# Patient Record
Sex: FEMALE | Race: WHITE | Employment: OTHER | URBAN - NONMETROPOLITAN AREA
[De-identification: names, ages, dates, MRNs, and addresses within clinical notes are randomized per-mention and may not be internally consistent; named-entity substitution may affect disease eponyms.]

---

## 2011-01-01 LAB
BILIRUBIN URINE: NORMAL
BLOOD, URINE: NORMAL
CLARITY: NORMAL
COLOR: NORMAL
GLUCOSE URINE: NORMAL
KETONES, URINE: NORMAL
LEUKOCYTE ESTERASE, URINE: NORMAL
NITRITE, URINE: NORMAL
PH UA: NORMAL
PROTEIN UA: NORMAL
SPECIFIC GRAVITY UA: NORMAL
UROBILINOGEN, URINE: NORMAL

## 2017-01-24 PROBLEM — A41.9 SEPSIS (HCC): Status: ACTIVE | Noted: 2017-01-24

## 2017-02-20 ENCOUNTER — OFFICE VISIT (OUTPATIENT)
Dept: CARDIOLOGY | Age: 67
End: 2017-02-20

## 2017-02-20 VITALS
WEIGHT: 129 LBS | HEART RATE: 60 BPM | HEIGHT: 63 IN | DIASTOLIC BLOOD PRESSURE: 80 MMHG | SYSTOLIC BLOOD PRESSURE: 130 MMHG | BODY MASS INDEX: 22.86 KG/M2

## 2017-02-20 DIAGNOSIS — I47.29 NSVT (NONSUSTAINED VENTRICULAR TACHYCARDIA): ICD-10-CM

## 2017-02-20 DIAGNOSIS — R06.09 DOE (DYSPNEA ON EXERTION): ICD-10-CM

## 2017-02-20 DIAGNOSIS — I49.3 PVC'S (PREMATURE VENTRICULAR CONTRACTIONS): Primary | ICD-10-CM

## 2017-02-20 DIAGNOSIS — G35 MULTIPLE SCLEROSIS (HCC): ICD-10-CM

## 2017-02-20 PROCEDURE — 1036F TOBACCO NON-USER: CPT | Performed by: PHYSICIAN ASSISTANT

## 2017-02-20 PROCEDURE — G8484 FLU IMMUNIZE NO ADMIN: HCPCS | Performed by: PHYSICIAN ASSISTANT

## 2017-02-20 PROCEDURE — G8427 DOCREV CUR MEDS BY ELIG CLIN: HCPCS | Performed by: PHYSICIAN ASSISTANT

## 2017-02-20 PROCEDURE — 1090F PRES/ABSN URINE INCON ASSESS: CPT | Performed by: PHYSICIAN ASSISTANT

## 2017-02-20 PROCEDURE — G8419 CALC BMI OUT NRM PARAM NOF/U: HCPCS | Performed by: PHYSICIAN ASSISTANT

## 2017-02-20 PROCEDURE — 99213 OFFICE O/P EST LOW 20 MIN: CPT | Performed by: PHYSICIAN ASSISTANT

## 2017-02-20 PROCEDURE — 4040F PNEUMOC VAC/ADMIN/RCVD: CPT | Performed by: PHYSICIAN ASSISTANT

## 2017-02-20 PROCEDURE — 3017F COLORECTAL CA SCREEN DOC REV: CPT | Performed by: PHYSICIAN ASSISTANT

## 2017-02-20 PROCEDURE — G8400 PT W/DXA NO RESULTS DOC: HCPCS | Performed by: PHYSICIAN ASSISTANT

## 2017-02-20 PROCEDURE — 1111F DSCHRG MED/CURRENT MED MERGE: CPT | Performed by: PHYSICIAN ASSISTANT

## 2017-02-20 PROCEDURE — 3014F SCREEN MAMMO DOC REV: CPT | Performed by: PHYSICIAN ASSISTANT

## 2017-02-20 PROCEDURE — 1123F ACP DISCUSS/DSCN MKR DOCD: CPT | Performed by: PHYSICIAN ASSISTANT

## 2017-02-20 RX ORDER — DIGOXIN 250 MCG
250 TABLET ORAL DAILY
Qty: 30 TABLET | Refills: 3 | Status: SHIPPED | OUTPATIENT
Start: 2017-02-20 | End: 2017-06-27 | Stop reason: SDUPTHER

## 2017-06-27 ENCOUNTER — OFFICE VISIT (OUTPATIENT)
Dept: CARDIOLOGY | Age: 67
End: 2017-06-27

## 2017-06-27 VITALS
HEART RATE: 72 BPM | WEIGHT: 130 LBS | SYSTOLIC BLOOD PRESSURE: 118 MMHG | HEIGHT: 63 IN | DIASTOLIC BLOOD PRESSURE: 64 MMHG | BODY MASS INDEX: 23.04 KG/M2

## 2017-06-27 DIAGNOSIS — I49.3 FREQUENT PVCS: Primary | ICD-10-CM

## 2017-06-27 DIAGNOSIS — I47.29 NSVT (NONSUSTAINED VENTRICULAR TACHYCARDIA): ICD-10-CM

## 2017-06-27 PROCEDURE — 3017F COLORECTAL CA SCREEN DOC REV: CPT | Performed by: INTERNAL MEDICINE

## 2017-06-27 PROCEDURE — 4040F PNEUMOC VAC/ADMIN/RCVD: CPT | Performed by: INTERNAL MEDICINE

## 2017-06-27 PROCEDURE — G8420 CALC BMI NORM PARAMETERS: HCPCS | Performed by: INTERNAL MEDICINE

## 2017-06-27 PROCEDURE — 3014F SCREEN MAMMO DOC REV: CPT | Performed by: INTERNAL MEDICINE

## 2017-06-27 PROCEDURE — 1036F TOBACCO NON-USER: CPT | Performed by: INTERNAL MEDICINE

## 2017-06-27 PROCEDURE — 1123F ACP DISCUSS/DSCN MKR DOCD: CPT | Performed by: INTERNAL MEDICINE

## 2017-06-27 PROCEDURE — G8427 DOCREV CUR MEDS BY ELIG CLIN: HCPCS | Performed by: INTERNAL MEDICINE

## 2017-06-27 PROCEDURE — 1090F PRES/ABSN URINE INCON ASSESS: CPT | Performed by: INTERNAL MEDICINE

## 2017-06-27 PROCEDURE — G8400 PT W/DXA NO RESULTS DOC: HCPCS | Performed by: INTERNAL MEDICINE

## 2017-06-27 PROCEDURE — 99213 OFFICE O/P EST LOW 20 MIN: CPT | Performed by: INTERNAL MEDICINE

## 2017-06-27 RX ORDER — ASPIRIN 325 MG
325 TABLET ORAL DAILY
COMMUNITY
End: 2018-02-06 | Stop reason: SDUPTHER

## 2017-06-27 RX ORDER — HYDROCODONE BITARTRATE AND ACETAMINOPHEN 5; 325 MG/1; MG/1
1 TABLET ORAL EVERY 8 HOURS PRN
COMMUNITY
End: 2018-02-06 | Stop reason: ALTCHOICE

## 2017-06-27 RX ORDER — DIGOXIN 250 MCG
250 TABLET ORAL DAILY
Qty: 30 TABLET | Refills: 6 | Status: SHIPPED | OUTPATIENT
Start: 2017-06-27 | End: 2018-01-09 | Stop reason: SDUPTHER

## 2018-01-09 RX ORDER — DIGOXIN 250 MCG
250 TABLET ORAL DAILY
Qty: 30 TABLET | Refills: 6 | Status: SHIPPED | OUTPATIENT
Start: 2018-01-09 | End: 2018-01-17 | Stop reason: SDUPTHER

## 2018-01-18 RX ORDER — DIGOXIN 250 MCG
250 TABLET ORAL DAILY
Qty: 30 TABLET | Refills: 0 | Status: SHIPPED | OUTPATIENT
Start: 2018-01-18 | End: 2018-08-07

## 2018-02-06 ENCOUNTER — OFFICE VISIT (OUTPATIENT)
Dept: CARDIOLOGY CLINIC | Age: 68
End: 2018-02-06
Payer: MEDICARE

## 2018-02-06 VITALS
WEIGHT: 130 LBS | SYSTOLIC BLOOD PRESSURE: 90 MMHG | HEART RATE: 62 BPM | DIASTOLIC BLOOD PRESSURE: 58 MMHG | BODY MASS INDEX: 23.04 KG/M2 | HEIGHT: 63 IN

## 2018-02-06 DIAGNOSIS — I25.10 ASCVD (ARTERIOSCLEROTIC CARDIOVASCULAR DISEASE): Primary | ICD-10-CM

## 2018-02-06 PROCEDURE — 3014F SCREEN MAMMO DOC REV: CPT | Performed by: INTERNAL MEDICINE

## 2018-02-06 PROCEDURE — 4040F PNEUMOC VAC/ADMIN/RCVD: CPT | Performed by: INTERNAL MEDICINE

## 2018-02-06 PROCEDURE — 1036F TOBACCO NON-USER: CPT | Performed by: INTERNAL MEDICINE

## 2018-02-06 PROCEDURE — G8420 CALC BMI NORM PARAMETERS: HCPCS | Performed by: INTERNAL MEDICINE

## 2018-02-06 PROCEDURE — 3017F COLORECTAL CA SCREEN DOC REV: CPT | Performed by: INTERNAL MEDICINE

## 2018-02-06 PROCEDURE — G8400 PT W/DXA NO RESULTS DOC: HCPCS | Performed by: INTERNAL MEDICINE

## 2018-02-06 PROCEDURE — G8484 FLU IMMUNIZE NO ADMIN: HCPCS | Performed by: INTERNAL MEDICINE

## 2018-02-06 PROCEDURE — G8427 DOCREV CUR MEDS BY ELIG CLIN: HCPCS | Performed by: INTERNAL MEDICINE

## 2018-02-06 PROCEDURE — G8598 ASA/ANTIPLAT THER USED: HCPCS | Performed by: INTERNAL MEDICINE

## 2018-02-06 PROCEDURE — 1123F ACP DISCUSS/DSCN MKR DOCD: CPT | Performed by: INTERNAL MEDICINE

## 2018-02-06 PROCEDURE — 1090F PRES/ABSN URINE INCON ASSESS: CPT | Performed by: INTERNAL MEDICINE

## 2018-02-06 PROCEDURE — 99214 OFFICE O/P EST MOD 30 MIN: CPT | Performed by: INTERNAL MEDICINE

## 2018-02-06 ASSESSMENT — ENCOUNTER SYMPTOMS
BLURRED VISION: 0
BACK PAIN: 0
DIARRHEA: 0
BLOATING: 0
COUGH: 0
CONSTIPATION: 0
HOARSE VOICE: 0
EYE DISCHARGE: 0
BOWEL INCONTINENCE: 0
SPUTUM PRODUCTION: 0
DOUBLE VISION: 0
SHORTNESS OF BREATH: 0
EYE PAIN: 0
ABDOMINAL PAIN: 0
HEMOPTYSIS: 0
CHANGE IN BOWEL HABIT: 0
ORTHOPNEA: 0

## 2018-02-06 NOTE — PROGRESS NOTES
[facility] PROFESSIONAL SERVS  [facility] OF [address]  [address] [address]  Dept: [phone]  Dept Fax: [phone]  Loc: [phone]    Visit Date: 2/6/2018    Ms. Jagjit Ramírez is a 69 y.o. female  who presented for:  Chief Complaint   Patient presents with    6 Month Follow-Up     PVC's       HPI:   68 yo F c hx of HLD, Multiple sclerosis, and seizure disorder presents for a follow up. Patient was last seen by Dr. Madan Hi. She is wheel chair bound due to multiple sclerosis . She was previously seen in hospital for possible PVCs? She denies any symptoms and is unsure of what medications she takes for her heart. Denies any chest pain, sob, palpitations, lightheadedness, dizziness, orthopnea, PND or pedal edema. Current Outpatient Prescriptions:     aspirin 81 MG tablet, Take 1 tablet by mouth daily, Disp: 30 tablet, Rfl: 3    metoprolol tartrate (LOPRESSOR) 25 MG tablet, Take 0.5 tablets by mouth 2 times daily, Disp: 30 tablet, Rfl: 0    digoxin (LANOXIN) 250 MCG tablet, Take 1 tablet by mouth daily, Disp: 30 tablet, Rfl: 0    oxybutynin (DITROPAN) 5 MG/5ML syrup, Take 5 mg by mouth 2 times daily, Disp: , Rfl:     carBAMazepine (TEGRETOL) 100 MG chewable tablet, Take 100 mg by mouth 3 times daily , Disp: , Rfl:     Past Medical History  Maria E Wahl  has a past medical history of Hyperlipidemia; Intra-abdominal lymphadenopathy; MS (multiple sclerosis) ([facility] [address] Pancreatitis; Pneumonia; Seizures ([facility] [address] and UTI (urinary tract infection). Social History  Sharri  reports that she quit smoking about 4 years ago. Her smoking use included E-Cigarettes. She has a 80.00 pack-year smoking history. She has quit using smokeless tobacco. She reports that she does not drink alcohol or use drugs. Family History  Sharri family history includes Cancer in her mother; Heart Disease in her father.     Past Surgical History   Past Surgical History:   Procedure Laterality Date    CHOLECYSTECTOMY Normal rate, regular rhythm, normal heart sounds and intact distal pulses. No murmur heard. Pulmonary/Chest: Effort normal and breath sounds normal. No respiratory distress. She has no wheezes. She has no rales. Abdominal: Soft. Bowel sounds are normal. She exhibits no distension. There is no tenderness. Musculoskeletal: Normal range of motion. She exhibits no edema. Neurological: She is alert and oriented to person, place, and time. No cranial nerve deficit. Coordination normal.   Skin: Skin is warm and dry. Psychiatric: She has a normal mood and affect. Nursing note and vitals reviewed.       No results found for: CKTOTAL, CKMB, CKMBINDEX    Lab Results   Component Value Date    WBC 7.6 01/29/2017    RBC 3.87 01/29/2017    HGB 12.7 01/29/2017    HCT 37.2 01/29/2017    MCV 95.9 01/29/2017    MCH 32.8 01/29/2017    MCHC 34.2 01/29/2017    RDW 13.6 01/29/2017     01/29/2017    MPV 9.4 01/29/2017       Lab Results   Component Value Date     01/29/2017    K 3.9 01/29/2017    CL 98 01/29/2017    CO2 25 01/29/2017    BUN 6 01/29/2017    LABALBU 2.4 01/24/2017    CREATININE 0.3 01/29/2017    CALCIUM 8.5 01/29/2017    LABGLOM >90 01/29/2017    GLUCOSE 75 01/29/2017       Lab Results   Component Value Date    ALKPHOS 261 01/24/2017    ALT 41 01/24/2017    AST 54 01/24/2017    PROT 7.1 01/24/2017    BILITOT 0.5 01/24/2017    BILIDIR 1.2 04/03/2016    LABALBU 2.4 01/24/2017       Lab Results   Component Value Date    MG 1.9 01/25/2017       Lab Results   Component Value Date    INR 1.04 10/02/2015         No results found for: LABA1C    Lab Results   Component Value Date    TRIG 67 10/02/2015       Lab Results   Component Value Date    TSH 2.430 03/29/2016         Testing Reviewed:      I have individually reviewed the below cardiac tests    EKG:SR, anterior infarct    ECHO:   Results for orders placed during the hospital encounter of 01/23/17   ECHO Complete 2D W Doppler W Color    Narrative Transthoracic Echocardiography Report (TTE)     Demographics      Patient Name    Alyssa Figueroa Gender                Female      MR #            [id number]     Race                                                      Ethnicity      Account #       [de-identified]       Room Number           0004      Accession       [id number]     Date of Study         01/24/2017   Number      Date of Birth   1950    Referring Physician   Sierra Arteaga MD      Age             74 year(s)    Sonographer           Ngozi Reis [facility]                                    Interpreting          Martha Watson DO                                 Physician     Procedure    Type of Study      TTE procedure:ECHOCARDIOGRAM COMPLETE 2D W DOPPLER W COLOR. Procedure Date  Date: 01/24/2017 Start: 08:50 AM    Study Location: Bedside  Technical Quality: Adequate visualization    Indications:Arrhythmia. Additional Medical History:Ex Smoker, Sepsis, Multiple Sclerosis,  Hyperlipidemia. Patient Status: Routine    Height: 63 inches Weight: 115 pounds BSA: 1.53 m^2 BMI: 20.37 kg/m^2    BP: 96/76 mmHg     Conclusions      Summary   Systolic function was normal.   Ejection fraction is visually estimated at 50 %. Doppler parameters were consistent with abnormal left ventricular   relaxation (grade 1 diastolic dysfunction). Normal right ventricular size and function. Right ventricular systolic pressure of 45 mm Hg consistent with mild   pulmonary hypertension. Structurally normal mitral valve. Mild mitral regurgitation is present. Tricuspid valve is structurally normal.   Mild tricuspid regurgitation visualized.       Signature      ----------------------------------------------------------------   Electronically signed by Martha Watson DO (Interpreting   physician) on 01/24/2017 at 09:43 PM   ---------------------------------------------------------------- TV Peak A-Wave: 39 cm/s   msec                              IVRT: 70 msec         TV Peak Gradient: 1.05                                                    mmHg   MV E' Septal Velocity:                           TR Velocity:296 cm/s   8.68 cm/s                  AV DVI (Vmax):0.7     TR Gradient:35.05 mmHg   MV A' Septal Velocity:                           PV Peak Velocity: 73.5   8.48 cm/s                                        cm/s   MV E' Lateral Velocity:                          PV Peak Gradient: 2.16   7.02 cm/s                                        mmHg   MV A' Lateral Velocity:   11.1 cm/s   E/E' septal: 10.35   E/E' lateral: 12.79   MR Velocity: 398 cm/s     [email or web address] [id number]%3d%3d       STRESS:2/23/17:      Summary   There were no significant perfusion abnormalities.   No evidence of ischemia is noted on this study.   The LVEF is calculated to be 63% with normal wall motion       CATH:    Assessment/Plan      1. ASCVD (arteriosclerotic cardiovascular disease)     2. Hx of PVCs  3. HLD  4. Multiple scleroris    Patient denies any cardiac symptoms at present  Reviewed Echo and Stress test with her  BP on the lower side  No lightheadedness or dizziness  Patient on Aspirin 325mg will reduce to 81 mg daily  Patient is on digoxin, asked to stop the medication  The patient is asked to make an attempt to improve diet and exercise patterns to aid in medical management of this problem. Asked to call office or return to clinic if any changes or symptoms. Thank you for allowing me to participate in the care of this patient. Please do not hesitate to contact me for any further questions. Return in about 6 months (around 8/6/2018), or if symptoms worsen or fail to improve, for Review testing, Regular follow up.            Electronically signed by Gómez Rangel MD

## 2018-02-06 NOTE — PROGRESS NOTES
Pt here for 6 month follow up, former [facility] pt. Pt denies cp, dizziness/lightheadedness, SOB, palpitations    Pt continues with bilateral feet swelling without change.

## 2018-06-04 RX ORDER — ACETAMINOPHEN/DIPHENHYDRAMINE 500MG-25MG
TABLET ORAL
Qty: 30 TABLET | Refills: 6 | Status: SHIPPED | OUTPATIENT
Start: 2018-06-04 | End: 2018-12-28 | Stop reason: SDUPTHER

## 2018-08-07 ENCOUNTER — OFFICE VISIT (OUTPATIENT)
Dept: CARDIOLOGY CLINIC | Age: 68
End: 2018-08-07
Payer: MEDICARE

## 2018-08-07 VITALS
HEART RATE: 60 BPM | BODY MASS INDEX: 23.92 KG/M2 | HEIGHT: 63 IN | SYSTOLIC BLOOD PRESSURE: 122 MMHG | DIASTOLIC BLOOD PRESSURE: 72 MMHG | WEIGHT: 135 LBS

## 2018-08-07 DIAGNOSIS — I25.10 ASCVD (ARTERIOSCLEROTIC CARDIOVASCULAR DISEASE): Primary | ICD-10-CM

## 2018-08-07 DIAGNOSIS — R60.0 LEG EDEMA: ICD-10-CM

## 2018-08-07 PROCEDURE — 4040F PNEUMOC VAC/ADMIN/RCVD: CPT | Performed by: INTERNAL MEDICINE

## 2018-08-07 PROCEDURE — G8598 ASA/ANTIPLAT THER USED: HCPCS | Performed by: INTERNAL MEDICINE

## 2018-08-07 PROCEDURE — G8427 DOCREV CUR MEDS BY ELIG CLIN: HCPCS | Performed by: INTERNAL MEDICINE

## 2018-08-07 PROCEDURE — 1036F TOBACCO NON-USER: CPT | Performed by: INTERNAL MEDICINE

## 2018-08-07 PROCEDURE — 99213 OFFICE O/P EST LOW 20 MIN: CPT | Performed by: INTERNAL MEDICINE

## 2018-08-07 PROCEDURE — 1123F ACP DISCUSS/DSCN MKR DOCD: CPT | Performed by: INTERNAL MEDICINE

## 2018-08-07 PROCEDURE — 3017F COLORECTAL CA SCREEN DOC REV: CPT | Performed by: INTERNAL MEDICINE

## 2018-08-07 PROCEDURE — 1090F PRES/ABSN URINE INCON ASSESS: CPT | Performed by: INTERNAL MEDICINE

## 2018-08-07 PROCEDURE — G8420 CALC BMI NORM PARAMETERS: HCPCS | Performed by: INTERNAL MEDICINE

## 2018-08-07 PROCEDURE — 1101F PT FALLS ASSESS-DOCD LE1/YR: CPT | Performed by: INTERNAL MEDICINE

## 2018-08-07 PROCEDURE — G8400 PT W/DXA NO RESULTS DOC: HCPCS | Performed by: INTERNAL MEDICINE

## 2018-08-07 RX ORDER — FUROSEMIDE 20 MG/1
20 TABLET ORAL DAILY
Qty: 60 TABLET | Refills: 3 | Status: SHIPPED | OUTPATIENT
Start: 2018-08-07 | End: 2018-11-08

## 2018-08-07 ASSESSMENT — ENCOUNTER SYMPTOMS
SHORTNESS OF BREATH: 0
ORTHOPNEA: 0
EYE PAIN: 0
BLOATING: 0
EYE DISCHARGE: 0
SPUTUM PRODUCTION: 0
BOWEL INCONTINENCE: 0
CONSTIPATION: 0
HEMOPTYSIS: 0
COUGH: 0
DOUBLE VISION: 0
HOARSE VOICE: 0
CHANGE IN BOWEL HABIT: 0
BACK PAIN: 0
BLURRED VISION: 0
ABDOMINAL PAIN: 0
DIARRHEA: 0

## 2018-08-07 NOTE — PROGRESS NOTES
Pt here for 6 month check up    Denies chest pain, SOB, palpitations     C/O bilateral lower leg edema. Pt states she has had it before but not this bad.

## 2018-08-07 NOTE — PROGRESS NOTES
[address] CARDIOLOGY  [facility] [address]  [place] [address]  Dept: [phone]  Dept Fax: [phone]  Loc: [phone]    Visit Date: 8/7/2018    Ms. Jimenez Harrison is a 69 y.o. female  who presented for:  Chief Complaint   Patient presents with    6 Month Follow-Up    Leg Swelling       HPI:   70 yo F c hx of Seizures, pancreatitis, HLD is here for a follow up. Patient reports some pedal edema. The leg edema is chronic. paient is unable to ambulate due to multiple sclerosis. Denies any chest pain, sob, palpitations, lightheadedness, dizziness, orthopnea, PND. Current Outpatient Prescriptions:     RA ASPIRIN EC 81 MG EC tablet, take 1 tablet by mouth once daily, Disp: 30 tablet, Rfl: 6    metoprolol tartrate (LOPRESSOR) 25 MG tablet, Take 0.5 tablets by mouth 2 times daily, Disp: 30 tablet, Rfl: 8    oxybutynin (DITROPAN) 5 MG/5ML syrup, Take 5 mg by mouth 2 times daily, Disp: , Rfl:     carBAMazepine (TEGRETOL) 100 MG chewable tablet, Take 100 mg by mouth 3 times daily , Disp: , Rfl:     Past Medical History  Kamran Grace  has a past medical history of Hyperlipidemia; Intra-abdominal lymphadenopathy; MS (multiple sclerosis) ([address] Pancreatitis; Pneumonia; Seizures ([address] and UTI (urinary tract infection). Social History  Sharri  reports that she quit smoking about 4 years ago. Her smoking use included E-Cigarettes. She has a 80.00 pack-year smoking history. She has quit using smokeless tobacco. She reports that she does not drink alcohol or use drugs. Family History  Sharri family history includes Cancer in her mother; Heart Disease in her father. Past Surgical History   Past Surgical History:   Procedure Laterality Date    CHOLECYSTECTOMY  April 1st 2016    laparoscopic       Subjective:     Review of Systems   Constitution: Negative for decreased appetite, diaphoresis, fever, weakness and malaise/fatigue.    HENT: Negative for congestion,                                     Ethnicity      Account #       [de-identified]       Room Number           0004      Accession       [id number]     Date of Study         01/24/2017   Number      Date of Birth   1950    Referring Physician   Gary Hein MD      Age             74 year(s)    Sonographer           Maria Teresa Carbajal, [facility]                                    Interpreting          Bailee Belle DO                                 Physician     Procedure    Type of Study      TTE procedure:ECHOCARDIOGRAM COMPLETE 2D W DOPPLER W COLOR. Procedure Date  Date: 01/24/2017 Start: 08:50 AM    Study Location: Bedside  Technical Quality: Adequate visualization    Indications:Arrhythmia. Additional Medical History:Ex Smoker, Sepsis, Multiple Sclerosis,  Hyperlipidemia. Patient Status: Routine    Height: 63 inches Weight: 115 pounds BSA: 1.53 m^2 BMI: 20.37 kg/m^2    BP: 96/76 mmHg     Conclusions      Summary   Systolic function was normal.   Ejection fraction is visually estimated at 50 %. Doppler parameters were consistent with abnormal left ventricular   relaxation (grade 1 diastolic dysfunction). Normal right ventricular size and function. Right ventricular systolic pressure of 45 mm Hg consistent with mild   pulmonary hypertension. Structurally normal mitral valve. Mild mitral regurgitation is present. Tricuspid valve is structurally normal.   Mild tricuspid regurgitation visualized. Signature      ----------------------------------------------------------------   Electronically signed by Bailee Belle DO (Interpreting   physician) on 01/24/2017 at 09:43 PM   ----------------------------------------------------------------      Findings      Mitral Valve   Structurally normal mitral valve. Mild mitral regurgitation is present.       Aortic Valve   The aortic valve appears to be trileaflet with good leaflet separation. Tricuspid Valve   Tricuspid valve is structurally normal.   Mild tricuspid regurgitation visualized. Pulmonic Valve   Pulmonic valve is structurally normal.   Trivial pulmonic regurgitation visualized. Left Atrium   Normal size left atrium. Left Ventricle   Systolic function was normal.   Ejection fraction is visually estimated at 50 %. Doppler parameters were consistent with abnormal left ventricular   relaxation (grade 1 diastolic dysfunction). Right Atrium   The right atrium is of normal size. Right Ventricle   Normal right ventricular size and function. Right ventricular systolic pressure of 45 mm Hg consistent with mild   pulmonary hypertension. Pericardial Effusion   There is a trivial pericardial effusion noted.      M-Mode/2D Measurements & Calculations      LV Diastolic     LV Systolic Dimension: 3 cm AV Cusp Separation: 1.6 cmLA   Dimension: 3.8   LV Volume Diastolic: 62 ml  Dimension: 2.4 cmAO Root   cm               LV Volume Systolic: 35 ml   Dimension: 3 cm   LV FS:21.1 %     LV EDV/LV EDV Index: 62   LV PW Diastolic: PA/56 W^0WR ESV/LV ESV   0.7 cm           Index: 35 ml/23 m^2   Septum           EF Calculated: 43.6 %       RV Diastolic Dimension: 1.6   Diastolic: 0.8                               cm   cm                                                LA/Aorta: 0.8     Doppler Measurements & Calculations      MV Peak E-Wave: 89.8 cm/s  AV Peak Velocity: 142 LVOT Peak Velocity: 100   MV Peak A-Wave: 91.8 cm/s  cm/s                  cm/s   MV E/A Ratio: 0.98         AV Peak Gradient:     LVOT Peak Gradient: 4   MV Peak Gradient: 3.23     8.07 mmHg             mmHg   mmHg                                                    TV Peak E-Wave: 51.3 cm/s   MV Deceleration Time: 313                        TV Peak A-Wave: 39 cm/s   msec                              IVRT: 70 msec         TV Peak Gradient: 1.05 MD [facility]  8/7/2018 at 12:21 PM

## 2018-11-08 NOTE — PROGRESS NOTES
Patient instructed not to eat or drink anything after midnight the day before surgery. Take heart, blood pressure medications & Tegretol in the morning with a small sip of water. Please bring list of medications with the dosages & when you take them. If you do not  have a list bring the medications bottles with you. If having a MAC or general anesthetic you MUST have a . Bring photo ID & insurance information. Leave jewelry (watch ,rings, peircings) & other valuables, including extra cash, at home. Wear comfortable clean clothing. When showering or bathing the night before or morning of surgery please use  antibacterial soap.

## 2018-11-14 ENCOUNTER — ANESTHESIA EVENT (OUTPATIENT)
Dept: OPERATING ROOM | Age: 68
End: 2018-11-14
Payer: MEDICARE

## 2018-11-15 ENCOUNTER — ANESTHESIA (OUTPATIENT)
Dept: OPERATING ROOM | Age: 68
End: 2018-11-15
Payer: MEDICARE

## 2018-11-15 ENCOUNTER — HOSPITAL ENCOUNTER (OUTPATIENT)
Age: 68
Setting detail: OUTPATIENT SURGERY
Discharge: HOME | End: 2018-11-15
Attending: OPHTHALMOLOGY | Admitting: OPHTHALMOLOGY
Payer: MEDICARE

## 2018-11-15 VITALS
OXYGEN SATURATION: 98 % | DIASTOLIC BLOOD PRESSURE: 52 MMHG | RESPIRATION RATE: 18 BRPM | SYSTOLIC BLOOD PRESSURE: 89 MMHG

## 2018-11-15 VITALS
WEIGHT: 135 LBS | RESPIRATION RATE: 20 BRPM | OXYGEN SATURATION: 98 % | DIASTOLIC BLOOD PRESSURE: 56 MMHG | SYSTOLIC BLOOD PRESSURE: 94 MMHG | TEMPERATURE: 97 F | HEIGHT: 63 IN | BODY MASS INDEX: 23.92 KG/M2 | HEART RATE: 95 BPM

## 2018-11-15 PROCEDURE — 2709999900 HC NON-CHARGEABLE SUPPLY: Performed by: OPHTHALMOLOGY

## 2018-11-15 PROCEDURE — 3600000003 HC SURGERY LEVEL 3 BASE: Performed by: OPHTHALMOLOGY

## 2018-11-15 PROCEDURE — 3600000013 HC SURGERY LEVEL 3 ADDTL 15MIN: Performed by: OPHTHALMOLOGY

## 2018-11-15 PROCEDURE — 3700000000 HC ANESTHESIA ATTENDED CARE: Performed by: OPHTHALMOLOGY

## 2018-11-15 PROCEDURE — 7100000011 HC PHASE II RECOVERY - ADDTL 15 MIN: Performed by: OPHTHALMOLOGY

## 2018-11-15 PROCEDURE — 2500000003 HC RX 250 WO HCPCS: Performed by: OPHTHALMOLOGY

## 2018-11-15 PROCEDURE — 6370000000 HC RX 637 (ALT 250 FOR IP): Performed by: OPHTHALMOLOGY

## 2018-11-15 PROCEDURE — 3700000001 HC ADD 15 MINUTES (ANESTHESIA): Performed by: OPHTHALMOLOGY

## 2018-11-15 PROCEDURE — 7100000010 HC PHASE II RECOVERY - FIRST 15 MIN: Performed by: OPHTHALMOLOGY

## 2018-11-15 PROCEDURE — V2632 POST CHMBR INTRAOCULAR LENS: HCPCS | Performed by: OPHTHALMOLOGY

## 2018-11-15 PROCEDURE — 6360000002 HC RX W HCPCS: Performed by: NURSE ANESTHETIST, CERTIFIED REGISTERED

## 2018-11-15 DEVICE — Z DUP USE 2247921 LENS INTOCU +5.0 TO +34.0 DIOPT L13MM DIA6MM UV BLK: Type: IMPLANTABLE DEVICE | Site: EYE | Status: FUNCTIONAL

## 2018-11-15 RX ORDER — PROPOFOL 10 MG/ML
INJECTION, EMULSION INTRAVENOUS PRN
Status: DISCONTINUED | OUTPATIENT
Start: 2018-11-15 | End: 2018-11-15 | Stop reason: SDUPTHER

## 2018-11-15 RX ORDER — MIDAZOLAM HYDROCHLORIDE 1 MG/ML
INJECTION INTRAMUSCULAR; INTRAVENOUS PRN
Status: DISCONTINUED | OUTPATIENT
Start: 2018-11-15 | End: 2018-11-15 | Stop reason: SDUPTHER

## 2018-11-15 RX ORDER — ACETAMINOPHEN 500 MG
1000 TABLET ORAL
Status: DISCONTINUED | OUTPATIENT
Start: 2018-11-15 | End: 2018-11-15 | Stop reason: HOSPADM

## 2018-11-15 RX ORDER — SODIUM CHLORIDE 9 MG/ML
INJECTION, SOLUTION INTRAVENOUS CONTINUOUS
Status: DISCONTINUED | OUTPATIENT
Start: 2018-11-15 | End: 2018-11-15 | Stop reason: HOSPADM

## 2018-11-15 RX ORDER — KETOROLAC TROMETHAMINE 5 MG/ML
1 SOLUTION OPHTHALMIC EVERY 5 MIN PRN
Status: COMPLETED | OUTPATIENT
Start: 2018-11-15 | End: 2018-11-15

## 2018-11-15 RX ORDER — PHENYLEPHRINE HCL 2.5 %
1 DROPS OPHTHALMIC (EYE) EVERY 5 MIN PRN
Status: COMPLETED | OUTPATIENT
Start: 2018-11-15 | End: 2018-11-15

## 2018-11-15 RX ORDER — BUPIVACAINE HYDROCHLORIDE 7.5 MG/ML
1 INJECTION, SOLUTION EPIDURAL; RETROBULBAR EVERY 5 MIN PRN
Status: COMPLETED | OUTPATIENT
Start: 2018-11-15 | End: 2018-11-15

## 2018-11-15 RX ORDER — TROPICAMIDE 10 MG/ML
1 SOLUTION/ DROPS OPHTHALMIC EVERY 5 MIN PRN
Status: COMPLETED | OUTPATIENT
Start: 2018-11-15 | End: 2018-11-15

## 2018-11-15 RX ORDER — LIDOCAINE HYDROCHLORIDE 10 MG/ML
INJECTION, SOLUTION EPIDURAL; INFILTRATION; INTRACAUDAL; PERINEURAL PRN
Status: DISCONTINUED | OUTPATIENT
Start: 2018-11-15 | End: 2018-11-15 | Stop reason: HOSPADM

## 2018-11-15 RX ADMIN — PROPOFOL 50 MG: 10 INJECTION, EMULSION INTRAVENOUS at 10:41

## 2018-11-15 RX ADMIN — PROPOFOL 30 MG: 10 INJECTION, EMULSION INTRAVENOUS at 10:57

## 2018-11-15 RX ADMIN — PHENYLEPHRINE HYDROCHLORIDE 1 DROP: 25 SOLUTION/ DROPS OPHTHALMIC at 09:25

## 2018-11-15 RX ADMIN — TROPICAMIDE 1 DROP: 10 SOLUTION/ DROPS OPHTHALMIC at 09:45

## 2018-11-15 RX ADMIN — PROPOFOL 30 MG: 10 INJECTION, EMULSION INTRAVENOUS at 10:43

## 2018-11-15 RX ADMIN — TROPICAMIDE 1 DROP: 10 SOLUTION/ DROPS OPHTHALMIC at 09:40

## 2018-11-15 RX ADMIN — PROPOFOL 30 MG: 10 INJECTION, EMULSION INTRAVENOUS at 10:50

## 2018-11-15 RX ADMIN — KETOROLAC TROMETHAMINE 1 DROP: 5 SOLUTION OPHTHALMIC at 09:30

## 2018-11-15 RX ADMIN — KETOROLAC TROMETHAMINE 1 DROP: 5 SOLUTION OPHTHALMIC at 09:25

## 2018-11-15 RX ADMIN — TROPICAMIDE 1 DROP: 10 SOLUTION/ DROPS OPHTHALMIC at 09:35

## 2018-11-15 RX ADMIN — PROPOFOL 30 MG: 10 INJECTION, EMULSION INTRAVENOUS at 10:47

## 2018-11-15 RX ADMIN — PROPOFOL 30 MG: 10 INJECTION, EMULSION INTRAVENOUS at 10:53

## 2018-11-15 RX ADMIN — BUPIVACAINE HYDROCHLORIDE 0.38 MG: 7.5 INJECTION, SOLUTION EPIDURAL; RETROBULBAR at 09:35

## 2018-11-15 RX ADMIN — PHENYLEPHRINE HYDROCHLORIDE 1 DROP: 25 SOLUTION/ DROPS OPHTHALMIC at 09:35

## 2018-11-15 RX ADMIN — BUPIVACAINE HYDROCHLORIDE 0.38 MG: 7.5 INJECTION, SOLUTION EPIDURAL; RETROBULBAR at 09:25

## 2018-11-15 RX ADMIN — TROPICAMIDE 1 DROP: 10 SOLUTION/ DROPS OPHTHALMIC at 09:25

## 2018-11-15 RX ADMIN — MIDAZOLAM HYDROCHLORIDE 2 MG: 1 INJECTION, SOLUTION INTRAMUSCULAR; INTRAVENOUS at 10:41

## 2018-11-15 RX ADMIN — PHENYLEPHRINE HYDROCHLORIDE 1 DROP: 25 SOLUTION/ DROPS OPHTHALMIC at 09:30

## 2018-11-15 RX ADMIN — KETOROLAC TROMETHAMINE 1 DROP: 5 SOLUTION OPHTHALMIC at 09:39

## 2018-11-15 RX ADMIN — BUPIVACAINE HYDROCHLORIDE 0.38 MG: 7.5 INJECTION, SOLUTION EPIDURAL; RETROBULBAR at 09:30

## 2018-11-15 RX ADMIN — TROPICAMIDE 1 DROP: 10 SOLUTION/ DROPS OPHTHALMIC at 09:30

## 2018-11-15 ASSESSMENT — PULMONARY FUNCTION TESTS
PIF_VALUE: 0

## 2018-11-15 ASSESSMENT — PAIN - FUNCTIONAL ASSESSMENT: PAIN_FUNCTIONAL_ASSESSMENT: 0-10

## 2018-11-15 ASSESSMENT — ENCOUNTER SYMPTOMS: SHORTNESS OF BREATH: 1

## 2018-11-15 ASSESSMENT — PAIN SCALES - GENERAL
PAINLEVEL_OUTOF10: 0

## 2018-11-15 NOTE — PROGRESS NOTES
1116:  Pt arrival to phase II recovery, VSS, POx 98% RA. Pt denies pain/nausea. IV infusing. Warm blankets provided to pt.  to bedside, Dr. Talley to see pt & . Pt denies snack/drink. Pt resting, side rails up x 2. Call light in reach. 1135:  Pt &  educated on d/c instructions, both verbalize understanding. IV removed, pt dressing for d/c.    1155:  Pt has riding scooter, fully awake.  assisting pt to utilize scooter to get to handicapped accessible vehicle. Pt d/c in stable condition.

## 2018-12-16 ENCOUNTER — ANESTHESIA EVENT (OUTPATIENT)
Dept: OPERATING ROOM | Age: 68
End: 2018-12-16
Payer: MEDICARE

## 2018-12-17 ENCOUNTER — HOSPITAL ENCOUNTER (OUTPATIENT)
Age: 68
Setting detail: OUTPATIENT SURGERY
Discharge: HOME | End: 2018-12-17
Attending: OPHTHALMOLOGY | Admitting: OPHTHALMOLOGY
Payer: MEDICARE

## 2018-12-17 ENCOUNTER — ANESTHESIA (OUTPATIENT)
Dept: OPERATING ROOM | Age: 68
End: 2018-12-17
Payer: MEDICARE

## 2018-12-17 VITALS
RESPIRATION RATE: 20 BRPM | TEMPERATURE: 97 F | WEIGHT: 140 LBS | SYSTOLIC BLOOD PRESSURE: 102 MMHG | BODY MASS INDEX: 24.8 KG/M2 | HEART RATE: 82 BPM | OXYGEN SATURATION: 97 % | DIASTOLIC BLOOD PRESSURE: 55 MMHG | HEIGHT: 63 IN

## 2018-12-17 VITALS
SYSTOLIC BLOOD PRESSURE: 92 MMHG | RESPIRATION RATE: 16 BRPM | OXYGEN SATURATION: 98 % | DIASTOLIC BLOOD PRESSURE: 55 MMHG

## 2018-12-17 PROCEDURE — 7100000011 HC PHASE II RECOVERY - ADDTL 15 MIN: Performed by: OPHTHALMOLOGY

## 2018-12-17 PROCEDURE — 3700000000 HC ANESTHESIA ATTENDED CARE: Performed by: OPHTHALMOLOGY

## 2018-12-17 PROCEDURE — 7100000010 HC PHASE II RECOVERY - FIRST 15 MIN: Performed by: OPHTHALMOLOGY

## 2018-12-17 PROCEDURE — V2632 POST CHMBR INTRAOCULAR LENS: HCPCS | Performed by: OPHTHALMOLOGY

## 2018-12-17 PROCEDURE — 6360000002 HC RX W HCPCS: Performed by: NURSE ANESTHETIST, CERTIFIED REGISTERED

## 2018-12-17 PROCEDURE — 2500000003 HC RX 250 WO HCPCS: Performed by: OPHTHALMOLOGY

## 2018-12-17 PROCEDURE — 6370000000 HC RX 637 (ALT 250 FOR IP): Performed by: OPHTHALMOLOGY

## 2018-12-17 PROCEDURE — 3600000012 HC SURGERY LEVEL 2 ADDTL 15MIN: Performed by: OPHTHALMOLOGY

## 2018-12-17 PROCEDURE — 3700000001 HC ADD 15 MINUTES (ANESTHESIA): Performed by: OPHTHALMOLOGY

## 2018-12-17 PROCEDURE — 2580000003 HC RX 258: Performed by: OPHTHALMOLOGY

## 2018-12-17 PROCEDURE — 3600000002 HC SURGERY LEVEL 2 BASE: Performed by: OPHTHALMOLOGY

## 2018-12-17 PROCEDURE — 2709999900 HC NON-CHARGEABLE SUPPLY: Performed by: OPHTHALMOLOGY

## 2018-12-17 DEVICE — Z DUP USE 2247921 LENS INTOCU +5.0 TO +34.0 DIOPT L13MM DIA6MM UV BLK: Type: IMPLANTABLE DEVICE | Site: EYE | Status: FUNCTIONAL

## 2018-12-17 RX ORDER — SODIUM CHLORIDE 9 MG/ML
INJECTION, SOLUTION INTRAVENOUS CONTINUOUS
Status: DISCONTINUED | OUTPATIENT
Start: 2018-12-17 | End: 2018-12-17 | Stop reason: HOSPADM

## 2018-12-17 RX ORDER — MIDAZOLAM HYDROCHLORIDE 1 MG/ML
INJECTION INTRAMUSCULAR; INTRAVENOUS PRN
Status: DISCONTINUED | OUTPATIENT
Start: 2018-12-17 | End: 2018-12-17 | Stop reason: SDUPTHER

## 2018-12-17 RX ORDER — ACETAMINOPHEN 500 MG
1000 TABLET ORAL
Status: DISCONTINUED | OUTPATIENT
Start: 2018-12-17 | End: 2018-12-17 | Stop reason: HOSPADM

## 2018-12-17 RX ORDER — TROPICAMIDE 10 MG/ML
1 SOLUTION/ DROPS OPHTHALMIC EVERY 5 MIN PRN
Status: COMPLETED | OUTPATIENT
Start: 2018-12-17 | End: 2018-12-17

## 2018-12-17 RX ORDER — PROPOFOL 10 MG/ML
INJECTION, EMULSION INTRAVENOUS PRN
Status: DISCONTINUED | OUTPATIENT
Start: 2018-12-17 | End: 2018-12-17 | Stop reason: SDUPTHER

## 2018-12-17 RX ORDER — KETOROLAC TROMETHAMINE 5 MG/ML
1 SOLUTION OPHTHALMIC EVERY 5 MIN PRN
Status: COMPLETED | OUTPATIENT
Start: 2018-12-17 | End: 2018-12-17

## 2018-12-17 RX ORDER — BUPIVACAINE HYDROCHLORIDE 7.5 MG/ML
1 INJECTION, SOLUTION EPIDURAL; RETROBULBAR EVERY 5 MIN PRN
Status: COMPLETED | OUTPATIENT
Start: 2018-12-17 | End: 2018-12-17

## 2018-12-17 RX ORDER — LIDOCAINE HYDROCHLORIDE 10 MG/ML
INJECTION, SOLUTION EPIDURAL; INFILTRATION; INTRACAUDAL; PERINEURAL PRN
Status: DISCONTINUED | OUTPATIENT
Start: 2018-12-17 | End: 2018-12-17 | Stop reason: HOSPADM

## 2018-12-17 RX ORDER — PHENYLEPHRINE HCL 2.5 %
1 DROPS OPHTHALMIC (EYE) EVERY 5 MIN PRN
Status: COMPLETED | OUTPATIENT
Start: 2018-12-17 | End: 2018-12-17

## 2018-12-17 RX ADMIN — PROPOFOL 10 MG: 10 INJECTION, EMULSION INTRAVENOUS at 08:45

## 2018-12-17 RX ADMIN — PROPOFOL 10 MG: 10 INJECTION, EMULSION INTRAVENOUS at 08:29

## 2018-12-17 RX ADMIN — TROPICAMIDE 1 DROP: 10 SOLUTION/ DROPS OPHTHALMIC at 07:35

## 2018-12-17 RX ADMIN — PROPOFOL 40 MG: 10 INJECTION, EMULSION INTRAVENOUS at 08:28

## 2018-12-17 RX ADMIN — PROPOFOL 20 MG: 10 INJECTION, EMULSION INTRAVENOUS at 08:41

## 2018-12-17 RX ADMIN — PROPOFOL 20 MG: 10 INJECTION, EMULSION INTRAVENOUS at 08:38

## 2018-12-17 RX ADMIN — KETOROLAC TROMETHAMINE 1 DROP: 5 SOLUTION OPHTHALMIC at 07:45

## 2018-12-17 RX ADMIN — TROPICAMIDE 1 DROP: 10 SOLUTION/ DROPS OPHTHALMIC at 07:55

## 2018-12-17 RX ADMIN — PHENYLEPHRINE HYDROCHLORIDE 1 DROP: 25 SOLUTION/ DROPS OPHTHALMIC at 07:45

## 2018-12-17 RX ADMIN — TROPICAMIDE 1 DROP: 10 SOLUTION/ DROPS OPHTHALMIC at 07:40

## 2018-12-17 RX ADMIN — PROPOFOL 20 MG: 10 INJECTION, EMULSION INTRAVENOUS at 08:31

## 2018-12-17 RX ADMIN — PROPOFOL 20 MG: 10 INJECTION, EMULSION INTRAVENOUS at 08:43

## 2018-12-17 RX ADMIN — KETOROLAC TROMETHAMINE 1 DROP: 5 SOLUTION OPHTHALMIC at 07:40

## 2018-12-17 RX ADMIN — BUPIVACAINE HYDROCHLORIDE 0.38 MG: 7.5 INJECTION, SOLUTION EPIDURAL; RETROBULBAR at 07:40

## 2018-12-17 RX ADMIN — BUPIVACAINE HYDROCHLORIDE 0.38 MG: 7.5 INJECTION, SOLUTION EPIDURAL; RETROBULBAR at 07:45

## 2018-12-17 RX ADMIN — KETOROLAC TROMETHAMINE 1 DROP: 5 SOLUTION OPHTHALMIC at 07:35

## 2018-12-17 RX ADMIN — PROPOFOL 10 MG: 10 INJECTION, EMULSION INTRAVENOUS at 08:57

## 2018-12-17 RX ADMIN — BUPIVACAINE HYDROCHLORIDE 0.38 MG: 7.5 INJECTION, SOLUTION EPIDURAL; RETROBULBAR at 07:35

## 2018-12-17 RX ADMIN — PROPOFOL 10 MG: 10 INJECTION, EMULSION INTRAVENOUS at 08:50

## 2018-12-17 RX ADMIN — PHENYLEPHRINE HYDROCHLORIDE 1 DROP: 25 SOLUTION/ DROPS OPHTHALMIC at 07:35

## 2018-12-17 RX ADMIN — PROPOFOL 20 MG: 10 INJECTION, EMULSION INTRAVENOUS at 08:34

## 2018-12-17 RX ADMIN — PROPOFOL 10 MG: 10 INJECTION, EMULSION INTRAVENOUS at 08:47

## 2018-12-17 RX ADMIN — MIDAZOLAM HYDROCHLORIDE 1 MG: 1 INJECTION, SOLUTION INTRAMUSCULAR; INTRAVENOUS at 08:43

## 2018-12-17 RX ADMIN — TROPICAMIDE 1 DROP: 10 SOLUTION/ DROPS OPHTHALMIC at 07:50

## 2018-12-17 RX ADMIN — TROPICAMIDE 1 DROP: 10 SOLUTION/ DROPS OPHTHALMIC at 07:45

## 2018-12-17 RX ADMIN — SODIUM CHLORIDE: 9 INJECTION, SOLUTION INTRAVENOUS at 08:24

## 2018-12-17 RX ADMIN — MIDAZOLAM HYDROCHLORIDE 1 MG: 1 INJECTION, SOLUTION INTRAMUSCULAR; INTRAVENOUS at 08:27

## 2018-12-17 RX ADMIN — PHENYLEPHRINE HYDROCHLORIDE 1 DROP: 25 SOLUTION/ DROPS OPHTHALMIC at 07:40

## 2018-12-17 RX ADMIN — PROPOFOL 10 MG: 10 INJECTION, EMULSION INTRAVENOUS at 08:55

## 2018-12-17 ASSESSMENT — PULMONARY FUNCTION TESTS
PIF_VALUE: 0

## 2018-12-17 ASSESSMENT — PAIN - FUNCTIONAL ASSESSMENT: PAIN_FUNCTIONAL_ASSESSMENT: 0-10

## 2018-12-17 ASSESSMENT — PAIN SCALES - GENERAL: PAINLEVEL_OUTOF10: 0

## 2018-12-17 ASSESSMENT — ENCOUNTER SYMPTOMS: SHORTNESS OF BREATH: 1

## 2018-12-17 NOTE — OP NOTE
[address] [place] [address]                                OPERATIVE REPORT    PATIENT NAME: Erma Plummer                      :        1950  MED REC NO:   [id number]                           ROOM:  ACCOUNT NO:   [de-identified]                           ADMIT DATE: 2018  PROVIDER:     Jhon Elena M.D.    Kurtis Earnest OF PROCEDURE:  2018    PREOPERATIVE DIAGNOSIS:  Visually significant cataract, right eye. POSTOPERATIVE DIAGNOSIS:  Visually significant cataract, right eye. PROCEDURE:  Phacoemulsification of cataract with posterior chamber  intraocular lens implant, right eye. SURGEON:  Jhon Elena M.D. ANESTHESIA:  MAC. DESCRIPTION OF PROCEDURE:  The patient was brought to the operating  room. The right eye was prepped and draped in the usual sterile  fashion. An eyelid speculum was placed, isolating the eyelashes. A  paracentesis was performed. The anterior chamber was inflated with 0.3  mL of 1% lidocaine and then with Viscoat. A leander blade was used to  make a 600 micron groove at the temporal limbus. The keratome was  placed one-half the depth of the screw and used to tunnel through about  2.7 mm of clear cornea, entering the anterior chamber. A continuous  capsulorrhexis was performed with forceps. Hydrodissection was  performed with balanced salt solution. The nucleus was removed with the  phacoemulsification handpiece with a bimanual, endocapsular technique. Cortex was removed with irrigation/aspiration. The bag was inflated  with Provisc. The intraocular lens power was confirmed. The  intraocular lens was inspected, folded, and then inserted into the  capsular bag. Viscoelastic was removed from behind the implant from the  anterior chamber. All incisions were hydrated with balanced salt  solution.   The intraocular pressure was adjusted to normal.  All  incisions were found to

## 2018-12-17 NOTE — H&P
[address] [place] [address]                              HISTORY AND PHYSICAL    PATIENT NAME: Jud Malin                      :        1950  MED REC NO:   [id number]                           ROOM:  ACCOUNT NO:   [de-identified]                           ADMIT DATE: 2018  PROVIDER:     Jacobo Hurtado M.D. HISTORY OF PRESENT ILLNESS:  This is a 38-year-old woman who was found  to have decreased vision and a visually significant cataract in the  right eye. PHYSICAL EXAMINATION:  GENERAL:  Today, she is alert. VITAL SIGNS:  Stable. HEART:  She has a regular heart rhythm. LUNGS:  Clear. ABDOMEN:  Soft. IMPRESSION AND PLAN:  In summary, the patient has a stable exam and we  will proceed with cataract surgery of the right eye.         Joycelyn Mazariegos M.D.    D: 2018 9:02:44       T: 2018 9:30:08     [id number]  Job#: [id number]     Doc#: [id number]    CC:

## 2018-12-17 NOTE — ANESTHESIA PRE PROCEDURE
(63.5 kg)   Height: 5' 3\" (1.6 m) 5' 3\" (1.6 m)                                              BP Readings from Last 3 Encounters:   12/17/18 (!) 117/56   11/15/18 (!) 89/52   11/15/18 (!) 94/56       NPO Status: Time of last liquid consumption: 2000                        Time of last solid consumption: 2000                        Date of last liquid consumption: 12/16/18                        Date of last solid food consumption: 12/16/18    BMI:   Wt Readings from Last 3 Encounters:   12/17/18 140 lb (63.5 kg)   11/15/18 135 lb (61.2 kg)   08/07/18 135 lb (61.2 kg)     Body mass index is 24.8 kg/m². CBC:   Lab Results   Component Value Date    WBC 7.6 01/29/2017    RBC 3.87 01/29/2017    HGB 12.7 01/29/2017    HCT 37.2 01/29/2017    MCV 95.9 01/29/2017    RDW 13.6 01/29/2017     01/29/2017       CMP:   Lab Results   Component Value Date     01/29/2017    K 3.9 01/29/2017    CL 98 01/29/2017    CO2 25 01/29/2017    BUN 6 01/29/2017    CREATININE 0.3 01/29/2017    LABGLOM >90 01/29/2017    GLUCOSE 75 01/29/2017    PROT 7.1 01/24/2017    CALCIUM 8.5 01/29/2017    BILITOT 0.5 01/24/2017    ALKPHOS 261 01/24/2017    AST 54 01/24/2017    ALT 41 01/24/2017       POC Tests: No results for input(s): POCGLU, POCNA, POCK, POCCL, POCBUN, POCHEMO, POCHCT in the last 72 hours.     Coags:   Lab Results   Component Value Date    INR 1.04 10/02/2015    APTT 31.9 10/02/2015       HCG (If Applicable): No results found for: PREGTESTUR, PREGSERUM, HCG, HCGQUANT     ABGs: No results found for: PHART, PO2ART, NAC7WSJ, FRP3WXC, BEART, M8EDAPVU     Type & Screen (If Applicable):  No results found for: LABABO, [address]    Anesthesia Evaluation  Patient summary reviewed and Nursing notes reviewed  Airway: Mallampati: II  TM distance: >3 FB   Neck ROM: full  Mouth opening: > = 3 FB Dental:          Pulmonary: breath sounds clear to auscultation  (+) pneumonia:  shortness of breath:

## 2018-12-28 RX ORDER — ACETAMINOPHEN/DIPHENHYDRAMINE 500MG-25MG
TABLET ORAL
Qty: 30 TABLET | Refills: 6 | Status: SHIPPED | OUTPATIENT
Start: 2018-12-28 | End: 2019-06-06 | Stop reason: SDUPTHER

## 2018-12-31 ENCOUNTER — APPOINTMENT (OUTPATIENT)
Dept: INTERVENTIONAL RADIOLOGY/VASCULAR | Age: 68
DRG: 689 | End: 2018-12-31
Payer: MEDICARE

## 2018-12-31 ENCOUNTER — APPOINTMENT (OUTPATIENT)
Dept: CT IMAGING | Age: 68
DRG: 689 | End: 2018-12-31
Payer: MEDICARE

## 2018-12-31 ENCOUNTER — HOSPITAL ENCOUNTER (INPATIENT)
Age: 68
LOS: 6 days | Discharge: HOME | DRG: 689 | End: 2019-01-06
Attending: EMERGENCY MEDICINE | Admitting: INTERNAL MEDICINE
Payer: MEDICARE

## 2018-12-31 DIAGNOSIS — R30.0 DYSURIA: ICD-10-CM

## 2018-12-31 DIAGNOSIS — N30.01 ACUTE CYSTITIS WITH HEMATURIA: ICD-10-CM

## 2018-12-31 DIAGNOSIS — R31.0 HEMATURIA, GROSS: Primary | ICD-10-CM

## 2018-12-31 PROBLEM — R31.9 HEMATURIA: Status: ACTIVE | Noted: 2018-12-31

## 2018-12-31 LAB
ANION GAP SERPL CALCULATED.3IONS-SCNC: 11 MEQ/L (ref 8–16)
BACTERIA: ABNORMAL
BASOPHILS # BLD: 0.6 %
BASOPHILS ABSOLUTE: 0.1 THOU/MM3 (ref 0–0.1)
BILIRUBIN URINE: NEGATIVE
BLOOD, URINE: ABNORMAL
BUN BLDV-MCNC: 27 MG/DL (ref 7–22)
CALCIUM SERPL-MCNC: 9.1 MG/DL (ref 8.5–10.5)
CASTS: ABNORMAL /LPF
CHARACTER, URINE: ABNORMAL
CHLORIDE BLD-SCNC: 101 MEQ/L (ref 98–111)
CO2: 26 MEQ/L (ref 23–33)
COLOR: ABNORMAL
CREAT SERPL-MCNC: 0.6 MG/DL (ref 0.4–1.2)
EOSINOPHIL # BLD: 1.4 %
EOSINOPHILS ABSOLUTE: 0.2 THOU/MM3 (ref 0–0.4)
EPITHELIAL CELLS, UA: ABNORMAL /HPF
ERYTHROCYTE [DISTWIDTH] IN BLOOD BY AUTOMATED COUNT: 13.2 % (ref 11.5–14.5)
ERYTHROCYTE [DISTWIDTH] IN BLOOD BY AUTOMATED COUNT: 50.3 FL (ref 35–45)
GFR SERPL CREATININE-BSD FRML MDRD: > 90 ML/MIN/1.73M2
GLUCOSE BLD-MCNC: 95 MG/DL (ref 70–108)
GLUCOSE, URINE: 100 MG/DL
HCT VFR BLD CALC: 40.5 % (ref 37–47)
HEMOGLOBIN: 13.1 GM/DL (ref 12–16)
IMMATURE GRANS (ABS): 0.07 THOU/MM3 (ref 0–0.07)
IMMATURE GRANULOCYTES: 0.5 %
KETONES, URINE: NEGATIVE
LACTIC ACID, SEPSIS: 1.6 MMOL/L (ref 0.5–1.9)
LACTIC ACID, SEPSIS: 1.9 MMOL/L (ref 0.5–1.9)
LEUKOCYTE ESTERASE, URINE: ABNORMAL
LYMPHOCYTES # BLD: 14.2 %
LYMPHOCYTES ABSOLUTE: 2 THOU/MM3 (ref 1–4.8)
MCH RBC QN AUTO: 33 PG (ref 26–33)
MCHC RBC AUTO-ENTMCNC: 32.3 GM/DL (ref 32.2–35.5)
MCV RBC AUTO: 102 FL (ref 81–99)
MONOCYTES # BLD: 8.6 %
MONOCYTES ABSOLUTE: 1.2 THOU/MM3 (ref 0.4–1.3)
NITRITE, URINE: NEGATIVE
NUCLEATED RED BLOOD CELLS: 0 /100 WBC
OSMOLALITY CALCULATION: 280.6 MOSMOL/KG (ref 275–300)
PH UA: 7.5
PLATELET # BLD: 251 THOU/MM3 (ref 130–400)
PMV BLD AUTO: 11.3 FL (ref 9.4–12.4)
POTASSIUM REFLEX MAGNESIUM: 4.7 MEQ/L (ref 3.5–5.2)
PROTEIN UA: >= 1000 MG/DL
RBC # BLD: 3.97 MILL/MM3 (ref 4.2–5.4)
RBC URINE: > 200 /HPF
SEG NEUTROPHILS: 74.7 %
SEGMENTED NEUTROPHILS ABSOLUTE COUNT: 10.3 THOU/MM3 (ref 1.8–7.7)
SODIUM BLD-SCNC: 138 MEQ/L (ref 135–145)
SPECIFIC GRAVITY UA: > 1.03 (ref 1–1.03)
UROBILINOGEN, URINE: 0.2 EU/DL
WBC # BLD: 13.8 THOU/MM3 (ref 4.8–10.8)
WBC UA: > 200 /HPF

## 2018-12-31 PROCEDURE — 81001 URINALYSIS AUTO W/SCOPE: CPT

## 2018-12-31 PROCEDURE — 80048 BASIC METABOLIC PNL TOTAL CA: CPT

## 2018-12-31 PROCEDURE — 96365 THER/PROPH/DIAG IV INF INIT: CPT

## 2018-12-31 PROCEDURE — 74177 CT ABD & PELVIS W/CONTRAST: CPT

## 2018-12-31 PROCEDURE — 6360000002 HC RX W HCPCS: Performed by: EMERGENCY MEDICINE

## 2018-12-31 PROCEDURE — 93005 ELECTROCARDIOGRAM TRACING: CPT | Performed by: UROLOGY

## 2018-12-31 PROCEDURE — 2580000003 HC RX 258: Performed by: EMERGENCY MEDICINE

## 2018-12-31 PROCEDURE — 36415 COLL VENOUS BLD VENIPUNCTURE: CPT

## 2018-12-31 PROCEDURE — 99223 1ST HOSP IP/OBS HIGH 75: CPT | Performed by: NURSE PRACTITIONER

## 2018-12-31 PROCEDURE — 99285 EMERGENCY DEPT VISIT HI MDM: CPT

## 2018-12-31 PROCEDURE — 6370000000 HC RX 637 (ALT 250 FOR IP): Performed by: EMERGENCY MEDICINE

## 2018-12-31 PROCEDURE — 2709999900 HC NON-CHARGEABLE SUPPLY

## 2018-12-31 PROCEDURE — 1200000000 HC SEMI PRIVATE

## 2018-12-31 PROCEDURE — 51700 IRRIGATION OF BLADDER: CPT

## 2018-12-31 PROCEDURE — 1200000003 HC TELEMETRY R&B

## 2018-12-31 PROCEDURE — 93970 EXTREMITY STUDY: CPT

## 2018-12-31 PROCEDURE — 6360000004 HC RX CONTRAST MEDICATION: Performed by: EMERGENCY MEDICINE

## 2018-12-31 PROCEDURE — 85025 COMPLETE CBC W/AUTO DIFF WBC: CPT

## 2018-12-31 PROCEDURE — 87040 BLOOD CULTURE FOR BACTERIA: CPT

## 2018-12-31 PROCEDURE — 83605 ASSAY OF LACTIC ACID: CPT

## 2018-12-31 RX ORDER — SODIUM CHLORIDE 0.9 % (FLUSH) 0.9 %
10 SYRINGE (ML) INJECTION PRN
Status: DISCONTINUED | OUTPATIENT
Start: 2018-12-31 | End: 2019-01-06 | Stop reason: HOSPADM

## 2018-12-31 RX ORDER — SODIUM CHLORIDE 0.9 % (FLUSH) 0.9 %
10 SYRINGE (ML) INJECTION EVERY 12 HOURS SCHEDULED
Status: DISCONTINUED | OUTPATIENT
Start: 2018-12-31 | End: 2019-01-06 | Stop reason: HOSPADM

## 2018-12-31 RX ORDER — DOCUSATE SODIUM 100 MG/1
100 CAPSULE, LIQUID FILLED ORAL 2 TIMES DAILY PRN
Status: DISCONTINUED | OUTPATIENT
Start: 2018-12-31 | End: 2019-01-06 | Stop reason: HOSPADM

## 2018-12-31 RX ORDER — 0.9 % SODIUM CHLORIDE 0.9 %
30 INTRAVENOUS SOLUTION INTRAVENOUS ONCE
Status: COMPLETED | OUTPATIENT
Start: 2018-12-31 | End: 2018-12-31

## 2018-12-31 RX ORDER — CARBAMAZEPINE 100 MG/1
100 TABLET, CHEWABLE ORAL 2 TIMES DAILY
Status: DISCONTINUED | OUTPATIENT
Start: 2018-12-31 | End: 2019-01-06 | Stop reason: HOSPADM

## 2018-12-31 RX ORDER — ACETAMINOPHEN 160 MG/5ML
650 SOLUTION ORAL ONCE
Status: COMPLETED | OUTPATIENT
Start: 2018-12-31 | End: 2018-12-31

## 2018-12-31 RX ORDER — ONDANSETRON 2 MG/ML
4 INJECTION INTRAMUSCULAR; INTRAVENOUS EVERY 6 HOURS PRN
Status: DISCONTINUED | OUTPATIENT
Start: 2018-12-31 | End: 2019-01-06 | Stop reason: HOSPADM

## 2018-12-31 RX ORDER — METOPROLOL TARTRATE 50 MG/1
25 TABLET, FILM COATED ORAL 2 TIMES DAILY
Status: DISCONTINUED | OUTPATIENT
Start: 2018-12-31 | End: 2019-01-06 | Stop reason: HOSPADM

## 2018-12-31 RX ADMIN — SODIUM CHLORIDE 1641 ML: 9 INJECTION, SOLUTION INTRAVENOUS at 17:57

## 2018-12-31 RX ADMIN — IOPAMIDOL 85 ML: 755 INJECTION, SOLUTION INTRAVENOUS at 19:56

## 2018-12-31 RX ADMIN — ACETAMINOPHEN 650 MG: 650 SOLUTION ORAL at 19:14

## 2018-12-31 RX ADMIN — CEFTRIAXONE SODIUM 1 G: 1 INJECTION, POWDER, FOR SOLUTION INTRAMUSCULAR; INTRAVENOUS at 18:52

## 2018-12-31 ASSESSMENT — PAIN - FUNCTIONAL ASSESSMENT: PAIN_FUNCTIONAL_ASSESSMENT: 0-10

## 2018-12-31 ASSESSMENT — PAIN SCALES - GENERAL: PAINLEVEL_OUTOF10: 3

## 2018-12-31 ASSESSMENT — PAIN DESCRIPTION - LOCATION: LOCATION: VAGINA

## 2018-12-31 ASSESSMENT — PAIN DESCRIPTION - PAIN TYPE: TYPE: ACUTE PAIN

## 2018-12-31 ASSESSMENT — PAIN DESCRIPTION - FREQUENCY: FREQUENCY: CONTINUOUS

## 2018-12-31 ASSESSMENT — PAIN DESCRIPTION - DESCRIPTORS: DESCRIPTORS: BURNING

## 2019-01-01 PROBLEM — Z72.0 NICOTINE ABUSE: Status: ACTIVE | Noted: 2019-01-01

## 2019-01-01 PROBLEM — A41.9 SEPSIS (HCC): Status: RESOLVED | Noted: 2017-01-24 | Resolved: 2019-01-01

## 2019-01-01 LAB
ANION GAP SERPL CALCULATED.3IONS-SCNC: 10 MEQ/L (ref 8–16)
BUN BLDV-MCNC: 18 MG/DL (ref 7–22)
CALCIUM SERPL-MCNC: 8.3 MG/DL (ref 8.5–10.5)
CHLORIDE BLD-SCNC: 104 MEQ/L (ref 98–111)
CO2: 24 MEQ/L (ref 23–33)
CREAT SERPL-MCNC: 0.5 MG/DL (ref 0.4–1.2)
EKG ATRIAL RATE: 127 BPM
EKG P AXIS: 57 DEGREES
EKG P-R INTERVAL: 100 MS
EKG Q-T INTERVAL: 314 MS
EKG QRS DURATION: 90 MS
EKG QTC CALCULATION (BAZETT): 456 MS
EKG R AXIS: -48 DEGREES
EKG T AXIS: 92 DEGREES
EKG VENTRICULAR RATE: 127 BPM
ERYTHROCYTE [DISTWIDTH] IN BLOOD BY AUTOMATED COUNT: 13.5 % (ref 11.5–14.5)
ERYTHROCYTE [DISTWIDTH] IN BLOOD BY AUTOMATED COUNT: 49.4 FL (ref 35–45)
GFR SERPL CREATININE-BSD FRML MDRD: > 90 ML/MIN/1.73M2
GLUCOSE BLD-MCNC: 106 MG/DL (ref 70–108)
HCT VFR BLD CALC: 29.2 % (ref 37–47)
HCT VFR BLD CALC: 34.9 % (ref 37–47)
HEMOGLOBIN: 11.4 GM/DL (ref 12–16)
HEMOGLOBIN: 9.5 GM/DL (ref 12–16)
MCH RBC QN AUTO: 32.8 PG (ref 26–33)
MCHC RBC AUTO-ENTMCNC: 32.7 GM/DL (ref 32.2–35.5)
MCV RBC AUTO: 100.3 FL (ref 81–99)
PLATELET # BLD: 192 THOU/MM3 (ref 130–400)
PMV BLD AUTO: 11.5 FL (ref 9.4–12.4)
POTASSIUM SERPL-SCNC: 3.9 MEQ/L (ref 3.5–5.2)
RBC # BLD: 3.48 MILL/MM3 (ref 4.2–5.4)
SODIUM BLD-SCNC: 138 MEQ/L (ref 135–145)
WBC # BLD: 11.6 THOU/MM3 (ref 4.8–10.8)

## 2019-01-01 PROCEDURE — 6370000000 HC RX 637 (ALT 250 FOR IP): Performed by: NURSE PRACTITIONER

## 2019-01-01 PROCEDURE — 85014 HEMATOCRIT: CPT

## 2019-01-01 PROCEDURE — 99233 SBSQ HOSP IP/OBS HIGH 50: CPT | Performed by: INTERNAL MEDICINE

## 2019-01-01 PROCEDURE — 85027 COMPLETE CBC AUTOMATED: CPT

## 2019-01-01 PROCEDURE — 2709999900 HC NON-CHARGEABLE SUPPLY

## 2019-01-01 PROCEDURE — 1200000003 HC TELEMETRY R&B

## 2019-01-01 PROCEDURE — 36415 COLL VENOUS BLD VENIPUNCTURE: CPT

## 2019-01-01 PROCEDURE — 6360000002 HC RX W HCPCS: Performed by: NURSE PRACTITIONER

## 2019-01-01 PROCEDURE — 93010 ELECTROCARDIOGRAM REPORT: CPT | Performed by: INTERNAL MEDICINE

## 2019-01-01 PROCEDURE — 51700 IRRIGATION OF BLADDER: CPT

## 2019-01-01 PROCEDURE — 93005 ELECTROCARDIOGRAM TRACING: CPT | Performed by: NURSE PRACTITIONER

## 2019-01-01 PROCEDURE — 2580000003 HC RX 258: Performed by: INTERNAL MEDICINE

## 2019-01-01 PROCEDURE — 2500000003 HC RX 250 WO HCPCS: Performed by: NURSE PRACTITIONER

## 2019-01-01 PROCEDURE — 80048 BASIC METABOLIC PNL TOTAL CA: CPT

## 2019-01-01 PROCEDURE — 2580000003 HC RX 258: Performed by: NURSE PRACTITIONER

## 2019-01-01 PROCEDURE — 99222 1ST HOSP IP/OBS MODERATE 55: CPT | Performed by: UROLOGY

## 2019-01-01 PROCEDURE — 85018 HEMOGLOBIN: CPT

## 2019-01-01 RX ORDER — SODIUM CHLORIDE 9 MG/ML
INJECTION, SOLUTION INTRAVENOUS CONTINUOUS
Status: DISCONTINUED | OUTPATIENT
Start: 2019-01-01 | End: 2019-01-05

## 2019-01-01 RX ORDER — PREDNISOLONE ACETATE 10 MG/ML
1 SUSPENSION/ DROPS OPHTHALMIC 3 TIMES DAILY
Status: DISCONTINUED | OUTPATIENT
Start: 2019-01-01 | End: 2019-01-01

## 2019-01-01 RX ORDER — METOPROLOL TARTRATE 5 MG/5ML
5 INJECTION INTRAVENOUS ONCE
Status: COMPLETED | OUTPATIENT
Start: 2019-01-01 | End: 2019-01-01

## 2019-01-01 RX ORDER — PREDNISOLONE ACETATE 10 MG/ML
1 SUSPENSION/ DROPS OPHTHALMIC 3 TIMES DAILY
Status: DISCONTINUED | OUTPATIENT
Start: 2019-01-01 | End: 2019-01-06 | Stop reason: HOSPADM

## 2019-01-01 RX ORDER — PREDNISOLONE ACETATE 10 MG/ML
1 SUSPENSION/ DROPS OPHTHALMIC 3 TIMES DAILY
COMMUNITY
End: 2019-06-06

## 2019-01-01 RX ADMIN — Medication 10 ML: at 11:38

## 2019-01-01 RX ADMIN — SODIUM CHLORIDE: 9 INJECTION, SOLUTION INTRAVENOUS at 14:43

## 2019-01-01 RX ADMIN — CEFTRIAXONE SODIUM 1 G: 1 INJECTION, POWDER, FOR SOLUTION INTRAMUSCULAR; INTRAVENOUS at 18:34

## 2019-01-01 RX ADMIN — PREDNISOLONE ACETATE 1 DROP: 10 SUSPENSION/ DROPS OPHTHALMIC at 15:00

## 2019-01-01 RX ADMIN — CARBAMAZEPINE 100 MG: 100 TABLET, CHEWABLE ORAL at 08:52

## 2019-01-01 RX ADMIN — CARBAMAZEPINE 100 MG: 100 TABLET, CHEWABLE ORAL at 01:03

## 2019-01-01 RX ADMIN — PREDNISOLONE ACETATE 1 DROP: 10 SUSPENSION/ DROPS OPHTHALMIC at 22:03

## 2019-01-01 RX ADMIN — Medication 10 ML: at 00:16

## 2019-01-01 RX ADMIN — SODIUM CHLORIDE: 9 INJECTION, SOLUTION INTRAVENOUS at 22:25

## 2019-01-01 RX ADMIN — METOPROLOL TARTRATE 5 MG: 1 INJECTION, SOLUTION INTRAVENOUS at 22:02

## 2019-01-01 RX ADMIN — CARBAMAZEPINE 100 MG: 100 TABLET, CHEWABLE ORAL at 22:01

## 2019-01-01 ASSESSMENT — PAIN SCALES - GENERAL
PAINLEVEL_OUTOF10: 0

## 2019-01-02 ENCOUNTER — ANESTHESIA EVENT (OUTPATIENT)
Dept: OPERATING ROOM | Age: 69
DRG: 689 | End: 2019-01-02
Payer: MEDICARE

## 2019-01-02 ENCOUNTER — ANESTHESIA (OUTPATIENT)
Dept: OPERATING ROOM | Age: 69
DRG: 689 | End: 2019-01-02
Payer: MEDICARE

## 2019-01-02 VITALS
RESPIRATION RATE: 1 BRPM | DIASTOLIC BLOOD PRESSURE: 51 MMHG | OXYGEN SATURATION: 99 % | SYSTOLIC BLOOD PRESSURE: 92 MMHG

## 2019-01-02 LAB
ABO: NORMAL
ANION GAP SERPL CALCULATED.3IONS-SCNC: 9 MEQ/L (ref 8–16)
ANTIBODY SCREEN: NORMAL
BUN BLDV-MCNC: 11 MG/DL (ref 7–22)
CALCIUM SERPL-MCNC: 8.1 MG/DL (ref 8.5–10.5)
CHLORIDE BLD-SCNC: 109 MEQ/L (ref 98–111)
CO2: 24 MEQ/L (ref 23–33)
CREAT SERPL-MCNC: 0.4 MG/DL (ref 0.4–1.2)
EKG ATRIAL RATE: 144 BPM
EKG P AXIS: 75 DEGREES
EKG P-R INTERVAL: 114 MS
EKG Q-T INTERVAL: 302 MS
EKG QRS DURATION: 100 MS
EKG QTC CALCULATION (BAZETT): 467 MS
EKG R AXIS: -52 DEGREES
EKG T AXIS: 127 DEGREES
EKG VENTRICULAR RATE: 144 BPM
ERYTHROCYTE [DISTWIDTH] IN BLOOD BY AUTOMATED COUNT: 13.7 % (ref 11.5–14.5)
ERYTHROCYTE [DISTWIDTH] IN BLOOD BY AUTOMATED COUNT: 52 FL (ref 35–45)
GFR SERPL CREATININE-BSD FRML MDRD: > 90 ML/MIN/1.73M2
GLUCOSE BLD-MCNC: 81 MG/DL (ref 70–108)
HCT VFR BLD CALC: 29.5 % (ref 37–47)
HEMOGLOBIN: 9.5 GM/DL (ref 12–16)
MCH RBC QN AUTO: 33.1 PG (ref 26–33)
MCHC RBC AUTO-ENTMCNC: 32.2 GM/DL (ref 32.2–35.5)
MCV RBC AUTO: 102.8 FL (ref 81–99)
PLATELET # BLD: 177 THOU/MM3 (ref 130–400)
PMV BLD AUTO: 11.3 FL (ref 9.4–12.4)
POTASSIUM SERPL-SCNC: 3.6 MEQ/L (ref 3.5–5.2)
RBC # BLD: 2.87 MILL/MM3 (ref 4.2–5.4)
RH FACTOR: NORMAL
SODIUM BLD-SCNC: 142 MEQ/L (ref 135–145)
WBC # BLD: 9.7 THOU/MM3 (ref 4.8–10.8)

## 2019-01-02 PROCEDURE — 7100000000 HC PACU RECOVERY - FIRST 15 MIN: Performed by: UROLOGY

## 2019-01-02 PROCEDURE — 86850 RBC ANTIBODY SCREEN: CPT

## 2019-01-02 PROCEDURE — 2709999900 HC NON-CHARGEABLE SUPPLY: Performed by: UROLOGY

## 2019-01-02 PROCEDURE — 36415 COLL VENOUS BLD VENIPUNCTURE: CPT

## 2019-01-02 PROCEDURE — 85027 COMPLETE CBC AUTOMATED: CPT

## 2019-01-02 PROCEDURE — 7100000001 HC PACU RECOVERY - ADDTL 15 MIN: Performed by: UROLOGY

## 2019-01-02 PROCEDURE — 6370000000 HC RX 637 (ALT 250 FOR IP): Performed by: NURSE PRACTITIONER

## 2019-01-02 PROCEDURE — 6370000000 HC RX 637 (ALT 250 FOR IP): Performed by: UROLOGY

## 2019-01-02 PROCEDURE — 3700000000 HC ANESTHESIA ATTENDED CARE: Performed by: UROLOGY

## 2019-01-02 PROCEDURE — 2709999900 HC NON-CHARGEABLE SUPPLY

## 2019-01-02 PROCEDURE — 3600000003 HC SURGERY LEVEL 3 BASE: Performed by: UROLOGY

## 2019-01-02 PROCEDURE — 86900 BLOOD TYPING SEROLOGIC ABO: CPT

## 2019-01-02 PROCEDURE — 2580000003 HC RX 258: Performed by: INTERNAL MEDICINE

## 2019-01-02 PROCEDURE — 0TCB8ZZ EXTIRPATION OF MATTER FROM BLADDER, VIA NATURAL OR ARTIFICIAL OPENING ENDOSCOPIC: ICD-10-PCS | Performed by: UROLOGY

## 2019-01-02 PROCEDURE — 3600000013 HC SURGERY LEVEL 3 ADDTL 15MIN: Performed by: UROLOGY

## 2019-01-02 PROCEDURE — 1200000003 HC TELEMETRY R&B

## 2019-01-02 PROCEDURE — 2580000003 HC RX 258: Performed by: NURSE ANESTHETIST, CERTIFIED REGISTERED

## 2019-01-02 PROCEDURE — 80048 BASIC METABOLIC PNL TOTAL CA: CPT

## 2019-01-02 PROCEDURE — 86901 BLOOD TYPING SEROLOGIC RH(D): CPT

## 2019-01-02 PROCEDURE — 3700000001 HC ADD 15 MINUTES (ANESTHESIA): Performed by: UROLOGY

## 2019-01-02 PROCEDURE — 99232 SBSQ HOSP IP/OBS MODERATE 35: CPT | Performed by: INTERNAL MEDICINE

## 2019-01-02 PROCEDURE — 6360000002 HC RX W HCPCS: Performed by: NURSE ANESTHETIST, CERTIFIED REGISTERED

## 2019-01-02 PROCEDURE — 99231 SBSQ HOSP IP/OBS SF/LOW 25: CPT | Performed by: UROLOGY

## 2019-01-02 PROCEDURE — 93010 ELECTROCARDIOGRAM REPORT: CPT | Performed by: INTERNAL MEDICINE

## 2019-01-02 PROCEDURE — 2580000003 HC RX 258: Performed by: NURSE PRACTITIONER

## 2019-01-02 PROCEDURE — 51700 IRRIGATION OF BLADDER: CPT

## 2019-01-02 PROCEDURE — 6360000002 HC RX W HCPCS: Performed by: NURSE PRACTITIONER

## 2019-01-02 PROCEDURE — 6370000000 HC RX 637 (ALT 250 FOR IP): Performed by: INTERNAL MEDICINE

## 2019-01-02 PROCEDURE — 51798 US URINE CAPACITY MEASURE: CPT

## 2019-01-02 RX ORDER — OXYBUTYNIN CHLORIDE 5 MG/1
5 TABLET ORAL 2 TIMES DAILY
Status: DISCONTINUED | OUTPATIENT
Start: 2019-01-02 | End: 2019-01-04

## 2019-01-02 RX ORDER — SODIUM CHLORIDE 9 MG/ML
INJECTION, SOLUTION INTRAVENOUS CONTINUOUS PRN
Status: DISCONTINUED | OUTPATIENT
Start: 2019-01-02 | End: 2019-01-02 | Stop reason: SDUPTHER

## 2019-01-02 RX ORDER — ACETAMINOPHEN 325 MG/1
650 TABLET ORAL EVERY 4 HOURS PRN
Status: DISCONTINUED | OUTPATIENT
Start: 2019-01-02 | End: 2019-01-06 | Stop reason: HOSPADM

## 2019-01-02 RX ORDER — PROPOFOL 10 MG/ML
INJECTION, EMULSION INTRAVENOUS PRN
Status: DISCONTINUED | OUTPATIENT
Start: 2019-01-02 | End: 2019-01-02 | Stop reason: SDUPTHER

## 2019-01-02 RX ORDER — MIDAZOLAM HYDROCHLORIDE 1 MG/ML
INJECTION INTRAMUSCULAR; INTRAVENOUS PRN
Status: DISCONTINUED | OUTPATIENT
Start: 2019-01-02 | End: 2019-01-02 | Stop reason: SDUPTHER

## 2019-01-02 RX ADMIN — PREDNISOLONE ACETATE 1 DROP: 10 SUSPENSION/ DROPS OPHTHALMIC at 07:45

## 2019-01-02 RX ADMIN — CARBAMAZEPINE 100 MG: 100 TABLET, CHEWABLE ORAL at 20:40

## 2019-01-02 RX ADMIN — PHENYLEPHRINE HYDROCHLORIDE 100 MCG: 10 INJECTION INTRAVENOUS at 08:20

## 2019-01-02 RX ADMIN — PHENYLEPHRINE HYDROCHLORIDE 100 MCG: 10 INJECTION INTRAVENOUS at 08:22

## 2019-01-02 RX ADMIN — PROPOFOL 30 MG: 10 INJECTION, EMULSION INTRAVENOUS at 08:19

## 2019-01-02 RX ADMIN — PREDNISOLONE ACETATE 1 DROP: 10 SUSPENSION/ DROPS OPHTHALMIC at 14:05

## 2019-01-02 RX ADMIN — ACETAMINOPHEN 650 MG: 325 TABLET ORAL at 16:41

## 2019-01-02 RX ADMIN — CEFTRIAXONE SODIUM 1 G: 1 INJECTION, POWDER, FOR SOLUTION INTRAMUSCULAR; INTRAVENOUS at 18:57

## 2019-01-02 RX ADMIN — OXYBUTYNIN CHLORIDE 5 MG: 5 TABLET ORAL at 20:40

## 2019-01-02 RX ADMIN — CARBAMAZEPINE 100 MG: 100 TABLET, CHEWABLE ORAL at 10:48

## 2019-01-02 RX ADMIN — SODIUM CHLORIDE: 9 INJECTION, SOLUTION INTRAVENOUS at 08:04

## 2019-01-02 RX ADMIN — PREDNISOLONE ACETATE 1 DROP: 10 SUSPENSION/ DROPS OPHTHALMIC at 20:42

## 2019-01-02 RX ADMIN — MIDAZOLAM HYDROCHLORIDE 1 MG: 1 INJECTION, SOLUTION INTRAMUSCULAR; INTRAVENOUS at 08:18

## 2019-01-02 RX ADMIN — SODIUM CHLORIDE: 9 INJECTION, SOLUTION INTRAVENOUS at 14:03

## 2019-01-02 RX ADMIN — PHENYLEPHRINE HYDROCHLORIDE 100 MCG: 10 INJECTION INTRAVENOUS at 08:32

## 2019-01-02 RX ADMIN — PROPOFOL 20 MG: 10 INJECTION, EMULSION INTRAVENOUS at 08:24

## 2019-01-02 RX ADMIN — METOPROLOL TARTRATE 25 MG: 50 TABLET, FILM COATED ORAL at 10:48

## 2019-01-02 ASSESSMENT — PAIN SCALES - GENERAL
PAINLEVEL_OUTOF10: 0
PAINLEVEL_OUTOF10: 3
PAINLEVEL_OUTOF10: 0
PAINLEVEL_OUTOF10: 0

## 2019-01-02 ASSESSMENT — PULMONARY FUNCTION TESTS
PIF_VALUE: 0
PIF_VALUE: 1
PIF_VALUE: 0
PIF_VALUE: 1
PIF_VALUE: 0
PIF_VALUE: 0
PIF_VALUE: 1
PIF_VALUE: 1
PIF_VALUE: 0
PIF_VALUE: 1
PIF_VALUE: 0
PIF_VALUE: 1
PIF_VALUE: 0

## 2019-01-03 LAB
ANION GAP SERPL CALCULATED.3IONS-SCNC: 7 MEQ/L (ref 8–16)
BUN BLDV-MCNC: 9 MG/DL (ref 7–22)
CALCIUM SERPL-MCNC: 7.8 MG/DL (ref 8.5–10.5)
CHLORIDE BLD-SCNC: 113 MEQ/L (ref 98–111)
CO2: 20 MEQ/L (ref 23–33)
CREAT SERPL-MCNC: 0.4 MG/DL (ref 0.4–1.2)
ERYTHROCYTE [DISTWIDTH] IN BLOOD BY AUTOMATED COUNT: 13.3 % (ref 11.5–14.5)
ERYTHROCYTE [DISTWIDTH] IN BLOOD BY AUTOMATED COUNT: 50.5 FL (ref 35–45)
GFR SERPL CREATININE-BSD FRML MDRD: > 90 ML/MIN/1.73M2
GLUCOSE BLD-MCNC: 83 MG/DL (ref 70–108)
HCT VFR BLD CALC: 26.1 % (ref 37–47)
HEMOGLOBIN: 8.4 GM/DL (ref 12–16)
MCH RBC QN AUTO: 32.8 PG (ref 26–33)
MCHC RBC AUTO-ENTMCNC: 32.2 GM/DL (ref 32.2–35.5)
MCV RBC AUTO: 102 FL (ref 81–99)
PLATELET # BLD: 155 THOU/MM3 (ref 130–400)
PMV BLD AUTO: 11.2 FL (ref 9.4–12.4)
POTASSIUM SERPL-SCNC: 3.5 MEQ/L (ref 3.5–5.2)
RBC # BLD: 2.56 MILL/MM3 (ref 4.2–5.4)
SODIUM BLD-SCNC: 140 MEQ/L (ref 135–145)
WBC # BLD: 9.3 THOU/MM3 (ref 4.8–10.8)

## 2019-01-03 PROCEDURE — 1200000003 HC TELEMETRY R&B

## 2019-01-03 PROCEDURE — 36415 COLL VENOUS BLD VENIPUNCTURE: CPT

## 2019-01-03 PROCEDURE — 2709999900 HC NON-CHARGEABLE SUPPLY

## 2019-01-03 PROCEDURE — 6370000000 HC RX 637 (ALT 250 FOR IP): Performed by: UROLOGY

## 2019-01-03 PROCEDURE — 6360000002 HC RX W HCPCS: Performed by: NURSE PRACTITIONER

## 2019-01-03 PROCEDURE — 85027 COMPLETE CBC AUTOMATED: CPT

## 2019-01-03 PROCEDURE — 2580000003 HC RX 258: Performed by: INTERNAL MEDICINE

## 2019-01-03 PROCEDURE — 99232 SBSQ HOSP IP/OBS MODERATE 35: CPT | Performed by: PHYSICIAN ASSISTANT

## 2019-01-03 PROCEDURE — 6370000000 HC RX 637 (ALT 250 FOR IP): Performed by: NURSE PRACTITIONER

## 2019-01-03 PROCEDURE — 80048 BASIC METABOLIC PNL TOTAL CA: CPT

## 2019-01-03 PROCEDURE — 2580000003 HC RX 258: Performed by: NURSE PRACTITIONER

## 2019-01-03 RX ADMIN — CARBAMAZEPINE 100 MG: 100 TABLET, CHEWABLE ORAL at 11:01

## 2019-01-03 RX ADMIN — SODIUM CHLORIDE: 9 INJECTION, SOLUTION INTRAVENOUS at 22:35

## 2019-01-03 RX ADMIN — CEFTRIAXONE SODIUM 1 G: 1 INJECTION, POWDER, FOR SOLUTION INTRAMUSCULAR; INTRAVENOUS at 19:21

## 2019-01-03 RX ADMIN — SODIUM CHLORIDE: 9 INJECTION, SOLUTION INTRAVENOUS at 00:45

## 2019-01-03 RX ADMIN — PREDNISOLONE ACETATE 1 DROP: 10 SUSPENSION/ DROPS OPHTHALMIC at 14:10

## 2019-01-03 RX ADMIN — SODIUM CHLORIDE: 9 INJECTION, SOLUTION INTRAVENOUS at 10:58

## 2019-01-03 RX ADMIN — METOPROLOL TARTRATE 25 MG: 50 TABLET, FILM COATED ORAL at 21:52

## 2019-01-03 RX ADMIN — PREDNISOLONE ACETATE 1 DROP: 10 SUSPENSION/ DROPS OPHTHALMIC at 11:05

## 2019-01-03 RX ADMIN — OXYBUTYNIN CHLORIDE 5 MG: 5 TABLET ORAL at 11:00

## 2019-01-03 RX ADMIN — METOPROLOL TARTRATE 25 MG: 50 TABLET, FILM COATED ORAL at 11:00

## 2019-01-03 RX ADMIN — CARBAMAZEPINE 100 MG: 100 TABLET, CHEWABLE ORAL at 21:52

## 2019-01-03 RX ADMIN — PREDNISOLONE ACETATE 1 DROP: 10 SUSPENSION/ DROPS OPHTHALMIC at 21:52

## 2019-01-03 ASSESSMENT — PAIN SCALES - GENERAL
PAINLEVEL_OUTOF10: 2
PAINLEVEL_OUTOF10: 0
PAINLEVEL_OUTOF10: 6

## 2019-01-03 ASSESSMENT — PAIN DESCRIPTION - PROGRESSION
CLINICAL_PROGRESSION: NOT CHANGED
CLINICAL_PROGRESSION: NOT CHANGED

## 2019-01-03 ASSESSMENT — PAIN DESCRIPTION - LOCATION
LOCATION: BUTTOCKS

## 2019-01-03 ASSESSMENT — PAIN DESCRIPTION - ONSET
ONSET: ON-GOING
ONSET: ON-GOING

## 2019-01-03 ASSESSMENT — PAIN DESCRIPTION - PAIN TYPE
TYPE: ACUTE PAIN

## 2019-01-03 ASSESSMENT — PAIN DESCRIPTION - FREQUENCY
FREQUENCY: CONTINUOUS
FREQUENCY: INTERMITTENT

## 2019-01-03 ASSESSMENT — PAIN DESCRIPTION - DESCRIPTORS
DESCRIPTORS: BURNING
DESCRIPTORS: BURNING

## 2019-01-04 LAB
ANION GAP SERPL CALCULATED.3IONS-SCNC: 7 MEQ/L (ref 8–16)
BUN BLDV-MCNC: 6 MG/DL (ref 7–22)
CALCIUM SERPL-MCNC: 7.8 MG/DL (ref 8.5–10.5)
CHLORIDE BLD-SCNC: 115 MEQ/L (ref 98–111)
CO2: 20 MEQ/L (ref 23–33)
CREAT SERPL-MCNC: 0.4 MG/DL (ref 0.4–1.2)
ERYTHROCYTE [DISTWIDTH] IN BLOOD BY AUTOMATED COUNT: 13.6 % (ref 11.5–14.5)
ERYTHROCYTE [DISTWIDTH] IN BLOOD BY AUTOMATED COUNT: 50.2 FL (ref 35–45)
GFR SERPL CREATININE-BSD FRML MDRD: > 90 ML/MIN/1.73M2
GLUCOSE BLD-MCNC: 88 MG/DL (ref 70–108)
HCT VFR BLD CALC: 26.2 % (ref 37–47)
HEMOGLOBIN: 8.5 GM/DL (ref 12–16)
MCH RBC QN AUTO: 32.8 PG (ref 26–33)
MCHC RBC AUTO-ENTMCNC: 32.4 GM/DL (ref 32.2–35.5)
MCV RBC AUTO: 101.2 FL (ref 81–99)
PLATELET # BLD: 187 THOU/MM3 (ref 130–400)
PMV BLD AUTO: 11 FL (ref 9.4–12.4)
POTASSIUM SERPL-SCNC: 3.6 MEQ/L (ref 3.5–5.2)
RBC # BLD: 2.59 MILL/MM3 (ref 4.2–5.4)
SODIUM BLD-SCNC: 142 MEQ/L (ref 135–145)
WBC # BLD: 8.3 THOU/MM3 (ref 4.8–10.8)

## 2019-01-04 PROCEDURE — 6370000000 HC RX 637 (ALT 250 FOR IP): Performed by: INTERNAL MEDICINE

## 2019-01-04 PROCEDURE — 51700 IRRIGATION OF BLADDER: CPT

## 2019-01-04 PROCEDURE — 99232 SBSQ HOSP IP/OBS MODERATE 35: CPT | Performed by: NURSE PRACTITIONER

## 2019-01-04 PROCEDURE — 80048 BASIC METABOLIC PNL TOTAL CA: CPT

## 2019-01-04 PROCEDURE — 6360000002 HC RX W HCPCS: Performed by: NURSE PRACTITIONER

## 2019-01-04 PROCEDURE — 1200000003 HC TELEMETRY R&B

## 2019-01-04 PROCEDURE — 36415 COLL VENOUS BLD VENIPUNCTURE: CPT

## 2019-01-04 PROCEDURE — 2709999900 HC NON-CHARGEABLE SUPPLY

## 2019-01-04 PROCEDURE — 2580000003 HC RX 258: Performed by: NURSE PRACTITIONER

## 2019-01-04 PROCEDURE — 85027 COMPLETE CBC AUTOMATED: CPT

## 2019-01-04 PROCEDURE — 99232 SBSQ HOSP IP/OBS MODERATE 35: CPT | Performed by: INTERNAL MEDICINE

## 2019-01-04 PROCEDURE — 6370000000 HC RX 637 (ALT 250 FOR IP): Performed by: UROLOGY

## 2019-01-04 PROCEDURE — 6370000000 HC RX 637 (ALT 250 FOR IP): Performed by: NURSE PRACTITIONER

## 2019-01-04 RX ADMIN — CARBAMAZEPINE 100 MG: 100 TABLET, CHEWABLE ORAL at 20:56

## 2019-01-04 RX ADMIN — CARBAMAZEPINE 100 MG: 100 TABLET, CHEWABLE ORAL at 08:35

## 2019-01-04 RX ADMIN — ACETAMINOPHEN 650 MG: 325 TABLET ORAL at 14:28

## 2019-01-04 RX ADMIN — METOPROLOL TARTRATE 25 MG: 50 TABLET, FILM COATED ORAL at 20:56

## 2019-01-04 RX ADMIN — ACETAMINOPHEN 650 MG: 325 TABLET ORAL at 06:28

## 2019-01-04 RX ADMIN — PREDNISOLONE ACETATE 1 DROP: 10 SUSPENSION/ DROPS OPHTHALMIC at 21:01

## 2019-01-04 RX ADMIN — CEFTRIAXONE SODIUM 1 G: 1 INJECTION, POWDER, FOR SOLUTION INTRAMUSCULAR; INTRAVENOUS at 19:34

## 2019-01-04 RX ADMIN — OXYBUTYNIN CHLORIDE 5 MG: 5 TABLET ORAL at 08:35

## 2019-01-04 RX ADMIN — PREDNISOLONE ACETATE 1 DROP: 10 SUSPENSION/ DROPS OPHTHALMIC at 17:10

## 2019-01-04 RX ADMIN — PREDNISOLONE ACETATE 1 DROP: 10 SUSPENSION/ DROPS OPHTHALMIC at 08:35

## 2019-01-04 RX ADMIN — METOPROLOL TARTRATE 25 MG: 50 TABLET, FILM COATED ORAL at 08:35

## 2019-01-04 ASSESSMENT — PAIN SCALES - GENERAL
PAINLEVEL_OUTOF10: 0
PAINLEVEL_OUTOF10: 7
PAINLEVEL_OUTOF10: 7
PAINLEVEL_OUTOF10: 0

## 2019-01-05 ENCOUNTER — APPOINTMENT (OUTPATIENT)
Dept: GENERAL RADIOLOGY | Age: 69
DRG: 689 | End: 2019-01-05
Payer: MEDICARE

## 2019-01-05 LAB
ANION GAP SERPL CALCULATED.3IONS-SCNC: 8 MEQ/L (ref 8–16)
BASOPHILS # BLD: 0.7 %
BASOPHILS ABSOLUTE: 0.1 THOU/MM3 (ref 0–0.1)
BUN BLDV-MCNC: 6 MG/DL (ref 7–22)
CALCIUM SERPL-MCNC: 8 MG/DL (ref 8.5–10.5)
CHLORIDE BLD-SCNC: 113 MEQ/L (ref 98–111)
CO2: 22 MEQ/L (ref 23–33)
CREAT SERPL-MCNC: 0.4 MG/DL (ref 0.4–1.2)
EOSINOPHIL # BLD: 6.9 %
EOSINOPHILS ABSOLUTE: 0.5 THOU/MM3 (ref 0–0.4)
ERYTHROCYTE [DISTWIDTH] IN BLOOD BY AUTOMATED COUNT: 13.5 % (ref 11.5–14.5)
ERYTHROCYTE [DISTWIDTH] IN BLOOD BY AUTOMATED COUNT: 49.4 FL (ref 35–45)
GFR SERPL CREATININE-BSD FRML MDRD: > 90 ML/MIN/1.73M2
GLUCOSE BLD-MCNC: 88 MG/DL (ref 70–108)
HCT VFR BLD CALC: 26.8 % (ref 37–47)
HEMOGLOBIN: 8.6 GM/DL (ref 12–16)
IMMATURE GRANS (ABS): 0.04 THOU/MM3 (ref 0–0.07)
IMMATURE GRANULOCYTES: 0.5 %
LYMPHOCYTES # BLD: 19.9 %
LYMPHOCYTES ABSOLUTE: 1.5 THOU/MM3 (ref 1–4.8)
MAGNESIUM: 1.6 MG/DL (ref 1.6–2.4)
MCH RBC QN AUTO: 32.5 PG (ref 26–33)
MCHC RBC AUTO-ENTMCNC: 32.1 GM/DL (ref 32.2–35.5)
MCV RBC AUTO: 101.1 FL (ref 81–99)
MONOCYTES # BLD: 8.5 %
MONOCYTES ABSOLUTE: 0.7 THOU/MM3 (ref 0.4–1.3)
NUCLEATED RED BLOOD CELLS: 0 /100 WBC
PLATELET # BLD: 218 THOU/MM3 (ref 130–400)
PMV BLD AUTO: 10.8 FL (ref 9.4–12.4)
POTASSIUM SERPL-SCNC: 3.8 MEQ/L (ref 3.5–5.2)
RBC # BLD: 2.65 MILL/MM3 (ref 4.2–5.4)
SEG NEUTROPHILS: 63.5 %
SEGMENTED NEUTROPHILS ABSOLUTE COUNT: 4.9 THOU/MM3 (ref 1.8–7.7)
SODIUM BLD-SCNC: 143 MEQ/L (ref 135–145)
WBC # BLD: 7.7 THOU/MM3 (ref 4.8–10.8)

## 2019-01-05 PROCEDURE — 83735 ASSAY OF MAGNESIUM: CPT

## 2019-01-05 PROCEDURE — 80048 BASIC METABOLIC PNL TOTAL CA: CPT

## 2019-01-05 PROCEDURE — 99232 SBSQ HOSP IP/OBS MODERATE 35: CPT | Performed by: INTERNAL MEDICINE

## 2019-01-05 PROCEDURE — 2709999900 HC NON-CHARGEABLE SUPPLY

## 2019-01-05 PROCEDURE — 99232 SBSQ HOSP IP/OBS MODERATE 35: CPT | Performed by: NURSE PRACTITIONER

## 2019-01-05 PROCEDURE — 2580000003 HC RX 258: Performed by: NURSE PRACTITIONER

## 2019-01-05 PROCEDURE — 85025 COMPLETE CBC W/AUTO DIFF WBC: CPT

## 2019-01-05 PROCEDURE — 1200000003 HC TELEMETRY R&B

## 2019-01-05 PROCEDURE — 6370000000 HC RX 637 (ALT 250 FOR IP): Performed by: NURSE PRACTITIONER

## 2019-01-05 PROCEDURE — 6370000000 HC RX 637 (ALT 250 FOR IP): Performed by: INTERNAL MEDICINE

## 2019-01-05 PROCEDURE — 2580000003 HC RX 258: Performed by: INTERNAL MEDICINE

## 2019-01-05 PROCEDURE — 36415 COLL VENOUS BLD VENIPUNCTURE: CPT

## 2019-01-05 PROCEDURE — 71046 X-RAY EXAM CHEST 2 VIEWS: CPT

## 2019-01-05 RX ORDER — LEVOFLOXACIN 500 MG/1
500 TABLET, FILM COATED ORAL DAILY
Status: DISCONTINUED | OUTPATIENT
Start: 2019-01-05 | End: 2019-01-06 | Stop reason: HOSPADM

## 2019-01-05 RX ADMIN — CARBAMAZEPINE 100 MG: 100 TABLET, CHEWABLE ORAL at 08:18

## 2019-01-05 RX ADMIN — SODIUM CHLORIDE: 9 INJECTION, SOLUTION INTRAVENOUS at 16:10

## 2019-01-05 RX ADMIN — Medication 10 ML: at 21:31

## 2019-01-05 RX ADMIN — PREDNISOLONE ACETATE 1 DROP: 10 SUSPENSION/ DROPS OPHTHALMIC at 08:18

## 2019-01-05 RX ADMIN — LEVOFLOXACIN 500 MG: 500 TABLET, FILM COATED ORAL at 21:24

## 2019-01-05 RX ADMIN — CARBAMAZEPINE 100 MG: 100 TABLET, CHEWABLE ORAL at 21:31

## 2019-01-05 RX ADMIN — PREDNISOLONE ACETATE 1 DROP: 10 SUSPENSION/ DROPS OPHTHALMIC at 15:25

## 2019-01-05 RX ADMIN — METOPROLOL TARTRATE 25 MG: 50 TABLET, FILM COATED ORAL at 21:24

## 2019-01-05 RX ADMIN — METOPROLOL TARTRATE 25 MG: 50 TABLET, FILM COATED ORAL at 08:17

## 2019-01-05 RX ADMIN — PREDNISOLONE ACETATE 1 DROP: 10 SUSPENSION/ DROPS OPHTHALMIC at 21:31

## 2019-01-05 RX ADMIN — SODIUM CHLORIDE: 9 INJECTION, SOLUTION INTRAVENOUS at 05:08

## 2019-01-05 ASSESSMENT — PAIN SCALES - GENERAL: PAINLEVEL_OUTOF10: 0

## 2019-01-06 VITALS
WEIGHT: 145.8 LBS | HEART RATE: 94 BPM | TEMPERATURE: 98.4 F | OXYGEN SATURATION: 93 % | SYSTOLIC BLOOD PRESSURE: 117 MMHG | RESPIRATION RATE: 16 BRPM | DIASTOLIC BLOOD PRESSURE: 58 MMHG | BODY MASS INDEX: 24.89 KG/M2 | HEIGHT: 64 IN

## 2019-01-06 LAB
ANION GAP SERPL CALCULATED.3IONS-SCNC: 7 MEQ/L (ref 8–16)
BASOPHILS # BLD: 1.1 %
BASOPHILS ABSOLUTE: 0.1 THOU/MM3 (ref 0–0.1)
BLOOD CULTURE, ROUTINE: NORMAL
BLOOD CULTURE, ROUTINE: NORMAL
BUN BLDV-MCNC: 7 MG/DL (ref 7–22)
CALCIUM SERPL-MCNC: 8.1 MG/DL (ref 8.5–10.5)
CHLORIDE BLD-SCNC: 109 MEQ/L (ref 98–111)
CO2: 23 MEQ/L (ref 23–33)
CREAT SERPL-MCNC: 0.4 MG/DL (ref 0.4–1.2)
EOSINOPHIL # BLD: 5.5 %
EOSINOPHILS ABSOLUTE: 0.4 THOU/MM3 (ref 0–0.4)
ERYTHROCYTE [DISTWIDTH] IN BLOOD BY AUTOMATED COUNT: 13.4 % (ref 11.5–14.5)
ERYTHROCYTE [DISTWIDTH] IN BLOOD BY AUTOMATED COUNT: 50 FL (ref 35–45)
GFR SERPL CREATININE-BSD FRML MDRD: > 90 ML/MIN/1.73M2
GLUCOSE BLD-MCNC: 83 MG/DL (ref 70–108)
HCT VFR BLD CALC: 29.5 % (ref 37–47)
HEMOGLOBIN: 9.3 GM/DL (ref 12–16)
IMMATURE GRANS (ABS): 0.06 THOU/MM3 (ref 0–0.07)
IMMATURE GRANULOCYTES: 0.8 %
LYMPHOCYTES # BLD: 22.6 %
LYMPHOCYTES ABSOLUTE: 1.7 THOU/MM3 (ref 1–4.8)
MCH RBC QN AUTO: 32.9 PG (ref 26–33)
MCHC RBC AUTO-ENTMCNC: 31.5 GM/DL (ref 32.2–35.5)
MCV RBC AUTO: 104.2 FL (ref 81–99)
MONOCYTES # BLD: 10.4 %
MONOCYTES ABSOLUTE: 0.8 THOU/MM3 (ref 0.4–1.3)
NUCLEATED RED BLOOD CELLS: 0 /100 WBC
PLATELET # BLD: 221 THOU/MM3 (ref 130–400)
PMV BLD AUTO: 10.6 FL (ref 9.4–12.4)
POTASSIUM SERPL-SCNC: 4 MEQ/L (ref 3.5–5.2)
RBC # BLD: 2.83 MILL/MM3 (ref 4.2–5.4)
SEG NEUTROPHILS: 59.6 %
SEGMENTED NEUTROPHILS ABSOLUTE COUNT: 4.4 THOU/MM3 (ref 1.8–7.7)
SODIUM BLD-SCNC: 139 MEQ/L (ref 135–145)
WBC # BLD: 7.4 THOU/MM3 (ref 4.8–10.8)

## 2019-01-06 PROCEDURE — 36415 COLL VENOUS BLD VENIPUNCTURE: CPT

## 2019-01-06 PROCEDURE — 2580000003 HC RX 258: Performed by: NURSE PRACTITIONER

## 2019-01-06 PROCEDURE — 6370000000 HC RX 637 (ALT 250 FOR IP): Performed by: INTERNAL MEDICINE

## 2019-01-06 PROCEDURE — 80048 BASIC METABOLIC PNL TOTAL CA: CPT

## 2019-01-06 PROCEDURE — 6370000000 HC RX 637 (ALT 250 FOR IP): Performed by: NURSE PRACTITIONER

## 2019-01-06 PROCEDURE — 99239 HOSP IP/OBS DSCHRG MGMT >30: CPT | Performed by: INTERNAL MEDICINE

## 2019-01-06 PROCEDURE — 85025 COMPLETE CBC W/AUTO DIFF WBC: CPT

## 2019-01-06 RX ORDER — PSEUDOEPHEDRINE HCL 30 MG
100 TABLET ORAL 2 TIMES DAILY PRN
Qty: 30 CAPSULE | Refills: 0 | Status: SHIPPED | OUTPATIENT
Start: 2019-01-06 | End: 2019-06-06

## 2019-01-06 RX ORDER — LEVOFLOXACIN 500 MG/1
500 TABLET, FILM COATED ORAL DAILY
Qty: 3 TABLET | Refills: 0 | Status: SHIPPED | OUTPATIENT
Start: 2019-01-07 | End: 2019-01-10

## 2019-01-06 RX ADMIN — PREDNISOLONE ACETATE 1 DROP: 10 SUSPENSION/ DROPS OPHTHALMIC at 09:28

## 2019-01-06 RX ADMIN — METOPROLOL TARTRATE 25 MG: 50 TABLET, FILM COATED ORAL at 09:27

## 2019-01-06 RX ADMIN — Medication 10 ML: at 09:27

## 2019-01-06 RX ADMIN — LEVOFLOXACIN 500 MG: 500 TABLET, FILM COATED ORAL at 09:27

## 2019-01-06 RX ADMIN — CARBAMAZEPINE 100 MG: 100 TABLET, CHEWABLE ORAL at 09:27

## 2019-01-07 ENCOUNTER — CARE COORDINATION (OUTPATIENT)
Dept: CASE MANAGEMENT | Age: 69
End: 2019-01-07

## 2019-01-15 ENCOUNTER — TELEPHONE (OUTPATIENT)
Dept: UROLOGY | Age: 69
End: 2019-01-15

## 2019-01-15 ENCOUNTER — OFFICE VISIT (OUTPATIENT)
Dept: UROLOGY | Age: 69
End: 2019-01-15
Payer: MEDICARE

## 2019-01-15 VITALS
BODY MASS INDEX: 24.75 KG/M2 | DIASTOLIC BLOOD PRESSURE: 78 MMHG | HEIGHT: 64 IN | SYSTOLIC BLOOD PRESSURE: 128 MMHG | WEIGHT: 145 LBS

## 2019-01-15 DIAGNOSIS — R31.0 GROSS HEMATURIA: Primary | ICD-10-CM

## 2019-01-15 LAB
BILIRUBIN URINE: NEGATIVE
BLOOD URINE, POC: ABNORMAL
CHARACTER, URINE: ABNORMAL
COLOR, URINE: ABNORMAL
GLUCOSE URINE: NEGATIVE MG/DL
KETONES, URINE: NEGATIVE
LEUKOCYTE CLUMPS, URINE: ABNORMAL
NITRITE, URINE: NEGATIVE
PH, URINE: 8.5
PROTEIN, URINE: >= 300 MG/DL
SPECIFIC GRAVITY, URINE: 1.02 (ref 1–1.03)
UROBILINOGEN, URINE: 0.2 EU/DL

## 2019-01-15 PROCEDURE — G8427 DOCREV CUR MEDS BY ELIG CLIN: HCPCS | Performed by: NURSE PRACTITIONER

## 2019-01-15 PROCEDURE — 1090F PRES/ABSN URINE INCON ASSESS: CPT | Performed by: NURSE PRACTITIONER

## 2019-01-15 PROCEDURE — G8400 PT W/DXA NO RESULTS DOC: HCPCS | Performed by: NURSE PRACTITIONER

## 2019-01-15 PROCEDURE — G8484 FLU IMMUNIZE NO ADMIN: HCPCS | Performed by: NURSE PRACTITIONER

## 2019-01-15 PROCEDURE — 4004F PT TOBACCO SCREEN RCVD TLK: CPT | Performed by: NURSE PRACTITIONER

## 2019-01-15 PROCEDURE — 51702 INSERT TEMP BLADDER CATH: CPT | Performed by: NURSE PRACTITIONER

## 2019-01-15 PROCEDURE — 1101F PT FALLS ASSESS-DOCD LE1/YR: CPT | Performed by: NURSE PRACTITIONER

## 2019-01-15 PROCEDURE — 1111F DSCHRG MED/CURRENT MED MERGE: CPT | Performed by: NURSE PRACTITIONER

## 2019-01-15 PROCEDURE — 4040F PNEUMOC VAC/ADMIN/RCVD: CPT | Performed by: NURSE PRACTITIONER

## 2019-01-15 PROCEDURE — 1123F ACP DISCUSS/DSCN MKR DOCD: CPT | Performed by: NURSE PRACTITIONER

## 2019-01-15 PROCEDURE — 3017F COLORECTAL CA SCREEN DOC REV: CPT | Performed by: NURSE PRACTITIONER

## 2019-01-15 PROCEDURE — 99214 OFFICE O/P EST MOD 30 MIN: CPT | Performed by: NURSE PRACTITIONER

## 2019-01-15 PROCEDURE — 81003 URINALYSIS AUTO W/O SCOPE: CPT | Performed by: NURSE PRACTITIONER

## 2019-01-15 PROCEDURE — G8420 CALC BMI NORM PARAMETERS: HCPCS | Performed by: NURSE PRACTITIONER

## 2019-01-15 RX ORDER — CATHETER MALE,EXTERNAL 41MM
EACH MISCELLANEOUS
Qty: 1 EACH | Refills: 11 | Status: SHIPPED | OUTPATIENT
Start: 2019-01-15 | End: 2022-01-12

## 2019-01-15 RX ORDER — OXYBUTYNIN CHLORIDE 5 MG/1
5 TABLET ORAL 3 TIMES DAILY PRN
Qty: 90 TABLET | Refills: 0 | Status: SHIPPED | OUTPATIENT
Start: 2019-01-15 | End: 2019-04-23 | Stop reason: SDUPTHER

## 2019-01-16 ENCOUNTER — TELEPHONE (OUTPATIENT)
Dept: UROLOGY | Age: 69
End: 2019-01-16

## 2019-01-16 DIAGNOSIS — N31.9 NEUROGENIC BLADDER: Primary | ICD-10-CM

## 2019-01-31 ENCOUNTER — CARE COORDINATION (OUTPATIENT)
Dept: CASE MANAGEMENT | Age: 69
End: 2019-01-31

## 2019-02-13 ENCOUNTER — CARE COORDINATION (OUTPATIENT)
Dept: CASE MANAGEMENT | Age: 69
End: 2019-02-13

## 2019-02-15 ENCOUNTER — TELEPHONE (OUTPATIENT)
Dept: UROLOGY | Age: 69
End: 2019-02-15

## 2019-02-18 ENCOUNTER — TELEPHONE (OUTPATIENT)
Dept: UROLOGY | Age: 69
End: 2019-02-18

## 2019-02-18 LAB
BILIRUBIN URINE: ABNORMAL MG/DL
BLOOD, URINE: ABNORMAL
CLARITY: ABNORMAL
COLOR: YELLOW
GLUCOSE URINE: NEGATIVE
KETONES, URINE: NEGATIVE
LEUKOCYTE ESTERASE, URINE: ABNORMAL
NITRITE, URINE: NEGATIVE
PH UA: 7 (ref 4.5–8)
PROTEIN UA: ABNORMAL
SPECIFIC GRAVITY UA: 1.02 (ref 1–1.03)
UROBILINOGEN, URINE: NORMAL

## 2019-02-18 RX ORDER — NITROFURANTOIN 25; 75 MG/1; MG/1
100 CAPSULE ORAL 2 TIMES DAILY
Qty: 10 CAPSULE | Refills: 0 | Status: SHIPPED | OUTPATIENT
Start: 2019-02-18 | End: 2019-02-19

## 2019-02-19 RX ORDER — NITROFURANTOIN 25 MG/5ML
50 SUSPENSION ORAL 4 TIMES DAILY
Qty: 200 ML | Refills: 0 | Status: SHIPPED | OUTPATIENT
Start: 2019-02-19 | End: 2019-07-02 | Stop reason: SDUPTHER

## 2019-02-21 ENCOUNTER — TELEPHONE (OUTPATIENT)
Dept: UROLOGY | Age: 69
End: 2019-02-21

## 2019-02-25 ENCOUNTER — TELEPHONE (OUTPATIENT)
Dept: UROLOGY | Age: 69
End: 2019-02-25

## 2019-02-25 RX ORDER — MAGNESIUM HYDROXIDE 1200 MG/15ML
LIQUID ORAL
Qty: 250 ML | Refills: 0 | Status: SHIPPED | OUTPATIENT
Start: 2019-02-25 | End: 2021-11-16 | Stop reason: ALTCHOICE

## 2019-02-27 ENCOUNTER — CARE COORDINATION (OUTPATIENT)
Dept: CARE COORDINATION | Age: 69
End: 2019-02-27

## 2019-02-28 ENCOUNTER — TELEPHONE (OUTPATIENT)
Dept: UROLOGY | Age: 69
End: 2019-02-28

## 2019-02-28 RX ORDER — ACETIC ACID 0.25 G/100ML
IRRIGANT IRRIGATION
Qty: 1000 ML | Refills: 5 | Status: SHIPPED | OUTPATIENT
Start: 2019-02-28 | End: 2020-01-28 | Stop reason: SDUPTHER

## 2019-03-22 ENCOUNTER — CARE COORDINATION (OUTPATIENT)
Dept: CASE MANAGEMENT | Age: 69
End: 2019-03-22

## 2019-04-04 ENCOUNTER — TELEPHONE (OUTPATIENT)
Dept: UROLOGY | Age: 69
End: 2019-04-04

## 2019-04-04 NOTE — TELEPHONE ENCOUNTER
Nate Winslow from [address] [place] [address] calling in regarding orders that have been faxed to the office on this patient. She wanted to make sure Dr Maura Maldonado was able to sign them today so they can be faxed back to them today at [phone].

## 2019-04-09 ENCOUNTER — CARE COORDINATION (OUTPATIENT)
Dept: CARE COORDINATION | Age: 69
End: 2019-04-09

## 2019-04-09 NOTE — CARE COORDINATION
Ne [facility] Transitions Follow Up Call    2019    Patient: Joao Patino  Patient : 1950   MRN: [id number]  Reason for Admission:   Discharge Date: 19 RARS: Readmission Risk Score: 10         Spoke with: Sb Rizzo, patient    Care Transitions Subsequent and Final Call    Subsequent and Final Calls  Are you currently active with any services?:  Home Health  Care Transitions Interventions  Other Interventions: Follow Up:  Spoke with patient. Patient states she is feeling better. Denies having any nausea/vomiting, fever, chills, pain or pressure. She currently has a home health nurse who visits once a week. The home health nurse also changes the catheter when needed. Patient has an appointment with PCP next Tuesday and follow up with urologist in July. Informed her that this is the final hospital discharge follow up call. She is aware to contact MD with any questions or concerns that she may have. No needs or concerns at this time.       Future Appointments   Date Time Provider Matt Loco   2019 11:45 AM Mary Rae MD  [place] [facility] [place]   2019  1:15 PM Stanton Olivares, [address] [place] Urology [facility] [place] Arita RN

## 2019-04-23 ENCOUNTER — TELEPHONE (OUTPATIENT)
Dept: UROLOGY | Age: 69
End: 2019-04-23

## 2019-04-23 RX ORDER — OXYBUTYNIN CHLORIDE 5 MG/1
5 TABLET ORAL 3 TIMES DAILY PRN
Qty: 90 TABLET | Refills: 5 | Status: SHIPPED | OUTPATIENT
Start: 2019-04-23 | End: 2019-06-06

## 2019-04-23 NOTE — TELEPHONE ENCOUNTER
The patient stated she seen Dr Jared Roberson and he gave her the liquid form of the oxybutynin. She can not take pills. She stated she has not been leaking around the catheter anymore and was not taking it. She also spoke to Dr Jared Roberson regarding the potassium and he wants her to continue eating bananas instead. Thank you.

## 2019-04-23 NOTE — TELEPHONE ENCOUNTER
I sent in refills for oxybutynin. I got a fax regarding liquid potassium, that will have to be addressed to her PCP.

## 2019-06-06 ENCOUNTER — OFFICE VISIT (OUTPATIENT)
Dept: CARDIOLOGY CLINIC | Age: 69
End: 2019-06-06
Payer: MEDICARE

## 2019-06-06 VITALS
WEIGHT: 140 LBS | HEART RATE: 72 BPM | HEIGHT: 63 IN | BODY MASS INDEX: 24.8 KG/M2 | DIASTOLIC BLOOD PRESSURE: 63 MMHG | SYSTOLIC BLOOD PRESSURE: 120 MMHG

## 2019-06-06 DIAGNOSIS — R60.0 LEG EDEMA: Primary | ICD-10-CM

## 2019-06-06 PROCEDURE — 1090F PRES/ABSN URINE INCON ASSESS: CPT | Performed by: INTERNAL MEDICINE

## 2019-06-06 PROCEDURE — 4040F PNEUMOC VAC/ADMIN/RCVD: CPT | Performed by: INTERNAL MEDICINE

## 2019-06-06 PROCEDURE — G8420 CALC BMI NORM PARAMETERS: HCPCS | Performed by: INTERNAL MEDICINE

## 2019-06-06 PROCEDURE — G8428 CUR MEDS NOT DOCUMENT: HCPCS | Performed by: INTERNAL MEDICINE

## 2019-06-06 PROCEDURE — G8400 PT W/DXA NO RESULTS DOC: HCPCS | Performed by: INTERNAL MEDICINE

## 2019-06-06 PROCEDURE — 1123F ACP DISCUSS/DSCN MKR DOCD: CPT | Performed by: INTERNAL MEDICINE

## 2019-06-06 PROCEDURE — 4004F PT TOBACCO SCREEN RCVD TLK: CPT | Performed by: INTERNAL MEDICINE

## 2019-06-06 PROCEDURE — 99213 OFFICE O/P EST LOW 20 MIN: CPT | Performed by: INTERNAL MEDICINE

## 2019-06-06 PROCEDURE — 3017F COLORECTAL CA SCREEN DOC REV: CPT | Performed by: INTERNAL MEDICINE

## 2019-06-06 RX ORDER — FUROSEMIDE 10 MG/ML
SOLUTION ORAL DAILY
Refills: 0 | Status: ON HOLD | COMMUNITY
Start: 2019-05-10 | End: 2019-09-01

## 2019-06-06 RX ORDER — ASPIRIN 81 MG/1
TABLET ORAL
Qty: 30 TABLET | Refills: 11 | Status: SHIPPED | OUTPATIENT
Start: 2019-06-06 | End: 2020-05-08 | Stop reason: SDUPTHER

## 2019-06-06 NOTE — PROGRESS NOTES
Arthritis, Hyperlipidemia, Hypertension, Intra-abdominal lymphadenopathy, MS (multiple sclerosis) ([address] Pancreatitis, Pneumonia, Seizures ([address] and UTI (urinary tract infection). Social History  Sharri  reports that she quit smoking about 5 years ago. Her smoking use included cigarettes. She has a 80.00 pack-year smoking history. She uses smokeless tobacco. She reports that she does not drink alcohol or use drugs. Family History  Sharri family history includes Cancer in her mother; Heart Disease in her father. Past Surgical History   Past Surgical History:   Procedure Laterality Date    CHOLECYSTECTOMY  April 1st 2016    laparoscopic    CYSTOSCOPY N/A 1/2/2019    CYSTO, CLOT EVACUATION, TURBT performed by Abida Clayton MD at [address] [place] [address] Right 12/17/2018    EYE CATARACT EMULSIFICATION IOL IMPLANT, RIGHT performed by Tod Barfield MD at [place] [address] Left 11/15/2018    EYE CATARACT EMULSIFICATION IOL IMPLANT LEFT EYE performed by Tod Barfield MD at [address] [place] [address]       Subjective:     REVIEW OF SYSTEMS  Constitutional: denies sweats, chills and fever  HENT: denies  congestion, sinus pressure, sneezing and sore throat. Eyes: denies  pain, discharge, redness and itching. Respiratory: denies apnea, cough  Gastrointestinal: denies blood in stool, constipation, diarrhea   Endocrine: denies cold intolerance, heat intolerance, polydipsia. Genitourinary: denies dysuria, enuresis, flank pain and hematuria. Musculoskeletal: denies arthralgias, joint swelling and neck pain. Neurological: denies numbness and headaches. Psychiatric/Behavioral: denies agitation, confusion, decreased concentration and dysphoric mood    All others reviewed and are negative.    Objective:     Ht 5' 3\" (1.6 m)   Wt 140 lb (63.5 kg) Comment: pt states weight  BMI 24.80 kg/m²     Wt Readings from Last 3 Encounters:   06/06/19 140 lb (63.5 kg)   01/15/19 145 lb (65.8 kg)   01/04/19 145 lb 12.8 oz (66.1 kg)     BP Readings from Last 3 Encounters:   01/15/19 128/78   01/06/19 (!) 117/58   01/02/19 (!) 92/51       PHYSICAL EXAM  Constitutional: Oriented to person, place, and time. Appears well-developed and well-nourished. HENT:   Head: Normocephalic and atraumatic. Eyes: EOM are normal. Pupils are equal, round, and reactive to light. Neck: Normal range of motion. Neck supple. No JVD present. Cardiovascular: Normal rate , normal heart sounds and intact distal pulses. Pulmonary/Chest: Effort normal and breath sounds normal. No respiratory distress. No wheezes. No rales. Abdominal: Soft. Bowel sounds are normal. No distension. There is no tenderness. Musculoskeletal: Normal range of motion. No edema. Neurological: Alert and oriented to person, place, and time. No cranial nerve deficit. Coordination normal.   Skin: Skin is warm and dry. Psychiatric: Normal mood and affect.        No results found for: CKTOTAL, CKMB, CKMBINDEX    Lab Results   Component Value Date    WBC 7.4 01/06/2019    RBC 2.83 01/06/2019    HGB 9.3 01/06/2019    HCT 29.5 01/06/2019    .2 01/06/2019    MCH 32.9 01/06/2019    MCHC 31.5 01/06/2019    RDW 13.6 01/29/2017     01/06/2019    MPV 10.6 01/06/2019       Lab Results   Component Value Date     01/06/2019    K 4.0 01/06/2019    K 4.7 12/31/2018     01/06/2019    CO2 23 01/06/2019    BUN 7 01/06/2019    LABALBU 2.4 01/24/2017    CREATININE 0.4 01/06/2019    CALCIUM 8.1 01/06/2019    LABGLOM >90 01/06/2019    GLUCOSE 83 01/06/2019       Lab Results   Component Value Date    ALKPHOS 261 01/24/2017    ALT 41 01/24/2017    AST 54 01/24/2017    PROT 7.1 01/24/2017    BILITOT 0.5 01/24/2017    BILIDIR 1.2 04/03/2016    LABALBU 2.4 01/24/2017       Lab Results   Component Value Date    MG 1.6 01/05/2019       Lab Results   Component Value Date    INR 1.04 10/02/2015         No results found for: LABA1C    Lab Results   Component Value Date    TRIG 67 10/02/2015       Lab Results   Component Value Date    TSH 2.430 03/29/2016         Testing Reviewed:      I haveindividually reviewed the below cardiac tests    EKG:    ECHO:   Results for orders placed during the hospital encounter of 01/23/17   ECHO Complete 2D W Doppler W Color    Narrative Transthoracic Echocardiography Report (TTE)     Demographics      Patient Name    Unice Numbers Gender                Female      MR #            [id number]     Race                                                      Ethnicity      Account #       [de-identified]       Room Number           0004      Accession       [id number]     Date of Study         01/24/2017   Number      Date of Birth   1950    Referring Physician   Shantell Hill MD      Age             74 year(s)    Sonographer           Felice Ahumada, RDCS                                    Interpreting          [facility]                                 Physician     Procedure    Type of Study      TTE procedure:ECHOCARDIOGRAM COMPLETE 2D W DOPPLER W COLOR. Procedure Date  Date: 01/24/2017 Start: 08:50 AM    Study Location: Bedside  Technical Quality: Adequate visualization    Indications:Arrhythmia. Additional Medical History:Ex Smoker, Sepsis, Multiple Sclerosis,  Hyperlipidemia. Patient Status: Routine    Height: 63 inches Weight: 115 pounds BSA: 1.53 m^2 BMI: 20.37 kg/m^2    BP: 96/76 mmHg     Conclusions      Summary   Systolic function was normal.   Ejection fraction is visually estimated at 50 %. Doppler parameters were consistent with abnormal left ventricular   relaxation (grade 1 diastolic dysfunction). Normal right ventricular size and function. Right ventricular systolic pressure of 45 mm Hg consistent with mild   pulmonary hypertension. Structurally normal mitral valve.    Mild mitral Peak E-Wave: 89.8 cm/s  AV Peak Velocity: 142 LVOT Peak Velocity: 100   MV Peak A-Wave: 91.8 cm/s  cm/s                  cm/s   MV E/A Ratio: 0.98         AV Peak Gradient:     LVOT Peak Gradient: 4   MV Peak Gradient: 3.23     8.07 mmHg             mmHg   mmHg                                                    TV Peak E-Wave: 51.3 cm/s   MV Deceleration Time: 313                        TV Peak A-Wave: 39 cm/s   msec                              IVRT: 70 msec         TV Peak Gradient: 1.05                                                    mmHg   MV E' Septal Velocity:                           TR Velocity:296 cm/s   8.68 cm/s                  AV DVI (Vmax):0.7     TR Gradient:35.05 mmHg   MV A' Septal Velocity:                           PV Peak Velocity: 73.5   8.48 cm/s                                        cm/s   MV E' Lateral Velocity:                          PV Peak Gradient: 2.16   7.02 cm/s                                        mmHg   MV A' Lateral Velocity:   11.1 cm/s   E/E' septal: 10.35   E/E' lateral: 12.79   MR Velocity: 398 cm/s     [email or web address] [id number]       STRESS:    CATH:    Assessment/Plan       Diagnosis Orders   1. Leg edema       Leg edema , wheel chair bound due to MS  HLD  Seizure disorder  Pancreatitis     Patent has 3+ bilateral pitting edema  She does not ambulate and is totally wheel chair bound  Had mild pul HTN  Discussed about leg wrapping, compression stocking and diuretics  presribed Lasix 20mg daily on last visit  Patient does not want to take diuretics   Continue aspirin  The patient is asked to make an attempt to improve diet and exercise patterns to aid in medical management of this problem.   Advised patient to call office or seek immediate medical attention if there is any new onset of  any chest pain, sob, palpitations, lightheadedness, dizziness, orthopnea, PND or pedal edema. Thank youfor allowing me to participate in the care of this patient. Please do not hesitate to contact me for any further questions. Return if symptoms worsen or fail to improve, for Review testing, Regular follow up.        Electronically signed by Cha Mitchell MD [address]  6/6/2019 at 11:42 AM

## 2019-07-01 ENCOUNTER — TELEPHONE (OUTPATIENT)
Dept: UROLOGY | Age: 69
End: 2019-07-01

## 2019-07-02 RX ORDER — NITROFURANTOIN 25 MG/5ML
50 SUSPENSION ORAL 4 TIMES DAILY
Qty: 400 ML | Refills: 0 | Status: SHIPPED | OUTPATIENT
Start: 2019-07-02 | End: 2019-07-12

## 2019-07-02 NOTE — TELEPHONE ENCOUNTER
Lorna  from Dr Taye Clark office called and stated patient cannot swallow pills so cannot do Macrobid. Lorna  would like me to call her back and let her know what else patient can be on that is a liquid and Dr Andrea Quesada can prescribe.

## 2019-07-23 ENCOUNTER — OFFICE VISIT (OUTPATIENT)
Dept: UROLOGY | Age: 69
End: 2019-07-23
Payer: MEDICARE

## 2019-07-23 VITALS
BODY MASS INDEX: 23.92 KG/M2 | SYSTOLIC BLOOD PRESSURE: 110 MMHG | HEIGHT: 63 IN | WEIGHT: 135 LBS | DIASTOLIC BLOOD PRESSURE: 64 MMHG

## 2019-07-23 DIAGNOSIS — R33.9 RETENTION OF URINE: Primary | ICD-10-CM

## 2019-07-23 DIAGNOSIS — N31.9 NEUROGENIC BLADDER: ICD-10-CM

## 2019-07-23 PROCEDURE — 1123F ACP DISCUSS/DSCN MKR DOCD: CPT | Performed by: NURSE PRACTITIONER

## 2019-07-23 PROCEDURE — G8420 CALC BMI NORM PARAMETERS: HCPCS | Performed by: NURSE PRACTITIONER

## 2019-07-23 PROCEDURE — G8400 PT W/DXA NO RESULTS DOC: HCPCS | Performed by: NURSE PRACTITIONER

## 2019-07-23 PROCEDURE — 3017F COLORECTAL CA SCREEN DOC REV: CPT | Performed by: NURSE PRACTITIONER

## 2019-07-23 PROCEDURE — 4040F PNEUMOC VAC/ADMIN/RCVD: CPT | Performed by: NURSE PRACTITIONER

## 2019-07-23 PROCEDURE — 1090F PRES/ABSN URINE INCON ASSESS: CPT | Performed by: NURSE PRACTITIONER

## 2019-07-23 PROCEDURE — G8427 DOCREV CUR MEDS BY ELIG CLIN: HCPCS | Performed by: NURSE PRACTITIONER

## 2019-07-23 PROCEDURE — 99213 OFFICE O/P EST LOW 20 MIN: CPT | Performed by: NURSE PRACTITIONER

## 2019-07-23 PROCEDURE — 4004F PT TOBACCO SCREEN RCVD TLK: CPT | Performed by: NURSE PRACTITIONER

## 2019-07-23 NOTE — PROGRESS NOTES
[facility],[address] [address]  Dept: [phone]  Loc: [phone]  Visit Date: 7/23/2019      HPI:     Maria L Segura is a 77 y.o. with past medical history as listed below who presents today in follow-up for gross hematuria and recurrent UTIs. Patient seen as consult while in the hospital by Urology locum provider, Dr. Priscilla Smith. Patient underwent Cystourethroscopy with clot evacuation from bladder per Dr. Priscilla Smith on 1-2-19. Cystoscopy showed acutely inflamed bladder consistent with UTI, extensive trabeculation and cellulization of bladder. Patient has history of MS, she is wheelchair bound. Patient's significant other cares for her, and does transfers from bed to chair. Patient reports last 4-5 years her urinary urgency, frequency, incontinence and recurrent UTIs have gotten progressively worse. She wears depends, she reports she is tired of being wet. She has ulcers on buttocks due to incontinence of urine. She reports frequent UTIs, being treated by PCP. She is currently taking cranberry pills and D Mannose for prevention. It was decided at last visit that a chronic vega catheter would be placed and changed every 4 weeks by home health nurse. Patient denies any issues with catheter. She denies any spasms, or leakage around catheter. Catheter is irrigated every week with acetic acid irrigation. Urine was checked last month for infection but patient denies any symptoms of UTI at that time. She was treated with macrobid. She denies fever, chills, gross hematuria, change in appetite or back pain. Neisha Romeo comes in today by herself. Hx is obtained from the patient and medical record.        Current Outpatient Medications   Medication Sig Dispense Refill    furosemide (LASIX) 10 MG/ML solution Take by mouth daily 4 milliliters by mout daily  0    aspirin (RA ASPIRIN EC) 81 MG EC tablet take 1 tablet by mouth once daily 30

## 2019-08-29 ENCOUNTER — APPOINTMENT (OUTPATIENT)
Dept: GENERAL RADIOLOGY | Age: 69
DRG: 698 | End: 2019-08-29
Payer: MEDICARE

## 2019-08-29 ENCOUNTER — HOSPITAL ENCOUNTER (INPATIENT)
Age: 69
LOS: 6 days | Discharge: HOME HEALTH | DRG: 698 | End: 2019-09-04
Attending: INTERNAL MEDICINE | Admitting: INTERNAL MEDICINE
Payer: MEDICARE

## 2019-08-29 DIAGNOSIS — T83.511A URINARY TRACT INFECTION ASSOCIATED WITH INDWELLING URETHRAL CATHETER, INITIAL ENCOUNTER (HCC): Primary | ICD-10-CM

## 2019-08-29 DIAGNOSIS — N39.0 URINARY TRACT INFECTION ASSOCIATED WITH INDWELLING URETHRAL CATHETER, INITIAL ENCOUNTER (HCC): Primary | ICD-10-CM

## 2019-08-29 DIAGNOSIS — R65.10 SIRS (SYSTEMIC INFLAMMATORY RESPONSE SYNDROME) (HCC): ICD-10-CM

## 2019-08-29 PROBLEM — I10 HTN (HYPERTENSION): Status: ACTIVE | Noted: 2019-08-29

## 2019-08-29 PROBLEM — G35 MULTIPLE SCLEROSIS (HCC): Status: ACTIVE | Noted: 2019-08-29

## 2019-08-29 PROBLEM — A41.9 SEPSIS (HCC): Status: ACTIVE | Noted: 2019-08-29

## 2019-08-29 PROBLEM — E78.5 HLD (HYPERLIPIDEMIA): Status: ACTIVE | Noted: 2019-08-29

## 2019-08-29 LAB
ANION GAP SERPL CALCULATED.3IONS-SCNC: 13 MEQ/L (ref 8–16)
BACTERIA: ABNORMAL /HPF
BASOPHILS # BLD: 0.5 %
BASOPHILS ABSOLUTE: 0.1 THOU/MM3 (ref 0–0.1)
BILIRUBIN URINE: NEGATIVE
BLOOD, URINE: ABNORMAL
BUN BLDV-MCNC: 24 MG/DL (ref 7–22)
CALCIUM SERPL-MCNC: 9.5 MG/DL (ref 8.5–10.5)
CASTS UA: ABNORMAL /LPF
CHARACTER, URINE: ABNORMAL
CHLORIDE BLD-SCNC: 100 MEQ/L (ref 98–111)
CO2: 26 MEQ/L (ref 23–33)
COLOR: ABNORMAL
CREAT SERPL-MCNC: 0.9 MG/DL (ref 0.4–1.2)
CRYSTALS, UA: ABNORMAL
EOSINOPHIL # BLD: 0.8 %
EOSINOPHILS ABSOLUTE: 0.1 THOU/MM3 (ref 0–0.4)
EPITHELIAL CELLS, UA: ABNORMAL /HPF
ERYTHROCYTE [DISTWIDTH] IN BLOOD BY AUTOMATED COUNT: 13.3 % (ref 11.5–14.5)
ERYTHROCYTE [DISTWIDTH] IN BLOOD BY AUTOMATED COUNT: 48 FL (ref 35–45)
GFR SERPL CREATININE-BSD FRML MDRD: 62 ML/MIN/1.73M2
GLUCOSE BLD-MCNC: 111 MG/DL (ref 70–108)
GLUCOSE URINE: NEGATIVE MG/DL
HCT VFR BLD CALC: 43.7 % (ref 37–47)
HEMOGLOBIN: 14.3 GM/DL (ref 12–16)
IMMATURE GRANS (ABS): 0.04 THOU/MM3 (ref 0–0.07)
IMMATURE GRANULOCYTES: 0 %
KETONES, URINE: NEGATIVE
LACTIC ACID: 1.3 MMOL/L (ref 0.5–2.2)
LACTIC ACID: 1.4 MMOL/L (ref 0.5–2.2)
LEUKOCYTE ESTERASE, URINE: ABNORMAL
LYMPHOCYTES # BLD: 9.1 %
LYMPHOCYTES ABSOLUTE: 1.1 THOU/MM3 (ref 1–4.8)
MCH RBC QN AUTO: 32.1 PG (ref 26–33)
MCHC RBC AUTO-ENTMCNC: 32.7 GM/DL (ref 32.2–35.5)
MCV RBC AUTO: 98.2 FL (ref 81–99)
MONOCYTES # BLD: 8.1 %
MONOCYTES ABSOLUTE: 1 THOU/MM3 (ref 0.4–1.3)
MRSA SCREEN RT-PCR: NEGATIVE
NITRITE, URINE: NEGATIVE
NUCLEATED RED BLOOD CELLS: 0 /100 WBC
OSMOLALITY CALCULATION: 282.3 MOSMOL/KG (ref 275–300)
PH UA: 7 (ref 5–9)
PLATELET # BLD: 204 THOU/MM3 (ref 130–400)
PMV BLD AUTO: 11.8 FL (ref 9.4–12.4)
POTASSIUM SERPL-SCNC: 4.3 MEQ/L (ref 3.5–5.2)
PROCALCITONIN: 0.09 NG/ML (ref 0.01–0.09)
PROTEIN UA: 100
RBC # BLD: 4.45 MILL/MM3 (ref 4.2–5.4)
RBC URINE: > 200 /HPF
SEG NEUTROPHILS: 81.2 %
SEGMENTED NEUTROPHILS ABSOLUTE COUNT: 9.7 THOU/MM3 (ref 1.8–7.7)
SODIUM BLD-SCNC: 139 MEQ/L (ref 135–145)
SPECIFIC GRAVITY, URINE: 1.01 (ref 1–1.03)
TROPONIN T: < 0.01 NG/ML
TROPONIN T: < 0.01 NG/ML
UROBILINOGEN, URINE: 0.2 EU/DL (ref 0–1)
VANCOMYCIN RESISTANT ENTEROCOCCUS: NEGATIVE
WBC # BLD: 12 THOU/MM3 (ref 4.8–10.8)
WBC UA: > 200 /HPF

## 2019-08-29 PROCEDURE — 87186 SC STD MICRODIL/AGAR DIL: CPT

## 2019-08-29 PROCEDURE — 6370000000 HC RX 637 (ALT 250 FOR IP): Performed by: INTERNAL MEDICINE

## 2019-08-29 PROCEDURE — 2580000003 HC RX 258: Performed by: NURSE PRACTITIONER

## 2019-08-29 PROCEDURE — 85025 COMPLETE CBC W/AUTO DIFF WBC: CPT

## 2019-08-29 PROCEDURE — 2580000003 HC RX 258: Performed by: INTERNAL MEDICINE

## 2019-08-29 PROCEDURE — 84145 PROCALCITONIN (PCT): CPT

## 2019-08-29 PROCEDURE — 6360000002 HC RX W HCPCS: Performed by: INTERNAL MEDICINE

## 2019-08-29 PROCEDURE — 96375 TX/PRO/DX INJ NEW DRUG ADDON: CPT

## 2019-08-29 PROCEDURE — 36415 COLL VENOUS BLD VENIPUNCTURE: CPT

## 2019-08-29 PROCEDURE — APPSS60 APP SPLIT SHARED TIME 46-60 MINUTES: Performed by: NURSE PRACTITIONER

## 2019-08-29 PROCEDURE — 2709999900 HC NON-CHARGEABLE SUPPLY

## 2019-08-29 PROCEDURE — 99223 1ST HOSP IP/OBS HIGH 75: CPT | Performed by: INTERNAL MEDICINE

## 2019-08-29 PROCEDURE — 71045 X-RAY EXAM CHEST 1 VIEW: CPT

## 2019-08-29 PROCEDURE — 87086 URINE CULTURE/COLONY COUNT: CPT

## 2019-08-29 PROCEDURE — 87081 CULTURE SCREEN ONLY: CPT

## 2019-08-29 PROCEDURE — 2000000000 HC ICU R&B

## 2019-08-29 PROCEDURE — 87641 MR-STAPH DNA AMP PROBE: CPT

## 2019-08-29 PROCEDURE — 02HV33Z INSERTION OF INFUSION DEVICE INTO SUPERIOR VENA CAVA, PERCUTANEOUS APPROACH: ICD-10-PCS | Performed by: INTERNAL MEDICINE

## 2019-08-29 PROCEDURE — 2500000003 HC RX 250 WO HCPCS: Performed by: INTERNAL MEDICINE

## 2019-08-29 PROCEDURE — 84484 ASSAY OF TROPONIN QUANT: CPT

## 2019-08-29 PROCEDURE — 97166 OT EVAL MOD COMPLEX 45 MIN: CPT

## 2019-08-29 PROCEDURE — C1751 CATH, INF, PER/CENT/MIDLINE: HCPCS

## 2019-08-29 PROCEDURE — 74018 RADEX ABDOMEN 1 VIEW: CPT

## 2019-08-29 PROCEDURE — 87077 CULTURE AEROBIC IDENTIFY: CPT

## 2019-08-29 PROCEDURE — 80048 BASIC METABOLIC PNL TOTAL CA: CPT

## 2019-08-29 PROCEDURE — 6360000002 HC RX W HCPCS: Performed by: PHARMACIST

## 2019-08-29 PROCEDURE — 83605 ASSAY OF LACTIC ACID: CPT

## 2019-08-29 PROCEDURE — 87147 CULTURE TYPE IMMUNOLOGIC: CPT

## 2019-08-29 PROCEDURE — 81001 URINALYSIS AUTO W/SCOPE: CPT

## 2019-08-29 PROCEDURE — 96365 THER/PROPH/DIAG IV INF INIT: CPT

## 2019-08-29 PROCEDURE — 36556 INSERT NON-TUNNEL CV CATH: CPT | Performed by: INTERNAL MEDICINE

## 2019-08-29 PROCEDURE — 93005 ELECTROCARDIOGRAM TRACING: CPT | Performed by: INTERNAL MEDICINE

## 2019-08-29 PROCEDURE — 87040 BLOOD CULTURE FOR BACTERIA: CPT

## 2019-08-29 PROCEDURE — 97110 THERAPEUTIC EXERCISES: CPT

## 2019-08-29 PROCEDURE — 87500 VANOMYCIN DNA AMP PROBE: CPT

## 2019-08-29 PROCEDURE — 99285 EMERGENCY DEPT VISIT HI MDM: CPT

## 2019-08-29 PROCEDURE — 94761 N-INVAS EAR/PLS OXIMETRY MLT: CPT

## 2019-08-29 PROCEDURE — 2580000003 HC RX 258: Performed by: PHARMACIST

## 2019-08-29 PROCEDURE — 6360000002 HC RX W HCPCS: Performed by: NURSE PRACTITIONER

## 2019-08-29 RX ORDER — ACETIC ACID 0.25 G/100ML
150 IRRIGANT IRRIGATION
Status: DISCONTINUED | OUTPATIENT
Start: 2019-08-29 | End: 2019-09-04 | Stop reason: HOSPADM

## 2019-08-29 RX ORDER — SODIUM CHLORIDE 0.9 % (FLUSH) 0.9 %
10 SYRINGE (ML) INJECTION EVERY 12 HOURS SCHEDULED
Status: DISCONTINUED | OUTPATIENT
Start: 2019-08-29 | End: 2019-09-04 | Stop reason: HOSPADM

## 2019-08-29 RX ORDER — LIDOCAINE HYDROCHLORIDE 10 MG/ML
5 INJECTION, SOLUTION EPIDURAL; INFILTRATION; INTRACAUDAL; PERINEURAL ONCE
Status: DISCONTINUED | OUTPATIENT
Start: 2019-08-29 | End: 2019-09-04 | Stop reason: HOSPADM

## 2019-08-29 RX ORDER — SODIUM CHLORIDE 0.9 % (FLUSH) 0.9 %
10 SYRINGE (ML) INJECTION EVERY 12 HOURS SCHEDULED
Status: DISCONTINUED | OUTPATIENT
Start: 2019-08-29 | End: 2019-08-29

## 2019-08-29 RX ORDER — ACETAMINOPHEN 325 MG/1
650 TABLET ORAL EVERY 4 HOURS PRN
Status: DISCONTINUED | OUTPATIENT
Start: 2019-08-29 | End: 2019-08-29 | Stop reason: SDUPTHER

## 2019-08-29 RX ORDER — SODIUM CHLORIDE 9 MG/ML
INJECTION, SOLUTION INTRAVENOUS CONTINUOUS
Status: DISCONTINUED | OUTPATIENT
Start: 2019-08-29 | End: 2019-08-31

## 2019-08-29 RX ORDER — ONDANSETRON 2 MG/ML
4 INJECTION INTRAMUSCULAR; INTRAVENOUS ONCE
Status: COMPLETED | OUTPATIENT
Start: 2019-08-29 | End: 2019-08-29

## 2019-08-29 RX ORDER — 0.9 % SODIUM CHLORIDE 0.9 %
1000 INTRAVENOUS SOLUTION INTRAVENOUS ONCE
Status: COMPLETED | OUTPATIENT
Start: 2019-08-29 | End: 2019-08-29

## 2019-08-29 RX ORDER — ACETAMINOPHEN 650 MG/1
650 SUPPOSITORY RECTAL EVERY 4 HOURS PRN
Status: DISCONTINUED | OUTPATIENT
Start: 2019-08-29 | End: 2019-09-04 | Stop reason: HOSPADM

## 2019-08-29 RX ORDER — CARBAMAZEPINE 100 MG/1
100 TABLET, CHEWABLE ORAL 3 TIMES DAILY
Status: DISCONTINUED | OUTPATIENT
Start: 2019-08-29 | End: 2019-09-04 | Stop reason: HOSPADM

## 2019-08-29 RX ORDER — 0.9 % SODIUM CHLORIDE 0.9 %
2500 INTRAVENOUS SOLUTION INTRAVENOUS ONCE
Status: DISCONTINUED | OUTPATIENT
Start: 2019-08-29 | End: 2019-09-04 | Stop reason: HOSPADM

## 2019-08-29 RX ORDER — ACETAMINOPHEN 325 MG/1
650 TABLET ORAL EVERY 4 HOURS PRN
Status: DISCONTINUED | OUTPATIENT
Start: 2019-08-29 | End: 2019-09-04 | Stop reason: HOSPADM

## 2019-08-29 RX ORDER — KETOROLAC TROMETHAMINE 30 MG/ML
15 INJECTION, SOLUTION INTRAMUSCULAR; INTRAVENOUS ONCE
Status: COMPLETED | OUTPATIENT
Start: 2019-08-29 | End: 2019-08-29

## 2019-08-29 RX ORDER — HYDROCODONE BITARTRATE AND ACETAMINOPHEN 7.5; 325 MG/1; MG/1
1 TABLET ORAL EVERY 6 HOURS PRN
Status: DISCONTINUED | OUTPATIENT
Start: 2019-08-29 | End: 2019-09-04 | Stop reason: HOSPADM

## 2019-08-29 RX ORDER — ONDANSETRON 2 MG/ML
4 INJECTION INTRAMUSCULAR; INTRAVENOUS EVERY 6 HOURS PRN
Status: DISCONTINUED | OUTPATIENT
Start: 2019-08-29 | End: 2019-09-04 | Stop reason: HOSPADM

## 2019-08-29 RX ORDER — SODIUM CHLORIDE 0.9 % (FLUSH) 0.9 %
10 SYRINGE (ML) INJECTION PRN
Status: DISCONTINUED | OUTPATIENT
Start: 2019-08-29 | End: 2019-08-29

## 2019-08-29 RX ORDER — ASPIRIN 81 MG/1
81 TABLET ORAL DAILY
Status: DISCONTINUED | OUTPATIENT
Start: 2019-08-29 | End: 2019-09-04 | Stop reason: HOSPADM

## 2019-08-29 RX ORDER — PROMETHAZINE HYDROCHLORIDE 25 MG/ML
12.5 INJECTION, SOLUTION INTRAMUSCULAR; INTRAVENOUS ONCE
Status: COMPLETED | OUTPATIENT
Start: 2019-08-29 | End: 2019-08-29

## 2019-08-29 RX ORDER — SODIUM CHLORIDE 0.9 % (FLUSH) 0.9 %
10 SYRINGE (ML) INJECTION PRN
Status: DISCONTINUED | OUTPATIENT
Start: 2019-08-29 | End: 2019-09-04 | Stop reason: HOSPADM

## 2019-08-29 RX ORDER — ACETAMINOPHEN 325 MG/1
650 TABLET ORAL ONCE
Status: DISCONTINUED | OUTPATIENT
Start: 2019-08-29 | End: 2019-09-04 | Stop reason: HOSPADM

## 2019-08-29 RX ADMIN — PROMETHAZINE HYDROCHLORIDE 12.5 MG: 25 INJECTION INTRAMUSCULAR; INTRAVENOUS at 03:24

## 2019-08-29 RX ADMIN — CARBAMAZEPINE 100 MG: 100 TABLET, CHEWABLE ORAL at 15:24

## 2019-08-29 RX ADMIN — Medication 8 MCG/MIN: at 08:15

## 2019-08-29 RX ADMIN — Medication 6 MCG/MIN: at 08:00

## 2019-08-29 RX ADMIN — VANCOMYCIN HYDROCHLORIDE 1000 MG: 1 INJECTION, POWDER, LYOPHILIZED, FOR SOLUTION INTRAVENOUS at 13:00

## 2019-08-29 RX ADMIN — MEROPENEM 1 G: 1 INJECTION, POWDER, FOR SOLUTION INTRAVENOUS at 18:25

## 2019-08-29 RX ADMIN — Medication 2 MCG/MIN: at 07:35

## 2019-08-29 RX ADMIN — ENOXAPARIN SODIUM 40 MG: 40 INJECTION SUBCUTANEOUS at 11:28

## 2019-08-29 RX ADMIN — Medication 4 MCG/MIN: at 07:50

## 2019-08-29 RX ADMIN — ASPIRIN 81 MG: 81 TABLET ORAL at 11:25

## 2019-08-29 RX ADMIN — MEROPENEM 1 G: 1 INJECTION, POWDER, FOR SOLUTION INTRAVENOUS at 11:21

## 2019-08-29 RX ADMIN — VITAMIN D, TAB 1000IU (100/BT) 1000 UNITS: 25 TAB at 11:25

## 2019-08-29 RX ADMIN — ONDANSETRON 4 MG: 2 INJECTION INTRAMUSCULAR; INTRAVENOUS at 02:50

## 2019-08-29 RX ADMIN — KETOROLAC TROMETHAMINE 15 MG: 30 INJECTION, SOLUTION INTRAMUSCULAR at 04:18

## 2019-08-29 RX ADMIN — CEFTRIAXONE SODIUM 1 G: 1 INJECTION, POWDER, FOR SOLUTION INTRAMUSCULAR; INTRAVENOUS at 04:42

## 2019-08-29 RX ADMIN — Medication 10 ML: at 21:36

## 2019-08-29 RX ADMIN — CARBAMAZEPINE 100 MG: 100 TABLET, CHEWABLE ORAL at 21:43

## 2019-08-29 RX ADMIN — CARBAMAZEPINE 100 MG: 100 TABLET, CHEWABLE ORAL at 11:25

## 2019-08-29 RX ADMIN — ACETIC ACID 150 ML: 250 IRRIGANT IRRIGATION at 14:07

## 2019-08-29 RX ADMIN — SODIUM CHLORIDE 1000 ML: 9 INJECTION, SOLUTION INTRAVENOUS at 02:50

## 2019-08-29 ASSESSMENT — PAIN SCALES - GENERAL
PAINLEVEL_OUTOF10: 0
PAINLEVEL_OUTOF10: 0
PAINLEVEL_OUTOF10: 4

## 2019-08-29 ASSESSMENT — ENCOUNTER SYMPTOMS
WHEEZING: 0
CONSTIPATION: 0
SINUS PAIN: 0
PHOTOPHOBIA: 0
VOMITING: 0
BACK PAIN: 0
SHORTNESS OF BREATH: 0
COLOR CHANGE: 0
TROUBLE SWALLOWING: 0

## 2019-08-29 ASSESSMENT — PAIN DESCRIPTION - PAIN TYPE: TYPE: ACUTE PAIN

## 2019-08-29 ASSESSMENT — PAIN DESCRIPTION - LOCATION: LOCATION: GROIN

## 2019-08-29 NOTE — CONSULTS
- CNP on 8/29/2019 at 4:43 PM   Patient seen by me. Chest x-ray shows mild interstitial prominence. Patient has severe kyphosis. Neck is stiff. Patient has 2+ lower extremity edema bilaterally. She does have anxiety. No thyromegaly. Continue to treat with antibiotics. Continue with low-dose pressors. Continue with antibiotics. Slow improvement. Electronically signed by Sarah Deutsch MD.

## 2019-08-29 NOTE — PROGRESS NOTES
[address] THERAPY  [facility] ICU 4D  EVALUATION    Time:    Time In: 1210  Time Out: 1256  Timed Code Treatment Minutes: 32 Minutes  Minutes: 46          Date: 2019  Patient Name: Reji Johnson,   Gender: female      MRN: [id number]  : 1950  (77 y.o.)  Referring Practitioner: Dr. Ramsey Sepulveda  Diagnosis: sepsis  Additional Pertinent Hx: Per ER note on 19: 77 y.o. female who presented to [address] [place] [address] with Vega issues. Patient states that she has leakage around her Vega catheter. Patient is complaining that she is having some bladder discomfort. Patient endorses recurrent UTIs. Patient states that she has no chest pain. She states that she had some shortness of breath but that this is resolved during my examination. She states that she has an pressure ulcer to her buttocks. Restrictions/Precautions:  Restrictions/Precautions: Fall Risk  Position Activity Restriction  Other position/activity restrictions: hx of MS, chronic vega    Subjective  Chart Reviewed: Yes, Orders, Progress Notes, History and Physical  Patient assessed for rehabilitation services?: Yes  Family / Caregiver Present: Yes(son )    Subjective: cooperative    Pain:  Pain Assessment  Patient Currently in Pain: No    Social/Functional History:  Lives With: Significant other  Type of Home: House  Home Layout: Two level, Performs ADL's on one level  Home Access: Ramped entrance  Home Equipment: Electric scooter, Hospital bed(phillip)   Bathroom Shower/Tub: Walk-in shower  Bathroom Toilet: Handicap height  Bathroom Equipment: Shower chair, Grab bars in shower, Grab bars around toilet       ADL Assistance: Needs assistance  Homemaking Responsibilities: No  Ambulation Assistance: (non ambulatory)  Transfer Assistance: Needs assistance          Additional Comments:  uses phillip to get Pt from bed to electric scooter;  A with t/f to toilet from electric scooter. Cognition/Orientation:     Overall Cognitive Status: WFL    ADL;s:  Feeding: Setup  Grooming: Setup       Functional Mobility:  Bed mobility  Rolling to Left: Maximum assistance  Rolling to Right: Maximum assistance   ** rolled 2x both ways; max A for repositioning in bed d/t Pt laying crooked        Balance:   Pt declined OOB, reports she really does not do that at home. Carlo to electric scooter & describes max A of  for sit-pivot scooter to toilet. Pt not wanting to practice while here in the hospital despite therapist encouragement. Upper Extremity Assessment:   LUE AROM : WFL  RUE AROM : WFL    LUE Strength  Gross LUE Strength: (shoulder 3-/5, elbow 4/5)  RUE Strength  Gross RUE Strength: (shoulder 3-/5, elbow 4/5)    Sensation  Overall Sensation Status: [facility](BUE)       Activity Tolerance: Patient limited by fatigue       Assessment:  Assessment: Pt demo decreased endurance & strength with admission for sepsis. Continued OT recommended to educate Pt on BUE HEP. Performance deficits / Impairments: Decreased strength, Decreased endurance  Prognosis: Fair  REQUIRES OT FOLLOW UP: Yes  Decision Making: Medium Complexity  Safety Devices in place: Yes  Type of devices: All fall risk precautions in place, Call light within reach    Treatment Initiated: Treatment and education initiated within context of evaluation. Evaluation time included review of current medical information, gathering information related to past medical, social and functional history, completion of standardized testing, formal and informal observation of tasks, assessment of data and development of plan of care and goals. Treatment time included skilled education and facilitation of tasks to increase safety and independence with ADL's for improved functional independence and quality of life.     Discharge Recommendations:  Continue to assess pending progress    Patient Education:  OT Education: OT Role, Plan of

## 2019-08-29 NOTE — H&P
History & Physical        Patient:  Peter Cardoza  YOB: 1950    MRN: [id number]     Acct: [de-identified]    PCP: Yoni Brown MD    Date of Admission: 8/29/2019    Date of Service: Pt seen/examined on 8/29/2019   and Admitted to Inpatient with expected LOS greater than two midnights due to medical therapy. Chief Complaint:   Chief Complaint   Patient presents with    Urinary Catheter Problem       History Of Present Illness:      77 y.o. female who presented to [address] [place] [address] with Stanford issues. Patient states that she has leakage around her Stanford catheter. Patient is complaining that she is having some bladder discomfort. Patient endorses recurrent UTIs. Patient states that she has no chest pain. She states that she had some shortness of breath but that this is resolved during my examination. She states that she has an pressure ulcer to her buttocks. Patient has nausea, that has been relieved with Phenergan. She denies vomiting. Denies diarrhea. She states that she feels she may be constipated. Patient hemodynamically stable while in emergency department. Patient was noted to have mild hypotension. Pulse 135, respirations 21, temperature 102.0 °F.  Leukocytosis with a WBC of 12,000. Patient has history of ESBL. Patient has a pressure ulcer to her buttocks. While in the ER urinary catheter was exchanged. Patient admitted at this time for sepsis likely due to underlying UTI cannot rule out other sources at this time. Chest x-ray is pending.     Past Medical History:          Diagnosis Date    Arthritis     Hyperlipidemia     Hypertension     Intra-abdominal lymphadenopathy     MS (multiple sclerosis) (HCC)     Pancreatitis     Pneumonia     Seizures (HCC)     UTI (urinary tract infection)        Past Surgical History:          Procedure Laterality Date    CHOLECYSTECTOMY  April 1st 2016    laparoscopic    CYSTOSCOPY N/A 1/2/2019    CYSTO, CLOT EVACUATION, History:      The patient currently lives at home    TOBACCO:   reports that she quit smoking about 5 years ago. Her smoking use included cigarettes. She has a 80.00 pack-year smoking history. She uses smokeless tobacco.  ETOH:   reports that she does not drink alcohol. Family History:      Reviewed in detail and positive as follows:        Problem Relation Age of Onset    Cancer Mother         colon    Heart Disease Father     Diabetes Neg Hx     High Blood Pressure Neg Hx     Stroke Neg Hx        Diet:  Regular diet    REVIEW OF SYSTEMS:   Pertinent positives as noted in the HPI. All other systems reviewed and negative. PHYSICAL EXAM:    BP 94/61   Pulse 133   Temp 102 °F (38.9 °C) (Oral)   Resp 19   Ht 5' 3\" (1.6 m)   Wt 135 lb (61.2 kg)   SpO2 97%   BMI 23.91 kg/m²     General appearance:  Noted apparent distress, appears stated age and cooperative. HEENT:  Normal cephalic, atraumatic without obvious deformity. Pupils equal, round, and reactive to light. Extra ocular muscles intact. Conjunctivae/corneas clear. Neck: Supple, with full range of motion. No jugular venous distention. Trachea midline. Respiratory:  Normal respiratory effort. Clear to auscultation, bilaterally without Rales/Wheezes/Rhonchi; decreased bases bilaterally  Cardiovascular:   normal S1/S2 without rubs or gallops. Abdomen: Soft, non-tender, non-distended with normal bowel sounds. Musculoskeletal:  No clubbing, cyanosis or edema bilaterally. Full range of motion without deformity. Skin: Skin color, texture, turgor normal.  No rashes or lesions. Neurologic:  Generalized weakness.   Answers questions and follows commands appropriately  Psychiatric:  Alert and oriented, thought content appropriate, normal insight  Capillary Refill: Brisk,< 3 seconds   Peripheral Pulses: +2 palpable, equal bilaterally       Labs:     Recent Labs     08/29/19  0244   WBC 12.0*   HGB 14.3   HCT 43.7        Recent Labs 08/29/19  0244      K 4.3      CO2 26   BUN 24*   CREATININE 0.9   CALCIUM 9.5     No results for input(s): AST, ALT, BILIDIR, BILITOT, ALKPHOS in the last 72 hours. No results for input(s): INR in the last 72 hours. No results for input(s): Celesta Felty in the last 72 hours. Urinalysis:      Lab Results   Component Value Date    NITRU NEGATIVE 08/29/2019    WBCUA > 200 08/29/2019    BACTERIA FEW 08/29/2019    RBCUA > 200 08/29/2019    BLOODU LARGE 08/29/2019    SPECGRAV 1.020 02/18/2019    GLUCOSEU NEGATIVE 08/29/2019       Radiology:       XR CHEST PORTABLE    (Results Pending)   XR ABDOMEN (KUB) (SINGLE AP VIEW)    (Results Pending)            DVT prophylaxis: [x] Lovenox                                 [] SCDs                                 [] SQ Heparin                                 [] Encourage ambulation           [] Already on Anticoagulation    Code Status: Full Code      PT/OT Eval Status: Encouraged    Disposition:    [x] Home       [] TCU       [] Rehab       [] Psych       [] SNF       [] [place]       [] Other-    ASSESSMENT:    Active Hospital Problems    Diagnosis Date Noted    Sepsis ([facility] [A41.9] 08/29/2019     Priority: High    UTI (urinary tract infection) [N39.0] 08/29/2019     Priority: High    HTN (hypertension) [I10] 08/29/2019    HLD (hyperlipidemia) [E78.5] 08/29/2019    Multiple sclerosis ([facility] [place] 08/29/2019    [facility] [G40.909] 10/02/2015       PLAN:    Admit to Telemetry Unit  Diet regular  IVF hydration as tolerated, fluid resuscitation for sepsis  Analgesics  Antiemetics  DVT prophylaxis  PT/OT encouraged  IS  Mer[place], history of ESBL  Cultures pending  CXR pending  Will need to re-evaluate home medications in [facility]  Continue to monitor closely      Thank you Tamika Lainez MD for the opportunity to be involved in this patient's care.     Electronically signed by Juan Antonio Gaming DO on 8/29/2019 at 5:22 AM

## 2019-08-29 NOTE — LETTER
Beneficiary Notification Letter     This [place] Provider is Participating in an Innovative Payment and [address] [place] [address] [place] from Monalisa Grant:   [facility] is participating in a Medicare initiative called the [facility] for [facility]. You are receiving this letter because your health care provider has identified you as a patient who is receiving care through this initiative. Health care providers participating in the [facility], including [facility], will work with Medicare to improve care for patients. Your Medicare rights have not been changed. You still have all the same Medicare rights and protections, including the right to choose which hospital, doctor, or other health care provider you see. However, because [facility] chose to participate in the [address] [place] [address], all Medicare beneficiaries who meet the eligibility criteria of this initiative will receive care under the initiative. If you do not wish to receive care under the Bundled Payments [facility], you must choose a health care provider that does not participate in this initiative for your care. Regardless of which health care provider you see, Medicare will continue to cover all of your medically necessary services. Bundled Payments for Care Improvement Advanced aims to help improve your care     The Bundled Payments [facility] is an innovative Medicare initiative that encourages your doctors, hospitals, and other health care providers to work more closely together so you get better care during and following certain hospital stays.  In this initiative, doctors and hospitals may work closely with certain health care providers and

## 2019-08-30 ENCOUNTER — APPOINTMENT (OUTPATIENT)
Dept: GENERAL RADIOLOGY | Age: 69
DRG: 698 | End: 2019-08-30
Payer: MEDICARE

## 2019-08-30 PROBLEM — E87.20 METABOLIC ACIDOSIS, NORMAL ANION GAP (NAG): Status: ACTIVE | Noted: 2019-08-30

## 2019-08-30 LAB
ANION GAP SERPL CALCULATED.3IONS-SCNC: 8 MEQ/L (ref 8–16)
BUN BLDV-MCNC: 12 MG/DL (ref 7–22)
CALCIUM SERPL-MCNC: 8.4 MG/DL (ref 8.5–10.5)
CHLORIDE BLD-SCNC: 109 MEQ/L (ref 98–111)
CO2: 22 MEQ/L (ref 23–33)
CREAT SERPL-MCNC: 0.5 MG/DL (ref 0.4–1.2)
EKG ATRIAL RATE: 91 BPM
EKG ATRIAL RATE: 97 BPM
EKG P AXIS: 60 DEGREES
EKG P AXIS: 61 DEGREES
EKG P-R INTERVAL: 114 MS
EKG P-R INTERVAL: 122 MS
EKG Q-T INTERVAL: 356 MS
EKG Q-T INTERVAL: 374 MS
EKG QRS DURATION: 102 MS
EKG QRS DURATION: 102 MS
EKG QTC CALCULATION (BAZETT): 452 MS
EKG QTC CALCULATION (BAZETT): 460 MS
EKG R AXIS: -49 DEGREES
EKG R AXIS: -51 DEGREES
EKG T AXIS: 107 DEGREES
EKG T AXIS: 108 DEGREES
EKG VENTRICULAR RATE: 91 BPM
EKG VENTRICULAR RATE: 97 BPM
ERYTHROCYTE [DISTWIDTH] IN BLOOD BY AUTOMATED COUNT: 13.7 % (ref 11.5–14.5)
ERYTHROCYTE [DISTWIDTH] IN BLOOD BY AUTOMATED COUNT: 50.4 FL (ref 35–45)
GFR SERPL CREATININE-BSD FRML MDRD: > 90 ML/MIN/1.73M2
GLUCOSE BLD-MCNC: 95 MG/DL (ref 70–108)
HCT VFR BLD CALC: 37.8 % (ref 37–47)
HEMOGLOBIN: 12.3 GM/DL (ref 12–16)
MCH RBC QN AUTO: 32.5 PG (ref 26–33)
MCHC RBC AUTO-ENTMCNC: 32.5 GM/DL (ref 32.2–35.5)
MCV RBC AUTO: 99.7 FL (ref 81–99)
PLATELET # BLD: 168 THOU/MM3 (ref 130–400)
PMV BLD AUTO: 11.4 FL (ref 9.4–12.4)
POTASSIUM SERPL-SCNC: 3.7 MEQ/L (ref 3.5–5.2)
PRO-BNP: 1868 PG/ML (ref 0–900)
RBC # BLD: 3.79 MILL/MM3 (ref 4.2–5.4)
SODIUM BLD-SCNC: 139 MEQ/L (ref 135–145)
WBC # BLD: 13 THOU/MM3 (ref 4.8–10.8)

## 2019-08-30 PROCEDURE — 71045 X-RAY EXAM CHEST 1 VIEW: CPT

## 2019-08-30 PROCEDURE — 93010 ELECTROCARDIOGRAM REPORT: CPT | Performed by: NUCLEAR MEDICINE

## 2019-08-30 PROCEDURE — 83880 ASSAY OF NATRIURETIC PEPTIDE: CPT

## 2019-08-30 PROCEDURE — 80048 BASIC METABOLIC PNL TOTAL CA: CPT

## 2019-08-30 PROCEDURE — 2580000003 HC RX 258: Performed by: INTERNAL MEDICINE

## 2019-08-30 PROCEDURE — 93005 ELECTROCARDIOGRAM TRACING: CPT | Performed by: INTERNAL MEDICINE

## 2019-08-30 PROCEDURE — 2580000003 HC RX 258: Performed by: PHARMACIST

## 2019-08-30 PROCEDURE — 2000000000 HC ICU R&B

## 2019-08-30 PROCEDURE — 2500000003 HC RX 250 WO HCPCS: Performed by: INTERNAL MEDICINE

## 2019-08-30 PROCEDURE — 6360000002 HC RX W HCPCS: Performed by: INTERNAL MEDICINE

## 2019-08-30 PROCEDURE — 6360000002 HC RX W HCPCS: Performed by: PHARMACIST

## 2019-08-30 PROCEDURE — 6370000000 HC RX 637 (ALT 250 FOR IP): Performed by: INTERNAL MEDICINE

## 2019-08-30 PROCEDURE — 36415 COLL VENOUS BLD VENIPUNCTURE: CPT

## 2019-08-30 PROCEDURE — 99232 SBSQ HOSP IP/OBS MODERATE 35: CPT | Performed by: INTERNAL MEDICINE

## 2019-08-30 PROCEDURE — APPSS45 APP SPLIT SHARED TIME 31-45 MINUTES: Performed by: NURSE PRACTITIONER

## 2019-08-30 PROCEDURE — 85027 COMPLETE CBC AUTOMATED: CPT

## 2019-08-30 PROCEDURE — 2709999900 HC NON-CHARGEABLE SUPPLY

## 2019-08-30 RX ADMIN — MEROPENEM 1 G: 1 INJECTION, POWDER, FOR SOLUTION INTRAVENOUS at 03:55

## 2019-08-30 RX ADMIN — ASPIRIN 81 MG: 81 TABLET ORAL at 09:30

## 2019-08-30 RX ADMIN — VITAMIN D, TAB 1000IU (100/BT) 1000 UNITS: 25 TAB at 09:30

## 2019-08-30 RX ADMIN — CARBAMAZEPINE 100 MG: 100 TABLET, CHEWABLE ORAL at 13:14

## 2019-08-30 RX ADMIN — ENOXAPARIN SODIUM 40 MG: 40 INJECTION SUBCUTANEOUS at 09:30

## 2019-08-30 RX ADMIN — Medication 10 ML: at 20:17

## 2019-08-30 RX ADMIN — CARBAMAZEPINE 100 MG: 100 TABLET, CHEWABLE ORAL at 09:30

## 2019-08-30 RX ADMIN — VANCOMYCIN HYDROCHLORIDE 1000 MG: 1 INJECTION, POWDER, LYOPHILIZED, FOR SOLUTION INTRAVENOUS at 13:14

## 2019-08-30 RX ADMIN — CARBAMAZEPINE 100 MG: 100 TABLET, CHEWABLE ORAL at 20:30

## 2019-08-30 ASSESSMENT — ENCOUNTER SYMPTOMS
VOMITING: 0
COLOR CHANGE: 0
ABDOMINAL PAIN: 0
SORE THROAT: 0
NAUSEA: 0
WHEEZING: 0
COUGH: 0
PHOTOPHOBIA: 0
TROUBLE SWALLOWING: 0

## 2019-08-30 ASSESSMENT — PAIN SCALES - GENERAL
PAINLEVEL_OUTOF10: 0

## 2019-08-30 NOTE — PROGRESS NOTES
CNP on 8/30/2019 at 11:56 AM   Patient seen by me. Case discussed with nurse practitioner. Slow improvement in sepsis. Continue with antibiotics.

## 2019-08-30 NOTE — PLAN OF CARE
Problem: Nutrition  Goal: Optimal nutrition therapy  Outcome: Ongoing   Nutrition Problem: Inadequate oral intake  Intervention: Food and/or Nutrient Delivery: Continue current diet, Start ONS, Vitamin Supplement(Started Magic Cups TID.  Consider a Multivitamin w/minerals daily.)  Nutritional Goals: Pt will consume 75% or more of meals during LOS

## 2019-08-30 NOTE — PLAN OF CARE
temps through night  Goal: Complications related to the disease process, condition or treatment will be avoided or minimized  Description  Complications related to the disease process, condition or treatment will be avoided or minimized  Outcome: Ongoing  Note:   Vega care completed. Patient with chronic vega d/t MS history. Care plan reviewed with patient and family. Patient and family verbalize understanding of the plan of care and contribute to goal setting.

## 2019-08-30 NOTE — PROGRESS NOTES
lb 1.6 oz (68.1 kg)(8/29; +4 pitting edema BLE)  · Usual Body Wt: 140 lb (63.5 kg)(per pt report. Per EMR: 145 lb 12.8 oz on 12/31/18)  · % Weight Change:  no weight loss in 8 months per EMR  · Ideal Body Wt: 115 lb (52.2 kg)   · BMI Classification: BMI 25.0 - 29.9 Overweight(26.6)    Nutrition Interventions:   Continue current diet, Start ONS, Vitamin Supplement(Downgraded diet to Dental Soft per pt request. Started Magic Cups TID.  Consider a Multivitamin w/minerals daily.)  Continued Inpatient Monitoring, Education Initiated, Coordination of Care(Encouraged po intake of meals at best effort and use of ONS during LOS and upon discharge.)    Nutrition Evaluation:   · Evaluation: Goals set   · Goals: Pt will consume 75% or more of meals during LOS    · Monitoring: Nutrition Progression, Meal Intake, Supplement Intake, Diet Tolerance, Skin Integrity, Wound Healing, Weight, Pertinent Labs, Monitor Bowel Function    Electronically signed by Clara Gustafson RD, LD on 8/30/19 at 3:09 PM    Contact Number: [phone]

## 2019-08-31 ENCOUNTER — APPOINTMENT (OUTPATIENT)
Dept: GENERAL RADIOLOGY | Age: 69
DRG: 698 | End: 2019-08-31
Payer: MEDICARE

## 2019-08-31 LAB
ANION GAP SERPL CALCULATED.3IONS-SCNC: 10 MEQ/L (ref 8–16)
BUN BLDV-MCNC: 10 MG/DL (ref 7–22)
CALCIUM SERPL-MCNC: 8.1 MG/DL (ref 8.5–10.5)
CHLORIDE BLD-SCNC: 108 MEQ/L (ref 98–111)
CO2: 21 MEQ/L (ref 23–33)
CREAT SERPL-MCNC: 0.3 MG/DL (ref 0.4–1.2)
ERYTHROCYTE [DISTWIDTH] IN BLOOD BY AUTOMATED COUNT: 13.7 % (ref 11.5–14.5)
ERYTHROCYTE [DISTWIDTH] IN BLOOD BY AUTOMATED COUNT: 51.2 FL (ref 35–45)
GFR SERPL CREATININE-BSD FRML MDRD: > 90 ML/MIN/1.73M2
GLUCOSE BLD-MCNC: 82 MG/DL (ref 70–108)
HCT VFR BLD CALC: 35.6 % (ref 37–47)
HEMOGLOBIN: 11.3 GM/DL (ref 12–16)
MCH RBC QN AUTO: 32.5 PG (ref 26–33)
MCHC RBC AUTO-ENTMCNC: 31.7 GM/DL (ref 32.2–35.5)
MCV RBC AUTO: 102.3 FL (ref 81–99)
ORGANISM: ABNORMAL
ORGANISM: ABNORMAL
PLATELET # BLD: 143 THOU/MM3 (ref 130–400)
PMV BLD AUTO: 11.6 FL (ref 9.4–12.4)
POTASSIUM SERPL-SCNC: 3.6 MEQ/L (ref 3.5–5.2)
RBC # BLD: 3.48 MILL/MM3 (ref 4.2–5.4)
SODIUM BLD-SCNC: 139 MEQ/L (ref 135–145)
URINE CULTURE REFLEX: ABNORMAL
URINE CULTURE REFLEX: ABNORMAL
WBC # BLD: 9.5 THOU/MM3 (ref 4.8–10.8)

## 2019-08-31 PROCEDURE — P9047 ALBUMIN (HUMAN), 25%, 50ML: HCPCS | Performed by: INTERNAL MEDICINE

## 2019-08-31 PROCEDURE — 6360000002 HC RX W HCPCS: Performed by: INTERNAL MEDICINE

## 2019-08-31 PROCEDURE — 99291 CRITICAL CARE FIRST HOUR: CPT | Performed by: INTERNAL MEDICINE

## 2019-08-31 PROCEDURE — 2000000000 HC ICU R&B

## 2019-08-31 PROCEDURE — 6360000002 HC RX W HCPCS: Performed by: PHARMACIST

## 2019-08-31 PROCEDURE — 6370000000 HC RX 637 (ALT 250 FOR IP): Performed by: INTERNAL MEDICINE

## 2019-08-31 PROCEDURE — 85027 COMPLETE CBC AUTOMATED: CPT

## 2019-08-31 PROCEDURE — 2580000003 HC RX 258: Performed by: INTERNAL MEDICINE

## 2019-08-31 PROCEDURE — 2500000003 HC RX 250 WO HCPCS: Performed by: INTERNAL MEDICINE

## 2019-08-31 PROCEDURE — 80048 BASIC METABOLIC PNL TOTAL CA: CPT

## 2019-08-31 PROCEDURE — 2709999900 HC NON-CHARGEABLE SUPPLY

## 2019-08-31 PROCEDURE — 36415 COLL VENOUS BLD VENIPUNCTURE: CPT

## 2019-08-31 PROCEDURE — 93005 ELECTROCARDIOGRAM TRACING: CPT | Performed by: INTERNAL MEDICINE

## 2019-08-31 PROCEDURE — 2580000003 HC RX 258: Performed by: PHARMACIST

## 2019-08-31 PROCEDURE — 71045 X-RAY EXAM CHEST 1 VIEW: CPT

## 2019-08-31 RX ORDER — ALBUMIN (HUMAN) 12.5 G/50ML
25 SOLUTION INTRAVENOUS ONCE
Status: COMPLETED | OUTPATIENT
Start: 2019-08-31 | End: 2019-08-31

## 2019-08-31 RX ORDER — BUMETANIDE 0.25 MG/ML
1 INJECTION, SOLUTION INTRAMUSCULAR; INTRAVENOUS ONCE
Status: COMPLETED | OUTPATIENT
Start: 2019-09-01 | End: 2019-08-31

## 2019-08-31 RX ADMIN — METOPROLOL TARTRATE 12.5 MG: 25 TABLET ORAL at 22:51

## 2019-08-31 RX ADMIN — ENOXAPARIN SODIUM 40 MG: 40 INJECTION SUBCUTANEOUS at 08:22

## 2019-08-31 RX ADMIN — VANCOMYCIN HYDROCHLORIDE 1000 MG: 1 INJECTION, POWDER, LYOPHILIZED, FOR SOLUTION INTRAVENOUS at 13:00

## 2019-08-31 RX ADMIN — Medication 10 ML: at 08:23

## 2019-08-31 RX ADMIN — CARBAMAZEPINE 100 MG: 100 TABLET, CHEWABLE ORAL at 13:45

## 2019-08-31 RX ADMIN — BUMETANIDE 1 MG: 0.25 INJECTION INTRAMUSCULAR; INTRAVENOUS at 23:06

## 2019-08-31 RX ADMIN — CARBAMAZEPINE 100 MG: 100 TABLET, CHEWABLE ORAL at 08:22

## 2019-08-31 RX ADMIN — ALBUMIN (HUMAN) 25 G: 0.25 INJECTION, SOLUTION INTRAVENOUS at 23:06

## 2019-08-31 RX ADMIN — METOPROLOL TARTRATE 12.5 MG: 25 TABLET ORAL at 19:17

## 2019-08-31 RX ADMIN — VITAMIN D, TAB 1000IU (100/BT) 1000 UNITS: 25 TAB at 08:22

## 2019-08-31 RX ADMIN — CARBAMAZEPINE 100 MG: 100 TABLET, CHEWABLE ORAL at 20:49

## 2019-08-31 RX ADMIN — ASPIRIN 81 MG: 81 TABLET ORAL at 08:22

## 2019-08-31 ASSESSMENT — PAIN SCALES - GENERAL
PAINLEVEL_OUTOF10: 0

## 2019-08-31 NOTE — PLAN OF CARE
Problem: DISCHARGE BARRIERS  Goal: Patient's continuum of care needs are met  8/31/2019 0239 by Nelida Hendrix RN  Outcome: Ongoing  Note:   Pt's daily icu care needs are met     Problem: Discharge Planning:  Goal: Discharged to appropriate level of care  Description  Discharged to appropriate level of care  8/31/2019 0239 by Nelida Hendrix RN  Outcome: Ongoing  Note:   Pt remains in icu care for monitoring BP     Problem: Infection, Septic Shock:  Goal: Will show no infection signs and symptoms  Description  Will show no infection signs and symptoms  8/31/2019 0239 by Nelida Hendrix RN  Outcome: Ongoing  Note:   Temp 99.3. WBC 13.0 Remains off levophed at this time. Problem: Falls - Risk of:  Goal: Will remain free from falls  Description  Will remain free from falls  8/31/2019 0239 by Nelida Hendrix RN  Outcome: Ongoing  Note:   Fall precautions in place due to weakness. Fall bracelet, non skid socks, fall door magnet, and bed alarm on. Call light in reach. Problem: Skin Integrity:  Goal: Absence of new skin breakdown  Description  Absence of new skin breakdown  8/31/2019 0239 by Nelida Hendrix RN  Outcome: Ongoing  Note:   Q2 turns. Protective barrier to coccyx/buttocks     Problem: Urinary Elimination:  Goal: Signs and symptoms of infection will decrease  Description  Signs and symptoms of infection will decrease  8/31/2019 0239 by Nelida Hendrix RN  Outcome: Ongoing  Note:   S&S of UTI decreasing. On vanco     Problem: Nutrition  Goal: Optimal nutrition therapy  8/31/2019 0239 by Nelida Hendrix RN  Outcome: Ongoing  Note:   In report was told pt did not eat much of her breakfast and lunch but ate more of her dinner tonight. Pt receives nutrition supplements with meals   Care plan reviewed with patient.  Will review and discuss care plan with family when available

## 2019-09-01 LAB
EKG ATRIAL RATE: 125 BPM
EKG P AXIS: 61 DEGREES
EKG P-R INTERVAL: 122 MS
EKG Q-T INTERVAL: 332 MS
EKG QRS DURATION: 98 MS
EKG QTC CALCULATION (BAZETT): 479 MS
EKG R AXIS: -35 DEGREES
EKG T AXIS: 100 DEGREES
EKG VENTRICULAR RATE: 125 BPM
VANCOMYCIN TROUGH: 6.3 UG/ML (ref 5–15)

## 2019-09-01 PROCEDURE — 2709999900 HC NON-CHARGEABLE SUPPLY

## 2019-09-01 PROCEDURE — 6360000002 HC RX W HCPCS: Performed by: INTERNAL MEDICINE

## 2019-09-01 PROCEDURE — 6370000000 HC RX 637 (ALT 250 FOR IP): Performed by: INTERNAL MEDICINE

## 2019-09-01 PROCEDURE — 93010 ELECTROCARDIOGRAM REPORT: CPT | Performed by: NUCLEAR MEDICINE

## 2019-09-01 PROCEDURE — 80202 ASSAY OF VANCOMYCIN: CPT

## 2019-09-01 PROCEDURE — 36415 COLL VENOUS BLD VENIPUNCTURE: CPT

## 2019-09-01 PROCEDURE — 99233 SBSQ HOSP IP/OBS HIGH 50: CPT | Performed by: INTERNAL MEDICINE

## 2019-09-01 PROCEDURE — 2060000000 HC ICU INTERMEDIATE R&B

## 2019-09-01 PROCEDURE — 2580000003 HC RX 258: Performed by: INTERNAL MEDICINE

## 2019-09-01 PROCEDURE — 2580000003 HC RX 258: Performed by: PHARMACIST

## 2019-09-01 PROCEDURE — 6360000002 HC RX W HCPCS: Performed by: PHARMACIST

## 2019-09-01 RX ADMIN — CARBAMAZEPINE 100 MG: 100 TABLET, CHEWABLE ORAL at 20:28

## 2019-09-01 RX ADMIN — ASPIRIN 81 MG: 81 TABLET ORAL at 08:45

## 2019-09-01 RX ADMIN — METOPROLOL TARTRATE 12.5 MG: 25 TABLET ORAL at 20:27

## 2019-09-01 RX ADMIN — CARBAMAZEPINE 100 MG: 100 TABLET, CHEWABLE ORAL at 13:30

## 2019-09-01 RX ADMIN — ACETAMINOPHEN 650 MG: 325 TABLET ORAL at 23:20

## 2019-09-01 RX ADMIN — Medication 10 ML: at 20:28

## 2019-09-01 RX ADMIN — Medication 10 ML: at 08:45

## 2019-09-01 RX ADMIN — VITAMIN D, TAB 1000IU (100/BT) 1000 UNITS: 25 TAB at 08:45

## 2019-09-01 RX ADMIN — CARBAMAZEPINE 100 MG: 100 TABLET, CHEWABLE ORAL at 08:45

## 2019-09-01 RX ADMIN — VANCOMYCIN HYDROCHLORIDE 1000 MG: 1 INJECTION, POWDER, LYOPHILIZED, FOR SOLUTION INTRAVENOUS at 13:31

## 2019-09-01 RX ADMIN — ENOXAPARIN SODIUM 40 MG: 40 INJECTION SUBCUTANEOUS at 08:45

## 2019-09-01 RX ADMIN — METOPROLOL TARTRATE 12.5 MG: 25 TABLET ORAL at 08:45

## 2019-09-01 ASSESSMENT — PAIN SCALES - GENERAL
PAINLEVEL_OUTOF10: 0

## 2019-09-01 NOTE — PLAN OF CARE
room. Side rails raised x4 (residing on critical care unit). Room well illuminated. Continuing to closely monitor and assess. 9/1/2019 1040 by Robert Loo RN  Outcome: Ongoing  9/1/2019 1038 by Robert Loo RN  Outcome: Ongoing  9/1/2019 0629 by Prerna Quick RN  Outcome: Ongoing  Note:   Patient at risk for falls due to being non mobile. Fall assessment completed. Patient verbalizes understanding of  call light for needs this shift. Fall band in place on patient's arm. Fall signs posted. Bed alarm in place and functioning properly. Problem: Skin Integrity:  Goal: Absence of new skin breakdown  Description  Absence of new skin breakdown  9/1/2019 1059 by Jesse Napoles RN  Outcome: Ongoing  Note:   Ongoing assessment & interventions provided throughout shift. Skin assessments provided. Encouraging/assisting patient to turn as needed. Continuing to closely monitor and assess. 9/1/2019 1040 by Robert Loo RN  Outcome: Ongoing  9/1/2019 1038 by Robert Loo RN  Outcome: Ongoing  9/1/2019 0629 by Prerna Quick RN  Outcome: Ongoing  Note:   Patient at risk for decreased skin integrity due to decreased mobility. Patient turned and repositioned every 2 hours and PRN t/o this shift. No new skin breakdown noted this shift. Problem: Risk for Impaired Skin Integrity  Goal: Tissue integrity - skin and mucous membranes  Description  Structural intactness and normal physiological function of skin and  mucous membranes. 9/1/2019 1040 by Robert Loo RN  Outcome: Ongoing  9/1/2019 1038 by Robert Loo RN  Outcome: Ongoing  9/1/2019 0629 by Prerna Quick RN  Note:   Patient at risk for decreased skin integrity due to decreased mobility. Patient turned and repositioned every 2 hours and PRN t/o this shift. No new skin breakdown noted this shift.         Problem: Urinary Elimination:  Goal: Signs and symptoms of infection will decrease  Description  Signs and symptoms of to monitor. Care plan reviewed with patient. Patient verbalizes understanding of the care plan and contributed to goal setting.

## 2019-09-01 NOTE — PROGRESS NOTES
Report called to [facility] on 4K. Patient alert/oriented. Vital signs stable at this time. Transport put in and and belonging packed up/given to back to patient.

## 2019-09-01 NOTE — FLOWSHEET NOTE
2100: Patient complaining of SOB, patient continues to be tachycardic. Noticeable rhythm changes. EKG ordered per protocol. 2110: Talked with Dr. Shu Barker about patients tachycardic, one time dose of lopressor ordered. 2122: EKG at bedside. 2137: Dr. Shu Barker notified of EKG, notified of patients SOB, stated he would put orders in.

## 2019-09-01 NOTE — PLAN OF CARE
Patient at risk for decreased skin integrity due to decreased mobility. Patient turned and repositioned every 2 hours and PRN t/o this shift. No new skin breakdown noted this shift. Problem: Infection - Methicillin-Resistant Staphylococcus Aureus Infection:  Intervention: Contact precautions  Note:   Patient remains in contact isolation. Contact precautions as ordered. Care plan reviewed with patient. Patient verbalizes understanding of the plan of care and contribute to goal setting.

## 2019-09-02 LAB — MRSA SCREEN: NORMAL

## 2019-09-02 PROCEDURE — 6370000000 HC RX 637 (ALT 250 FOR IP): Performed by: INTERNAL MEDICINE

## 2019-09-02 PROCEDURE — 2580000003 HC RX 258: Performed by: INTERNAL MEDICINE

## 2019-09-02 PROCEDURE — 6360000002 HC RX W HCPCS: Performed by: INTERNAL MEDICINE

## 2019-09-02 PROCEDURE — 99233 SBSQ HOSP IP/OBS HIGH 50: CPT | Performed by: INTERNAL MEDICINE

## 2019-09-02 PROCEDURE — 2060000000 HC ICU INTERMEDIATE R&B

## 2019-09-02 PROCEDURE — 2709999900 HC NON-CHARGEABLE SUPPLY

## 2019-09-02 PROCEDURE — 2500000003 HC RX 250 WO HCPCS: Performed by: INTERNAL MEDICINE

## 2019-09-02 RX ADMIN — ACETIC ACID 150 ML: 250 IRRIGANT IRRIGATION at 12:14

## 2019-09-02 RX ADMIN — CARBAMAZEPINE 100 MG: 100 TABLET, CHEWABLE ORAL at 21:19

## 2019-09-02 RX ADMIN — VITAMIN D, TAB 1000IU (100/BT) 1000 UNITS: 25 TAB at 08:19

## 2019-09-02 RX ADMIN — CARBAMAZEPINE 100 MG: 100 TABLET, CHEWABLE ORAL at 15:50

## 2019-09-02 RX ADMIN — CARBAMAZEPINE 100 MG: 100 TABLET, CHEWABLE ORAL at 08:19

## 2019-09-02 RX ADMIN — VANCOMYCIN HYDROCHLORIDE 1000 MG: 1 INJECTION, POWDER, LYOPHILIZED, FOR SOLUTION INTRAVENOUS at 08:22

## 2019-09-02 RX ADMIN — ASPIRIN 81 MG: 81 TABLET ORAL at 08:19

## 2019-09-02 RX ADMIN — METOPROLOL TARTRATE 12.5 MG: 25 TABLET ORAL at 21:19

## 2019-09-02 RX ADMIN — ENOXAPARIN SODIUM 40 MG: 40 INJECTION SUBCUTANEOUS at 08:19

## 2019-09-02 RX ADMIN — METOPROLOL TARTRATE 12.5 MG: 25 TABLET ORAL at 08:19

## 2019-09-02 RX ADMIN — Medication 10 ML: at 08:19

## 2019-09-02 ASSESSMENT — ENCOUNTER SYMPTOMS
COUGH: 0
RHINORRHEA: 0
CHEST TIGHTNESS: 0
BACK PAIN: 0

## 2019-09-02 NOTE — PROGRESS NOTES
Assessment and Plan:        1. Septic shock: qSOFA score 1,  off vasopressors. Resolving. 2. UTI: enterococcus fecalis /group d., MRSA. chronic vega, Vancomycin 8/30. Will likely  Change to amoxil and bactrim for longer course. 3. MS: history, patient does not walk, home with  and home health, uses scooter at home   4. Hypertension:resume  lopressor  5. Hx seizures: home Tegretol continued     CC:  Hypotension    Hospital course: Nickie Saint is a 77year old white female who presents to [facility] with complaints of issues with here vega.  She has a past medical history of UTI, seizures, pneumonia, pancreatitis, MS, hypertension, arthritis. She is noted to have a chronic vega and was having bladder discomfort and leakage around vega. She does state that she has a history of frequent UTI.  On exam she states that she has a home health nurse that is changing her vega monthly and she does follow with urology. Lily Gomez was admitted with mild hypotension.  On 8/29 she was noted to have low b/p on step down. Was transferred to ICU for pressors. Tansferred back to step down on 9/1. Subjective: (12 point review of systems completed. Pertinent positives noted. Otherwise ROS is negative) : Resting in bed, feels improved. Discussed plans with UTI. No newpain. No SOB. No chest pain.   PMH:  Per HPI  Medications:     norepinephrine Stopped (08/30/19 1900)      vancomycin  1,000 mg Intravenous Q18H    metoprolol tartrate  12.5 mg Oral BID    vancomycin (VANCOCIN) intermittent dosing (placeholder)   Other RX Placeholder    acetaminophen  650 mg Oral Once    acetic acid  150 mL Irrigation Once per day on Mon Thu    aspirin  81 mg Oral Daily    carBAMazepine  100 mg Oral TID    vitamin D  1,000 Units Oral Daily    enoxaparin  40 mg Subcutaneous Daily    sodium chloride  2,500 mL Intravenous Once    lidocaine 1 % injection  5 mL Intradermal Once    sodium chloride flush  10 mL Intravenous 2 times per day

## 2019-09-03 LAB
BLOOD CULTURE, ROUTINE: NORMAL
BLOOD CULTURE, ROUTINE: NORMAL

## 2019-09-03 PROCEDURE — 2580000003 HC RX 258: Performed by: INTERNAL MEDICINE

## 2019-09-03 PROCEDURE — 6370000000 HC RX 637 (ALT 250 FOR IP): Performed by: INTERNAL MEDICINE

## 2019-09-03 PROCEDURE — 97535 SELF CARE MNGMENT TRAINING: CPT

## 2019-09-03 PROCEDURE — 99239 HOSP IP/OBS DSCHRG MGMT >30: CPT | Performed by: INTERNAL MEDICINE

## 2019-09-03 PROCEDURE — 92610 EVALUATE SWALLOWING FUNCTION: CPT

## 2019-09-03 PROCEDURE — 2060000000 HC ICU INTERMEDIATE R&B

## 2019-09-03 PROCEDURE — 6360000002 HC RX W HCPCS: Performed by: INTERNAL MEDICINE

## 2019-09-03 PROCEDURE — 97110 THERAPEUTIC EXERCISES: CPT

## 2019-09-03 PROCEDURE — 99222 1ST HOSP IP/OBS MODERATE 55: CPT | Performed by: NURSE PRACTITIONER

## 2019-09-03 RX ORDER — FUROSEMIDE 10 MG/ML
SOLUTION ORAL DAILY
Qty: 10 MG | Refills: 0 | Status: ON HOLD | COMMUNITY
Start: 2019-09-03 | End: 2020-12-02 | Stop reason: HOSPADM

## 2019-09-03 RX ORDER — AMOXICILLIN 250 MG/5ML
500 POWDER, FOR SUSPENSION ORAL 3 TIMES DAILY
Qty: 300 ML | Refills: 0 | Status: SHIPPED | OUTPATIENT
Start: 2019-09-03 | End: 2019-09-13

## 2019-09-03 RX ORDER — METHENAMINE HIPPURATE 1000 MG/1
1 TABLET ORAL 2 TIMES DAILY WITH MEALS
Qty: 60 TABLET | Refills: 5 | Status: SHIPPED | OUTPATIENT
Start: 2019-09-03 | End: 2020-01-28 | Stop reason: SDUPTHER

## 2019-09-03 RX ORDER — SULFAMETHOXAZOLE AND TRIMETHOPRIM 200; 40 MG/5ML; MG/5ML
160 SUSPENSION ORAL 2 TIMES DAILY
Qty: 400 ML | Refills: 0 | Status: SHIPPED | OUTPATIENT
Start: 2019-09-03 | End: 2020-02-13 | Stop reason: SDUPTHER

## 2019-09-03 RX ADMIN — Medication 10 ML: at 10:16

## 2019-09-03 RX ADMIN — CARBAMAZEPINE 100 MG: 100 TABLET, CHEWABLE ORAL at 19:46

## 2019-09-03 RX ADMIN — VANCOMYCIN HYDROCHLORIDE 1000 MG: 1 INJECTION, POWDER, LYOPHILIZED, FOR SOLUTION INTRAVENOUS at 02:33

## 2019-09-03 RX ADMIN — ASPIRIN 81 MG: 81 TABLET ORAL at 10:15

## 2019-09-03 RX ADMIN — VITAMIN D, TAB 1000IU (100/BT) 1000 UNITS: 25 TAB at 10:15

## 2019-09-03 RX ADMIN — METOPROLOL TARTRATE 12.5 MG: 25 TABLET ORAL at 10:15

## 2019-09-03 RX ADMIN — CARBAMAZEPINE 100 MG: 100 TABLET, CHEWABLE ORAL at 10:15

## 2019-09-03 RX ADMIN — ENOXAPARIN SODIUM 40 MG: 40 INJECTION SUBCUTANEOUS at 10:15

## 2019-09-03 RX ADMIN — Medication 10 ML: at 19:45

## 2019-09-03 RX ADMIN — CARBAMAZEPINE 100 MG: 100 TABLET, CHEWABLE ORAL at 14:42

## 2019-09-03 RX ADMIN — METOPROLOL TARTRATE 12.5 MG: 25 TABLET ORAL at 19:46

## 2019-09-03 RX ADMIN — VANCOMYCIN HYDROCHLORIDE 1000 MG: 1 INJECTION, POWDER, LYOPHILIZED, FOR SOLUTION INTRAVENOUS at 19:45

## 2019-09-03 ASSESSMENT — PAIN SCALES - GENERAL: PAINLEVEL_OUTOF10: 0

## 2019-09-03 NOTE — PLAN OF CARE
Problem: DISCHARGE BARRIERS  Goal: Patient's continuum of care needs are met  Outcome: Ongoing  Note:   Patient plans to be discharged to home with [facility] when medically stable. Problem: Infection, Septic Shock:  Goal: Will show no infection signs and symptoms  Description  Will show no infection signs and symptoms  Outcome: Ongoing  Note:   UTI/Chronic vega - IV vanc plans to switch to amoxil/bactrim     Problem: Falls - Risk of:  Goal: Will remain free from falls  Description  Will remain free from falls  Outcome: Ongoing  Note:   Call light within reach. Side rails up x2. Bed alarm on. Non skid slippers available. Problem: Urinary Elimination:  Goal: Signs and symptoms of infection will decrease  Description  Signs and symptoms of infection will decrease  Outcome: Ongoing  Note:   Chronic FC d/t MS     Problem: Nutrition  Goal: Optimal nutrition therapy  Outcome: Ongoing  Note:   General diet/Dental soft. Tolerating well     Problem: Pain - Acute:  Goal: Ability to express needs and understand communication  Outcome: Ongoing  Note:   Patient free from pain this shift. Pain rated on 0-10 pain rating scale. Will continue to reassess. Pain goal is no pain   Care plan reviewed with patient. Patient verbalize understanding of the plan of care and contribute to goal setting.

## 2019-09-03 NOTE — PROGRESS NOTES
3-5x  Current Treatment Recommendations: Strengthening    Patient Education  Patient Education: role of OT, HEP, breathing tech    Goals  Short term goals  Time Frame for Short term goals: 2 weeks  Short term goal 1: Complete BUE light-moderate strengthening HEP indep to increase ease of grooming tasks  Long term goals  Time Frame for Long term goals : No LTG set d/t short ELOS    Following session, patient left in safe position with all fall risk precautions in place.

## 2019-09-03 NOTE — CARE COORDINATION
[address] Transitions Interview     9/3/2019    Patient: Susannah Vernon Patient : 1950   MRN: [id number]  Reason for Admission:   RARS: Readmission Risk Score: 12         Introduced self/role. Explained BPCI-A program and beneficiary letter. Patient verbalized understandment. Readmission Risk  Patient Active Problem List   Diagnosis    [facility] (multiple sclerosis) ([facility]    Seizure disorder (HCC)    Frequent PVCs    Hematuria    Nicotine abuse    Hematuria, gross    Sepsis ([facility]    UTI (urinary tract infection)    HTN (hypertension)    HLD (hyperlipidemia)    Multiple sclerosis (HCC)    Metabolic acidosis, normal anion gap (NAG)       Inpatient Assessment  Care Transitions Summary    Care Transitions Inpatient Review  Medication Review  Do you have all of your prescriptions and are they filled?:  Yes   Are you able to afford your medications?:  Yes  How often do you have difficulty taking your medications?:  I always take them as prescribed.   Housing Review  Who do you live with?:  Alone  Are you an active caregiver in your home?:  No  Social Support  Durable Medical Equipment  Functional Review  Hearing and Vision  Visual Impairment:  Visual impairment (Glasses/contacts)  Hearing Impairment:  None  Care Transitions Interventions  No Identified Needs         Follow Up  Future Appointments   Date Time Provider Matt Loco   2020  1:15 PM Oral James, [address] [facility] Urology [facility] Maintenance  Health Maintenance Due   Topic Date Due    Lipid screen  1988    DEXA (modify frequency per FRAX score)  2013    Breast cancer screen  2018       Arun Lucero RN

## 2019-09-03 NOTE — CARE COORDINATION
9/3/19, 1:30 PM      [facility] day: 5  Location: [facility]21/021-A Reason for admit: Sepsis [facility]) [A41.9]   Procedure:   8/29 Chronic Vega exchanged in ED  8/29 KUB: Nonspecific, nonobstructive bowel gas pattern. A moderate amount of stool is noted within the descending colon  8/31CXR: Bilateral pleural effusions with bibasilar atelectasis versus infiltrates. Treatment Plan of Care: Transferred from ICU over weekend. Hospitalist following. + UTI. IVF. IV vanc. Acetic acid vega irrigations 2x week. 95% RA. PT/OT. PCP: Rosy Camilo MD  Readmission Risk Score: 12%  Discharge Plan: Patient plans to return home with s/o and [facility] and passport.

## 2019-09-04 VITALS
HEART RATE: 91 BPM | DIASTOLIC BLOOD PRESSURE: 62 MMHG | TEMPERATURE: 98.7 F | RESPIRATION RATE: 16 BRPM | WEIGHT: 160.94 LBS | SYSTOLIC BLOOD PRESSURE: 119 MMHG | OXYGEN SATURATION: 94 % | BODY MASS INDEX: 28.52 KG/M2 | HEIGHT: 63 IN

## 2019-09-04 LAB
ANION GAP SERPL CALCULATED.3IONS-SCNC: 10 MEQ/L (ref 8–16)
BUN BLDV-MCNC: 8 MG/DL (ref 7–22)
CALCIUM SERPL-MCNC: 8.3 MG/DL (ref 8.5–10.5)
CHLORIDE BLD-SCNC: 105 MEQ/L (ref 98–111)
CO2: 23 MEQ/L (ref 23–33)
CREAT SERPL-MCNC: 0.4 MG/DL (ref 0.4–1.2)
ERYTHROCYTE [DISTWIDTH] IN BLOOD BY AUTOMATED COUNT: 13.2 % (ref 11.5–14.5)
ERYTHROCYTE [DISTWIDTH] IN BLOOD BY AUTOMATED COUNT: 47.2 FL (ref 35–45)
GFR SERPL CREATININE-BSD FRML MDRD: > 90 ML/MIN/1.73M2
GLUCOSE BLD-MCNC: 77 MG/DL (ref 70–108)
HCT VFR BLD CALC: 35.9 % (ref 37–47)
HEMOGLOBIN: 12 GM/DL (ref 12–16)
MCH RBC QN AUTO: 32.5 PG (ref 26–33)
MCHC RBC AUTO-ENTMCNC: 33.4 GM/DL (ref 32.2–35.5)
MCV RBC AUTO: 97.3 FL (ref 81–99)
PLATELET # BLD: 198 THOU/MM3 (ref 130–400)
PMV BLD AUTO: 10.7 FL (ref 9.4–12.4)
POTASSIUM SERPL-SCNC: 4.1 MEQ/L (ref 3.5–5.2)
RBC # BLD: 3.69 MILL/MM3 (ref 4.2–5.4)
SODIUM BLD-SCNC: 138 MEQ/L (ref 135–145)
WBC # BLD: 6.7 THOU/MM3 (ref 4.8–10.8)

## 2019-09-04 PROCEDURE — 36415 COLL VENOUS BLD VENIPUNCTURE: CPT

## 2019-09-04 PROCEDURE — 6360000002 HC RX W HCPCS: Performed by: INTERNAL MEDICINE

## 2019-09-04 PROCEDURE — 85027 COMPLETE CBC AUTOMATED: CPT

## 2019-09-04 PROCEDURE — 6370000000 HC RX 637 (ALT 250 FOR IP): Performed by: INTERNAL MEDICINE

## 2019-09-04 PROCEDURE — 80048 BASIC METABOLIC PNL TOTAL CA: CPT

## 2019-09-04 RX ADMIN — ASPIRIN 81 MG: 81 TABLET ORAL at 08:26

## 2019-09-04 RX ADMIN — ENOXAPARIN SODIUM 40 MG: 40 INJECTION SUBCUTANEOUS at 08:26

## 2019-09-04 RX ADMIN — CARBAMAZEPINE 100 MG: 100 TABLET, CHEWABLE ORAL at 08:25

## 2019-09-04 RX ADMIN — VITAMIN D, TAB 1000IU (100/BT) 1000 UNITS: 25 TAB at 08:26

## 2019-09-04 RX ADMIN — METOPROLOL TARTRATE 12.5 MG: 25 TABLET ORAL at 08:26

## 2019-09-04 ASSESSMENT — PAIN SCALES - GENERAL
PAINLEVEL_OUTOF10: 0

## 2019-09-04 NOTE — PLAN OF CARE
Problem: Falls - Risk of:  Goal: Will remain free from falls  Description  Will remain free from falls  9/3/2019 2155 by Marsha Mora RN  Outcome: Met This Shift  Note:   Patient remained free from falls this shift. Used call light appropriately. Bed ridden. 2/4 rails up. Bed alarm on. Problem: Skin Integrity:  Goal: Absence of new skin breakdown  Description  Absence of new skin breakdown  9/3/2019 2155 by Marsha Mora RN  Outcome: Met This Shift  Note:   Patient turned Q2H and PRN. Pillow support provided. Patient with DTI to coccyx. Blanchable redness to bilateral heels. Will continue to monitor skin integrity and encourage repositioning Q2H and PRN to prevent further skin breakdown. 09/03/19 1956   Wound 08/29/19 Sacrum Mid DTI redness that does not magdalene- size of a softball on sacrum   Date First Assessed/Time First Assessed: 08/29/19 0500   Primary Wound Type: Pressure Injury  Location: Sacrum  Wound Location Orientation: Mid  Wound Description (Comments): DTI redness that does not magdalene- size of a softball on sacrum   Dressing Status Clean;Dry; Intact   Dressing/Treatment Open to air;Protective barrier   Wound Assessment Clean;Dry; Intact; Pink   Drainage Amount None   Odor None   Tati-wound Assessment Blanchable erythema        Problem: Pain - Acute:  Goal: Ability to express needs and understand communication  9/3/2019 2155 by Marsha Mora RN  Outcome: Met This Shift  Note:   Denies pain this shift. Pain goal: no pain. Will continue to monitor for pain. Problem: DISCHARGE BARRIERS  Goal: Patient's continuum of care needs are met  9/3/2019 2155 by Marsha Mora RN  Outcome: Ongoing  Note:   Patient from home with [facility] and . Patient uses phillip lift and wheelchair at home. Patient has discharge orders, however, due to not being able to reach her  this evening she will remain here until she can be picked up in the a.m. By her . Plan discharge in a.m.  Patient participated in plan of care and contributed to goal setting. Will continue to monitor for discharge needs. Problem: Discharge Planning:  Goal: Discharged to appropriate level of care  Description  Discharged to appropriate level of care  9/3/2019 2155 by Marge Bolivar RN  Outcome: Ongoing  Note:   Patient from home with [facility] and . Patient uses phillip lift and wheelchair at home. Patient has discharge orders, however, due to not being able to reach her  this evening she will remain here until she can be picked up in the a.m. By her . Plan discharge in a.m. Patient participated in plan of care and contributed to goal setting. Plan discharge home with  and HH. Will continue to monitor for discharge needs. Problem: Infection, Septic Shock:  Goal: Will show no infection signs and symptoms  Description  Will show no infection signs and symptoms  9/3/2019 2155 by Marge Bolivar RN  Outcome: Ongoing  Note:   Patient admitted with sepsis d/t UTI. Patient has chronic urinary vega. Afebrile this shift. Receiving antibiotics IV this shift. Last WBC drawn was WNL. Problem: Risk for Impaired Skin Integrity  Goal: Tissue integrity - skin and mucous membranes  Description  Structural intactness and normal physiological function of skin and  mucous membranes. 9/3/2019 2155 by Marge Bolivar RN  Outcome: Ongoing     Problem: Urinary Elimination:  Goal: Signs and symptoms of infection will decrease  Description  Signs and symptoms of infection will decrease  9/3/2019 2155 by Marge Bolivar RN  Outcome: Ongoing  Note:   Patient with chronic vega. Received last dose of IV antibiotics this shift. Afebrile. WBC WNL. New prescription to start at discharge to help prevent reoccurring UTI. Patient verbalized understanding of medications and vega care.      Isolation precautions maintained and patient verbalized understanding of MRSA transmission and ways to prevent infection from

## 2019-09-04 NOTE — PROGRESS NOTES
Planning to discharge patient home with spouse and Matt Bosch. JAMES faxed to [facility] at [phone].  is transporting patient home in personal vehicle.

## 2019-09-04 NOTE — DISCHARGE SUMMARY
Stable cardiac enlargement with redemonstration of coarse interstitial markings. Developing infrahilar lower lobe atelectasis or infiltrate cannot be excluded. **This report has been created using voice recognition software. It may contain minor errors which are inherent in voice recognition technology. **      Final report electronically signed by Dr. Nury Prince on 8/30/2019 5:08 AM      XR CHEST PORTABLE   Final Result    IMPRESSION:    Mild prominence of the interstitium correlate for mild pulmonary vascular congestion. **This report has been created using voice recognition software. It may contain minor errors which are inherent in voice recognition technology. **      Final report electronically signed by Dr. Sarath Mcqueen on 8/29/2019 4:45 PM      XR ABDOMEN (KUB) (SINGLE AP VIEW)   Final Result      Nonspecific, nonobstructive bowel gas pattern. A moderate amount of stool is noted within the descending colon. **This report has been created using voice recognition software. It may contain minor errors which are inherent in voice recognition technology. **      Final report electronically signed by Dr. Suellen Jones on 8/29/2019 8:27 AM      XR CHEST PORTABLE   Final Result   1. Cardiomegaly. 2. Changes of COPD      3 mild perihilar marking prominence correlate with minimal venous congestion. **This report has been created using voice recognition software. It may contain minor errors which are inherent in voice recognition technology. **      Final report electronically signed by Dr. Nury Prince on 8/29/2019 5:42 AM             Consults:     IP CONSULT TO PHARMACY  IP CONSULT TO SOCIAL WORK  IP CONSULT TO UROLOGY    Disposition: Home  Condition at Discharge: Stable    Code Status:  Full Code     Patient Instructions:    Discharge lab work: none  Activity: activity as tolerated  Diet: Dietary Nutrition Supplements: Frozen Oral Supplement  DIET GENERAL; Dental Soft      Follow-up visits:   Poppy Wise [place]  [address] [address]  [address]  [phone]             Discharge Medications:      Minoo Orona   Home Medication Instructions CF    Printed on:19   Medication Information                      acetic acid 0.25 % irrigation  Irrigate catheter with 150 ml weekly.              amoxicillin (AMOXIL) 250 MG/5ML suspension  Take 10 mLs by mouth 3 times daily for 10 days             aspirin (RA ASPIRIN EC) 81 MG EC tablet  take 1 tablet by mouth once daily             carBAMazepine (TEGRETOL) 100 MG chewable tablet  Take 100 mg by mouth 3 times daily              Cholecalciferol (VITAMIN D3 PO)  Take by mouth daily             CRANBERRY PO  Take by mouth daily             D-Mannose 500 MG CAPS  Take 1,500 mg by mouth daily             furosemide (LASIX) 10 MG/ML solution  Take by mouth daily 4 milliliters by mout daily             Incontinence Supplies (BEDSIDE DRAINAGE BAG) MISC  Large 2000 cc bedside drainage bag             Incontinence Supplies (URINARY LEG BAG STRAPS) MISC  Leg bag straps to go with urinary catheter leg bag             Incontinence Supplies (URINARY LEG BAG) MISC  900 cc Tracy Urinary catheter leg bag             methenamine (HIPREX) 1 g tablet  Take 1 tablet by mouth 2 times daily (with meals)             metoprolol tartrate (LOPRESSOR) 25 MG tablet  take 1/2 tablet by mouth twice a day             sulfamethoxazole-trimethoprim (BACTRIM;SEPTRA) 200-40 MG/5ML suspension  Take 20 mLs by mouth 2 times daily for 10 days             Water For Irrigation, Sterile (STERILE WATER FOR IRRIGATION)  Irrigate vega catheter with 50 ml of sterile water weekly                 Time Spent on discharge is more than 30 minutes in the examination, evaluation, counseling and review of medications and discharge

## 2019-09-05 ENCOUNTER — CARE COORDINATION (OUTPATIENT)
Dept: CASE MANAGEMENT | Age: 69
End: 2019-09-05

## 2019-09-05 NOTE — CARE COORDINATION
[address] Transitions Initial Follow Up Call    Call within 2 business days of discharge: Yes    Patient: Isha Hill Patient : 1950   MRN: [id number]  Reason for Admission: UTI-sepsis  Discharge Date: 19 RARS: Readmission Risk Score: 15      Last Discharge [address]       Complaint Diagnosis Description Type Department Provider    19 Urinary Catheter Problem Urinary tract infection associated with indwelling urethral catheter, [facility] . .. ED to Hosp-Admission (Discharged) (ADMITTED) GIOVANNI 4K Elizabeth Mattson DO; Barbara Sexton,... Spoke with: Isha Hill  Patient stated she is doing well. Stanford continues to be in place. No leakage or pain. Patient has a follow up visit with Dr Martha Zaman next Tuesday. [facility] nurse has already been to home and set up medication box. Denies need to review medication. Patient states she has no concerns or issues at this time.      Facility: [facility]    Non-face-to-face services provided:  Obtained and reviewed discharge summary and/or continuity of care documents    Care Transitions 24 Hour Call    Do you have any ongoing symptoms?:  No  Do you have a copy of your discharge instructions?:  Yes  Do you have all of your prescriptions and are they filled?:  Yes  Have you been contacted by a [employer]?:  No  Have you scheduled your follow up appointment?:  No  Were you discharged with any Home Care or Post Acute Services:  No  Post Acute Services:  Home Health (Comment: [address]  Do you have support at home?:  Alone  Do you feel like you have everything you need to keep you well at home?:  Yes  Are you an active caregiver in your home?:  No  Care Transitions Interventions  No Identified Needs         Follow Up  Future Appointments   Date Time Provider Matt Loco   2020  1:15 PM [facility] P - Yu Cardenas RN

## 2019-09-27 ENCOUNTER — CARE COORDINATION (OUTPATIENT)
Dept: CASE MANAGEMENT | Age: 69
End: 2019-09-27

## 2019-09-28 PROBLEM — N39.0 UTI (URINARY TRACT INFECTION): Status: RESOLVED | Noted: 2019-08-29 | Resolved: 2019-09-28

## 2020-01-02 ENCOUNTER — TELEPHONE (OUTPATIENT)
Dept: UROLOGY | Age: 70
End: 2020-01-02

## 2020-01-28 ENCOUNTER — OFFICE VISIT (OUTPATIENT)
Dept: UROLOGY | Age: 70
End: 2020-01-28
Payer: MEDICARE

## 2020-01-28 VITALS
DIASTOLIC BLOOD PRESSURE: 62 MMHG | BODY MASS INDEX: 23.05 KG/M2 | SYSTOLIC BLOOD PRESSURE: 106 MMHG | WEIGHT: 135 LBS | HEIGHT: 64 IN

## 2020-01-28 PROCEDURE — 4004F PT TOBACCO SCREEN RCVD TLK: CPT | Performed by: NURSE PRACTITIONER

## 2020-01-28 PROCEDURE — 99214 OFFICE O/P EST MOD 30 MIN: CPT | Performed by: NURSE PRACTITIONER

## 2020-01-28 PROCEDURE — G8420 CALC BMI NORM PARAMETERS: HCPCS | Performed by: NURSE PRACTITIONER

## 2020-01-28 PROCEDURE — G8484 FLU IMMUNIZE NO ADMIN: HCPCS | Performed by: NURSE PRACTITIONER

## 2020-01-28 PROCEDURE — 1090F PRES/ABSN URINE INCON ASSESS: CPT | Performed by: NURSE PRACTITIONER

## 2020-01-28 PROCEDURE — 4040F PNEUMOC VAC/ADMIN/RCVD: CPT | Performed by: NURSE PRACTITIONER

## 2020-01-28 PROCEDURE — 3017F COLORECTAL CA SCREEN DOC REV: CPT | Performed by: NURSE PRACTITIONER

## 2020-01-28 PROCEDURE — G8400 PT W/DXA NO RESULTS DOC: HCPCS | Performed by: NURSE PRACTITIONER

## 2020-01-28 PROCEDURE — 1123F ACP DISCUSS/DSCN MKR DOCD: CPT | Performed by: NURSE PRACTITIONER

## 2020-01-28 PROCEDURE — G8427 DOCREV CUR MEDS BY ELIG CLIN: HCPCS | Performed by: NURSE PRACTITIONER

## 2020-01-28 RX ORDER — METHENAMINE HIPPURATE 1000 MG/1
1 TABLET ORAL 2 TIMES DAILY WITH MEALS
Qty: 60 TABLET | Refills: 11 | Status: SHIPPED | OUTPATIENT
Start: 2020-02-28 | End: 2022-01-12

## 2020-01-28 RX ORDER — ACETIC ACID 0.25 G/100ML
IRRIGANT IRRIGATION
Qty: 1000 ML | Refills: 5 | Status: SHIPPED | OUTPATIENT
Start: 2020-01-28 | End: 2021-12-13 | Stop reason: SDUPTHER

## 2020-01-28 NOTE — PROGRESS NOTES
extremity pain or weakness, and no neurological deficits. No rashes.  symptoms per HPI. The remainder of the review of symptoms is negative. Objective:     PE:   Vitals:    01/28/20 1409   BP: 106/62   Site: Left Upper Arm   Position: Sitting   Cuff Size: Large Adult   Weight: 135 lb (61.2 kg)   Height: 5' 3.5\" (1.613 m)       Constitutional: Alert and oriented times 3, no acute distress and cooperative to examination with appropriate mood and affect. HENT:   Head:        Normocephalic and atraumatic. Mouth/Throat:         Mucous membranes are normal.   Eyes:         EOM are normal. No scleral icterus. PERRLA. Neck:        Supple, symmetrical, trachea midline  Cardiovascular:        Normal rate, regular rhythm, S1 S2 heart sounds. No murmurs, rub, or gallops. Pulmonary/Chest:    Normal respiratory rate and rhthym. No use of accessory muscles. Abdominal:         Soft. No tenderness. Bowel sounds present. : catheter draining yellow urine with sediment/debris  Musculoskeletal:        Lower extremity edema   Extremities: No cyanosis, or clubbing    Neurological:        Alert and oriented. Psychiatric:        Normal mood and affect. Labs   Urine dip demonstrates   No results found for this visit on 01/28/20. Recent BUN/Creatinine:  Lab Results   Component Value Date    BUN 8 09/04/2019    CREATININE 0.4 09/04/2019       Radiology  No imaging reviewed      Assessment & Plan:     Neurogenic bladder  Recurrent UTI  Gross Hematuria  Chronic catheter    Continue vega catheter changes every 4 weeks. 1 admission to hospital with UTI and low BP in the last 6 months. Blood cultures negative. Continue Acetic Acid washes weekly. Continue D-mannose and Cranberry. Continue Hiprex. I discussed SP catheter briefly with patient today. She is not interested at this time. Return in about 6 months (around 7/28/2020) for recurrent uti, chronic catheter.  BROOK Leon  Urology

## 2020-02-13 ENCOUNTER — TELEPHONE (OUTPATIENT)
Dept: UROLOGY | Age: 70
End: 2020-02-13

## 2020-02-13 ENCOUNTER — HOSPITAL ENCOUNTER (EMERGENCY)
Age: 70
Discharge: HOME | End: 2020-02-13
Payer: MEDICARE

## 2020-02-13 VITALS
DIASTOLIC BLOOD PRESSURE: 63 MMHG | SYSTOLIC BLOOD PRESSURE: 102 MMHG | TEMPERATURE: 97.9 F | RESPIRATION RATE: 16 BRPM | BODY MASS INDEX: 25.69 KG/M2 | HEIGHT: 63 IN | WEIGHT: 145 LBS | HEART RATE: 74 BPM | OXYGEN SATURATION: 100 %

## 2020-02-13 LAB
ALBUMIN SERPL-MCNC: 3.2 G/DL (ref 3.5–5.1)
ALP BLD-CCNC: 319 U/L (ref 38–126)
ALT SERPL-CCNC: 41 U/L (ref 11–66)
ANION GAP SERPL CALCULATED.3IONS-SCNC: 13 MEQ/L (ref 8–16)
AST SERPL-CCNC: 60 U/L (ref 5–40)
BACTERIA: ABNORMAL /HPF
BASOPHILS # BLD: 0.9 %
BASOPHILS ABSOLUTE: 0.1 THOU/MM3 (ref 0–0.1)
BILIRUB SERPL-MCNC: 0.6 MG/DL (ref 0.3–1.2)
BILIRUBIN URINE: ABNORMAL
BLOOD, URINE: ABNORMAL
BUN BLDV-MCNC: 19 MG/DL (ref 7–22)
CALCIUM SERPL-MCNC: 9.1 MG/DL (ref 8.5–10.5)
CASTS UA: ABNORMAL /LPF
CHARACTER, URINE: ABNORMAL
CHLORIDE BLD-SCNC: 100 MEQ/L (ref 98–111)
CO2: 27 MEQ/L (ref 23–33)
COLOR: ABNORMAL
CREAT SERPL-MCNC: 0.5 MG/DL (ref 0.4–1.2)
CRYSTALS, UA: ABNORMAL
EOSINOPHIL # BLD: 4 %
EOSINOPHILS ABSOLUTE: 0.2 THOU/MM3 (ref 0–0.4)
EPITHELIAL CELLS, UA: ABNORMAL /HPF
ERYTHROCYTE [DISTWIDTH] IN BLOOD BY AUTOMATED COUNT: 13 % (ref 11.5–14.5)
ERYTHROCYTE [DISTWIDTH] IN BLOOD BY AUTOMATED COUNT: 49.2 FL (ref 35–45)
GFR SERPL CREATININE-BSD FRML MDRD: > 90 ML/MIN/1.73M2
GLUCOSE BLD-MCNC: 83 MG/DL (ref 70–108)
GLUCOSE URINE: NEGATIVE MG/DL
HCT VFR BLD CALC: 40.9 % (ref 37–47)
HEMOGLOBIN: 13 GM/DL (ref 12–16)
ICTOTEST: NEGATIVE
IMMATURE GRANS (ABS): 0.02 THOU/MM3 (ref 0–0.07)
IMMATURE GRANULOCYTES: 0.4 %
KETONES, URINE: NEGATIVE
LEUKOCYTE ESTERASE, URINE: ABNORMAL
LYMPHOCYTES # BLD: 25.5 %
LYMPHOCYTES ABSOLUTE: 1.4 THOU/MM3 (ref 1–4.8)
MCH RBC QN AUTO: 32.3 PG (ref 26–33)
MCHC RBC AUTO-ENTMCNC: 31.8 GM/DL (ref 32.2–35.5)
MCV RBC AUTO: 101.7 FL (ref 81–99)
MONOCYTES # BLD: 9 %
MONOCYTES ABSOLUTE: 0.5 THOU/MM3 (ref 0.4–1.3)
NITRITE, URINE: POSITIVE
NUCLEATED RED BLOOD CELLS: 0 /100 WBC
OSMOLALITY CALCULATION: 280.8 MOSMOL/KG (ref 275–300)
PH UA: 7 (ref 5–9)
PLATELET # BLD: 161 THOU/MM3 (ref 130–400)
PMV BLD AUTO: 11.9 FL (ref 9.4–12.4)
POTASSIUM SERPL-SCNC: 3.9 MEQ/L (ref 3.5–5.2)
PROTEIN UA: 300
RBC # BLD: 4.02 MILL/MM3 (ref 4.2–5.4)
RBC URINE: > 200 /HPF
RENAL EPITHELIAL, UA: ABNORMAL
SEG NEUTROPHILS: 60.2 %
SEGMENTED NEUTROPHILS ABSOLUTE COUNT: 3.4 THOU/MM3 (ref 1.8–7.7)
SODIUM BLD-SCNC: 140 MEQ/L (ref 135–145)
SPECIFIC GRAVITY, URINE: 1.03 (ref 1–1.03)
TOTAL PROTEIN: 9.1 G/DL (ref 6.1–8)
UROBILINOGEN, URINE: 0.2 EU/DL (ref 0–1)
WBC # BLD: 5.6 THOU/MM3 (ref 4.8–10.8)
WBC UA: ABNORMAL /HPF
YEAST: ABNORMAL

## 2020-02-13 PROCEDURE — 36415 COLL VENOUS BLD VENIPUNCTURE: CPT

## 2020-02-13 PROCEDURE — 81001 URINALYSIS AUTO W/SCOPE: CPT

## 2020-02-13 PROCEDURE — 87086 URINE CULTURE/COLONY COUNT: CPT

## 2020-02-13 PROCEDURE — 99284 EMERGENCY DEPT VISIT MOD MDM: CPT

## 2020-02-13 PROCEDURE — 6360000002 HC RX W HCPCS: Performed by: PHYSICIAN ASSISTANT

## 2020-02-13 PROCEDURE — 85025 COMPLETE CBC W/AUTO DIFF WBC: CPT

## 2020-02-13 PROCEDURE — 87077 CULTURE AEROBIC IDENTIFY: CPT

## 2020-02-13 PROCEDURE — 2580000003 HC RX 258: Performed by: PHYSICIAN ASSISTANT

## 2020-02-13 PROCEDURE — 87040 BLOOD CULTURE FOR BACTERIA: CPT

## 2020-02-13 PROCEDURE — 87186 SC STD MICRODIL/AGAR DIL: CPT

## 2020-02-13 PROCEDURE — 80053 COMPREHEN METABOLIC PANEL: CPT

## 2020-02-13 PROCEDURE — 96365 THER/PROPH/DIAG IV INF INIT: CPT

## 2020-02-13 RX ORDER — SULFAMETHOXAZOLE AND TRIMETHOPRIM 200; 40 MG/5ML; MG/5ML
160 SUSPENSION ORAL 2 TIMES DAILY
Qty: 400 ML | Refills: 0 | Status: SHIPPED | OUTPATIENT
Start: 2020-02-13 | End: 2020-02-13

## 2020-02-13 RX ORDER — SULFAMETHOXAZOLE AND TRIMETHOPRIM 200; 40 MG/5ML; MG/5ML
160 SUSPENSION ORAL 2 TIMES DAILY
Qty: 400 ML | Refills: 0 | Status: SHIPPED | OUTPATIENT
Start: 2020-02-13 | End: 2020-02-16 | Stop reason: ALTCHOICE

## 2020-02-13 RX ADMIN — CEFTRIAXONE SODIUM 1 G: 1 INJECTION, POWDER, FOR SOLUTION INTRAMUSCULAR; INTRAVENOUS at 15:56

## 2020-02-13 ASSESSMENT — ENCOUNTER SYMPTOMS
BLOOD IN STOOL: 0
WHEEZING: 0
SHORTNESS OF BREATH: 0
RHINORRHEA: 0
COUGH: 0
SORE THROAT: 0
NAUSEA: 0
ABDOMINAL PAIN: 0
DIARRHEA: 0
COLOR CHANGE: 0
BACK PAIN: 0
CONSTIPATION: 0
VOMITING: 0

## 2020-02-13 NOTE — ED PROVIDER NOTES
[facility] EMERGENCY DEPT      CHIEF COMPLAINT       Chief Complaint   Patient presents with    Hematuria       Nurses Notes reviewed and I agree except asnoted in the HPI. Manuel Hicks is a 68 y.o. female who presents to the emergency department for evaluation of hematuria. The patient has a history of MS as well as neurogenic bladder and has a chronic indwelling Stanford catheter. The patient reportedly has her catheter changed monthly and follows with Urology. Patient reports noting a scant amount of blood in her catheter bag last night that she states worsened today after her Stanford catheter was changed. The patient reports that she has never noticed this much blood before. The patient reports a history of recurrent UTIs but denies any current UTI symptoms. The patient denies any fevers, chills, coughing, URI symptoms, dizziness, lightheadedness, weakness, numbness, tingling, chest pain, shortness of breath, abdominal pain, nausea, vomiting, diarrhea, constipation, or blood in her stool. She denies any difficulty in changing her Stanford catheter this morning. Patient denies use of anticoagulants. The patient also has history of seizures, hypertension, and hyperlipidemia. There are no additional complaints at this time. REVIEW OF SYSTEMS      Review of Systems   Constitutional: Negative for chills, fatigue and fever. HENT: Negative for congestion, ear pain, rhinorrhea and sore throat. Respiratory: Negative for cough, shortness of breath and wheezing. Cardiovascular: Negative for chest pain and palpitations. Gastrointestinal: Negative for abdominal pain, blood in stool, constipation, diarrhea, nausea and vomiting. Genitourinary: Positive for hematuria. Negative for decreased urine volume, difficulty urinating, dysuria, frequency and urgency. Musculoskeletal: Negative for arthralgias, back pain, myalgias and neck pain.    Skin: Negative for color change and pallor. Neurological: Negative for dizziness, weakness, light-headedness, numbness and headaches. Hematological: Does not bruise/bleed easily. PAST MEDICAL HISTORY    has a past medical history of Arthritis, Hyperlipidemia, Hypertension, Intra-abdominal lymphadenopathy, MS (multiple sclerosis) ([address] Pancreatitis, Pneumonia, Seizures ([address] and UTI (urinary tract infection). SURGICAL HISTORY      has a past surgical history that includes Cholecystectomy (2016); pr xcapsl ctrc rmvl insj io lens prosth w/o ecp (Left, 11/15/2018); Intracapsular cataract extraction (Right, 2018); and Cystoscopy (N/A, 2019). CURRENT MEDICATIONS       Previous Medications    ACETIC ACID 0.25 % IRRIGATION    Irrigate catheter with 150 ml weekly. ASPIRIN (RA ASPIRIN EC) 81 MG EC TABLET    take 1 tablet by mouth once daily    CARBAMAZEPINE (TEGRETOL) 100 MG CHEWABLE TABLET    Take 100 mg by mouth 3 times daily     CHOLECALCIFEROL (VITAMIN D3 PO)    Take by mouth daily    CRANBERRY PO    Take by mouth daily    D-MANNOSE 500 MG CAPS    Take 1,500 mg by mouth daily    FUROSEMIDE (LASIX) 10 MG/ML SOLUTION    Take by mouth daily 4 milliliters by mout daily    INCONTINENCE SUPPLIES (BEDSIDE DRAINAGE BAG) MISC    Large 2000 cc bedside drainage bag    INCONTINENCE SUPPLIES (URINARY LEG BAG STRAPS) MISC    Leg bag straps to go with urinary catheter leg bag    INCONTINENCE SUPPLIES (URINARY LEG BAG) MISC    900 cc Tracy Urinary catheter leg bag    METHENAMINE (HIPREX) 1 G TABLET    Take 1 tablet by mouth 2 times daily (with meals)    METHENAMINE-SODIUM SALICYLATE (CYSTEX PO)    Take by mouth    METOPROLOL TARTRATE (LOPRESSOR) 25 MG TABLET    take 1/2 tablet by mouth twice a day    WATER FOR IRRIGATION, STERILE (STERILE WATER FOR IRRIGATION)    Irrigate vega catheter with 50 ml of sterile water weekly       ALLERGIES     has No Known Allergies. FAMILY HISTORY     She indicated that her mother is . Genitourinary:     Comments: There is grossly bloody urine in the patient's urinary catheter bag. Musculoskeletal: Normal range of motion. Skin:     General: Skin is warm and dry. Coloration: Skin is not pale. Findings: No erythema or rash. Neurological:      Mental Status: She is alert and oriented to person, place, and time. GCS: GCS eye subscore is 4. GCS verbal subscore is 5. GCS motor subscore is 6. Motor: No abnormal muscle tone. Coordination: Coordination normal.   Psychiatric:         Behavior: Behavior normal.         Thought Content: Thought content normal.               DIFFERENTIAL DIAGNOSIS:   Includes but not limited to: Gross hematuria, UTI    DIAGNOSTIC RESULTS     EKG: All EKG's are interpreted by the Emergency Department Physician who either signs or Co-signsthis chart in the absence of a cardiologist.  None    RADIOLOGY: non-plain film images(s) such as CT, Ultrasound and MRI are read by the radiologist.  Plainradiographic images are visualized and preliminarily interpreted by the emergency physician unless otherwise stated below.   No orders to display       LABS:   Labs Reviewed   URINE WITH REFLEXED MICRO - Abnormal; Notable for the following components:       Result Value    Bilirubin Urine SMALL (*)     Blood, Urine LARGE (*)     Protein,  (*)     Nitrite, Urine POSITIVE (*)     Leukocyte Esterase, Urine MODERATE (*)     Color, UA RED (*)     Character, Urine TURBID (*)     All other components within normal limits   CBC WITH AUTO DIFFERENTIAL - Abnormal; Notable for the following components:    RBC 4.02 (*)     .7 (*)     MCHC 31.8 (*)     RDW-SD 49.2 (*)     All other components within normal limits   COMPREHENSIVE METABOLIC PANEL - Abnormal; Notable for the following components:    AST 60 (*)     Alkaline Phosphatase 319 (*)     Total Protein 9.1 (*)     Alb 3.2 (*)     All other components within normal limits   CULTURE, REFLEXED, URINE Narrative:     Source: cath urine       Site:           Current Antibiotics: not stated   CULTURE BLOOD #1   CULTURE BLOOD #2   BILE ACIDS, TOTAL   ANION GAP   GLOMERULAR FILTRATION RATE, ESTIMATED   OSMOLALITY       EMERGENCY DEPARTMENT COURSE:   Vitals:    Vitals:    02/13/20 1258 02/13/20 1343 02/13/20 1414 02/13/20 1551   BP: (!) 112/52 100/78 109/67 102/63   Pulse: 85 78  74   Resp: 16 17  16   Temp: 97.9 °F (36.6 °C)      TempSrc: Oral      SpO2: 100% 100%  100%   Weight: 145 lb (65.8 kg)      Height: 5' 3\" (1.6 m)        The patient was seen and evaluated within the ED today with gross hematuria. Within the department, I observed the patient's vital signs to be within acceptable range, and she was afebrile. Blood pressure on arrival to this department was 112/52 but increased to 100/78 at recheck. On exam, I appreciated clear lung sounds. There is no abdominal tenderness, guarding, rigidity, rebound tenderness, or distention. There is grossly bloody urine in the patient's urinary catheter bag. Laboratory work was reassuring. White blood cell count, hemoglobin, and hematocrit are within normal range. UA revealed nitrite positive pyuria with bacteriuria and hematuria. Within the department, the patient was treated with IV Rocephin. I observed the patient's condition to remain stable during the duration of the stay. The patient's nurse performed 4 rounds of bladder irrigation. Small blood clots and grossly bloody urine were irrigated during the first 3 rounds of bladder irrigation. On the fourth round of bladder irrigation, there were no blood clots and no hematuria. The patient was observed for approximately 30 minutes without any grossly bloody urine draining in her Stanford tubing or catheter bag. Her urine is a light yellow color without any blood.  I discussed the case again with Cassandra Perkins, Urology CNP, who believes that because the patient's bladder was irrigated with removal

## 2020-02-13 NOTE — TELEPHONE ENCOUNTER
Conchita Flynn from [employer] [phone]    Patient called P last night stating she was having blood in her urine. The nurse changed her catheter today and states there is blood in her bag and patient is passing some blood clots as well. Conchita Flynn, also states she got a clean cath collection today for any orders you may want. Please advise, thank you.

## 2020-02-13 NOTE — ED NOTES
Bed: 010A  Expected date: 2/13/20  Expected time: 12:46 PM  Means of arrival: [facility]  Comments:     Yesenia Trujillo RN  02/13/20 [id number]

## 2020-02-13 NOTE — ED NOTES
Urine noted to be clear and yellow and draining well. Pt denies any lightheadedness or dizziness at this time. [facility] PA notified.      Axel Martínez RN  02/13/20 2447

## 2020-02-13 NOTE — TELEPHONE ENCOUNTER
Ok to obtain urine culture from straight cath specimen. Started her on bactrim, stent to [facility]    Increase fluid intake. Can hand irrigate catheter with 100 cc of normal saline every 2 hrs as needed for clot retention. If cannot clear up, she needs to go to ER for evaluation. Any other symptoms.

## 2020-02-15 LAB
ORGANISM: ABNORMAL
URINE CULTURE REFLEX: ABNORMAL

## 2020-02-16 ENCOUNTER — TELEPHONE (OUTPATIENT)
Dept: PHARMACY | Age: 70
End: 2020-02-16

## 2020-02-16 RX ORDER — CEPHALEXIN 250 MG/5ML
500 POWDER, FOR SUSPENSION ORAL 2 TIMES DAILY
Qty: 140 ML | Refills: 0 | Status: SHIPPED | OUTPATIENT
Start: 2020-02-16 | End: 2020-02-23

## 2020-02-16 NOTE — TELEPHONE ENCOUNTER
Pharmacy Note  ED Culture Follow-up    Wes Hicks is a 68 y.o. female. Allergies: Patient has no known allergies. Labs:  Lab Results   Component Value Date    BUN 19 02/13/2020    CREATININE 0.5 02/13/2020    WBC 5.6 02/13/2020     Estimated Creatinine Clearance: 94 mL/min (based on SCr of 0.5 mg/dL). Current antimicrobials:   · bactrim    ASSESSMENT:  Micro results:   Urine culture: positive for e. coli     PLAN:  Need for intervention: Yes  Discussed with: Shanda Gaona PA-C  Chosen treatment:    · Cephalexin 500 mg po bid x 7 days     Patient response:   · Called and spoke with patient. [facility] in [place]    Called/sent in prescription to: [facility]    Please call with any questions.  Ext. [phone]

## 2020-02-19 LAB
BLOOD CULTURE, ROUTINE: NORMAL
BLOOD CULTURE, ROUTINE: NORMAL

## 2020-05-08 RX ORDER — ASPIRIN 81 MG/1
TABLET ORAL
Qty: 30 TABLET | Refills: 0 | Status: SHIPPED | OUTPATIENT
Start: 2020-05-08

## 2020-11-03 ENCOUNTER — APPOINTMENT (OUTPATIENT)
Dept: GENERAL RADIOLOGY | Age: 70
DRG: 570 | End: 2020-11-03
Payer: MEDICARE

## 2020-11-03 ENCOUNTER — HOSPITAL ENCOUNTER (INPATIENT)
Age: 70
LOS: 5 days | Discharge: NURSING, REHAB OR LONG-TERM CARE | DRG: 570 | End: 2020-11-08
Attending: EMERGENCY MEDICINE | Admitting: INTERNAL MEDICINE
Payer: MEDICARE

## 2020-11-03 DIAGNOSIS — S91.302A: ICD-10-CM

## 2020-11-03 DIAGNOSIS — L03.116 CELLULITIS OF LEFT LOWER EXTREMITY: Primary | ICD-10-CM

## 2020-11-03 LAB
ALBUMIN SERPL-MCNC: 3 G/DL (ref 3.5–5.1)
ALP BLD-CCNC: 99 U/L (ref 38–126)
ALT SERPL-CCNC: 17 U/L (ref 11–66)
ANION GAP SERPL CALCULATED.3IONS-SCNC: 12 MEQ/L (ref 8–16)
AST SERPL-CCNC: 33 U/L (ref 5–40)
BACTERIA: ABNORMAL /HPF
BASOPHILS # BLD: 0.4 %
BASOPHILS ABSOLUTE: 0.1 THOU/MM3 (ref 0–0.1)
BILIRUB SERPL-MCNC: 0.6 MG/DL (ref 0.3–1.2)
BILIRUBIN URINE: NEGATIVE
BLOOD, URINE: ABNORMAL
BUN BLDV-MCNC: 25 MG/DL (ref 7–22)
C-REACTIVE PROTEIN: 9.05 MG/DL (ref 0–1)
CALCIUM SERPL-MCNC: 8.5 MG/DL (ref 8.5–10.5)
CASTS 2: ABNORMAL /LPF
CASTS UA: ABNORMAL /LPF
CHARACTER, URINE: ABNORMAL
CHLORIDE BLD-SCNC: 92 MEQ/L (ref 98–111)
CO2: 34 MEQ/L (ref 23–33)
COLOR: YELLOW
CREAT SERPL-MCNC: 0.6 MG/DL (ref 0.4–1.2)
CRYSTALS, UA: ABNORMAL
EOSINOPHIL # BLD: 0.1 %
EOSINOPHILS ABSOLUTE: 0 THOU/MM3 (ref 0–0.4)
EPITHELIAL CELLS, UA: ABNORMAL /HPF
ERYTHROCYTE [DISTWIDTH] IN BLOOD BY AUTOMATED COUNT: 12.7 % (ref 11.5–14.5)
ERYTHROCYTE [DISTWIDTH] IN BLOOD BY AUTOMATED COUNT: 45.3 FL (ref 35–45)
GFR SERPL CREATININE-BSD FRML MDRD: > 90 ML/MIN/1.73M2
GLUCOSE BLD-MCNC: 92 MG/DL (ref 70–108)
GLUCOSE URINE: NEGATIVE MG/DL
HCT VFR BLD CALC: 39.7 % (ref 37–47)
HEMOGLOBIN: 13.8 GM/DL (ref 12–16)
IMMATURE GRANS (ABS): 0.06 THOU/MM3 (ref 0–0.07)
IMMATURE GRANULOCYTES: 0.5 %
INR BLD: 1.28 (ref 0.85–1.13)
KETONES, URINE: ABNORMAL
LACTIC ACID: 1.7 MMOL/L (ref 0.5–2.2)
LEUKOCYTE ESTERASE, URINE: ABNORMAL
LYMPHOCYTES # BLD: 12 %
LYMPHOCYTES ABSOLUTE: 1.5 THOU/MM3 (ref 1–4.8)
MAGNESIUM: 2 MG/DL (ref 1.6–2.4)
MCH RBC QN AUTO: 33.7 PG (ref 26–33)
MCHC RBC AUTO-ENTMCNC: 34.8 GM/DL (ref 32.2–35.5)
MCV RBC AUTO: 97.1 FL (ref 81–99)
MISCELLANEOUS 2: ABNORMAL
MONOCYTES # BLD: 10.5 %
MONOCYTES ABSOLUTE: 1.3 THOU/MM3 (ref 0.4–1.3)
NITRITE, URINE: NEGATIVE
NUCLEATED RED BLOOD CELLS: 0 /100 WBC
OSMOLALITY CALCULATION: 279.7 MOSMOL/KG (ref 275–300)
PH UA: 5.5 (ref 5–9)
PLATELET # BLD: 222 THOU/MM3 (ref 130–400)
PMV BLD AUTO: 11.1 FL (ref 9.4–12.4)
POTASSIUM REFLEX MAGNESIUM: 2.3 MEQ/L (ref 3.5–5.2)
PROCALCITONIN: 0.12 NG/ML (ref 0.01–0.09)
PROTEIN UA: ABNORMAL
RBC # BLD: 4.09 MILL/MM3 (ref 4.2–5.4)
RBC URINE: ABNORMAL /HPF
REASON FOR REJECTION: NORMAL
REJECTED TEST: NORMAL
RENAL EPITHELIAL, UA: ABNORMAL
SEDIMENTATION RATE, ERYTHROCYTE: 116 MM/HR (ref 0–20)
SEG NEUTROPHILS: 76.5 %
SEGMENTED NEUTROPHILS ABSOLUTE COUNT: 9.7 THOU/MM3 (ref 1.8–7.7)
SODIUM BLD-SCNC: 138 MEQ/L (ref 135–145)
SPECIFIC GRAVITY, URINE: 1.01 (ref 1–1.03)
TOTAL PROTEIN: 7.2 G/DL (ref 6.1–8)
TROPONIN T: < 0.01 NG/ML
UROBILINOGEN, URINE: 1 EU/DL (ref 0–1)
VANCOMYCIN RESISTANT ENTEROCOCCUS: NEGATIVE
WBC # BLD: 12.7 THOU/MM3 (ref 4.8–10.8)
WBC UA: > 200 /HPF
YEAST: ABNORMAL

## 2020-11-03 PROCEDURE — 96365 THER/PROPH/DIAG IV INF INIT: CPT

## 2020-11-03 PROCEDURE — 99223 1ST HOSP IP/OBS HIGH 75: CPT | Performed by: INTERNAL MEDICINE

## 2020-11-03 PROCEDURE — 87147 CULTURE TYPE IMMUNOLOGIC: CPT

## 2020-11-03 PROCEDURE — 83735 ASSAY OF MAGNESIUM: CPT

## 2020-11-03 PROCEDURE — 85651 RBC SED RATE NONAUTOMATED: CPT

## 2020-11-03 PROCEDURE — 6360000002 HC RX W HCPCS: Performed by: STUDENT IN AN ORGANIZED HEALTH CARE EDUCATION/TRAINING PROGRAM

## 2020-11-03 PROCEDURE — 2580000003 HC RX 258: Performed by: EMERGENCY MEDICINE

## 2020-11-03 PROCEDURE — 87081 CULTURE SCREEN ONLY: CPT

## 2020-11-03 PROCEDURE — 6370000000 HC RX 637 (ALT 250 FOR IP): Performed by: INTERNAL MEDICINE

## 2020-11-03 PROCEDURE — 36415 COLL VENOUS BLD VENIPUNCTURE: CPT

## 2020-11-03 PROCEDURE — 96361 HYDRATE IV INFUSION ADD-ON: CPT

## 2020-11-03 PROCEDURE — 2060000000 HC ICU INTERMEDIATE R&B

## 2020-11-03 PROCEDURE — 86140 C-REACTIVE PROTEIN: CPT

## 2020-11-03 PROCEDURE — 6360000002 HC RX W HCPCS: Performed by: EMERGENCY MEDICINE

## 2020-11-03 PROCEDURE — 87040 BLOOD CULTURE FOR BACTERIA: CPT

## 2020-11-03 PROCEDURE — 87086 URINE CULTURE/COLONY COUNT: CPT

## 2020-11-03 PROCEDURE — 6360000002 HC RX W HCPCS: Performed by: INTERNAL MEDICINE

## 2020-11-03 PROCEDURE — 87641 MR-STAPH DNA AMP PROBE: CPT

## 2020-11-03 PROCEDURE — 87205 SMEAR GRAM STAIN: CPT

## 2020-11-03 PROCEDURE — 2580000003 HC RX 258: Performed by: INTERNAL MEDICINE

## 2020-11-03 PROCEDURE — 84484 ASSAY OF TROPONIN QUANT: CPT

## 2020-11-03 PROCEDURE — 87500 VANOMYCIN DNA AMP PROBE: CPT

## 2020-11-03 PROCEDURE — 87077 CULTURE AEROBIC IDENTIFY: CPT

## 2020-11-03 PROCEDURE — 94760 N-INVAS EAR/PLS OXIMETRY 1: CPT

## 2020-11-03 PROCEDURE — 85025 COMPLETE CBC W/AUTO DIFF WBC: CPT

## 2020-11-03 PROCEDURE — 85610 PROTHROMBIN TIME: CPT

## 2020-11-03 PROCEDURE — 83605 ASSAY OF LACTIC ACID: CPT

## 2020-11-03 PROCEDURE — 84145 PROCALCITONIN (PCT): CPT

## 2020-11-03 PROCEDURE — 81001 URINALYSIS AUTO W/SCOPE: CPT

## 2020-11-03 PROCEDURE — 73630 X-RAY EXAM OF FOOT: CPT

## 2020-11-03 PROCEDURE — 87070 CULTURE OTHR SPECIMN AEROBIC: CPT

## 2020-11-03 PROCEDURE — 80053 COMPREHEN METABOLIC PANEL: CPT

## 2020-11-03 PROCEDURE — 87186 SC STD MICRODIL/AGAR DIL: CPT

## 2020-11-03 PROCEDURE — 87075 CULTR BACTERIA EXCEPT BLOOD: CPT

## 2020-11-03 PROCEDURE — 99285 EMERGENCY DEPT VISIT HI MDM: CPT

## 2020-11-03 RX ORDER — 0.9 % SODIUM CHLORIDE 0.9 %
1000 INTRAVENOUS SOLUTION INTRAVENOUS ONCE
Status: COMPLETED | OUTPATIENT
Start: 2020-11-03 | End: 2020-11-03

## 2020-11-03 RX ORDER — MORPHINE SULFATE 2 MG/ML
2 INJECTION, SOLUTION INTRAMUSCULAR; INTRAVENOUS
Status: DISCONTINUED | OUTPATIENT
Start: 2020-11-03 | End: 2020-11-04

## 2020-11-03 RX ORDER — CARBAMAZEPINE 100 MG/1
100 TABLET, CHEWABLE ORAL 3 TIMES DAILY
Status: DISCONTINUED | OUTPATIENT
Start: 2020-11-03 | End: 2020-11-08 | Stop reason: HOSPADM

## 2020-11-03 RX ORDER — DEXTROSE MONOHYDRATE 25 G/50ML
INJECTION, SOLUTION INTRAVENOUS
Status: DISPENSED
Start: 2020-11-03 | End: 2020-11-04

## 2020-11-03 RX ORDER — MORPHINE SULFATE 4 MG/ML
4 INJECTION, SOLUTION INTRAMUSCULAR; INTRAVENOUS
Status: DISCONTINUED | OUTPATIENT
Start: 2020-11-03 | End: 2020-11-04

## 2020-11-03 RX ORDER — SODIUM CHLORIDE 0.9 % (FLUSH) 0.9 %
10 SYRINGE (ML) INJECTION EVERY 12 HOURS SCHEDULED
Status: DISCONTINUED | OUTPATIENT
Start: 2020-11-03 | End: 2020-11-06

## 2020-11-03 RX ORDER — ONDANSETRON 2 MG/ML
4 INJECTION INTRAMUSCULAR; INTRAVENOUS EVERY 6 HOURS PRN
Status: DISCONTINUED | OUTPATIENT
Start: 2020-11-03 | End: 2020-11-06

## 2020-11-03 RX ORDER — ASPIRIN 81 MG/1
81 TABLET ORAL DAILY
Status: DISCONTINUED | OUTPATIENT
Start: 2020-11-04 | End: 2020-11-08 | Stop reason: HOSPADM

## 2020-11-03 RX ORDER — ACETAMINOPHEN 325 MG/1
650 TABLET ORAL EVERY 6 HOURS PRN
Status: DISCONTINUED | OUTPATIENT
Start: 2020-11-03 | End: 2020-11-08 | Stop reason: HOSPADM

## 2020-11-03 RX ORDER — ACETAMINOPHEN 650 MG/1
650 SUPPOSITORY RECTAL EVERY 6 HOURS PRN
Status: DISCONTINUED | OUTPATIENT
Start: 2020-11-03 | End: 2020-11-08 | Stop reason: HOSPADM

## 2020-11-03 RX ORDER — POLYETHYLENE GLYCOL 3350 17 G/17G
17 POWDER, FOR SOLUTION ORAL DAILY PRN
Status: DISCONTINUED | OUTPATIENT
Start: 2020-11-03 | End: 2020-11-08 | Stop reason: HOSPADM

## 2020-11-03 RX ORDER — SODIUM CHLORIDE 0.9 % (FLUSH) 0.9 %
10 SYRINGE (ML) INJECTION PRN
Status: DISCONTINUED | OUTPATIENT
Start: 2020-11-03 | End: 2020-11-06

## 2020-11-03 RX ORDER — PROMETHAZINE HYDROCHLORIDE 25 MG/1
12.5 TABLET ORAL EVERY 6 HOURS PRN
Status: DISCONTINUED | OUTPATIENT
Start: 2020-11-03 | End: 2020-11-06

## 2020-11-03 RX ORDER — POTASSIUM CHLORIDE 7.45 MG/ML
10 INJECTION INTRAVENOUS
Status: COMPLETED | OUTPATIENT
Start: 2020-11-03 | End: 2020-11-04

## 2020-11-03 RX ORDER — SODIUM CHLORIDE 9 MG/ML
INJECTION, SOLUTION INTRAVENOUS CONTINUOUS
Status: ACTIVE | OUTPATIENT
Start: 2020-11-03 | End: 2020-11-04

## 2020-11-03 RX ADMIN — CARBAMAZEPINE 100 MG: 100 TABLET, CHEWABLE ORAL at 21:20

## 2020-11-03 RX ADMIN — SODIUM CHLORIDE, PRESERVATIVE FREE 10 ML: 5 INJECTION INTRAVENOUS at 22:11

## 2020-11-03 RX ADMIN — PIPERACILLIN SODIUM,TAZOBACTAM SODIUM 4.5 G: 4; .5 INJECTION, POWDER, FOR SOLUTION INTRAVENOUS at 13:58

## 2020-11-03 RX ADMIN — SODIUM CHLORIDE 1000 ML: 9 INJECTION, SOLUTION INTRAVENOUS at 12:38

## 2020-11-03 RX ADMIN — ENOXAPARIN SODIUM 40 MG: 60 INJECTION SUBCUTANEOUS at 21:25

## 2020-11-03 RX ADMIN — SODIUM CHLORIDE 1000 ML: 9 INJECTION, SOLUTION INTRAVENOUS at 14:50

## 2020-11-03 RX ADMIN — PIPERACILLIN AND TAZOBACTAM 3.38 G: 3; .375 INJECTION, POWDER, LYOPHILIZED, FOR SOLUTION INTRAVENOUS at 21:20

## 2020-11-03 RX ADMIN — POTASSIUM CHLORIDE 10 MEQ: 7.46 INJECTION, SOLUTION INTRAVENOUS at 21:20

## 2020-11-03 RX ADMIN — SODIUM CHLORIDE: 9 INJECTION, SOLUTION INTRAVENOUS at 19:22

## 2020-11-03 RX ADMIN — POTASSIUM CHLORIDE 10 MEQ: 7.46 INJECTION, SOLUTION INTRAVENOUS at 22:52

## 2020-11-03 RX ADMIN — MORPHINE SULFATE 4 MG: 4 INJECTION, SOLUTION INTRAMUSCULAR; INTRAVENOUS at 21:26

## 2020-11-03 ASSESSMENT — ENCOUNTER SYMPTOMS
TROUBLE SWALLOWING: 0
PHOTOPHOBIA: 0
COUGH: 0
SHORTNESS OF BREATH: 0
VOMITING: 0
NAUSEA: 0
ABDOMINAL PAIN: 0

## 2020-11-03 ASSESSMENT — PAIN SCALES - GENERAL
PAINLEVEL_OUTOF10: 10
PAINLEVEL_OUTOF10: 5
PAINLEVEL_OUTOF10: 5
PAINLEVEL_OUTOF10: 10
PAINLEVEL_OUTOF10: 10

## 2020-11-03 ASSESSMENT — PAIN DESCRIPTION - DESCRIPTORS: DESCRIPTORS: ACHING;CONSTANT;JABBING

## 2020-11-03 ASSESSMENT — PAIN DESCRIPTION - PAIN TYPE
TYPE: ACUTE PAIN

## 2020-11-03 ASSESSMENT — PAIN DESCRIPTION - FREQUENCY: FREQUENCY: CONTINUOUS

## 2020-11-03 ASSESSMENT — PAIN DESCRIPTION - LOCATION
LOCATION: FOOT
LOCATION: BUTTOCKS
LOCATION: FOOT

## 2020-11-03 ASSESSMENT — PAIN DESCRIPTION - ORIENTATION
ORIENTATION: RIGHT
ORIENTATION: LEFT

## 2020-11-03 ASSESSMENT — PAIN DESCRIPTION - ONSET: ONSET: ON-GOING

## 2020-11-03 NOTE — ED NOTES
Pt up in bed with blankets over bottom half. Patient updated on plan of care at this time and expresses no concerns regarding treatment. Patient VSS. Respirations are regular and unlabored, patient is alert and oriented x4. Patient bed rails up x2 and call light within reach. Will continue to monitor.        Britany Chambers RN  11/03/20 2000

## 2020-11-03 NOTE — ED NOTES
Bed: 016A  Expected date: 11/3/20  Expected time: 10:36 AM  Means of arrival: [facility]  Comments:     Eloise George RN  11/03/20 1049

## 2020-11-03 NOTE — ED NOTES
Patient lying in bed with eyes closed at this time. Patient respirations are regular and unlabored. Patient appears to be in no current distress. Patient VSS. Call light is within reach. Patient expresses no needs at this time.        Leona Tamayo RN  11/03/20 6283

## 2020-11-03 NOTE — ED TRIAGE NOTES
Has had a chronic sore to left heel that seems to be getting worse within the last week with tenderness. Appointment with wound clinic was scheduled for 11/11/2020, but she felt she couldn't wait any longer. She has home health aides that come to change bandage \"every week\", and they had encouraged her to be evaluated in the ER. She chronic leg swelling, bilateral. No recent injury.

## 2020-11-03 NOTE — ED PROVIDER NOTES
[facility] ICU STEPDOWN [facility]    EMERGENCY MEDICINE     Pt Name: Alyssa Hope  MRN: [id number]  [place] 1950  Date of evaluation: 11/3/2020  Provider: Gerald Ybarra MD,     [address] [place] [address]       Chief Complaint   Patient presents with    Wound Infection     Left heel       HISTORY OF PRESENT ILLNESS    Alyssa Hope is a pleasant 68 y.o. female who presents to the emergency department from home via EMS for left foot pain. Patient has a history of MS and is wheelchair bound. She has had a sore on her left heel for a long time but recently she has noticed drainage and a foul odor from the wound. She does have home health that comes and helps her with her wounds and they recommended that she come in for evaluation today. She endorses that she feels fatigue, lack of appetite, and general malaise. She also complains of increasing pain on her buttocks and posterior thighs. She denies any fever, nausea, vomiting, or blood in her urine. She does have a Stanford in place secondary to her neurogenic bladder. Stanford was replaced on Friday by urology. Triage notes and Nursing notes were reviewed by myself. Any discrepancies are addressed above.     PAST MEDICAL HISTORY     Past Medical History:   Diagnosis Date    Arthritis     Hyperlipidemia     Hypertension     Intra-abdominal lymphadenopathy     MS (multiple sclerosis) ([address]     Pancreatitis     Pneumonia     Seizures ([address]     UTI (urinary tract infection)        SURGICAL HISTORY       Past Surgical History:   Procedure Laterality Date    CHOLECYSTECTOMY  April 1st 2016    laparoscopic    CYSTOSCOPY N/A 1/2/2019    CYSTO, CLOT EVACUATION, TURBT performed by Ila Burton MD at [facility] [address] Right 12/17/2018    EYE CATARACT EMULSIFICATION IOL IMPLANT, RIGHT performed by Meilsa Pond MD at [address] [facility] INSJ IO LENS PROSTH W/O ECP Left 11/15/2018    EYE CATARACT EMULSIFICATION IOL IMPLANT LEFT EYE performed by Hany Duffy MD at [address]       Current Discharge Medication List      CONTINUE these medications which have NOT CHANGED    Details   aspirin (RA ASPIRIN EC) 81 MG EC tablet take 1 tablet by mouth once daily  Qty: 30 tablet, Refills: 0      metoprolol tartrate (LOPRESSOR) 25 MG tablet take 1/2 tablet by mouth twice a day  Qty: 30 tablet, Refills: 11      furosemide (LASIX) 10 MG/ML solution Take by mouth daily 4 milliliters by mout daily  Qty: 10 mg, Refills: 0      D-Mannose 500 MG CAPS Take 1,500 mg by mouth daily  Qty: 90 capsule, Refills: 5      Cholecalciferol (VITAMIN D3 PO) Take by mouth daily      Methenamine-Sodium Salicylate (CYSTEX PO) Take by mouth      acetic acid 0.25 % irrigation Irrigate catheter with 150 ml weekly. Qty: 1000 mL, Refills: 5      methenamine (HIPREX) 1 g tablet Take 1 tablet by mouth 2 times daily (with meals)  Qty: 60 tablet, Refills: 11      Water For Irrigation, Sterile (STERILE WATER FOR IRRIGATION) Irrigate vega catheter with 50 ml of sterile water weekly  Qty: 250 mL, Refills: 0      !! Incontinence Supplies (BEDSIDE DRAINAGE BAG) MISC Large 2000 cc bedside drainage bag  Qty: 1 each, Refills: 11      !! Incontinence Supplies (URINARY LEG BAG) [facility] 900 cc Tracy Urinary catheter leg bag  Qty: 1 each, Refills: 11      !! Incontinence Supplies (URINARY LEG BAG STRAPS) MISC Leg bag straps to go with urinary catheter leg bag  Qty: 1 each, Refills: 11      CRANBERRY PO Take by mouth daily      carBAMazepine (TEGRETOL) 100 MG chewable tablet Take 100 mg by mouth 3 times daily        ! ! - Potential duplicate medications found. Please discuss with provider. ALLERGIES     Patient has no known allergies.     FAMILY HISTORY       Family History   Problem Relation Age of Onset    Cancer Mother         colon    Heart Disease Father     Diabetes Neg Hx     High Blood Pressure Neg Hx     Stroke Neg Hx         SOCIAL HISTORY       Social History     Socioeconomic History    Marital status: Life Partner     Spouse name: None    Number of children: 2    Years of education: None    Highest education level: None   Occupational History    None   Social Needs    Financial resource strain: None    Food insecurity     Worry: None     Inability: None    Transportation needs     Medical: None     Non-medical: None   Tobacco Use    Smoking status: Former Smoker     Packs/day: 2.00     Years: 40.00     Pack years: 80.00     Types: Cigarettes     Last attempt to quit: 10/2/2013     Years since quittin.0    Smokeless tobacco: Former User     Quit date: 11/3/2015   Substance and Sexual Activity    Alcohol use: No    Drug use: No    Sexual activity: Not Currently   Lifestyle    Physical activity     Days per week: None     Minutes per session: None    Stress: None   Relationships    Social connections     Talks on phone: None     Gets together: None     Attends Gnosticist service: None     Active member of club or organization: None     Attends meetings of clubs or organizations: None     Relationship status: None    Intimate partner violence     Fear of current or ex partner: None     Emotionally abused: None     Physically abused: None     Forced sexual activity: None   Other Topics Concern    None   Social History Narrative    None       REVIEW OF SYSTEMS     Review of Systems   Constitutional: Positive for appetite change and fatigue. Negative for chills and fever. HENT: Negative for congestion and trouble swallowing. Eyes: Negative for photophobia. Respiratory: Negative for cough and shortness of breath. Cardiovascular: Positive for leg swelling. Negative for chest pain. Gastrointestinal: Negative for abdominal pain, nausea and vomiting. Genitourinary: Negative for hematuria. Musculoskeletal: Positive for arthralgias and joint swelling. Skin: Positive for wound. Neurological: Negative for dizziness and headaches. Hematological: Does not bruise/bleed easily. Psychiatric/Behavioral: Negative for confusion. Except as noted above the remainder of the review of systems was reviewed and is. PHYSICAL EXAM    (up to 7 for level 4, 8 or more for level 5)     ED Triage Vitals   BP Temp Temp Source Pulse Resp SpO2 Height Weight   11/03/20 1049 11/03/20 1046 11/03/20 1046 11/03/20 1046 11/03/20 1046 11/03/20 1046 11/03/20 1046 11/03/20 1046   (!) 115/99 98.5 °F (36.9 °C) Oral 95 16 96 % 5' 3\" (1.6 m) 120 lb 8 oz (54.7 kg)       Physical Exam  Vitals signs and nursing note reviewed. Exam conducted with a chaperone present. Constitutional:       Appearance: She is ill-appearing. HENT:      Head: Normocephalic and atraumatic. Right Ear: Tympanic membrane, ear canal and external ear normal.      Left Ear: Tympanic membrane, ear canal and external ear normal.      Nose: Nose normal. No congestion. Mouth/Throat:      Mouth: Mucous membranes are moist.      Pharynx: Oropharynx is clear. Eyes:      Extraocular Movements: Extraocular movements intact. Pupils: Pupils are equal, round, and reactive to light. Neck:      Musculoskeletal: Normal range of motion. Cardiovascular:      Rate and Rhythm: Normal rate and regular rhythm. Pulses: Normal pulses. Heart sounds: No murmur. Pulmonary:      Effort: Pulmonary effort is normal. No respiratory distress. Breath sounds: Normal breath sounds. No wheezing. Abdominal:      General: Abdomen is flat. Palpations: Abdomen is soft. Tenderness: There is no abdominal tenderness. Musculoskeletal:      Right lower leg: Edema present. Left lower leg: Edema present. Skin:     General: Skin is dry. Comments: Significantly dry skin with flakes and crusting. She has a stage I decubitus ulcer on the right upper buttock along with a stage I decubitus ulcer at the gluteal cleft.   On her left heel she has a pressure ulcer with significant purulence and foul odor. The foot itself is warm and red. 3+ pitting edema in her foot bilaterally   Neurological:      Mental Status: She is alert. DIAGNOSTIC RESULTS     EKG:(none if blank)  All EKG's are [facility] who either signs or Co-signs this chart in the absence of a cardiologist.        RADIOLOGY: (none if blank)   Interpretation per the Radiologistbelow, if available at the time of this note:    XR FOOT LEFT (MIN 3 VIEWS)   Final Result   1. There is a linear lucency through the mid aspect of the distal phalange of the left first digit. This is concerning for a nondisplaced fracture. Correlate clinically for point tenderness at this location. 2. There is diffuse osteoporosis. 3. There is nonspecific soft tissue swelling seen diffusely throughout the left foot. This may be related to cellulitis versus edema. **This report has been created using voice recognition software. It may contain minor errors which are inherent in voice recognition technology. **      Final report electronically signed by Dr. Marie López on 11/3/2020 11:32 AM          LABS:  Labs Reviewed   CULTURE, URINE - Abnormal; Notable for the following components:       Result Value    Organism Enterococcus faecalis - (Group D) (*)     All other components within normal limits    Narrative:     Source: urine, clean catch       Site: clean void          Current Antibiotics:   Piperacillin/Tazobactam   CBC WITH AUTO DIFFERENTIAL - Abnormal; Notable for the following components:    WBC 12.7 (*)     RBC 4.09 (*)     MCH 33.7 (*)     RDW-SD 45.3 (*)     Segs Absolute 9.7 (*)     All other components within normal limits   PROTIME-INR - Abnormal; Notable for the following components:    INR 1.28 (*)     All other components within normal limits   URINE WITH REFLEXED MICRO - Abnormal; Notable for the following components: Ketones, Urine TRACE (*)     Blood, Urine MODERATE (*)     Protein, UA TRACE (*)     Leukocyte Esterase, Urine LARGE (*)     Character, Urine CLOUDY (*)     All other components within normal limits   COMPREHENSIVE METABOLIC PANEL W/ REFLEX TO MG FOR LOW K - Abnormal; Notable for the following components:    BUN 25 (*)     Potassium reflex Magnesium 2.3 (*)     Chloride 92 (*)     CO2 34 (*)     Alb 3.0 (*)     All other components within normal limits   SEDIMENTATION RATE - Abnormal; Notable for the following components:    Sed Rate 116 (*)     All other components within normal limits   C-REACTIVE PROTEIN - Abnormal; Notable for the following components:    CRP 9.05 (*)     All other components within normal limits   PROCALCITONIN - Abnormal; Notable for the following components:    Procalcitonin 0.12 (*)     All other components within normal limits   BASIC METABOLIC PANEL - Abnormal; Notable for the following components:    Potassium 2.6 (*)     Chloride 95 (*)     Glucose 60 (*)     Calcium 8.2 (*)     All other components within normal limits   CBC WITH AUTO DIFFERENTIAL - Abnormal; Notable for the following components:    RBC 3.42 (*)     Hemoglobin 11.3 (*)     Hematocrit 33.9 (*)     MCV 99.1 (*)     RDW-SD 45.9 (*)     All other components within normal limits   CALCIUM, IONIZED - Abnormal; Notable for the following components:    Calcium, Ion 1.06 (*)     All other components within normal limits   POTASSIUM - Abnormal; Notable for the following components:    Potassium 2.8 (*)     All other components within normal limits   BASIC METABOLIC PANEL - Abnormal; Notable for the following components:    Potassium 3.1 (*)     Calcium 7.9 (*)     All other components within normal limits   CULTURE, BLOOD 1    Narrative:     Source: blood-Adult-suboptimal <5.5oz./set volume       Site: Line:          Current   Antibiotics: not stated   CULTURE, BLOOD 2    Narrative:     Source: blood-Adult-suboptimal <5.5oz./set volume       Site: Peripheral Vein            Current Antibiotics: not stated   CULTURE, ANAEROBIC AND AEROBIC    Narrative:     Source: foot       Site: swab left          Current Antibiotics: Piperacillin/Tazobactam   CULTURE, MRSA, SCREENING    Narrative:     Source: rectal       Site:           Current Antibiotics: Piperacillin/Tazobactam   VRE SCREEN BY PCR   CULTURE, REFLEXED, URINE    Narrative:     Source: Specimen not received       Site:           Current Antibiotics:   LACTIC ACID, PLASMA   SPECIMEN REJECTION   TROPONIN   ANION GAP   GLOMERULAR FILTRATION RATE, ESTIMATED   MAGNESIUM   OSMOLALITY   MAGNESIUM   MRSA BY PCR   ANION GAP   GLOMERULAR FILTRATION RATE, ESTIMATED   ANION GAP   GLOMERULAR FILTRATION RATE, ESTIMATED   COVID-19   POCT GLUCOSE   POCT GLUCOSE       All other labs were within normal range or not returned as of this dictation. Please note, any cultures that may have been sent were not resulted at the time of this patient visit. EMERGENCY DEPARTMENT COURSE andMedical Decision Making:     MDM  Number of Diagnoses or Management Options  Diagnosis management comments: A 1year-old female with history of MS and chronic wounds presents to the emergency department for malaise and foul odor from her left foot ulcer. She appears unwell and disheveled in appearance. SHe has 2 new decubitus ulcers on her sacral area. The left foot ulcer has significant purulence and foul odor concerning for infection. There is an eschar formed over the wound and I am unable to determine if it penetrates to the bone. We will obtain a CBC, CMP, blood cultures, lactate, and an x-ray to evaluate for infection and osteomyelitis. He has a chronic Stanford and the urine is clean appearing however I will collect a sample and send for culture. Given her dry skin we will hydrate her with fluids. At this point she does not meet any SIRS criteria based on her vitals alone.   /  ED Course as of Nov 05 1112   Yogesh Burgos 03, 2020   1311 Xray consistent with infection. Mild leukocytosis but significant left shift. Will start Zosyn. Plan for admission, but pending CMP and trop      [DD]   1416 Blood pressure 94/60. Second bolus ordered. Still pending CMP to place admission    [DD]   1501 Spoke with lab and they have misplaced the sample. Will redraw and send. Still plan for admission    [DD]      ED Course User Index  [DD] Catarina Roberts MD         The patient was evaluated during the global COVID-19 pandemic, and that diagnosis was considered upon their initial presentation. Their evaluation, treatment and testing was consistent with current guidelines for patients who present with complaints or symptoms that may be related to COVID-19.     Strict returnprecautions and follow up instructions were discussed with the patient with which the patient agrees        ED Medications administered this visit:    Medications   aspirin EC tablet 81 mg (81 mg Oral Not Given 11/5/20 0751)   carBAMazepine (TEGRETOL) chewable tablet 100 mg (100 mg Oral Given 11/5/20 0835)   sodium chloride flush 0.9 % injection 10 mL (10 mLs Intravenous Given 11/5/20 0835)   sodium chloride flush 0.9 % injection 10 mL (10 mLs Intravenous Given 11/4/20 1600)   acetaminophen (TYLENOL) tablet 650 mg (has no administration in time range)     Or   acetaminophen (TYLENOL) suppository 650 mg (has no administration in time range)   polyethylene glycol (GLYCOLAX) packet 17 g (has no administration in time range)   promethazine (PHENERGAN) tablet 12.5 mg (has no administration in time range)     Or   ondansetron (ZOFRAN) injection 4 mg (has no administration in time range)   enoxaparin (LOVENOX) injection 40 mg (40 mg Subcutaneous Given 11/4/20 2101)   0.9 % sodium chloride infusion ( Intravenous New Bag 11/3/20 1922)   piperacillin-tazobactam (ZOSYN) 3.375 g in dextrose 5 % 50 mL IVPB extended infusion (mini-bag) (3.375 g Intravenous New Bag 11/5/20 0834)   potassium replacement protocol ( Other Canceled Entry 11/3/20 1931)   dextrose 50 % solution (  Canceled Entry 11/3/20 1932)   calcium replacement protocol ( Other Canceled Entry 11/4/20 0826)   glucose (GLUTOSE) 40 % oral gel 15 g (has no administration in time range)   potassium chloride (KLOR-CON M) extended release tablet 40 mEq ( Oral See Alternative 11/5/20 1032)     Or   potassium bicarb-citric acid (EFFER-K) effervescent tablet 40 mEq (40 mEq Oral Given 11/5/20 1032)     Or   potassium chloride 10 mEq/100 mL IVPB (Peripheral Line) ( Intravenous See Alternative 11/5/20 1032)   HYDROmorphone (DILAUDID) injection 0.5 mg (0.5 mg Intravenous Given 11/4/20 1557)   metoprolol tartrate (LOPRESSOR) tablet 12.5 mg (12.5 mg Oral Given 11/5/20 0835)   potassium bicarb-citric acid (EFFER-K) effervescent tablet 40 mEq (has no administration in time range)   0.9 % sodium chloride bolus (0 mLs Intravenous Stopped 11/3/20 1445)   piperacillin-tazobactam (ZOSYN) 4.5 g in dextrose 5 % 100 mL IVPB (mini-bag) (0 g Intravenous Stopped 11/3/20 1444)   0.9 % sodium chloride bolus (0 mLs Intravenous Stopped 11/3/20 1550)   potassium chloride 10 mEq/100 mL IVPB (Peripheral Line) (10 mEq Intravenous New Bag 11/4/20 0829)   vancomycin 1000 mg IVPB in 250 mL D5W addavial (0 mg Intravenous Stopped 11/4/20 0338)   0.9 % sodium chloride bolus (0 mLs Intravenous Stopped 11/4/20 1003)   0.9 % sodium chloride bolus (0 mLs Intravenous Stopped 11/4/20 2233)         Procedures: (None if blank)       CLINICAL       1.  Cellulitis of left lower extremity          DISPOSITION/PLAN   DISPOSITION Admitted 11/03/2020 03:27:08 PM      PATIENT REFERRED TO:  [address] [facility]  [address] [facility]  [phone]          DISCHARGE MEDICATIONS:  Current Discharge Medication List                 (Please note that portions of this note were completed with a voice recognition program.  Efforts were made to edit the dictations but occasionallywords are mis-transcribed.)      Electronically signed by Pattie Steele MD on 11/3/20 at 11:07 AM EST    Attending Physician, Emergency Department       Vibha Thorpe MD  11/05/20 2665

## 2020-11-03 NOTE — ED NOTES
Pt up in bed with blankets over bottom half. Patient updated on plan of care at this time and expresses no concerns regarding treatment. Patient VSS. Respirations are regular and unlabored, patient is alert and oriented x4. Patient bed rails up x2 and call light within reach. Will continue to monitor.        Orly De Paz RN  11/03/20 0893

## 2020-11-03 NOTE — H&P
History & Physical        Patient:  Nola Anton  YOB: 1950    MRN: [id number]     Acct: [de-identified]    PCP: Nova De La Fuente MD    Date of Admission: 11/3/2020    Date of Service: Pt seen/examined on 11/3/2020   and Admitted to Inpatient with expected LOS greater than two midnights due to medical therapy. Chief Complaint:  Left foot heel open wound       History Of Present Illness:     68 y.o. female who presented to [facility] with complaints of increased pain and swelling of her left foot heel wound. Pt has a pmh of MS and is bed bound with a chronic vega cathter for neurogenic bladder. Pt has chronic left heel wound that been going on for the past few months, had a wound clinic appointment scheduled on 11/11/2020. Pt reports over the past few days to have been experiencing increased fatigue, chills, poor PO intake, pain, swelling and foul smelling odor from left foot wound. Home health today recommended pt to come to the the hospital to get it evaluated. Pt denies any fevers, cough, congestion, sore throat, chest pain, sob, abdominal pain, diarrhea, blood in stool/urine. Pt gets her vega catheter exchanged once monthly, last time it was exchanged was on 10/30/2020. In the ED, HD stable. Elevated WBC count. Xray foot showing distal phalange nondisplaced fracture with soft tissue swelling seen diffusely concerning for cellulitis.      Past Medical History:          Diagnosis Date    Arthritis     Hyperlipidemia     Hypertension     Intra-abdominal lymphadenopathy     MS (multiple sclerosis) (HCC)     Pancreatitis     Pneumonia     Seizures (HCC)     UTI (urinary tract infection)        Past Surgical History:          Procedure Laterality Date    CHOLECYSTECTOMY  April 1st 2016    laparoscopic    CYSTOSCOPY N/A 1/2/2019    CYSTO, CLOT EVACUATION, TURBT performed by Ashley Whitaker MD at [address] [place] [address] Right 12/17/2018    EYE CATARACT EMULSIFICATION IOL IMPLANT, RIGHT [facility] W/O ECP Left 11/15/2018    EYE CATARACT EMULSIFICATION IOL IMPLANT LEFT EYE performed by Yuni De La Cruz MD at [address]       Medications Prior to Admission:      Prior to Admission medications    Medication Sig Start Date End Date Taking? Authorizing Provider   aspirin (RA ASPIRIN EC) 81 MG EC tablet take 1 tablet by mouth once daily 5/8/20  Yes Suyapa Barbosa MD   metoprolol tartrate (LOPRESSOR) 25 MG tablet take 1/2 tablet by mouth twice a day 9/3/19  Yes Vince Martinez MD   furosemide (LASIX) 10 MG/ML solution Take by mouth daily 4 milliliters by mout daily 9/3/19  Yes Vince Martinez MD   D-Mannose 500 MG CAPS Take 1,500 mg by mouth daily 1/4/19  Yes BROOK Ferguson CNP   Cholecalciferol (VITAMIN D3 PO) Take by mouth daily   Yes Historical Provider, MD   carBAMazepine (TEGRETOL) 100 MG chewable tablet Take 100 mg by mouth 3 times daily    Yes Historical Provider, MD   Methenamine-Sodium Salicylate (CYSTEX PO) Take by mouth    Historical Provider, MD   acetic acid 0.25 % irrigation Irrigate catheter with 150 ml weekly.  1/28/20   BROOK Waller CNP   methenamine (HIPREX) 1 g tablet Take 1 tablet by mouth 2 times daily (with meals) 2/28/20   BROOK Waller CNP   Water For Irrigation, Sterile (STERILE WATER FOR IRRIGATION) Irrigate vega catheter with 50 ml of sterile water weekly 2/25/19   BROOK Waller CNP   Incontinence Supplies ([facility]) [facility] Large 2000 cc bedside drainage bag 1/16/19   BROOK Waller CNP   Incontinence Supplies (URINARY LEG BAG) [facility] 900 cc Tracy Urinary catheter leg bag 1/15/19   BROOK Waller CNP   Incontinence Supplies (URINARY LEG BAG STRAPS) MISC Leg bag straps to go with urinary catheter leg bag 1/15/19   BROOK Waller CNP   CRANBERRY PO Take by mouth daily    Historical Provider, MD       Allergies:  Patient has no known allergies. Social History:     Social History     Socioeconomic History    Marital status: Life Partner     Spouse name: Not on file    Number of children: 2    Years of education: Not on file    Highest education level: Not on file   Occupational History    Not on file   Social Needs    Financial resource strain: Not on file    Food insecurity     Worry: Not on file     Inability: Not on file   [employer] needs     Medical: Not on file     Non-medical: Not on file   Tobacco Use    Smoking status: Former Smoker     Packs/day: 2.00     Years: 40.00     Pack years: 80.00     Types: Cigarettes     Last attempt to quit: 10/2/2013     Years since quittin.0    Smokeless tobacco: Current User    Tobacco comment: currently \"vapes\"   Substance and Sexual Activity    Alcohol use: No    Drug use: No    Sexual activity: Not Currently   Lifestyle    Physical activity     Days per week: Not on file     Minutes per session: Not on file    Stress: Not on file   Relationships    Social connections     Talks on phone: Not on file     Gets together: Not on file     Attends Church service: Not on file     Active member of club or organization: Not on file     Attends meetings of clubs or organizations: Not on file     Relationship status: Not on file    Intimate partner violence     Fear of current or ex partner: Not on file     Emotionally abused: Not on file     Physically abused: Not on file     Forced sexual activity: Not on file   Other Topics Concern    Not on file   Social History Narrative    Not on file       Family History:       Reviewed in detail and negative for DM, CAD, Cancer, CVA.  Positive as follows:        Problem Relation Age of Onset    Cancer Mother         colon    Heart Disease Father     Diabetes Neg Hx     High Blood Pressure Neg Hx     Stroke Neg Hx        Diet:  No diet orders on file    REVIEW OF SYSTEMS:   Pertinent positives as noted in the HPI. All other systems reviewed and negative. PHYSICAL EXAM:    BP 94/60   Pulse 77   Temp 98.5 °F (36.9 °C) (Oral)   Resp 20   Ht 5' 3\" (1.6 m)   Wt 120 lb 8 oz (54.7 kg)   SpO2 96%   BMI 21.35 kg/m²     General appearance:  No apparent distress, appears stated age and cooperative. HEENT:  Normal cephalic, atraumatic without obvious deformity. Pupils equal, round, and reactive to light. Extra ocular muscles intact. Conjunctivae/corneas clear. Neck: Supple, with full range of motion. No jugular venous distention. Trachea midline. Respiratory:  Normal respiratory effort. Clear to auscultation, bilaterally without Rales/Wheezes/Rhonchi. Cardiovascular:  Regular rate and rhythm with normal S1/S2 without murmurs, rubs or gallops. Abdomen: Soft, non-tender, non-distended with normal bowel sounds. Musculoskeletal:  left heel she has a pressure ulcer with significant purulence and foul odor. The foot itself is warm and red. 3+ pitting edema in her foot bilaterally   Skin: Skin color, texture, turgor normal.  No rashes or lesions. Neurologic:  Neurovascularly intact without any focal sensory/motor deficits. Cranial nerves: II-XII intact, grossly non-focal.  Psychiatric:  Alert and oriented, thought content appropriate, normal insight  Capillary Refill: Brisk,< 3 seconds   Peripheral Pulses: +2 palpable, equal bilaterally       Labs:     Recent Labs     11/03/20  1113   WBC 12.7*   HGB 13.8   HCT 39.7        No results for input(s): NA, K, CL, CO2, BUN, CREATININE, CALCIUM, PHOS in the last 72 hours. Invalid input(s): MAGNES  No results for input(s): AST, ALT, BILIDIR, BILITOT, ALKPHOS in the last 72 hours. Recent Labs     11/03/20  1229   INR 1.28*     No results for input(s): Shellia Bugler in the last 72 hours.     Urinalysis:      Lab Results   Component Value Date    NITRU NEGATIVE 11/03/2020    WBCUA > 200 11/03/2020 BACTERIA MANY 11/03/2020    RBCUA 25-50 11/03/2020    BLOODU MODERATE 11/03/2020    SPECGRAV 1.020 02/18/2019    GLUCOSEU NEGATIVE 11/03/2020       Radiology:       XR FOOT LEFT (MIN 3 VIEWS)   Final Result   1. There is a linear lucency through the mid aspect of the distal phalange of the left first digit. This is concerning for a nondisplaced fracture. Correlate clinically for point tenderness at this location. 2. There is diffuse osteoporosis. 3. There is nonspecific soft tissue swelling seen diffusely throughout the left foot. This may be related to cellulitis versus edema. **This report has been created using voice recognition software. It may contain minor errors which are inherent in voice recognition technology. **      Final report electronically signed by Dr. Yahaira Thomson on 11/3/2020 11:32 AM            Code Status: Prior      ASSESSMENT:    C/Mary Bridges 1106 Problems    Diagnosis Date Noted    Open wound of foot, complicated, left, initial encounter [S91.302A] 11/03/2020    [facility] Rell Phalen 08/29/2019       Assessment/Plan:    --Open Left Foot Heel Wound with Surrounding Erythema: concerning for deep tissue infection/OM. Foul draining smell with surrounding redness, swelling and pain. Xray showing nondisplaced fracture with soft tissue swelling seen diffusely concerning for cellulitis. Pending ESR, CRP, cont IV Zosyn. Bx pending. Consult ID and Podiatry. --Multiple Sclerosis with Chronic Vega Catheter: at baseline pt is bedbound with chronic vega cathter. Pt gets her vega catheter exchanged once monthly, last time it was exchanged was on 10/30/2020. Thank you Teo Glover MD for the opportunity to be involved in this patient's care.     Electronically signed by Rosa Murray MD on 11/3/2020 at 3:37 PM

## 2020-11-03 NOTE — ED NOTES
Patient lying in bed with blankets watching tv at this time. Patient respirations are regular and unlabored. Patient appears to be in no current distress. Patient VSS. Call light is within reach. Patient expresses no needs at this time.        Tylor Silva RN  11/03/20 1897

## 2020-11-03 NOTE — ED NOTES
Pt up in bed with blankets over bottom half. Patient updated on plan of care at this time and expresses no concerns regarding treatment. Patient VSS. Respirations are regular and unlabored, patient is alert and oriented x4. Patient bed rails up x2 and call light within reach. Will continue to monitor.        Kenn Bowles RN  11/03/20 8818

## 2020-11-03 NOTE — ED NOTES
ED to inpatient nurses report    Chief Complaint   Patient presents with    Wound Infection     Left heel      Present to ED from home  LOC: alert and orientated to name, place, date  Vital signs   Vitals:    11/03/20 1234 11/03/20 1349 11/03/20 1510 11/03/20 1512   BP: (!) 100/59 94/60 (!) 88/55 94/60   Pulse: 88 82 77    Resp: 16 16 20    Temp:       TempSrc:       SpO2: 97% 96% 96%    Weight:       Height:          Oxygen Baseline RA    Current needs required NONE Bipap/Cpap No  LDAs:   Peripheral IV 11/03/20 Left Forearm (Active)     Mobility: Fully dependent  Pending ED orders: Complete  Present condition: Stable    Electronically signed by Leona Tamayo RN on 11/3/2020 at 3:28 PM       Leona Tamayo [facility]  11/03/20 1529

## 2020-11-03 NOTE — ED NOTES
Patient to inpatient room with gait belt, phone and current clothes that patient is wearing.      Harsh Gutierrez RN  11/03/20 3567

## 2020-11-04 LAB
ANION GAP SERPL CALCULATED.3IONS-SCNC: 10 MEQ/L (ref 8–16)
BASOPHILS # BLD: 0.5 %
BASOPHILS ABSOLUTE: 0 THOU/MM3 (ref 0–0.1)
BUN BLDV-MCNC: 17 MG/DL (ref 7–22)
CALCIUM IONIZED: 1.06 MMOL/L (ref 1.12–1.32)
CALCIUM SERPL-MCNC: 8.2 MG/DL (ref 8.5–10.5)
CHLORIDE BLD-SCNC: 95 MEQ/L (ref 98–111)
CO2: 30 MEQ/L (ref 23–33)
CREAT SERPL-MCNC: 0.6 MG/DL (ref 0.4–1.2)
EKG ATRIAL RATE: 131 BPM
EKG P AXIS: 74 DEGREES
EKG P-R INTERVAL: 124 MS
EKG Q-T INTERVAL: 286 MS
EKG QRS DURATION: 102 MS
EKG QTC CALCULATION (BAZETT): 422 MS
EKG R AXIS: -26 DEGREES
EKG T AXIS: 113 DEGREES
EKG VENTRICULAR RATE: 131 BPM
EOSINOPHIL # BLD: 1.7 %
EOSINOPHILS ABSOLUTE: 0.1 THOU/MM3 (ref 0–0.4)
ERYTHROCYTE [DISTWIDTH] IN BLOOD BY AUTOMATED COUNT: 12.8 % (ref 11.5–14.5)
ERYTHROCYTE [DISTWIDTH] IN BLOOD BY AUTOMATED COUNT: 45.9 FL (ref 35–45)
GFR SERPL CREATININE-BSD FRML MDRD: > 90 ML/MIN/1.73M2
GLUCOSE BLD-MCNC: 60 MG/DL (ref 70–108)
HCT VFR BLD CALC: 33.9 % (ref 37–47)
HEMOGLOBIN: 11.3 GM/DL (ref 12–16)
IMMATURE GRANS (ABS): 0.03 THOU/MM3 (ref 0–0.07)
IMMATURE GRANULOCYTES: 0.4 %
LYMPHOCYTES # BLD: 16.6 %
LYMPHOCYTES ABSOLUTE: 1.3 THOU/MM3 (ref 1–4.8)
MAGNESIUM: 1.9 MG/DL (ref 1.6–2.4)
MCH RBC QN AUTO: 33 PG (ref 26–33)
MCHC RBC AUTO-ENTMCNC: 33.3 GM/DL (ref 32.2–35.5)
MCV RBC AUTO: 99.1 FL (ref 81–99)
MONOCYTES # BLD: 8.4 %
MONOCYTES ABSOLUTE: 0.6 THOU/MM3 (ref 0.4–1.3)
MRSA SCREEN RT-PCR: NEGATIVE
NUCLEATED RED BLOOD CELLS: 0 /100 WBC
PLATELET # BLD: 171 THOU/MM3 (ref 130–400)
PMV BLD AUTO: 11 FL (ref 9.4–12.4)
POTASSIUM SERPL-SCNC: 2.6 MEQ/L (ref 3.5–5.2)
POTASSIUM SERPL-SCNC: 2.8 MEQ/L (ref 3.5–5.2)
RBC # BLD: 3.42 MILL/MM3 (ref 4.2–5.4)
SEG NEUTROPHILS: 72.4 %
SEGMENTED NEUTROPHILS ABSOLUTE COUNT: 5.5 THOU/MM3 (ref 1.8–7.7)
SODIUM BLD-SCNC: 135 MEQ/L (ref 135–145)
WBC # BLD: 7.6 THOU/MM3 (ref 4.8–10.8)

## 2020-11-04 PROCEDURE — 83735 ASSAY OF MAGNESIUM: CPT

## 2020-11-04 PROCEDURE — 2580000003 HC RX 258: Performed by: STUDENT IN AN ORGANIZED HEALTH CARE EDUCATION/TRAINING PROGRAM

## 2020-11-04 PROCEDURE — 93005 ELECTROCARDIOGRAM TRACING: CPT | Performed by: STUDENT IN AN ORGANIZED HEALTH CARE EDUCATION/TRAINING PROGRAM

## 2020-11-04 PROCEDURE — 82330 ASSAY OF CALCIUM: CPT

## 2020-11-04 PROCEDURE — 93010 ELECTROCARDIOGRAM REPORT: CPT | Performed by: INTERNAL MEDICINE

## 2020-11-04 PROCEDURE — 94760 N-INVAS EAR/PLS OXIMETRY 1: CPT

## 2020-11-04 PROCEDURE — 85025 COMPLETE CBC W/AUTO DIFF WBC: CPT

## 2020-11-04 PROCEDURE — 84132 ASSAY OF SERUM POTASSIUM: CPT

## 2020-11-04 PROCEDURE — 6360000002 HC RX W HCPCS: Performed by: STUDENT IN AN ORGANIZED HEALTH CARE EDUCATION/TRAINING PROGRAM

## 2020-11-04 PROCEDURE — 2580000003 HC RX 258: Performed by: INTERNAL MEDICINE

## 2020-11-04 PROCEDURE — 2060000000 HC ICU INTERMEDIATE R&B

## 2020-11-04 PROCEDURE — 6370000000 HC RX 637 (ALT 250 FOR IP): Performed by: INTERNAL MEDICINE

## 2020-11-04 PROCEDURE — 6360000002 HC RX W HCPCS: Performed by: INTERNAL MEDICINE

## 2020-11-04 PROCEDURE — 99232 SBSQ HOSP IP/OBS MODERATE 35: CPT | Performed by: INTERNAL MEDICINE

## 2020-11-04 PROCEDURE — 80048 BASIC METABOLIC PNL TOTAL CA: CPT

## 2020-11-04 PROCEDURE — 36415 COLL VENOUS BLD VENIPUNCTURE: CPT

## 2020-11-04 RX ORDER — POTASSIUM CHLORIDE 7.45 MG/ML
10 INJECTION INTRAVENOUS PRN
Status: DISCONTINUED | OUTPATIENT
Start: 2020-11-04 | End: 2020-11-08 | Stop reason: HOSPADM

## 2020-11-04 RX ORDER — 0.9 % SODIUM CHLORIDE 0.9 %
500 INTRAVENOUS SOLUTION INTRAVENOUS ONCE
Status: COMPLETED | OUTPATIENT
Start: 2020-11-04 | End: 2020-11-04

## 2020-11-04 RX ORDER — 0.9 % SODIUM CHLORIDE 0.9 %
2000 INTRAVENOUS SOLUTION INTRAVENOUS ONCE
Status: COMPLETED | OUTPATIENT
Start: 2020-11-04 | End: 2020-11-04

## 2020-11-04 RX ORDER — NICOTINE POLACRILEX 4 MG
15 LOZENGE BUCCAL
Status: ACTIVE | OUTPATIENT
Start: 2020-11-04 | End: 2020-11-04

## 2020-11-04 RX ORDER — POTASSIUM CHLORIDE 20 MEQ/1
40 TABLET, EXTENDED RELEASE ORAL PRN
Status: DISCONTINUED | OUTPATIENT
Start: 2020-11-04 | End: 2020-11-08 | Stop reason: HOSPADM

## 2020-11-04 RX ADMIN — POTASSIUM CHLORIDE 10 MEQ: 7.46 INJECTION, SOLUTION INTRAVENOUS at 05:40

## 2020-11-04 RX ADMIN — MORPHINE SULFATE 4 MG: 4 INJECTION, SOLUTION INTRAMUSCULAR; INTRAVENOUS at 02:04

## 2020-11-04 RX ADMIN — CARBAMAZEPINE 100 MG: 100 TABLET, CHEWABLE ORAL at 08:58

## 2020-11-04 RX ADMIN — POTASSIUM CHLORIDE 10 MEQ: 7.46 INJECTION, SOLUTION INTRAVENOUS at 08:29

## 2020-11-04 RX ADMIN — POTASSIUM CHLORIDE 10 MEQ: 7.46 INJECTION, SOLUTION INTRAVENOUS at 01:57

## 2020-11-04 RX ADMIN — POTASSIUM CHLORIDE 10 MEQ: 7.46 INJECTION, SOLUTION INTRAVENOUS at 17:45

## 2020-11-04 RX ADMIN — POTASSIUM CHLORIDE 10 MEQ: 7.46 INJECTION, SOLUTION INTRAVENOUS at 13:00

## 2020-11-04 RX ADMIN — POTASSIUM CHLORIDE 10 MEQ: 7.46 INJECTION, SOLUTION INTRAVENOUS at 15:58

## 2020-11-04 RX ADMIN — POTASSIUM CHLORIDE 10 MEQ: 7.46 INJECTION, SOLUTION INTRAVENOUS at 21:02

## 2020-11-04 RX ADMIN — POTASSIUM CHLORIDE 10 MEQ: 7.46 INJECTION, SOLUTION INTRAVENOUS at 14:23

## 2020-11-04 RX ADMIN — POTASSIUM CHLORIDE 10 MEQ: 7.46 INJECTION, SOLUTION INTRAVENOUS at 04:07

## 2020-11-04 RX ADMIN — SODIUM CHLORIDE, PRESERVATIVE FREE 10 ML: 5 INJECTION INTRAVENOUS at 08:29

## 2020-11-04 RX ADMIN — VANCOMYCIN HYDROCHLORIDE 1000 MG: 1 INJECTION, POWDER, LYOPHILIZED, FOR SOLUTION INTRAVENOUS at 01:29

## 2020-11-04 RX ADMIN — SODIUM CHLORIDE 2000 ML: 9 INJECTION, SOLUTION INTRAVENOUS at 09:03

## 2020-11-04 RX ADMIN — ASPIRIN 81 MG: 81 TABLET, COATED ORAL at 08:58

## 2020-11-04 RX ADMIN — CARBAMAZEPINE 100 MG: 100 TABLET, CHEWABLE ORAL at 21:01

## 2020-11-04 RX ADMIN — HYDROMORPHONE HYDROCHLORIDE 0.5 MG: 1 INJECTION, SOLUTION INTRAMUSCULAR; INTRAVENOUS; SUBCUTANEOUS at 11:52

## 2020-11-04 RX ADMIN — CARBAMAZEPINE 100 MG: 100 TABLET, CHEWABLE ORAL at 15:16

## 2020-11-04 RX ADMIN — PIPERACILLIN AND TAZOBACTAM 3.38 G: 3; .375 INJECTION, POWDER, LYOPHILIZED, FOR SOLUTION INTRAVENOUS at 15:58

## 2020-11-04 RX ADMIN — HYDROMORPHONE HYDROCHLORIDE 0.5 MG: 1 INJECTION, SOLUTION INTRAMUSCULAR; INTRAVENOUS; SUBCUTANEOUS at 15:57

## 2020-11-04 RX ADMIN — PIPERACILLIN AND TAZOBACTAM 3.38 G: 3; .375 INJECTION, POWDER, LYOPHILIZED, FOR SOLUTION INTRAVENOUS at 08:00

## 2020-11-04 RX ADMIN — SODIUM CHLORIDE 500 ML: 9 INJECTION, SOLUTION INTRAVENOUS at 20:33

## 2020-11-04 RX ADMIN — ENOXAPARIN SODIUM 40 MG: 60 INJECTION SUBCUTANEOUS at 21:01

## 2020-11-04 RX ADMIN — Medication 10 ML: at 16:00

## 2020-11-04 RX ADMIN — SODIUM CHLORIDE, PRESERVATIVE FREE 10 ML: 5 INJECTION INTRAVENOUS at 21:03

## 2020-11-04 RX ADMIN — POTASSIUM CHLORIDE 10 MEQ: 7.46 INJECTION, SOLUTION INTRAVENOUS at 19:36

## 2020-11-04 ASSESSMENT — PAIN SCALES - GENERAL
PAINLEVEL_OUTOF10: 0
PAINLEVEL_OUTOF10: 9
PAINLEVEL_OUTOF10: 10
PAINLEVEL_OUTOF10: 0
PAINLEVEL_OUTOF10: 8
PAINLEVEL_OUTOF10: 8
PAINLEVEL_OUTOF10: 10
PAINLEVEL_OUTOF10: 7
PAINLEVEL_OUTOF10: 0
PAINLEVEL_OUTOF10: 0
PAINLEVEL_OUTOF10: 10
PAINLEVEL_OUTOF10: 7

## 2020-11-04 ASSESSMENT — PAIN SCALES - WONG BAKER
WONGBAKER_NUMERICALRESPONSE: 0

## 2020-11-04 ASSESSMENT — PAIN DESCRIPTION - LOCATION
LOCATION: FOOT

## 2020-11-04 ASSESSMENT — PAIN DESCRIPTION - ONSET
ONSET: ON-GOING

## 2020-11-04 ASSESSMENT — PAIN DESCRIPTION - ORIENTATION
ORIENTATION: LEFT

## 2020-11-04 ASSESSMENT — PAIN DESCRIPTION - DESCRIPTORS
DESCRIPTORS: ACHING;CONSTANT

## 2020-11-04 ASSESSMENT — PAIN DESCRIPTION - PAIN TYPE
TYPE: ACUTE PAIN

## 2020-11-04 ASSESSMENT — PAIN DESCRIPTION - FREQUENCY
FREQUENCY: CONTINUOUS

## 2020-11-04 NOTE — PROGRESS NOTES
Physician Progress Note      Digna Silvestre  CSN #:                  [id number]  :                       1950  ADMIT DATE:       11/3/2020 10:40 AM  DISCH DATE:  RESPONDING  PROVIDER #:        Melissa Cazares MD          QUERY TEXT:    Dr Efrain Burroughs,    Pt admitted with Stage 3 L heel ulcer with cellulitis. Pt. noted to have MS   and is bedbound. If possible, please document in the progress notes and/or   discharge summary if you are treating and/or evaluating any of the following: The medical record reflects the following:  Risk Factors: MS, HTN, Seizures  Clinical Indicators: MS, Bedbound, Dependent Care documented by RN,  Treatment: assistance with all ADLs  Options provided:  -- Functional quadriplegia  -- Severe debility/impaired mobility  -- Other - I will add my own diagnosis  -- Disagree - Not applicable / Not valid  -- Disagree - Clinically unable to determine / Unknown  -- Refer to Clinical Documentation Reviewer    PROVIDER RESPONSE TEXT:    This patient has severe debility/ impaired mobility.     Query created by: Andrew Palomo on 2020 6:50 AM      Electronically signed by:  Melissa Cazares MD 2020 1:21 PM

## 2020-11-04 NOTE — DISCHARGE INSTR - COC
Continuity of Care Form    Patient Name: Alem Ortiz   :  1950  MRN:  [id number]    Admit date:  11/3/2020  Discharge date:  ***    Code Status Order: Full Code   Advance Directives:   Advance Care Flowsheet Documentation     Date/Time Healthcare Directive Type of Healthcare Directive Copy in [address] Agent's Name Healthcare Agent's Phone Number    20  No, patient does not have an advance directive for healthcare treatment -- -- -- -- --          Admitting Physician:  Elsa Sanchez MD  PCP: Valeria Tabares MD    Discharging Nurse: [facility] Unit/Room#: 4K-03/003-A  Discharging Unit Phone Number: ***    Emergency Contact:   Extended Emergency Contact Information  Primary Emergency Contact: Daniel Ludwin [address] Phone: [phone]  Relation: Other  Secondary Emergency Contact: Gregory Oz  Address: [address] Phone: [phone]  Relation: Child    Past Surgical History:  Past Surgical History:   Procedure Laterality Date    CHOLECYSTECTOMY  2016    laparoscopic    CYSTOSCOPY N/A 2019    CYSTO, CLOT EVACUATION, TURBT performed by Iain Joshi MD at [address] [place] [address] Right 2018    EYE CATARACT EMULSIFICATION IOL IMPLANT, RIGHT performed by Tomas Barrera MD at [facility] W/O ECP Left 11/15/2018    EYE CATARACT EMULSIFICATION IOL IMPLANT LEFT EYE performed by Tomas Barrera MD at [address] [place] [address]       Immunization History: There is no immunization history for the selected administration types on file for this patient.     Active Problems:  Patient Active Problem List   Diagnosis Code    MS (multiple sclerosis) ([facility] G35    Seizure disorder ([facility] G40.909    Frequent PVCs I49.3    Hematuria R31.9    Nicotine abuse Z72.0    Hematuria, gross R31.0    Sepsis ([facility] A41.9    HTN (hypertension) I10    HLD (hyperlipidemia) E78.5    Multiple sclerosis (HCC) Z49    Metabolic acidosis, normal anion gap (NAG) E87.2    Open wound of foot, complicated, left, initial encounter S91.302A       Isolation/Infection:   Isolation          Contact        Patient Infection Status     Infection Onset Added Last Indicated Last Indicated By Review Planned Expiration Resolved Resolved By    MRSA 19 Urine Culture, Reflexed        Urine 2019    Resolved    ESBL (Extended Spectrum Beta Lactamase)  17 Deng Sanches RN   19 Deng Sanches RN    E coli in urine           Nurse Assessment:  Last Vital Signs: BP (!) 84/51   Pulse 94   Temp 98.3 °F (36.8 °C) (Oral)   Resp 18   Ht 5' 3\" (1.6 m)   Wt 120 lb 8 oz (54.7 kg)   SpO2 96%   BMI 21.35 kg/m²     Last documented pain score (0-10 scale): Pain Level: 9  Last Weight:   Wt Readings from Last 1 Encounters:   20 120 lb 8 oz (54.7 kg)     Mental Status:  {IP PT MENTAL STATUS:}    IV Access:  { [facility] IV ACCESS:304439281}    Nursing Mobility/ADLs:  Walking   {[facility] UUTY:547093992}  Transfer  {[facility] THXK:415833873}  Bathing  {[facility] XMBL:586701927}  Dressing  {[facility] BWDN:523050278}  Toileting  {[facility] FXBD:477988453}  Feeding  {[facility] KYC}  Med Admin  {[facility] DOXY:067978593}  Med Delivery   { [facility]ERN[facility] MED Delivery:703055922}    Wound Care Documentation and Therapy:  Wound 19 Sacrum Mid DTI redness that does not magdalene- size of a softball on sacrum (Active)   Number of days: 433       Wound 20 Back Lower;Medial (Active)   Wound Etiology Pressure Stage  2 200   Dressing/Treatment Open to air 20   Wound Assessment Dry;Pink/red 20   Drainage Amount None 20   Odor Mild 20   Number of days: 0       Wound 20 Buttocks Medial;Right (Active)   Wound Etiology Pressure Stage  2 11/04/20 0819   Dressing/Treatment Protective barrier 11/04/20 0819   Wound Assessment Pink/red;Slough 11/04/20 0819   Drainage Amount Scant 11/04/20 0819   Drainage Description Sanguinous 11/04/20 0819   Tati-wound Assessment Blanchable erythema;Fragile 11/04/20 0819   Number of days: 0       Wound 11/03/20 Coccyx Mid;Right;Left (Active)   Wound Etiology Pressure Stage  2 11/04/20 0819   Dressing/Treatment Protective barrier 11/04/20 0819   Wound Assessment Bleeding;Non-blanchable erythema;Slough 11/04/20 0819   Drainage Amount Scant 11/04/20 0819   Drainage Description Sanguinous 11/04/20 0819   Tati-wound Assessment Blanchable erythema;Fragile 11/04/20 0819   Number of days: 0       Wound 11/03/20 Heel Left (Active)   Wound Etiology Other 11/03/20 2310   Dressing Status Clean;Dry; Intact 11/04/20 0819   Number of days: 0        Elimination:  Continence:   · Bowel: {YES / PX:86915}  · Bladder: {YES / ON:55069}  Urinary Catheter: {Urinary Catheter:521237919}   Colostomy/Ileostomy/Ileal Conduit: {YES / FD:25462}       Date of Last BM: ***    Intake/Output Summary (Last 24 hours) at 11/4/2020 1145  Last data filed at 11/4/2020 0411  Gross per 24 hour   Intake 1297 ml   Output 1400 ml   Net -103 ml     I/O last 3 completed shifts:   In: 4926 [I.V.:1297]  Out: 1400 [Urine:1400]    Safety Concerns:     [employer] Safety Concerns:552420408}    Impairments/Disabilities:      [employer] Impairments/Disabilities:156969492}    Nutrition Therapy:  Current Nutrition Therapy:   [employer] Diet List:150481437}    Routes of Feeding: {CHP DME Other Feedings:952696583}  Liquids: {Slp liquid thickness:08716}  Daily Fluid Restriction: {CHP DME Yes amt example:611670659}  Last Modified Barium Swallow with Video (Video Swallowing Test): {Done Not Done TLVM:467954157}    Treatments at the Time of Hospital Discharge:   Respiratory Treatments: ***  Oxygen Therapy:  {Therapy; copd oxygen:23981}  Ventilator:    { CC Vent UQFI:252671776}    Rehab Therapies: {THERAPEUTIC INTERVENTION:7991864782}  Weight Bearing Status/Restrictions: { CC Weight Bearin}  Other Medical Equipment (for information only, NOT a DME order):  {EQUIPMENT:058631442}  Other Treatments: ***    Patient's personal belongings (please select all that are sent with patient):  {CHP DME Belongings:195753977}    RN SIGNATURE:  {Esignature:538421668}    CASE MANAGEMENT/SOCIAL WORK SECTION    Inpatient Status Date: ***    Readmission Risk Assessment Score:  Readmission Risk              Risk of Unplanned Readmission:        13           Discharging to Facility/ Agency   · Name:   · Address:  · Phone:  · Fax:    Dialysis Facility (if applicable)   · Name:  · Address:  · Dialysis Schedule:  · Phone:  · Fax:    / signature: {Esignature:580408235}    PHYSICIAN SECTION    Prognosis: {Prognosis:2537807343}    Condition at Discharge: [address] [place] [address] Patient Condition:209943385}    Rehab Potential (if transferring to Rehab): {Prognosis:3860348928}    Recommended Labs or Other Treatments After Discharge: ***    Physician Certification: I certify the above information and transfer of Mayur Monzon  is necessary for the continuing treatment of the diagnosis listed and that she requires {Admit to Appropriate Level of Care:26208} for {GREATER/LESS:269421164} 30 days.      Update Admission H&P: {CHP DME Changes in VAEYI:758982311}    PHYSICIAN SIGNATURE:  {Esignature:183086049}

## 2020-11-04 NOTE — FLOWSHEET NOTE
Pt was resting with eyes closed upon entering. Pt was roused easily with knocking on the door. Pt states \"I feel so tired. It must be because of the meds\".

## 2020-11-04 NOTE — FLOWSHEET NOTE
Pt was awake upon entering. Pt is alert and oriented x 4. Pt pupils were equal, round and reactive to light. Hand grasps strong and equal bilaterally. Radial pulses+2. Pt denies numbness and tingling in the upper extremities. Pt skin was warm and dry. Capillary refill and skin turgor <3. Heart rhythm regular. Lung sounds clear bilaterally. Bowel sounds auscultated in all 4 quadrents. Pt denies pain upon palpation of abdomen. Two assist required to reposition pt in order to visualize pt coccyx. Pt coccyx has an open wound on each upper leg below her coccyx. Pt also has open wounds on both sides of her coccyx, as well as in the middle. The intact skin appeared inflammed. Pt groin appears inflammed. Pt states numbness in her lower extremities. Pt is  unable to move legs and feet. Pedal pull and push unable to be assessed. Non pitting edema on top of right foot. Piddling 2+ edema on left foot. Moon boot is on left foot. Left foot and lower leg is also wrapped in ace wraps. Right foot and lower leg  is wrapped in ace wrap. Pt lower extremities are warm and dry to the touch. Pt lower extremities are warm and dry. Pt call light and tray table was placed within reach.

## 2020-11-04 NOTE — FLOWSHEET NOTE
Pt is up in bed upon entering the room. On a scale of 1-10 pt states a pain level of \"8\" and states \"Its better then it was\". Pt is able to feed self. Pt bed is in the lowest position with call light in reach upon exiting the room.

## 2020-11-04 NOTE — CARE COORDINATION
DISCHARGE/PLANNING EVALUATION  11/4/20, 11:27 AM EST    Reason for Referral:  \"has [employer] services, [facility] in past\"  Mental Status:  Alert and oriented   Decision Making:  Makes decisions with hr spouse. Family/Social/Home Environment:  SW spoke to patient about her needs. She and her spouse live in a 2 story home without a basement but she only uses the first floor. She states her spouse is with her most of the time unless he leaves at night, but she has her needs met before he leaves. He does some of the housekeeping and meals but her aide also will cook for her if she asks and also do housekeeping. The patient has a walk-in shower with grab bar and shower seat. The aides helps her with this  Current Services including food security, transportation and housekeeping:  See above, she is current with [facility] for skilled nursing once a week, a Medicare aide once a week and [employer] aides thru [facility] 3 hours 3 days a week ([facility]). JAIDA spoke to April with [facility] to confirm services   Current Equipment: she has a sliding board for transfers to her motorized wheelchair, electric bed with remote, handicap van, wheelchair ramp, gait belt and ERS  Payment Source: Medicare and Medicaid  Concerns or Barriers to Discharge:  She is hopeful to return home. JAIDA spoke with April at [facility] regarding IV antibiotics and coverage, she will verify if she has coverage. SW left message for her Josey Pickard, [phone] regarding her admission  Post acute provider list with quality measures, geographic area and applicable managed care information provided. Questions regarding selection process answered: N/A, current    Teach Back Method used with patient regarding care plan and needs  Patient verbalize understanding of the plan of care and contribute to goal setting. Patient goals, treatment preferences and discharge plan:   Hopeful to return home pending clinical course    Electronically signed by Rolf Clinton JOEY on 11/4/2020 at 11:27 AM     11/4/20, 1:47 PM EST    DISCHARGE PLANNING EVALUATION      SW received a call back from April with [facility], [facility]. She states that if she went home with IV antibiotics, the medication would be paid for under her Medicaid but she did not feel her family could be taught how to manage this at home.  JAIDA updated the CM

## 2020-11-04 NOTE — PROGRESS NOTES
Podiatric Progress Note  Nola Anton  Subjective :     [id number]  Patient seen bedside with Dr. Charisse Bruner. Patient states the wound is painful and the morphine does not control the pain. Discussed using norco or percocet but patient says she cannot swallow oral medication. Discussed plan to debride the wound in the OR in the next few days pending OR availability. Patient and  agreed with plan. She has no other complaints.       Current Medications:    Current Facility-Administered Medications: calcium replacement protocol, , Other, RX Placeholder  glucose (GLUTOSE) 40 % oral gel 15 g, 15 g, Oral, Once PRN  potassium chloride (KLOR-CON M) extended release tablet 40 mEq, 40 mEq, Oral, PRN **OR** potassium bicarb-citric acid (EFFER-K) effervescent tablet 40 mEq, 40 mEq, Oral, PRN **OR** potassium chloride 10 mEq/100 mL IVPB (Peripheral Line), 10 mEq, Intravenous, PRN  HYDROmorphone (DILAUDID) injection 0.5 mg, 0.5 mg, Intravenous, Q4H PRN  aspirin EC tablet 81 mg, 81 mg, Oral, Daily  carBAMazepine (TEGRETOL) chewable tablet 100 mg, 100 mg, Oral, TID  sodium chloride flush 0.9 % injection 10 mL, 10 mL, Intravenous, 2 times per day  sodium chloride flush 0.9 % injection 10 mL, 10 mL, Intravenous, PRN  acetaminophen (TYLENOL) tablet 650 mg, 650 mg, Oral, Q6H PRN **OR** acetaminophen (TYLENOL) suppository 650 mg, 650 mg, Rectal, Q6H PRN  polyethylene glycol (GLYCOLAX) packet 17 g, 17 g, Oral, Daily PRN  promethazine (PHENERGAN) tablet 12.5 mg, 12.5 mg, Oral, Q6H PRN **OR** ondansetron (ZOFRAN) injection 4 mg, 4 mg, Intravenous, Q6H PRN  enoxaparin (LOVENOX) injection 40 mg, 40 mg, Subcutaneous, Q24H  0.9 % sodium chloride infusion, , Intravenous, Continuous  piperacillin-tazobactam (ZOSYN) 3.375 g in dextrose 5 % 50 mL IVPB extended infusion (mini-bag), 3.375 g, Intravenous, Q8H  potassium replacement protocol, , Other, RX Placeholder    Objective     /70   Pulse 105   Temp 98.6 °F (37 °C) (Oral)   Resp 19 Ht 5' 3\" (1.6 m)   Wt 120 lb 8 oz (54.7 kg)   SpO2 94%   BMI 21.35 kg/m²      I/O:    Intake/Output Summary (Last 24 hours) at 11/4/2020 1616  Last data filed at 11/4/2020 1424  Gross per 24 hour   Intake 5422.95 ml   Output 1875 ml   Net 3547.95 ml              Wt Readings from Last 3 Encounters:   11/03/20 120 lb 8 oz (54.7 kg)   02/13/20 145 lb (65.8 kg)   01/28/20 135 lb (61.2 kg)       LABS:    Recent Labs     11/03/20  1113 11/04/20  0520   WBC 12.7* 7.6   HGB 13.8 11.3*   HCT 39.7 33.9*    171        Recent Labs     11/04/20  0520 11/04/20  1110     --    K 2.6* 2.8*   CL 95*  --    CO2 30  --    BUN 17  --    CREATININE 0.6  --         Recent Labs     11/03/20  1229 11/03/20  1512   PROT  --  7.2   INR 1.28*  --       No results for input(s): CKTOTAL, CKMB, CKMBINDEX, TROPONINI in the last 72 hours. Exam:    dressing intact and well maintained. No apparent strike through noted. Dermatologic: Full-thickness decubitus ulcer noted to medial aspect of left heel. Ulcer with surrounding erythema. Base mostly fibrotic with presence of biofilm and slough. Does not probe to bone. No undermining or sinus tracts noted. No malodor or drainage noted. Quality of skin poor; atrophic, and xerotic with decreased turgor. Nails 1-5 bilaterally are thickened,and dystrophic. Webspaces 1-4 bilaterally are clean, dry and intact. Hyperkeratoses noted sub right 5th metatarsal head and left forefoot. No other open lesions, rashes or subcutaneous nodules of note.      Vascular: Dorsalis pedis and posterior tibial pulses are audibly biphasic via Doppler US bilaterally. Skin temperature is warm to warm from proximal tibial tuberosity to distal digits. CFT less than 3 seconds to all 10 digits. Marked pitting edema noted to bilateral LE, especially pronounced to dorsum of foot bilaterally.  Pedal hair is absent distal to mid-calf.     Neurovascular: Light touch sensation diminished to the level of the digits

## 2020-11-04 NOTE — CONSULTS
CENTER OR    IA XCAPSL CTRC RMVL INSJ IO LENS PROSTH W/O ECP Left 11/15/2018    EYE CATARACT EMULSIFICATION IOL IMPLANT LEFT EYE performed by Markell Arce MD at [address] [place] [address]       Current Medications:    Current Facility-Administered Medications: [START ON 11/4/2020] aspirin EC tablet 81 mg, 81 mg, Oral, Daily  carBAMazepine (TEGRETOL) chewable tablet 100 mg, 100 mg, Oral, TID  sodium chloride flush 0.9 % injection 10 mL, 10 mL, Intravenous, 2 times per day  sodium chloride flush 0.9 % injection 10 mL, 10 mL, Intravenous, PRN  acetaminophen (TYLENOL) tablet 650 mg, 650 mg, Oral, Q6H PRN **OR** acetaminophen (TYLENOL) suppository 650 mg, 650 mg, Rectal, Q6H PRN  polyethylene glycol (GLYCOLAX) packet 17 g, 17 g, Oral, Daily PRN  promethazine (PHENERGAN) tablet 12.5 mg, 12.5 mg, Oral, Q6H PRN **OR** ondansetron (ZOFRAN) injection 4 mg, 4 mg, Intravenous, Q6H PRN  enoxaparin (LOVENOX) injection 40 mg, 40 mg, Subcutaneous, Q24H  0.9 % sodium chloride infusion, , Intravenous, Continuous  piperacillin-tazobactam (ZOSYN) 3.375 g in dextrose 5 % 50 mL IVPB extended infusion (mini-bag), 3.375 g, Intravenous, Q8H  potassium replacement protocol, , Other, RX Placeholder  dextrose 50 % solution, , ,   potassium chloride 10 mEq/100 mL IVPB (Peripheral Line), 10 mEq, Intravenous, Q2H    Allergies:  Patient has no known allergies. Social History:    TOBACCO:   reports that she quit smoking about 7 years ago. Her smoking use included cigarettes. She has a 80.00 pack-year smoking history. She quit smokeless tobacco use about 5 years ago. ETOH:   reports no history of alcohol use. DRUGS:   reports no history of drug use. Family History:       Problem Relation Age of Onset    Cancer Mother         colon    Heart Disease Father     Diabetes Neg Hx     High Blood Pressure Neg Hx     Stroke Neg Hx        REVIEW OF SYSTEMS:    Constitutional: Negative for fever, chills, and sudden weight loss or gain.   HEENT: Negative for blurry/double vision, ears, nose, or throat pain. Negative for mass. Respiratory: Negative for shortness of breath or hemoptysis. Cardiovascular: Negative for angina, palpitations, or lower extremity edema. Gastrointestinal: Negative for nausea, vomiting, diarrhea, constipation, or abdominal pain. Musculoskeletal: Positive for myalgia, arthralgia  Integument: Positive for foot wound, other back ulcers  Hematology: Negative for easy bruising or easy bleeding. Physical Examination:  General: Awake, alert, and oriented. In no acute distress. Resting in bed. HEENT: Atraumatic, normocephalic. Respiratory: CTA. No rales, rhonchi, or wheezing. Cardiovascular: RRR. Normal S1, S2.  Abdomen: Soft, non-tender, non-distended. Bowel sounds present x4 quadrants. Focused Lower Extremity Examination:    Vitals:    BP (!) 87/53   Pulse 78   Temp 98.7 °F (37.1 °C) (Oral)   Resp 16   Ht 5' 3\" (1.6 m)   Wt 120 lb 8 oz (54.7 kg)   SpO2 96%   BMI 21.35 kg/m²      Dermatologic: Full-thickness decubitus ulcer noted to medial aspect of left heel. Ulcer with surrounding erythema. Base mostly fibrotic with presence of biofilm and slough. Does not probe to bone. No undermining or sinus tracts noted. No malodor or drainage noted. Quality of skin poor; atrophic, and xerotic with decreased turgor. Nails 1-5 bilaterally are thickened,and dystrophic. Webspaces 1-4 bilaterally are clean, dry and intact. Hyperkeratoses noted sub right 5th metatarsal head and left forefoot. No other open lesions, rashes or subcutaneous nodules of note. Vascular: Dorsalis pedis and posterior tibial pulses are audibly biphasic via Doppler US bilaterally. Skin temperature is warm to warm from proximal tibial tuberosity to distal digits. CFT less than 3 seconds to all 10 digits. Marked pitting edema noted to bilateral LE, especially pronounced to dorsum of foot bilaterally.  Pedal hair is absent distal to mid-calf. Neurovascular: Light touch sensation diminished to the level of the digits bilaterally. Musculoskeletal: Muscle strength 3/5 for all muscle groups crossing the ankle joint. Pain with palpation of the left heel. Foot and ankle in rectus alignment bilaterally. IMAGING:  Impression    1. There is a linear lucency through the mid aspect of the distal phalange of the left first digit. This is concerning for a nondisplaced fracture. Correlate clinically for point tenderness at this location.         2. There is diffuse osteoporosis.         3. There is nonspecific soft tissue swelling seen diffusely throughout the left foot. This may be related to cellulitis versus edema.                   **This report has been created using voice recognition software.  It may contain minor errors which are inherent in voice recognition technology. **         Final report electronically signed by Dr. Rodger Mustafa on 11/3/2020 11:32 AM          STUDIES:    CULTURES:    LABS:   Recent Labs     11/03/20  1113   WBC 12.7*   HGB 13.8   HCT 39.7           Recent Labs     11/03/20  1512      K 2.3*   CL 92*   CO2 34*   BUN 25*   CREATININE 0.6        Recent Labs     11/03/20  1229 11/03/20  1512   PROT  --  7.2   INR 1.28*  --       No results for input(s): CKTOTAL, CKMB, CKMBINDEX, TROPONINI in the last 72 hours.     Assessment: 68 y.o. female with:  Principle  Decubitus ulcer, left heel  Edema, bilateral    Chronic  Patient Active Problem List   Diagnosis    MS (multiple sclerosis) ([facility] [address]    Seizure disorder (HCC)    Frequent PVCs    Hematuria    Nicotine abuse    Hematuria, gross    Sepsis ([address]    HTN (hypertension)    HLD (hyperlipidemia)    Multiple sclerosis (HCC)    Metabolic acidosis, normal anion gap (NAG)    Open wound of foot, complicated, left, initial encounter       Plan  Decubitus ulcer, left heel  Edema, bilateral  -Patient initially examined and evaluated.  -No surgical intervention indicated  -Reviewed XR of left foot; impression above  -Took swab of ulcer for A&A culture  -Patient currently on IV Zosyn; ordered IV vancomycin 1g ONE TIME for coverage  -Ordered IV morphine PRN for pain control  -Patient to remain NWB to left foot  -Orders for heel pressure offloading placed  -Consult to infectious diseases placed  -Applied Opticell, bordered foam, and Kerlix to right foot; applied ACE bandage bilaterally for swelling control  -Will place  consult to see if patient would be better off at SNF or with more frequent home health visits  -Discussed with patient etiology of ulcer and treatment plan. Patient verbalized understanding and agreement with the plan. All of her questions were answered to her satisfaction. Dr. Denia Ndiaye, thank you for the consultation, allowing podiatry to assist in the medical welfare of this patient. Podiatry will continue to follow this patient throughout the duration of hospitalization.      Miriam Ambrose DPM, PGY-1  Foot and Ankle Surgical Resident  11/3/2020 8:31 PM

## 2020-11-04 NOTE — CARE COORDINATION
11/4/20, 10:21 AM EST  DISCHARGE PLANNING EVALUATION:    Cait Knutson       Admitted from: ED 11/3/2020/ [address] day: 1   Location: [facility]03/003- Reason for admit: Open wound of foot, complicated, left, initial encounter [S91.302A] Status: IP  Admit order signed?: yes  PMH:  has a past medical history of Arthritis, Hyperlipidemia, Hypertension, Intra-abdominal lymphadenopathy, MS (multiple sclerosis) ([facility] [address] Pancreatitis, Pneumonia, Seizures ([address] and UTI (urinary tract infection). Procedure:   Medications:  Scheduled Meds:   calcium replacement protocol   Other RX Placeholder    aspirin  81 mg Oral Daily    carBAMazepine  100 mg Oral TID    sodium chloride flush  10 mL Intravenous 2 times per day    enoxaparin  40 mg Subcutaneous Q24H    piperacillin-tazobactam  3.375 g Intravenous Q8H    potassium replacement protocol   Other RX Placeholder     Continuous Infusions:   sodium chloride 100 mL/hr at 11/03/20 1922      Pertinent Info/Orders/Treatment Plan:  Client admitted with LLE (heel) wound (history Multiple Sclerosis0 treated with IV AB, IVF; Podiatry/ID following; await cultures  Diet: DIET GENERAL;   Smoking status:  reports that she quit smoking about 7 years ago. Her smoking use included cigarettes. She has a 80.00 pack-year smoking history. She quit smokeless tobacco use about 5 years ago. PCP: Silvina Martino MD  Readmission 30 days or less: no  Readmission Risk Score: 13%    Patient Goals/Plan/Treatment Preferences: lives with SO, has vega, scooter, chronic vega, non-ambulatory, [facility]; [facility] (nsg, aide); WC bound and bed bound, NWB LLE; Podiatry considering new SNF, current with [facility]; monitor AB plans; collaborated with JAIDA Kathleen  Transportation/Food Security/Housekeeping Addressed:  No issues identified.     Evaluation: yes

## 2020-11-04 NOTE — CONSULTS
[address] [place] [address]                                  CONSULTATION    PATIENT NAME: Rodger uSng                      :        1950  MED REC NO:   [id number]                           ROOM:       0003  ACCOUNT NO:   [de-identified]                           ADMIT DATE: 2020  PROVIDER:     Kevan Cordova. Jacey Arita M.D.    Katelynn Saul2020    HISTORY OF PRESENT ILLNESS:  She is a 56-year-old female patient who  came from home due to an ulcer on her left lateral heel. The patient  reports that she has a wound for a while and has home health and  visiting nurse that assists with her wound. She is bed bound due to MS. She has also osteoporosis, does not move much, and she developed a wound  on her left lateral heel, all related to pressure. Lately, it was  having an odor and was advised to come to the hospital.  She denies any  fever or chills. She has chronic Stanford catheter due to neurogenic  bladder. She has been trying to afloat the heel as much as possible by  putting a pillow; however, it got worse. She did deny any fever or  chills. She lives with her  who has medical issues and she did  report that he is at a point that he is not able to take care of her. She is, however, not ready to go to a nursing home. She had x-ray of  her foot. No soft tissue gas or bone erosion was noted. There was a  linear fracture on her distal phalanx, nondisplaced. PAST MEDICAL HISTORY:  Significant for multiple sclerosis,  osteoarthritis, hyperlipidemia, hypertension, history of pneumonia,  neurogenic bladder, and history of UTI. PAST SURGICAL HISTORY:  Includes cholecystectomy, cystoscopy, cataract  surgery. ALLERGIES:  She has no known drug allergies. SOCIAL HISTORY:  She is , lives with her . She does not  smoke. She did smoke in the past.  No alcohol or drugs.     FAMILY HISTORY: Noncontributory for her age. REVIEW OF SYSTEMS:  Noncontributory. MEDICATIONS:  She is on Tylenol, aspirin, carbamazepine, Lovenox, IV  Zosyn, polyethelene glycol. PHYSICAL EXAMINATION:  GENERAL:  She is awake and oriented. VITAL SIGNS:  Temperature 98.5, respirations 18, pulse 89, blood  pressure 96/52. HEENT:  She has sightly pale conjunctivae, anicteric sclerae. CHEST:  She has bilateral diminished breath sounds. CARDIOVASCULAR SYSTEM:  Regular. ABDOMEN:  Soft. EXTREMITIES:  She has an open wound on her left lateral heel. This is  full thickness, 2 x 3 cm and there is chronic leg swelling. The leg was  dry. Pulses were palpable. CNS:  She is conscious and oriented. DIAGNOSTICS:  WBC of 7.6, hemoglobin 11.3, hematocrit 33.9, platelets of  087. Sodium 135, potassium 2.8, chloride 95, bicarb 30, BUN 17,  creatinine 0.6. CRP 9.05. Urine was showing gram-positive cocci. She  has chronic Stanford. Wound culture showing gram-positive cocci, single  and in pairs and a few gram-positive cocci. MRSA screen was negative. IMPRESSION:  She is a 68-year-old female patient with MS with functional  quadriparesis, has pressure ulcer related wound on her left lateral  heel. Wound is growing gram-positive cocci in pairs and no MRSA was  identified. We will continue IV Zosyn, stop vancomycin, and we follow  culture report. Discussed with the patient on long-term plan about  going to an extended care facility. Again, she is not ready. She would  like to wait and see how she does. We will continue to follow the  patient.         Javier Fernando M.D.    D: 11/04/2020 13:46:20       T: 11/04/2020 14:29:21     [email or web address]  Job#: [id number]     Doc#: [id number]    CC:

## 2020-11-05 LAB
ANION GAP SERPL CALCULATED.3IONS-SCNC: 10 MEQ/L (ref 8–16)
BUN BLDV-MCNC: 7 MG/DL (ref 7–22)
CALCIUM SERPL-MCNC: 7.9 MG/DL (ref 8.5–10.5)
CHLORIDE BLD-SCNC: 103 MEQ/L (ref 98–111)
CO2: 26 MEQ/L (ref 23–33)
CREAT SERPL-MCNC: 0.4 MG/DL (ref 0.4–1.2)
GFR SERPL CREATININE-BSD FRML MDRD: > 90 ML/MIN/1.73M2
GLUCOSE BLD-MCNC: 84 MG/DL (ref 70–108)
GLUCOSE BLD-MCNC: 86 MG/DL (ref 70–108)
MRSA SCREEN: NORMAL
POTASSIUM SERPL-SCNC: 3.1 MEQ/L (ref 3.5–5.2)
SARS-COV-2, NAAT: NOT DETECTED
SODIUM BLD-SCNC: 139 MEQ/L (ref 135–145)

## 2020-11-05 PROCEDURE — 36415 COLL VENOUS BLD VENIPUNCTURE: CPT

## 2020-11-05 PROCEDURE — 82948 REAGENT STRIP/BLOOD GLUCOSE: CPT

## 2020-11-05 PROCEDURE — 2580000003 HC RX 258: Performed by: INTERNAL MEDICINE

## 2020-11-05 PROCEDURE — 6360000002 HC RX W HCPCS: Performed by: INTERNAL MEDICINE

## 2020-11-05 PROCEDURE — 2060000000 HC ICU INTERMEDIATE R&B

## 2020-11-05 PROCEDURE — 6370000000 HC RX 637 (ALT 250 FOR IP): Performed by: INTERNAL MEDICINE

## 2020-11-05 PROCEDURE — 80048 BASIC METABOLIC PNL TOTAL CA: CPT

## 2020-11-05 PROCEDURE — 6370000000 HC RX 637 (ALT 250 FOR IP): Performed by: STUDENT IN AN ORGANIZED HEALTH CARE EDUCATION/TRAINING PROGRAM

## 2020-11-05 PROCEDURE — 99232 SBSQ HOSP IP/OBS MODERATE 35: CPT | Performed by: INTERNAL MEDICINE

## 2020-11-05 PROCEDURE — U0002 COVID-19 LAB TEST NON-CDC: HCPCS

## 2020-11-05 RX ORDER — POTASSIUM CHLORIDE 20 MEQ/1
40 TABLET, EXTENDED RELEASE ORAL 2 TIMES DAILY WITH MEALS
Status: DISCONTINUED | OUTPATIENT
Start: 2020-11-05 | End: 2020-11-05

## 2020-11-05 RX ADMIN — Medication 10 ML: at 16:50

## 2020-11-05 RX ADMIN — PIPERACILLIN AND TAZOBACTAM 3.38 G: 3; .375 INJECTION, POWDER, LYOPHILIZED, FOR SOLUTION INTRAVENOUS at 00:24

## 2020-11-05 RX ADMIN — POTASSIUM BICARBONATE 40 MEQ: 782 TABLET, EFFERVESCENT ORAL at 16:47

## 2020-11-05 RX ADMIN — HYDROMORPHONE HYDROCHLORIDE 0.5 MG: 1 INJECTION, SOLUTION INTRAMUSCULAR; INTRAVENOUS; SUBCUTANEOUS at 21:39

## 2020-11-05 RX ADMIN — POTASSIUM BICARBONATE 40 MEQ: 782 TABLET, EFFERVESCENT ORAL at 10:32

## 2020-11-05 RX ADMIN — PIPERACILLIN AND TAZOBACTAM 3.38 G: 3; .375 INJECTION, POWDER, LYOPHILIZED, FOR SOLUTION INTRAVENOUS at 08:34

## 2020-11-05 RX ADMIN — METOPROLOL TARTRATE 12.5 MG: 25 TABLET, FILM COATED ORAL at 20:26

## 2020-11-05 RX ADMIN — METOPROLOL TARTRATE 12.5 MG: 25 TABLET, FILM COATED ORAL at 08:35

## 2020-11-05 RX ADMIN — CARBAMAZEPINE 100 MG: 100 TABLET, CHEWABLE ORAL at 14:33

## 2020-11-05 RX ADMIN — SODIUM CHLORIDE, PRESERVATIVE FREE 10 ML: 5 INJECTION INTRAVENOUS at 08:35

## 2020-11-05 RX ADMIN — PIPERACILLIN AND TAZOBACTAM 3.38 G: 3; .375 INJECTION, POWDER, LYOPHILIZED, FOR SOLUTION INTRAVENOUS at 16:47

## 2020-11-05 RX ADMIN — CARBAMAZEPINE 100 MG: 100 TABLET, CHEWABLE ORAL at 20:27

## 2020-11-05 RX ADMIN — CARBAMAZEPINE 100 MG: 100 TABLET, CHEWABLE ORAL at 08:35

## 2020-11-05 ASSESSMENT — PAIN SCALES - GENERAL
PAINLEVEL_OUTOF10: 0
PAINLEVEL_OUTOF10: 0
PAINLEVEL_OUTOF10: 7
PAINLEVEL_OUTOF10: 6
PAINLEVEL_OUTOF10: 0
PAINLEVEL_OUTOF10: 0
PAINLEVEL_OUTOF10: 10
PAINLEVEL_OUTOF10: 4
PAINLEVEL_OUTOF10: 10

## 2020-11-05 NOTE — PROGRESS NOTES
Hospitalist Progress Note      Patient:  Sue Brower    Unit/Bed:4K-03/003-A  YOB: 1950  MRN: [id number]   Acct: [de-identified]   PCP: Deepthi Malik MD  Date of Admission: 11/3/2020    Assessment/Plan:    1. Infected left lower foot wound-resolving. 11/3-4/2020: We know this based on the patient's presentation and based on physical exam done by podiatry. Infectious cause indicated by initial symptoms including foul-smelling discharge from wound, fatigue, chills, swelling, pain and poor p.o. intake. X-ray of the left foot showed nondisplaced fracture of the distal phalange of the left first digit. Concern for osteomyelitis. A CRP was obtained which was elevated at 9.05, which is a nonspecific laboratory finding for inflammation. This diagnosis is also supported by CBC finding of white blood cell count of 12.7 on presentation. However, as of the morning of 11/4/2020 the white blood cell count dropped to 7.6. Initial electrolytes and CBC were obtained. Initial left foot x-ray was obtained. Anaerobic culture of the foot was obtained, showed gram-positive cocci in pairs. Blood cultures were obtained which are pending. Urine cultures grew gram-positive cocci. Urinalysis demonstrated trace ketones, moderate blood, trace protein, and large leukocyte esterase. The patient had substantial pain that was not effectively treated with Tylenol and morphine. -11/3-4/2020: Monitor patient's condition with daily BMP and CBC. ID and podiatry will follow. We will maintain patient on Zosyn 3.375 g every 8 hours. If no clinical improvement, will engage in work-up for osteomyelitis including MRI. The patient was switched from morphine to Dilaudid. 2.  Hypotension, unspecified-stable. 11/3-4/2020: The patient's blood pressure is low at baseline in the 80s. Patient was given several liters of normal saline bolus.   The patient has also had a notable tachycardia with her last pulse being 124 as of the night of 11/4/2020 with a blood pressure at the same time of 98/50. The patient is on Lopressor 12.5 mg twice daily at home, but this was stopped due to hypotension. An EKG was obtained, which is sinus tachycardia, low voltage QRS, incomplete left bundle branch block, nonspecific ST and T wave abnormality, and no significant changes compared to ECG from August 31, 2019, per cardiology. -11/3-4/2020: We will give the patient half a liter normal saline bolus and resume Lopressor as described above. Night team was informed to monitor patient's heart rate and blood pressure. Will reevaluate in the morning of 11/5/2020. We will follow-up with PCP as an outpatient. 3.  Tachycardia, unspecified-stable. 11/3-4/2020: Unknown whether patient is tachycardic at baseline. Otherwise, tachycardia as described above. -11/3-4/2020: We will follow-up with patient with regards to baseline heart rate. Otherwise, treat as described above for hypertension. 4.  Hypokalemia-resolving. 11/3-4/2020: This can be seen in potassium increased from 2.3 on 11/3/2020 to 2.9 on 11/4/2020. However, potassium is still low. -11/3-4/2020: We will maintain patient on potassium replacement protocol. We will monitor with continuous telemetry and daily BMP. 5.  Hypocalcemia-new onset. 11/3-4/2020: This can be seen from low calcium on 11/4/2020 of 8.2 and low ionized calcium of 1.06.    -11/3-4/2020: We will maintain patient on calcium replacement protocol. We will monitor with daily BMP. 6.  Hypoglycemia -new onset. 11/3-4/2020: This can be seen from glucose level of 60 on the morning of 11/4/2020 as compared to a glucose level of 92 on the previous day. The patient was given a dose of oral glucose and taken off of n.p.o. and placed on a general diet. -11/3-4/2020: We will obtain new pocket glucose and monitor with daily BMP.   If pocket glucose is low, will treat and monitor hypoglycemia more aggressively. 7.  Macrocytic anemia-stable. 11/3-4/2020: The patient has a mild anemia normocytic to macrocytic anemia with a hemoglobin of 11.3 and an MCV of 99.1 as of 11/4/2020. This appears to be slightly less than the patient's baseline, which appears to be around 13.0-13.8. Blood loss, anemia of chronic disease, or nutritional deficiencies are all possibilities. -11/3-4/2020: We will monitor with daily CBC. If anemia does not resolve, will engage in more aggressive work-up, including iron studies, folate, B12, methylmalonic acid, and homocysteine. 8.  Benign essential hypertension-stable. 11/3-4/2020: We know this based on the patient's history. -11/3-4/2020: We will treat as described above for hypotension. 9.  Hyperlipidemia-stable. 11/3-4/2020: We know this based the patient's history. -11/3-4/2020: We will maintain patient on baby aspirin and follow-up with PCP as an outpatient. 10.  Epilepsy, unspecified, not intractable, without status epilepticus-stable. 11/3-4/2020: We know this based on the patient's history. -11/3-4/2020: We will maintain patient on Tegretol chewable tablet 100 mg 3 times daily. Expected discharge date: 11/7/2020. Disposition:    [x] Home       [] TCU       [] Rehab       [] Psych       [] SNF       [] [facility]       [] Other-    Chief Complaint: Left heel open foot wound. Opening statement:    The patient is a 28-year-old female with a complex medical history including previous seizures, UTI, hypertension, hyperlipidemia, and multiple sclerosis with bilateral lower limb paralysis and catheter use, who presents with a chief complaint of left heel open foot wound. Hospital Treatment Course:     11/3-4/2020: The patient presented to [facility] with complaints of increased foot pain and swelling on 11/3/2020.  The patient is bedbound for multiple sclerosis and has a Stanford catheter which is exchanged on a monthly basis. The patient has noted a chronic left heel wound has been ongoing for the past several months and had a wound clinic appointment scheduled for 11/11/2020. The patient affirmed foul-smelling odor from left foot wound, fatigue, chills, pain, swelling and poor p.o. intake. Podiatry, social work, and ID were consulted for patient treatment. Over the course of her hospitalization so far, patient has received urinalysis, CBC, INR, urine and blood cultures, multiple normal saline boluses, Zosyn and a one-time dose of vancomycin. Patient also received glucose oral gel for treatment of hypoglycemia. The patient was initially made n.p.o. for possible procedure. N.p.o. was canceled and patient was changed to general diet as podiatry did not recommend surgery at this time. The patient was given morphine and Tylenol and later Dilaudid to treat pain. Patient also received x-ray of the left foot that showed a nondisplaced fracture of the distal phalange of the left first digit. An EKG that shows sinus tachycardia, low voltage QRS, incomplete left bundle branch block, and nonspecific ST and T wave abnormalities per cardiology. Subjective (past 24 hours): On presentation, the patient is stable but she states that she is not feeling well because she only got half an hour of sleep last night and because of severe pain in her left foot. She stated that neither morphine or Tylenol helped. Otherwise, the patient has no new specific complaints or concerns. Cardiovascular: Denies chest pain and palpitations. Respiratory: Denies shortness of breath and pleuritic chest pain. GI: Denies abdominal pain and nausea. : Denies dysuria, hesitancy, and zena blood. Past medical history, family history, social history and allergies reviewed again and is unchanged since admission. ROS (12 point review of systems completed.   Pertinent positives noted. Otherwise ROS is negative)     Medications:  Reviewed    Infusion Medications   Scheduled Medications    calcium replacement protocol   Other RX Placeholder    metoprolol tartrate  12.5 mg Oral BID    sodium chloride  500 mL Intravenous Once    aspirin  81 mg Oral Daily    carBAMazepine  100 mg Oral TID    sodium chloride flush  10 mL Intravenous 2 times per day    enoxaparin  40 mg Subcutaneous Q24H    piperacillin-tazobactam  3.375 g Intravenous Q8H    potassium replacement protocol   Other RX Placeholder     PRN Meds: glucose, potassium chloride **OR** potassium alternative oral replacement **OR** potassium chloride, HYDROmorphone, sodium chloride flush, acetaminophen **OR** acetaminophen, polyethylene glycol, promethazine **OR** ondansetron      Intake/Output Summary (Last 24 hours) at 11/4/2020 1942  Last data filed at 11/4/2020 1557  Gross per 24 hour   Intake 5442. 95 ml   Output 1875 ml   Net 3567.95 ml       Diet:  DIET GENERAL;    Exam:  /70   Pulse 105   Temp 98.6 °F (37 °C) (Oral)   Resp 19   Ht 5' 3\" (1.6 m)   Wt 120 lb 8 oz (54.7 kg)   SpO2 94%   BMI 21.35 kg/m²   General appearance: Awake, alert, and oriented x3. In distress from pain. However, patient is stable. HEENT: Pupils equal, round, and reactive to light. Conjunctivae/corneas clear. Neck: Supple, with full range of motion. No jugular venous distention. Trachea midline. Respiratory:  Normal respiratory effort. Clear to auscultation, bilaterally without Rales/Wheezes/Rhonchi. Cardiovascular: Regular rate and rhythm with normal S1/S2 without murmurs, rubs or gallops. Abdomen: Soft, non-tender, non-distended with normal bowel sounds. Musculoskeletal: passive and active ROM x 2 upper extremities. No passive or active ROM x2 lower extremities. Skin: Skin color, texture, turgor normal.  No rashes or lesions. Capillary Refill: Brisk,< 3 seconds   Peripheral Pulses: +2 palpable radial pulses.   Extremities: Bandages cover both feet. Examination of the feet deferred as patient is being followed by podiatry. Labs:   Recent Labs     11/03/20  1113 11/04/20  0520   WBC 12.7* 7.6   HGB 13.8 11.3*   HCT 39.7 33.9*    171     Recent Labs     11/03/20  1512 11/04/20  0520 11/04/20  1110    135  --    K 2.3* 2.6* 2.8*   CL 92* 95*  --    CO2 34* 30  --    BUN 25* 17  --    CREATININE 0.6 0.6  --    CALCIUM 8.5 8.2*  --      Recent Labs     11/03/20  1512   AST 33   ALT 17   BILITOT 0.6   ALKPHOS 99     Recent Labs     11/03/20  1229   INR 1.28*     No results for input(s): CKTOTAL, TROPONINI in the last 72 hours. Microbiology:    Blood culture #1:   Lab Results   Component Value Date    BC No growth-preliminary  11/03/2020       Blood culture #2:No results found for: Jacque Alcala    Organism:  Lab Results   Component Value Date    ORG gram positive cocci 11/03/2020         Lab Results   Component Value Date    LABGRAM  11/03/2020     No segmented neutrophils observed. Rare epithelial cells observed. Many gram positive cocci occurring singly and in pairs. Few large gram positive bacilli. MRSA culture only:No results found for: [facility]    Urine culture:   Lab Results   Component Value Date    LABURIN [place] count: >100,000 CFU/mL  11/03/2020       Respiratory culture: No results found for: CULTRESP    Aerobic and Anaerobic :  No results found for: LABAERO  No results found for: LABANAE    Urinalysis:      Lab Results   Component Value Date    NITRU NEGATIVE 11/03/2020    WBCUA > 200 11/03/2020    BACTERIA MANY 11/03/2020    RBCUA 25-50 11/03/2020    BLOODU MODERATE 11/03/2020    SPECGRAV 1.020 02/18/2019    GLUCOSEU NEGATIVE 11/03/2020       Radiology:  XR FOOT LEFT (MIN 3 VIEWS)   Final Result   1. There is a linear lucency through the mid aspect of the distal phalange of the left first digit. This is concerning for a nondisplaced fracture. Correlate clinically for point tenderness at this location.       2. There is diffuse osteoporosis. 3. There is nonspecific soft tissue swelling seen diffusely throughout the left foot. This may be related to cellulitis versus edema. **This report has been created using voice recognition software. It may contain minor errors which are inherent in voice recognition technology. **      Final report electronically signed by Dr. Ajith Arrington on 11/3/2020 11:32 AM        Xr Foot Left (min 3 Views)    Result Date: 11/3/2020  PROCEDURE: XR FOOT LEFT (MIN 3 VIEWS) CLINICAL INFORMATION: infection COMPARISON: 5/29/2010 TECHNIQUE:  Left foot 4 views  FINDINGS: There is a linear lucency through the mid aspect of the distal phalange of the left first digit. This is concerning for a nondisplaced fracture. Correlate clinically for point tenderness at this location. There is diffuse osteoporosis. There is nonspecific soft tissue swelling seen diffusely throughout the left foot. This may be related to cellulitis versus edema. There is spurring along the first metatarsal head. 1. There is a linear lucency through the mid aspect of the distal phalange of the left first digit. This is concerning for a nondisplaced fracture. Correlate clinically for point tenderness at this location. 2. There is diffuse osteoporosis. 3. There is nonspecific soft tissue swelling seen diffusely throughout the left foot. This may be related to cellulitis versus edema. **This report has been created using voice recognition software. It may contain minor errors which are inherent in voice recognition technology. ** Final report electronically signed by Dr. Ajith Arrington on 11/3/2020 11:32 AM      Support Devices (date placed):  [] ETT []Oral / [] Nasal  [] Gastric Tube [] OG / [] NG    [] Central Venous Line (Specify Site):  [x] Urinary Catheter  [] Arterial Line (Specify Site)  [x] Peripheral IV access  [] Other:    DVT prophylaxis: [x] Lovenox                                 [] SCDs [] SQ Heparin                                 [] Encourage ambulation           [] Already on Anticoagulation     Code Status: Full Code    Tele:   [x] yes             [] no    Electronically signed by Reba Santos MD, MPH, [facility] on 11/4/2020 at 7:42 PM   Supervised by Dr. Fabian Law

## 2020-11-05 NOTE — FLOWSHEET NOTE
Pt was resting in bed with eyes closed upon entering. Pt was easily roused. Pt required 2 assist to turn to one side in order to re applie cream to coccyx. Pt stated \" the is pains gone\". Pt stated a pain level of \"0\" when asked to quantify pain on a scale of 1-10. Pt call light and tray table was placed within reach.

## 2020-11-05 NOTE — PROGRESS NOTES
Hospitalist Progress Note      Patient:  Julio Pepper    Unit/Bed:4K-03/003-A  YOB: 1950  MRN: [id number]   Acct: [de-identified]   PCP: Shalom Victoria MD  Date of Admission: 11/3/2020    Assessment/Plan:    1. Infected left lower foot wound-resolving. 11/3-4/2020: We know this based on the patient's presentation and based on physical exam done by podiatry. Infectious cause indicated by initial symptoms including foul-smelling discharge from wound, fatigue, chills, swelling, pain and poor p.o. intake. X-ray of the left foot showed nondisplaced fracture of the distal phalange of the left first digit. Concern for osteomyelitis. A CRP was obtained which was elevated at 9.05, which is a nonspecific laboratory finding for inflammation. This diagnosis is also supported by CBC finding of white blood cell count of 12.7 on presentation. However, as of the morning of 11/4/2020 the white blood cell count dropped to 7.6. Initial electrolytes and CBC were obtained. Initial left foot x-ray was obtained. Anaerobic culture of the foot was obtained, showed gram-positive cocci in pairs. Blood cultures were obtained which are pending. Urine cultures grew gram-positive cocci. Urinalysis demonstrated trace ketones, moderate blood, trace protein, and large leukocyte esterase. The patient had substantial pain that was not effectively treated with Tylenol and morphine. 11/5/2020: The patient states that her pain is substantially improved on the Dilaudid. The patient also states that she is feeling a lot better because she slept better. Podiatry will perform surgical debridement on 11/6/2020.    -11/3-4/2020: Monitor patient's condition with daily BMP and CBC. ID and podiatry will follow. We will maintain patient on Zosyn 3.375 g every 8 hours. If no clinical improvement, will engage in work-up for osteomyelitis including MRI.   The patient was switched from morphine to Dilaudid. -11/5/2020: We will maintain patient on Dilaudid 0.5 mg every 4 hours as needed for pain control. We will maintain patient on Zosyn 3.275 g every 8 hours. We will make patient n.p.o. after midnight on 11/5/2020 for surgery on 11/6/2020. We will continue to turn the patient every 2 hours. 2.  Hypotension, unspecified-stable. 11/3-4/2020: The patient's blood pressure is low at baseline in the 80s. Patient was given several liters of normal saline bolus. The patient has also had a notable tachycardia with her last pulse being 124 as of the night of 11/4/2020 with a blood pressure at the same time of 98/50. The patient is on Lopressor 12.5 mg twice daily at home, but this was stopped due to hypotension. An EKG was obtained, which is sinus tachycardia, low voltage QRS, incomplete left bundle branch block, nonspecific ST and T wave abnormality, and no significant changes compared to ECG from August 31, 2019, per cardiology. 11/5/2020: The patient's blood pressure has been stable as was 92/52 as of the morning of 11/5/2020, which is close to or slightly above her baseline. Pulse rate has also dropped to 83.    -11/3-4/2020: We will give the patient half a liter normal saline bolus and resume Lopressor as described above. Night team was informed to monitor patient's heart rate and blood pressure. Will reevaluate in the morning of 11/5/2020. We will follow-up with PCP as an outpatient. -11/5/2020: We will maintain Lopressor as described above. 3.  Tachycardia, unspecified-stable. 11/3-4/2020: Unknown whether patient is tachycardic at baseline. Otherwise, tachycardia as described above. 11/5/2020: Tachycardia has improved as pulse was 83 as of the morning of 11/5/2020.    -11/3-4/2020: We will follow-up with patient with regards to baseline heart rate. Otherwise, treat as described above for hypertension. -11/5/2020:  We will maintain Lopressor as described above.    4.  Hypokalemia-resolving. 11/3-4/2020: This can be seen in potassium increased from 2.3 on 11/3/2020 to 2.9 on 11/4/2020. However, potassium is still low. 11/5/2020: Potassium has increased to 3.8 on 11/5/2020 as opposed to 2.8 11/4/2020.    -11/3-4/2020: We will maintain patient on potassium replacement protocol. We will monitor with continuous telemetry and daily BMP.    -11/5/2020: See above. 5.  Hypocalcemia-stable. 11/3-4/2020: This can be seen from low calcium on 11/4/2020 of 8.2 and low ionized calcium of 1.06.    11/5/2020: Calcium is slightly lower at 7.9 on 11/5/2020 as opposed to 8.2 on 11/4/2020.    -11/3-4/2020: We will maintain patient on calcium replacement protocol. We will monitor with daily BMP.    -11/5/2020: See above. 6.  Hypoglycemia -new onset. 11/3-4/2020: This can be seen from glucose level of 60 on the morning of 11/4/2020 as compared to a glucose level of 92 on the previous day. The patient was given a dose of oral glucose and taken off of n.p.o. and placed on a general diet. 11/5/2020: Glucose was stable at 86 on the morning of 11/5/2020.    -11/3-4/2020: We will obtain new pocket glucose and monitor with daily BMP. If pocket glucose is low, will treat and monitor hypoglycemia more aggressively. -11/5/2020: We will monitor with daily BMP. Podiatry will monitor glucose perioperatively. 7.  Macrocytic anemia-stable. 11/3-4/2020: The patient has a mild anemia normocytic to macrocytic anemia with a hemoglobin of 11.3 and an MCV of 99.1 as of 11/4/2020. This appears to be slightly less than the patient's baseline, which appears to be around 13.0-13.8. Blood loss, anemia of chronic disease, or nutritional deficiencies are all possibilities. 11/5/2020: Last CBC on 11/4/2020 showed hemoglobin of 11.3.    -11/3-4/2020: We will monitor with daily CBC.   If anemia does not resolve, will engage in more aggressive work-up, including iron studies, folate, B12, methylmalonic acid, and homocysteine. -11/5/2020: We will monitor with daily CBC. We will follow-up with PCP as an outpatient. 8.  Benign essential hypertension-stable. 11/3-4/2020: We know this based on the patient's history. 11/5/2020: See above. -11/3-4/2020: We will treat as described above for hypotension. -11/5/2020: See above. 9.  Hyperlipidemia-stable. 11/3-4/2020: We know this based the patient's history. 11/5/2020: See above. -11/3-4/2020: We will maintain patient on baby aspirin and follow-up with PCP as an outpatient. -11/5/2020: See above. 10.  Epilepsy, unspecified, not intractable, without status epilepticus-stable. 11/3-4/2020: We know this based on the patient's history. 11/5/2020: See above. -11/3-4/2020: We will maintain patient on Tegretol chewable tablet 100 mg 3 times daily. -11/5/2020: See above. Expected discharge date: 11/7/2020. Disposition:    [x] Home       [] TCU       [] Rehab       [] Psych       [] SNF       [] [place]       [] Other-    Chief Complaint: Left heel open foot wound. Opening statement:    The patient is a 41-year-old female with a complex medical history including previous seizures, UTI, hypertension, hyperlipidemia, and multiple sclerosis with bilateral lower limb paralysis and catheter use, who presents with a chief complaint of left heel open foot wound. Hospital Treatment Course:     11/3-4/2020: The patient presented to [facility] with complaints of increased foot pain and swelling on 11/3/2020. The patient is bedbound for multiple sclerosis and has a [facility] catheter which is exchanged on a monthly basis. The patient has noted a chronic left heel wound has been ongoing for the past several months and had a wound clinic appointment scheduled for 11/11/2020.  The patient affirmed foul-smelling odor from left foot wound, fatigue, chills, pain, swelling and poor p.o. intake. Podiatry, social work, and ID were consulted for patient treatment. Over the course of her hospitalization so far, patient has received urinalysis, CBC, INR, urine and blood cultures, multiple normal saline boluses, Zosyn and a one-time dose of vancomycin. Patient also received glucose oral gel for treatment of hypoglycemia. The patient was initially made n.p.o. for possible procedure. N.p.o. was canceled and patient was changed to general diet as podiatry did not recommend surgery at this time. On presentation, the patient is stable but she states that she is not feeling well because she only got half an hour of sleep last night and because of severe pain in her left foot. She stated that neither morphine or Tylenol helped. Otherwise, the patient has no new specific complaints or concerns. The patient was given morphine and Tylenol and later Dilaudid to treat pain. Patient also received x-ray of the left foot that showed a nondisplaced fracture of the distal phalange of the left first digit. An EKG that shows sinus tachycardia, low voltage QRS, incomplete left bundle branch block, and nonspecific ST and T wave abnormalities per cardiology. 11/5/2020:    Patient's blood pressure improved to 92/52 on the morning of 11/5/2020. In addition, the patient's pulse dropped to 83. Subjective (past 24 hours): The patient states that she is feeling much better, as the Dilaudid has effectively controlled the pain and that she slept better. Otherwise, the patient has no new specific complaints or concerns. Cardiovascular: Denies chest pain and palpitations.     Respiratory: Denies shortness of breath and pleuritic chest pain.     GI: Denies abdominal pain and nausea.     : Denies dysuria, hesitancy, and zena blood. Past medical history, family history, social history and allergies reviewed again and is unchanged since admission. ROS (12 point review of systems completed. Pertinent positives noted. Otherwise ROS is negative)     Medications:  Reviewed    Infusion Medications   Scheduled Medications    potassium bicarb-citric acid  40 mEq Oral BID AC    calcium replacement protocol   Other RX Placeholder    metoprolol tartrate  12.5 mg Oral BID    aspirin  81 mg Oral Daily    carBAMazepine  100 mg Oral TID    sodium chloride flush  10 mL Intravenous 2 times per day    enoxaparin  40 mg Subcutaneous Q24H    piperacillin-tazobactam  3.375 g Intravenous Q8H    potassium replacement protocol   Other RX Placeholder     PRN Meds: potassium chloride **OR** potassium alternative oral replacement **OR** potassium chloride, HYDROmorphone, sodium chloride flush, acetaminophen **OR** acetaminophen, polyethylene glycol, promethazine **OR** ondansetron      Intake/Output Summary (Last 24 hours) at 11/5/2020 1410  Last data filed at 11/5/2020 1317  Gross per 24 hour   Intake 7428.91 ml   Output 1425 ml   Net 6003.91 ml       Diet:  DIET GENERAL;  Diet NPO, After Midnight    Exam:  BP (!) 92/52   Pulse 83   Temp 99.1 °F (37.3 °C) (Oral)   Resp 18   Ht 5' 3\" (1.6 m)   Wt 139 lb 9.6 oz (63.3 kg)   SpO2 97%   BMI 24.73 kg/m²   General appearance: The patient is awake, alert and in no acute distress. The patient is capable of answering questions and following commands. HEENT: Pupils equal, round, and reactive to light. Conjunctivae/corneas clear. Neck: Supple, with full range of motion. No jugular venous distention. Trachea midline. Respiratory:  Normal respiratory effort. Clear to auscultation, bilaterally without Rales/Wheezes/Rhonchi. Cardiovascular: Regular rate and rhythm with normal S1/S2 without murmurs, rubs or gallops. Abdomen: Soft, non-tender, non-distended with normal bowel sounds. Musculoskeletal: passive and active ROM x 2 upper extremities. No passive or active ROM x2 lower extremities. Skin: Skin color, texture, turgor normal.  No rashes or lesions.   Capillary Refill: Brisk,< 3 seconds   Peripheral Pulses: +2 palpable radial pulses. Extremities: Bandages cover both feet. Examination of the feet deferred as patient is being followed by podiatry and is patient stated that examination caused her pain. Labs:   Recent Labs     11/03/20  1113 11/04/20  0520   WBC 12.7* 7.6   HGB 13.8 11.3*   HCT 39.7 33.9*    171     Recent Labs     11/03/20  1512 11/04/20  0520 11/04/20  1110 11/05/20  0825    135  --  139   K 2.3* 2.6* 2.8* 3.1*   CL 92* 95*  --  103   CO2 34* 30  --  26   BUN 25* 17  --  7   CREATININE 0.6 0.6  --  0.4   CALCIUM 8.5 8.2*  --  7.9*     Recent Labs     11/03/20  1512   AST 33   ALT 17   BILITOT 0.6   ALKPHOS 99     Recent Labs     11/03/20  1229   INR 1.28*     No results for input(s): CKTOTAL, TROPONINI in the last 72 hours. Microbiology:    Blood culture #1:   Lab Results   Component Value Date    BC No growth-preliminary  11/03/2020       Blood culture #2:No results found for: Gladis Sandoval    Organism:  Lab Results   Component Value Date    ORG Enterococcus faecalis - (Group D) 11/03/2020         Lab Results   Component Value Date    LABGRAM  11/03/2020     No segmented neutrophils observed. Rare epithelial cells observed. Many gram positive cocci occurring singly and in pairs. Few large gram positive bacilli. MRSA culture only:No results found for: [facility]    Urine culture:   Lab Results   Component Value Date    LABURIN [place] count: >100,000 CFU/mL  11/03/2020       Respiratory culture: No results found for: CULTRESP    Aerobic and Anaerobic :  Lab Results   Component Value Date    LABAERO  11/03/2020     Culture yielded moderate growth of gram positive bacilli most consistent with Corynebacterium.       No results found for: LABANAE    Urinalysis:      Lab Results   Component Value Date    NITRU NEGATIVE 11/03/2020    WBCUA > 200 11/03/2020    BACTERIA MANY 11/03/2020    RBCUA 25-50 11/03/2020    BLOODU MODERATE 11/03/2020 SPECGRAV 1.020 02/18/2019    GLUCOSEU NEGATIVE 11/03/2020       Radiology:  XR FOOT LEFT (MIN 3 VIEWS)   Final Result   1. There is a linear lucency through the mid aspect of the distal phalange of the left first digit. This is concerning for a nondisplaced fracture. Correlate clinically for point tenderness at this location. 2. There is diffuse osteoporosis. 3. There is nonspecific soft tissue swelling seen diffusely throughout the left foot. This may be related to cellulitis versus edema. **This report has been created using voice recognition software. It may contain minor errors which are inherent in voice recognition technology. **      Final report electronically signed by Dr. Shayan Montano on 11/3/2020 11:32 AM        Xr Foot Left (min 3 Views)    Result Date: 11/3/2020  PROCEDURE: XR FOOT LEFT (MIN 3 VIEWS) CLINICAL INFORMATION: infection COMPARISON: 5/29/2010 TECHNIQUE:  Left foot 4 views  FINDINGS: There is a linear lucency through the mid aspect of the distal phalange of the left first digit. This is concerning for a nondisplaced fracture. Correlate clinically for point tenderness at this location. There is diffuse osteoporosis. There is nonspecific soft tissue swelling seen diffusely throughout the left foot. This may be related to cellulitis versus edema. There is spurring along the first metatarsal head. 1. There is a linear lucency through the mid aspect of the distal phalange of the left first digit. This is concerning for a nondisplaced fracture. Correlate clinically for point tenderness at this location. 2. There is diffuse osteoporosis. 3. There is nonspecific soft tissue swelling seen diffusely throughout the left foot. This may be related to cellulitis versus edema. **This report has been created using voice recognition software. It may contain minor errors which are inherent in voice recognition technology. ** Final report electronically signed by Dr. Shayan Montano on 11/3/2020 11:32 AM      Support Devices (date placed):  [] ETT []Oral / [] Nasal  [] Gastric Tube [] OG / [] NG    [] Central Venous Line (Specify Site):  [x] Urinary Catheter  [] Arterial Line (Specify Site)  [x] Peripheral IV access  [] Other:    DVT prophylaxis: [x] Lovenox                                 [] SCDs                                 [] SQ Heparin                                 [] Encourage ambulation           [] Already on Anticoagulation     Code Status: Full Code    Tele:   [x] yes             [] no    Electronically signed by Erin Miguel MD, MPH, [facility] on 11/5/2020 at 2:10 PM   Supervised by Dr. Marguerite Lopez

## 2020-11-05 NOTE — FLOWSHEET NOTE
Pt was asleep upon entering. [facility] assisted with giving pt a bed bath, gown change, and chucks pad change. Pt tolerated the exertion well. Cream was reapplied to pts coccyx. Powder was applied to underarms and crevices. Pt denied pain. Bed was lowered. Tray table and call light were placed within reach.

## 2020-11-05 NOTE — CARE COORDINATION
11/5/20, 2:43 PM EST    DISCHARGE PLANNING EVALUATION      SW spoke to patient about ECF as SW received an order \"ECF placement\". JAIDA advised that the Dr wanted ECF for her for some rehab. We discussed the 2 in the area. She was not sure what to say and agreed that SW could speak to her spouse. SW spoke to the spouse about the ECF need. He states that the Dr was just in to see her (JAIDA reviewed the chart and Dr Ambrosio Barrera was just in) and suggested she be seen daily by the [facility] nurses. SW advised that she needs to have someone with her 24/7. He states he is, but SW informed him that his wife states he may leave at night. He states he is not far when he does. SW advised that his wife may not be able to get herself out of the home if it started to burn and she was home alone. He states there is a ramp to the home. JAIDA advised that [facility] will be called regarding this request and will let him know tomorrow. 11/5/20, 2:55 PM EST    DISCHARGE PLANNING EVALUATION      SW spoke to Magali Laisha with [facility] to request more nursing visits. They will be able to do daily for about 3 days but staff and Medicare guidelines do not allow this. She states they have instructed him many times to irrigate her vega at minimum one more time a week but he does not do this. They are not sure why, not that he is not caring.  They advised that he needs to become more attentive to his care as someone will need to do her daily dressings once they do not see as often

## 2020-11-05 NOTE — PROGRESS NOTES
Progress note: Infectious diseases    Patient - Melisa Garcia,  Age - 68 y.o.    - 1950      Room Number - 4K-03/003-A   MRN -  [id number]   Acct # - [de-identified]  Date of Admission -  11/3/2020 10:40 AM    SUBJECTIVE:   No new issues. No fever    OBJECTIVE   VITALS    height is 5' 3\" (1.6 m) and weight is 139 lb 9.6 oz (63.3 kg). Her oral temperature is 99.1 °F (37.3 °C). Her blood pressure is 92/52 (abnormal) and her pulse is 83. Her respiration is 18 and oxygen saturation is 97%.        Wt Readings from Last 3 Encounters:   20 139 lb 9.6 oz (63.3 kg)   20 145 lb (65.8 kg)   20 135 lb (61.2 kg)       I/O (24 Hours)    Intake/Output Summary (Last 24 hours) at 2020 1433  Last data filed at 2020 1317  Gross per 24 hour   Intake 3302.96 ml   Output 950 ml   Net 2352.96 ml       General Appearance  Awake, alert, oriented,  Chronically sick looking  HEENT - normocephalic, atraumatic, pale conjunctiva,  anicteric sclera  Neck - Supple, no mass  Lungs -  Bilateral  air entry, no rhonchi, no wheeze  Cardiovascular - Heart sounds are normal.     Abdomen - soft, not distended, nontender,   Neurologic -oriented  Skin - No bruising or bleeding  Extremities - open wound on the left ankle    MEDICATIONS:      potassium bicarb-citric acid  40 mEq Oral BID AC    calcium replacement protocol   Other RX Placeholder    metoprolol tartrate  12.5 mg Oral BID    aspirin  81 mg Oral Daily    carBAMazepine  100 mg Oral TID    sodium chloride flush  10 mL Intravenous 2 times per day    enoxaparin  40 mg Subcutaneous Q24H    piperacillin-tazobactam  3.375 g Intravenous Q8H    potassium replacement protocol   Other RX Placeholder       potassium chloride **OR** potassium alternative oral replacement **OR** potassium chloride, HYDROmorphone, sodium chloride flush, acetaminophen **OR** acetaminophen, polyethylene glycol, promethazine **OR** ondansetron      LABS:     CBC:   Recent Labs     11/03/20  1113 11/04/20  0520   WBC 12.7* 7.6   HGB 13.8 11.3*    171     BMP:    Recent Labs     11/03/20  1512 11/04/20  0520 11/04/20  1110 11/05/20  0825    135  --  139   K 2.3* 2.6* 2.8* 3.1*   CL 92* 95*  --  103   CO2 34* 30  --  26   BUN 25* 17  --  7   CREATININE 0.6 0.6  --  0.4   GLUCOSE 92 60*  --  84     Calcium:  Recent Labs     11/05/20  0825   CALCIUM 7.9*     Ionized Calcium:No results for input(s): IONCA in the last 72 hours. Magnesium:  Recent Labs     11/04/20  0520   MG 1.9     Phosphorus:No results for input(s): PHOS in the last 72 hours. BNP:No results for input(s): BNP in the last 72 hours. Glucose:  Recent Labs     11/05/20  0023   POCGLU 86     HgbA1C: No results for input(s): LABA1C in the last 72 hours.   INR:   Recent Labs     11/03/20  1229   INR 1.28*     Hepatic:   Recent Labs     11/03/20  1512   ALKPHOS 99   ALT 17   AST 33   PROT 7.2   BILITOT 0.6   LABALBU 3.0*     Amylase and Lipase:  Recent Labs     11/03/20  1113   LACTA 1.7     Lactic Acid:   Recent Labs     11/03/20  1113   LACTA 1.7        CULTURES:   UA:   Recent Labs     11/03/20  1250   PHUR 5.5   COLORU YELLOW   PROTEINU TRACE*   BLOODU MODERATE*   RBCUA 25-50   WBCUA > 200   BACTERIA MANY   NITRU NEGATIVE   GLUCOSEU NEGATIVE   BILIRUBINUR NEGATIVE   UROBILINOGEN 1.0   KETUA TRACE*     Micro:   Lab Results   Component Value Date    BC No growth-preliminary  11/03/2020         IMAGING:         Problem list of patient:     Patient Active Problem List   Diagnosis Code    [facility]) ([facility] G35    Seizure disorder ([facility] G40.909    Frequent PVCs I49.3    Hematuria R31.9    Nicotine abuse Z72.0    Hematuria, gross R31.0    Sepsis ([address] A41.9    HTN (hypertension) I10    HLD (hyperlipidemia) E78.5    Multiple sclerosis ([facility] V89    Metabolic acidosis, normal anion gap (NAG) E87.2    Open wound of foot, complicated, left, initial encounter S91.302A         ASSESSMENT/PLAN   MS with functional quadriparesis  Open wound on left ankle related to pressure: continue dressing and offloading. She wants to go home on discharge.   Will ask social service if her home health can come daily      Trudy Gonzáles MD, [address] 11/5/2020 2:33 PM

## 2020-11-06 ENCOUNTER — ANESTHESIA EVENT (OUTPATIENT)
Dept: OPERATING ROOM | Age: 70
DRG: 570 | End: 2020-11-06
Payer: MEDICARE

## 2020-11-06 ENCOUNTER — APPOINTMENT (OUTPATIENT)
Dept: GENERAL RADIOLOGY | Age: 70
DRG: 570 | End: 2020-11-06
Payer: MEDICARE

## 2020-11-06 ENCOUNTER — ANESTHESIA (OUTPATIENT)
Dept: OPERATING ROOM | Age: 70
DRG: 570 | End: 2020-11-06
Payer: MEDICARE

## 2020-11-06 VITALS
SYSTOLIC BLOOD PRESSURE: 89 MMHG | DIASTOLIC BLOOD PRESSURE: 53 MMHG | OXYGEN SATURATION: 98 % | RESPIRATION RATE: 10 BRPM

## 2020-11-06 LAB
AEROBIC CULTURE: NORMAL
ANAEROBIC CULTURE: NORMAL
ANION GAP SERPL CALCULATED.3IONS-SCNC: 9 MEQ/L (ref 8–16)
BUN BLDV-MCNC: 6 MG/DL (ref 7–22)
CALCIUM SERPL-MCNC: 7.9 MG/DL (ref 8.5–10.5)
CHLORIDE BLD-SCNC: 106 MEQ/L (ref 98–111)
CO2: 24 MEQ/L (ref 23–33)
CREAT SERPL-MCNC: 0.4 MG/DL (ref 0.4–1.2)
ERYTHROCYTE [DISTWIDTH] IN BLOOD BY AUTOMATED COUNT: 13 % (ref 11.5–14.5)
ERYTHROCYTE [DISTWIDTH] IN BLOOD BY AUTOMATED COUNT: 50.6 FL (ref 35–45)
GFR SERPL CREATININE-BSD FRML MDRD: > 90 ML/MIN/1.73M2
GLUCOSE BLD-MCNC: 117 MG/DL (ref 70–108)
GLUCOSE BLD-MCNC: 84 MG/DL (ref 70–108)
GRAM STAIN RESULT: NORMAL
HCT VFR BLD CALC: 34.5 % (ref 37–47)
HEMOGLOBIN: 10.9 GM/DL (ref 12–16)
MCH RBC QN AUTO: 33.3 PG (ref 26–33)
MCHC RBC AUTO-ENTMCNC: 31.6 GM/DL (ref 32.2–35.5)
MCV RBC AUTO: 105.5 FL (ref 81–99)
PLATELET # BLD: 176 THOU/MM3 (ref 130–400)
PMV BLD AUTO: 10.9 FL (ref 9.4–12.4)
POTASSIUM SERPL-SCNC: 3.1 MEQ/L (ref 3.5–5.2)
RBC # BLD: 3.27 MILL/MM3 (ref 4.2–5.4)
SODIUM BLD-SCNC: 139 MEQ/L (ref 135–145)
WBC # BLD: 5.5 THOU/MM3 (ref 4.8–10.8)

## 2020-11-06 PROCEDURE — 2709999900 HC NON-CHARGEABLE SUPPLY: Performed by: PODIATRIST

## 2020-11-06 PROCEDURE — 6360000002 HC RX W HCPCS: Performed by: INTERNAL MEDICINE

## 2020-11-06 PROCEDURE — 2060000000 HC ICU INTERMEDIATE R&B

## 2020-11-06 PROCEDURE — 6370000000 HC RX 637 (ALT 250 FOR IP): Performed by: STUDENT IN AN ORGANIZED HEALTH CARE EDUCATION/TRAINING PROGRAM

## 2020-11-06 PROCEDURE — 82948 REAGENT STRIP/BLOOD GLUCOSE: CPT

## 2020-11-06 PROCEDURE — 80048 BASIC METABOLIC PNL TOTAL CA: CPT

## 2020-11-06 PROCEDURE — 2580000003 HC RX 258: Performed by: STUDENT IN AN ORGANIZED HEALTH CARE EDUCATION/TRAINING PROGRAM

## 2020-11-06 PROCEDURE — 3700000000 HC ANESTHESIA ATTENDED CARE: Performed by: PODIATRIST

## 2020-11-06 PROCEDURE — 36415 COLL VENOUS BLD VENIPUNCTURE: CPT

## 2020-11-06 PROCEDURE — 85027 COMPLETE CBC AUTOMATED: CPT

## 2020-11-06 PROCEDURE — 6360000002 HC RX W HCPCS: Performed by: STUDENT IN AN ORGANIZED HEALTH CARE EDUCATION/TRAINING PROGRAM

## 2020-11-06 PROCEDURE — 2500000003 HC RX 250 WO HCPCS: Performed by: NURSE ANESTHETIST, CERTIFIED REGISTERED

## 2020-11-06 PROCEDURE — 99232 SBSQ HOSP IP/OBS MODERATE 35: CPT | Performed by: INTERNAL MEDICINE

## 2020-11-06 PROCEDURE — 2580000003 HC RX 258: Performed by: NURSE ANESTHETIST, CERTIFIED REGISTERED

## 2020-11-06 PROCEDURE — 0JBR0ZZ EXCISION OF LEFT FOOT SUBCUTANEOUS TISSUE AND FASCIA, OPEN APPROACH: ICD-10-PCS | Performed by: PODIATRIST

## 2020-11-06 PROCEDURE — 6360000002 HC RX W HCPCS: Performed by: NURSE ANESTHETIST, CERTIFIED REGISTERED

## 2020-11-06 PROCEDURE — 2500000003 HC RX 250 WO HCPCS: Performed by: PODIATRIST

## 2020-11-06 PROCEDURE — 3600000002 HC SURGERY LEVEL 2 BASE: Performed by: PODIATRIST

## 2020-11-06 PROCEDURE — 3700000001 HC ADD 15 MINUTES (ANESTHESIA): Performed by: PODIATRIST

## 2020-11-06 PROCEDURE — 2580000003 HC RX 258: Performed by: INTERNAL MEDICINE

## 2020-11-06 PROCEDURE — 6370000000 HC RX 637 (ALT 250 FOR IP): Performed by: INTERNAL MEDICINE

## 2020-11-06 PROCEDURE — 71045 X-RAY EXAM CHEST 1 VIEW: CPT

## 2020-11-06 PROCEDURE — 3600000012 HC SURGERY LEVEL 2 ADDTL 15MIN: Performed by: PODIATRIST

## 2020-11-06 RX ORDER — PROPOFOL 10 MG/ML
INJECTION, EMULSION INTRAVENOUS PRN
Status: DISCONTINUED | OUTPATIENT
Start: 2020-11-06 | End: 2020-11-06 | Stop reason: SDUPTHER

## 2020-11-06 RX ORDER — LIDOCAINE HYDROCHLORIDE 10 MG/ML
INJECTION, SOLUTION INFILTRATION; PERINEURAL PRN
Status: DISCONTINUED | OUTPATIENT
Start: 2020-11-06 | End: 2020-11-06 | Stop reason: HOSPADM

## 2020-11-06 RX ORDER — LIDOCAINE HYDROCHLORIDE 10 MG/ML
INJECTION, SOLUTION INFILTRATION; PERINEURAL PRN
Status: DISCONTINUED | OUTPATIENT
Start: 2020-11-06 | End: 2020-11-06 | Stop reason: SDUPTHER

## 2020-11-06 RX ORDER — PROMETHAZINE HYDROCHLORIDE 25 MG/1
12.5 TABLET ORAL EVERY 6 HOURS PRN
Status: DISCONTINUED | OUTPATIENT
Start: 2020-11-06 | End: 2020-11-08 | Stop reason: HOSPADM

## 2020-11-06 RX ORDER — SODIUM CHLORIDE, SODIUM LACTATE, POTASSIUM CHLORIDE, CALCIUM CHLORIDE 600; 310; 30; 20 MG/100ML; MG/100ML; MG/100ML; MG/100ML
INJECTION, SOLUTION INTRAVENOUS CONTINUOUS PRN
Status: DISCONTINUED | OUTPATIENT
Start: 2020-11-06 | End: 2020-11-06 | Stop reason: SDUPTHER

## 2020-11-06 RX ORDER — OXYCODONE HYDROCHLORIDE AND ACETAMINOPHEN 5; 325 MG/1; MG/1
1 TABLET ORAL
Status: DISCONTINUED | OUTPATIENT
Start: 2020-11-06 | End: 2020-11-07

## 2020-11-06 RX ORDER — SODIUM CHLORIDE 0.9 % (FLUSH) 0.9 %
10 SYRINGE (ML) INJECTION EVERY 12 HOURS SCHEDULED
Status: DISCONTINUED | OUTPATIENT
Start: 2020-11-06 | End: 2020-11-08 | Stop reason: HOSPADM

## 2020-11-06 RX ORDER — ONDANSETRON 2 MG/ML
4 INJECTION INTRAMUSCULAR; INTRAVENOUS EVERY 6 HOURS PRN
Status: DISCONTINUED | OUTPATIENT
Start: 2020-11-06 | End: 2020-11-08 | Stop reason: HOSPADM

## 2020-11-06 RX ORDER — SODIUM CHLORIDE 0.9 % (FLUSH) 0.9 %
10 SYRINGE (ML) INJECTION PRN
Status: DISCONTINUED | OUTPATIENT
Start: 2020-11-06 | End: 2020-11-08 | Stop reason: HOSPADM

## 2020-11-06 RX ADMIN — SODIUM CHLORIDE, PRESERVATIVE FREE 10 ML: 5 INJECTION INTRAVENOUS at 22:11

## 2020-11-06 RX ADMIN — PIPERACILLIN AND TAZOBACTAM 3.38 G: 3; .375 INJECTION, POWDER, LYOPHILIZED, FOR SOLUTION INTRAVENOUS at 08:55

## 2020-11-06 RX ADMIN — PROPOFOL 20 MG: 10 INJECTION, EMULSION INTRAVENOUS at 14:35

## 2020-11-06 RX ADMIN — CARBAMAZEPINE 100 MG: 100 TABLET, CHEWABLE ORAL at 08:46

## 2020-11-06 RX ADMIN — METOPROLOL TARTRATE 12.5 MG: 25 TABLET, FILM COATED ORAL at 22:15

## 2020-11-06 RX ADMIN — PROPOFOL 30 MG: 10 INJECTION, EMULSION INTRAVENOUS at 14:30

## 2020-11-06 RX ADMIN — OXYCODONE HYDROCHLORIDE AND ACETAMINOPHEN 1 TABLET: 5; 325 TABLET ORAL at 19:37

## 2020-11-06 RX ADMIN — ENOXAPARIN SODIUM 40 MG: 60 INJECTION SUBCUTANEOUS at 22:10

## 2020-11-06 RX ADMIN — PIPERACILLIN AND TAZOBACTAM 3.38 G: 3; .375 INJECTION, POWDER, LYOPHILIZED, FOR SOLUTION INTRAVENOUS at 01:14

## 2020-11-06 RX ADMIN — LIDOCAINE HYDROCHLORIDE 50 MG: 10 INJECTION, SOLUTION INFILTRATION; PERINEURAL at 14:30

## 2020-11-06 RX ADMIN — PIPERACILLIN AND TAZOBACTAM 3.38 G: 3; .375 INJECTION, POWDER, LYOPHILIZED, FOR SOLUTION INTRAVENOUS at 15:57

## 2020-11-06 RX ADMIN — CARBAMAZEPINE 100 MG: 100 TABLET, CHEWABLE ORAL at 22:11

## 2020-11-06 RX ADMIN — PROPOFOL 10 MG: 10 INJECTION, EMULSION INTRAVENOUS at 14:44

## 2020-11-06 RX ADMIN — POTASSIUM BICARBONATE 40 MEQ: 782 TABLET, EFFERVESCENT ORAL at 15:57

## 2020-11-06 RX ADMIN — SODIUM CHLORIDE, POTASSIUM CHLORIDE, SODIUM LACTATE AND CALCIUM CHLORIDE: 600; 310; 30; 20 INJECTION, SOLUTION INTRAVENOUS at 14:22

## 2020-11-06 RX ADMIN — POTASSIUM BICARBONATE 40 MEQ: 782 TABLET, EFFERVESCENT ORAL at 08:45

## 2020-11-06 RX ADMIN — CARBAMAZEPINE 100 MG: 100 TABLET, CHEWABLE ORAL at 15:57

## 2020-11-06 RX ADMIN — POTASSIUM BICARBONATE 40 MEQ: 782 TABLET, EFFERVESCENT ORAL at 15:58

## 2020-11-06 RX ADMIN — SODIUM CHLORIDE, PRESERVATIVE FREE 10 ML: 5 INJECTION INTRAVENOUS at 08:42

## 2020-11-06 RX ADMIN — PROPOFOL 10 MG: 10 INJECTION, EMULSION INTRAVENOUS at 14:33

## 2020-11-06 RX ADMIN — OXYCODONE HYDROCHLORIDE AND ACETAMINOPHEN 1 TABLET: 5; 325 TABLET ORAL at 22:11

## 2020-11-06 ASSESSMENT — PULMONARY FUNCTION TESTS
PIF_VALUE: 0

## 2020-11-06 ASSESSMENT — PAIN DESCRIPTION - PAIN TYPE: TYPE: ACUTE PAIN

## 2020-11-06 ASSESSMENT — PAIN SCALES - GENERAL
PAINLEVEL_OUTOF10: 9
PAINLEVEL_OUTOF10: 0
PAINLEVEL_OUTOF10: 6
PAINLEVEL_OUTOF10: 9
PAINLEVEL_OUTOF10: 0
PAINLEVEL_OUTOF10: 0

## 2020-11-06 ASSESSMENT — PAIN DESCRIPTION - DESCRIPTORS: DESCRIPTORS: ACHING

## 2020-11-06 ASSESSMENT — PAIN DESCRIPTION - LOCATION: LOCATION: FOOT

## 2020-11-06 ASSESSMENT — PAIN DESCRIPTION - FREQUENCY: FREQUENCY: CONTINUOUS

## 2020-11-06 ASSESSMENT — PAIN DESCRIPTION - ORIENTATION: ORIENTATION: LEFT

## 2020-11-06 ASSESSMENT — PAIN DESCRIPTION - ONSET: ONSET: ON-GOING

## 2020-11-06 NOTE — ANESTHESIA PRE PROCEDURE
Department of Anesthesiology  Preprocedure Note       Name:  Ricky Maguire   Age:  68 y.o.  :  1950                                          MRN:  [id number]         Date:  2020      Surgeon: Gladys Strong):  Ifrah Mcdowell DPM    Procedure: Procedure(s):  LEFT HEEL WOUND I&D    Medications prior to admission:   Prior to Admission medications    Medication Sig Start Date End Date Taking? Authorizing Provider   aspirin (RA ASPIRIN EC) 81 MG EC tablet take 1 tablet by mouth once daily 20  Yes Srinivasa Martínez MD   metoprolol tartrate (LOPRESSOR) 25 MG tablet take 1/2 tablet by mouth twice a day 9/3/19  Yes Martita Schmidt MD   furosemide (LASIX) 10 MG/ML solution Take by mouth daily 4 milliliters by mout daily 9/3/19  Yes Martita Schmidt MD   D-Mannose 500 MG CAPS Take 1,500 mg by mouth daily 19  Yes BROOK Pineda CNP   Cholecalciferol (VITAMIN D3 PO) Take by mouth daily   Yes Historical Provider, MD   Methenamine-Sodium Salicylate (CYSTEX PO) Take by mouth    Historical Provider, MD   acetic acid 0.25 % irrigation Irrigate catheter with 150 ml weekly.  20   BROOK Renteria - CNP   methenamine (HIPREX) 1 g tablet Take 1 tablet by mouth 2 times daily (with meals) 20   Sandra Edward APRN - CNP   Water For Irrigation, Sterile (STERILE WATER FOR IRRIGATION) Irrigate vega catheter with 50 ml of sterile water weekly 19   Sandra Edward APRN - CNP   Incontinence Supplies ([facility]) [facility] [place] Large 2000 cc bedside drainage bag 19   Sandra Edward APRN - CNP   Incontinence Supplies (URINARY LEG BAG) [facility] [place] 900 cc Tracy Urinary catheter leg bag 1/15/19   Cloycjohn Edward APRN - CNP   Incontinence Supplies (URINARY LEG BAG STRAPS) MISC Leg bag straps to go with urinary catheter leg bag 1/15/19   Ayla Rondon APRN - CNP   CRANBERRY PO Take by mouth daily    Historical Provider, MD   carBAMazepine (TEGRETOL) 100 MG chewable tablet Take 100 mg by mouth 3 times daily     Historical Provider, MD       Current medications:    Current Facility-Administered Medications   Medication Dose Route Frequency Provider Last Rate Last Dose    potassium bicarb-citric acid (EFFER-K) effervescent tablet 40 mEq  40 mEq Oral Once Adebayo Parsons MD        oxyCODONE-acetaminophen (PERCOCET) 5-325 MG per tablet 1 tablet  1 tablet Oral Q2H PRN Adebayo Parsons MD        potassium bicarb-citric acid (EFFER-K) effervescent tablet 40 mEq  40 mEq Oral BID AC Adebayo Parsons MD   40 mEq at 11/06/20 0845    calcium replacement protocol   Other RX Placeholder Adebayo Parsons MD        potassium chloride (KLOR-CON M) extended release tablet 40 mEq  40 mEq Oral PRN Adebayo Parsons MD        Or    potassium bicarb-citric acid (EFFER-K) effervescent tablet 40 mEq  40 mEq Oral PRN Adebayo Parsons MD   40 mEq at 11/05/20 1032    Or    potassium chloride 10 mEq/100 mL IVPB (Peripheral Line)  10 mEq Intravenous PRN Adebayo Parsons  mL/hr at 11/04/20 2102 10 mEq at 11/04/20 2102    metoprolol tartrate (LOPRESSOR) tablet 12.5 mg  12.5 mg Oral BID Adebayo Parsons MD   Stopped at 11/06/20 0845    aspirin EC tablet 81 mg  81 mg Oral Daily Venice Gallego MD   Stopped at 11/06/20 0846    carBAMazepine (TEGRETOL) chewable tablet 100 mg  100 mg Oral TID Venice Gallego MD   100 mg at 11/06/20 0846    sodium chloride flush 0.9 % injection 10 mL  10 mL Intravenous 2 times per day Venice Gallego MD   10 mL at 11/06/20 0842    sodium chloride flush 0.9 % injection 10 mL  10 mL Intravenous PRN Venice Gallego MD   10 mL at 11/05/20 1650    acetaminophen (TYLENOL) tablet 650 mg  650 mg Oral Q6H PRN Venice Gallego MD        Or   [facility] acetaminophen (TYLENOL) suppository 650 mg  650 mg Rectal Q6H PRN Venice Gallego MD        polyethylene glycol (GLYCOLAX) packet 17 g  17 g Oral Daily PRN Venice Gallego MD        promethazine (PHENERGAN) tablet 12.5 mg 12.5 mg Oral Q6H PRN Tanesha Marcum MD        Or    ondansetron [facility]) injection 4 mg  4 mg Intravenous Q6H PRN Tanesha Marcum MD        enoxaparin (LOVENOX) injection 40 mg  40 mg Subcutaneous Q24H Tanesha Marcum MD   Stopped at 11/05/20 1819    piperacillin-tazobactam (ZOSYN) 3.375 g in dextrose 5 % 50 mL IVPB extended infusion (mini-bag)  3.375 g Intravenous Q8H Tanesha Marcum MD   Stopped at 11/06/20 1308    potassium replacement protocol   Other RX Placeholder Tanesha Marcum MD           Allergies:  No Known Allergies    Problem List:    Patient Active Problem List   Diagnosis Code    MS (multiple sclerosis) ([facility] G35    Seizure disorder ([facility] G40.909    Frequent PVCs I49.3    Hematuria R31.9    Nicotine abuse Z72.0    Hematuria, gross R31.0    Sepsis ([address] A41.9    HTN (hypertension) I10    HLD (hyperlipidemia) E78.5    Multiple sclerosis ([address] P17    Metabolic acidosis, normal anion gap (NAG) E87.2    Open wound of foot, complicated, left, initial encounter S91.302A       Past Medical History:        Diagnosis Date    Arthritis     Hyperlipidemia     Hypertension     Intra-abdominal lymphadenopathy     MS (multiple sclerosis) ([address]     Pancreatitis     Pneumonia     Seizures ([address]     UTI (urinary tract infection)        Past Surgical History:        Procedure Laterality Date    CHOLECYSTECTOMY  April 1st 2016    laparoscopic    CYSTOSCOPY N/A 1/2/2019    CYSTO, CLOT EVACUATION, TURBT performed by Aric Paul MD at [address] [place] [address] Right 12/17/2018    EYE CATARACT EMULSIFICATION IOL IMPLANT, RIGHT performed by Arian Chambers MD at [address] [facility] INSJ IO LENS PROSTH W/O ECP Left 11/15/2018    EYE CATARACT EMULSIFICATION IOL IMPLANT LEFT EYE performed by Arian Chambers MD at [address] [address] [address] History:    Social History     Tobacco Use    Smoking status: Former Smoker PO2ART, [id number], [id number], BEART, [id number]     Type & Screen (If Applicable):  Lab Results   Component Value Date    LABRH NEG 01/02/2019       Drug/Infectious Status (If Applicable):  No results found for: HIV, HEPCAB    COVID-19 Screening (If Applicable):   Lab Results   Component Value Date    COVID19 NOT DETECTED 11/05/2020         Anesthesia Evaluation    Airway: Mallampati: II  TM distance: >3 FB   Neck ROM: full  Mouth opening: > = 3 FB Dental:          Pulmonary:   (+) decreased breath sounds,                             Cardiovascular:    (+) hypertension:,         Rhythm: regular                      Neuro/Psych:   (+) neuromuscular disease: multiple sclerosis,             GI/Hepatic/Renal:             Endo/Other:                     Abdominal:           Vascular:                                        Anesthesia Plan      MAC     ASA 4     (Possible GA)  Induction: intravenous. MIPS: Postoperative opioids intended and Prophylactic antiemetics administered. Anesthetic plan and risks discussed with patient and spouse. Plan discussed with CRNA.                   Susan Paz MD   11/6/2020

## 2020-11-06 NOTE — PROGRESS NOTES
Patient alert and oriented x3. Heart sounds normal with S1 And S2 present. Tachycardic of 131 bpm. Lung sounds clear in all lobes. Lower extremities cool and pale. Limited movement. Pedal push and pull absent bilaterally. +4 pitting edema in left foot. Stage 2 pressure ulcer on left foot open to room air. Patient reports pain \"10\" on scale of 0-10. Patient reports left foot is \"throbbing. \" Notified primary RN, Jared Matos.

## 2020-11-06 NOTE — CARE COORDINATION
11/6/20, 3:12 PM EST  Anticipate discharge Sat to home with family and continued services (need new order for RN, PT OT aide and LSW) of [facility]. SW asked staff to please call Jay Freeman at [address] and fax [phone] to complete the discharge. Patient goals/plan/ treatment preferences discussed by  and . Patient goals/plan/ treatment preferences reviewed with patient/ family. Patient/ family verbalize understanding of discharge plan and are in agreement with goal/plan/treatment preferences. Understanding was demonstrated using the teach back method. AVS provided by RN at time of discharge, which includes all necessary medical information pertaining to the patients current course of illness, treatment, post-discharge goals of care, and treatment preferences.     Services After Discharge  Services At/After Discharge: Nursing Services, OT, PT, Aide Services([facility] and [facility])   IMM Letter  IMM Letter given to Patient/Family/Significant other/Guardian/POA/by[de-identified]   IMM Letter date given[de-identified] 11/06/20  IMM Letter time given[de-identified] 1640

## 2020-11-06 NOTE — PROGRESS NOTES
[address] [address]  PHYSICAL THERAPY MISSED TREATMENT NOTE  ACUTE CARE  [facility] OR          Missed Treatment:  PT eval/tx orders received. Per chart review, pt noted to be bedbound at home, phillip lift for OOB transfers, and  assists with ADL tasks. No therapy services warranted at this time. Per OT,  SW who confirmed and denied need for therapy evals.  Will discontinue orders at this time

## 2020-11-06 NOTE — PROGRESS NOTES
Hospitalist Progress Note      Patient:  Sujey Kellogg    Unit/Bed:4K-03/003-A  YOB: 1950  MRN: [id number]   Acct: [de-identified]   PCP: Sanjuanita Rabago MD  Date of Admission: 11/3/2020    Assessment/Plan:    1. Infected left lower foot wound-resolving. 11/3-4/2020: We know this based on the patient's presentation and based on physical exam done by podiatry. Infectious cause indicated by initial symptoms including foul-smelling discharge from wound, fatigue, chills, swelling, pain and poor p.o. intake. X-ray of the left foot showed nondisplaced fracture of the distal phalange of the left first digit. Concern for osteomyelitis. A CRP was obtained which was elevated at 9.05, which is a nonspecific laboratory finding for inflammation. This diagnosis is also supported by CBC finding of white blood cell count of 12.7 on presentation. However, as of the morning of 11/4/2020 the white blood cell count dropped to 7.6. Initial electrolytes and CBC were obtained. Initial left foot x-ray was obtained. Anaerobic culture of the foot was obtained, showed gram-positive cocci in pairs. Blood cultures were obtained which are pending. Urine cultures grew gram-positive cocci. Urinalysis demonstrated trace ketones, moderate blood, trace protein, and large leukocyte esterase. The patient had substantial pain that was not effectively treated with Tylenol and morphine. 11/5/2020: The patient states that her pain is substantially improved on the Dilaudid. The patient also states that she is feeling a lot better because she slept better. Podiatry will perform surgical debridement on 11/6/2020.    11/6/2020: The patient received surgical debridement of left infected heel ulcer today. The patient's pain control meds was changed from IV Dilaudid to p.o. Percocet. We recommend discharge to nursing home. However, the patient does not want this.   Therefore, patient will be discharged home with home health.    -11/3-4/2020: Monitor patient's condition with daily BMP and CBC. ID and podiatry will follow. We will maintain patient on Zosyn 3.375 g every 8 hours. If no clinical improvement, will engage in work-up for osteomyelitis including MRI. The patient was switched from morphine to Dilaudid. -11/5/2020: We will maintain patient on Dilaudid 0.5 mg every 4 hours as needed for pain control. We will maintain patient on Zosyn 3.275 g every 8 hours. We will make patient n.p.o. after midnight on 11/5/2020 for surgery on 11/6/2020. We will continue to turn the patient every 2 hours. -11/6/2020: We will reevaluate patient's pain tomorrow morning to determine whether patient has adequate control on Percocet. Patient will be discharged on oral Percocet and Augmentin per ID recommendation. 2.  Hypotension, unspecified-resolved. 11/3-4/2020: The patient's blood pressure is low at baseline in the 80s. Patient was given several liters of normal saline bolus. The patient has also had a notable tachycardia with her last pulse being 124 as of the night of 11/4/2020 with a blood pressure at the same time of 98/50. The patient is on Lopressor 12.5 mg twice daily at home, but this was stopped due to hypotension. An EKG was obtained, which is sinus tachycardia, low voltage QRS, incomplete left bundle branch block, nonspecific ST and T wave abnormality, and no significant changes compared to ECG from August 31, 2019, per cardiology. 11/5/2020: The patient's blood pressure has been stable as was 92/52 as of the morning of 11/5/2020, which is close to or slightly above her baseline. Pulse rate has also dropped to 83.    11/6/2020: Blood pressure stable 115/58 and pulse stable at 92.    -11/3-4/2020: We will give the patient half a liter normal saline bolus and resume Lopressor as described above. Night team was informed to monitor patient's heart rate and blood pressure.   Will reevaluate in the morning of 11/5/2020. We will follow-up with PCP as an outpatient. -11/5/2020: We will maintain Lopressor as described above. -11/6/2020: See above. 3.  Tachycardia, unspecified-resolved. 11/3-4/2020: Unknown whether patient is tachycardic at baseline. Otherwise, tachycardia as described above. 11/5/2020: Tachycardia has improved as pulse was 83 as of the morning of 11/5/2020.    11/6/2020: See above. -11/3-4/2020: We will follow-up with patient with regards to baseline heart rate. Otherwise, treat as described above for hypertension. -11/5/2020: We will maintain Lopressor as described above. -11/6/2020: See above. 4.  Hypokalemia-stable. 11/3-4/2020: This can be seen in potassium increased from 2.3 on 11/3/2020 to 2.9 on 11/4/2020. However, potassium is still low. 11/5/2020: Potassium has increased to 3.1 on 11/5/2020 as opposed to 2.8 11/4/2020.    11/6/2020: Patient's potassium remains at 3.1 as of 11/6/2020.    -11/3-4/2020: We will maintain patient on potassium replacement protocol. We will monitor with continuous telemetry and daily BMP.    -11/5/2020: See above. -11/6/2020: Patient will be obtained on 80 mg/day of p.o. effervescent potassium. 5.  Hypocalcemia-stable. 11/3-4/2020: This can be seen from low calcium on 11/4/2020 of 8.2 and low ionized calcium of 1.06.    11/5/2020: Calcium is slightly lower at 7.9 on 11/5/2020 as opposed to 8.2 on 11/4/2020.      11/6/2020: Calcium is stable at 7.9.    -11/3-4/2020: We will maintain patient on calcium replacement protocol. We will monitor with daily BMP.    -11/5/2020: See above. -11/6/2020: See above. 6.  Hypoglycemia -resolved. 11/3-4/2020: This can be seen from glucose level of 60 on the morning of 11/4/2020 as compared to a glucose level of 92 on the previous day. The patient was given a dose of oral glucose and taken off of n.p.o. and placed on a general diet.     11/5/2020: Glucose was stable at 86 on the morning of 11/5/2020.    11/6/2020: Glucose stable at 84.    -11/3-4/2020: We will obtain new pocket glucose and monitor with daily BMP. If pocket glucose is low, will treat and monitor hypoglycemia more aggressively. -11/5/2020: We will monitor with daily BMP. Podiatry will monitor glucose perioperatively. -11/6/2020: We will monitor with daily BMP. 7.  Macrocytic anemia-stable. 11/3-4/2020: The patient has a mild anemia normocytic to macrocytic anemia with a hemoglobin of 11.3 and an MCV of 99.1 as of 11/4/2020. This appears to be slightly less than the patient's baseline, which appears to be around 13.0-13.8. Blood loss, anemia of chronic disease, or nutritional deficiencies are all possibilities. 11/5/2020: Last CBC on 11/4/2020 showed hemoglobin of 11.3.    11/6/2020: Hemoglobin 10.9 and .5.    -11/3-4/2020: We will monitor with daily CBC. If anemia does not resolve, will engage in more aggressive work-up, including iron studies, folate, B12, methylmalonic acid, and homocysteine. -11/5/2020: We will monitor with daily CBC. We will follow-up with PCP as an outpatient. -11/6/2020: We will monitor with daily CBC. We will follow-up with PCP as an outpatient. 8.  Benign essential hypertension-stable. 11/3-4/2020: We know this based on the patient's history. 11/5/2020: See above. 11/6/2020: See above. -11/3-4/2020: We will treat as described above for hypotension. -11/5/2020: See above. -11/6/2020: See above. 9.  Hyperlipidemia-stable. 11/3-4/2020: We know this based the patient's history. 11/5/2020: See above. 11/6/2020: See above. -11/3-4/2020: We will maintain patient on baby aspirin and follow-up with PCP as an outpatient. -11/5/2020: See above. -11/6/2020: See above. 10.  Epilepsy, unspecified, not intractable, without status epilepticus-stable. 11/3-4/2020:  We know this based on the patient's history. 11/5/2020: See above. 11/6/2020: See above. -11/3-4/2020: We will maintain patient on Tegretol chewable tablet 100 mg 3 times daily. We will follow-up with PCP as an outpatient. -11/5/2020: See above. -11/6/2020: See above. Expected discharge date: 11/7/2020. Disposition:    [x] Home       [] TCU       [] Rehab       [] Psych       [] SNF       [] [place]       [] Other-    Chief Complaint: Left heel open foot wound. Opening statement:    The patient is a 64-year-old female with a complex medical history including previous seizures, UTI, hypertension, hyperlipidemia, and multiple sclerosis with bilateral lower limb paralysis and catheter use, who presents with a chief complaint of left heel open foot wound. Hospital Treatment Course:     11/3-4/2020: The patient presented to [facility] with complaints of increased foot pain and swelling on 11/3/2020. The patient is bedbound for multiple sclerosis and has a Stanford catheter which is exchanged on a monthly basis. The patient has noted a chronic left heel wound has been ongoing for the past several months and had a wound clinic appointment scheduled for 11/11/2020. The patient affirmed foul-smelling odor from left foot wound, fatigue, chills, pain, swelling and poor p.o. intake. Podiatry, social work, and ID were consulted for patient treatment. Over the course of her hospitalization so far, patient has received urinalysis, CBC, INR, urine and blood cultures, multiple normal saline boluses, Zosyn and a one-time dose of vancomycin. Patient also received glucose oral gel for treatment of hypoglycemia. The patient was initially made n.p.o. for possible procedure. N.p.o. was canceled and patient was changed to general diet as podiatry did not recommend surgery at this time.     On presentation, the patient is stable but she states that she is not feeling well because she only got half an hour of sleep last night and because of severe pain in her left foot. She stated that neither morphine or Tylenol helped. Otherwise, the patient has no new specific complaints or concerns. The patient was given morphine and Tylenol and later Dilaudid to treat pain. Patient also received x-ray of the left foot that showed a nondisplaced fracture of the distal phalange of the left first digit. An EKG that shows sinus tachycardia, low voltage QRS, incomplete left bundle branch block, and nonspecific ST and T wave abnormalities per cardiology. 11/5/2020:    Patient's blood pressure improved to 92/52 on the morning of 11/5/2020. In addition, the patient's pulse dropped to 83.    11/6/2020:    Patient's blood pressure stable 115/58 and pulse stable at 92. Subjective (past 24 hours): The patient states that she is feeling okay, and further states that her pain is only a 5 out of 10. She states that this is an acceptable level of control for her. Otherwise, the patient has no new specific complaints or concerns. Cardiovascular: Denies chest pain and palpitations.     Respiratory: Denies shortness of breath and pleuritic chest pain.     GI: Denies abdominal pain and nausea.     : Denies dysuria, hesitancy, and zena blood. Past medical history, family history, social history and allergies reviewed again and is unchanged since admission. ROS (12 point review of systems completed. Pertinent positives noted.  Otherwise ROS is negative)     Medications:  Reviewed    Infusion Medications   Scheduled Medications    sodium chloride flush  10 mL Intravenous 2 times per day    potassium bicarb-citric acid  40 mEq Oral BID AC    calcium replacement protocol   Other RX Placeholder    metoprolol tartrate  12.5 mg Oral BID    aspirin  81 mg Oral Daily    carBAMazepine  100 mg Oral TID    enoxaparin  40 mg Subcutaneous Q24H    piperacillin-tazobactam  3.375 g Intravenous Q8H    potassium replacement protocol   Other RX Placeholder     PRN Meds: oxyCODONE-acetaminophen, sodium chloride flush, promethazine **OR** ondansetron, potassium chloride **OR** potassium alternative oral replacement **OR** potassium chloride, acetaminophen **OR** acetaminophen, polyethylene glycol      Intake/Output Summary (Last 24 hours) at 11/6/2020 0716  Last data filed at 11/6/2020 1448  Gross per 24 hour   Intake 100 ml   Output 570 ml   Net -470 ml       Diet:  DIET GENERAL;    Exam:  BP (!) 115/58   Pulse 92   Temp 98 °F (36.7 °C) (Oral)   Resp 18   Ht 5' 3\" (1.6 m)   Wt 138 lb 7 oz (62.8 kg)   SpO2 97%   BMI 24.52 kg/m²   General appearance: The patient is awake, alert and in no acute distress. The patient is capable of answering questions and following commands. HEENT: Pupils equal, round, and reactive to light. Conjunctivae/corneas clear. Neck: Supple, with full range of motion. No jugular venous distention. Trachea midline. Respiratory:  Normal respiratory effort. Clear to auscultation, bilaterally without Rales/Wheezes/Rhonchi. Cardiovascular: Regular rate and rhythm with normal S1/S2 without murmurs, rubs or gallops. Abdomen: Soft, non-tender, non-distended with normal bowel sounds. Musculoskeletal: passive and active ROM x 2 upper extremities. No passive or active ROM x2 lower extremities. Skin: Skin color, texture, turgor normal.  No rashes or lesions. Capillary Refill: Brisk,< 3 seconds   Peripheral Pulses: +2 palpable radial pulses. Extremities: Bandages cover both feet. Examination of the feet deferred as patient is being followed by podiatry and is patient stated that examination caused her pain.     Labs:   Recent Labs     11/04/20 0520 11/06/20  0538   WBC 7.6 5.5   HGB 11.3* 10.9*   HCT 33.9* 34.5*    176     Recent Labs     11/04/20  0520 11/04/20  1110 11/05/20  0825 11/06/20  0538     --  139 139   K 2.6* 2.8* 3.1* 3.1*   CL 95*  --  103 106   CO2 30  --  26 24   BUN 17  --  7 6* CREATININE 0.6  --  0.4 0.4   CALCIUM 8.2*  --  7.9* 7.9*     No results for input(s): AST, ALT, BILIDIR, BILITOT, ALKPHOS in the last 72 hours. No results for input(s): INR in the last 72 hours. No results for input(s): Wendy Walton in the last 72 hours. Microbiology:    Blood culture #1:   Lab Results   Component Value Date    BC No growth-preliminary  11/03/2020       Blood culture #2:No results found for: Gerry Leighann    Organism:  Lab Results   Component Value Date    ORG Enterococcus faecalis - (Group D) 11/03/2020         Lab Results   Component Value Date    LABGRAM  11/03/2020     No segmented neutrophils observed. Rare epithelial cells observed. Many gram positive cocci occurring singly and in pairs. Few large gram positive bacilli. MRSA culture only:No results found for: [address] [place] [address]    Urine culture:   Lab Results   Component Value Date    LABURIN Coxsackie count: >100,000 CFU/mL  11/03/2020       Respiratory culture: No results found for: CULTRESP    Aerobic and Anaerobic :  Lab Results   Component Value Date    LABAERO  11/03/2020     Culture yielded moderate mixed growth of gram positive bacilli most consistent with Corynebacterium, Enterococcus species and Staphylococcus (coagulase negative). Clinical correlation required. Lab Results   Component Value Date    LABANAE  11/03/2020     Culture yielded moderate mixed growth which included anaerobic gram positive bacilli most consistent with Clostridium species, anaerobic gram positive cocci, and anaerobic gram positive bacilli most consistent with Corynebacterium species. Broad spectrum empiric antibiotic therapy is indicated andshould include coverage for anaerobic organisms.        Urinalysis:      Lab Results   Component Value Date    NITRU NEGATIVE 11/03/2020    WBCUA > 200 11/03/2020    BACTERIA MANY 11/03/2020    RBCUA 25-50 11/03/2020    BLOODU MODERATE 11/03/2020    SPECGRAV 1.020 02/18/2019    GLUCOSEU NEGATIVE 11/03/2020 Radiology:  XR CHEST PORTABLE   Final Result   Prominent interstitium and more focal opacity in the right lung base correlate for edema, atelectasis or pneumonia. **This report has been created using voice recognition software. It may contain minor errors which are inherent in voice recognition technology. **      Final report electronically signed by Dr. Des Guzman on 11/6/2020 3:12 PM      XR FOOT LEFT (MIN 3 VIEWS)   Final Result   1. There is a linear lucency through the mid aspect of the distal phalange of the left first digit. This is concerning for a nondisplaced fracture. Correlate clinically for point tenderness at this location. 2. There is diffuse osteoporosis. 3. There is nonspecific soft tissue swelling seen diffusely throughout the left foot. This may be related to cellulitis versus edema. **This report has been created using voice recognition software. It may contain minor errors which are inherent in voice recognition technology. **      Final report electronically signed by Dr. Alison Meek on 11/3/2020 11:32 AM        Xr Foot Left (min 3 Views)    Result Date: 11/3/2020  PROCEDURE: XR FOOT LEFT (MIN 3 VIEWS) CLINICAL INFORMATION: infection COMPARISON: 5/29/2010 TECHNIQUE:  Left foot 4 views  FINDINGS: There is a linear lucency through the mid aspect of the distal phalange of the left first digit. This is concerning for a nondisplaced fracture. Correlate clinically for point tenderness at this location. There is diffuse osteoporosis. There is nonspecific soft tissue swelling seen diffusely throughout the left foot. This may be related to cellulitis versus edema. There is spurring along the first metatarsal head. 1. There is a linear lucency through the mid aspect of the distal phalange of the left first digit. This is concerning for a nondisplaced fracture. Correlate clinically for point tenderness at this location. 2. There is diffuse osteoporosis.  3. There is nonspecific soft tissue swelling seen diffusely throughout the left foot. This may be related to cellulitis versus edema. **This report has been created using voice recognition software. It may contain minor errors which are inherent in voice recognition technology. ** Final report electronically signed by Dr. Subhash Rutledge on 11/3/2020 11:32 AM      Support Devices (date placed):  [] ETT []Oral / [] Nasal  [] Gastric Tube [] OG / [] NG    [] Central Venous Line (Specify Site):  [x] Urinary Catheter  [] Arterial Line (Specify Site)  [x] Peripheral IV access  [] Other:    DVT prophylaxis: [x] Lovenox                                 [] SCDs                                 [] SQ Heparin                                 [] Encourage ambulation           [] Already on Anticoagulation     Code Status: Full Code    Tele:   [x] yes             [] no    Electronically signed by Yrn Gilman MD, MPH, [facility] on 11/6/2020 at 5:34 PM   Supervised by Dr. Gwyn Casiano

## 2020-11-06 NOTE — FLOWSHEET NOTE
[facility] THERAPY MISSED TREATMENT NOTE  [facility] ICU STEPDOWN TELEMETRY 4K  4K-003-A      Date: 2020  Patient Name: Hamlet Vila        CSN: [id number]   : 1950  (68 y.o.)  Gender: female                REASON FOR MISSED TREATMENT: OT eval/tx orders received. Per chart review, pt noted to be bedbound at home, phillip lift for OOB transfers, and  assists with ADL tasks. No therapy services warranted at this time. Spoke with SW who confirmed, denied need for therapy evals.  Will discontinue orders at this time

## 2020-11-06 NOTE — BRIEF OP NOTE
Brief Postoperative Note      Patient: Hamlet Vila  YOB: 1950  MRN: [id number]    Date of Procedure: 11/6/2020    Pre-Op Diagnosis: WOUND LEFT HEEL    Post-Op Diagnosis: Same       Procedure(s):  LEFT HEEL WOUND I&D    Surgeon(s):  Troy Gustafson DPM    Assistant:  * No surgical staff found *    Anesthesia: Monitor Anesthesia Care    Estimated Blood Loss (mL): less than 50     Complications: None    Specimens:   * No specimens in log *    Implants:  * No implants in log *      Drains:   Urethral Catheter Double-lumen 16 fr (Active)   Catheter Indications Other (specify) 11/06/20 1100   Site Assessment [place] 11/06/20 1100   Urine Color Yellow 11/06/20 1100   Urine Appearance Hazy 11/06/20 1100   Output (mL) 350 mL 11/06/20 1110       Findings: consistent with diagnosis     Electronically signed by Sowmya Rashid DPM on 11/6/2020 at 2:51 PM

## 2020-11-06 NOTE — CARE COORDINATION
11/6/20, 10:51 AM EST    DISCHARGE ON GOING EVALUATION    [facility] day: 3  Location: -03/003-A Reason for admit: Open wound of foot, complicated, left, initial encounter [S91.302A]   Procedure:   11/3 LLE XR (foot)  1. There is a linear lucency through the mid aspect of the distal phalange of the left first digit. This is concerning for a nondisplaced fracture. Correlate clinically for point tenderness at this location. 2. There is diffuse osteoporosis. 3. There is nonspecific soft tissue swelling seen diffusely throughout the left foot  11/6 LLE (heel) I/D  Treatment Plan of Care:   Client admitted with LLE (heel) wound (history Multiple Sclerosis, functional quadriparesis); ID/Podiatry following  Barriers to Discharge: K+ 3.1 (2.6) improved, (+) Urine Culture: Enterococcus Faecalis; monitor.  BP 80-90/s; monitor, IV AB continued, not home yesterday r/t procedure above planned today by Podiatry  PCP: An Sweeney MD  Readmission Risk Score: 16%  Patient Goals/Plan/Treatment Preferences:   lives with SO, has vega, scooter, chronic vega, non-ambulatory, has [employer] service aides; [facility] (nsg, aide); WC bound and bed bound, NWB LLE; Podiatry refused SNF, current with [facility]ound [facility]; monitor AB plans, therapy following; collaborated with Rekha Gonzalez

## 2020-11-06 NOTE — OP NOTE
Operative Note      Operative Report  Patient: Brody Goss  YOB: 1950  MRN: [id number]    Date of Procedure: 11/6/2020    Pre-Op Diagnosis: WOUND LEFT HEEL    Post-Op Diagnosis: Same       Procedure(s):  LEFT HEEL WOUND I&D    Surgeon(s):  Troy Fuentes DPM    Assistant:  * No surgical staff found *    Anesthesia: Monitor Anesthesia Care    Estimated Blood Loss (mL): less than 50     Complications: None    Specimens:   * No specimens in log *    Implants:  * No implants in log *      Drains:   Urethral Catheter Double-lumen 16 fr (Active)   Catheter Indications Other (specify) 11/06/20 1100   Site Assessment [place] 11/06/20 1100   Urine Color Yellow 11/06/20 1100   Urine Appearance Hazy 11/06/20 1100   Output (mL) 350 mL 11/06/20 1110       Findings: consistent with diagnosis     Indications: Patient is a 68 y.o. female with a chief complaint of left heel wound who is being seen by Dr. Chuy Gama and being treated for left heel decubitus ulcer. At this time all conservative options have been exhausted and surgical intervention is necessary. The procedure has been explained to the patient and they understand the risks, benefits and possible complications including bleeding, infection, recurrence, need for further surgery, need for skin graft, loss of limb, loss of life. No promises have been made as to surgical outcome. Procedure: The patient was transported from the pre-op holding to the operating room and placed in a supint position. A pre-operative injection of 20cc of 1% lidocaine plain was injected into the left foot in a field block. The left foot was then prepped and draped in the normal aspetic manner. Attention was directed to the posterior plantar left heel. The wound was sharply excised using a 15 blade down and through subcutaneous tissue. All necrotic and nonviable tissue was excised sharply and with a rongeur.  The wound was then noted to be a healthy bleeding base measuring 3.5cm. The wound was then flushed with irrisept and dressed with 4x4 gauze, abd pads, kerlix, ace wrap. The patient [facility] floorwith VSS and NVS intact to all digits of the left foot. No complications were noted throughout the procedure. The patient is to be discharged per PACU protocol. The patient is to follow-up with Dr. Opal Maria after discharge .     Dr. Opal Maria, DPEVAN Lindsay DPM PGY-I  Foot and Ankle Surgical Resident  11/6/2020  2:52 PM        Electronically signed by Tres Lindsay DPM on 11/6/2020 at 2:51 PM

## 2020-11-06 NOTE — ANESTHESIA POSTPROCEDURE EVALUATION
Department of Anesthesiology  Postprocedure Note    Patient: Angel Brunner  MRN: [id number]  YOB: 1950  Date of evaluation: 11/6/2020  Time:  2:51 PM     Procedure Summary     Date:  11/06/20 Room / Location:  [address]    Anesthesia Start:  1422 Anesthesia Stop:  1215    Procedure:  LEFT HEEL WOUND I&D (Left ) Diagnosis:  (WOUND LEFT HEEL)    Surgeon:  Estrellita Escalera DPM Responsible Provider:  Kelly Fowler MD    Anesthesia Type:  MAC ASA Status:  4          Anesthesia Type: MAC    Collin Phase I:      Collin Phase II:      Last vitals: Reviewed and per EMR flowsheets.        Anesthesia Post Evaluation    Patient location during evaluation: bedside  Patient participation: complete - patient participated  Level of consciousness: awake and alert  Pain score: 0  Airway patency: patent  Nausea & Vomiting: no nausea and no vomiting  Complications: no  Cardiovascular status: hemodynamically stable  Respiratory status: spontaneous ventilation and acceptable  Hydration status: euvolemic

## 2020-11-06 NOTE — H&P
[address] [address]  History and Physical Update    Pt Name: Melissa Cohen  MRN: [id number]  YOB: 1950  Date of evaluation: 11/6/2020    [x] I have examined the patient and reviewed the H&P/Consult and there are no changes to the patient or plans.     [] I have examined the patient and reviewed the H&P/Consult and have noted the following changes:        Tiff Hong DPM  Electronically signed 11/6/2020 at 2:00 PM

## 2020-11-07 LAB
ANION GAP SERPL CALCULATED.3IONS-SCNC: 7 MEQ/L (ref 8–16)
BUN BLDV-MCNC: 6 MG/DL (ref 7–22)
CALCIUM SERPL-MCNC: 8.1 MG/DL (ref 8.5–10.5)
CHLORIDE BLD-SCNC: 106 MEQ/L (ref 98–111)
CO2: 27 MEQ/L (ref 23–33)
CREAT SERPL-MCNC: 0.5 MG/DL (ref 0.4–1.2)
ERYTHROCYTE [DISTWIDTH] IN BLOOD BY AUTOMATED COUNT: 13 % (ref 11.5–14.5)
ERYTHROCYTE [DISTWIDTH] IN BLOOD BY AUTOMATED COUNT: 49.1 FL (ref 35–45)
FERRITIN: 208 NG/ML (ref 10–291)
FOLATE: 9.9 NG/ML (ref 4.8–24.2)
GFR SERPL CREATININE-BSD FRML MDRD: > 90 ML/MIN/1.73M2
GLUCOSE BLD-MCNC: 114 MG/DL (ref 70–108)
GLUCOSE BLD-MCNC: 116 MG/DL (ref 70–108)
GLUCOSE BLD-MCNC: 81 MG/DL (ref 70–108)
GLUCOSE BLD-MCNC: 85 MG/DL (ref 70–108)
GLUCOSE BLD-MCNC: 97 MG/DL (ref 70–108)
HCT VFR BLD CALC: 38.2 % (ref 37–47)
HEMOGLOBIN: 12.5 GM/DL (ref 12–16)
IRON SATURATION: 36 % (ref 20–50)
IRON: 55 UG/DL (ref 50–170)
MCH RBC QN AUTO: 33.4 PG (ref 26–33)
MCHC RBC AUTO-ENTMCNC: 32.7 GM/DL (ref 32.2–35.5)
MCV RBC AUTO: 102.1 FL (ref 81–99)
PLATELET # BLD: 197 THOU/MM3 (ref 130–400)
PMV BLD AUTO: 10.6 FL (ref 9.4–12.4)
POTASSIUM SERPL-SCNC: 4.3 MEQ/L (ref 3.5–5.2)
RBC # BLD: 3.74 MILL/MM3 (ref 4.2–5.4)
SODIUM BLD-SCNC: 140 MEQ/L (ref 135–145)
TOTAL IRON BINDING CAPACITY: 152 UG/DL (ref 171–450)
VITAMIN B-12: 1882 PG/ML (ref 211–911)
WBC # BLD: 5.9 THOU/MM3 (ref 4.8–10.8)

## 2020-11-07 PROCEDURE — 99232 SBSQ HOSP IP/OBS MODERATE 35: CPT | Performed by: INTERNAL MEDICINE

## 2020-11-07 PROCEDURE — 82948 REAGENT STRIP/BLOOD GLUCOSE: CPT

## 2020-11-07 PROCEDURE — 83550 IRON BINDING TEST: CPT

## 2020-11-07 PROCEDURE — 6370000000 HC RX 637 (ALT 250 FOR IP): Performed by: INTERNAL MEDICINE

## 2020-11-07 PROCEDURE — 2060000000 HC ICU INTERMEDIATE R&B

## 2020-11-07 PROCEDURE — 6370000000 HC RX 637 (ALT 250 FOR IP): Performed by: STUDENT IN AN ORGANIZED HEALTH CARE EDUCATION/TRAINING PROGRAM

## 2020-11-07 PROCEDURE — 2580000003 HC RX 258: Performed by: STUDENT IN AN ORGANIZED HEALTH CARE EDUCATION/TRAINING PROGRAM

## 2020-11-07 PROCEDURE — 80048 BASIC METABOLIC PNL TOTAL CA: CPT

## 2020-11-07 PROCEDURE — 36415 COLL VENOUS BLD VENIPUNCTURE: CPT

## 2020-11-07 PROCEDURE — 82607 VITAMIN B-12: CPT

## 2020-11-07 PROCEDURE — 94760 N-INVAS EAR/PLS OXIMETRY 1: CPT

## 2020-11-07 PROCEDURE — 6360000002 HC RX W HCPCS: Performed by: STUDENT IN AN ORGANIZED HEALTH CARE EDUCATION/TRAINING PROGRAM

## 2020-11-07 PROCEDURE — 83921 ORGANIC ACID SINGLE QUANT: CPT

## 2020-11-07 PROCEDURE — 85027 COMPLETE CBC AUTOMATED: CPT

## 2020-11-07 PROCEDURE — 82746 ASSAY OF FOLIC ACID SERUM: CPT

## 2020-11-07 PROCEDURE — 83540 ASSAY OF IRON: CPT

## 2020-11-07 PROCEDURE — 83090 ASSAY OF HOMOCYSTEINE: CPT

## 2020-11-07 PROCEDURE — 82728 ASSAY OF FERRITIN: CPT

## 2020-11-07 RX ORDER — OXYCODONE HYDROCHLORIDE 5 MG/1
10 TABLET ORAL EVERY 4 HOURS PRN
Status: DISCONTINUED | OUTPATIENT
Start: 2020-11-07 | End: 2020-11-08 | Stop reason: HOSPADM

## 2020-11-07 RX ADMIN — ENOXAPARIN SODIUM 40 MG: 60 INJECTION SUBCUTANEOUS at 20:08

## 2020-11-07 RX ADMIN — ASPIRIN 81 MG: 81 TABLET, COATED ORAL at 09:00

## 2020-11-07 RX ADMIN — SODIUM CHLORIDE, PRESERVATIVE FREE 10 ML: 5 INJECTION INTRAVENOUS at 20:08

## 2020-11-07 RX ADMIN — POTASSIUM BICARBONATE 40 MEQ: 782 TABLET, EFFERVESCENT ORAL at 09:00

## 2020-11-07 RX ADMIN — METOPROLOL TARTRATE 12.5 MG: 25 TABLET, FILM COATED ORAL at 20:08

## 2020-11-07 RX ADMIN — METOPROLOL TARTRATE 12.5 MG: 25 TABLET, FILM COATED ORAL at 08:56

## 2020-11-07 RX ADMIN — CARBAMAZEPINE 100 MG: 100 TABLET, CHEWABLE ORAL at 08:57

## 2020-11-07 RX ADMIN — CARBAMAZEPINE 100 MG: 100 TABLET, CHEWABLE ORAL at 20:08

## 2020-11-07 RX ADMIN — OXYCODONE HYDROCHLORIDE 10 MG: 5 TABLET ORAL at 23:54

## 2020-11-07 RX ADMIN — PIPERACILLIN AND TAZOBACTAM 3.38 G: 3; .375 INJECTION, POWDER, LYOPHILIZED, FOR SOLUTION INTRAVENOUS at 23:49

## 2020-11-07 RX ADMIN — PIPERACILLIN AND TAZOBACTAM 3.38 G: 3; .375 INJECTION, POWDER, LYOPHILIZED, FOR SOLUTION INTRAVENOUS at 09:59

## 2020-11-07 RX ADMIN — PIPERACILLIN AND TAZOBACTAM 3.38 G: 3; .375 INJECTION, POWDER, LYOPHILIZED, FOR SOLUTION INTRAVENOUS at 17:44

## 2020-11-07 RX ADMIN — OXYCODONE HYDROCHLORIDE AND ACETAMINOPHEN 1 TABLET: 5; 325 TABLET ORAL at 00:05

## 2020-11-07 RX ADMIN — OXYCODONE HYDROCHLORIDE 10 MG: 5 TABLET ORAL at 12:13

## 2020-11-07 RX ADMIN — PIPERACILLIN AND TAZOBACTAM 3.38 G: 3; .375 INJECTION, POWDER, LYOPHILIZED, FOR SOLUTION INTRAVENOUS at 00:05

## 2020-11-07 RX ADMIN — CARBAMAZEPINE 100 MG: 100 TABLET, CHEWABLE ORAL at 14:40

## 2020-11-07 RX ADMIN — SODIUM CHLORIDE, PRESERVATIVE FREE 10 ML: 5 INJECTION INTRAVENOUS at 10:00

## 2020-11-07 RX ADMIN — OXYCODONE HYDROCHLORIDE AND ACETAMINOPHEN 1 TABLET: 5; 325 TABLET ORAL at 04:46

## 2020-11-07 ASSESSMENT — PAIN DESCRIPTION - DESCRIPTORS: DESCRIPTORS: ACHING

## 2020-11-07 ASSESSMENT — PAIN DESCRIPTION - ONSET: ONSET: ON-GOING

## 2020-11-07 ASSESSMENT — PAIN SCALES - WONG BAKER
WONGBAKER_NUMERICALRESPONSE: 0

## 2020-11-07 ASSESSMENT — PAIN SCALES - GENERAL
PAINLEVEL_OUTOF10: 10
PAINLEVEL_OUTOF10: 10
PAINLEVEL_OUTOF10: 7
PAINLEVEL_OUTOF10: 10
PAINLEVEL_OUTOF10: 8
PAINLEVEL_OUTOF10: 6
PAINLEVEL_OUTOF10: 10

## 2020-11-07 ASSESSMENT — PAIN DESCRIPTION - LOCATION: LOCATION: FOOT

## 2020-11-07 ASSESSMENT — PAIN DESCRIPTION - PAIN TYPE: TYPE: ACUTE PAIN

## 2020-11-07 ASSESSMENT — PAIN DESCRIPTION - ORIENTATION: ORIENTATION: LEFT

## 2020-11-07 ASSESSMENT — PAIN DESCRIPTION - FREQUENCY: FREQUENCY: CONTINUOUS

## 2020-11-07 NOTE — PROGRESS NOTES
Hospitalist Progress Note      Patient:  Sujey Kellogg    Unit/Bed:4K-03/003-A  YOB: 1950  MRN: [id number]   Acct: [de-identified]   PCP: Sanjuanita Rabago MD  Date of Admission: 11/3/2020    Assessment/Plan:    1. Infected left lower foot wound-resolving. 11/3-4/2020: We know this based on the patient's presentation and based on physical exam done by podiatry. Infectious cause indicated by initial symptoms including foul-smelling discharge from wound, fatigue, chills, swelling, pain and poor p.o. intake. X-ray of the left foot showed nondisplaced fracture of the distal phalange of the left first digit. Concern for osteomyelitis. A CRP was obtained which was elevated at 9.05, which is a nonspecific laboratory finding for inflammation. This diagnosis is also supported by CBC finding of white blood cell count of 12.7 on presentation. However, as of the morning of 11/4/2020 the white blood cell count dropped to 7.6. Initial electrolytes and CBC were obtained. Initial left foot x-ray was obtained. Anaerobic culture of the foot was obtained, showed gram-positive cocci in pairs. Blood cultures were obtained which are pending. Urine cultures grew gram-positive cocci. Urinalysis demonstrated trace ketones, moderate blood, trace protein, and large leukocyte esterase. The patient had substantial pain that was not effectively treated with Tylenol and morphine. 11/5/2020: The patient states that her pain is substantially improved on the Dilaudid. The patient also states that she is feeling a lot better because she slept better. Podiatry will perform surgical debridement on 11/6/2020.    11/6/2020: The patient received surgical debridement of left infected heel ulcer today. The patient's pain control meds was changed from IV Dilaudid to p.o. Percocet. We recommend discharge to nursing home. However, the patient does not want this.   Therefore, patient will be discharged home with home health. 11/7/2020: The patient states the Percocet did not provide an acceptable level of pain control status post surgical debridement. -11/3-4/2020: Monitor patient's condition with daily BMP and CBC. ID and podiatry will follow. We will maintain patient on Zosyn 3.375 g every 8 hours. If no clinical improvement, will engage in work-up for osteomyelitis including MRI. The patient was switched from morphine to Dilaudid. -11/5/2020: We will maintain patient on Dilaudid 0.5 mg every 4 hours as needed for pain control. We will maintain patient on Zosyn 3.275 g every 8 hours. We will make patient n.p.o. after midnight on 11/5/2020 for surgery on 11/6/2020. We will continue to turn the patient every 2 hours. -11/6/2020: We will reevaluate patient's pain tomorrow morning to determine whether patient has adequate control on Percocet. Patient will be discharged on oral Percocet and Augmentin per ID recommendation. -11/7/2020: Percocet was changed to oxycodone 10 mg every 4 hours. We will discharge patient if Percocet achieves acceptable level control and if podiatry approves. Patient is otherwise stable for discharge to home with home health. 2.  Hypotension, unspecified-resolved. 11/3-4/2020: The patient's blood pressure is low at baseline in the 80s. Patient was given several liters of normal saline bolus. The patient has also had a notable tachycardia with her last pulse being 124 as of the night of 11/4/2020 with a blood pressure at the same time of 98/50. The patient is on Lopressor 12.5 mg twice daily at home, but this was stopped due to hypotension. An EKG was obtained, which is sinus tachycardia, low voltage QRS, incomplete left bundle branch block, nonspecific ST and T wave abnormality, and no significant changes compared to ECG from August 31, 2019, per cardiology. 11/5/2020:  The patient's blood pressure has been stable as was 92/52 as of the morning of 11/5/2020, which is close to or slightly above her baseline. Pulse rate has also dropped to 83.    11/6/2020: Blood pressure stable 115/58 and pulse stable at 92.    11/7/2020: Blood pressure stable at 92/55 and pulse stable at 93. These are both close to baseline. -11/3-4/2020: We will give the patient half a liter normal saline bolus and resume Lopressor as described above. Night team was informed to monitor patient's heart rate and blood pressure. Will reevaluate in the morning of 11/5/2020. We will follow-up with PCP as an outpatient. -11/5/2020: We will maintain Lopressor as described above. -11/6/2020: See above. -11/7/2020: See above. 3.  Tachycardia, unspecified-resolved. 11/3-4/2020: Unknown whether patient is tachycardic at baseline. Otherwise, tachycardia as described above. 11/5/2020: Tachycardia has improved as pulse was 83 as of the morning of 11/5/2020.    11/6/2020: See above. 11/7/2020: Pulse stable at 93.    -11/3-4/2020: We will follow-up with patient with regards to baseline heart rate. Otherwise, treat as described above for hypertension. -11/5/2020: We will maintain Lopressor as described above. -11/6/2020: See above. -11/7/2020: See above. 4.  Hypokalemia-resolved. 11/3-4/2020: This can be seen in potassium increased from 2.3 on 11/3/2020 to 2.9 on 11/4/2020. However, potassium is still low. 11/5/2020: Potassium has increased to 3.1 on 11/5/2020 as opposed to 2.8 11/4/2020.    11/6/2020: Patient's potassium remains at 3.1.    11/7/2020: Potassium in the normal range at 4.3.    -11/3-4/2020: We will maintain patient on potassium replacement protocol. We will monitor with continuous telemetry and daily BMP.    -11/5/2020: See above. -11/6/2020: Patient will be maintained on 80 mg/day of p.o. effervescent potassium. -11/7/2020: We will discontinue p.o. potassium. Patient is stable for discharge.     5. Hypocalcemia-stable. 11/3-4/2020: This can be seen from low calcium on 11/4/2020 of 8.2 and low ionized calcium of 1.06.    11/5/2020: Calcium is slightly lower at 7.9 on 11/5/2020 as opposed to 8.2 on 11/4/2020.      11/6/2020: Calcium is stable at 7.9.    11/7/2020: Calcium essentially stable 8.1.    -11/3-4/2020: We will maintain patient on calcium replacement protocol. We will monitor with daily BMP.    -11/5/2020: See above. -11/6/2020: See above. -11/7/2020: See above. 6.  Hypoglycemia -resolved. 11/3-4/2020: This can be seen from glucose level of 60 on the morning of 11/4/2020 as compared to a glucose level of 92 on the previous day. The patient was given a dose of oral glucose and taken off of n.p.o. and placed on a general diet. 11/5/2020: Glucose was stable at 86 on the morning of 11/5/2020.    11/6/2020: Glucose stable at 84.    -11/3-4/2020: We will obtain new pocket glucose and monitor with daily BMP. If pocket glucose is low, will treat and monitor hypoglycemia more aggressively. -11/5/2020: We will monitor with daily BMP. Podiatry will monitor glucose perioperatively. -11/6/2020: We will monitor with daily BMP. 7.  Macrocytic anemia-stable. 11/3-4/2020: The patient has a mild anemia normocytic to macrocytic anemia with a hemoglobin of 11.3 and an MCV of 99.1 as of 11/4/2020. This appears to be slightly less than the patient's baseline, which appears to be around 13.0-13.8. Blood loss, anemia of chronic disease, or nutritional deficiencies are all possibilities. 11/5/2020: Last CBC on 11/4/2020 showed hemoglobin of 11.3.    11/6/2020: Hemoglobin 10.9 and .5.    11/7/2020: No new CBC.    -11/3-4/2020: We will monitor with daily CBC. If anemia does not resolve, will engage in more aggressive work-up, including iron studies, folate, B12, methylmalonic acid, and homocysteine. -11/5/2020: We will monitor with daily CBC.   We will follow-up with PCP as an outpatient. -11/6/2020: We will monitor with daily CBC. We will follow-up with PCP as an outpatient. -11/7/2020: We will obtain new CBC and iron studies to include ferritin, iron, iron saturation, and total iron-binding capacity. We also obtain vitamin B12 level, folate level, methylmalonic level, and homocysteine level. We will follow-up with PCP as an outpatient. 8.  Benign essential hypertension-stable. 11/3-4/2020: We know this based on the patient's history. 11/5/2020: See above. 11/6/2020: See above. 11/7/2020: See above. -11/3-4/2020: We will treat as described above for hypotension. -11/5/2020: See above. -11/6/2020: See above. 11/7/2020: See above. 9.  Hyperlipidemia-stable. 11/3-4/2020: We know this based the patient's history. 11/5/2020: See above. 11/6/2020: See above. -11/3-4/2020: We will maintain patient on baby aspirin and follow-up with PCP as an outpatient. -11/5/2020: See above. -11/6/2020: See above. 10.  Epilepsy, unspecified, not intractable, without status epilepticus-stable. 11/3-4/2020: We know this based on the patient's history. 11/5/2020: See above. 11/6/2020: See above. -11/3-4/2020: We will maintain patient on Tegretol chewable tablet 100 mg 3 times daily. We will follow-up with PCP as an outpatient. -11/5/2020: See above. -11/6/2020: See above. Expected discharge date: 11/7/2020. Disposition:    [x] Home       [] TCU       [] Rehab       [] Psych       [] SNF       [] [facility]       [] Other-    Chief Complaint: Left heel open foot wound. Opening statement:    The patient is a 78-year-old female with a complex medical history including previous seizures, UTI, hypertension, hyperlipidemia, and multiple sclerosis with bilateral lower limb paralysis and catheter use, who presents with a chief complaint of left heel open foot wound. Hospital Treatment Course:     11/3-4/2020:     The patient presented to [facility] with complaints of increased foot pain and swelling on 11/3/2020. The patient is bedbound for multiple sclerosis and has a Stanford catheter which is exchanged on a monthly basis. The patient has noted a chronic left heel wound has been ongoing for the past several months and had a wound clinic appointment scheduled for 11/11/2020. The patient affirmed foul-smelling odor from left foot wound, fatigue, chills, pain, swelling and poor p.o. intake. Podiatry, social work, and ID were consulted for patient treatment. Over the course of her hospitalization so far, patient has received urinalysis, CBC, INR, urine and blood cultures, multiple normal saline boluses, Zosyn and a one-time dose of vancomycin. Patient also received glucose oral gel for treatment of hypoglycemia. The patient was initially made n.p.o. for possible procedure. N.p.o. was canceled and patient was changed to general diet as podiatry did not recommend surgery at this time. On presentation, the patient is stable but she states that she is not feeling well because she only got half an hour of sleep last night and because of severe pain in her left foot. She stated that neither morphine nor Tylenol helped. Otherwise, the patient has no new specific complaints or concerns. The patient was given morphine and Tylenol and later Dilaudid to treat pain. Patient also received x-ray of the left foot that showed a nondisplaced fracture of the distal phalange of the left first digit. An EKG that shows sinus tachycardia, low voltage QRS, incomplete left bundle branch block, and nonspecific ST and T wave abnormalities per cardiology. 11/5/2020:    Patient's blood pressure improved to 92/52 on the morning of 11/5/2020. In addition, the patient's pulse dropped to 83.    11/6/2020:    Patient's blood pressure stable 115/58 and pulse stable at 92.      The patient states that she is feeling okay, and further states that her pain is only a 5 out of 10. She states that this is an acceptable level of control for her. Otherwise, the patient has no new specific complaints or concerns. Subjective (past 24 hours): The patient states that her surgical debridement went okay. However, the patient states that she does not have an acceptable level of pain control on Percocet. Otherwise, no new specific complaints or concerns. Cardiovascular: Denies chest pain and palpitations.     Respiratory: Denies shortness of breath and pleuritic chest pain.     GI: Denies abdominal pain and nausea.     : Denies dysuria, hesitancy, and zena blood. Past medical history, family history, social history and allergies reviewed again and is unchanged since admission. ROS (12 point review of systems completed. Pertinent positives noted.  Otherwise ROS is negative)     Medications:  Reviewed    Infusion Medications   Scheduled Medications    sodium chloride flush  10 mL Intravenous 2 times per day    potassium bicarb-citric acid  40 mEq Oral BID AC    calcium replacement protocol   Other RX Placeholder    metoprolol tartrate  12.5 mg Oral BID    aspirin  81 mg Oral Daily    carBAMazepine  100 mg Oral TID    enoxaparin  40 mg Subcutaneous Q24H    piperacillin-tazobactam  3.375 g Intravenous Q8H    potassium replacement protocol   Other RX Placeholder     PRN Meds: oxyCODONE, sodium chloride flush, promethazine **OR** ondansetron, potassium chloride **OR** potassium alternative oral replacement **OR** potassium chloride, acetaminophen **OR** acetaminophen, polyethylene glycol      Intake/Output Summary (Last 24 hours) at 11/7/2020 1244  Last data filed at 11/7/2020 0325  Gross per 24 hour   Intake 483.6 ml   Output 500 ml   Net -16.4 ml       Diet:  DIET GENERAL;    Exam:  BP (!) 92/55   Pulse 93   Temp 98.3 °F (36.8 °C) (Oral)   Resp 19   Ht 5' 3\" (1.6 m)   Wt 131 lb 4.8 oz (59.6 kg)   SpO2 98%   BMI 23.26 kg/m²   General appearance: The patient is awake, alert and in no acute distress. The patient is capable of answering questions and following commands. HEENT: Pupils equal, round, and reactive to light. Conjunctivae/corneas clear. Neck: Supple, with full range of motion. No jugular venous distention. Trachea midline. Respiratory:  Normal respiratory effort. Clear to auscultation, bilaterally without Rales/Wheezes/Rhonchi. Cardiovascular: Regular rate and rhythm with normal S1/S2 without murmurs, rubs or gallops. Abdomen: Soft, non-tender, non-distended with normal bowel sounds. Musculoskeletal: passive and active ROM x 2 upper extremities. No passive or active ROM x2 lower extremities. Skin: Skin color, texture, turgor normal.  No rashes or lesions. Capillary Refill: Brisk,< 3 seconds   Peripheral Pulses: +2 palpable radial pulses. Extremities: Bandages cover both feet. Examination of the feet deferred as patient is being followed by podiatry and is patient stated that examination caused her pain. Labs:   Recent Labs     11/06/20  0538   WBC 5.5   HGB 10.9*   HCT 34.5*        Recent Labs     11/05/20  0825 11/06/20  0538 11/07/20  0526    139 140   K 3.1* 3.1* 4.3    106 106   CO2 26 24 27   BUN 7 6* 6*   CREATININE 0.4 0.4 0.5   CALCIUM 7.9* 7.9* 8.1*     No results for input(s): AST, ALT, BILIDIR, BILITOT, ALKPHOS in the last 72 hours. No results for input(s): INR in the last 72 hours. No results for input(s): Macy Pace in the last 72 hours. Microbiology:    Blood culture #1:   Lab Results   Component Value Date    BC No growth-preliminary  11/03/2020       Blood culture #2:No results found for: Saw Cole    Organism:  Lab Results   Component Value Date    ORG Enterococcus faecalis - (Group D) 11/03/2020         Lab Results   Component Value Date    LABGRAM  11/03/2020     No segmented neutrophils observed. Rare epithelial cells observed.  Many gram positive cocci occurring singly and in pairs. Few large gram positive bacilli. MRSA culture only:No results found for: [facility]    Urine culture:   Lab Results   Component Value Date    LABURIN Hardesty count: >100,000 CFU/mL  11/03/2020       Respiratory culture: No results found for: CULTRESP    Aerobic and Anaerobic :  Lab Results   Component Value Date    LABAERO  11/03/2020     Culture yielded moderate mixed growth of gram positive bacilli most consistent with Corynebacterium, Enterococcus species and Staphylococcus (coagulase negative). Clinical correlation required. Lab Results   Component Value Date    LABANAE  11/03/2020     Culture yielded moderate mixed growth which included anaerobic gram positive bacilli most consistent with Clostridium species, anaerobic gram positive cocci, and anaerobic gram positive bacilli most consistent with Corynebacterium species. Broad spectrum empiric antibiotic therapy is indicated andshould include coverage for anaerobic organisms. Urinalysis:      Lab Results   Component Value Date    NITRU NEGATIVE 11/03/2020    WBCUA > 200 11/03/2020    BACTERIA MANY 11/03/2020    RBCUA 25-50 11/03/2020    BLOODU MODERATE 11/03/2020    SPECGRAV 1.020 02/18/2019    GLUCOSEU NEGATIVE 11/03/2020       Radiology:  XR CHEST PORTABLE   Final Result   Prominent interstitium and more focal opacity in the right lung base correlate for edema, atelectasis or pneumonia. **This report has been created using voice recognition software. It may contain minor errors which are inherent in voice recognition technology. **      Final report electronically signed by Dr. Jade Lehman on 11/6/2020 3:12 PM      XR FOOT LEFT (MIN 3 VIEWS)   Final Result   1. There is a linear lucency through the mid aspect of the distal phalange of the left first digit. This is concerning for a nondisplaced fracture. Correlate clinically for point tenderness at this location. 2. There is diffuse osteoporosis. [] Encourage ambulation           [] Already on Anticoagulation     Code Status: Full Code    Tele:   [x] yes             [] no    Electronically signed by Reji Nichole MD, MPH, [facility] on 11/7/2020 at 12:44 PM   Supervised by Dr. Meche Ellington

## 2020-11-07 NOTE — FLOWSHEET NOTE
11/06/20 0801   Provider Notification   Reason for Communication Evaluate  (pottassium)   Provider Name Dr. Lew Gaming   Provider Notification Resident   Method of Communication Secure Message   Response No new orders   Notification Time 0801   Clarified with Dr. Jannette Santiago that I just wanted to clarify that you wanted the patient to have 80 mEq of potassium this morning? Dr. Jannette Santiago stated to give both K+ order.

## 2020-11-08 VITALS
WEIGHT: 131.3 LBS | DIASTOLIC BLOOD PRESSURE: 66 MMHG | BODY MASS INDEX: 23.27 KG/M2 | RESPIRATION RATE: 18 BRPM | OXYGEN SATURATION: 95 % | HEART RATE: 112 BPM | TEMPERATURE: 99.1 F | SYSTOLIC BLOOD PRESSURE: 109 MMHG | HEIGHT: 63 IN

## 2020-11-08 LAB
ANION GAP SERPL CALCULATED.3IONS-SCNC: 10 MEQ/L (ref 8–16)
BUN BLDV-MCNC: 6 MG/DL (ref 7–22)
CALCIUM SERPL-MCNC: 8.3 MG/DL (ref 8.5–10.5)
CHLORIDE BLD-SCNC: 108 MEQ/L (ref 98–111)
CO2: 22 MEQ/L (ref 23–33)
CREAT SERPL-MCNC: 0.4 MG/DL (ref 0.4–1.2)
GFR SERPL CREATININE-BSD FRML MDRD: > 90 ML/MIN/1.73M2
GLUCOSE BLD-MCNC: 77 MG/DL (ref 70–108)
GLUCOSE BLD-MCNC: 79 MG/DL (ref 70–108)
GLUCOSE BLD-MCNC: 90 MG/DL (ref 70–108)
ORGANISM: ABNORMAL
ORGANISM: ABNORMAL
POTASSIUM SERPL-SCNC: 4.1 MEQ/L (ref 3.5–5.2)
SODIUM BLD-SCNC: 140 MEQ/L (ref 135–145)
URINE CULTURE, ROUTINE: ABNORMAL
URINE CULTURE, ROUTINE: ABNORMAL

## 2020-11-08 PROCEDURE — 6360000002 HC RX W HCPCS: Performed by: STUDENT IN AN ORGANIZED HEALTH CARE EDUCATION/TRAINING PROGRAM

## 2020-11-08 PROCEDURE — 82948 REAGENT STRIP/BLOOD GLUCOSE: CPT

## 2020-11-08 PROCEDURE — 80048 BASIC METABOLIC PNL TOTAL CA: CPT

## 2020-11-08 PROCEDURE — 99239 HOSP IP/OBS DSCHRG MGMT >30: CPT | Performed by: INTERNAL MEDICINE

## 2020-11-08 PROCEDURE — 6370000000 HC RX 637 (ALT 250 FOR IP): Performed by: STUDENT IN AN ORGANIZED HEALTH CARE EDUCATION/TRAINING PROGRAM

## 2020-11-08 PROCEDURE — 2580000003 HC RX 258: Performed by: STUDENT IN AN ORGANIZED HEALTH CARE EDUCATION/TRAINING PROGRAM

## 2020-11-08 PROCEDURE — 36415 COLL VENOUS BLD VENIPUNCTURE: CPT

## 2020-11-08 RX ORDER — OXYCODONE HYDROCHLORIDE 5 MG/1
5 TABLET ORAL EVERY 4 HOURS PRN
Qty: 28 TABLET | Refills: 0 | Status: SHIPPED | OUTPATIENT
Start: 2020-11-08 | End: 2020-11-15

## 2020-11-08 RX ORDER — AMOXICILLIN AND CLAVULANATE POTASSIUM 875; 125 MG/1; MG/1
1 TABLET, FILM COATED ORAL 2 TIMES DAILY
Qty: 14 TABLET | Refills: 0 | Status: SHIPPED | OUTPATIENT
Start: 2020-11-08 | End: 2020-11-15

## 2020-11-08 RX ADMIN — PIPERACILLIN AND TAZOBACTAM 3.38 G: 3; .375 INJECTION, POWDER, LYOPHILIZED, FOR SOLUTION INTRAVENOUS at 09:09

## 2020-11-08 RX ADMIN — CARBAMAZEPINE 100 MG: 100 TABLET, CHEWABLE ORAL at 09:09

## 2020-11-08 RX ADMIN — SODIUM CHLORIDE, PRESERVATIVE FREE 10 ML: 5 INJECTION INTRAVENOUS at 09:09

## 2020-11-08 RX ADMIN — POTASSIUM BICARBONATE 40 MEQ: 782 TABLET, EFFERVESCENT ORAL at 05:48

## 2020-11-08 RX ADMIN — METOPROLOL TARTRATE 12.5 MG: 25 TABLET, FILM COATED ORAL at 09:09

## 2020-11-08 RX ADMIN — ASPIRIN 81 MG: 81 TABLET, COATED ORAL at 09:09

## 2020-11-08 ASSESSMENT — PAIN SCALES - WONG BAKER
WONGBAKER_NUMERICALRESPONSE: 0

## 2020-11-08 ASSESSMENT — PAIN SCALES - GENERAL: PAINLEVEL_OUTOF10: 0

## 2020-11-08 NOTE — PROGRESS NOTES
Discharge teaching and instructions for diagnosis/procedure of sepsis completed with patient using teachback method. AVS reviewed. Printed prescriptions given to patient. Patient voiced understanding regarding prescriptions, follow up appointments, and care of self at home.  Discharged from home to  home with support per EMS transportation

## 2020-11-08 NOTE — PROGRESS NOTES
Progress note: Infectious diseases    Patient - Lokesh Perez,  Age - 68 y.o.    - 1950      Room Number - 4K-03/003-A   MRN -  [id number]   Acct # - [de-identified]  Date of Admission -  11/3/2020 10:40 AM    SUBJECTIVE:   No new issues. She had debridment of the wound       OBJECTIVE   VITALS    height is 5' 3\" (1.6 m) and weight is 131 lb 4.8 oz (59.6 kg). Her oral temperature is 99.1 °F (37.3 °C). Her blood pressure is 109/66 and her pulse is 112. Her respiration is 18 and oxygen saturation is 95%.        Wt Readings from Last 3 Encounters:   20 131 lb 4.8 oz (59.6 kg)   20 145 lb (65.8 kg)   20 135 lb (61.2 kg)       I/O (24 Hours)    Intake/Output Summary (Last 24 hours) at 2020 1042  Last data filed at 2020 0630  Gross per 24 hour   Intake 280.44 ml   Output 725 ml   Net -444.56 ml       General Appearance  Awake, alert, oriented,  Chronically sick looking  HEENT - normocephalic, atraumatic, pale conjunctiva,  anicteric sclera  Neck - Supple, no mass  Lungs -  Bilateral  air entry, no rhonchi, no wheeze  Cardiovascular - Heart sounds are normal.     Abdomen - soft, not distended, nontender,   Neurologic -oriented  Skin - No bruising or bleeding  Extremities - dressed left ankle wound    MEDICATIONS:      sodium chloride flush  10 mL Intravenous 2 times per day    potassium bicarb-citric acid  40 mEq Oral BID AC    calcium replacement protocol   Other RX Placeholder    metoprolol tartrate  12.5 mg Oral BID    aspirin  81 mg Oral Daily    carBAMazepine  100 mg Oral TID    enoxaparin  40 mg Subcutaneous Q24H    piperacillin-tazobactam  3.375 g Intravenous Q8H    potassium replacement protocol   Other RX Placeholder       oxyCODONE, sodium chloride flush, promethazine **OR** ondansetron, potassium chloride **OR** potassium alternative oral replacement **OR** potassium chloride, acetaminophen **OR** acetaminophen, polyethylene glycol      LABS:     CBC:   Recent Labs     11/06/20  0538 11/07/20  1306   WBC 5.5 5.9   HGB 10.9* 12.5    197     BMP:    Recent Labs     11/06/20  0538 11/07/20  0526 11/08/20  0548    140 140   K 3.1* 4.3 4.1    106 108   CO2 24 27 22*   BUN 6* 6* 6*   CREATININE 0.4 0.5 0.4   GLUCOSE 84 85 77     Calcium:  Recent Labs     11/08/20  0548   CALCIUM 8.3*     Ionized Calcium:No results for input(s): IONCA in the last 72 hours. Magnesium:  No results for input(s): MG in the last 72 hours. Phosphorus:No results for input(s): PHOS in the last 72 hours. BNP:No results for input(s): BNP in the last 72 hours. Glucose:  Recent Labs     11/07/20  1635 11/07/20  1930 11/08/20  0731   POCGLU 114* 116* 79           Problem list of patient:     Patient Active Problem List   Diagnosis Code    MS (multiple sclerosis) ([facility] [address] G35    Seizure disorder ([facility] [address] G40.909    Frequent PVCs I49.3    Hematuria R31.9    Nicotine abuse Z72.0    Hematuria, gross R31.0    Sepsis ([facility] [address] A41.9    HTN (hypertension) I10    HLD (hyperlipidemia) E78.5    Multiple sclerosis ([facility] [address] E69    Metabolic acidosis, normal anion gap (NAG) E87.2    Open wound of foot, complicated, left, initial encounter X48.707D         ASSESSMENT/PLAN   MS with functional quadriparesis  Open wound on left ankle related to pressure: continue dressing and offloading.   Discharge plan in 1-2 days  Anastasia Barrera MD, [address] 11/8/2020 10:42 AM

## 2020-11-08 NOTE — PROGRESS NOTES
Podiatric Progress Note  Nola Anton  Subjective :   [phone]  Patient seen bedside with Dr. Charisse Bruner. Patient is 1 day s/p LEFT HEEL WOUND I&D (DOS 11/6/2020). Patient complains of pain in her left foot due to the surgery. Pain is mainly present when her foot is moved around. She states that she was able to take oxycontin and that this helped provide some relief. Patient does state that her  was worried about her taking oxycontin as her 's sister did not tolerate this medication. No other pedal complaints at this point in time. 11.4.2020  Patient seen bedside with Dr. Charisse Bruner. Patient states the wound is painful and the morphine does not control the pain. Discussed using norco or percocet but patient says she cannot swallow oral medication. Discussed plan to debride the wound in the OR in the next few days pending OR availability. Patient and  agreed with plan. She has no other complaints.       Current Medications:    Current Facility-Administered Medications: oxyCODONE (ROXICODONE) immediate release tablet 10 mg, 10 mg, Oral, Q4H PRN  sodium chloride flush 0.9 % injection 10 mL, 10 mL, Intravenous, 2 times per day  sodium chloride flush 0.9 % injection 10 mL, 10 mL, Intravenous, PRN  promethazine (PHENERGAN) tablet 12.5 mg, 12.5 mg, Oral, Q6H PRN **OR** ondansetron (ZOFRAN) injection 4 mg, 4 mg, Intravenous, Q6H PRN  potassium bicarb-citric acid (EFFER-K) effervescent tablet 40 mEq, 40 mEq, Oral, BID AC  calcium replacement protocol, , Other, RX Placeholder  potassium chloride (KLOR-CON M) extended release tablet 40 mEq, 40 mEq, Oral, PRN **OR** potassium bicarb-citric acid (EFFER-K) effervescent tablet 40 mEq, 40 mEq, Oral, PRN **OR** potassium chloride 10 mEq/100 mL IVPB (Peripheral Line), 10 mEq, Intravenous, PRN  metoprolol tartrate (LOPRESSOR) tablet 12.5 mg, 12.5 mg, Oral, BID  aspirin EC tablet 81 mg, 81 mg, Oral, Daily  carBAMazepine (TEGRETOL) chewable tablet 100 mg, 100 mg, Oral, TID  acetaminophen (TYLENOL) tablet 650 mg, 650 mg, Oral, Q6H PRN **OR** acetaminophen (TYLENOL) suppository 650 mg, 650 mg, Rectal, Q6H PRN  polyethylene glycol (GLYCOLAX) packet 17 g, 17 g, Oral, Daily PRN  enoxaparin (LOVENOX) injection 40 mg, 40 mg, Subcutaneous, Q24H  piperacillin-tazobactam (ZOSYN) 3.375 g in dextrose 5 % 50 mL IVPB extended infusion (mini-bag), 3.375 g, Intravenous, Q8H  potassium replacement protocol, , Other, RX Placeholder    Objective     BP (!) 96/52   Pulse 93   Temp 98.5 °F (36.9 °C) (Oral)   Resp 19   Ht 5' 3\" (1.6 m)   Wt 131 lb 4.8 oz (59.6 kg)   SpO2 97%   BMI 23.26 kg/m²      I/O:    Intake/Output Summary (Last 24 hours) at 11/7/2020 1937  Last data filed at 11/7/2020 1348  Gross per 24 hour   Intake 820.04 ml   Output 350 ml   Net 470.04 ml              Wt Readings from Last 3 Encounters:   11/07/20 131 lb 4.8 oz (59.6 kg)   02/13/20 145 lb (65.8 kg)   01/28/20 135 lb (61.2 kg)       LABS:    Recent Labs     11/06/20  0538 11/07/20  1306   WBC 5.5 5.9   HGB 10.9* 12.5   HCT 34.5* 38.2    197        Recent Labs     11/07/20  0526      K 4.3      CO2 27   BUN 6*   CREATININE 0.5        No results for input(s): PROT, INR, APTT in the last 72 hours. No results for input(s): CKTOTAL, CKMB, CKMBINDEX, TROPONINI in the last 72 hours. Exam:    dressing intact and well maintained. No apparent strike through noted. Dermatologic: Patient has a surgical site present to the medial aspect of left heel. Healthy granulation tissue present at base of wound. Mild bleeding present in wound as is typical in post-operative course. No necrotic or non-viable tissue present. Tati-wound erythema present consistent with post-operative course. Quality of skin poor; atrophic, and xerotic with decreased turgor. Nails 1-5 bilaterally are thickened,and dystrophic. Webspaces 1-4 bilaterally are clean, dry and intact.  Hyperkeratoses noted sub right 5th metatarsal head

## 2020-11-08 NOTE — DISCHARGE SUMMARY
Hospital Medicine Discharge Summary      Patient Identification:   Nicko Aguila   :   MRN: [id number]   Account: [de-identified]      Patient's PCP: Davonte Bernal MD    Admit Date: 11/3/2020     Discharge Date:   20    Admitting Physician: Lupe Rose MD     Discharge Physician: Lupe Rose MD     Discharge Diagnoses: Infected left lower foot wound/Left Heel, Decubitus Ulcer, Multiple Sclerosis functional quad      Active Hospital Problems    Diagnosis Date Noted    Open wound of foot, complicated, left, initial encounter [S91.302A] 2020    Multiple sclerosis ([facility] [address] Sofi Dangelo 2019       The patient was seen and examined on day of discharge and this discharge summary is in conjunction with any daily progress note from day of discharge. Hospital Course: The patient presented to [facility] with complaints of increased foot pain and swelling on 11/3/2020. The patient is bedbound for multiple sclerosis and has a Stanford catheter which is exchanged on a monthly basis. The patient has noted a chronic left heel wound has been ongoing for the past several months and had a wound clinic appointment scheduled for 2020. The patient affirmed foul-smelling odor from left foot wound, fatigue, chills, pain, swelling and poor p.o. intake. Podiatry, social work, and ID were consulted for patient treatment. Pt started on IV ABx. OM ruled out. Underwent I&D of left heel wound on  with Podiatry. ID recommending PO Augmentin at discharge. Pt will benefit for discharge to [facility] for frequent turning however pt declining. Will set up home health. Pt to follow-up with PCP and Podiatry in clinic. Tolerating PO intake. Pain well controlled.        Exam:     Vitals:  Vitals:    20 2004 20 2337 20 0345 20 0906   BP: (!) 99/59 (!) 99/57 102/61 109/66   Pulse: 112 105 111 112   Resp:    Temp: 98.3 °F (36.8 °C) 99.1 °F (37.3 °C) 99.4 °F (37.4 °C) 99.1 °F (37.3 °C)   TempSrc: Oral Oral Oral Oral   SpO2: 95% 96% 96% 95%   Weight:       Height:         Weight: Weight: 131 lb 4.8 oz (59.6 kg)     24 hour intake/output:    Intake/Output Summary (Last 24 hours) at 11/8/2020 1219  Last data filed at 11/8/2020 0630  Gross per 24 hour   Intake 280.44 ml   Output 725 ml   Net -444.56 ml         Labs: For convenience and continuity at follow-up the following most recent labs are provided:      CBC:    Lab Results   Component Value Date    WBC 5.9 11/07/2020    HGB 12.5 11/07/2020    HCT 38.2 11/07/2020     11/07/2020       Renal:    Lab Results   Component Value Date     11/08/2020    K 4.1 11/08/2020    K 2.3 11/03/2020     11/08/2020    CO2 22 11/08/2020    BUN 6 11/08/2020    CREATININE 0.4 11/08/2020    CALCIUM 8.3 11/08/2020         Significant Diagnostic Studies    Radiology:   XR CHEST PORTABLE   Final Result   Prominent interstitium and more focal opacity in the right lung base correlate for edema, atelectasis or pneumonia. **This report has been created using voice recognition software. It may contain minor errors which are inherent in voice recognition technology. **      Final report electronically signed by Dr. Izaiah Sepulveda on 11/6/2020 3:12 PM      XR FOOT LEFT (MIN 3 VIEWS)   Final Result   1. There is a linear lucency through the mid aspect of the distal phalange of the left first digit. This is concerning for a nondisplaced fracture. Correlate clinically for point tenderness at this location. 2. There is diffuse osteoporosis. 3. There is nonspecific soft tissue swelling seen diffusely throughout the left foot. This may be related to cellulitis versus edema. **This report has been created using voice recognition software. It may contain minor errors which are inherent in voice recognition technology. **      Final report electronically signed by Dr. Sindy Roberts on 11/3/2020 11:32 AM Consults:     IP CONSULT TO INFECTIOUS DISEASES  IP CONSULT TO PODIATRY  IP CONSULT TO SOCIAL WORK  IP CONSULT TO SOCIAL WORK  IP CONSULT TO HOME CARE NEEDS    Disposition:    [x] Home       [] TCU       [] Rehab       [] Psych       [] SNF       [] [place]       [] Other-    Condition at Discharge: Stable    Code Status:  Full Code     Patient Instructions: Activity: activity as tolerated  Diet: DIET GENERAL;      Follow-up visits:   [address]  [address] [address]  [address], [address] [facility]  [address] [place] [address]  [facility]  [address] [facility]  [phone]               Discharge Medications:      Dimitris Fire   Home Medication Instructions GARCIA:[id number]    Printed on:11/08/20 1310   Medication Information                      acetic acid 0.25 % irrigation  Irrigate catheter with 150 ml weekly.              amoxicillin-clavulanate (AUGMENTIN) 875-125 MG per tablet  Take 1 tablet by mouth 2 times daily for 7 days             aspirin (RA ASPIRIN EC) 81 MG EC tablet  take 1 tablet by mouth once daily             carBAMazepine (TEGRETOL) 100 MG chewable tablet  Take 100 mg by mouth 3 times daily              Cholecalciferol (VITAMIN D3 PO)  Take by mouth daily             CRANBERRY PO  Take by mouth daily             D-Mannose 500 MG CAPS  Take 1,500 mg by mouth daily             furosemide (LASIX) 10 MG/ML solution  Take by mouth daily 4 milliliters by mout daily             Incontinence Supplies (BEDSIDE DRAINAGE BAG) MISC  Large 2000 cc bedside drainage bag             Incontinence Supplies (URINARY LEG BAG STRAPS) MISC  Leg bag straps to go with urinary catheter leg bag             Incontinence Supplies (URINARY LEG BAG) MISC  900 cc [place] Urinary catheter leg bag             methenamine (HIPREX) 1 g tablet  Take 1 tablet by mouth 2 times daily (with meals)             Methenamine-Sodium Salicylate (CYSTEX PO)  Take by mouth             metoprolol tartrate (LOPRESSOR) 25 MG tablet  take 1/2 tablet by mouth twice a day             oxyCODONE (ROXICODONE) 5 MG immediate release tablet  Take 1 tablet by mouth every 4 hours as needed for Pain for up to 7 days. potassium bicarb-citric acid (EFFER-K) 20 MEQ TBEF effervescent tablet  Take 1 tablet by mouth daily             Water For Irrigation, Sterile (STERILE WATER FOR IRRIGATION)  Irrigate vega catheter with 50 ml of sterile water weekly                 Time Spent on discharge is more than 30 minutes in the examination, evaluation, counseling and review of medications and discharge plan. Signed: Thank you Jude Doyle MD for the opportunity to be involved in this patient's care.     Electronically signed by Diego Heck MD on 11/8/2020 at 12:19 PM

## 2020-11-09 LAB
BLOOD CULTURE, ROUTINE: NORMAL
BLOOD CULTURE, ROUTINE: NORMAL
HOMOCYSTEINE: 9.4 UMOL/L

## 2020-11-09 NOTE — CARE COORDINATION
11/9/20, 10:21 AM EST    DISCHARGE PLANNING EVALUATION    Received call from [phone] with [employer] re: patient discharge plan. Advised her of patient discharge on 11/8 to home with referral to [address] [place], [place] [address]e.

## 2020-11-12 LAB — METHYLMALONIC ACID: NORMAL

## 2020-11-28 ENCOUNTER — HOSPITAL ENCOUNTER (INPATIENT)
Age: 70
LOS: 3 days | Discharge: NURSING, REHAB OR LONG-TERM CARE | DRG: 177 | End: 2020-12-02
Attending: EMERGENCY MEDICINE | Admitting: FAMILY MEDICINE
Payer: MEDICARE

## 2020-11-28 ENCOUNTER — APPOINTMENT (OUTPATIENT)
Dept: GENERAL RADIOLOGY | Age: 70
DRG: 177 | End: 2020-11-28
Payer: MEDICARE

## 2020-11-28 LAB
ALBUMIN SERPL-MCNC: 2.2 G/DL (ref 3.5–5.1)
ALP BLD-CCNC: 102 U/L (ref 38–126)
ALT SERPL-CCNC: 11 U/L (ref 11–66)
ANION GAP SERPL CALCULATED.3IONS-SCNC: 12 MEQ/L (ref 8–16)
AST SERPL-CCNC: 33 U/L (ref 5–40)
BASOPHILS # BLD: 0.2 %
BASOPHILS ABSOLUTE: 0 THOU/MM3 (ref 0–0.1)
BILIRUB SERPL-MCNC: 0.5 MG/DL (ref 0.3–1.2)
BUN BLDV-MCNC: 5 MG/DL (ref 7–22)
CALCIUM SERPL-MCNC: 8.3 MG/DL (ref 8.5–10.5)
CHLORIDE BLD-SCNC: 111 MEQ/L (ref 98–111)
CO2: 21 MEQ/L (ref 23–33)
CREAT SERPL-MCNC: 0.5 MG/DL (ref 0.4–1.2)
EOSINOPHIL # BLD: 0.1 %
EOSINOPHILS ABSOLUTE: 0 THOU/MM3 (ref 0–0.4)
ERYTHROCYTE [DISTWIDTH] IN BLOOD BY AUTOMATED COUNT: 14.5 % (ref 11.5–14.5)
ERYTHROCYTE [DISTWIDTH] IN BLOOD BY AUTOMATED COUNT: 51.4 FL (ref 35–45)
GFR SERPL CREATININE-BSD FRML MDRD: > 90 ML/MIN/1.73M2
GLUCOSE BLD-MCNC: 86 MG/DL (ref 70–108)
HCT VFR BLD CALC: 34.5 % (ref 37–47)
HEMOGLOBIN: 12.4 GM/DL (ref 12–16)
IMMATURE GRANS (ABS): 0.07 THOU/MM3 (ref 0–0.07)
IMMATURE GRANULOCYTES: 0.8 %
LYMPHOCYTES # BLD: 6.8 %
LYMPHOCYTES ABSOLUTE: 0.6 THOU/MM3 (ref 1–4.8)
MCH RBC QN AUTO: 37.6 PG (ref 26–33)
MCHC RBC AUTO-ENTMCNC: 35.9 GM/DL (ref 32.2–35.5)
MCV RBC AUTO: 104.5 FL (ref 81–99)
MONOCYTES # BLD: 3.8 %
MONOCYTES ABSOLUTE: 0.3 THOU/MM3 (ref 0.4–1.3)
NUCLEATED RED BLOOD CELLS: 0 /100 WBC
OSMOLALITY CALCULATION: 283.4 MOSMOL/KG (ref 275–300)
PLATELET # BLD: 165 THOU/MM3 (ref 130–400)
PMV BLD AUTO: 11 FL (ref 9.4–12.4)
POTASSIUM REFLEX MAGNESIUM: 3.2 MEQ/L (ref 3.5–5.2)
RBC # BLD: 3.3 MILL/MM3 (ref 4.2–5.4)
SEG NEUTROPHILS: 88.3 %
SEGMENTED NEUTROPHILS ABSOLUTE COUNT: 7.4 THOU/MM3 (ref 1.8–7.7)
SODIUM BLD-SCNC: 144 MEQ/L (ref 135–145)
TOTAL PROTEIN: 6.6 G/DL (ref 6.1–8)
WBC # BLD: 8.4 THOU/MM3 (ref 4.8–10.8)

## 2020-11-28 PROCEDURE — 86140 C-REACTIVE PROTEIN: CPT

## 2020-11-28 PROCEDURE — 99285 EMERGENCY DEPT VISIT HI MDM: CPT

## 2020-11-28 PROCEDURE — 83615 LACTATE (LD) (LDH) ENZYME: CPT

## 2020-11-28 PROCEDURE — 84484 ASSAY OF TROPONIN QUANT: CPT

## 2020-11-28 PROCEDURE — 36415 COLL VENOUS BLD VENIPUNCTURE: CPT

## 2020-11-28 PROCEDURE — U0002 COVID-19 LAB TEST NON-CDC: HCPCS

## 2020-11-28 PROCEDURE — 93005 ELECTROCARDIOGRAM TRACING: CPT | Performed by: EMERGENCY MEDICINE

## 2020-11-28 PROCEDURE — 84145 PROCALCITONIN (PCT): CPT

## 2020-11-28 PROCEDURE — 83735 ASSAY OF MAGNESIUM: CPT

## 2020-11-28 PROCEDURE — 82728 ASSAY OF FERRITIN: CPT

## 2020-11-28 PROCEDURE — 80053 COMPREHEN METABOLIC PANEL: CPT

## 2020-11-28 PROCEDURE — 71045 X-RAY EXAM CHEST 1 VIEW: CPT

## 2020-11-28 PROCEDURE — 85025 COMPLETE CBC W/AUTO DIFF WBC: CPT

## 2020-11-28 PROCEDURE — 83605 ASSAY OF LACTIC ACID: CPT

## 2020-11-28 PROCEDURE — 85379 FIBRIN DEGRADATION QUANT: CPT

## 2020-11-28 RX ORDER — ACETAMINOPHEN 650 MG/1
650 SUPPOSITORY RECTAL EVERY 6 HOURS PRN
Status: DISCONTINUED | OUTPATIENT
Start: 2020-11-28 | End: 2020-11-29 | Stop reason: SDUPTHER

## 2020-11-28 RX ORDER — ACETAMINOPHEN 325 MG/1
650 TABLET ORAL EVERY 6 HOURS PRN
Status: DISCONTINUED | OUTPATIENT
Start: 2020-11-28 | End: 2020-11-29 | Stop reason: SDUPTHER

## 2020-11-28 ASSESSMENT — ENCOUNTER SYMPTOMS
COUGH: 1
ABDOMINAL PAIN: 0
SHORTNESS OF BREATH: 1
NAUSEA: 1
BLOOD IN STOOL: 0
RHINORRHEA: 0
SORE THROAT: 0
VOMITING: 0
CONSTIPATION: 0
DIARRHEA: 0

## 2020-11-29 ENCOUNTER — APPOINTMENT (OUTPATIENT)
Dept: CT IMAGING | Age: 70
DRG: 177 | End: 2020-11-29
Payer: MEDICARE

## 2020-11-29 PROBLEM — U07.1 COVID-19 VIRUS INFECTION: Status: ACTIVE | Noted: 2020-11-29

## 2020-11-29 LAB
ABO CHECK: NORMAL
ACINETOBACTER BAUMANNII FILM ARRAY: NOT DETECTED
ANTIBODY SCREEN: NORMAL
BOTTLE TYPE: ABNORMAL
C-REACTIVE PROTEIN: 9.35 MG/DL (ref 0–1)
CANDIDA ALBICANS FILM ARRAY: NOT DETECTED
CANDIDA GLABRATA FILM ARRAY: NOT DETECTED
CANDIDA KRUSEI FILM ARRAY: NOT DETECTED
CANDIDA PARAPSILOSIS FILM ARRAY: NOT DETECTED
CANDIDA TROPICALIS FILM ARRAY: NOT DETECTED
CARBAPENEM RESITANT FILM ARRAY: NOT DETECTED
D-DIMER QUANTITATIVE: 1857 NG/ML FEU (ref 0–500)
D-DIMER QUANTITATIVE: 3735 NG/ML FEU (ref 0–500)
ENTERBACTER CLOACAE FILM ARRAY: NOT DETECTED
ENTERBACTERIACEAE FILM ARRAY: DETECTED
ENTEROCOCCUS FILM ARRAY: NOT DETECTED
ESCHERICHIA COLI FILM ARRAY: DETECTED
FERRITIN: 422 NG/ML (ref 10–291)
HAEMOPHILUS INFLUENZA FILM ARRAY: NOT DETECTED
KLEBSIELLA OXYTOCA FILM ARRAY: NOT DETECTED
KLEBSIELLA PNEUMONIAE FILM ARRAY: NOT DETECTED
LACTIC ACID: 1 MMOL/L (ref 0.5–2.2)
LD: 202 U/L (ref 100–190)
LISTERIA MONOCYTOGENES FILM ARRAY: NOT DETECTED
MAGNESIUM: 1.7 MG/DL (ref 1.6–2.4)
METHICILLIN RESISTANT FILM ARRAY: ABNORMAL
NEISSERIA MENIGITIDIS FILM ARRAY: NOT DETECTED
PRO-BNP: 3472 PG/ML (ref 0–900)
PROCALCITONIN: 0.09 NG/ML (ref 0.01–0.09)
PROTEUS FILM ARRAY: NOT DETECTED
PSEUDOMONAS AERUGINOSA FILM ARRAY: NOT DETECTED
RH FACTOR: NORMAL
SARS-COV-2, NAAT: DETECTED
SERRATIA MARCESCENS FILM ARRAY: NOT DETECTED
SOURCE OF BLOOD CULTURE: ABNORMAL
STAPH AUREUS FILM ARRAY: NOT DETECTED
STAPHYLOCOCCUS FILM ARRAY: NOT DETECTED
STREP AGALACTIAE FILM ARRAY: NOT DETECTED
STREP PNEUMONIAE FILM ARRAY: NOT DETECTED
STREP PYOCGENES FILM ARRAY: NOT DETECTED
STREPTOCOCCUS FILM ARRAY: NOT DETECTED
TROPONIN T: 0.02 NG/ML
TROPONIN T: < 0.01 NG/ML
TROPONIN T: < 0.01 NG/ML
VANCOMYCIN RESISTANT FILM ARRAY: ABNORMAL

## 2020-11-29 PROCEDURE — 84484 ASSAY OF TROPONIN QUANT: CPT

## 2020-11-29 PROCEDURE — 6360000004 HC RX CONTRAST MEDICATION: Performed by: EMERGENCY MEDICINE

## 2020-11-29 PROCEDURE — 6360000002 HC RX W HCPCS: Performed by: EMERGENCY MEDICINE

## 2020-11-29 PROCEDURE — 83880 ASSAY OF NATRIURETIC PEPTIDE: CPT

## 2020-11-29 PROCEDURE — 87040 BLOOD CULTURE FOR BACTERIA: CPT

## 2020-11-29 PROCEDURE — 87077 CULTURE AEROBIC IDENTIFY: CPT

## 2020-11-29 PROCEDURE — 85379 FIBRIN DEGRADATION QUANT: CPT

## 2020-11-29 PROCEDURE — 86850 RBC ANTIBODY SCREEN: CPT

## 2020-11-29 PROCEDURE — 6370000000 HC RX 637 (ALT 250 FOR IP): Performed by: FAMILY MEDICINE

## 2020-11-29 PROCEDURE — 6360000002 HC RX W HCPCS: Performed by: FAMILY MEDICINE

## 2020-11-29 PROCEDURE — 86900 BLOOD TYPING SEROLOGIC ABO: CPT

## 2020-11-29 PROCEDURE — 71275 CT ANGIOGRAPHY CHEST: CPT

## 2020-11-29 PROCEDURE — 2580000003 HC RX 258: Performed by: FAMILY MEDICINE

## 2020-11-29 PROCEDURE — 36415 COLL VENOUS BLD VENIPUNCTURE: CPT

## 2020-11-29 PROCEDURE — 2580000003 HC RX 258: Performed by: EMERGENCY MEDICINE

## 2020-11-29 PROCEDURE — 99223 1ST HOSP IP/OBS HIGH 75: CPT | Performed by: FAMILY MEDICINE

## 2020-11-29 PROCEDURE — 86901 BLOOD TYPING SEROLOGIC RH(D): CPT

## 2020-11-29 PROCEDURE — 6360000002 HC RX W HCPCS: Performed by: INTERNAL MEDICINE

## 2020-11-29 PROCEDURE — 87186 SC STD MICRODIL/AGAR DIL: CPT

## 2020-11-29 PROCEDURE — 2140000000 HC CCU INTERMEDIATE R&B

## 2020-11-29 PROCEDURE — 87801 DETECT AGNT MULT DNA AMPLI: CPT

## 2020-11-29 RX ORDER — ONDANSETRON 2 MG/ML
4 INJECTION INTRAMUSCULAR; INTRAVENOUS EVERY 6 HOURS PRN
Status: DISCONTINUED | OUTPATIENT
Start: 2020-11-29 | End: 2020-12-02 | Stop reason: HOSPADM

## 2020-11-29 RX ORDER — 0.9 % SODIUM CHLORIDE 0.9 %
500 INTRAVENOUS SOLUTION INTRAVENOUS ONCE
Status: COMPLETED | OUTPATIENT
Start: 2020-11-29 | End: 2020-11-29

## 2020-11-29 RX ORDER — ASPIRIN 81 MG/1
81 TABLET ORAL DAILY
Status: DISCONTINUED | OUTPATIENT
Start: 2020-11-29 | End: 2020-12-02 | Stop reason: HOSPADM

## 2020-11-29 RX ORDER — ACETAMINOPHEN 325 MG/1
650 TABLET ORAL EVERY 6 HOURS PRN
Status: DISCONTINUED | OUTPATIENT
Start: 2020-11-29 | End: 2020-12-02 | Stop reason: HOSPADM

## 2020-11-29 RX ORDER — POTASSIUM CHLORIDE 20 MEQ/1
40 TABLET, EXTENDED RELEASE ORAL PRN
Status: DISCONTINUED | OUTPATIENT
Start: 2020-11-29 | End: 2020-12-02 | Stop reason: HOSPADM

## 2020-11-29 RX ORDER — DEXAMETHASONE 4 MG/1
6 TABLET ORAL DAILY
Status: DISCONTINUED | OUTPATIENT
Start: 2020-11-29 | End: 2020-12-02 | Stop reason: HOSPADM

## 2020-11-29 RX ORDER — SODIUM CHLORIDE 0.9 % (FLUSH) 0.9 %
10 SYRINGE (ML) INJECTION PRN
Status: DISCONTINUED | OUTPATIENT
Start: 2020-11-29 | End: 2020-12-02 | Stop reason: HOSPADM

## 2020-11-29 RX ORDER — CARBAMAZEPINE 100 MG/1
100 TABLET, CHEWABLE ORAL 3 TIMES DAILY
Status: DISCONTINUED | OUTPATIENT
Start: 2020-11-29 | End: 2020-12-02 | Stop reason: HOSPADM

## 2020-11-29 RX ORDER — ZINC SULFATE 50(220)MG
50 CAPSULE ORAL DAILY
Status: DISCONTINUED | OUTPATIENT
Start: 2020-11-29 | End: 2020-12-02 | Stop reason: HOSPADM

## 2020-11-29 RX ORDER — SODIUM CHLORIDE 0.9 % (FLUSH) 0.9 %
10 SYRINGE (ML) INJECTION EVERY 12 HOURS SCHEDULED
Status: DISCONTINUED | OUTPATIENT
Start: 2020-11-29 | End: 2020-12-02 | Stop reason: HOSPADM

## 2020-11-29 RX ORDER — PROMETHAZINE HYDROCHLORIDE 25 MG/1
12.5 TABLET ORAL EVERY 6 HOURS PRN
Status: DISCONTINUED | OUTPATIENT
Start: 2020-11-29 | End: 2020-12-02 | Stop reason: HOSPADM

## 2020-11-29 RX ORDER — ASCORBIC ACID 500 MG
500 TABLET ORAL DAILY
Status: DISCONTINUED | OUTPATIENT
Start: 2020-11-29 | End: 2020-12-02 | Stop reason: HOSPADM

## 2020-11-29 RX ORDER — ACETAMINOPHEN 650 MG/1
650 SUPPOSITORY RECTAL EVERY 6 HOURS PRN
Status: DISCONTINUED | OUTPATIENT
Start: 2020-11-29 | End: 2020-12-02 | Stop reason: HOSPADM

## 2020-11-29 RX ORDER — POTASSIUM CHLORIDE 20 MEQ/1
40 TABLET, EXTENDED RELEASE ORAL ONCE
Status: COMPLETED | OUTPATIENT
Start: 2020-11-29 | End: 2020-11-29

## 2020-11-29 RX ORDER — MAGNESIUM HYDROXIDE 1200 MG/15ML
500 LIQUID ORAL ONCE
Status: DISCONTINUED | OUTPATIENT
Start: 2020-11-29 | End: 2020-12-02 | Stop reason: HOSPADM

## 2020-11-29 RX ORDER — VITAMIN B COMPLEX
1000 TABLET ORAL DAILY
Status: DISCONTINUED | OUTPATIENT
Start: 2020-11-29 | End: 2020-12-02 | Stop reason: HOSPADM

## 2020-11-29 RX ORDER — POLYETHYLENE GLYCOL 3350 17 G/17G
17 POWDER, FOR SOLUTION ORAL DAILY PRN
Status: DISCONTINUED | OUTPATIENT
Start: 2020-11-29 | End: 2020-12-02 | Stop reason: HOSPADM

## 2020-11-29 RX ORDER — DEXAMETHASONE SODIUM PHOSPHATE 4 MG/ML
6 INJECTION, SOLUTION INTRA-ARTICULAR; INTRALESIONAL; INTRAMUSCULAR; INTRAVENOUS; SOFT TISSUE DAILY
Status: DISCONTINUED | OUTPATIENT
Start: 2020-11-29 | End: 2020-11-29 | Stop reason: ALTCHOICE

## 2020-11-29 RX ORDER — FLUTICASONE PROPIONATE 50 MCG
2 SPRAY, SUSPENSION (ML) NASAL 2 TIMES DAILY
Status: DISCONTINUED | OUTPATIENT
Start: 2020-11-29 | End: 2020-12-02 | Stop reason: HOSPADM

## 2020-11-29 RX ORDER — POTASSIUM CHLORIDE 7.45 MG/ML
10 INJECTION INTRAVENOUS PRN
Status: DISCONTINUED | OUTPATIENT
Start: 2020-11-29 | End: 2020-12-02 | Stop reason: HOSPADM

## 2020-11-29 RX ADMIN — SODIUM CHLORIDE, PRESERVATIVE FREE 10 ML: 5 INJECTION INTRAVENOUS at 11:00

## 2020-11-29 RX ADMIN — POTASSIUM CHLORIDE 40 MEQ: 1500 TABLET, EXTENDED RELEASE ORAL at 05:23

## 2020-11-29 RX ADMIN — ENOXAPARIN SODIUM 30 MG: 30 INJECTION SUBCUTANEOUS at 21:48

## 2020-11-29 RX ADMIN — METOPROLOL TARTRATE 25 MG: 25 TABLET, FILM COATED ORAL at 21:49

## 2020-11-29 RX ADMIN — CEFEPIME 2 G: 2 INJECTION, POWDER, FOR SOLUTION INTRAVENOUS at 17:30

## 2020-11-29 RX ADMIN — SODIUM CHLORIDE 500 ML: 9 INJECTION, SOLUTION INTRAVENOUS at 02:05

## 2020-11-29 RX ADMIN — DEXAMETHASONE 6 MG: 4 TABLET ORAL at 13:07

## 2020-11-29 RX ADMIN — CEFEPIME 2 G: 2 INJECTION, POWDER, FOR SOLUTION INTRAVENOUS at 05:33

## 2020-11-29 RX ADMIN — Medication 1000 UNITS: at 11:00

## 2020-11-29 RX ADMIN — IOPAMIDOL 80 ML: 755 INJECTION, SOLUTION INTRAVENOUS at 02:24

## 2020-11-29 RX ADMIN — METOPROLOL TARTRATE 25 MG: 25 TABLET, FILM COATED ORAL at 11:00

## 2020-11-29 RX ADMIN — VANCOMYCIN HYDROCHLORIDE 1000 MG: 1 INJECTION, POWDER, LYOPHILIZED, FOR SOLUTION INTRAVENOUS at 05:54

## 2020-11-29 RX ADMIN — SODIUM CHLORIDE, PRESERVATIVE FREE 10 ML: 5 INJECTION INTRAVENOUS at 19:50

## 2020-11-29 RX ADMIN — CARBAMAZEPINE 100 MG: 100 TABLET, CHEWABLE ORAL at 19:50

## 2020-11-29 RX ADMIN — ENOXAPARIN SODIUM 30 MG: 30 INJECTION SUBCUTANEOUS at 11:00

## 2020-11-29 RX ADMIN — ASPIRIN 81 MG: 81 TABLET ORAL at 11:00

## 2020-11-29 RX ADMIN — CARBAMAZEPINE 100 MG: 100 TABLET, CHEWABLE ORAL at 17:27

## 2020-11-29 RX ADMIN — FLUTICASONE PROPIONATE 2 SPRAY: 50 SPRAY, METERED NASAL at 19:49

## 2020-11-29 RX ADMIN — Medication 50 MG: at 11:00

## 2020-11-29 RX ADMIN — CARBAMAZEPINE 100 MG: 100 TABLET, CHEWABLE ORAL at 11:00

## 2020-11-29 RX ADMIN — FLUTICASONE PROPIONATE 2 SPRAY: 50 SPRAY, METERED NASAL at 11:00

## 2020-11-29 RX ADMIN — ACETAMINOPHEN 650 MG: 325 TABLET ORAL at 06:22

## 2020-11-29 RX ADMIN — OXYCODONE HYDROCHLORIDE AND ACETAMINOPHEN 500 MG: 500 TABLET ORAL at 11:00

## 2020-11-29 ASSESSMENT — PAIN DESCRIPTION - FREQUENCY
FREQUENCY: CONTINUOUS
FREQUENCY: CONTINUOUS

## 2020-11-29 ASSESSMENT — PAIN DESCRIPTION - ONSET
ONSET: ON-GOING
ONSET: ON-GOING

## 2020-11-29 ASSESSMENT — PAIN DESCRIPTION - LOCATION
LOCATION: FOOT
LOCATION: BACK

## 2020-11-29 ASSESSMENT — PAIN DESCRIPTION - ORIENTATION
ORIENTATION: LEFT
ORIENTATION: LOWER

## 2020-11-29 ASSESSMENT — PAIN DESCRIPTION - DIRECTION: RADIATING_TOWARDS: LEG

## 2020-11-29 ASSESSMENT — PAIN DESCRIPTION - DESCRIPTORS
DESCRIPTORS: ACHING
DESCRIPTORS: ACHING;CONSTANT

## 2020-11-29 ASSESSMENT — PAIN DESCRIPTION - PROGRESSION
CLINICAL_PROGRESSION: GRADUALLY WORSENING
CLINICAL_PROGRESSION: GRADUALLY WORSENING

## 2020-11-29 ASSESSMENT — PAIN - FUNCTIONAL ASSESSMENT
PAIN_FUNCTIONAL_ASSESSMENT: PREVENTS OR INTERFERES WITH MANY ACTIVE NOT PASSIVE ACTIVITIES
PAIN_FUNCTIONAL_ASSESSMENT: PREVENTS OR INTERFERES WITH ALL ACTIVE AND SOME PASSIVE ACTIVITIES

## 2020-11-29 ASSESSMENT — PAIN DESCRIPTION - PAIN TYPE
TYPE: ACUTE PAIN
TYPE: ACUTE PAIN

## 2020-11-29 ASSESSMENT — PAIN SCALES - GENERAL
PAINLEVEL_OUTOF10: 10
PAINLEVEL_OUTOF10: 0

## 2020-11-29 NOTE — ED NOTES
ED nurse-to-nurse bedside report    Chief Complaint   Patient presents with    Shortness of Breath      LOC: alert and orientated to name, place, date  Vital signs   Vitals:    11/28/20 2229   BP: (!) 106/45   Pulse: 100   Resp: 23   Temp: 100.2 °F (37.9 °C)   TempSrc: Oral   SpO2: 98%   Weight: 131 lb (59.4 kg)   Height: 5' 3\" (1.6 m)      Pain:    Pain Interventions: denies pain  Pain Goal: denies pain  Oxygen: Yes    Current needs required 3L nasal canula   Telemetry: Yes  LDAs:   Peripheral IV 11/28/20 Right Antecubital (Active)   Site Assessment Clean;Dry; Intact 11/28/20 2235   Line Status Normal saline locked 11/28/20 2235   Dressing Status Clean;Dry; Intact 11/28/20 2235     Continuous Infusions:   Mobility: Fully dependent  Casiano Fall Risk Score: No flowsheet data found.   Fall Interventions: x 2 side rails  Report given to: Sherry Gan RN  11/28/20 7777

## 2020-11-29 NOTE — ED PROVIDER NOTES
Kennedi [address] COMPLAINT       Chief Complaint   Patient presents with    Shortness of Breath       Nurses Notes reviewed and I agree except as noted in the HPI. HISTORY OF PRESENT Tomer Greenwood is a 65 y.o. female who presents to the emergency department due to difficulty breathing. She states that she was swabbed for Covid and found out she was positive last Saturday week ago. She has had symptoms of loss of sense of taste and smell. She resides at the [facility] in [facility]. She states that since yesterday she has felt more more short of breath. She has had a mild cough since yesterday as well. She denies known fever, diarrhea. She does report ongoing nausea. No other initial complaints or concerns. REVIEW OF SYSTEMS     Review of Systems   Constitutional: Negative for diaphoresis and fever. HENT: Negative for congestion, rhinorrhea and sore throat. Eyes: Negative for visual disturbance. Respiratory: Positive for cough and shortness of breath. Cardiovascular: Negative for chest pain, palpitations and leg swelling. Gastrointestinal: Positive for nausea. Negative for abdominal pain, blood in stool, constipation, diarrhea and vomiting. Endocrine: Negative for polyuria. Genitourinary: Negative for difficulty urinating and dysuria. Musculoskeletal: Negative for joint swelling. Skin: Negative for rash. Neurological: Negative for seizures, syncope, facial asymmetry, speech difficulty, weakness and headaches. Hematological: Negative for adenopathy. Psychiatric/Behavioral: Negative for confusion. All other systems reviewed and are negative. PAST MEDICAL HISTORY    has a past medical history of Arthritis, Hyperlipidemia, Hypertension, Intra-abdominal lymphadenopathy, MS (multiple sclerosis) ([address] Pancreatitis, Pneumonia, Seizures ([address] and UTI (urinary tract infection).     SURGICAL HISTORY      has a past surgical history that includes Cholecystectomy (April 1st 2016); pr xcapsl ctrc rmvl insj io lens prosth w/o ecp (Left, 11/15/2018); Intracapsular cataract extraction (Right, 12/17/2018); Cystoscopy (N/A, 1/2/2019); and Foot Debridement (Left, 11/6/2020). CURRENT MEDICATIONS       Current Discharge Medication List      CONTINUE these medications which have NOT CHANGED    Details   potassium bicarb-citric acid (EFFER-K) 20 MEQ TBEF effervescent tablet Take 1 tablet by mouth daily  Qty: 30 tablet, Refills: 0      aspirin (RA ASPIRIN EC) 81 MG EC tablet take 1 tablet by mouth once daily  Qty: 30 tablet, Refills: 0      Methenamine-Sodium Salicylate (CYSTEX PO) Take by mouth      methenamine (HIPREX) 1 g tablet Take 1 tablet by mouth 2 times daily (with meals)  Qty: 60 tablet, Refills: 11      metoprolol tartrate (LOPRESSOR) 25 MG tablet take 1/2 tablet by mouth twice a day  Qty: 30 tablet, Refills: 11      furosemide (LASIX) 10 MG/ML solution Take by mouth daily 4 milliliters by mout daily  Qty: 10 mg, Refills: 0      Water For Irrigation, Sterile (STERILE WATER FOR IRRIGATION) Irrigate vega catheter with 50 ml of sterile water weekly  Qty: 250 mL, Refills: 0      Cholecalciferol (VITAMIN D3 PO) Take by mouth daily      CRANBERRY PO Take by mouth daily      carBAMazepine (TEGRETOL) 100 MG chewable tablet Take 100 mg by mouth 3 times daily       acetic acid 0.25 % irrigation Irrigate catheter with 150 ml weekly. Qty: 1000 mL, Refills: 5      !! Incontinence Supplies (BEDSIDE DRAINAGE BAG) MISC Large 2000 cc bedside drainage bag  Qty: 1 each, Refills: 11      !! Incontinence Supplies (URINARY LEG BAG) [facility] 900 cc [place] Urinary catheter leg bag  Qty: 1 each, Refills: 11      !!  Incontinence Supplies (URINARY LEG BAG STRAPS) MISC Leg bag straps to go with urinary catheter leg bag  Qty: 1 each, Refills: 11      D-Mannose 500 MG CAPS Take 1,500 mg by mouth daily  Qty: 90 capsule, Refills: 5       !! - Potential duplicate medications found. Please discuss with provider. ALLERGIES     has No Known Allergies. FAMILY HISTORY     She indicated that her mother is . She indicated that her father is . She indicated that the status of her neg hx is unknown.   family history includes Cancer in her mother; Heart Disease in her father. SOCIAL HISTORY      reports that she quit smoking about 7 years ago. Her smoking use included cigarettes. She has a 80.00 pack-year smoking history. She quit smokeless tobacco use about 5 years ago. She reports that she does not drink alcohol or use drugs. PHYSICAL EXAM     INITIAL VITALS:  height is 5' 3\" (1.6 m) and weight is 131 lb (59.4 kg). Her oral temperature is 98.5 °F (36.9 °C). Her blood pressure is 118/58 (abnormal) and her pulse is 94. Her respiration is 22 and oxygen saturation is 94%. CONSTITUTIONAL: [Awake, alert, non toxic, well developed, well nourished, tachypnea noted with RR varying between 28-40 on NC O2, sats 96-98%]  HEAD: [Normocephalic, atraumatic]  EYES: [Pupils equal, round & reactive to light, extraocular movements intact, no nystagmus, clear conjunctiva, non-icteric sclera]  ENT: [External ear canal clear without evidence of cerumen impaction or foreign body, TM's clear without erythema or bulging. Nares patent without drainage, septum appears midline. Moist mucus membranes, oropharynx clear without exudate, erythema, or mass. Uvula midline]  NECK: [Nontender and supple. Kyphotic. No meningismus, no appreciated lymphadenopathy. Intact full range of motion. C-spine midline without vertebral tenderness. Trachea midline.]  CHEST: [Inspection normal, no lesions, equal rise. No crepitus or tenderness upon palpation.]  CARDIOVASCULAR: [Regular rhythm, tachycardic, normal S1 and S2. No appreciated murmurs, rubs, or gallops. No pulse deficits appreciated. Intact distal perfusion. JVD not appreciated.]  PULMONARY: [Respiratory distress present. Respiratory effort normal. Breath sounds clear to auscultation without rhonchi, rales, or wheezing. No accessory muscle use. No stridor. Tachypnea noted at times though patient able to converse in full sentences]  ABDOMEN: [Inspection normal, without surgical scars. Soft, non-tender, non-distended, with normoactive bowel sounds. No palpable masses, rebound, or guarding]  BACK: [Intact ROM. No midline vertebral tenderness, step off, or crepitus. No CVA tenderness.]  MUSCULOSKELETAL: [Extremities nontender to palpation. No gross deformity or evidence of external trauma. Intact range of motion. Sensation intact. No clubbing, cyanosis, or edema. Has Unna boots on bilaterally.]  SKIN: [Warm, dry. No jaundice, rash, urticaria, or petechiae]  NEUROLOGIC: [Alert and oriented x 3, GCS 15, normal mentation for age. Moves all four extremities. No gross sensory deficit. Cerebellar function grossly normal.]  PSYCHIATRIC: [Normal mood and affect, thought process is clear and linear]     DIFFERENTIAL DIAGNOSIS:   covid 19, pneumonia, ?anemia, ?nstemi, and others    DIAGNOSTIC RESULTS       RADIOLOGY: non-plain film images(s) such as CT,Ultrasound and MRI are read by the radiologist.    CTA CHEST W WO CONTRAST   Final Result   Impression:      No evidence of pulmonary embolus. Diffuse bilateral consolidation and pleural fluid. Question pulmonary edema versus pneumonia. This document has been electronically signed by: Blanca Barros MD on    11/29/2020 02:52 AM      All CT scans at this facility use dose modulation, iterative    reconstruction, and/or weight-based   dosing when appropriate to reduce radiation dose to as low as reasonably    achievable. XR CHEST PORTABLE   Final Result   Impression:      Worsening left basilar consolidation. Stable right basilar consolidation and pleural fluid.       This document has been electronically signed by: Blanca Barros MD on    11/28/2020 11:23 PM [] Visualized and interpreted by me   [x] Radiologist's Wet Read Report Reviewed   [] Discussed withRadiologist.    LABS:   Labs Reviewed   CBC WITH AUTO DIFFERENTIAL - Abnormal; Notable for the following components:       Result Value    RBC 3.30 (*)     Hematocrit 34.5 (*)     .5 (*)     MCH 37.6 (*)     MCHC 35.9 (*)     RDW-SD 51.4 (*)     Lymphocytes Absolute 0.6 (*)     Monocytes Absolute 0.3 (*)     All other components within normal limits   COMPREHENSIVE METABOLIC PANEL W/ REFLEX TO MG FOR LOW K - Abnormal; Notable for the following components:    BUN 5 (*)     Potassium reflex Magnesium 3.2 (*)     CO2 21 (*)     Calcium 8.3 (*)     Alb 2.2 (*)     All other components within normal limits   C-REACTIVE PROTEIN - Abnormal; Notable for the following components:    CRP 9.35 (*)     All other components within normal limits   LACTATE DEHYDROGENASE - Abnormal; Notable for the following components:     (*)     All other components within normal limits   FERRITIN - Abnormal; Notable for the following components:    Ferritin 422 (*)     All other components within normal limits   D-DIMER, QUANTITATIVE - Abnormal; Notable for the following components:    D-Dimer, Quant 1857.00 (*)     All other components within normal limits   TROPONIN - Abnormal; Notable for the following components:    Troponin T 0.018 (*)     All other components within normal limits   COVID-19 - Abnormal; Notable for the following components:    SARS-CoV-2, NAAT DETECTED (*)     All other components within normal limits   BRAIN NATRIURETIC PEPTIDE - Abnormal; Notable for the following components:    Pro-BNP 3472.0 (*)     All other components within normal limits   CULTURE, BLOOD 1   CULTURE, BLOOD 2   PROCALCITONIN   LACTIC ACID, PLASMA   ANION GAP   GLOMERULAR FILTRATION RATE, ESTIMATED   MAGNESIUM   OSMOLALITY   D-DIMER, QUANTITATIVE   TROPONIN   TROPONIN   TYPE AND SCREEN       EMERGENCY DEPARTMENT COURSE:   Vitals: Vitals:    11/29/20 0121 11/29/20 0225 11/29/20 0323 11/29/20 0445   BP: (!) 98/56 (!) 105/59 (!) 95/49 (!) 118/58   Pulse: 101 89 76 94   Resp: (!) 31 28 30 22   Temp:    98.5 °F (36.9 °C)   TempSrc:    Oral   SpO2: 94% 97% 97% 94%   Weight:       Height:         Based upon the patient's history, physical exam, and ED evaluation, I feel the patient's medical condition warrants admission to the hospital for further evaluation and treatment. I have discussed the patient with hospitalist who has agreed with the treatment plan and agreed to admit. I have transferred care of the patient to hospitalist. I have discussed the clinical presentation, work-up, and ED course thus far. CRITICAL CARE:   none    CONSULTS:  hospitalist for admission    PROCEDURES:  None    FINAL IMPRESSION      1. Pneumonia of both lungs due to infectious organism, unspecified part of lung    2. COVID-19          DISPOSITION/PLAN   admit    PATIENT REFERRED TO:  No follow-up provider specified. DISCHARGE MEDICATIONS:  Current Discharge Medication List          (Please note that portions of this note were completed with a voice recognition program.  Efforts were made to edit the dictations but occasionally words are mis-transcribed.)    Provider:  I personally performed the services described in the documentation, reviewed and edited the documentation which was dictated, and it accurately records my words and actions.     Marlin Oneill MD 11/28/20 5:11 AM                Marlin Oneill MD  11/29/20 [id number]

## 2020-11-29 NOTE — ED NOTES
Pt is transported to [facility] without incident. Floor nurse was contacted prior to departure.

## 2020-11-29 NOTE — ED NOTES
Pt arrives to ED from the [facility] c/o low spO2. Pt arrives on non re breather at 100%. On room air the pt was 95%. Pt placed on 2L nasal canula for comfort. Pt saturating 99%. Pt breathing 23 breaths a minute. Pt shows no signs of distress. Monitor applied to pt, EKG complete. Pt established IV flushes. Pt skin pink and warm.       Riley Moe BXZXTH, CLARY  11/28/20 8415

## 2020-11-29 NOTE — H&P
Hospitalist - History & Physical      Patient: [facility]    Unit/Bed:JT Santana Gomez  YOB: 1950  MRN: [id number]   Acct: [de-identified]   PCP: Gaye Pritchett MD    Date of Service: Pt seen/examined on 11/29/20  and Admitted to Inpatient with expected LOS greater than two midnights due to medical therapy. Chief Complaint:  Fever with cough    Assessment and Plan:-  1. Superimposed bacterial pneumonia on recent COVID-19 virus infection: As mentioned in H&P, CTA of the chest showed diffuse bilateral consolidation for possible pneumonia versus pulmonary edema. Received a dose of cefepime and vancomycin in ER. I will continue cefepime for possible healthcare associated pneumonia and to cover Pseudomonas aeruginosa infection given the fact that patient resident at nursing facility. Blood culture obtained  2. COVID-19 virus infection: Diagnosed one week ago, currently has increased shortness of breath, may start Decadron 6 mg daily for 5 days and reevaluate, no convalescent plasma or remdesivir indicated at this point, start vitamin C, vitamin D, zinc, Flonase nasal spray, DVT prophylaxis, CTA did not show any PE. 3. Acute on chronic hypoxic respiratory failure likely secondary to above-mentioned: Continue wean off oxygen as tolerated, currently on 3 L nasal cannula oxygen satting 96%. 4. Hypokalemia: Corrected orally with potassium replacement protocol  5. Elevated troponin level: Unsure of this is chronic or not, patient currently does not have any chest pain, EKG showed pulmonary disease changes. Repeat troponin level, monitor closely. 6. Essential hypertension: Restart antihypertensive medications with parameters to hold when systolic blood pressure less than 100 and heart rate less than 55  7. Bed bounded for multiple sclerosis and has Stanford catheter to be exchanged on a monthly basis: Acknowledged  8.  Physical deconditioning: Patient already at long-term facility, may return to her facility after discharge. History Of Present Illness:    Patient not a good historian, although she mentions she has been having terrible week 7 days ago when she diagnosed with COVID-19 disease at that time at her nursing facility then she moved to different facility which is specifically for COVID-19 disease patients. She has been having increased cough and fever, her diarrhea resolved as she mentioned, been having also myalgia. Patient recently discharged from our facility for open wound of the left foot and underwent I&D of the left heel as of 11/6/2020. Currently her left lower extremity supported with boot. In the ER patient requiring 3 L nasal cannula oxygen satting 96%, tachypneic, hypotensive, lymphocytopenia, we will admit the patient because of possible superimposed bacterial pneumonia on current COVID-19 virus infection. Curb 65 score of 3. Past Medical History:        Diagnosis Date    Arthritis     Hyperlipidemia     Hypertension     Intra-abdominal lymphadenopathy     MS (multiple sclerosis) (HCC)     Pancreatitis     Pneumonia     Seizures (HCC)     UTI (urinary tract infection)        Past Surgical History:        Procedure Laterality Date    CHOLECYSTECTOMY  April 1st 2016    laparoscopic    CYSTOSCOPY N/A 1/2/2019    CYSTO, CLOT EVACUATION, TURBT performed by Sam Quezada MD at [address] [place] [address] Left 11/6/2020    LEFT HEEL WOUND I&D performed by Peter aFrmer DPM at [address] Right 12/17/2018    EYE CATARACT EMULSIFICATION IOL IMPLANT, RIGHT performed by Payal Prieto MD at [address] [place] [address] IO LENS PROSTH W/O ECP Left 11/15/2018    EYE CATARACT EMULSIFICATION IOL IMPLANT LEFT EYE performed by Payal Prieto MD at [address] [place] [address] Medications:   No current facility-administered medications on file prior to encounter.       Current Outpatient Medications on File Prior to Encounter   Medication Sig Dispense Refill    potassium bicarb-citric acid (EFFER-K) 20 MEQ TBEF effervescent tablet Take 1 tablet by mouth daily 30 tablet 0    aspirin (RA ASPIRIN EC) 81 MG EC tablet take 1 tablet by mouth once daily 30 tablet 0    Methenamine-Sodium Salicylate (CYSTEX PO) Take by mouth      acetic acid 0.25 % irrigation Irrigate catheter with 150 ml weekly. 1000 mL 5    methenamine (HIPREX) 1 g tablet Take 1 tablet by mouth 2 times daily (with meals) 60 tablet 11    metoprolol tartrate (LOPRESSOR) 25 MG tablet take 1/2 tablet by mouth twice a day 30 tablet 11    furosemide (LASIX) 10 MG/ML solution Take by mouth daily 4 milliliters by mout daily 10 mg 0    Water For Irrigation, Sterile (STERILE WATER FOR IRRIGATION) Irrigate vega catheter with 50 ml of sterile water weekly 250 mL 0    Incontinence Supplies (BEDSIDE DRAINAGE BAG) MISC Large 2000 cc bedside drainage bag 1 each 11    Incontinence Supplies (URINARY LEG BAG) [facility] 900 cc Tracy Urinary catheter leg bag 1 each 11    Incontinence Supplies (URINARY LEG BAG STRAPS) MISC Leg bag straps to go with urinary catheter leg bag 1 each 11    D-Mannose 500 MG CAPS Take 1,500 mg by mouth daily 90 capsule 5    Cholecalciferol (VITAMIN D3 PO) Take by mouth daily      CRANBERRY PO Take by mouth daily      carBAMazepine (TEGRETOL) 100 MG chewable tablet Take 100 mg by mouth 3 times daily          Allergies:    Patient has no known allergies. Social History:    reports that she quit smoking about 7 years ago. Her smoking use included cigarettes. She has a 80.00 pack-year smoking history. She quit smokeless tobacco use about 5 years ago. She reports that she does not drink alcohol or use drugs.     Family History:       Problem Relation Age of Onset    Cancer Mother         colon    Heart Disease Father     Diabetes Neg Hx     High Blood Pressure Neg Hx     Stroke Neg Hx        Diet:  DIET GENERAL; No Added Salt (3-4 GM)    Review of systems:   Pertinent positives as noted in the HPI. All other systems reviewed and negative. PHYSICAL EXAM:  BP (!) 95/49   Pulse 76   Temp 100.2 °F (37.9 °C) (Oral)   Resp 30   Ht 5' 3\" (1.6 m)   Wt 131 lb (59.4 kg)   SpO2 97%   BMI 23.21 kg/m²   General appearance: No apparent distress, appears stated age and cooperative. On 3 L nasal cannula oxygen. HEENT: Normal cephalic, atraumatic without obvious deformity. Pupils equal, round, and reactive to light. Extra ocular muscles intact. Conjunctivae/corneas clear. Neck: Supple, with full range of motion. No jugular venous distention. Trachea midline. Respiratory: Bibasilar crackles especially on the left side, no wheeziness  Cardiovascular: S1/S2, CAROL appreciated, no rubs or gallops Abdomen: Soft, increased abdominal girth, non-tender, non-distended with normal bowel sounds. Musculoskeletal: Left lower extremity in boot. No edema right lower extremity. Skin: Skin color, texture, turgor normal.  No rashes or lesions. Neurologic:  Neurovascularly intact without any focal sensory/motor deficits. Cranial nerves: II-XII intact, grossly non-focal.  Psychiatric: Alert and oriented, thought content appropriate, normal insight  Capillary Refill: Brisk,< 3 seconds   Peripheral Pulses: +2 palpable, equal bilaterally   Stanford in place. Labs:   Recent Labs     11/28/20  2258   WBC 8.4   HGB 12.4   HCT 34.5*        Recent Labs     11/28/20  2258      K 3.2*      CO2 21*   BUN 5*   CREATININE 0.5   CALCIUM 8.3*     Recent Labs     11/28/20  2258   AST 33   ALT 11   BILITOT 0.5   ALKPHOS 102     No results for input(s): INR in the last 72 hours. No results for input(s): Betty Beat in the last 72 hours.     Urinalysis:    Lab Results   Component Value Date    NITRU NEGATIVE 11/03/2020    WBCUA > 200 11/03/2020    BACTERIA MANY 11/03/2020    RBCUA 25-50 11/03/2020    BLOODU MODERATE 11/03/2020    SPECGRAV 1.020 02/18/2019    GLUCOSEU NEGATIVE 11/03/2020       Radiology:   CTA CHEST W WO CONTRAST   Final Result   Impression:      No evidence of pulmonary embolus. Diffuse bilateral consolidation and pleural fluid. Question pulmonary edema versus pneumonia. This document has been electronically signed by: Elvia Dixon MD on    11/29/2020 02:52 AM      All CT scans at this facility use dose modulation, iterative    reconstruction, and/or weight-based   dosing when appropriate to reduce radiation dose to as low as reasonably    achievable. XR CHEST PORTABLE   Final Result   Impression:      Worsening left basilar consolidation. Stable right basilar consolidation and pleural fluid. This document has been electronically signed by: Elvia Dixon MD on    11/28/2020 11:23 PM           Cta Chest W Wo Contrast    Result Date: 11/29/2020  CT angiogram chest/pulmonary arteries with contrast, Multiplanar reconstructions and MIPS Comparison: None Findings: Bilateral groundglass consolidation and interstitial prominence. Moderate bilateral pleural effusions with posterior lower lobe atelectasis. Cardiomegaly. Question pulmonary edema versus pneumonia. No significant mediastinal adenopathy. No acute bony abnormalities. Thoracic aorta normal in caliber without dissection. Great vessel origins are patent. Moderate coronary artery calcifications. No filling defects are noted to suggest pulmonary embolus. Main pulmonary artery normal in caliber. Impression: No evidence of pulmonary embolus. Diffuse bilateral consolidation and pleural fluid. Question pulmonary edema versus pneumonia. This document has been electronically signed by: Elvia Dixon MD on 11/29/2020 02:52 AM All CT scans at this facility use dose modulation, iterative reconstruction, and/or weight-based dosing when appropriate to reduce radiation dose to as low as reasonably achievable. Xr Chest Portable    Result Date: 11/28/2020  1 view chest x-ray. Comparison: 11/6/2020 Findings: Worsening left basilar consolidation since prior study. Stable right basilar consolidation and small pleural effusion. Heart size normal.  No bony abnormality. Impression: Worsening left basilar consolidation. Stable right basilar consolidation and pleural fluid.  This document has been electronically signed by: Negra Michel MD on 11/28/2020 11:23 PM         EKG: Possible pulmonary disease    Electronically signed by Daniel Esclaante MD on 11/29/2020 at 4:06 AM

## 2020-11-29 NOTE — ED NOTES
Pt resting quietly in room no needs expressed. Side rails up x2 with call light in reach. Will continue to monitor.        Randa Jj RN  11/29/20 0028

## 2020-11-29 NOTE — ED NOTES
Pt resting quietly in room no needs expressed. Side rails up x2 with call light in reach. Will continue to monitor.        Annette Lawrence RN  11/29/20 1966

## 2020-11-29 NOTE — ED NOTES
ED to inpatient nurses report    Chief Complaint   Patient presents with    Shortness of Breath      Present to ED from nursing home  LOC: alert and orientated to name, place, date  Vital signs   Vitals:    11/29/20 0028 11/29/20 0121 11/29/20 0225 11/29/20 0323   BP: 112/60 (!) 98/56 (!) 105/59 (!) 95/49   Pulse: 88 101 89 76   Resp: 26 (!) 31 28 30   Temp:       TempSrc:       SpO2: 95% 94% 97% 97%   Weight:       Height:          Oxygen Baseline room air    Current needs required 2L NC Bipap/Cpap No  LDAs:   Peripheral IV 11/28/20 Right Antecubital (Active)   Site Assessment Clean;Dry; Intact 11/29/20 0122   Line Status Infusing 11/29/20 0122   Dressing Status Clean;Dry; Intact 11/29/20 0122       Peripheral IV 11/29/20 Left Wrist (Active)   Site Assessment Clean;Dry; Intact 11/29/20 0145   Line Status Blood return noted;Normal saline locked 11/29/20 0145   Dressing Status Clean; Intact;Dry 11/29/20 0145   Dressing Intervention New 11/29/20 0145     Mobility: Fully dependent  Pending ED orders: none  Present condition: stable, resting in bed  Person of contract Lisa Ndiaye, phone number [phone]  Our promise was given to patient    Electronically signed by Nai Mendoza, RN on 11/29/2020 at 4:01 AM       Nai Mendoza RN  11/29/20 8619

## 2020-11-30 LAB
ALBUMIN SERPL-MCNC: 2 G/DL (ref 3.5–5.1)
ALP BLD-CCNC: 89 U/L (ref 38–126)
ALT SERPL-CCNC: 10 U/L (ref 11–66)
ANION GAP SERPL CALCULATED.3IONS-SCNC: 12 MEQ/L (ref 8–16)
AST SERPL-CCNC: 25 U/L (ref 5–40)
BACTERIA: ABNORMAL /HPF
BASOPHILS # BLD: 1 %
BASOPHILS ABSOLUTE: 0.1 THOU/MM3 (ref 0–0.1)
BILIRUB SERPL-MCNC: 0.4 MG/DL (ref 0.3–1.2)
BILIRUBIN URINE: NEGATIVE
BLOOD, URINE: ABNORMAL
BUN BLDV-MCNC: 7 MG/DL (ref 7–22)
CALCIUM SERPL-MCNC: 8.5 MG/DL (ref 8.5–10.5)
CASTS UA: ABNORMAL /LPF
CHARACTER, URINE: ABNORMAL
CHLORIDE BLD-SCNC: 115 MEQ/L (ref 98–111)
CO2: 21 MEQ/L (ref 23–33)
COLOR: ABNORMAL
CREAT SERPL-MCNC: 0.3 MG/DL (ref 0.4–1.2)
CRYSTALS, UA: ABNORMAL
D-DIMER QUANTITATIVE: 2011 NG/ML FEU (ref 0–500)
EKG ATRIAL RATE: 101 BPM
EKG Q-T INTERVAL: 308 MS
EKG QRS DURATION: 54 MS
EKG QTC CALCULATION (BAZETT): 401 MS
EKG R AXIS: 47 DEGREES
EKG T AXIS: 92 DEGREES
EKG VENTRICULAR RATE: 102 BPM
EOSINOPHIL # BLD: 1.9 %
EOSINOPHILS ABSOLUTE: 0.1 THOU/MM3 (ref 0–0.4)
EPITHELIAL CELLS, UA: ABNORMAL /HPF
ERYTHROCYTE [DISTWIDTH] IN BLOOD BY AUTOMATED COUNT: 14.6 % (ref 11.5–14.5)
ERYTHROCYTE [DISTWIDTH] IN BLOOD BY AUTOMATED COUNT: 63.1 FL (ref 35–45)
GFR SERPL CREATININE-BSD FRML MDRD: > 90 ML/MIN/1.73M2
GLUCOSE BLD-MCNC: 83 MG/DL (ref 70–108)
GLUCOSE URINE: NEGATIVE MG/DL
HCT VFR BLD CALC: 39 % (ref 37–47)
HEMOGLOBIN: 12.1 GM/DL (ref 12–16)
IMMATURE GRANS (ABS): 0.05 THOU/MM3 (ref 0–0.07)
IMMATURE GRANULOCYTES: 0.8 %
KETONES, URINE: ABNORMAL
LEUKOCYTE ESTERASE, URINE: ABNORMAL
LYMPHOCYTES # BLD: 21.3 %
LYMPHOCYTES ABSOLUTE: 1.3 THOU/MM3 (ref 1–4.8)
MACROCYTES: PRESENT
MAGNESIUM: 1.9 MG/DL (ref 1.6–2.4)
MCH RBC QN AUTO: 37 PG (ref 26–33)
MCHC RBC AUTO-ENTMCNC: 31 GM/DL (ref 32.2–35.5)
MCV RBC AUTO: 119.3 FL (ref 81–99)
MISCELLANEOUS 2: ABNORMAL
MONOCYTES # BLD: 9 %
MONOCYTES ABSOLUTE: 0.5 THOU/MM3 (ref 0.4–1.3)
NITRITE, URINE: POSITIVE
NUCLEATED RED BLOOD CELLS: 0 /100 WBC
PH UA: 6 (ref 5–9)
PLATELET # BLD: 191 THOU/MM3 (ref 130–400)
PLATELET ESTIMATE: ADEQUATE
PMV BLD AUTO: 11.1 FL (ref 9.4–12.4)
POTASSIUM REFLEX MAGNESIUM: 3.5 MEQ/L (ref 3.5–5.2)
PROTEIN UA: 30
RBC # BLD: 3.27 MILL/MM3 (ref 4.2–5.4)
RBC URINE: ABNORMAL /HPF
REASON FOR REJECTION: NORMAL
REJECTED TEST: NORMAL
RENAL EPITHELIAL, UA: ABNORMAL
SCAN OF BLOOD SMEAR: NORMAL
SEG NEUTROPHILS: 66 %
SEGMENTED NEUTROPHILS ABSOLUTE COUNT: 3.9 THOU/MM3 (ref 1.8–7.7)
SODIUM BLD-SCNC: 148 MEQ/L (ref 135–145)
SPECIFIC GRAVITY, URINE: 1.02 (ref 1–1.03)
TOTAL PROTEIN: 6.4 G/DL (ref 6.1–8)
UROBILINOGEN, URINE: 1 EU/DL (ref 0–1)
WBC # BLD: 5.9 THOU/MM3 (ref 4.8–10.8)
WBC UA: ABNORMAL /HPF
YEAST: ABNORMAL

## 2020-11-30 PROCEDURE — 6370000000 HC RX 637 (ALT 250 FOR IP): Performed by: FAMILY MEDICINE

## 2020-11-30 PROCEDURE — 87106 FUNGI IDENTIFICATION YEAST: CPT

## 2020-11-30 PROCEDURE — 6360000002 HC RX W HCPCS: Performed by: INTERNAL MEDICINE

## 2020-11-30 PROCEDURE — 6360000002 HC RX W HCPCS: Performed by: FAMILY MEDICINE

## 2020-11-30 PROCEDURE — 36415 COLL VENOUS BLD VENIPUNCTURE: CPT

## 2020-11-30 PROCEDURE — 6360000002 HC RX W HCPCS: Performed by: EMERGENCY MEDICINE

## 2020-11-30 PROCEDURE — 80053 COMPREHEN METABOLIC PANEL: CPT

## 2020-11-30 PROCEDURE — 99233 SBSQ HOSP IP/OBS HIGH 50: CPT | Performed by: INTERNAL MEDICINE

## 2020-11-30 PROCEDURE — 85025 COMPLETE CBC W/AUTO DIFF WBC: CPT

## 2020-11-30 PROCEDURE — 2580000003 HC RX 258: Performed by: EMERGENCY MEDICINE

## 2020-11-30 PROCEDURE — 93010 ELECTROCARDIOGRAM REPORT: CPT | Performed by: INTERNAL MEDICINE

## 2020-11-30 PROCEDURE — 85379 FIBRIN DEGRADATION QUANT: CPT

## 2020-11-30 PROCEDURE — 2140000000 HC CCU INTERMEDIATE R&B

## 2020-11-30 PROCEDURE — 81001 URINALYSIS AUTO W/SCOPE: CPT

## 2020-11-30 PROCEDURE — 2580000003 HC RX 258: Performed by: INTERNAL MEDICINE

## 2020-11-30 PROCEDURE — 83735 ASSAY OF MAGNESIUM: CPT

## 2020-11-30 PROCEDURE — 87086 URINE CULTURE/COLONY COUNT: CPT

## 2020-11-30 PROCEDURE — 2580000003 HC RX 258: Performed by: FAMILY MEDICINE

## 2020-11-30 RX ORDER — SODIUM CHLORIDE 9 MG/ML
INJECTION, SOLUTION INTRAVENOUS CONTINUOUS
Status: ACTIVE | OUTPATIENT
Start: 2020-11-30 | End: 2020-12-01

## 2020-11-30 RX ADMIN — CEFEPIME 2 G: 2 INJECTION, POWDER, FOR SOLUTION INTRAVENOUS at 04:29

## 2020-11-30 RX ADMIN — ASPIRIN 81 MG: 81 TABLET ORAL at 08:32

## 2020-11-30 RX ADMIN — ACETAMINOPHEN 650 MG: 325 TABLET ORAL at 04:29

## 2020-11-30 RX ADMIN — Medication 50 MG: at 08:32

## 2020-11-30 RX ADMIN — SODIUM CHLORIDE: 9 INJECTION, SOLUTION INTRAVENOUS at 23:13

## 2020-11-30 RX ADMIN — METOPROLOL TARTRATE 25 MG: 25 TABLET, FILM COATED ORAL at 08:32

## 2020-11-30 RX ADMIN — FLUTICASONE PROPIONATE 2 SPRAY: 50 SPRAY, METERED NASAL at 21:38

## 2020-11-30 RX ADMIN — SODIUM CHLORIDE: 9 INJECTION, SOLUTION INTRAVENOUS at 11:01

## 2020-11-30 RX ADMIN — Medication 1000 UNITS: at 08:33

## 2020-11-30 RX ADMIN — DEXAMETHASONE 6 MG: 4 TABLET ORAL at 08:32

## 2020-11-30 RX ADMIN — FLUTICASONE PROPIONATE 2 SPRAY: 50 SPRAY, METERED NASAL at 08:33

## 2020-11-30 RX ADMIN — ENOXAPARIN SODIUM 30 MG: 30 INJECTION SUBCUTANEOUS at 14:16

## 2020-11-30 RX ADMIN — CARBAMAZEPINE 100 MG: 100 TABLET, CHEWABLE ORAL at 14:15

## 2020-11-30 RX ADMIN — ENOXAPARIN SODIUM 30 MG: 30 INJECTION SUBCUTANEOUS at 23:13

## 2020-11-30 RX ADMIN — METOPROLOL TARTRATE 25 MG: 25 TABLET, FILM COATED ORAL at 21:39

## 2020-11-30 RX ADMIN — CEFEPIME 2 G: 2 INJECTION, POWDER, FOR SOLUTION INTRAVENOUS at 15:00

## 2020-11-30 RX ADMIN — SODIUM CHLORIDE, PRESERVATIVE FREE 10 ML: 5 INJECTION INTRAVENOUS at 08:34

## 2020-11-30 RX ADMIN — CARBAMAZEPINE 100 MG: 100 TABLET, CHEWABLE ORAL at 08:33

## 2020-11-30 RX ADMIN — OXYCODONE HYDROCHLORIDE AND ACETAMINOPHEN 500 MG: 500 TABLET ORAL at 08:32

## 2020-11-30 RX ADMIN — CARBAMAZEPINE 100 MG: 100 TABLET, CHEWABLE ORAL at 21:39

## 2020-11-30 ASSESSMENT — PAIN DESCRIPTION - ONSET
ONSET: ON-GOING

## 2020-11-30 ASSESSMENT — PAIN DESCRIPTION - LOCATION
LOCATION: BACK

## 2020-11-30 ASSESSMENT — PAIN SCALES - GENERAL
PAINLEVEL_OUTOF10: 2
PAINLEVEL_OUTOF10: 2
PAINLEVEL_OUTOF10: 10
PAINLEVEL_OUTOF10: 10

## 2020-11-30 ASSESSMENT — PAIN DESCRIPTION - PAIN TYPE
TYPE: CHRONIC PAIN
TYPE: CHRONIC PAIN
TYPE: ACUTE PAIN

## 2020-11-30 ASSESSMENT — PAIN DESCRIPTION - DESCRIPTORS: DESCRIPTORS: ACHING

## 2020-11-30 ASSESSMENT — PAIN - FUNCTIONAL ASSESSMENT: PAIN_FUNCTIONAL_ASSESSMENT: ACTIVITIES ARE NOT PREVENTED

## 2020-11-30 ASSESSMENT — PAIN DESCRIPTION - PROGRESSION: CLINICAL_PROGRESSION: NOT CHANGED

## 2020-11-30 ASSESSMENT — PAIN DESCRIPTION - ORIENTATION: ORIENTATION: LOWER

## 2020-11-30 ASSESSMENT — PAIN DESCRIPTION - FREQUENCY: FREQUENCY: CONTINUOUS

## 2020-11-30 NOTE — PROGRESS NOTES
Hospitalist Progress Note    Patient:  Angel Brunner      Unit/Bed:3B-28/028-A    YOB: 1950    MRN: [id number]       Acct: [de-identified]     PCP: An Sweeney MD    Date of Admission: 11/28/2020    Active Hospital Problems    Diagnosis Date Noted    COVID-19 virus infection [U07.1] 11/29/2020     Assessment and Plan:    1. COVID-19 PNA with Suspected Superimposed Bacterial PNA: having increased cough and fevers. CTA of the chest showed diffuse bilateral consolidation for possible pneumonia. Received a dose of cefepime and vancomycin in ER. Bx obtained and growing Gram Negative Bacilli. Cont IV Cefepime. 2. COVID-19 PNA: Diagnosed one week ago, currently has increased shortness of breath, cont Decadron 6 mg daily, no convalescent plasma or remdesivir indicated at this point, start vitamin C, vitamin D, zinc, Flonase nasal spray, DVT prophylaxis, CTA did not show any PE. 3. Acute on chronic hypoxic respiratory failure likely secondary to above-mentioned: 2/2 to #1 and #2. Continue wean off oxygen as tolerated, was on 3L NC on presentation and now down to 1L NC.   4. UTI Associated with Chronic Stanford Catheter: likely the source of bacteremia, growing gram negative bacilli. Pt gets frequent UTI's. Cont IV Cefepime. Follow-up speciation. 5. Hypokalemia: Corrected orally with potassium replacement protocol  6. Elevated troponin level: likely demand mismatch. Patient currently does not have any chest pain, EKG showed pulmonary disease changes. 7. Essential hypertension: Restart antihypertensive medications with parameters to hold when systolic blood pressure less than 100 and heart rate less than 55  8. Bed bounded for multiple sclerosis and has Stanford catheter to be exchanged on a monthly basis: Acknowledged  9. Physical deconditioning: Patient already at long-term facility, may return to her facility after discharge.   10. Hypernatremia: Na up to 148, likely dehydration related due to poor PO intake. Will start gentle IVF. Cont to monitor.            Chief Complaint: cough, fevers, sob     Hospital Course: Patient not a good historian, although she mentions she has been having terrible week 7 days ago when she diagnosed with COVID-19 disease at that time at her nursing facility then she moved to different facility which is specifically for COVID-19 disease patients. She has been having increased cough and fever, her diarrhea resolved as she mentioned, been having also myalgia. Patient recently discharged from our facility for open wound of the left foot and underwent I&D of the left heel as of 11/6/2020. Currently her left lower extremity supported with boot. In the ER patient requiring 3 L nasal cannula oxygen satting 96%, tachypneic, hypotensive, lymphocytopenia, we will admit the patient because of possible superimposed bacterial pneumonia on current COVID-19 virus infection. Subjective: no acute events overnight. Pt reports feeling better, starting to eat. Improvement in sob. No fevers or chills. No chest pain. No abdominal pain.        Medications:  Reviewed    Infusion Medications    sodium chloride 75 mL/hr at 11/30/20 1101     Scheduled Medications    cefepime  2 g Intravenous Q12H    sodium chloride flush  10 mL Intravenous 2 times per day    carBAMazepine  100 mg Oral TID    metoprolol tartrate  25 mg Oral BID    aspirin  81 mg Oral Daily    sterile water for irrigation  500 mL Irrigation Once    zinc sulfate  50 mg Oral Daily    Vitamin D  1,000 Units Oral Daily    vitamin C  500 mg Oral Daily    fluticasone  2 spray Each Nostril BID    dexamethasone  6 mg Oral Daily    enoxaparin  30 mg Subcutaneous Q12H     PRN Meds: sodium chloride flush, acetaminophen **OR** acetaminophen, polyethylene glycol, promethazine **OR** ondansetron, potassium chloride **OR** potassium alternative oral replacement **OR** potassium chloride      Intake/Output Summary (Last 24 hours) at 11/30/2020 1324  Last data filed at 11/30/2020 0439  Gross per 24 hour   Intake 0 ml   Output 500 ml   Net -500 ml       Diet:  DIET GENERAL; No Added Salt (3-4 GM)    Exam:  /70   Pulse 80   Temp 97.8 °F (36.6 °C) (Oral)   Resp 18   Ht 5' 3\" (1.6 m)   Wt 131 lb (59.4 kg)   SpO2 95%   BMI 23.21 kg/m²     General appearance: No apparent distress, appears stated age and cooperative. On 3 L nasal cannula oxygen. HEENT: Normal cephalic, atraumatic without obvious deformity. Pupils equal, round, and reactive to light. Extra ocular muscles intact. Conjunctivae/corneas clear. Neck: Supple, with full range of motion. No jugular venous distention. Trachea midline. Respiratory: Bibasilar crackles especially on the left side, no wheeziness  Cardiovascular: S1/S2, CAROL appreciated, no rubs or gallops Abdomen: Soft, increased abdominal girth, non-tender, non-distended with normal bowel sounds. Musculoskeletal: Left lower extremity in boot. No edema right lower extremity. Skin: Skin color, texture, turgor normal.  No rashes or lesions. Neurologic:  Neurovascularly intact without any focal sensory/motor deficits. Cranial nerves: II-XII intact, grossly non-focal.  Psychiatric: Alert and oriented, thought content appropriate, normal insight  Capillary Refill: Brisk,< 3 seconds   Peripheral Pulses: +2 palpable, equal bilaterally   Stanford in place. Labs:   Recent Labs     11/30/20  0433   WBC 5.9   HGB 12.1   HCT 39.0        Recent Labs     11/30/20  0433   *   K 3.5   *   CO2 21*   BUN 7   CREATININE 0.3*   CALCIUM 8.5     Recent Labs     11/30/20  0433   AST 25   ALT 10*   BILITOT 0.4   ALKPHOS 89     No results for input(s): INR in the last 72 hours. No results for input(s): Nazia Comment in the last 72 hours.     Urinalysis:      Lab Results   Component Value Date    NITRU POSITIVE 11/30/2020    WBCUA  11/30/2020    BACTERIA NONE SEEN 11/30/2020    RBCUA 0-2 11/30/2020 BLOODU TRACE 11/30/2020    SPECGRAV 1.020 02/18/2019    GLUCOSEU NEGATIVE 11/30/2020       Radiology:   All imaging reviewed     Diet: DIET GENERAL; No Added Salt (3-4 GM)      Code Status: Full Code            Electronically signed by Douglas Mccarthy MD on 11/30/2020 at 1:24 PM

## 2020-11-30 NOTE — PROGRESS NOTES
Pharmacy Note  [employer] Result    Charles Santo is a 65 y.o. female, with a positive blood culture result    [employer] message received from Moon, laboratory employee on 11/29/2020 at 8:22 PM    First Gram stain result: gram negative bacilli    BioFire BCID result: E coli    BioFire BCID and gram stain congruent? Yes    Suspected source? Unknown    Patient chart reviewed for signs/symptoms of infection: Yes  /66   Pulse 100   Temp 97.9 °F (36.6 °C) (Oral)   Resp 20   Ht 5' 3\" (1.6 m)   Wt 131 lb (59.4 kg)   SpO2 95%   BMI 23.21 kg/m²   Lab Results   Component Value Date    WBC 8.4 11/28/2020       Allergies reviewed  Patient has no known allergies. Renal function reviewed  Estimated Creatinine Clearance: 84 mL/min (based on SCr of 0.5 mg/dL).     Current antibiotic regimen: Cefepime    Intervention needed: No    Individual contacted: Provider Dr. Isela Li  11/29/2020 8:22 PM

## 2020-11-30 NOTE — CARE COORDINATION
DISASTER CHARTING    11/30/20, 8:53 AM EST    DISCHARGE ONGOING EVALUATION:     Lacy Minor day: 1  Location: -28/028-A Reason for admit: COVID-19 virus infection [U07.1]   Barriers to Discharge: To ER with SOB. +Covid one week ago. From [address]. Cefepime. Decadron. Vits. This am afebrile and O2 at 1L/NC with sat 95%. SW consulted. PCP: Kylah Hudson MD  Patient Goals/Plan/Treatment Preferences: CM visit deferred today as pt is from [facility] and planning return. SW following for continuity of services.

## 2020-11-30 NOTE — PROGRESS NOTES
1910 Niraj Cedricjohn, spouse, called in to get update. All questions answered to his satisfaction. 1230 Returned call to Eloise Love, at [facility], [phone]. All questions answered to her satisfaction. Eloise Love would like the  to know to notify at discharge day. I will convey this to our , Deanne Odell. [address] with  Moise Baltazar, updated him on her progress. Answered all of his questions to his satisfaction.

## 2020-12-01 LAB
ANION GAP SERPL CALCULATED.3IONS-SCNC: 9 MEQ/L (ref 8–16)
BASOPHILS # BLD: 0.5 %
BASOPHILS ABSOLUTE: 0 THOU/MM3 (ref 0–0.1)
BUN BLDV-MCNC: 9 MG/DL (ref 7–22)
CALCIUM SERPL-MCNC: 8.2 MG/DL (ref 8.5–10.5)
CHLORIDE BLD-SCNC: 115 MEQ/L (ref 98–111)
CO2: 21 MEQ/L (ref 23–33)
CREAT SERPL-MCNC: 0.3 MG/DL (ref 0.4–1.2)
EOSINOPHIL # BLD: 0.5 %
EOSINOPHILS ABSOLUTE: 0 THOU/MM3 (ref 0–0.4)
ERYTHROCYTE [DISTWIDTH] IN BLOOD BY AUTOMATED COUNT: 14.1 % (ref 11.5–14.5)
ERYTHROCYTE [DISTWIDTH] IN BLOOD BY AUTOMATED COUNT: 51.5 FL (ref 35–45)
GFR SERPL CREATININE-BSD FRML MDRD: > 90 ML/MIN/1.73M2
GLUCOSE BLD-MCNC: 76 MG/DL (ref 70–108)
HCT VFR BLD CALC: 33.4 % (ref 37–47)
HEMOGLOBIN: 11.1 GM/DL (ref 12–16)
IMMATURE GRANS (ABS): 0.05 THOU/MM3 (ref 0–0.07)
IMMATURE GRANULOCYTES: 0.9 %
LYMPHOCYTES # BLD: 23.4 %
LYMPHOCYTES ABSOLUTE: 1.4 THOU/MM3 (ref 1–4.8)
MAGNESIUM: 1.8 MG/DL (ref 1.6–2.4)
MCH RBC QN AUTO: 34.2 PG (ref 26–33)
MCHC RBC AUTO-ENTMCNC: 33.2 GM/DL (ref 32.2–35.5)
MCV RBC AUTO: 102.8 FL (ref 81–99)
MONOCYTES # BLD: 8.3 %
MONOCYTES ABSOLUTE: 0.5 THOU/MM3 (ref 0.4–1.3)
NUCLEATED RED BLOOD CELLS: 0 /100 WBC
ORGANISM: ABNORMAL
PLATELET # BLD: 193 THOU/MM3 (ref 130–400)
PMV BLD AUTO: 11.2 FL (ref 9.4–12.4)
POTASSIUM SERPL-SCNC: 3.3 MEQ/L (ref 3.5–5.2)
RBC # BLD: 3.25 MILL/MM3 (ref 4.2–5.4)
SEG NEUTROPHILS: 66.4 %
SEGMENTED NEUTROPHILS ABSOLUTE COUNT: 3.9 THOU/MM3 (ref 1.8–7.7)
SODIUM BLD-SCNC: 145 MEQ/L (ref 135–145)
WBC # BLD: 5.8 THOU/MM3 (ref 4.8–10.8)

## 2020-12-01 PROCEDURE — 2580000003 HC RX 258: Performed by: FAMILY MEDICINE

## 2020-12-01 PROCEDURE — 6370000000 HC RX 637 (ALT 250 FOR IP): Performed by: INTERNAL MEDICINE

## 2020-12-01 PROCEDURE — 99233 SBSQ HOSP IP/OBS HIGH 50: CPT | Performed by: INTERNAL MEDICINE

## 2020-12-01 PROCEDURE — 6360000002 HC RX W HCPCS: Performed by: INTERNAL MEDICINE

## 2020-12-01 PROCEDURE — 6370000000 HC RX 637 (ALT 250 FOR IP): Performed by: FAMILY MEDICINE

## 2020-12-01 PROCEDURE — 2700000000 HC OXYGEN THERAPY PER DAY

## 2020-12-01 PROCEDURE — 94760 N-INVAS EAR/PLS OXIMETRY 1: CPT

## 2020-12-01 PROCEDURE — 6360000002 HC RX W HCPCS: Performed by: EMERGENCY MEDICINE

## 2020-12-01 PROCEDURE — 6360000002 HC RX W HCPCS: Performed by: FAMILY MEDICINE

## 2020-12-01 PROCEDURE — 83735 ASSAY OF MAGNESIUM: CPT

## 2020-12-01 PROCEDURE — 80048 BASIC METABOLIC PNL TOTAL CA: CPT

## 2020-12-01 PROCEDURE — 2580000003 HC RX 258: Performed by: INTERNAL MEDICINE

## 2020-12-01 PROCEDURE — 36415 COLL VENOUS BLD VENIPUNCTURE: CPT

## 2020-12-01 PROCEDURE — 2140000000 HC CCU INTERMEDIATE R&B

## 2020-12-01 PROCEDURE — 85025 COMPLETE CBC W/AUTO DIFF WBC: CPT

## 2020-12-01 PROCEDURE — 2580000003 HC RX 258: Performed by: EMERGENCY MEDICINE

## 2020-12-01 RX ORDER — SULFAMETHOXAZOLE AND TRIMETHOPRIM 800; 160 MG/1; MG/1
1 TABLET ORAL EVERY 12 HOURS SCHEDULED
Status: DISCONTINUED | OUTPATIENT
Start: 2020-12-01 | End: 2020-12-02 | Stop reason: HOSPADM

## 2020-12-01 RX ORDER — SODIUM CHLORIDE 9 MG/ML
INJECTION, SOLUTION INTRAVENOUS CONTINUOUS
Status: ACTIVE | OUTPATIENT
Start: 2020-12-01 | End: 2020-12-02

## 2020-12-01 RX ADMIN — CARBAMAZEPINE 100 MG: 100 TABLET, CHEWABLE ORAL at 13:25

## 2020-12-01 RX ADMIN — CEFEPIME 2 G: 2 INJECTION, POWDER, FOR SOLUTION INTRAVENOUS at 04:26

## 2020-12-01 RX ADMIN — ASPIRIN 81 MG: 81 TABLET ORAL at 10:06

## 2020-12-01 RX ADMIN — Medication 1000 UNITS: at 08:10

## 2020-12-01 RX ADMIN — ENOXAPARIN SODIUM 30 MG: 30 INJECTION SUBCUTANEOUS at 10:10

## 2020-12-01 RX ADMIN — SODIUM CHLORIDE: 9 INJECTION, SOLUTION INTRAVENOUS at 13:23

## 2020-12-01 RX ADMIN — ENOXAPARIN SODIUM 30 MG: 30 INJECTION SUBCUTANEOUS at 22:26

## 2020-12-01 RX ADMIN — FLUTICASONE PROPIONATE 2 SPRAY: 50 SPRAY, METERED NASAL at 21:51

## 2020-12-01 RX ADMIN — SULFAMETHOXAZOLE AND TRIMETHOPRIM 1 TABLET: 800; 160 TABLET ORAL at 21:51

## 2020-12-01 RX ADMIN — CARBAMAZEPINE 100 MG: 100 TABLET, CHEWABLE ORAL at 21:51

## 2020-12-01 RX ADMIN — OXYCODONE HYDROCHLORIDE AND ACETAMINOPHEN 500 MG: 500 TABLET ORAL at 08:11

## 2020-12-01 RX ADMIN — METOPROLOL TARTRATE 25 MG: 25 TABLET, FILM COATED ORAL at 21:51

## 2020-12-01 RX ADMIN — DEXAMETHASONE 6 MG: 4 TABLET ORAL at 08:11

## 2020-12-01 RX ADMIN — FLUTICASONE PROPIONATE 2 SPRAY: 50 SPRAY, METERED NASAL at 10:06

## 2020-12-01 RX ADMIN — SODIUM CHLORIDE, PRESERVATIVE FREE 10 ML: 5 INJECTION INTRAVENOUS at 08:17

## 2020-12-01 RX ADMIN — METOPROLOL TARTRATE 25 MG: 25 TABLET, FILM COATED ORAL at 08:17

## 2020-12-01 RX ADMIN — CARBAMAZEPINE 100 MG: 100 TABLET, CHEWABLE ORAL at 08:11

## 2020-12-01 RX ADMIN — POTASSIUM BICARBONATE 40 MEQ: 782 TABLET, EFFERVESCENT ORAL at 05:56

## 2020-12-01 RX ADMIN — Medication 50 MG: at 08:11

## 2020-12-01 ASSESSMENT — PAIN SCALES - GENERAL: PAINLEVEL_OUTOF10: 0

## 2020-12-01 NOTE — DISCHARGE INSTR - COC
Continuity of Care Form    Patient Name: Sujey Kellogg   :    MRN:  [id number]    Admit date:  2020  Discharge date:  2020    Code Status Order: Full Code   Advance Directives:   Advance Care Flowsheet Documentation       Date/Time Healthcare Directive Type of Healthcare Directive Copy in [address] Agent's Name Healthcare Agent's Phone Number    [phone]  No, patient does not have an advance directive for healthcare treatment -- -- -- -- --            Admitting Physician:  Kianna Campbell MD  PCP: Sanjuanita Rabago MD    Discharging Nurse: Grazyna Vela RN  [address] Unit/Room#: 3B-28/028-A  Discharging Unit Phone Number: [phone]    Emergency Contact:   Extended Emergency Contact Information  Primary Emergency Contact: Arabella Watkins States of [address] [place] [address] Phone: [phone]  Relation: Other  Secondary Emergency Contact: Jeremie Hopson  Address: CELL  [address] [place] [address], [address] [place] [address] Phone: [phone]  Relation: Child    Past Surgical History:  Past Surgical History:   Procedure Laterality Date    CHOLECYSTECTOMY  2016    laparoscopic    CYSTOSCOPY N/A 2019    CYSTO, CLOT EVACUATION, TURBT performed by Refugio Veras MD at [address] Left 2020    LEFT HEEL WOUND I&D performed by Nikki Harris DPM at [address] Right 2018    EYE CATARACT EMULSIFICATION IOL IMPLANT, RIGHT performed by Yo Sandoval MD at [address] W/O ECP Left 11/15/2018    EYE CATARACT EMULSIFICATION IOL IMPLANT LEFT EYE performed by Yo Sandoval MD at [facility]       Immunization History: There is no immunization history for the selected administration types on file for this patient.     Active Problems:  Patient Active Problem List   Diagnosis Code    MS (multiple sclerosis) ([facility] G35    Seizure disorder ([facility] G40.909    Frequent PVCs I49.3    Hematuria R31.9    Nicotine abuse Z72.0    Hematuria, gross R31.0    Sepsis ([facility] A41.9    HTN (hypertension) I10    HLD (hyperlipidemia) E78.5    Multiple sclerosis (HCC) A56    Metabolic acidosis, normal anion gap (NAG) E87.2    Open wound of foot, complicated, left, initial encounter S91.302A    COVID-19 virus infection U07.1       Isolation/Infection:   Isolation            Droplet Plus          Patient Infection Status       Infection Onset Added Last Indicated Last Indicated By Review Planned Expiration Resolved Resolved By    COVID-19 11/28/20 11/29/20 11/28/20 COVID-19 12/08/20 12/12/20      +11/28 from [facility] [employer] [phone].   Stated + 11/21  Came in with increase SOB    MRSA 08/29/19 08/31/19 08/29/19 Urine Culture, Reflexed        Urine 8/2019    Resolved    COVID-19 Rule Out 11/28/20 11/28/20 11/28/20 COVID-19 (Ordered)   11/29/20 Rule-Out Test Resulted    COVID-19 Rule Out 11/05/20 11/05/20 11/05/20 COVID-19 (Ordered)   11/05/20 Rule-Out Test Resulted    ESBL (Extended Spectrum Beta Lactamase)  01/26/17 01/26/17 Alex Yeh RN   08/29/19 Alex Yeh RN    E coli in urine 1/17            Nurse Assessment:  Last Vital Signs: /76   Pulse 120   Temp 98 °F (36.7 °C) (Oral)   Resp 18   Ht 5' 3\" (1.6 m)   Wt 131 lb (59.4 kg)   SpO2 94%   BMI 23.21 kg/m²     Last documented pain score (0-10 scale): Pain Level: 0  Last Weight:   Wt Readings from Last 1 Encounters:   11/28/20 131 lb (59.4 kg)     Mental Status:  disoriented    IV Access:  - None    Nursing Mobility/ADLs:  Walking   Dependent  Transfer  Dependent  Bathing  Dependent  Dressing  Dependent  Toileting  Dependent  Feeding  Dependent  Med Admin  Dependent  Med Delivery   whole    Wound Care Documentation and Therapy:  Wound 08/29/19 Sacrum Mid DTI redness that does not magdalene- size of a softball on sacrum (Active) Number of days: 460       Wound 11/03/20 Back Lower;Medial (Active)   Number of days: 27       Wound 11/03/20 Buttocks Medial;Right (Active)   Number of days: 27       Wound 11/03/20 Coccyx Mid;Right;Left (Active)   Number of days: 27       Wound 11/29/20 Sacrum Stage 1 (Active)   Wound Etiology Pressure Stage  1 12/01/20 0420   Wound Assessment Non-blanchable erythema 12/01/20 0824   Number of days: 2       Wound 11/29/20 Buttocks Left Stage 1 (Active)   Wound Etiology Pressure Stage  1 12/01/20 0420   Wound Assessment Non-blanchable erythema 12/01/20 0824   Number of days: 2       Wound 11/29/20 Buttocks Right stage 1 (Active)   Wound Etiology Pressure Stage  1 12/01/20 0420   Wound Assessment Non-blanchable erythema 12/01/20 0824   Number of days: 2       Wound 11/29/20 Heel Left Stage 2 ulcer (Active)   Wound Etiology Pressure Stage  2 12/01/20 0420   Dressing Status Clean;Dry; Intact 12/01/20 0824   Dressing/Treatment ABD 11/30/20 1616   Number of days: 2        Elimination:  Continence:   · Bowel: No  · Bladder: No  Urinary Catheter: Insertion Date: 8/29/2019   Colostomy/Ileostomy/Ileal Conduit: No       Date of Last BM: 12/1/2020    Intake/Output Summary (Last 24 hours) at 12/1/2020 1521  Last data filed at 12/1/2020 1153  Gross per 24 hour   Intake 1439.3 ml   Output 600 ml   Net 839.3 ml     I/O last 3 completed shifts: In: 1439.3 [P.O.:160; I.V.:1279.3]  Out: 600 [Urine:600]    Safety Concerns: At Risk for Falls    Impairments/Disabilities:      Contractures: MS, bedbound, confused    Nutrition Therapy:  Current Nutrition Therapy:   - Oral Diet:  [address] HERNÁN Oral [id number]}    Routes of Feeding: Oral  Liquids: No Restrictions  Daily Fluid Restriction: no  Last Modified Barium Swallow with Video (Video Swallowing Test): not done    Treatments at the Time of Hospital Discharge:   Respiratory Treatments: n/a  Oxygen Therapy:  is not on home oxygen therapy.   Ventilator:    - No ventilator support    Rehab Therapies: Physical Therapy  Weight Bearing Status/Restrictions: No weight bearing restirctions  Other Medical Equipment (for information only, NOT a DME order):  hospital bed  Other Treatments: n/a    Patient's personal belongings (please select all that are sent with patient):  Glasses    RN SIGNATURE:  Electronically signed by Thu Greer RN on 12/2/20 at 2:26 PM EST    CASE MANAGEMENT/SOCIAL WORK SECTION    Inpatient Status Date: 11/29/2020    Readmission Risk Assessment Score:  Readmission Risk              Risk of Unplanned Readmission:        19           Discharging to Facility/ Agency   · Name: [place]    · [facility] [place], [place]  · Phone:[phone]  · BAT:[phone]    Dialysis Facility (if applicable)   · Name:  · Address:  · Dialysis Schedule:  · Phone:  · Fax:    / signature: Electronically signed by JOEY Adler on 12/1/20 at 3:25 PM EST    PHYSICIAN SECTION    Prognosis: Guarded    Condition at Discharge: Stable    Rehab Potential (if transferring to Rehab): Guarded    Recommended Labs or Other Treatments After Discharge: none     Physician Certification: I certify the above information and transfer of Gianna Elena  is necessary for the continuing treatment of the diagnosis listed and that she requires [facility] for greater 30 days.      Update Admission H&P: No change in H&P    PHYSICIAN SIGNATURE:  Electronically signed by Diego Heck MD on 12/2/20 at 11:13 AM EST

## 2020-12-01 NOTE — PROGRESS NOTES
Comprehensive Nutrition Assessment    Type and Reason for Visit:  Initial, Positive Nutrition Screen(chewing/swallowing difficulty, wound)    Nutrition Recommendations/Plan:   Send soft texture foods with ground meats & sauce on them as well as catsup on eggs/potatoes to moisten per pt request.   Send mandarin oranges TID per request.  Send ensure compact TID. Continue MVI, Vitamin, C, Zincate. Nutrition Assessment:     Pt. nutritionally compromised AEB wounds. At risk for further nutrition compromise r/t increased nutrient needs for wound healing, underlying medical condition admit withCovid 19, and need for nutrition support. Nutrition recommendations/interventions as per above. Malnutrition Assessment:  Malnutrition Status:  Insufficient data      Estimated Daily Nutrient Needs:  Energy (kcal):  1080-1200kcals ( 18-20kcals/kgm) acute phase; Weight Used for Energy Requirements:  ((11/3) Per EMR: 60kgm (131# 4.8oz))     Protein (g):   grams (1.2-2 grams protein/kgm); Weight Used for Protein Requirements:  (60kgm (11/3))          Nutrition Related Findings:  Pt admitted with covid 19. Spoke with pt who reports appetite is fair, has difficulty tasting foods. Has chewing difficulty due to no upper teeth & requests ground meats with sauce, catsup on eggs & potatoes to moisten as wqell as soft texture foods. Labs: (12/1) K+ 3.3, Ca 8.2, hemoglobin 11.1. Meds: Vitamin C, Vitamin D, Zincate, IVF, Glycolax.  does the cookingat home, tends to consume 3 meals/day  with Boost ONS ad april. Last BM noted was (11/29).       Wounds:  (stage 1 sacrum, stage 1 left buttocks, stage 1 rt buttocks, stage 2 left heel)       Current Nutrition Therapies:    DIET GENERAL; No Added Salt (3-4 GM)  Dietary Nutrition Supplements: Other Oral Supplement (see comment)  Dietary Nutrition Supplements: Low Volume Supplement    Anthropometric Measures:  · Height: 5' 3\" (160 cm)  · Current Body Weight: 131 lb (59.4 kg)(stated) · Admission Body Weight: 131 lb (59.4 kg)((11/28)stated wt)    · Usual Body Weight: (per pt 134#, per EMR (11/3/20): 131# 4.8oz)     · Ideal Body Weight: 115 lbs; BMI: 23.2  · Adjusted Body Weight:  ; No Adjustment   ·     · BMI Categories: Normal Weight (BMI 18.5-24. 9)       Nutrition Diagnosis:   · Increased nutrient needs related to increase demand for energy/nutrients as evidenced by wounds      Nutrition Interventions:   Food and/or Nutrient Delivery:  Modify Current Diet, Start Oral Nutrition Supplement, Vitamin Supplement  Nutrition Education/Counseling:  Education initiated(Encouraged lean protein choices TID)   Coordination of Nutrition Care:  Continue to monitor while inpatient    Goals:  Pt will consume 75% or more at meals during LOS to aid with wound healing. Nutrition Monitoring and Evaluation:   Food/Nutrient Intake Outcomes:  Food and Nutrient Intake, Supplement Intake, Vitamin/Mineral Intake  Physical Signs/Symptoms Outcomes:  Biochemical Data, Chewing or Swallowing, GI Status, Fluid Status or Edema, Hemodynamic Status, Skin, Weight     Discharge Planning:     Too soon to determine     Electronically signed by Josef Tripathi RD, LD on 12/1/20 at 11:29 AM EST    [phone]

## 2020-12-01 NOTE — PROGRESS NOTES
Hospitalist Progress Note    Patient:  Alem Ortiz      Unit/Bed:3B-28/028-A    YOB: 1950    MRN: [id number]       Acct: [de-identified]     PCP: Valeria Tabares MD    Date of Admission: 11/28/2020    Active Hospital Problems    Diagnosis Date Noted    COVID-19 virus infection [U07.1] 11/29/2020     Assessment and Plan:    1. COVID-19 PNA with Suspected Superimposed Bacterial PNA: having increased cough and fevers. CTA of the chest showed diffuse bilateral consolidation for possible pneumonia. Received a dose of cefepime and vancomycin in ER. Bx obtained and growing E. Coli. Transition from IV Cefepime to PO Bactrim on 12/1.   2. COVID-19 PNA: Diagnosed one week ago, currently has increased shortness of breath, cont Decadron 6 mg daily, no convalescent plasma or remdesivir indicated at this point, start vitamin C, vitamin D, zinc, Flonase nasal spray, DVT prophylaxis, CTA did not show any PE. 3. Acute on chronic hypoxic respiratory failure likely secondary to above-mentioned (resolved): 2/2 to #1 and #2. Continue wean off oxygen as tolerated, was on 3L NC on presentation and now down to RA,    4. UTI Associated with Chronic Stanford Catheter: likely the source of bacteremia, growing E. Coli. Pt gets frequent UTI's. Transitioned from IV Cefepime to PO Bactrim on 12/1.   5. Hypokalemia: Corrected orally with potassium replacement protocol  6. Elevated troponin level: likely demand mismatch. Patient currently does not have any chest pain, EKG showed pulmonary disease changes. 7. Essential hypertension: Restart antihypertensive medications with parameters to hold when systolic blood pressure less than 100 and heart rate less than 55  8. Bed bounded for multiple sclerosis and has Stanford catheter to be exchanged on a monthly basis: Acknowledged  9. Physical deconditioning: Patient already at long-term facility, may return to her facility after discharge.   10. Hypernatremia: Na up to 148, likely dehydration related due to poor PO intake. Will started gentle IVF yesterday, Na today back to normal at 145. Dispo: transitioned from IV ABx to PO Bactrim today. Plan to [facility] back to [facility] tomorrow. Pt does not want to go back to the [facility] she came from. Will speak and make CM aware.            Chief Complaint: cough, fevers, sob     Hospital Course: Patient not a good historian, although she mentions she has been having terrible week 7 days ago when she diagnosed with COVID-19 disease at that time at her nursing facility then she moved to different facility which is specifically for COVID-19 disease patients. She has been having increased cough and fever, her diarrhea resolved as she mentioned, been having also myalgia. Patient recently discharged from our facility for open wound of the left foot and underwent I&D of the left heel as of 11/6/2020. Currently her left lower extremity supported with boot. In the ER patient requiring 3 L nasal cannula oxygen satting 96%, tachypneic, hypotensive, lymphocytopenia, we will admit the patient because of possible superimposed bacterial pneumonia on current COVID-19 virus infection. Subjective: no acute events overnight. Pt reports feeling better and eating. Improvement in sob. No fevers or chills. No chest pain. No abdominal pain.        Medications:  Reviewed    Infusion Medications     Scheduled Medications    sulfamethoxazole-trimethoprim  1 tablet Oral 2 times per day    sodium chloride flush  10 mL Intravenous 2 times per day    carBAMazepine  100 mg Oral TID    metoprolol tartrate  25 mg Oral BID    aspirin  81 mg Oral Daily    sterile water for irrigation  500 mL Irrigation Once    zinc sulfate  50 mg Oral Daily    Vitamin D  1,000 Units Oral Daily    vitamin C  500 mg Oral Daily    fluticasone  2 spray Each Nostril BID    dexamethasone  6 mg Oral Daily    enoxaparin  30 mg Subcutaneous Q12H     PRN Meds: sodium chloride flush, acetaminophen **OR** acetaminophen, polyethylene glycol, promethazine **OR** ondansetron, potassium chloride **OR** potassium alternative oral replacement **OR** potassium chloride      Intake/Output Summary (Last 24 hours) at 12/1/2020 1145  Last data filed at 12/1/2020 0919  Gross per 24 hour   Intake 892.08 ml   Output 575 ml   Net 317.08 ml       Diet:  DIET GENERAL; No Added Salt (3-4 GM)  Dietary Nutrition Supplements: Other Oral Supplement (see comment)  Dietary Nutrition Supplements: Low Volume Supplement    Exam:  /72   Pulse 96   Temp 97.6 °F (36.4 °C) (Oral)   Resp 20   Ht 5' 3\" (1.6 m)   Wt 131 lb (59.4 kg)   SpO2 96%   BMI 23.21 kg/m²     General appearance: No apparent distress, appears stated age and cooperative. On 3 L nasal cannula oxygen. HEENT: Normal cephalic, atraumatic without obvious deformity. Pupils equal, round, and reactive to light. Extra ocular muscles intact. Conjunctivae/corneas clear. Neck: Supple, with full range of motion. No jugular venous distention. Trachea midline. Respiratory: normal respiratory effort, no wheezing or rales   Cardiovascular: S1/S2, CAROL appreciated, no rubs or gallops   Abdomen: Soft, increased abdominal girth, non-tender, non-distended with normal bowel sounds. Musculoskeletal: Left lower extremity in boot. No edema right lower extremity. Skin: Skin color, texture, turgor normal.  No rashes or lesions. Neurologic:  Neurovascularly intact without any focal sensory/motor deficits. Cranial nerves: II-XII intact, grossly non-focal.  Psychiatric: Alert and oriented, thought content appropriate, normal insight  Capillary Refill: Brisk,< 3 seconds   Peripheral Pulses: +2 palpable, equal bilaterally   Stanford in place.       Labs:   Recent Labs     12/01/20  0409   WBC 5.8   HGB 11.1*   HCT 33.4*        Recent Labs     12/01/20  0409      K 3.3*   *   CO2 21*   BUN 9   CREATININE 0.3*   CALCIUM 8.2*     Recent Labs     11/30/20  0433   AST 25   ALT 10*   BILITOT 0.4   ALKPHOS 89     No results for input(s): INR in the last 72 hours. No results for input(s): Nazia Comment in the last 72 hours. Urinalysis:      Lab Results   Component Value Date    NITRU POSITIVE 11/30/2020    WBCUA  11/30/2020    BACTERIA NONE SEEN 11/30/2020    RBCUA 0-2 11/30/2020    BLOODU TRACE 11/30/2020    SPECGRAV 1.020 02/18/2019    GLUCOSEU NEGATIVE 11/30/2020       Radiology:   All imaging reviewed     Diet: DIET GENERAL; No Added Salt (3-4 GM)  Dietary Nutrition Supplements: Other Oral Supplement (see comment)  Dietary Nutrition Supplements: Low Volume Supplement      Code Status: Full Code            Electronically signed by Marguerite Lopez MD on 12/1/2020 at 11:45 AM

## 2020-12-02 VITALS
OXYGEN SATURATION: 95 % | BODY MASS INDEX: 23.21 KG/M2 | WEIGHT: 131 LBS | SYSTOLIC BLOOD PRESSURE: 115 MMHG | HEART RATE: 89 BPM | HEIGHT: 63 IN | DIASTOLIC BLOOD PRESSURE: 66 MMHG | RESPIRATION RATE: 16 BRPM | TEMPERATURE: 97.8 F

## 2020-12-02 LAB
ANION GAP SERPL CALCULATED.3IONS-SCNC: 8 MEQ/L (ref 8–16)
BASOPHILS # BLD: 0.4 %
BASOPHILS ABSOLUTE: 0 THOU/MM3 (ref 0–0.1)
BUN BLDV-MCNC: 7 MG/DL (ref 7–22)
CALCIUM SERPL-MCNC: 8.1 MG/DL (ref 8.5–10.5)
CHLORIDE BLD-SCNC: 113 MEQ/L (ref 98–111)
CO2: 22 MEQ/L (ref 23–33)
CREAT SERPL-MCNC: 0.4 MG/DL (ref 0.4–1.2)
EOSINOPHIL # BLD: 3 %
EOSINOPHILS ABSOLUTE: 0.2 THOU/MM3 (ref 0–0.4)
ERYTHROCYTE [DISTWIDTH] IN BLOOD BY AUTOMATED COUNT: 14.3 % (ref 11.5–14.5)
ERYTHROCYTE [DISTWIDTH] IN BLOOD BY AUTOMATED COUNT: 52.7 FL (ref 35–45)
GFR SERPL CREATININE-BSD FRML MDRD: > 90 ML/MIN/1.73M2
GLUCOSE BLD-MCNC: 70 MG/DL (ref 70–108)
HCT VFR BLD CALC: 32 % (ref 37–47)
HEMOGLOBIN: 10.8 GM/DL (ref 12–16)
IMMATURE GRANS (ABS): 0.09 THOU/MM3 (ref 0–0.07)
IMMATURE GRANULOCYTES: 1.7 %
LYMPHOCYTES # BLD: 30.1 %
LYMPHOCYTES ABSOLUTE: 1.6 THOU/MM3 (ref 1–4.8)
MAGNESIUM: 1.8 MG/DL (ref 1.6–2.4)
MCH RBC QN AUTO: 35.2 PG (ref 26–33)
MCHC RBC AUTO-ENTMCNC: 33.8 GM/DL (ref 32.2–35.5)
MCV RBC AUTO: 104.2 FL (ref 81–99)
MONOCYTES # BLD: 7.4 %
MONOCYTES ABSOLUTE: 0.4 THOU/MM3 (ref 0.4–1.3)
NUCLEATED RED BLOOD CELLS: 0 /100 WBC
PLATELET # BLD: 194 THOU/MM3 (ref 130–400)
PMV BLD AUTO: 11.4 FL (ref 9.4–12.4)
POTASSIUM SERPL-SCNC: 3.5 MEQ/L (ref 3.5–5.2)
RBC # BLD: 3.07 MILL/MM3 (ref 4.2–5.4)
SEG NEUTROPHILS: 57.4 %
SEGMENTED NEUTROPHILS ABSOLUTE COUNT: 3 THOU/MM3 (ref 1.8–7.7)
SODIUM BLD-SCNC: 143 MEQ/L (ref 135–145)
WBC # BLD: 5.3 THOU/MM3 (ref 4.8–10.8)

## 2020-12-02 PROCEDURE — 99239 HOSP IP/OBS DSCHRG MGMT >30: CPT | Performed by: INTERNAL MEDICINE

## 2020-12-02 PROCEDURE — 6370000000 HC RX 637 (ALT 250 FOR IP): Performed by: FAMILY MEDICINE

## 2020-12-02 PROCEDURE — 6360000002 HC RX W HCPCS: Performed by: INTERNAL MEDICINE

## 2020-12-02 PROCEDURE — 94760 N-INVAS EAR/PLS OXIMETRY 1: CPT

## 2020-12-02 PROCEDURE — 80048 BASIC METABOLIC PNL TOTAL CA: CPT

## 2020-12-02 PROCEDURE — 36415 COLL VENOUS BLD VENIPUNCTURE: CPT

## 2020-12-02 PROCEDURE — 83735 ASSAY OF MAGNESIUM: CPT

## 2020-12-02 PROCEDURE — 85025 COMPLETE CBC W/AUTO DIFF WBC: CPT

## 2020-12-02 PROCEDURE — 6370000000 HC RX 637 (ALT 250 FOR IP): Performed by: INTERNAL MEDICINE

## 2020-12-02 PROCEDURE — 6360000002 HC RX W HCPCS: Performed by: FAMILY MEDICINE

## 2020-12-02 RX ORDER — ASCORBIC ACID 500 MG
500 TABLET ORAL DAILY
Qty: 30 TABLET | Refills: 0 | DISCHARGE
Start: 2020-12-03 | End: 2022-01-12

## 2020-12-02 RX ORDER — ZINC SULFATE 50(220)MG
50 CAPSULE ORAL DAILY
Qty: 30 CAPSULE | Refills: 0 | DISCHARGE
Start: 2020-12-03 | End: 2021-11-16

## 2020-12-02 RX ORDER — DEXAMETHASONE 6 MG/1
6 TABLET ORAL DAILY
Qty: 5 TABLET | Refills: 0 | DISCHARGE
Start: 2020-12-03 | End: 2020-12-08

## 2020-12-02 RX ORDER — FLUTICASONE PROPIONATE 50 MCG
2 SPRAY, SUSPENSION (ML) NASAL 2 TIMES DAILY
Qty: 1 BOTTLE | Refills: 3 | DISCHARGE
Start: 2020-12-02 | End: 2022-08-08

## 2020-12-02 RX ORDER — SULFAMETHOXAZOLE AND TRIMETHOPRIM 800; 160 MG/1; MG/1
1 TABLET ORAL EVERY 12 HOURS SCHEDULED
Qty: 12 TABLET | Refills: 0 | DISCHARGE
Start: 2020-12-02 | End: 2020-12-08

## 2020-12-02 RX ADMIN — ENOXAPARIN SODIUM 30 MG: 30 INJECTION SUBCUTANEOUS at 13:54

## 2020-12-02 RX ADMIN — POTASSIUM BICARBONATE 20 MEQ: 782 TABLET, EFFERVESCENT ORAL at 09:02

## 2020-12-02 RX ADMIN — ASPIRIN 81 MG: 81 TABLET ORAL at 09:02

## 2020-12-02 RX ADMIN — Medication 50 MG: at 09:02

## 2020-12-02 RX ADMIN — CARBAMAZEPINE 100 MG: 100 TABLET, CHEWABLE ORAL at 13:54

## 2020-12-02 RX ADMIN — CARBAMAZEPINE 100 MG: 100 TABLET, CHEWABLE ORAL at 09:01

## 2020-12-02 RX ADMIN — METOPROLOL TARTRATE 25 MG: 25 TABLET, FILM COATED ORAL at 09:01

## 2020-12-02 RX ADMIN — OXYCODONE HYDROCHLORIDE AND ACETAMINOPHEN 500 MG: 500 TABLET ORAL at 09:02

## 2020-12-02 RX ADMIN — Medication 1000 UNITS: at 09:02

## 2020-12-02 RX ADMIN — SULFAMETHOXAZOLE AND TRIMETHOPRIM 1 TABLET: 800; 160 TABLET ORAL at 09:02

## 2020-12-02 RX ADMIN — FLUTICASONE PROPIONATE 2 SPRAY: 50 SPRAY, METERED NASAL at 09:03

## 2020-12-02 RX ADMIN — DEXAMETHASONE 6 MG: 4 TABLET ORAL at 09:02

## 2020-12-02 RX ADMIN — ACETAMINOPHEN 650 MG: 325 TABLET ORAL at 00:37

## 2020-12-02 ASSESSMENT — PAIN SCALES - GENERAL
PAINLEVEL_OUTOF10: 0
PAINLEVEL_OUTOF10: 0

## 2020-12-02 NOTE — PROGRESS NOTES
CLINICAL PHARMACY: DISCHARGE MED RECONCILIATION/REVIEW    [facility]) Select Patient?: No  Total # of Interventions Recommended: 0   -   Total # Interventions Accepted: 0  Intervention Severity:   - Level 1 Intervention Present?: No   - Level 2 #: 0   - Level 3 #: 0   Time Spent (min): 5    Additional Documentation:

## 2020-12-02 NOTE — PROGRESS NOTES
Report called to Justin at [facility]. Update: destination changed to [place] [address]. Report called to [facility]. Last dose MAR and AVS faxed. [facility] transported patient with all belongings to the [place]. Patient's , Dameon Almaguer, updated on discharge plan.

## 2020-12-02 NOTE — CARE COORDINATION
12/2/20, 2:15 PM EST    Patient goals/plan/ treatment preferences discussed by  and . Patient goals/plan/ treatment preferences reviewed with patient/ family. Patient/ family verbalize understanding of discharge plan and are in agreement with goal/plan/treatment preferences. Understanding was demonstrated using the teach back method. AVS provided by RN at time of discharge, which includes all necessary medical information pertaining to the patients current course of illness, treatment, post-discharge goals of care, and treatment preferences. Services After Discharge  Services At/After Discharge: Nursing Services, OT, PT, In ambulance, Aide Services([facility] [place] [id number] [facility] ambulance)   IMM Letter  IMM Letter given to Patient/Family/Significant other/Guardian/POA/by[de-identified] Martha MEANS Case Management. IMM Letter date given[de-identified] 12/02/20  IMM Letter time given[de-identified] 9047     BETI will be discharged back to [address] [place] [address] today. She will be returning under her Medicare benefit. She will be transported by ambulance. Discharge instructions and trasnport forms are attached to patients blue discharge packet. Helen Hernández is agreeable to discharge plan. Spoke with Cee at the facility and she is aware patient will be returning today. Update 2:49pm:   Spoke with Son at [facility]. She reports that they want Sharri to return to [facility] [place] [address]. Called and spoke with the  and they are able to take her to [facility] instead of [facility]. Called nursing staff and they will call report to The [address] SENA will fax the AVS.  Spoke with Cheryl Laboy at [place] [address] at [facility] and everyone is on the same page with the discharge plan. Sharri wanted to go to [facility].

## 2020-12-02 NOTE — DISCHARGE SUMMARY
Hospital Medicine Discharge Summary      Patient Identification:   Brody Goss   :   MRN: [id number]   Account: [de-identified]      Patient's PCP: Anisa White MD    Admit Date: 2020     Discharge Date:   20    Admitting Physician: Venice Gallego MD     Discharge Physician: Venice Gallego MD     Discharge Diagnoses: COVID-19 PNA, UTI Associated with Chronic Vega Catheter, E. Coli Bacteremia, Bed bounded for multiple sclerosis     Active Hospital Problems    Diagnosis Date Noted    COVID-19 virus infection [U07.1] 2020       The patient was seen and examined on day of discharge and this discharge summary is in conjunction with any daily progress note from day of discharge. Hospital Course:   Brody Goss is a 65 y.o. female admitted to [facility] on 2020 for cough and fevers. Pt has a pmh of MS and is bed bound with chronic vega catheter. Pt reports to have [facility], however has now been symptomatic and was brought to the ED. Pt requiring 3L NC. CTA of the chest showed diffuse bilateral consolidation for possible pneumonia. Pt started on Decadron 6 mg daily, Vit C, D, and Zinc. Did not get any Plasma or Remdisivir. Pt also started on IV Cefepime. Bx came back positive for E. Coli, source is likely from genitourinary. Transitioned to PO Bactrim. Pt weaned down to RA, not having any fevers. Tolerating PO intake. Pt will be discharged back to ECF. Assessment and Plan:     1. COVID-19 PNA with possible superimposed bacterial PNA: having increased cough and fevers. CTA of the chest showed diffuse bilateral consolidation for possible pneumonia. Received a dose of cefepime and vancomycin in ER. Bx obtained and growing E. Coli.  Transition from IV Cefepime to PO Bactrim on .   2. COVID-19 PNA: Diagnosed one week ago, currently has increased shortness of breath, cont Decadron 6 mg daily, no convalescent plasma or remdesivir indicated at this point, start vitamin C, vitamin D, zinc, Flonase nasal spray, DVT prophylaxis, CTA did not show any PE. 3. Acute on chronic hypoxic respiratory failure likely secondary to above-mentioned (resolved): 2/2 to #1 and #2. Continue wean off oxygen as tolerated, was on 3L NC on presentation and now down to RA,    4. UTI Associated with Chronic Stanford Catheter: likely the source of bacteremia, growing E. Coli. Pt gets frequent UTI's. Transitioned from IV Cefepime to PO Bactrim on 12/1.   5. Hypokalemia: Corrected orally with potassium replacement protocol  6. Elevated troponin level: likely demand mismatch. Patient currently does not have any chest pain, EKG showed pulmonary disease changes.    7. Essential hypertension: Restart antihypertensive medications with parameters to hold when systolic blood pressure less than 100 and heart rate less than 55  8. Bed bounded for multiple sclerosis and has Stanford catheter to be exchanged on a monthly basis: Acknowledged  9. Physical deconditioning: Patient already at long-term facility, return to her facility after discharge. 10. Hypernatremia: resolved with IVF and with pt eating better.          Exam:     Vitals:  Vitals:    12/01/20 1643 12/01/20 2146 12/02/20 0451 12/02/20 0845   BP: 120/69 115/68 112/64 117/72   Pulse: 85 88 72 76   Resp: 16 16 16 14   Temp: 98.4 °F (36.9 °C) 98.1 °F (36.7 °C) 97.7 °F (36.5 °C) 97.6 °F (36.4 °C)   TempSrc: Oral Oral Oral Oral   SpO2: 95% 95% 96% 96%   Weight:       Height:         Weight: Weight: 131 lb (59.4 kg)     24 hour intake/output:    Intake/Output Summary (Last 24 hours) at 12/2/2020 1114  Last data filed at 12/2/2020 0451  Gross per 24 hour   Intake 1519.71 ml   Output 900 ml   Net 619.71 ml         Labs:  For convenience and continuity at follow-up the following most recent labs are provided:      CBC:    Lab Results   Component Value Date    WBC 5.3 12/02/2020    HGB 10.8 12/02/2020    HCT 32.0 1 tablet by mouth once daily             carBAMazepine (TEGRETOL) 100 MG chewable tablet  Take 100 mg by mouth 3 times daily              Cholecalciferol (VITAMIN D3 PO)  Take by mouth daily             CRANBERRY PO  Take by mouth daily             D-Mannose 500 MG CAPS  Take 1,500 mg by mouth daily             dexamethasone (DECADRON) 6 MG tablet  Take 1 tablet by mouth daily for 5 days             fluticasone (FLONASE) 50 MCG/ACT nasal spray  2 sprays by Each Nostril route 2 times daily             Incontinence Supplies (BEDSIDE DRAINAGE BAG) MISC  Large 2000 cc bedside drainage bag             Incontinence Supplies (URINARY LEG BAG STRAPS) MISC  Leg bag straps to go with urinary catheter leg bag             Incontinence Supplies (URINARY LEG BAG) MISC  900 cc [place] Urinary catheter leg bag             methenamine (HIPREX) 1 g tablet  Take 1 tablet by mouth 2 times daily (with meals)             Methenamine-Sodium Salicylate (CYSTEX PO)  Take by mouth             metoprolol tartrate (LOPRESSOR) 25 MG tablet  take 1/2 tablet by mouth twice a day             potassium bicarb-citric acid (EFFER-K) 20 MEQ TBEF effervescent tablet  Take 1 tablet by mouth daily             sulfamethoxazole-trimethoprim (BACTRIM DS;SEPTRA DS) 800-160 MG per tablet  Take 1 tablet by mouth every 12 hours for 6 days             vitamin C (VITAMIN C) 500 MG tablet  Take 1 tablet by mouth daily             Water For Irrigation, Sterile (STERILE WATER FOR IRRIGATION)  Irrigate vega catheter with 50 ml of sterile water weekly             zinc sulfate (ZINCATE) 220 (50 Zn) MG capsule  Take 1 capsule by mouth daily                 Time Spent on discharge is more than 30 minutes in the examination, evaluation, counseling and review of medications and discharge plan. Signed: Thank you An Sweeney MD for the opportunity to be involved in this patient's care.     Electronically signed by Iain Morin MD on 12/2/2020 at 11:14 AM

## 2020-12-04 LAB
BLOOD CULTURE, ROUTINE: NORMAL
ORGANISM: ABNORMAL
URINE CULTURE REFLEX: ABNORMAL
URINE CULTURE REFLEX: ABNORMAL

## 2021-06-16 ENCOUNTER — TELEPHONE (OUTPATIENT)
Dept: WOUND CARE | Age: 71
End: 2021-06-16

## 2021-06-22 ENCOUNTER — TELEPHONE (OUTPATIENT)
Dept: WOUND CARE | Age: 71
End: 2021-06-22

## 2021-06-22 NOTE — TELEPHONE ENCOUNTER
Received call from [facility] regarding referral sent to wound clinic to check on status. Instructed that wound clinic called patient on 6/16/21 to schedule patient, no answer at that time and VM was left. She is going to check with patient and see if patient will return call to schedule appt or if she wants the [facility] nurse to schedule. Will await return call.

## 2021-07-08 ENCOUNTER — HOSPITAL ENCOUNTER (OUTPATIENT)
Dept: WOUND CARE | Age: 71
Discharge: HOME | End: 2021-07-08
Payer: MEDICARE

## 2021-07-08 VITALS
BODY MASS INDEX: 17.91 KG/M2 | TEMPERATURE: 97.7 F | SYSTOLIC BLOOD PRESSURE: 119 MMHG | OXYGEN SATURATION: 98 % | HEART RATE: 75 BPM | RESPIRATION RATE: 16 BRPM | HEIGHT: 64 IN | WEIGHT: 104.9 LBS | DIASTOLIC BLOOD PRESSURE: 64 MMHG

## 2021-07-08 DIAGNOSIS — R53.81 PHYSICAL DECONDITIONING: ICD-10-CM

## 2021-07-08 DIAGNOSIS — G35 MS (MULTIPLE SCLEROSIS) (HCC): Primary | ICD-10-CM

## 2021-07-08 DIAGNOSIS — L89.313 PRESSURE INJURY OF RIGHT ISCHIUM, STAGE 3 (HCC): ICD-10-CM

## 2021-07-08 DIAGNOSIS — L89.623 PRESSURE INJURY OF LEFT HEEL, STAGE 3 (HCC): ICD-10-CM

## 2021-07-08 DIAGNOSIS — L89.152 PRESSURE INJURY OF COCCYGEAL REGION, STAGE 2 (HCC): ICD-10-CM

## 2021-07-08 PROCEDURE — 99203 OFFICE O/P NEW LOW 30 MIN: CPT | Performed by: NURSE PRACTITIONER

## 2021-07-08 PROCEDURE — 99214 OFFICE O/P EST MOD 30 MIN: CPT

## 2021-07-08 RX ORDER — GENTAMICIN SULFATE 1 MG/G
OINTMENT TOPICAL ONCE
Status: CANCELLED | OUTPATIENT
Start: 2021-07-08 | End: 2021-07-08

## 2021-07-08 RX ORDER — LIDOCAINE 40 MG/G
CREAM TOPICAL ONCE
Status: CANCELLED | OUTPATIENT
Start: 2021-07-08 | End: 2021-07-08

## 2021-07-08 RX ORDER — BACITRACIN, NEOMYCIN, POLYMYXIN B 400; 3.5; 5 [USP'U]/G; MG/G; [USP'U]/G
OINTMENT TOPICAL ONCE
Status: CANCELLED | OUTPATIENT
Start: 2021-07-08 | End: 2021-07-08

## 2021-07-08 RX ORDER — LIDOCAINE HYDROCHLORIDE 20 MG/ML
JELLY TOPICAL ONCE
Status: CANCELLED | OUTPATIENT
Start: 2021-07-08 | End: 2021-07-08

## 2021-07-08 RX ORDER — LIDOCAINE 50 MG/G
OINTMENT TOPICAL ONCE
Status: CANCELLED | OUTPATIENT
Start: 2021-07-08 | End: 2021-07-08

## 2021-07-08 RX ORDER — LIDOCAINE HYDROCHLORIDE 40 MG/ML
SOLUTION TOPICAL ONCE
Status: CANCELLED | OUTPATIENT
Start: 2021-07-08 | End: 2021-07-08

## 2021-07-08 RX ORDER — BETAMETHASONE DIPROPIONATE 0.05 %
OINTMENT (GRAM) TOPICAL ONCE
Status: CANCELLED | OUTPATIENT
Start: 2021-07-08 | End: 2021-07-08

## 2021-07-08 RX ORDER — CLOBETASOL PROPIONATE 0.5 MG/G
OINTMENT TOPICAL ONCE
Status: CANCELLED | OUTPATIENT
Start: 2021-07-08 | End: 2021-07-08

## 2021-07-08 RX ORDER — GINSENG 100 MG
CAPSULE ORAL ONCE
Status: CANCELLED | OUTPATIENT
Start: 2021-07-08 | End: 2021-07-08

## 2021-07-08 RX ORDER — BACITRACIN ZINC AND POLYMYXIN B SULFATE 500; 1000 [USP'U]/G; [USP'U]/G
OINTMENT TOPICAL ONCE
Status: CANCELLED | OUTPATIENT
Start: 2021-07-08 | End: 2021-07-08

## 2021-07-08 NOTE — PLAN OF CARE
Problem: Wound:  Goal: Will show signs of wound healing; wound closure and no evidence of infection  Description: Will show signs of wound healing; wound closure and no evidence of infection  Outcome: Ongoing   Pt. Seen today for multiple open wounds see AVS for orders. Follow up in 3 weeks for VV with home health. Care plan reviewed with patient and . Patient and  verbalize understanding of the plan of care and contribute to goal setting.

## 2021-07-08 NOTE — PROGRESS NOTES
[facility] and Procedure Note      [address] RECORD NUMBER:  [id number]  AGE: 65 y.o. GENDER: female  : 1950  EPISODE DATE:  2021    SUBJECTIVE:     Chief Complaint   Patient presents with    Wound Check     bilateral buttocks, left heel         HISTORY OF PRESENT ILLNESS      Jossie Rater is a 65 y.o. female who presents today for evaluation of left heel, right ischium and coccyx wounds. Patient states that wounds have been present for some time. Patient denies pain to wounds. Current wound care includes zinc oxide cream, applied by home health aids. Patient has history of MS, is mostly bedbound, has phillip lift at home for transfers. Patient notes that  does slide her in her chair for repositioning and pulls phillip out from underneath her after use, sliding against her bottom. Patient has chronic vega catheter, does have chronic leakage per her report. Patient states that she has \"questionable\" control of her bowels. States that  sits her upright on stool when she needs to have a bowel movement. She states that she wipes herself but states that she frequently is unable to completely clean herself. Current with home health, has aides that come several days weekly,  is primary caregiver. Patient states that she does not turn at home. Recently obtained low air loss mattress in hopes that she would not have to turn. She denies any fever or chills.   She denies any further needs or concerns    PAST MEDICAL HISTORY             Diagnosis Date    Arthritis     Hyperlipidemia     Hypertension     Intra-abdominal lymphadenopathy     MS (multiple sclerosis) (HCC)     Pancreatitis     Pneumonia     Seizures (HCC)     UTI (urinary tract infection)        PAST SURGICAL HISTORY     Past Surgical History:   Procedure Laterality Date    CHOLECYSTECTOMY  2016    laparoscopic    CYSTOSCOPY N/A 2019    CYSTO, CLOT EVACUATION, TURBT performed by Anton Pompa MD at [facility] Left 2020    LEFT HEEL WOUND I&D performed by Donna Mcleod DPM at [address] [place] [address] Right 2018    EYE CATARACT EMULSIFICATION IOL IMPLANT, RIGHT performed by Choco Eugene MD at [address] RMVL INSJ IO LENS PROSTH W/O ECP Left 11/15/2018    EYE CATARACT EMULSIFICATION IOL IMPLANT LEFT EYE performed by Choco Eugene MD at [facility]     Family History   Problem Relation Age of Onset    Cancer Mother         colon    Heart Disease Father     Diabetes Neg Hx     High Blood Pressure Neg Hx     Stroke Neg Hx        SOCIAL HISTORY     Social History     Tobacco Use    Smoking status: Former Smoker     Packs/day: 2.00     Years: 40.00     Pack years: 80.00     Types: Cigarettes     Quit date: 10/2/2013     Years since quittin.7    Smokeless tobacco: Former User     Quit date: 11/3/2015   Vaping Use    Vaping Use: Former    Substances: Unknown   Substance Use Topics    Alcohol use: No    Drug use: No       ALLERGIES     No Known Allergies    MEDICATIONS     Current Outpatient Medications on File Prior to Encounter   Medication Sig Dispense Refill    vitamin C (VITAMIN C) 500 MG tablet Take 1 tablet by mouth daily 30 tablet 0    aspirin (RA ASPIRIN EC) 81 MG EC tablet take 1 tablet by mouth once daily 30 tablet 0    Methenamine-Sodium Salicylate (CYSTEX PO) Take by mouth      acetic acid 0.25 % irrigation Irrigate catheter with 150 ml weekly.  1000 mL 5    methenamine (HIPREX) 1 g tablet Take 1 tablet by mouth 2 times daily (with meals) 60 tablet 11    metoprolol tartrate (LOPRESSOR) 25 MG tablet take 1/2 tablet by mouth twice a day 30 tablet 11    Water For Irrigation, Sterile (STERILE WATER FOR IRRIGATION) Irrigate vega catheter with 50 ml of sterile water weekly 250 mL 0    Incontinence Supplies (BEDSIDE DRAINAGE BAG) MISC Large 2000 cc bedside drainage bag 1 each 11    Incontinence Supplies (URINARY LEG BAG) [facility] 900 cc [place] Urinary catheter leg bag 1 each 11    Incontinence Supplies (URINARY LEG BAG STRAPS) MISC Leg bag straps to go with urinary catheter leg bag 1 each 11    D-Mannose 500 MG CAPS Take 1,500 mg by mouth daily 90 capsule 5    Cholecalciferol (VITAMIN D3 PO) Take by mouth daily      carBAMazepine (TEGRETOL) 100 MG chewable tablet Take 100 mg by mouth 3 times daily       fluticasone (FLONASE) 50 MCG/ACT nasal spray 2 sprays by Each Nostril route 2 times daily 1 Bottle 3    zinc sulfate (ZINCATE) 220 (50 Zn) MG capsule Take 1 capsule by mouth daily 30 capsule 0    potassium bicarb-citric acid (EFFER-K) 20 MEQ TBEF effervescent tablet Take 1 tablet by mouth daily 30 tablet 0    CRANBERRY PO Take by mouth daily       No current facility-administered medications on file prior to encounter.        REVIEW OF SYSTEMS:     Constitutional: Denies fever, chills, night sweats, fatigue, weight loss/gain, loss of appetite   Head: Denies headache,  dizziness, loss of consciousness  Respiratory: Denies shortness of breath, cough, wheezing, dyspnea with exertion  Cardiovascular:Denies chest pain, palpitations, edema  Gastrointestinal: Denies nausea, vomiting, constipation, diarrhea, abdominal pain   JEFFRY: Denies dysuria, frequency, urgency, hematuria  Musculoskeletal: +Generalized muscle weakness  Endocrine: Denies polyuria, polydipsia, cold or heat intolerance  Hematology: Denies easy brusing or bleeding, hx of clotting disorder  Dermatology: +right ischium, left heel, coccyx wounds Denies skin rash, eczema, pruritis    PHYSICAL EXAM:     /64   Pulse 75   Temp 97.7 °F (36.5 °C) (Infrared)   Resp 16   Ht 5' 3.5\" (1.613 m)   Wt 104 lb 14.4 oz (47.6 kg)   SpO2 98%   BMI 18.29 kg/m²   Wt Readings from Last 3 Encounters:   07/08/21 104 lb 14.4 oz (47.6 kg)   11/28/20 131 lb (59.4 kg) 11/07/20 131 lb 4.8 oz (59.6 kg)     General:  Awake, alert, no apparent distress. Appears stated age  [de-identified]: conjuctivae are clear without exudate or hemorrhage, anicteric sclera, moist oral mucosa. Chest:  Respirations regular, non-labored. Chest rise and fall equal bilaterally. Neurological: Awake, alert  Psychiatric:  Appropriate mood and affect  Extremities: non-traumatic in appearance. Nonedematous Capillary refill less than 3 seconds. Pulses palpable  Skin:  Warm and dry  Wound:    Stage 2 pressure injury to coccyx- wound bed granular with small amount of serosanguinous drainage. Periwound dry. Stage 3 pressure injury to right ischium- Wound bed granular with slough. Draining moderate of serosanguinous drainage. Periulcer skin is macerated. No warmth or induration. Stage 3 pressure injury to left heel- Wound bed granular with slough. Draining moderate of serosanguinous drainage. Periulcer skin is macerated. No warmth or induration. Wound 11/03/20 Coccyx Mid (Active)   Wound Image   07/08/21 1342   Wound Etiology Pressure Stage  2 07/08/21 1342   Dressing Status Old drainage noted 07/08/21 1342   Wound Cleansed Cleansed with saline 07/08/21 1342   Dressing/Treatment Triad hydro/zinc oxide-based hydrophilic paste 78/83/86 1256   Wound Assessment Granulation tissue; Epithelialization 07/08/21 1342   Drainage Amount Small 07/08/21 1342   Drainage Description Serosanguinous 07/08/21 1342   Odor None 07/08/21 1342   Tati-wound Assessment Intact;Dry/flaky 07/08/21 1342   Margins Attached edges 07/08/21 1342   Wound Thickness Description not for Pressure Injury Full thickness 07/08/21 1342   Number of days: 246       Wound 11/29/20 Buttocks Right (Active)   Wound Image   07/08/21 1342   Wound Etiology Pressure Stage  3 07/08/21 1342   Dressing Status Intact; Old drainage noted 07/08/21 1342   Wound Cleansed Cleansed with saline 07/08/21 1342   Dressing/Treatment Alginate with Ag; Foam 07/08/21 1342   Wound Length (cm) 2.5 cm 07/08/21 1342   Wound Width (cm) 1.8 cm 07/08/21 1342   Wound Depth (cm) 0.1 cm 07/08/21 1342   Wound Surface Area (cm^2) 4.5 cm^2 07/08/21 1342   Wound Volume (cm^3) 0.45 cm^3 07/08/21 1342   Wound Assessment Granulation tissue;Slough 07/08/21 1342   Drainage Amount Moderate 07/08/21 1342   Drainage Description Serosanguinous 07/08/21 1342   Odor None 07/08/21 1342   Tati-wound Assessment Intact; Maceration 07/08/21 1342   Margins Attached edges 07/08/21 1342   Wound Thickness Description not for Pressure Injury Full thickness 07/08/21 1342   Number of days: 221       Wound 11/29/20 Heel Left (Active)   Wound Image   07/08/21 1342   Wound Etiology Pressure Stage  3 07/08/21 1342   Dressing Status Intact; Old drainage noted 07/08/21 1342   Wound Cleansed Cleansed with saline 07/08/21 1342   Dressing/Treatment Alginate with Ag; Foam 07/08/21 1342   Wound Length (cm) 1.1 cm 07/08/21 1342   Wound Width (cm) 0.9 cm 07/08/21 1342   Wound Depth (cm) 0.1 cm 07/08/21 1342   Wound Surface Area (cm^2) 0.99 cm^2 07/08/21 1342   Wound Volume (cm^3) 0.099 cm^3 07/08/21 1342   Wound Assessment Granulation tissue;Slough 07/08/21 1342   Drainage Amount Moderate 07/08/21 1342   Drainage Description Serosanguinous 07/08/21 1342   Odor None 07/08/21 1342   Tati-wound Assessment Intact; Maceration 07/08/21 1342   Margins Attached edges 07/08/21 1342   Wound Thickness Description not for Pressure Injury Full thickness 07/08/21 1342   Number of days: 221         LABS/IMAGING       UA: No results for input(s): SPECGRAV, PHUR, COLORU, CLARITYU, MUCUS, PROTEINU, BLOODU, RBCUA, WBCUA, BACTERIA, NITRU, GLUCOSEU, BILIRUBINUR, UROBILINOGEN, KETUA, LABCAST, LABCASTTY, MAKIOS in the last 72 hours.     Invalid input(s): CRYSTALS  Micro:   Lab Results   Component Value Date    BC No growth-preliminary No growth  11/29/2020     ASSESSMENT     -Stage 2 pressure injury, coccyx  -Stage 3 pressure injury, right ischium  -Stage 3 pressure injury, left heel    Ulcer Identification:  Ulcer Type: pressure  Contributing Factors: chronic pressure, decreased mobility, shear force, incontinence of stool and incontinence of urine    Depth of Diabetic/Pressure/Non Pressure Ulcers or Wound:  Pressure ulcer, Stage 2  Pressure ulcer, Stage 3     Patient Active Problem List   Diagnosis Code    MS (multiple sclerosis) ([facility] [address] G35    Seizure disorder ([facility] [address] G40.909    Frequent PVCs I49.3    Hematuria R31.9    Nicotine abuse Z72.0    Hematuria, gross R31.0    Sepsis (HCC) A41.9    HTN (hypertension) I10    HLD (hyperlipidemia) E78.5    Multiple sclerosis (HCC) A12    Metabolic acidosis, normal anion gap (NAG) E87.2    Open wound of foot, complicated, left, initial encounter S91.302A    COVID-19 virus infection U07.1    Pressure injury of right ischium, stage 3 ([facility] [address] L89.313         PLAN     Patient examined and evaluated. All available lab work, radiology studies, and progress notes pertaining to Nimisha Glass reviewed prior to or during patient visit today.    -Patient presents today with multiple pressure ulcers- Extensive education provided today on the importance of offloading wounds to promote healing. Low air loss mattress is NOT a substitute for turning and repositioning. Discussed importance of turning every 2 hours to help promote healing and prevent future wounds. Patient states that she tries to turn herself but has difficulty due to increased muscle weakness. Recommend  assistance with turning when possible as wounds will not heal and may continue to worsen with ongoing pressure. Limit time upright in chair, patient primarily uses hover-round while up. Does have wheelchair. Will order Roho cushion for wheelchair as patient would benefit from use of this while up in chair. Also discussed the importance of not sliding against furniture and linens to decrease friction and shearing injury.   Recommend turing to remove phillip lift, do not slide against skin with removal.  Patient verbalized understanding.      -Stage 2 pressure injury to coccyx- Continues to worsen per patient report with use of zinc oxide. Pressure offloading as above. Will order Triad paste to be applied daily and as needed. Discussed importance of keeping skin clean and dry. Recommend  assistance with cleaning post bowel movement as patient states she feels as though she is no longer to adequately clean herself. Education provided that wet skin exposed to stool and urine can lead to significant wounds and infection.     -Stage 3 pressure injury, right ischium- Offload pressure as above. Start use of silver alginate with silicone bordered foam dressings. Change three times weekly. -Stage 3 pressure injury, left heel- Offload pressure as above. Start use of silver alginate with silicone bordered foam dressings. Change three times weekly.      -No indications of infection to wounds at today's visit. Education provided on signs and symptoms of infection. Call clinic or seek emergency care should these occur. Follow up 2-3 weeks for re-evaluation via virtual visit with home health. Call clinic with any needs or concerns prior to scheduled visit. All questions and concerns addressed prior to discharge from today's visit. Please see attached Discharge Instructions    Written patient dismissal instructions given to patient and signed by patient or POA. I spent a total of 38 minutes reviewing previous notes, test results and face to face with Mayur Monzon  on 7/8/2021. Greater than 50% of this time was spent on counseling, reviewing and discussing test results, answering questions, instructions on treatment and/or medications ordered, and coordinating the care based on my plan and assessment as noted.        Discharge Instructions         Discharge Instructions       Visit Discharge/Physician Orders: change Electronically signed by BROOK Nowak CNP on 7/8/2021 at 5:01 PM

## 2021-07-12 ENCOUNTER — TELEPHONE (OUTPATIENT)
Dept: WOUND CARE | Age: 71
End: 2021-07-12

## 2021-07-12 NOTE — TELEPHONE ENCOUNTER
----- Message from Shaista Dudley sent at 7/12/2021 11:52 AM EDT -----  Regarding: Harsh Michel RN, from [address] [place] [address] Ave called and stated from the patient's last visit they have not received any new orders and would like to discuss with someone if anything has changed.  [address] # [phone], fax # [phone]

## 2021-07-12 NOTE — TELEPHONE ENCOUNTER
Spoke with Claudia Power at [facility], she stated she has patients new orders on her desk and she will contact Kaia.

## 2021-07-29 ENCOUNTER — HOSPITAL ENCOUNTER (OUTPATIENT)
Dept: WOUND CARE | Age: 71
Discharge: HOME | End: 2021-07-29
Payer: MEDICARE

## 2021-07-29 DIAGNOSIS — L89.313 PRESSURE INJURY OF RIGHT ISCHIUM, STAGE 3 (HCC): ICD-10-CM

## 2021-07-29 DIAGNOSIS — L89.623 PRESSURE INJURY OF LEFT HEEL, STAGE 3 (HCC): ICD-10-CM

## 2021-07-29 DIAGNOSIS — L89.152 PRESSURE INJURY OF COCCYGEAL REGION, STAGE 2 (HCC): Primary | ICD-10-CM

## 2021-07-29 PROCEDURE — 99212 OFFICE O/P EST SF 10 MIN: CPT

## 2021-07-29 PROCEDURE — 99212 OFFICE O/P EST SF 10 MIN: CPT | Performed by: NURSE PRACTITIONER

## 2021-07-29 NOTE — PROGRESS NOTES
Virtual Visit Via [employer]   Progress Note and Procedure Note    [address] RECORD NUMBER:  [id number]  AGE: 65 y.o. GENDER: female  : 1950  EPISODE DATE:  2021    Subjective:     Chief Complaint   Patient presents with    Wound Infection        Consent obtained to communicate via Virtual Visit:  Yes     HISTORY of Governor Means HPI     Brody Goss is a 65 y.o. female who presents today via Virtual Visit for re-evaluation of left heel, right ischium and coccyx wounds. History of Wound Context:   Patient states that wounds have been present for some time. Patient denies pain to wounds. Current wound care includes triad to coccyx, silver alginate with bordered foam to heel and ischial wound. Patient has history of MS, is mostly bedbound, has phillip lift at home for transfers. Patient notes that  does slide her in her chair for repositioning and pulls phillip out from underneath her after use, sliding against her bottom. Patient has chronic vega catheter, does have chronic leakage per her report. Patient states that she has \"questionable\" control of her bowels. States that  sits her upright on stool when she needs to have a bowel movement. She states that she wipes herself but states that she frequently is unable to completely clean herself. Current with home health, has aides that come several days weekly,  is primary caregiver. Patient states that she does not turn at home. Recently obtained low air loss mattress. She denies any fever or chills.   She denies any further needs or concerns    Ulcer Identification:  Ulcer Type: pressure  Contributing Factors: chronic pressure, decreased mobility, shear force, incontinence of stool and incontinence of urine     Depth of Diabetic/Pressure/Non Pressure Ulcers or Wound:  Pressure ulcer, Stage 2  Pressure ulcer, Stage 3    PAST MEDICAL HISTORY        Diagnosis Date    Arthritis     Hyperlipidemia     Hypertension     Intra-abdominal lymphadenopathy     MS (multiple sclerosis) (HCC)     Pancreatitis     Pneumonia     Seizures (HCC)     UTI (urinary tract infection)        PAST SURGICAL HISTORY    Past Surgical History:   Procedure Laterality Date    CHOLECYSTECTOMY  2016    laparoscopic    CYSTOSCOPY N/A 2019    CYSTO, CLOT EVACUATION, TURBT performed by Christina Noble MD at [address] Left 2020    LEFT HEEL WOUND I&D performed by Ramón Harrell DPM at [facility] Right 2018    EYE CATARACT EMULSIFICATION IOL IMPLANT, RIGHT performed by Miguel Estrada MD at [address] INSJ IO LENS PROSTH W/O ECP Left 11/15/2018    EYE CATARACT EMULSIFICATION IOL IMPLANT LEFT EYE performed by Miguel Estrada MD at [address] [facility]    Family History   Problem Relation Age of Onset    Cancer Mother         colon    Heart Disease Father     Diabetes Neg Hx     High Blood Pressure Neg Hx     Stroke Neg Hx        SOCIAL HISTORY    Social History     Tobacco Use    Smoking status: Former Smoker     Packs/day: 2.00     Years: 40.00     Pack years: 80.00     Types: Cigarettes     Quit date: 10/2/2013     Years since quittin.8    Smokeless tobacco: Former User     Quit date: 11/3/2015   Vaping Use    Vaping Use: Former    Substances: Unknown   Substance Use Topics    Alcohol use: No    Drug use: No       ALLERGIES    No Known Allergies    MEDICATIONS    Current Outpatient Medications on File Prior to Encounter   Medication Sig Dispense Refill    fluticasone (FLONASE) 50 MCG/ACT nasal spray 2 sprays by Each Nostril route 2 times daily 1 Bottle 3    zinc sulfate (ZINCATE) 220 (50 Zn) MG capsule Take 1 capsule by mouth daily 30 capsule 0    vitamin C (VITAMIN C) 500 MG tablet Take 1 tablet by mouth daily 30 tablet 0    potassium bicarb-citric acid (EFFER-K) 20 MEQ TBEF effervescent tablet Take 1 tablet by mouth daily 30 tablet 0    aspirin (RA ASPIRIN EC) 81 MG EC tablet take 1 tablet by mouth once daily 30 tablet 0    Methenamine-Sodium Salicylate (CYSTEX PO) Take by mouth      acetic acid 0.25 % irrigation Irrigate catheter with 150 ml weekly. 1000 mL 5    methenamine (HIPREX) 1 g tablet Take 1 tablet by mouth 2 times daily (with meals) 60 tablet 11    metoprolol tartrate (LOPRESSOR) 25 MG tablet take 1/2 tablet by mouth twice a day 30 tablet 11    D-Mannose 500 MG CAPS Take 1,500 mg by mouth daily 90 capsule 5    Cholecalciferol (VITAMIN D3 PO) Take by mouth daily      CRANBERRY PO Take by mouth daily      carBAMazepine (TEGRETOL) 100 MG chewable tablet Take 100 mg by mouth 3 times daily       Water For Irrigation, Sterile (STERILE WATER FOR IRRIGATION) Irrigate vega catheter with 50 ml of sterile water weekly 250 mL 0    Incontinence Supplies (BEDSIDE DRAINAGE BAG) MISC Large 2000 cc bedside drainage bag 1 each 11    Incontinence Supplies (URINARY LEG BAG) [facility] 900 cc Tracy Urinary catheter leg bag 1 each 11    Incontinence Supplies (URINARY LEG BAG STRAPS) MISC Leg bag straps to go with urinary catheter leg bag 1 each 11     No current facility-administered medications on file prior to encounter. REVIEW OF SYSTEMS    Pertinent items are noted in HPI. Objective:     PHYSICAL EXAM    General Appearance: in no acute distress and alert    Wound:    Stage 2 pressure injury to coccyx- Has healed     Stage 3 pressure injury to right ischium- Wound bed granular. Draining moderate of serosanguinous draiage. Periulcer skin is mintact. No warmth or induration. Stage 3 pressure injury to left heel- Dry eschar to wound bed. no drainage noted. Periulcer skin is dry, intact. No warmth or induration.     Assessment:      Problem List Items Addressed This Visit     * (Principal) Pressure injury of right ischium, stage 3 (HCC)    Pressure injury of left heel, stage 3 (HCC)    Pressure injury of coccygeal region, stage 2 ([address] - Primary          Performed by: BROOK Senior - CNP    Wound/Ulcer #: 1, 2 and 3    Wound 11/03/20 Coccyx Mid (Active)   Wound Image   07/08/21 1342   Wound Etiology Pressure Stage  2 07/08/21 1342   Dressing Status Old drainage noted 07/08/21 1342   Wound Cleansed Cleansed with saline 07/08/21 1342   Dressing/Treatment Triad hydro/zinc oxide-based hydrophilic paste 18/13/41 3336   Wound Length (cm) 0 cm 07/29/21 1419   Wound Width (cm) 0 cm 07/29/21 1419   Wound Depth (cm) 0 cm 07/29/21 1419   Wound Surface Area (cm^2) 0 cm^2 07/29/21 1419   Wound Volume (cm^3) 0 cm^3 07/29/21 1419   Wound Assessment Pink/red 07/29/21 1419   Drainage Amount None 07/29/21 1419   Drainage Description Serosanguinous 07/08/21 1342   Odor None 07/08/21 1342   Tati-wound Assessment Intact;Dry/flaky 07/08/21 1342   Margins Attached edges 07/08/21 1342   Wound Thickness Description not for Pressure Injury Full thickness 07/08/21 1342   Number of days: 267       Wound 11/29/20 Buttocks Right (Active)   Wound Image   07/29/21 1419   Wound Etiology Pressure Stage  3 07/08/21 1342   Dressing Status Intact; Old drainage noted 07/29/21 1419   Wound Cleansed Cleansed with saline 07/08/21 1342   Dressing/Treatment Alginate with Ag; Foam 07/29/21 1419   Wound Length (cm) 1.5 cm 07/29/21 1419   Wound Width (cm) 1.5 cm 07/29/21 1419   Wound Depth (cm) 0 cm 07/29/21 1419   Wound Surface Area (cm^2) 2.25 cm^2 07/29/21 1419   Change in Wound Size % (l*w) 50 07/29/21 1419   Wound Volume (cm^3) 0 cm^3 07/29/21 1419   Wound Healing % 100 07/29/21 1419   Wound Assessment Granulation tissue;Slough 07/29/21 1419   Drainage Amount Small 07/29/21 1419   Drainage Description Serosanguinous 07/29/21 1419   Odor None 07/29/21 1419   Tati-wound Assessment Blanchable erythema; Intact 07/29/21 1419   Margins Attached edges 07/29/21 1419 -Reinforced importance of frequent turning and repositioning. Offloading mattress and cushions do not replace turning. Avoid sliding against furniture with transfers, use lift equipment as able. Offloading techniques reviewed, patient verbalizes understanding.      -No indications of infection to wound at today's visit. Call clinic or seek emergency care should these occur.      -Follow up 3-4 weeks for re-evaluation via virtual visit. Call clinic with any needs or concerns prior to scheduled visit. Written patient dismissal instructions available to Patient/POA       Discharge Instructions       Visit Discharge/Physician Orders:  - change positions every 2 hours even with the low air loss mattress  - try to limit the amount of time spent sitting in the hover round. - recommend getting a wheelchair through insurance so we can order a roho cushion  - continue to use offloading boot   - please try to have wounds uncovered and wound measurements at time of visit     [facility] Care: [facility]     Wound Location: coccyx, right buttock, left heel     Dressing orders:      1) Gather wound care supplies and arrange on clean table.      2) Wash your hands with soap and water or use alcohol based hand  for 20 seconds (sing \"Happy Birthday\" twice).    3) Cleanse wounds with normal saline or wound cleanser and gauze. Pat dry with clean gauze.      4) coccyx, perivaginal and other irritated areas- apply triad daily and as needed    Right buttock- Apply skin prep/ no sting barrier to the skin around the wound then apply silver alginate to the open area and cover with a silicone bordered foam. Change twice a week    Left heel- Apply betadine daily     Keep all dressings clean & dry.     Do not shower, take baths or get wound wet, unless otherwise instructed by your Wound Care doctor.      Follow up visit:   4 Weeks 8/26/21 at 2:30pm virtual visit with [facility]      *Call with any issues or concerns or if you think patient needs to be seen in person.       Keep next scheduled appointment. Please give 24 hour notice if unable to keep appointment. [phone]     If you experience any of the following, please call the Wound Care Service during business hours: Monday through Friday 8:00 am - 4:30 pm  ([phone]). *Increase in pain              *Temperature over 101              *Increase in drainage from your wound or a foul odor              *Uncontrolled swelling              *Need for compression bandage changes due to slippage, breakthrough drainage     If you need medical attention outside of business hours, please contact your Primary Care Doctor or go to the nearest emergency roomJanelle Hyde AGE: 65 y.o. GENDER: female  : 1950 being evaluated by a Virtual Visit (video visit) encounter to address concerns as mentioned above. A caregiver was present when appropriate. Due to this being a TeleHealth encounter (During [facility]- public [facility] emergency), evaluation of the following organ systems was limited: Vitals/Constitutional/EENT/Resp/CV/GI//MS/Neuro/Skin/Heme-Lymph-Imm. Pursuant to the emergency declaration under the [address] [place] [address], [address] [place] [address] authority and the [employer] General Act, this Virtual Visit was conducted with patient's (and/or legal guardian's) consent, to reduce the patient's risk of exposure to COVID-19 and provide necessary medical care. The patient (and/or legal guardian) has also been advised to contact this office for worsening conditions or problems, and seek emergency medical treatment and/or call 911 if deemed necessary. Services were provided through a video synchronous discussion virtually to substitute for in-person clinic visit. Patient was located at their individual homes. Provider was located in [place] [address].     Electronically signed by Anderson Hunter, APRN - CNP on 7/29/2021 at 2:51 PM

## 2021-07-29 NOTE — PROGRESS NOTES
Virtual Visit Wound Care  Clinic Level of Care   NAME:  Paula Melissa OF BIRTH:  1950 GENDER: female  MEDICAL RECORD NUMBER:  [id number]   DATE:  7/29/2021     Patient Type Points   No documentation completed by nursing staff. []   0   Nursing staff documented in the navigator for an ESTABLISHED patient including Episode, Patient ID, Chief Complaint, Travel Screen, Allergies, Latex Allergy, Home Medication, History, Psychosocial Screen, C-SSRS Screen, Fall Risk, Nutritional Screen, Advanced Directive, Education and Plan of Care, and Discharge Instructions. The Functional Screening tab is only required if the patient's status changes. [x]   1   Nursing staff documented in the navigator for a NEW patient including Patient ID, Chief Complaint, Travel Screen, Allergies, Latex Allergy, Home Medication, History, Psychosocial Screen, C-SSRS Screen, Fall Risk, Nutritional Screen, Advanced Directive, Functional Screen, Education and Plan of Care, and Discharge Instructions. []   2   Nursing staff documented in the navigator for a CONSULT patient including Episode, Patient ID, Chief Complaint, Travel Screen, Allergies, Latex Allergy, Home Medication, History, Psychosocial Screen, C-SSRS Screen, Fall Risk, Nutritional Screen, Advanced Directive, Functional Screen, Education and Plan of Care, and Discharge Instructions. []   2     Wound Description Points   Unable to obtain image of Wound. For example, patient/caregiver is instructed not to remove dressing, is unable to correctly position smart phone, no smart phone is available, patient is unable to maintain connectivity or the patient's wound is healed. []   0   1-3 wound images annotated. Images of the wound(s) is obtained and annotated along with completed description in [address] [place] [address]. [x]   1   4-5 wound images annotated. Images of the wound(s) is obtained and annotated along with completed description in [address] [place] [address]. []   2   Greater than 6 wound images annotated.  Images calls made to home health, primary care providers, pharmacy, or DME. May include filling out forms and writing letters, arranging transportation, communication with insurance , vendors, etc.  Discharge, instructions and/or After Visit Summary given to patient/caregiver and instructions completed.    []   3     Is this the Patient's First Visit to the [address] [place] [address] Road  No    Is this Patient Established @ [facility]  Yes             Virtual Visit Clinical Level of Care Wound Care      Points  1-3  Level 1 []     Points  4-5  Level 2 [x]     Points  6-7  Level 3 []     Points  8-9  Level 4 []     Points  10-11  Level 5 []       Electronically signed by Noel Gallardo RN on 7/29/2021 at 2:41 PM

## 2021-07-29 NOTE — PLAN OF CARE
Problem: Wound:  Goal: Will show signs of wound healing; wound closure and no evidence of infection  Description: Will show signs of wound healing; wound closure and no evidence of infection  Outcome: Ongoing  Note: Patient seen virtually today with home health nurse. Orders faxed to [address] [place] [address]. See avs for discharge instructions. Follow up 4 weeks      Care plan reviewed with patient and home health nurse. Patient and home health nurse verbalize understanding of the plan of care and contribute to goal setting.

## 2021-07-29 NOTE — PROGRESS NOTES
Mark Stallings, was evaluated through a synchronous (real-time) audio-video encounter. The patient (or guardian if applicable) is aware that this is a billable service. Verbal consent to proceed has been obtained within the past 12 months. The visit was conducted pursuant to the emergency declaration under the [address] [place] [address] and the [employer] and [employer] General Act. Patient identification was verified, and a caregiver was present when appropriate. The patient was located in a state where the provider was credentialed to provide care. Total time spent for this encounter: 15 minutes    --Eitan Guerra RN on 7/29/2021 at 2:39 PM    An electronic signature was used to authenticate this note.

## 2021-08-16 NOTE — PROGRESS NOTES
Bill For Simulation And Treatment Device Design: Yes Physician Progress Note      PATIENT:               Elodia Calderon  CSN #:                  [id number]  :                       1950  ADMIT DATE:       11/3/2020 10:40 AM  DISCH DATE:        2020 2:37 PM  RESPONDING  PROVIDER #:        Christiane Moffett MD          QUERY TEXT:    Dr Alek Chambers,    Pt admitted with pressure ulcer, stage 3 of the left heel  & Cellulitis. Pt   noted to have  WBC 12.7, CRP 9.05 and procalcitonin of 1.28. If possible,   please document in the progress notes and discharge summary if you are   evaluating and /or treating any of the following: The medical record reflects the following:  Risk Factors: MS, HTN, bed bound  Clinical Indicators: WBC 12.7, CRP 9.05 and procalcitonin of 1.28. Treatment:  IV Zosyn & Excisional debridement of the ulcer  Options provided:  -- Sepsis, present on admission  -- No Sepsis, localized infection -cellulitis only  -- Sepsis was ruled out  -- Other - I will add my own diagnosis  -- Disagree - Not applicable / Not valid  -- Disagree - Clinically unable to determine / Unknown  -- Refer to Clinical Documentation Reviewer    PROVIDER RESPONSE TEXT:    This patient has localized infection -cellulitis only, patient is not septic.     Query created by: Victor Manuel Jaramillo on 2020 6:16 AM      Electronically signed by:  Christiane Moffett MD 2020 3:20 PM Total Number Of Fractions: 20 Dimensions-Y Axis In Cm: 1.5 Body Location Override (Optional): Nasal Supratip Dose Per Fractionation In Cgy (Optional): 273 Detail Level: Zone Depth (Optional-Please Include Units): 2.17 mm Please Choose The Type Of Visit (Required): Treatment Visit: Show Treatment Variables Treatment Time In Min (Optional): 0.42 min Number Of Days Off Treatment: 1 Time Dose Fractionation (Optional- Include Units If Applicable): 97 Custom Shielding Afterword Text Will Not Be Included With Simple Simulations (X X Y Cm............): port to correlate with the lesion size, including treatment margin. The custom lead shield is adequate to accommodate the appropriate applicator and provide adequate shielding around the treatment site. Additional shielding (as noted below) is used to protect sensitive, normal tissues. Additional Prescription Justification Text: If there is any interruption in treatment exceeding 5 days please see Decay and Dose Adjustment Calculation and complete treatment under Prescription 2, 3 or 4 as appropriate. Energy (Include Units): 100 kv Render Text From Evaluation And Management Tab (Will Not Bill 52305): No Fractions / Week Rx 2: 5 Assessment: Appropriate reaction Simple Simulation Preamble Text Will Be Included With Simple Simulations (.......... Indications): Simple simulation was performed today for the following reasons: Total Number Of Fractions Rx 2: 15 Patient Positioning: Sitting Functional Status: 0 (fully active) Day Of The Week Treatment Administered: Monday Custom Shielding Preamble Text Will Not Be Included With Simple Simulations (.......... X X Y Cm): A lead shield of 0.762 mm thickness is utilized to form a molded, custom shield with a Treatment Device Design After Initial Simulation Justification (Will Render If Bill For Treatment Devices = Yes): The patient is status post radiation simulation and is evaluated as to the use of additional devices for shielding and placement for radiation therapy. Dimensions-X Axis In Cm: 1.3 Field Size (Applicator): 3.0 cm Total Dose (Optional-Please Include Units): 5460 cGy Information: Selecting Yes will display possible errors in your note based on the variables you have selected. This validation is only offered as a suggestion for you. PLEASE NOTE THAT THE VALIDATION TEXT WILL BE REMOVED WHEN YOU FINALIZE YOUR NOTE. IF YOU WANT TO FAX A PRELIMINARY NOTE YOU WILL NEED TO TOGGLE THIS TO 'NO' IF YOU DO NOT WANT IT IN YOUR FAXED NOTE. Port Dimensions-X Axis In Cm: 2.5 Fractions / Week: 3 Initial Radiation Treatment Planning (Will Render If Bill Simulation = Yes): The patient had a complete consultation regarding all applicable modalities for the treatment of their skin cancer and based on a variety of factors including the type of tumor, size, and location, the relevant medical history as well as local tissue factors, the functional status of the individual, the ability to perform necessary postoperative wound instructions and the need for simultaneous treatments as well as overall wound healing status, it was determined that the patient would begin radiation therapy treatment for skin cancer.  A full simulation and treatment device design was performed including the determination and formulation of appropriate simple and complex devices including lead shield of 0.762 mm thickness to form molded customized shielding to specifically correlate with the lesion size including treatment margin.  The custom lead shield is adequate to accommodate the appropriate applicator and provide adequate shielding around the treatment site.  The specific field applicator, shields, and devices both simple and complex as well as the specific patient setup is outlined below.  The patient was given a full consent for superficial radiation to both verbally and in writing and the full determination of patient's eligibility for treatment and selection is outlined on the patient eligibility and treatment selection form.  The specific superficial radiotherapy prescription was determined and was documented on the superficial radiotherapy prescription form.  A treatment calculation was also performed and documented on the treatment calculation form.  Based on the prescription, the patient was scheduled for a series of fractional treatments. Intro Statement (Will Not Render If Left Blank): The patient is undergoing superficial radiation therapy for skin cancer and presents for weekly evaluation and management.  Per protocol and as documented on the flow sheet, the patient was questioned as to subjective redness, pruritus, pain, drainage, fatigue, or any other symptoms.  Objectively, the radiation area was evaluated with regards to erythema, atrophy, scale, crusting, erosion, ulceration, edema, purpura, tenderness, warmth, drainage, and any other findings.  The plan was extensively reviewed including the dose, and dosing schedule.  The simulation and clinical setup was also reviewed as was the external and any internal shields and based on this review the appropriateness and sufficiency of treatment was determined. Daily Fractionated Dose (Optional- Include Units): 273 cGy Shielding Size (Optional- Include Units): 2.5 cm Pathology Override (Pathology Will Render As Diagnosis Name If Left Blank): Basal Supratip

## 2021-08-25 ENCOUNTER — TELEPHONE (OUTPATIENT)
Dept: WOUND CARE | Age: 71
End: 2021-08-25

## 2021-08-25 NOTE — TELEPHONE ENCOUNTER
----- Message from Alejandra Koenig sent at 8/25/2021 10:25 AM EDT -----  Regarding: Order Changes  Venkat Draft from [address] [place] [address] called and requested a call back for wound care order changes.  [address] # [phone]

## 2021-08-26 ENCOUNTER — TELEPHONE (OUTPATIENT)
Dept: WOUND CARE | Age: 71
End: 2021-08-26

## 2021-08-26 ENCOUNTER — HOSPITAL ENCOUNTER (OUTPATIENT)
Dept: WOUND CARE | Age: 71
Discharge: HOME | End: 2021-08-26
Payer: MEDICARE

## 2021-08-26 VITALS
DIASTOLIC BLOOD PRESSURE: 72 MMHG | OXYGEN SATURATION: 96 % | SYSTOLIC BLOOD PRESSURE: 118 MMHG | HEART RATE: 72 BPM | RESPIRATION RATE: 16 BRPM | BODY MASS INDEX: 18.43 KG/M2 | WEIGHT: 104 LBS | HEIGHT: 63 IN | TEMPERATURE: 98.1 F

## 2021-08-26 DIAGNOSIS — L89.313 PRESSURE INJURY OF RIGHT ISCHIUM, STAGE 3 (HCC): Primary | ICD-10-CM

## 2021-08-26 PROCEDURE — 99212 OFFICE O/P EST SF 10 MIN: CPT | Performed by: NURSE PRACTITIONER

## 2021-08-26 PROCEDURE — 99213 OFFICE O/P EST LOW 20 MIN: CPT

## 2021-08-26 RX ORDER — GENTAMICIN SULFATE 1 MG/G
OINTMENT TOPICAL ONCE
Status: CANCELLED | OUTPATIENT
Start: 2021-08-26 | End: 2021-08-26

## 2021-08-26 RX ORDER — LIDOCAINE HYDROCHLORIDE 20 MG/ML
JELLY TOPICAL ONCE
Status: CANCELLED | OUTPATIENT
Start: 2021-08-26 | End: 2021-08-26

## 2021-08-26 RX ORDER — BACITRACIN ZINC AND POLYMYXIN B SULFATE 500; 1000 [USP'U]/G; [USP'U]/G
OINTMENT TOPICAL ONCE
Status: CANCELLED | OUTPATIENT
Start: 2021-08-26 | End: 2021-08-26

## 2021-08-26 RX ORDER — LIDOCAINE 50 MG/G
OINTMENT TOPICAL ONCE
Status: CANCELLED | OUTPATIENT
Start: 2021-08-26 | End: 2021-08-26

## 2021-08-26 RX ORDER — LIDOCAINE HYDROCHLORIDE 40 MG/ML
SOLUTION TOPICAL ONCE
Status: CANCELLED | OUTPATIENT
Start: 2021-08-26 | End: 2021-08-26

## 2021-08-26 RX ORDER — BACITRACIN, NEOMYCIN, POLYMYXIN B 400; 3.5; 5 [USP'U]/G; MG/G; [USP'U]/G
OINTMENT TOPICAL ONCE
Status: CANCELLED | OUTPATIENT
Start: 2021-08-26 | End: 2021-08-26

## 2021-08-26 RX ORDER — LIDOCAINE 40 MG/G
CREAM TOPICAL ONCE
Status: CANCELLED | OUTPATIENT
Start: 2021-08-26 | End: 2021-08-26

## 2021-08-26 RX ORDER — BETAMETHASONE DIPROPIONATE 0.05 %
OINTMENT (GRAM) TOPICAL ONCE
Status: CANCELLED | OUTPATIENT
Start: 2021-08-26 | End: 2021-08-26

## 2021-08-26 RX ORDER — CLOBETASOL PROPIONATE 0.5 MG/G
OINTMENT TOPICAL ONCE
Status: CANCELLED | OUTPATIENT
Start: 2021-08-26 | End: 2021-08-26

## 2021-08-26 RX ORDER — GINSENG 100 MG
CAPSULE ORAL ONCE
Status: CANCELLED | OUTPATIENT
Start: 2021-08-26 | End: 2021-08-26

## 2021-08-26 NOTE — PLAN OF CARE
Problem: Wound:  Goal: Will show signs of wound healing; wound closure and no evidence of infection  Description: Will show signs of wound healing; wound closure and no evidence of infection  Outcome: Ongoing   Patient seen for right buttock, left heel, and coccyx wounds via virtual visit. Wound shows signs of proper closure and healing. No s/s of infection noted. Follow up in 4 weeks. See AVS for order changes. Care plan reviewed with patient and home health. Patient and home health verbalize understanding of the plan of care and contribute to goal setting.

## 2021-08-26 NOTE — PROGRESS NOTES
Nicko Sanchezanel, was evaluated through a synchronous (real-time) audio-video encounter. The patient (or guardian if applicable) is aware that this is a billable service. Verbal consent to proceed has been obtained within the past 12 months. The visit was conducted pursuant to the emergency declaration under the [address] [place] [address] and the [employer] General Act. Patient identification was verified, and a caregiver was present when appropriate. The patient was located in a state where the provider was credentialed to provide care. Total time spent for this encounter: 15 minutes    --Ashish Cullen LPN on 9/47/4035 at 4:18 PM    An electronic signature was used to authenticate this note.

## 2021-08-26 NOTE — PROGRESS NOTES
Virtual Visit Via [employer]   Progress Note and Procedure Note    [address] RECORD NUMBER:  [id number]  AGE: 65 y.o. GENDER: female  : 1950  EPISODE DATE:  2021    Subjective:     Chief Complaint   Patient presents with    Wound Check     right buttocks coccyx left heel        Consent obtained to communicate via Virtual Visit:  Yes     HISTORY of Sebastian العلي is a 65 y.o. female who presents today via Virtual Visit for re-evaluation of left heel, right ischium and coccyx wounds.      History of Wound Context:   Patient states that wounds have been present for some time.  Patient denies pain to wounds.  Current wound care includes triad to coccyx, silver alginate with bordered foam to ischial wound. Betadine paint to left  Heel. Patient has history of MS, is mostly bedbound, has phillip lift at home for transfers.  Patient notes that  does slide her in her chair for repositioning and pulls phillip out from underneath her after use, sliding against her bottom.  Patient has chronic vega catheter, does have chronic leakage per her report. Zaid Arechiga states that she has \"questionable\" control of her bowels.  States that  sits her upright on stool when she needs to have a bowel movement.  She states that she wipes herself but states that she frequently is unable to completely clean herself.   Current with home health, has aides that come several days weekly,  is primary caregiver. Zaid Arechiga states that she does not turn at home.  Recently obtained low air loss mattress.  She denies any fever or chills.  She denies any further needs or concerns     Ulcer Identification:  Ulcer Type: pressure  Contributing Factors: chronic pressure, decreased mobility, shear force, incontinence of stool and incontinence of urine     Depth of Diabetic/Pressure/Non Pressure Ulcers or Wound:  Pressure ulcer, Stage 2  Pressure ulcer, Stage 3    PAST MEDICAL HISTORY        Diagnosis Date    Arthritis     Hyperlipidemia     Hypertension     Intra-abdominal lymphadenopathy     MS (multiple sclerosis) (HCC)     Pancreatitis     Pneumonia     Seizures (HCC)     UTI (urinary tract infection)        PAST SURGICAL HISTORY    Past Surgical History:   Procedure Laterality Date    CHOLECYSTECTOMY  2016    laparoscopic    CYSTOSCOPY N/A 2019    CYSTO, CLOT EVACUATION, TURBT performed by Gayla Blackburn MD at [address] [facility] Left 2020    LEFT HEEL WOUND I&D performed by Nicole Tucker DPM at [address] Right 2018    EYE CATARACT EMULSIFICATION IOL IMPLANT, RIGHT [facility] LENS [facility]OST[facility] W/O ECP Left 11/15/2018    EYE CATARACT EMULSIFICATION IOL IMPLANT LEFT EYE performed by Dina Uriarte MD at [facility]    Family History   Problem Relation Age of Onset    Cancer Mother         colon    Heart Disease Father     Diabetes Neg Hx     High Blood Pressure Neg Hx     Stroke Neg Hx        SOCIAL HISTORY    Social History     Tobacco Use    Smoking status: Former Smoker     Packs/day: 2.00     Years: 40.00     Pack years: 80.00     Types: Cigarettes     Quit date: 10/2/2013     Years since quittin.9    Smokeless tobacco: Former User     Quit date: 11/3/2015   Vaping Use    Vaping Use: Former    Substances: Unknown   Substance Use Topics    Alcohol use: No    Drug use: No       ALLERGIES    No Known Allergies    MEDICATIONS    Current Outpatient Medications on File Prior to Encounter   Medication Sig Dispense Refill    fluticasone (FLONASE) 50 MCG/ACT nasal spray 2 sprays by Each Nostril route 2 times daily 1 Bottle 3    zinc sulfate (ZINCATE) 220 (50 Zn) MG capsule Take 1 capsule by mouth daily 30 capsule 0    vitamin C (VITAMIN C) 500 MG tablet Take 1 tablet by mouth daily 30 tablet 0    potassium bicarb-citric acid (EFFER-K) 20 MEQ TBEF effervescent tablet Take 1 tablet by mouth daily 30 tablet 0    aspirin (RA ASPIRIN EC) 81 MG EC tablet take 1 tablet by mouth once daily 30 tablet 0    Methenamine-Sodium Salicylate (CYSTEX PO) Take by mouth      acetic acid 0.25 % irrigation Irrigate catheter with 150 ml weekly. 1000 mL 5    methenamine (HIPREX) 1 g tablet Take 1 tablet by mouth 2 times daily (with meals) 60 tablet 11    metoprolol tartrate (LOPRESSOR) 25 MG tablet take 1/2 tablet by mouth twice a day 30 tablet 11    Water For Irrigation, Sterile (STERILE WATER FOR IRRIGATION) Irrigate vega catheter with 50 ml of sterile water weekly 250 mL 0    Incontinence Supplies (BEDSIDE DRAINAGE BAG) MISC Large 2000 cc bedside drainage bag 1 each 11    Incontinence Supplies (URINARY LEG BAG) [facility] 900 cc [employer] Urinary catheter leg bag 1 each 11    Incontinence Supplies (URINARY LEG BAG STRAPS) MISC Leg bag straps to go with urinary catheter leg bag 1 each 11    D-Mannose 500 MG CAPS Take 1,500 mg by mouth daily 90 capsule 5    Cholecalciferol (VITAMIN D3 PO) Take by mouth daily      CRANBERRY PO Take by mouth daily      carBAMazepine (TEGRETOL) 100 MG chewable tablet Take 100 mg by mouth 3 times daily        No current facility-administered medications on file prior to encounter. REVIEW OF SYSTEMS    Pertinent items are noted in HPI. Objective:     PHYSICAL EXAM    General Appearance: in no acute distress and alert     Wound:    Stage 2 pressure injury to coccyx- Has re-opened, several small scattered areas noted, wound bed granular. Small amounts of serosanguinous drainage reported.     Healing stage 3 pressure injury to right ischium- Wound bed granular.  Draining small amounts of serosanguinous draiage. Periulcer skin is intact.                 Healing stage 3 pressure injury to left heel- Dry eschar to wound bed. no drainage noted. Periulcer skin is dry, intact. No warmth or induration. Assessment:      Problem List Items Addressed This Visit     Pressure injury of right ischium, stage 3 ([address] - Primary    Relevant Orders    Initiate Outpatient Wound Care Protocol        Performed by: BROOK Mukherjee - CAYETANO    Wound/Ulcer #: 1, 2 and 3    Wound 11/03/20 Coccyx Mid (Active)   Wound Image   07/08/21 1342   Wound Etiology Pressure Stage  2 08/26/21 1452   Dressing Status Old drainage noted 07/08/21 1342   Wound Cleansed Cleansed with saline 07/08/21 1342   Dressing/Treatment Triad hydro/zinc oxide-based hydrophilic paste 51/51/71 9974   Wound Length (cm) 0 cm 07/29/21 1419   Wound Width (cm) 0 cm 07/29/21 1419   Wound Depth (cm) 0 cm 07/29/21 1419   Wound Surface Area (cm^2) 0 cm^2 07/29/21 1419   Wound Volume (cm^3) 0 cm^3 07/29/21 1419   Wound Assessment Pink/red 07/29/21 1419   Drainage Amount None 07/29/21 1419   Drainage Description Serosanguinous 07/08/21 1342   Odor None 07/08/21 1342   Tati-wound Assessment Intact;Dry/flaky 07/08/21 1342   Margins Attached edges 07/08/21 1342   Wound Thickness Description not for Pressure Injury Full thickness 07/08/21 1342   Number of days: 295       Wound 11/29/20 Buttocks Right (Active)   Wound Image   08/26/21 1452   Wound Etiology Pressure Stage  3 08/26/21 1452   Dressing Status Intact; Old drainage noted 07/29/21 1419   Wound Cleansed Cleansed with saline 07/08/21 1342   Dressing/Treatment Triad hydro/zinc oxide-based hydrophilic paste 12/09/90 4121   Wound Length (cm) 0.5 cm 08/26/21 1452   Wound Width (cm) 0.5 cm 08/26/21 1452   Wound Depth (cm) 0.01 cm 08/26/21 1452   Wound Surface Area (cm^2) 0.25 cm^2 08/26/21 1452   Change in Wound Size % (l*w) 94.44 08/26/21 1452   Wound Volume (cm^3) 0.0025 cm^3 08/26/21 1452   Wound Healing % 99 08/26/21 1452   Wound Assessment Granulation tissue 08/26/21 1452   Drainage Amount Moderate 08/26/21 1452   Drainage Description Serosanguinous 07/29/21 1419   Odor None 07/29/21 1419   Tati-wound Assessment Intact; Hyperpigmented 08/26/21 1452   Margins Attached edges 07/29/21 1419   Wound Thickness Description not for Pressure Injury Full thickness 07/29/21 1419   Number of days: 270       Wound 11/29/20 Heel Left (Active)   Wound Image   07/29/21 1419   Wound Etiology Pressure Stage  3 07/29/21 1419   Dressing Status Intact; Old drainage noted 07/08/21 1342   Wound Cleansed Cleansed with saline 07/08/21 1342   Dressing/Treatment Betadine swabs/povidone iodine 08/26/21 1452   Offloading for Diabetic Foot Ulcers Offloading boot 07/29/21 1419   Wound Length (cm) 1 cm 08/26/21 1452   Wound Width (cm) 0.6 cm 08/26/21 1452   Wound Depth (cm) 0 cm 07/29/21 1419   Wound Surface Area (cm^2) 0.6 cm^2 08/26/21 1452   Change in Wound Size % (l*w) 39.39 08/26/21 1452   Wound Volume (cm^3) 0 cm^3 07/29/21 1419   Wound Healing % 100 07/29/21 1419   Wound Assessment Eschar dry 08/26/21 1452   Drainage Amount None 08/26/21 1452   Drainage Description Serosanguinous 07/08/21 1342   Odor None 08/26/21 1452   Tati-wound Assessment Hyperpigmented; Intact;Dry/flaky 08/26/21 1452   Margins Attached edges 08/26/21 1452   Wound Thickness Description not for Pressure Injury Full thickness 08/26/21 1452   Number of days: 270          Diabetic/Pressure/Non Pressure Ulcers only:  Ulcer: Pressure ulcer, Stage 2 and Pressure ulcer, Stage 3       Plan:     Please see attached Discharge Instructions    Based upon this virtual visit it is not required to have an in-person visit of this time.    -Stage 2 pressure injury to coccyx has re-opened from last visit. Start triad paste to area. Apply daily and as needed.      -Stage 3 pressure injury to right ischium appears improved at today's visit. Discontinue silver alginate and silicone bordered foams as wound is very superficial.  Start use of Triad paste. Apply daily and as needed.       -Stage 3 pressure injury to left heel- Dry eschar continues. Continue to paint with betadine daily. Continue prevalon boots for offloading.       -Reinforced importance of frequent turning and repositioning. Offloading mattress and cushions do not replace turning. Avoid sliding against furniture with transfers, use lift equipment as able. Offloading techniques reviewed, patient verbalizes understanding.      -No indications of infection to wounds at today's visit. Call clinic or seek emergency care should these occur.      -Follow up 4 weeks for re-evaluation via virtual visit. Call clinic with any needs or concerns prior to scheduled visit. Written patient dismissal instructions available to Patient/POA       Discharge Instructions       Visit Discharge/Physician Orders:  - change positions every 2 hours even with the low air loss mattress  - try to limit the amount of time spent sitting in the hover round. - recommend getting a wheelchair through insurance so we can order a roho cushion  - continue to use offloading boot   - please try to have wounds uncovered and wound measurements at time of visit     Home Care: [facility]     Wound Location: coccyx, right buttock, left heel     Dressing orders:      1) Gather wound care supplies and arrange on clean table.      2) Wash your hands with soap and water or use alcohol based hand  for 20 seconds (sing \"Happy Birthday\" twice).    3) Cleanse wounds with normal saline or wound cleanser and gauze. Pat dry with clean gauze.      4) coccyx, perivaginal and other irritated areas- apply triad daily and as needed     Right buttock- Apply Triad cream daily and as needed     Left heel- Apply betadine daily     Keep all dressings clean & dry.     Do not shower, take baths or get wound wet, unless otherwise instructed by your Wound Care doctor.      Follow up visit:   4 Weeks 09/23/2021 at 2:30pm virtual visit with [facility]        *Call with any issues or concerns or if you think patient needs to be seen in person.       Keep next scheduled appointment. Please give 24 hour notice if unable to keep appointment. [phone]     If you experience any of the following, please call the Wound Care Service during business hours: Monday through Friday 8:00 am - 4:30 pm  ([phone]).             *Increase in pain              *Temperature over 101              *Increase in drainage from your wound or a foul odor              *Uncontrolled swelling              *Need for compression bandage changes due to slippage, breakthrough drainage     If you need medical attention outside of business hours, please contact your Primary Care Doctor or go to the nearest emergency room.                Charles Santo AGE: 65 y.o. GENDER: female  : 1950 being evaluated by a Virtual Visit (video visit) encounter to address concerns as mentioned above. A caregiver was present when appropriate. Due to this being a TeleHealth encounter (During [facility] public health emergency), evaluation of the following organ systems was limited: Vitals/Constitutional/EENT/Resp/CV/GI//MS/Neuro/Skin/Heme-Lymph-Imm. Pursuant to the emergency declaration under the [address] [place] [address], [address] [place] [address] authority and the "[employer] Act, this Virtual Visit was conducted with patient's (and/or legal guardian's) consent, to reduce the patient's risk of exposure to COVID-19 and provide necessary medical care. The patient (and/or legal guardian) has also been advised to contact this office for worsening conditions or problems, and seek emergency medical treatment and/or call 911 if deemed necessary. Services were provided through a video synchronous discussion virtually to substitute for in-person clinic visit. Patient was located at their individual homes. Provider was located in [place] [address].     Electronically signed by BROOK Barron CNP on 8/26/2021 at 3:27 PM

## 2021-08-26 NOTE — TELEPHONE ENCOUNTER
Called Ivan. I was notified that lenora is out of the office today. I spoke to another nurse and I  Informed them that any order changes can be reassessed at her VV today at 2:30. She verbalized understanding.

## 2021-08-26 NOTE — TELEPHONE ENCOUNTER
----- Message from Suyapa Stein sent at 8/25/2021 10:25 AM EDT -----  Regarding: Order Changes  Jacey Jenkins from [address] [place] [address] called and requested a call back for wound care order changes.  [address] # [phone]

## 2021-08-26 NOTE — PROGRESS NOTES
Virtual Visit Wound Care  Clinic Level of Care   NAME:  Pancho Zapata OF BIRTH:  1950 GENDER: female  MEDICAL RECORD NUMBER:  [id number]   DATE:  8/26/2021     Patient Type Points   No documentation completed by nursing staff. []   0   Nursing staff documented in the navigator for an ESTABLISHED patient including Episode, Patient ID, Chief Complaint, Travel Screen, Allergies, Latex Allergy, Home Medication, History, Psychosocial Screen, C-SSRS Screen, Fall Risk, Nutritional Screen, Advanced Directive, Education and Plan of Care, and Discharge Instructions. The Functional Screening tab is only required if the patient's status changes. [x]   1   Nursing staff documented in the navigator for a NEW patient including Patient ID, Chief Complaint, Travel Screen, Allergies, Latex Allergy, Home Medication, History, Psychosocial Screen, C-SSRS Screen, Fall Risk, Nutritional Screen, Advanced Directive, Functional Screen, Education and Plan of Care, and Discharge Instructions. []   2   Nursing staff documented in the navigator for a CONSULT patient including Episode, Patient ID, Chief Complaint, Travel Screen, Allergies, Latex Allergy, Home Medication, History, Psychosocial Screen, C-SSRS Screen, Fall Risk, Nutritional Screen, Advanced Directive, Functional Screen, Education and Plan of Care, and Discharge Instructions. []   2     Wound Description Points   Unable to obtain image of Wound. For example, patient/caregiver is instructed not to remove dressing, is unable to correctly position smart phone, no smart phone is available, patient is unable to maintain connectivity or the patient's wound is healed. []   0   1-3 wound images annotated. Images of the wound(s) is obtained and annotated along with completed description in [address] [place] [address]. []   1   4-5 wound images annotated. Images of the wound(s) is obtained and annotated along with completed description in [address] [place] [address]. [x]   2   Greater than 6 wound images annotated.  Images of the wound(s) is obtained and annotated along with completed description in [address] [place] [address]. []   3     Education Points   No Education completed by nursing staff.    []   0   Patient/caregiver is educated on 1-4 topics. Nursing staff identifies learner, confirms understanding of information (verbal, demonstration, written) and documents details. May include Discharge Instructions/AVS, available documents in My Chart, or Web-based learning.  []   1   Patient/caregiver is educated on 5-9 topics. Nursing staff identifies learner, confirms understanding of information (verbal, demonstration, written) and documents details. May include Discharge Instructions/AVS, available documents in My Chart, or Web-based learning. [x]   2   Patient/caregiver is educated on 10 or more topics. Nursing staff identifies learner, confirms understanding of information (verbal, demonstration, written) and documents details. May include Discharge Instructions/AVS, available documents in My Chart, or Web-based learning. []   3     Follow-up Virtual Visit Points   No contact with outside resources made. []   0   Nursing staff contacts 1-2 outside resource. For example, telephone call made to home health, primary care provider, pharmacy, or DME. May include filling out forms and writing letters, arranging transportation, communication with insurance , vendors, etc.  Discharge, instructions and/or After Visit Summary given to patient/caregiver and instructions completed. [x]   1   Nursing staff contacts 3-4 outside resource. For example, telephone calls made to home health, primary care provider, pharmacy, or DME. May include filling out forms and writing letters, arranging transportation, communication with insurance , vendors, etc.  Discharge, instructions and/or After Visit Summary given to patient/caregiver and instructions completed. []   2   Nursing contacts 5 or more outside resource.  For example, telephone calls made to home health, primary care providers, pharmacy, or DME. May include filling out forms and writing letters, arranging transportation, communication with insurance , vendors, etc.  Discharge, instructions and/or After Visit Summary given to patient/caregiver and instructions completed.    []   3     Is this the Patient's First Visit to the [address] [place] [address]  No    Is this Patient Established @ [facility]  Yes             Virtual Visit Clinical Level of Care Wound Care      Points  1-3  Level 1 []     Points  4-5  Level 2 []     Points  6-7  Level 3 [x]     Points  8-9  Level 4 []     Points  10-11  Level 5 []       Electronically signed by Darren Joseph LPN on 8/07/4631 at @NO

## 2021-08-26 NOTE — PROGRESS NOTES
Monalisa Reyna R.N. has reviewed and agrees with Deloris Crook LPN's shift   Assessment.     Corona Gunter RN  8/26/2021

## 2021-09-23 ENCOUNTER — HOSPITAL ENCOUNTER (OUTPATIENT)
Dept: WOUND CARE | Age: 71
Discharge: HOME | End: 2021-09-23
Payer: MEDICARE

## 2021-09-23 DIAGNOSIS — L89.152 PRESSURE INJURY OF COCCYGEAL REGION, STAGE 2 (HCC): ICD-10-CM

## 2021-09-23 DIAGNOSIS — L89.313 PRESSURE INJURY OF RIGHT ISCHIUM, STAGE 3 (HCC): ICD-10-CM

## 2021-09-23 DIAGNOSIS — L89.623 PRESSURE INJURY OF LEFT HEEL, STAGE 3 (HCC): Primary | ICD-10-CM

## 2021-09-23 PROCEDURE — 99212 OFFICE O/P EST SF 10 MIN: CPT

## 2021-09-23 PROCEDURE — 99212 OFFICE O/P EST SF 10 MIN: CPT | Performed by: NURSE PRACTITIONER

## 2021-09-23 NOTE — PROGRESS NOTES
Virtual Visit Via [employer]   Progress Note and Procedure Note    [address] RECORD NUMBER:  [id number]  AGE: 65 y.o. GENDER: female  : 1950  EPISODE DATE:  2021    Subjective:     Chief Complaint   Patient presents with    Wound Check     right buttocks, coccyx, left heel        Consent obtained to communicate via Virtual Visit:  Yes     HISTORY of Alric Ruthch KETAN Stallings is a 65 y.o. female who presents today via Virtual Visit for re-evaluation of left heel, right ischium and coccyx wounds.      History of Wound Context:   Patient states that wounds have been present for some time.  Patient denies pain to wounds.  Current wound care includes triad to coccyx and ischial wounds. Betadine paint to left heel. Patient has history of MS, is mostly bedbound, has phillip lift at home for transfers.  Patient notes that  does slide her in her chair for repositioning and pulls phillip out from underneath her after use, sliding against her bottom.  Patient has chronic vega catheter, does have chronic leakage per her report. Carlos Glover states that she has \"questionable\" control of her bowels.  States that  sits her upright on stool when she needs to have a bowel movement.  She states that she wipes herself but states that she frequently is unable to completely clean herself.   Current with home health, has aides that come several days weekly,  is primary caregiver. Carlos Glover states that she does not turn at home.  Recently obtained low air loss mattress.  She denies any fever or chills.  She denies any further needs or concerns     Ulcer Identification:  Ulcer Type: pressure  Contributing Factors: chronic pressure, decreased mobility, shear force, incontinence of stool and incontinence of urine     Depth of Diabetic/Pressure/Non Pressure Ulcers or Wound:  Pressure ulcer, stage 3  Pressure ulcer, stage 2    PAST MEDICAL HISTORY Diagnosis Date    Arthritis     Hyperlipidemia     Hypertension     Intra-abdominal lymphadenopathy     MS (multiple sclerosis) (HCC)     Pancreatitis     Pneumonia     Seizures (HCC)     UTI (urinary tract infection)        PAST SURGICAL HISTORY    Past Surgical History:   Procedure Laterality Date    CHOLECYSTECTOMY  2016    laparoscopic    CYSTOSCOPY N/A 2019    CYSTO, CLOT EVACUATION, TURBT performed by Aric Paul MD at [address] [address] Left 2020    LEFT HEEL WOUND I&D performed by Ana Roberson DPM at [address] [facility] Right 2018    EYE CATARACT EMULSIFICATION IOL IMPLANT, RIGHT performed by Arian Chambers MD at [address] [facility] INS IO LENS PROSTH W/O ECP Left 11/15/2018    EYE CATARACT EMULSIFICATION IOL IMPLANT LEFT EYE performed by Arian Chambers MD at [facility]    Family History   Problem Relation Age of Onset    Cancer Mother         colon    Heart Disease Father     Diabetes Neg Hx     High Blood Pressure Neg Hx     Stroke Neg Hx        SOCIAL HISTORY    Social History     Tobacco Use    Smoking status: Former Smoker     Packs/day: 2.00     Years: 40.00     Pack years: 80.00     Types: Cigarettes     Quit date: 10/2/2013     Years since quittin.9    Smokeless tobacco: Former User     Quit date: 11/3/2015   Vaping Use    Vaping Use: Former    Substances: Unknown   Substance Use Topics    Alcohol use: No    Drug use: No       ALLERGIES    No Known Allergies    MEDICATIONS    Current Outpatient Medications on File Prior to Encounter   Medication Sig Dispense Refill    fluticasone (FLONASE) 50 MCG/ACT nasal spray 2 sprays by Each Nostril route 2 times daily 1 Bottle 3    zinc sulfate (ZINCATE) 220 (50 Zn) MG capsule Take 1 capsule by mouth daily 30 capsule 0    vitamin C (VITAMIN C) 500 MG tablet Take 1 tablet by mouth daily 30 tablet 0    potassium bicarb-citric acid (EFFER-K) 20 MEQ TBEF effervescent tablet Take 1 tablet by mouth daily 30 tablet 0    aspirin (RA ASPIRIN EC) 81 MG EC tablet take 1 tablet by mouth once daily 30 tablet 0    Methenamine-Sodium Salicylate (CYSTEX PO) Take by mouth      acetic acid 0.25 % irrigation Irrigate catheter with 150 ml weekly. 1000 mL 5    methenamine (HIPREX) 1 g tablet Take 1 tablet by mouth 2 times daily (with meals) 60 tablet 11    metoprolol tartrate (LOPRESSOR) 25 MG tablet take 1/2 tablet by mouth twice a day 30 tablet 11    Water For Irrigation, Sterile (STERILE WATER FOR IRRIGATION) Irrigate vega catheter with 50 ml of sterile water weekly 250 mL 0    Incontinence Supplies (BEDSIDE DRAINAGE BAG) MISC Large 2000 cc bedside drainage bag 1 each 11    Incontinence Supplies (URINARY LEG BAG) [facility] 900 cc Tracy Urinary catheter leg bag 1 each 11    Incontinence Supplies (URINARY LEG BAG STRAPS) MISC Leg bag straps to go with urinary catheter leg bag 1 each 11    D-Mannose 500 MG CAPS Take 1,500 mg by mouth daily 90 capsule 5    Cholecalciferol (VITAMIN D3 PO) Take by mouth daily      CRANBERRY PO Take by mouth daily      carBAMazepine (TEGRETOL) 100 MG chewable tablet Take 100 mg by mouth 3 times daily        No current facility-administered medications on file prior to encounter. REVIEW OF SYSTEMS    Pertinent items are noted in HPI. Objective:     PHYSICAL EXAM    General Appearance: in no acute distress and alert     Healing Stage 2 pressure injury to coccyx- wound bed granular. Scant drainage reported      Healing stage 3 pressure injury to right ischium- Wound bed granular.  Scant drainage reported Periulcer skin is intact.                    Healing stage 3 pressure injury, left heel- Home health notes skin intact, dark purple discoloration noted. no drainage noted.             Assessment:      Problem List Items Addressed This Visit     * (Principal) Pressure injury of right ischium, stage 3 (HCC)    Pressure injury of left heel, stage 3 (HCC) - Primary    Pressure injury of coccygeal region, stage 2 ([facility] [address]        Performed by: BROOK Vazquez - CNP    Wound/Ulcer #: 1, 2 and 3           Diabetic/Pressure/Non Pressure Ulcers only:  Ulcer: Pressure ulcer, Stage 2 and Pressure ulcer, Stage 3       Plan:     Please see attached Discharge Instructions    Based upon this virtual visit it is not required to have an in-person visit of this time.    -Stage 2 pressure injury to coccyx -Improved from last visit. Continue triad paste to area. Apply daily and as needed.      -Stage 3 pressure injury to right ischium appears improved at today's visit, has mostly healed. Continue Triad paste. Apply daily and as needed.         -Stage 3 pressure injury to left heel- Appears improved at today's visit, skin intact with ongoing dark discoloration. Continue to paint with betadine daily. Continue prevalon boots for offloading.       -Reinforced importance of frequent turning and repositioning. Offloading mattress and cushions do not replace turning. Avoid sliding against furniture with transfers, use lift equipment as able. Offloading techniques reviewed, patient verbalizes understanding.      -No indications of infection to wounds at today's visit. Call clinic or seek emergency care should these occur.      -Follow up 2 months for re-evaluation via virtual visit. Call clinic with any needs or concerns prior to scheduled visit. Written patient dismissal instructions available to Patient/POA       Discharge Instructions       Visit Discharge/Physician Orders:  - change positions every 2 hours even with the low air loss mattress  - try to limit the amount of time spent sitting in the hover round.   - recommend getting a wheelchair through insurance so we can order a roho cushion  - continue to use offloading boot   - please try to have wounds uncovered and wound measurements at time of visit     Home Care: [facility]     Wound Location: coccyx, right buttock, left heel     Dressing orders:      1) Gather wound care supplies and arrange on clean table.      2) Wash your hands with soap and water or use alcohol based hand  for 20 seconds (sing \"Happy Birthday\" twice).    3) Cleanse wounds with normal saline or wound cleanser and gauze. Pat dry with clean gauze.    4) coccyx, perivaginal and other irritated areas- apply triad daily and as needed.      Right buttock- Apply Triad cream daily and as needed.      Left heel- Apply betadine daily.      Keep all dressings clean & dry.     Do not shower, take baths or get wound wet, unless otherwise instructed by your Wound Care doctor.      Follow up visit:   4 Weeks virtual visit with [facility] coleen Paniagua         *Call with any issues or concerns or if you think patient needs to be seen in person.       Keep next scheduled appointment. Please give 24 hour notice if unable to keep appointment. [phone]     If you experience any of the following, please call the Wound Care Service during business hours: Monday through Friday 8:00 am - 4:30 pm  ([phone]).             *Increase in pain              *Temperature over 101              *Increase in drainage from your wound or a foul odor              *Uncontrolled swelling              *Need for compression bandage changes due to slippage, breakthrough drainage     If you need medical attention outside of business hours, please contact your Primary Care Doctor or go to the nearest emergency room. Julio Bars AGE: 65 y.o. GENDER: female  : 1950 being evaluated by a Virtual Visit (video visit) encounter to address concerns as mentioned above. A caregiver was present when appropriate.  Due to this being a TeleHealth encounter (During  public health emergency), evaluation of the following organ systems was limited: Vitals/Constitutional/EENT/Resp/CV/GI//MS/Neuro/Skin/Heme-Lymph-Imm. Pursuant to the emergency declaration under the [address] [place] [address] and the [employer] Act, this Virtual Visit was conducted with patient's (and/or legal guardian's) consent, to reduce the patient's risk of exposure to COVID-19 and provide necessary medical care. The patient (and/or legal guardian) has also been advised to contact this office for worsening conditions or problems, and seek emergency medical treatment and/or call 911 if deemed necessary. Services were provided through a video synchronous discussion virtually to substitute for in-person clinic visit. Patient was located at their individual homes. Provider was located in [place] [address].     Electronically signed by BROOK Nowak CNP on 9/23/2021 at 2:54 PM

## 2021-09-23 NOTE — PROGRESS NOTES
[facility] was evaluated through a synchronous (real-time) audio-video encounter. The patient (or guardian if applicable) is aware that this is a billable service. Verbal consent to proceed has been obtained within the past 12 months. The visit was conducted pursuant to the emergency declaration under the [address] [place] [address] and the [employer] and [employer] Act. Patient identification was verified, and a caregiver was present when appropriate. The patient was located in a state where the provider was credentialed to provide care. Total time spent for this encounter: 20 minutes    --Donell Fraire RN on 9/23/2021 at 4:06 PM    An electronic signature was used to authenticate this note.

## 2021-09-23 NOTE — PROGRESS NOTES
Virtual Visit Wound Care  Clinic Level of Care   NAME:  Kelly Wooten OF BIRTH:  1950 GENDER: female  MEDICAL RECORD NUMBER:  [id number]   DATE:  9/23/2021     Patient Type Points   No documentation completed by nursing staff. []   0   Nursing staff documented in the navigator for an ESTABLISHED patient including Episode, Patient ID, Chief Complaint, Travel Screen, Allergies, Latex Allergy, Home Medication, History, Psychosocial Screen, C-SSRS Screen, Fall Risk, Nutritional Screen, Advanced Directive, Education and Plan of Care, and Discharge Instructions. The Functional Screening tab is only required if the patient's status changes. [x]   1   Nursing staff documented in the navigator for a NEW patient including Patient ID, Chief Complaint, Travel Screen, Allergies, Latex Allergy, Home Medication, History, Psychosocial Screen, C-SSRS Screen, Fall Risk, Nutritional Screen, Advanced Directive, Functional Screen, Education and Plan of Care, and Discharge Instructions. []   2   Nursing staff documented in the navigator for a CONSULT patient including Episode, Patient ID, Chief Complaint, Travel Screen, Allergies, Latex Allergy, Home Medication, History, Psychosocial Screen, C-SSRS Screen, Fall Risk, Nutritional Screen, Advanced Directive, Functional Screen, Education and Plan of Care, and Discharge Instructions. []   2     Wound Description Points   Unable to obtain image of Wound. For example, patient/caregiver is instructed not to remove dressing, is unable to correctly position smart phone, no smart phone is available, patient is unable to maintain connectivity or the patient's wound is healed. []   0   1-3 wound images annotated. Images of the wound(s) is obtained and annotated along with completed description in [address] [place] [address]. [x]   1   4-5 wound images annotated. Images of the wound(s) is obtained and annotated along with completed description in [address] [place] [address]. []   2   Greater than 6 wound images annotated.  Images of the wound(s) is obtained and annotated along with completed description in [address] [place] [address]. []   3     Education Points   No Education completed by nursing staff.    []   0   Patient/caregiver is educated on 1-4 topics. Nursing staff identifies learner, confirms understanding of information (verbal, demonstration, written) and documents details. May include Discharge Instructions/AVS, available documents in My Chart, or Web-based learning. [x]   1   Patient/caregiver is educated on 5-9 topics. Nursing staff identifies learner, confirms understanding of information (verbal, demonstration, written) and documents details. May include Discharge Instructions/AVS, available documents in My Chart, or Web-based learning. []   2   Patient/caregiver is educated on 10 or more topics. Nursing staff identifies learner, confirms understanding of information (verbal, demonstration, written) and documents details. May include Discharge Instructions/AVS, available documents in My Chart, or Web-based learning. []   3     Follow-up Virtual Visit Points   No contact with outside resources made. []   0   Nursing staff contacts 1-2 outside resource. For example, telephone call made to home health, primary care provider, pharmacy, or DME. May include filling out forms and writing letters, arranging transportation, communication with insurance , vendors, etc.  Discharge, instructions and/or After Visit Summary given to patient/caregiver and instructions completed. [x]   1   Nursing staff contacts 3-4 outside resource. For example, telephone calls made to home health, primary care provider, pharmacy, or DME. May include filling out forms and writing letters, arranging transportation, communication with insurance , vendors, etc.  Discharge, instructions and/or After Visit Summary given to patient/caregiver and instructions completed. []   2   Nursing contacts 5 or more outside resource.  For example, telephone calls made to home health, primary care providers, pharmacy, or DME. May include filling out forms and writing letters, arranging transportation, communication with insurance , vendors, etc.  Discharge, instructions and/or After Visit Summary given to patient/caregiver and instructions completed.    []   3     Is this the Patient's First Visit to the [facility]  No    Is this Patient Established @ [facility]  Yes             Virtual Visit Clinical Level of Care Wound Care      Points  1-3  Level 1 []     Points  4-5  Level 2 [x]     Points  6-7  Level 3 []     Points  8-9  Level 4 []     Points  10-11  Level 5 []       Electronically signed by Sb Drummond RN on 9/23/2021 at 4:07 PM

## 2021-10-27 ENCOUNTER — TELEPHONE (OUTPATIENT)
Dept: UROLOGY | Age: 71
End: 2021-10-27

## 2021-10-27 ENCOUNTER — HOSPITAL ENCOUNTER (EMERGENCY)
Age: 71
Discharge: HOME | End: 2021-10-27
Attending: EMERGENCY MEDICINE
Payer: MEDICARE

## 2021-10-27 VITALS
RESPIRATION RATE: 20 BRPM | TEMPERATURE: 97.8 F | WEIGHT: 115 LBS | SYSTOLIC BLOOD PRESSURE: 116 MMHG | DIASTOLIC BLOOD PRESSURE: 67 MMHG | HEIGHT: 63 IN | HEART RATE: 82 BPM | BODY MASS INDEX: 20.38 KG/M2 | OXYGEN SATURATION: 97 %

## 2021-10-27 DIAGNOSIS — N39.0 URINARY TRACT INFECTION WITHOUT HEMATURIA, SITE UNSPECIFIED: Primary | ICD-10-CM

## 2021-10-27 LAB
BACTERIA: ABNORMAL
BILIRUBIN URINE: NEGATIVE
BLOOD, URINE: ABNORMAL
CASTS: ABNORMAL /LPF
CASTS: ABNORMAL /LPF
CHARACTER, URINE: ABNORMAL
COLOR: YELLOW
CRYSTALS: ABNORMAL
EPITHELIAL CELLS, UA: ABNORMAL /HPF
GLUCOSE, URINE: NEGATIVE MG/DL
KETONES, URINE: NEGATIVE
LEUKOCYTE ESTERASE, URINE: ABNORMAL
MISCELLANEOUS LAB TEST RESULT: ABNORMAL
NITRITE, URINE: NEGATIVE
PH UA: 7 (ref 5–9)
PROTEIN UA: 30 MG/DL
RBC URINE: > 100 /HPF
RENAL EPITHELIAL, UA: ABNORMAL
SPECIFIC GRAVITY UA: 1.02 (ref 1–1.03)
UROBILINOGEN, URINE: 1 EU/DL (ref 0–1)
WBC UA: > 200 /HPF
YEAST: ABNORMAL

## 2021-10-27 PROCEDURE — 99284 EMERGENCY DEPT VISIT MOD MDM: CPT

## 2021-10-27 PROCEDURE — 81001 URINALYSIS AUTO W/SCOPE: CPT

## 2021-10-27 PROCEDURE — 87086 URINE CULTURE/COLONY COUNT: CPT

## 2021-10-27 PROCEDURE — 87186 SC STD MICRODIL/AGAR DIL: CPT

## 2021-10-27 PROCEDURE — 87077 CULTURE AEROBIC IDENTIFY: CPT

## 2021-10-27 RX ORDER — CEPHALEXIN 500 MG/1
500 CAPSULE ORAL 2 TIMES DAILY
Qty: 14 CAPSULE | Refills: 0 | Status: SHIPPED | OUTPATIENT
Start: 2021-10-27 | End: 2021-11-03

## 2021-10-27 ASSESSMENT — ENCOUNTER SYMPTOMS
ABDOMINAL DISTENTION: 0
CONSTIPATION: 0
SORE THROAT: 0
WHEEZING: 0
COLOR CHANGE: 0
DIARRHEA: 0
SHORTNESS OF BREATH: 0
VOMITING: 0
COUGH: 0
NAUSEA: 0
RHINORRHEA: 0

## 2021-10-27 NOTE — TELEPHONE ENCOUNTER
Can do larger catheter 18 fr if she isn't already using this. Irrigate catheter 3 times a week with acetic acid, if shes not already doing that. She needs to drink more water as well. She can schedule fu, she cancelled appt last summer and has not rescheduled.

## 2021-10-27 NOTE — ED PROVIDER NOTES
[place] ENCOUNTER          Pt Name: Gabriel Reeves  MRN: [id number]  [place] 1950  Date of evaluation: 10/27/2021  Treating Resident Physician: George Grayson MD  Supervising Physician: Charlie Peck MD    CHIEF COMPLAINT       Chief Complaint   Patient presents with    Other     catheter problem     History obtained from the patient. HISTORY OF PRESENT ILLNESS    HPI  Gabriel Reeves is a 65 y.o. female who presents to the emergency department for evaluation of concerns regarding Stanford catheter. Patient has a chronic catheter and has had to have it changed 3 times this month due to sedimentation. Patient had cath changed today and Stanford bag is currently draining but there was concern due to multiple cathing secondary to sediment in the last month and came to the ED for evaluation. Patient denies any urinary retention, abdominal pain, or fever. The patient has no other acute complaints at this time. REVIEW OF SYSTEMS   Review of Systems   Constitutional: Negative for fatigue and fever. HENT: Negative for ear pain, rhinorrhea and sore throat. Respiratory: Negative for cough, shortness of breath and wheezing. Cardiovascular: Negative for chest pain, palpitations and leg swelling. Gastrointestinal: Negative for abdominal distention, constipation, diarrhea, nausea and vomiting. Endocrine: Negative for polydipsia and polyuria. Genitourinary: Negative for difficulty urinating and dysuria. Stanford catheter in place draining    Musculoskeletal: Negative for arthralgias. Skin: Negative for color change, pallor and rash. Neurological: Negative for dizziness, seizures, syncope, weakness and numbness.          PAST MEDICAL AND SURGICAL HISTORY     Past Medical History:   Diagnosis Date    Arthritis     Hyperlipidemia     Hypertension     Intra-abdominal lymphadenopathy     [facility] [address]     Pancreatitis  Pneumonia     Seizures ([facility] [address]     UTI (urinary tract infection)      Past Surgical History:   Procedure Laterality Date    CHOLECYSTECTOMY  April 1st 2016    laparoscopic    CYSTOSCOPY N/A 1/2/2019    CYSTO, CLOT EVACUATION, TURBT performed by Efraín Marks MD at [facility] Left 11/6/2020    LEFT HEEL WOUND I&D performed by Inga Boyce DPM at [address] [place] [address] Right 12/17/2018    EYE CATARACT EMULSIFICATION IOL IMPLANT, RIGHT [facility] L[facility]OST[facility] W/O ECP Left 11/15/2018    EYE CATARACT EMULSIFICATION IOL IMPLANT LEFT EYE performed by Cindy Mckenzie MD at [facility]   No current facility-administered medications for this encounter. Current Outpatient Medications:     cephALEXin (KEFLEX) 500 MG capsule, Take 1 capsule by mouth 2 times daily for 7 days, Disp: 14 capsule, Rfl: 0    fluticasone (FLONASE) 50 MCG/ACT nasal spray, 2 sprays by Each Nostril route 2 times daily, Disp: 1 Bottle, Rfl: 3    zinc sulfate (ZINCATE) 220 (50 Zn) MG capsule, Take 1 capsule by mouth daily, Disp: 30 capsule, Rfl: 0    vitamin C (VITAMIN C) 500 MG tablet, Take 1 tablet by mouth daily, Disp: 30 tablet, Rfl: 0    potassium bicarb-citric acid (EFFER-K) 20 MEQ TBEF effervescent tablet, Take 1 tablet by mouth daily, Disp: 30 tablet, Rfl: 0    aspirin (RA ASPIRIN EC) 81 MG EC tablet, take 1 tablet by mouth once daily, Disp: 30 tablet, Rfl: 0    Methenamine-Sodium Salicylate (CYSTEX PO), Take by mouth, Disp: , Rfl:     acetic acid 0.25 % irrigation, Irrigate catheter with 150 ml weekly. , Disp: 1000 mL, Rfl: 5    methenamine (HIPREX) 1 g tablet, Take 1 tablet by mouth 2 times daily (with meals), Disp: 60 tablet, Rfl: 11    metoprolol tartrate (LOPRESSOR) 25 MG tablet, take 1/2 tablet by mouth twice a day, Disp: 30 tablet, Rfl: 11    Water For Irrigation, Sterile (STERILE WATER FOR IRRIGATION), Irrigate vega catheter with 50 ml of sterile water weekly, Disp: 250 mL, Rfl: 0    Incontinence Supplies (BEDSIDE DRAINAGE BAG) MISC, Large 2000 cc bedside drainage bag, Disp: 1 each, Rfl: 11    Incontinence Supplies (URINARY LEG BAG) MISC, 900 cc Tracy Urinary catheter leg bag, Disp: 1 each, Rfl: 11    Incontinence Supplies (URINARY LEG BAG STRAPS) MISC, Leg bag straps to go with urinary catheter leg bag, Disp: 1 each, Rfl: 11    D-Mannose 500 MG CAPS, Take 1,500 mg by mouth daily, Disp: 90 capsule, Rfl: 5    Cholecalciferol (VITAMIN D3 PO), Take by mouth daily, Disp: , Rfl:     CRANBERRY PO, Take by mouth daily, Disp: , Rfl:     carBAMazepine (TEGRETOL) 100 MG chewable tablet, Take 100 mg by mouth 3 times daily , Disp: , Rfl:       SOCIAL HISTORY     Social History     Social History Narrative    Not on file     Social History     Tobacco Use    Smoking status: Former Smoker     Packs/day: 2.00     Years: 40.00     Pack years: 80.00     Types: Cigarettes     Quit date: 10/2/2013     Years since quittin.0    Smokeless tobacco: Former User     Quit date: 11/3/2015   Vaping Use    Vaping Use: Former    Substances: Unknown   Substance Use Topics    Alcohol use: No    Drug use: No         ALLERGIES   No Known Allergies      FAMILY HISTORY     Family History   Problem Relation Age of Onset    Cancer Mother         colon    Heart Disease Father     Diabetes Neg Hx     High Blood Pressure Neg Hx     Stroke Neg Hx          PREVIOUS RECORDS   Previous records reviewed: Today    PHYSICAL EXAM     ED Triage Vitals   BP Temp Temp Source Pulse Resp SpO2 Height Weight   10/27/21 1744 10/27/21 1456 10/27/21 1456 10/27/21 1456 10/27/21 1456 10/27/21 1456 10/27/21 1456 10/27/21 1456   116/67 97.8 °F (36.6 °C) Oral 71 18 95 % 5' 3\" (1.6 m) 115 lb (52.2 kg)     Initial vital signs and nursing assessment reviewed and normal. Body mass index is 20.37 kg/m². Pulsoximetry is normal per my interpretation. Additional Vital Signs:  Vitals:    10/27/21 1744   BP: 116/67   Pulse: 82   Resp: 20   Temp:    SpO2: 97%       Physical Exam  Constitutional:       Appearance: Normal appearance. HENT:      Head: Normocephalic. Right Ear: External ear normal.      Left Ear: External ear normal.      Nose: Nose normal.      Mouth/Throat:      Mouth: Mucous membranes are moist.      Pharynx: Oropharynx is clear. Eyes:      Extraocular Movements: Extraocular movements intact. Conjunctiva/sclera: Conjunctivae normal.      Pupils: Pupils are equal, round, and reactive to light. Cardiovascular:      Rate and Rhythm: Normal rate and regular rhythm. Pulses: Normal pulses. Heart sounds: Normal heart sounds. Pulmonary:      Effort: Pulmonary effort is normal.      Breath sounds: Normal breath sounds. Abdominal:      General: Bowel sounds are normal.      Palpations: Abdomen is soft. Genitourinary:     Comments: Stanford catheter in place draining clear yellow urine. No sediment noted. External pelvic exam showed no acute abnormality or discharge. Musculoskeletal:         General: Normal range of motion. Cervical back: Normal range of motion and neck supple. Skin:     General: Skin is warm and dry. Capillary Refill: Capillary refill takes less than 2 seconds. Neurological:      General: No focal deficit present. Mental Status: She is alert and oriented to person, place, and time. MEDICAL DECISION MAKING   Initial Assessment:   Patient is a 68-year-old female who presents today with concerns regarding Stanford catheter. Patient has had to have Stanford catheter changed x3 in the last month due to urinary sedimentation. Patient states that Stanford was changed today and is actively draining like normal but she came in due to concerns of having to be changed so frequently.   Patient exam shows no acute abnormality has no abdominal tenderness external pelvic exam shows no acute abnormality and Stanford catheter is draining clear yellow urine. Patient differential diagnosis includes but is not limited to UTI, urinary retention, candidiasis, and complications secondary to Stanford catheter. Plan: Will order urinalysis and urine culture at this time. Monitor urine output. ED RESULTS   Laboratory results:  Labs Reviewed   URINALYSIS WITH MICROSCOPIC - Abnormal; Notable for the following components:       Result Value    Blood, Urine LARGE (*)     Protein, UA 30 (*)     Leukocyte Esterase, Urine LARGE (*)     Character, Urine CLOUDY (*)     All other components within normal limits   CULTURE, URINE       Radiologic studies results:  No orders to display       ED Medications administered this visit: Medications - No data to display      ED COURSE      Patient urinalysis was consistent with a UTI patient will be started on antibiotic therapy and urine culture sent. Spoke to urology and they stated patient to call the next business day for follow-up appointment. Strict return precautions and follow up instructions were discussed with the patient prior to discharge, with which the patient agrees. MEDICATION CHANGES     New Prescriptions    CEPHALEXIN (KEFLEX) 500 MG CAPSULE    Take 1 capsule by mouth 2 times daily for 7 days         FINAL DISPOSITION     Final diagnoses:   Urinary tract infection without hematuria, site unspecified     Condition: condition: good  Dispo: Discharge to home      This transcription was electronically signed. Parts of this transcriptions may have been dictated by use of voice recognition software and electronically transcribed, and parts may have been transcribed with the assistance of an ED scribe. The transcription may contain errors not detected in proofreading. Please refer to my supervising physician's documentation if my documentation differs.     Electronically Signed: Justine Summers MD, 10/27/21, 7:20 PM       Kimmie Roblero MD  Resident  10/27/21 0676

## 2021-10-27 NOTE — ED NOTES
Pt laying in bed at this time and denies any needs at this time. Pt denies pain and respers regular at this time. Call light within reach.       Lizzette Torres RN  10/27/21 3748

## 2021-10-27 NOTE — ED TRIAGE NOTES
Pt comes to ED by [address] [place] [address] Dr EMS. Pt has vega catheter. Pt had a vega catheter change this morning because home health nurse could not irrigate it. Home health nurse changed the catheter and is working now but this is the 3rd time they have had to change the catheter this month because it wouldn't irrigate. Pt is coming to ED to be evaluated. PT denies any pain. Pt has hx of UTI and has a UTI now and is being treated for it. respers regular and no distress noted. Pt denies any needs at this time. Assessment completed.

## 2021-10-27 NOTE — TELEPHONE ENCOUNTER
Elysiabrie Duran from [employer] is calling in stating patient has been catheter problems over the last several weeks. They have had to irrigate it 3 times due to it being occluded. Today Elysia Duran changed it because the aid stated it was not draining and she could not irrigate it. Elysia Duran was able to change it and did state that the catheter was occluded with mucus and there was a lot of sediment. Elysia Delucapancho stated that once catheter was change there was output of 75 cc she was able to irrigate with new catheter. Elysia Delucapancho states that patient does not have much output during the day at all only 400 cc out in the last 24 hours. Elysia Roger would like a call on what she needs to do collect urine to send for culture, does patient need an office visit, bigger catheter, irrigate more often. Please advise.

## 2021-10-27 NOTE — TELEPHONE ENCOUNTER
I called and spoke with [place] with [facility] stated that she just off the phone with Dr Negar Palmer and he advised the patient to go to the ER. Patient is on her way to the [facility] ER. Per Highland, patient currently has a 18Fr catheter and they are irrigating with acetic acid one to two times a day. Yola Patches that the patient will need an appt with our office after ER visit.

## 2021-10-27 NOTE — ED NOTES
Bed: 039A  Expected date:   Expected time:   Means of arrival: [facility]  Comments:     Martha Biggs, CLARY  10/27/21 9409

## 2021-10-28 ENCOUNTER — TELEPHONE (OUTPATIENT)
Dept: WOUND CARE | Age: 71
End: 2021-10-28

## 2021-10-28 NOTE — TELEPHONE ENCOUNTER
Rigo Fernandez from [facility] called regarding patients wound care dressings. Spoke with Eleanor Worley. Okay to use silicone bordered foam to buttocks wound. Also okay to use skin prep to heel as wound is not open. Seth Chen understanding and has no new concerns at this time.

## 2021-10-28 NOTE — ED PROVIDER NOTES
[facility]  EMERGENCY MEDICINE ATTENDING ATTESTATION      Evaluation of Roger Becker. Case discussed and care plan developed with resident physician. I agree with the resident physician documentation and plan as documented by her, except if my documentation differs. Patient seen, interviewed and examined by me. I reviewed the medical, surgical, family and social history, medications and allergies. I have reviewed the nursing documentation. I have reviewed the patient's vital signs and are normal per my interpretation. Body mass index is 20.37 kg/m². Pulsoxymetry is normal per my interpretation. Brief H&P   Patient c/o sediment in Stanford catheter, difficulty flushing Stanford catheter prior to it being exchanged, recurrent UTI for which patient is on antibiotics    Physical exam is notable for well appearing, no abdominal tenderness, no signs of sepsis. Patient overall well-appearing      Medical Decision Making   MDM:   Patient is a 68-year-old female with a history of chronic Stanford who presents with sediment in Stanford catheter prior to it being changed and concern for recurrent UTI. Patient has no systemic signs of infection at this time and Stanford is draining without difficulty. UA and urine culture were sent. Patient case was discussed with urology who recommends urine culture and starting patient on Keflex. Plan:    Follow-up with urology outpatient   Return precautions    Please see the resident physician completed note for final disposition except as documented on this attestation. I have reviewed and interpreted all available lab, radiology and ekg results available at the moment. Diagnosis, treatment and disposition plans were discussed and agreed upon by patient. This transcription was electronically signed. It was dictated by use of voice recognition software and electronically transcribed.  The transcription may contain errors not detected in proofreading.      I performed direct supervision and was present for the critical portion following procedures: None  Critical care time on this case: None    Electronically signed by Kyler Cochran MD on 10/27/21 at 11:04 PM EDT          Kyler Cochran MD  10/27/21 8856

## 2021-10-30 LAB
ORGANISM: ABNORMAL
ORGANISM: ABNORMAL
URINE CULTURE, ROUTINE: ABNORMAL
URINE CULTURE, ROUTINE: ABNORMAL

## 2021-11-01 NOTE — PROGRESS NOTES
Pharmacy Note  ED Culture Follow-up    Sunny Ralph is a 65 y.o. female. Allergies: Patient has no known allergies. Labs:  Lab Results   Component Value Date    BUN 7 12/02/2020    CREATININE 0.4 12/02/2020    WBC 5.3 12/02/2020     CrCl cannot be calculated (Patient's most recent lab result is older than the maximum 10 days allowed. ). Current antimicrobials:   Cephalexin    ASSESSMENT:  Micro results:   Urine culture growing Proteus mirabilis (10-50k) and Enterococcus faecalis (10-50k)     PLAN:  Need for intervention: No 2/2 patient has a vega in place so colonization is suspected. Will leave the cephalexin which will cover the Proteus mirabilis for this patient. The patient's vega was changed in ED. Discussed with: PETTY Quinones  Chosen treatment:    No treatment indicated    Patient response:   No need to contact patient    Called/sent in prescription to: Not applicable    Please call with any questions.  [address]    Karmen Grimaldo [facility] [place], PharmD 8:28 PM 10/31/2021

## 2021-11-15 ENCOUNTER — TELEPHONE (OUTPATIENT)
Dept: UROLOGY | Age: 71
End: 2021-11-15

## 2021-11-15 DIAGNOSIS — N39.0 RECURRENT UTI: Primary | ICD-10-CM

## 2021-11-15 NOTE — TELEPHONE ENCOUNTER
[place] at [facility] [phone] stated the vega has a lot of mucus in it and hard to irrigate. She has VV scheduled tomorrow with Bridget Barry CNP. It was exchanged around 3 weeks ago and finished antibiotics around 1-2 weeks ago for infection. She denies fever, chills, or pain. Should they exchange the catheter early and collect a urine. Orders can be faxed [phone]. Please advise. Thank you.

## 2021-11-15 NOTE — TELEPHONE ENCOUNTER
Dr Valentino Pimenta is ordering daily acetic acid irrigations 50 ml. Maribeth Marin at [facility] voiced understanding to exchange the catheter and send a urine specimen to the lab from the new catheter. Orders faxed to [phone].

## 2021-11-16 ENCOUNTER — TELEPHONE (OUTPATIENT)
Dept: UROLOGY | Age: 71
End: 2021-11-16

## 2021-11-16 ENCOUNTER — VIRTUAL VISIT (OUTPATIENT)
Dept: UROLOGY | Age: 71
End: 2021-11-16
Payer: MEDICARE

## 2021-11-16 DIAGNOSIS — N31.9 NEUROGENIC BLADDER: Primary | ICD-10-CM

## 2021-11-16 PROCEDURE — 99441 PR PHYS/QHP TELEPHONE EVALUATION 5-10 MIN: CPT | Performed by: NURSE PRACTITIONER

## 2021-11-16 RX ORDER — MAGNESIUM HYDROXIDE 1200 MG/15ML
LIQUID ORAL
Qty: 1000 ML | Refills: 5 | Status: ON HOLD | OUTPATIENT
Start: 2021-11-16 | End: 2022-10-21

## 2021-11-16 NOTE — TELEPHONE ENCOUNTER
No cysto in office. discuss sp catheter, and then if physician schedules SP catheter placement,, will be done in OR.

## 2021-11-16 NOTE — PROGRESS NOTES
Ernesto Tamayo is a 65 y.o. female evaluated via telephone on 11/16/2021. Consent:  She and/or health care decision maker is aware that that she may receive a bill for this telephone service, depending on her insurance coverage, and has provided verbal consent to proceed: Yes      Documentation:  I communicated with the patient and/or health care decision maker about chronic catheter. Details of this discussion including any medical advice provided:   [facility] Chilo Wong, [facility], Kolton Newell. Ernesto Tamayo is a 68 y.o. with past medical history as listed below who presents today in follow-up for gross hematuria and recurrent UTIs.      Last seen in January of 2020 in office, no follow up on file. Per notes, PCP was taking care of catheter care. Now having more problems with clogged catheters and encrustation/ mucous. She has 18 fr catheter in, HH was exchanging every 4 weeks, needing to exchange more often now. Doing acetic acid irrigations 2 times weekly. Last urine culture with Proteus, and Enterococcus faecalis, treated with Keflex. No fevers, or chills. Patient has history of MS, she is wheelchair bound. Patient's significant other cares for her, and does transfers from bed to chair. She was experiencing urinary incontinence, retention and pressure ulcers. She elected to have chronic catheter in place. She has trouble swallowing, and therefore hydration is likely an issue. She needs thickened liquids but she doesn't like the thickener.     Historically, patient underwent Cystourethroscopy with clot evacuation from bladder per Dr. Priyanka Galan on 1-2-19. Cystoscopy showed acutely inflamed bladder consistent with UTI, extensive trabeculation and cellulization of bladder.      Interested in [address] catheter placement. No abdominal surgeries. Had gallbladder removed. No recent imaging done, will get Renal US and KUB. FU with physician to discuss SP catheter, possible cystoscopy.    to irrigate with sterile water 2 times weekly. Per [facility] nurse, urine is clear today with sediment. I affirm this is a Patient Initiated Episode with a Patient who has not had a related appointment within my department in the past 7 days or scheduled within the next 24 hours. Patient identification    on was verified at the start of the visit: Yes    Total Time: minutes: 5-10 minutes    The visit was conducted pursuant to the emergency declaration under the [address] [place] [address], [address] [place] [address] authority and the [employer] Act. Patient identification was verified, and a caregiver was present when appropriate. The patient was located in a state where the provider was credentialed to provide care.     Note: not billable if this call serves to triage the patient into an appointment for the relevant concern      Es Jerry, APRN - CNP

## 2021-11-16 NOTE — TELEPHONE ENCOUNTER
Patient scheduled for US RENAL COMPLETE  at [facility]  on 12/6/21 ARRIVAL OF  12:15 PM FOR A 12:30 PM SCAN TIME WITH NO CARBONATED BEVERAGES THE DAY OF AND ARRIVE WELL  HYDERATED. Patient advised of instructions.   Order mailed/given to patient

## 2021-11-20 ENCOUNTER — TELEPHONE (OUTPATIENT)
Dept: UROLOGY | Age: 71
End: 2021-11-20

## 2021-11-20 RX ORDER — CIPROFLOXACIN 500 MG/1
500 TABLET, FILM COATED ORAL 2 TIMES DAILY
Qty: 28 TABLET | Refills: 0 | Status: SHIPPED | OUTPATIENT
Start: 2021-11-20 | End: 2021-11-20

## 2021-11-20 RX ORDER — CIPROFLOXACIN 500 MG/5ML
500 KIT ORAL 2 TIMES DAILY
Qty: 140 ML | Refills: 0 | Status: SHIPPED | OUTPATIENT
Start: 2021-11-20 | End: 2021-12-04

## 2021-11-20 NOTE — TELEPHONE ENCOUNTER
Therese Vela RN with home health called after hours line. Pt's catheter is out and she is having bladder spasms. Order provided to re-insert 18 Bulgarian with 5 ml balloon vega catheter. RN reports pt's urine culture with Proteus mirabilis and E coli. Performed at [facility] labs from sample collected 11/16/21. Proteus sensitive to Cipro per RN report. No sensitivity yet on E coli per RN. Start Cipro empirically. Prescription sent to [facility] [address]

## 2021-11-20 NOTE — TELEPHONE ENCOUNTER
Notified by pharmacy that family/pt decline oral tablets. New script for suspension sent to pharmacy.

## 2021-11-22 ENCOUNTER — TELEPHONE (OUTPATIENT)
Dept: UROLOGY | Age: 71
End: 2021-11-22

## 2021-11-22 RX ORDER — ACETIC ACID 0.25 G/100ML
IRRIGANT IRRIGATION
Qty: 1000 ML | Refills: 5 | Status: SHIPPED | OUTPATIENT
Start: 2021-11-22

## 2021-11-22 NOTE — TELEPHONE ENCOUNTER
Ollie Meraz from [facility] [phone] states patient needs a prescription for acetic acid irrigations sent to [facility]. They were doing extra irrigations as ordered by PCP so they ran out. They are currently irrigating BID with 50 ml. Please advise. Thank you.

## 2021-11-23 ENCOUNTER — TELEPHONE (OUTPATIENT)
Dept: UROLOGY | Age: 71
End: 2021-11-23

## 2021-11-23 NOTE — TELEPHONE ENCOUNTER
----- Message from BROOK Suarez CNP sent at 11/23/2021  7:28 AM EST -----  Do we have sensitivities to E coli??    ----- Message -----  From: Mallorie Esposito  Sent: 11/23/2021   6:32 AM EST  To: BROOK Suarez CNP

## 2021-11-24 RX ORDER — CEFDINIR 300 MG/1
300 CAPSULE ORAL 2 TIMES DAILY
Qty: 20 CAPSULE | Refills: 0 | Status: SHIPPED | OUTPATIENT
Start: 2021-11-24 | End: 2021-11-24 | Stop reason: ALTCHOICE

## 2021-11-24 NOTE — TELEPHONE ENCOUNTER
Patient advised to take the cipro until completed. She voiced understanding. [facility] advised to cancel omnicef.

## 2021-11-24 NOTE — TELEPHONE ENCOUNTER
Patient advised of the urine results and omnicef was ordered. She is on cipro 500 mg BID for 14 days. Does she need both antibiotics? Please advise.  Thank you

## 2021-12-02 ENCOUNTER — HOSPITAL ENCOUNTER (OUTPATIENT)
Dept: WOUND CARE | Age: 71
Discharge: HOME | End: 2021-12-02
Payer: MEDICARE

## 2021-12-02 DIAGNOSIS — L89.313 PRESSURE INJURY OF RIGHT ISCHIUM, STAGE 3 (HCC): ICD-10-CM

## 2021-12-02 PROCEDURE — 99212 OFFICE O/P EST SF 10 MIN: CPT | Performed by: NURSE PRACTITIONER

## 2021-12-02 PROCEDURE — 99212 OFFICE O/P EST SF 10 MIN: CPT

## 2021-12-02 NOTE — PROGRESS NOTES
Virtual Visit Wound Care  Clinic Level of Care   NAME:  Lokesh Mckeon OF BIRTH:  1950 GENDER: female  MEDICAL RECORD NUMBER:  [id number]   DATE:  12/2/2021     Patient Type Points   No documentation completed by nursing staff. []   0   Nursing staff documented in the navigator for an ESTABLISHED patient including Episode, Patient ID, Chief Complaint, Travel Screen, Allergies, Latex Allergy, Home Medication, History, Psychosocial Screen, C-SSRS Screen, Fall Risk, Nutritional Screen, Advanced Directive, Education and Plan of Care, and Discharge Instructions. The Functional Screening tab is only required if the patient's status changes. [x]   1   Nursing staff documented in the navigator for a NEW patient including Patient ID, Chief Complaint, Travel Screen, Allergies, Latex Allergy, Home Medication, History, Psychosocial Screen, C-SSRS Screen, Fall Risk, Nutritional Screen, Advanced Directive, Functional Screen, Education and Plan of Care, and Discharge Instructions. []   2   Nursing staff documented in the navigator for a CONSULT patient including Episode, Patient ID, Chief Complaint, Travel Screen, Allergies, Latex Allergy, Home Medication, History, Psychosocial Screen, C-SSRS Screen, Fall Risk, Nutritional Screen, Advanced Directive, Functional Screen, Education and Plan of Care, and Discharge Instructions. []   2     Wound Description Points   Unable to obtain image of Wound. For example, patient/caregiver is instructed not to remove dressing, is unable to correctly position smart phone, no smart phone is available, patient is unable to maintain connectivity or the patient's wound is healed. []   0   1-3 wound images annotated. Images of the wound(s) is obtained and annotated along with completed description in [address] [place] [address]. [x]   1   4-5 wound images annotated. Images of the wound(s) is obtained and annotated along with completed description in [address] [place] [address]. []   2   Greater than 6 wound images annotated.  Images of the wound(s) is obtained and annotated along with completed description in [address] [place] [address]. []   3     Education Points   No Education completed by nursing staff.    []   0   Patient/caregiver is educated on 1-4 topics. Nursing staff identifies learner, confirms understanding of information (verbal, demonstration, written) and documents details. May include Discharge Instructions/AVS, available documents in My Chart, or Web-based learning. [x]   1   Patient/caregiver is educated on 5-9 topics. Nursing staff identifies learner, confirms understanding of information (verbal, demonstration, written) and documents details. May include Discharge Instructions/AVS, available documents in My Chart, or Web-based learning. []   2   Patient/caregiver is educated on 10 or more topics. Nursing staff identifies learner, confirms understanding of information (verbal, demonstration, written) and documents details. May include Discharge Instructions/AVS, available documents in My Chart, or Web-based learning. []   3     Follow-up Virtual Visit Points   No contact with outside resources made. []   0   Nursing staff contacts 1-2 outside resource. For example, telephone call made to home health, primary care provider, pharmacy, or DME. May include filling out forms and writing letters, arranging transportation, communication with insurance , vendors, etc.  Discharge, instructions and/or After Visit Summary given to patient/caregiver and instructions completed. [x]   1   Nursing staff contacts 3-4 outside resource. For example, telephone calls made to home health, primary care provider, pharmacy, or DME. May include filling out forms and writing letters, arranging transportation, communication with insurance , vendors, etc.  Discharge, instructions and/or After Visit Summary given to patient/caregiver and instructions completed. []   2   Nursing contacts 5 or more outside resource.  For example, telephone calls made to home health, primary care providers, pharmacy, or DME. May include filling out forms and writing letters, arranging transportation, communication with insurance , vendors, etc.  Discharge, instructions and/or After Visit Summary given to patient/caregiver and instructions completed.    []   3     Is this the Patient's First Visit to the [address] [place] [address] Road  No    Is this Patient Established @ [facility]  Yes             Virtual Visit Clinical Level of Care Wound Care      Points  1-3  Level 1 []     Points  4-5  Level 2 [x]     Points  6-7  Level 3 []     Points  8-9  Level 4 []     Points  10-11  Level 5 []       Electronically signed by Aristeo Lacey RN on 12/2/2021 at 1:37 PM

## 2021-12-02 NOTE — PROGRESS NOTES
Virtual Visit Via [employer]   Progress Note and Procedure Note    [address] RECORD NUMBER:  [id number]  AGE: 66 y.o. GENDER: female  : 1950  EPISODE DATE:  2021    Subjective:     Chief Complaint   Patient presents with    Wound Check     Coccyx,buttocks,heel        Consent obtained to communicate via Virtual Visit:  Yes     HISTORY of Tanna Noun HPI     Sanjeev Moreira is a 66 y.o. female who presents today via Virtual Visit for re-evaluation of left heel, right ischium and coccyx wounds.      History of Wound Context:   Patient states that wounds have been present for some time.  Patient denies pain to wounds.  Current wound care includes triad to coccyx and ischial wounds. Betadine paint to left heel. Home health nurse states coccyx and heel wounds have healed, wound care has been discontinued. Patient has history of MS, is mostly bedbound, has phillip lift at home for transfers.  Patient notes that  does slide her in her chair for repositioning and pulls phillip out from underneath her after use, sliding against her bottom.  Patient has chronic vega catheter, does have chronic leakage per her report. Beny Holder states that she has \"questionable\" control of her bowels.  States that  sits her upright on stool when she needs to have a bowel movement.  She wipes herself but states that she frequently is unable to completely clean herself.   Current with home health, has aides that come several days weekly,  is primary caregiver. Beny Holder states that she does not turn at home.  Recently obtained low air loss mattress.  She denies any fever or chills.  She denies any further needs or concerns     Ulcer Identification:  Ulcer Type: pressure  Contributing Factors: chronic pressure, decreased mobility, shear force, incontinence of stool and incontinence of urine     Depth of Diabetic/Pressure/Non Pressure Ulcers or Wound:  Pressure ulcer, stage 3  Pressure ulcer, stage 2    PAST MEDICAL HISTORY        Diagnosis Date    Arthritis     Hyperlipidemia     Hypertension     Intra-abdominal lymphadenopathy     MS (multiple sclerosis) (HCC)     Pancreatitis     Pneumonia     Seizures (HCC)     UTI (urinary tract infection)        PAST SURGICAL HISTORY    Past Surgical History:   Procedure Laterality Date    CHOLECYSTECTOMY  2016    laparoscopic    CYSTOSCOPY N/A 2019    CYSTO, CLOT EVACUATION, TURBT performed by Stefan Kaiser MD at [facility] Left 2020    LEFT HEEL WOUND I&D performed by David Smiley DPM at [address] Right 2018    EYE CATARACT EMULSIFICATION IOL IMPLANT, RIGHT performed by Yo Erickson MD at [address] [facility] INSJ IO LENS PROSTH W/O ECP Left 11/15/2018    EYE CATARACT EMULSIFICATION IOL IMPLANT LEFT EYE performed by Yo Erickson MD at [facility]    Family History   Problem Relation Age of Onset    Cancer Mother         colon    Heart Disease Father     Diabetes Neg Hx     High Blood Pressure Neg Hx     Stroke Neg Hx        SOCIAL HISTORY    Social History     Tobacco Use    Smoking status: Former Smoker     Packs/day: 2.00     Years: 40.00     Pack years: 80.00     Types: Cigarettes     Quit date: 10/2/2013     Years since quittin.1    Smokeless tobacco: Former User     Quit date: 11/3/2015   Vaping Use    Vaping Use: Former    Substances: Unknown   Substance Use Topics    Alcohol use: No    Drug use: No       ALLERGIES    No Known Allergies    MEDICATIONS    Current Outpatient Medications on File Prior to Encounter   Medication Sig Dispense Refill    acetic acid 0.25 % irrigation Irrigate as directed 1000 mL 5    ciprofloxacin (CIPRO) 500 MG/5ML (10%) suspension Take 5 mLs by mouth 2 times daily for 14 days 140 mL 0    Water For Irrigation, Sterile (STERILE WATER FOR IRRIGATION) Irrigate with 50 ml of sterile water twice a day. 1000 mL 5    fluticasone (FLONASE) 50 MCG/ACT nasal spray 2 sprays by Each Nostril route 2 times daily (Patient not taking: Reported on 11/16/2021) 1 Bottle 3    vitamin C (VITAMIN C) 500 MG tablet Take 1 tablet by mouth daily 30 tablet 0    potassium bicarb-citric acid (EFFER-K) 20 MEQ TBEF effervescent tablet Take 1 tablet by mouth daily 30 tablet 0    aspirin (RA ASPIRIN EC) 81 MG EC tablet take 1 tablet by mouth once daily 30 tablet 0    Methenamine-Sodium Salicylate (CYSTEX PO) Take by mouth (Patient not taking: Reported on 11/16/2021)      acetic acid 0.25 % irrigation Irrigate catheter with 150 ml weekly. 1000 mL 5    methenamine (HIPREX) 1 g tablet Take 1 tablet by mouth 2 times daily (with meals) 60 tablet 11    metoprolol tartrate (LOPRESSOR) 25 MG tablet take 1/2 tablet by mouth twice a day 30 tablet 11    Incontinence Supplies (BEDSIDE DRAINAGE BAG) MISC Large 2000 cc bedside drainage bag 1 each 11    Incontinence Supplies (URINARY LEG BAG) [facility] 900 cc [place] Urinary catheter leg bag 1 each 11    Incontinence Supplies (URINARY LEG BAG STRAPS) MISC Leg bag straps to go with urinary catheter leg bag 1 each 11    D-Mannose 500 MG CAPS Take 1,500 mg by mouth daily 90 capsule 5    Cholecalciferol (VITAMIN D3 PO) Take by mouth daily      CRANBERRY PO Take by mouth daily (Patient not taking: Reported on 11/16/2021)      carBAMazepine (TEGRETOL) 100 MG chewable tablet Take 100 mg by mouth 3 times daily        No current facility-administered medications on file prior to encounter. REVIEW OF SYSTEMS    Pertinent items are noted in HPI.     Objective:     PHYSICAL EXAM    General Appearance: in no acute distress and alert     Healing Stage 2 pressure injury to coccyx- Has healed      Healing stage 3 pressure injury to right ischium- Wound bed granular.  Scant drainage reported Periulcer skin is round.  - recommend getting a wheelchair through insurance so we can order a roho cushion  - continue to use offloading boot   - please try to have wounds uncovered and wound measurements at time of visit     Home Care: [facility]     Wound Location: coccyx, right buttock, left heel     Dressing orders:      1) Gather wound care supplies and arrange on clean table.      2) Wash your hands with soap and water or use alcohol based hand  for 20 seconds (sing \"Happy Birthday\" twice).    3) Cleanse wounds with normal saline or wound cleanser and gauze. Pat dry with clean gauze.    4) coccyx, perivaginal and other irritated areas- apply triad daily and as needed.      Right buttock- Apply Triad cream daily and as needed.      Left heel- Apply betadine daily.      Keep all dressings clean & dry.     Do not shower, take baths or get wound wet, unless otherwise instructed by your Wound Care doctor.      Follow up visit:   4 Weeks virtual visit with [facility]         *Call with any issues or concerns or if you think patient needs to be seen in person.       Keep next scheduled appointment. Please give 24 hour notice if unable to keep appointment. [phone]     If you experience any of the following, please call the Wound Care Service during business hours: Monday through Friday 8:00 am - 4:30 pm  ([phone]).             *Increase in pain              *Temperature over 101              *Increase in drainage from your wound or a foul odor              *Uncontrolled swelling              *Need for compression bandage changes due to slippage, breakthrough drainage     If you need medical attention outside of business hours, please       Malcolm Soler AGE: 66 y.o. GENDER: female  : 1950 being evaluated by a Virtual Visit (video visit) encounter to address concerns as mentioned above. A caregiver was present when appropriate.  Due to this being a TeleHealth encounter (During [facility] public health emergency), evaluation of the following organ systems was limited: Vitals/Constitutional/EENT/Resp/CV/GI//MS/Neuro/Skin/Heme-Lymph-Imm. Pursuant to the emergency declaration under the [address] [place] [address], [address] [place] [address] authority and the [employer] Act, this Virtual Visit was conducted with patient's (and/or legal guardian's) consent, to reduce the patient's risk of exposure to COVID-19 and provide necessary medical care. The patient (and/or legal guardian) has also been advised to contact this office for worsening conditions or problems, and seek emergency medical treatment and/or call 911 if deemed necessary. Services were provided through a video synchronous discussion virtually to substitute for in-person clinic visit. Patient was located at their individual homes. Provider was located in [place] [address].     Electronically signed by BROOK Leal CNP on 12/2/2021 at 3:40 PM

## 2021-12-02 NOTE — PLAN OF CARE
Problem: Wound:  Goal: Will show signs of wound healing; wound closure and no evidence of infection  Description: Will show signs of wound healing; wound closure and no evidence of infection  Outcome: Ongoing   Pt. Seen today for right buttock and left heel see AVS for new orders. Follow up in 2 months. Care plan reviewed with patient and nurse. Patient and nurse verbalize understanding of the plan of care and contribute to goal setting.

## 2021-12-02 NOTE — PROGRESS NOTES
[facility], was evaluated through a synchronous (real-time) audio-video encounter. The patient (or guardian if applicable) is aware that this is a billable service. Verbal consent to proceed has been obtained within the past 12 months. The visit was conducted pursuant to the emergency declaration under the [address] [place] [address], [address] [place] [address] and the [employer] Act. Patient identification was verified, and a caregiver was present when appropriate. The patient was located in a state where the provider was credentialed to provide care. Total time spent for this encounter: 15 mins    --Marilou Lin RN on 12/2/2021 at 1:35 PM    An electronic signature was used to authenticate this note.

## 2021-12-06 ENCOUNTER — HOSPITAL ENCOUNTER (OUTPATIENT)
Dept: ULTRASOUND IMAGING | Age: 71
Discharge: HOME | End: 2021-12-06
Payer: MEDICARE

## 2021-12-06 DIAGNOSIS — N31.9 NEUROGENIC BLADDER: ICD-10-CM

## 2021-12-06 PROCEDURE — 76770 US EXAM ABDO BACK WALL COMP: CPT

## 2021-12-13 ENCOUNTER — OFFICE VISIT (OUTPATIENT)
Dept: UROLOGY | Age: 71
End: 2021-12-13
Payer: MEDICARE

## 2021-12-13 VITALS
WEIGHT: 140 LBS | SYSTOLIC BLOOD PRESSURE: 124 MMHG | HEIGHT: 63 IN | BODY MASS INDEX: 24.8 KG/M2 | DIASTOLIC BLOOD PRESSURE: 72 MMHG

## 2021-12-13 DIAGNOSIS — N39.0 RECURRENT UTI: ICD-10-CM

## 2021-12-13 DIAGNOSIS — N31.9 NEUROGENIC BLADDER: Primary | ICD-10-CM

## 2021-12-13 PROCEDURE — G8427 DOCREV CUR MEDS BY ELIG CLIN: HCPCS | Performed by: UROLOGY

## 2021-12-13 PROCEDURE — G8484 FLU IMMUNIZE NO ADMIN: HCPCS | Performed by: UROLOGY

## 2021-12-13 PROCEDURE — G8420 CALC BMI NORM PARAMETERS: HCPCS | Performed by: UROLOGY

## 2021-12-13 PROCEDURE — 99204 OFFICE O/P NEW MOD 45 MIN: CPT | Performed by: UROLOGY

## 2021-12-13 PROCEDURE — 1036F TOBACCO NON-USER: CPT | Performed by: UROLOGY

## 2021-12-13 PROCEDURE — 3017F COLORECTAL CA SCREEN DOC REV: CPT | Performed by: UROLOGY

## 2021-12-13 PROCEDURE — G8400 PT W/DXA NO RESULTS DOC: HCPCS | Performed by: UROLOGY

## 2021-12-13 PROCEDURE — 1090F PRES/ABSN URINE INCON ASSESS: CPT | Performed by: UROLOGY

## 2021-12-13 PROCEDURE — 4040F PNEUMOC VAC/ADMIN/RCVD: CPT | Performed by: UROLOGY

## 2021-12-13 PROCEDURE — 1123F ACP DISCUSS/DSCN MKR DOCD: CPT | Performed by: UROLOGY

## 2021-12-13 NOTE — PROGRESS NOTES
Dr. Cydney Trujillo MD MD  [facility] Consultation / New Patient Visit    Patient:  Pradeep Vora  YOB: 1950  Date: 12/13/2021  Consult requested from Estrella Mijares MD     HISTORY OF PRESENT ILLNESS:   The patient is a 66 y.o. female who presents today for follow-up for the following problem(s): Urinary retention, recurrent UTI  Overall the problem(s) : are worsening. Associated Symptoms: No dysuria, gross hematuria. Pain Severity:      Today visit:   12/13/21  Presents with history of NGB and urinary retention secondary to MS. She is having problems with the urethral catheter clogging and recurrent UTIs. She is interested in SPT. No abdominal surgeries. Meds: no ARCHIE    Summary of old records:   (Patient's old records, notes and chart reviewed and summarized above.)  Pradeep Vora is a 68 y.o. with past medical history as listed below who presents today in follow-up for gross hematuria and recurrent UTIs.      Last seen in January of 2020 in office, no follow up on file. Per notes, PCP was taking care of catheter care. Now having more problems with clogged catheters and encrustation/ mucous. She has 18 fr catheter in, HH was exchanging every 4 weeks, needing to exchange more often now. Doing acetic acid irrigations 2 times weekly. Last urine culture with Proteus, and Enterococcus faecalis, treated with Keflex. No fevers, or chills. Patient has history of MS, she is wheelchair bound. Patient's significant other cares for her, and does transfers from bed to chair. She was experiencing urinary incontinence, retention and pressure ulcers. She elected to have chronic catheter in place. She has trouble swallowing, and therefore hydration is likely an issue. She needs thickened liquids but she doesn't like the thickener.     Historically, patient underwent Cystourethroscopy with clot evacuation from bladder per Dr. Anabelle Waldrop on 1-2-19.  Cystoscopy showed acutely inflamed bladder consistent with UTI, extensive trabeculation and cellulization of bladder.      Interested in [place] catheter placement. No abdominal surgeries. Had gallbladder removed. No recent imaging done, will get Renal US and KUB. FU with physician to discuss SP catheter, possible cystoscopy.  to irrigate with sterile water 2 times weekly. Per [facility] nurse, urine is clear today with sediment. Urinalysis today:  No results found for this visit on 12/13/21.     Last BUN and creatinine:  Lab Results   Component Value Date    BUN 7 12/02/2020     Lab Results   Component Value Date    CREATININE 0.4 12/02/2020       Imaging Reviewed during this Office Visit:   (results were independently reviewed by physician and radiology report verified)    PAST MEDICAL, FAMILY AND SOCIAL HISTORY:  Past Medical History:   Diagnosis Date    Arthritis     Hyperlipidemia     Hypertension     Intra-abdominal lymphadenopathy     MS [facility]multiple sclerosis) ([facility]     Pancreatitis     Pneumonia     Seizures ([facility]     UTI (urinary tract infection)      Past Surgical History:   Procedure Laterality Date    CHOLECYSTECTOMY  April 1st 2016    laparoscopic    CYSTOSCOPY N/A 1/2/2019    CYSTO, CLOT EVACUATION, TURBT performed by Brea Hercules MD at [facility] Left 11/6/2020    LEFT HEEL WOUND I&D performed by Tanvi Han DPM at [address] Right 12/17/2018    EYE CATARACT EMULSIFICATION IOL IMPLANT, RIGHT performed by Emil Schilder, MD at [address] INS IO LENS PROSTH W/O ECP Left 11/15/2018    EYE CATARACT EMULSIFICATION IOL IMPLANT LEFT EYE performed by Emil Schilder, MD at [address] [facility]     Family History   Problem Relation Age of Onset    Cancer Mother         colon    Heart Disease Father     Diabetes Neg Hx     High Blood Pressure Neg Hx     Stroke Neg Hx      Outpatient Medications Marked as Taking for the 21 encounter (Office Visit) with Juvenal Clemente MD   Medication Sig Dispense Refill    acetic acid 0.25 % irrigation Irrigate as directed 1000 mL 5    Water For Irrigation, Sterile (STERILE WATER FOR IRRIGATION) Irrigate with 50 ml of sterile water twice a day. 1000 mL 5    fluticasone (FLONASE) 50 MCG/ACT nasal spray 2 sprays by Each Nostril route 2 times daily 1 Bottle 3    vitamin C (VITAMIN C) 500 MG tablet Take 1 tablet by mouth daily 30 tablet 0    potassium bicarb-citric acid (EFFER-K) 20 MEQ TBEF effervescent tablet Take 1 tablet by mouth daily 30 tablet 0    aspirin (RA ASPIRIN EC) 81 MG EC tablet take 1 tablet by mouth once daily 30 tablet 0    methenamine (HIPREX) 1 g tablet Take 1 tablet by mouth 2 times daily (with meals) 60 tablet 11    metoprolol tartrate (LOPRESSOR) 25 MG tablet take 1/2 tablet by mouth twice a day 30 tablet 11    Incontinence Supplies (BEDSIDE DRAINAGE BAG) MISC Large 2000 cc bedside drainage bag 1 each 11    Incontinence Supplies (URINARY LEG BAG STRAPS) MISC Leg bag straps to go with urinary catheter leg bag 1 each 11    Cholecalciferol (VITAMIN D3 PO) Take by mouth daily      carBAMazepine (TEGRETOL) 100 MG chewable tablet Take 100 mg by mouth 3 times daily          Patient has no known allergies.   Social History     Tobacco Use   Smoking Status Former Smoker    Packs/day: 2.00    Years: 40.00    Pack years: 80.00    Types: Cigarettes    Quit date: 10/2/2013    Years since quittin.2   Smokeless Tobacco Former User    Quit date: 11/3/2015       Social History     Substance and Sexual Activity   Alcohol Use No       REVIEW OF SYSTEMS:  Constitutional: negative  Eyes: negative  Respiratory: negative  Cardiovascular: negative  Gastrointestinal: negative  Genitourinary: negative  Musculoskeletal: negative  Skin: negative   Neurological: negative  Hematological/Lymphatic: negative  Psychological: negative    Physical Exam:    This a 66 y.o. female Vitals:    12/13/21 1013   BP: 124/72     Constitutional: Patient in no acute distress   Neuro: alert and oriented to person place and time. Psych: Mood and affect normal.  Head: atraumatic normocephalic  Eyes: EOMi  HEENT: neck supple, trachea midline  Lungs: Respiratory effort normal  Cardiovascular:  Normal peripheral pulses  Abdomen: Soft, non-tender, non-distended, No CVA  Bladder: non-tender and not distended. FROMx4, no cyanosis clubbing edema  Skin: warm and dry      Assessment and Plan      1. Neurogenic bladder    2. Recurrent UTI           Plan:      No follow-ups on file.   Cysto with SPT placement using Ultrasound  Needs Urine culture 1 week prior

## 2021-12-14 ENCOUNTER — TELEPHONE (OUTPATIENT)
Dept: UROLOGY | Age: 71
End: 2021-12-14

## 2021-12-14 DIAGNOSIS — Z01.818 PRE-OP TESTING: ICD-10-CM

## 2021-12-14 DIAGNOSIS — N39.0 RECURRENT UTI: ICD-10-CM

## 2021-12-14 DIAGNOSIS — N31.9 NEUROGENIC BLADDER: Primary | ICD-10-CM

## 2021-12-14 NOTE — TELEPHONE ENCOUNTER
SURGERY [address] [place] [address] [address] MONICA LORA AM OFFENEGG II.MARTI, [address]      Phone *[phone] *[phone]   Surgical Scheduling Direct Line Phone *[phone] Fax *[phone]      Mayito Blum 1950 female    [facility]   Marital Status: Life Partner         Home Phone: [phone]      Cell Phone:    Telephone Information:   Mobile [phone]          Surgeon: Dr. Hermann Priest Surgery Date: 1/17/22   Time: 11:00 am    Procedure: Cystoscopy, Suprapubic Catheter Placement with Ultrasound    Diagnosis: Neurogenic Bladder    Important Medical History:  In [facility]    Special Inst/Equip: JYOTI Porter in [facility] [facility] notified    CPT Codes:    [address]  Latex Allergy: No     Cardiac Device:  No    Anesthesia:  General          Admission Type:  Same Day                        Admit Prior to Day of Surgery: No    Case Location:  Main OR            Preadmission Testing:  Phone Call          PAT Date and Time:______________________________________________________    PAT Confirmation #: ______________________________________________________    Post Op Visit: ___________________________________________________________    Need Preop Cardiac Clearance: No    Does Patient have Cardiologist/physician?      None    Surgery Confirmation #: __________________________________________________    : ________________________   Date: __________________________     [employer] Name: Medicare

## 2021-12-14 NOTE — TELEPHONE ENCOUNTER
DO NOT TAKE ASPIRIN, PLAVIX, FISH OIL, COUMADIN, IBUPROFEN, MOTRIN-LIKE DRUGS AND ANY MULTIVITAMINS OR OVER THE COUNTER SUPPLEMENTS 5 DAYS PRIOR TO SURGERY. Malcolm Ryder 1950 Diagnosis:     Surgical Physician: Dr. Solomon Head have been scheduled for the procedure marked below:      Surgery: Cystoscopy, Placement of suprapubic catheter with ultrasound        Date: 1/17/22     Anesthesia: Anesthesiologist (General/Spinal)     Place of Service: [facility] Second Floor Same Day Surgery         Arrive to same day surgery by:  9:00 am  (Surgery time is subject to change)      INSTRUCTIONS AS MARKED BELOW:    1.  DO NOT eat or drink anything after midnight before surgery. 2.  We prefer you shower or bathe with an antibacterial soap (Dial) the morning of surgery. 3.  Please ensure to have a  with you to transport you home. 4.  Please bring a current medication list, photo ID and insurance card(s) with you  5. Okay to take Tylenol  6. If you take Glucophage, Metformin or Janumet, hold 48-hours prior to surgery  7. Take blood pressure or heart medication as directed, if taken in the morning take with a small sip of water  8. The office will call you in 1-2 days after your procedure to schedule a follow up. DATE SENSITIVE TESTING *WALK IN *NO APPOINTMENT    Do the pre op urine culture, fasting labs, chest xray and ekg on 1/3/22.  Orders included        Date: 12/14/2021

## 2021-12-20 ENCOUNTER — PREP FOR PROCEDURE (OUTPATIENT)
Dept: UROLOGY | Age: 71
End: 2021-12-20

## 2021-12-20 RX ORDER — SODIUM CHLORIDE 9 MG/ML
INJECTION, SOLUTION INTRAVENOUS CONTINUOUS
Status: CANCELLED | OUTPATIENT
Start: 2022-01-17

## 2021-12-22 ENCOUNTER — TELEPHONE (OUTPATIENT)
Dept: UROLOGY | Age: 71
End: 2021-12-22

## 2022-01-03 ENCOUNTER — HOSPITAL ENCOUNTER (OUTPATIENT)
Age: 72
Discharge: HOME | End: 2022-01-03
Payer: MEDICARE

## 2022-01-03 ENCOUNTER — HOSPITAL ENCOUNTER (OUTPATIENT)
Dept: GENERAL RADIOLOGY | Age: 72
Discharge: HOME | End: 2022-01-03
Payer: MEDICARE

## 2022-01-03 DIAGNOSIS — N31.9 NEUROGENIC BLADDER: ICD-10-CM

## 2022-01-03 DIAGNOSIS — Z01.818 PRE-OP TESTING: ICD-10-CM

## 2022-01-03 DIAGNOSIS — N39.0 RECURRENT UTI: ICD-10-CM

## 2022-01-03 LAB
ANION GAP SERPL CALCULATED.3IONS-SCNC: 10 MEQ/L (ref 8–16)
BASOPHILS # BLD: 1.1 %
BASOPHILS ABSOLUTE: 0 THOU/MM3 (ref 0–0.1)
BUN BLDV-MCNC: 16 MG/DL (ref 7–22)
CALCIUM SERPL-MCNC: 9.1 MG/DL (ref 8.5–10.5)
CHLORIDE BLD-SCNC: 106 MEQ/L (ref 98–111)
CO2: 25 MEQ/L (ref 23–33)
CREAT SERPL-MCNC: 0.5 MG/DL (ref 0.4–1.2)
EKG ATRIAL RATE: 83 BPM
EKG P AXIS: 80 DEGREES
EKG P-R INTERVAL: 124 MS
EKG Q-T INTERVAL: 372 MS
EKG QRS DURATION: 100 MS
EKG QTC CALCULATION (BAZETT): 437 MS
EKG R AXIS: -59 DEGREES
EKG T AXIS: 84 DEGREES
EKG VENTRICULAR RATE: 83 BPM
EOSINOPHIL # BLD: 4 %
EOSINOPHILS ABSOLUTE: 0.1 THOU/MM3 (ref 0–0.4)
ERYTHROCYTE [DISTWIDTH] IN BLOOD BY AUTOMATED COUNT: 12.9 % (ref 11.5–14.5)
ERYTHROCYTE [DISTWIDTH] IN BLOOD BY AUTOMATED COUNT: 49.8 FL (ref 35–45)
GFR SERPL CREATININE-BSD FRML MDRD: > 90 ML/MIN/1.73M2
GLUCOSE BLD-MCNC: 89 MG/DL (ref 70–108)
HCT VFR BLD CALC: 43.1 % (ref 37–47)
HEMOGLOBIN: 13.1 GM/DL (ref 12–16)
IMMATURE GRANS (ABS): 0.01 THOU/MM3 (ref 0–0.07)
IMMATURE GRANULOCYTES: 0.3 %
LYMPHOCYTES # BLD: 32.8 %
LYMPHOCYTES ABSOLUTE: 1.1 THOU/MM3 (ref 1–4.8)
MCH RBC QN AUTO: 31.7 PG (ref 26–33)
MCHC RBC AUTO-ENTMCNC: 30.4 GM/DL (ref 32.2–35.5)
MCV RBC AUTO: 104.4 FL (ref 81–99)
MONOCYTES # BLD: 7.8 %
MONOCYTES ABSOLUTE: 0.3 THOU/MM3 (ref 0.4–1.3)
NUCLEATED RED BLOOD CELLS: 0 /100 WBC
PLATELET # BLD: 109 THOU/MM3 (ref 130–400)
PMV BLD AUTO: 12.5 FL (ref 9.4–12.4)
POTASSIUM SERPL-SCNC: 4.1 MEQ/L (ref 3.5–5.2)
RBC # BLD: 4.13 MILL/MM3 (ref 4.2–5.4)
SEG NEUTROPHILS: 54 %
SEGMENTED NEUTROPHILS ABSOLUTE COUNT: 1.9 THOU/MM3 (ref 1.8–7.7)
SODIUM BLD-SCNC: 141 MEQ/L (ref 135–145)
WBC # BLD: 3.5 THOU/MM3 (ref 4.8–10.8)

## 2022-01-03 PROCEDURE — 93005 ELECTROCARDIOGRAM TRACING: CPT

## 2022-01-03 PROCEDURE — 87077 CULTURE AEROBIC IDENTIFY: CPT

## 2022-01-03 PROCEDURE — 85025 COMPLETE CBC W/AUTO DIFF WBC: CPT

## 2022-01-03 PROCEDURE — 87186 SC STD MICRODIL/AGAR DIL: CPT

## 2022-01-03 PROCEDURE — 71046 X-RAY EXAM CHEST 2 VIEWS: CPT

## 2022-01-03 PROCEDURE — 80048 BASIC METABOLIC PNL TOTAL CA: CPT

## 2022-01-03 PROCEDURE — 87086 URINE CULTURE/COLONY COUNT: CPT

## 2022-01-03 PROCEDURE — 93010 ELECTROCARDIOGRAM REPORT: CPT | Performed by: INTERNAL MEDICINE

## 2022-01-03 PROCEDURE — 36415 COLL VENOUS BLD VENIPUNCTURE: CPT

## 2022-01-07 ENCOUNTER — TELEPHONE (OUTPATIENT)
Dept: UROLOGY | Age: 72
End: 2022-01-07

## 2022-01-07 RX ORDER — CIPROFLOXACIN 500 MG/1
500 TABLET, FILM COATED ORAL 2 TIMES DAILY
Qty: 20 TABLET | Refills: 0 | Status: SHIPPED | OUTPATIENT
Start: 2022-01-07 | End: 2022-01-17

## 2022-01-12 ENCOUNTER — TELEPHONE (OUTPATIENT)
Dept: UROLOGY | Age: 72
End: 2022-01-12

## 2022-01-12 NOTE — PROGRESS NOTES
Following instructions given to patient, who states understanding:     NPO after midnight  May take very small amount of applesauce to take metoprolol and tegretol am of surgery  Bring insurance info and 's license  Wear comfortable clean clothing  Do not bring jewelry  Shower night before and morning of surgery with a liquid antibacterial soap  Bring medications in original bottles  Follow all instructions given by your physician   needed at discharge  Call [phone] for any questions  Report to [facility] on 2nd floor  If you would become ill prior to surgery, please call the surgeon  May have only 1 visitor accompany you for surgery  Please bring and wear mask  You will be receiving a phone call one or two days before surgery to review covid screening exposure    Patient has [facility] in [place] both nursing and aids.

## 2022-01-12 NOTE — TELEPHONE ENCOUNTER
Regarding Opal Tan ([birth year]) putting in a SP Cath on 1/17/22. Platelet count is 470. The PAT nurses wanted me to make you aware of this. Are you ok with proceeding?   Read 1/12/2022 1:20 PM  1/12/2022 1:20 PM  Yes, that should be fine  1/12/2022 1:27 PM  Thanks  Unread

## 2022-01-12 NOTE — PROGRESS NOTES
Dr. Yu Hernandez with anesthesia ok'd pt taking Tegretol and Metoprolol am of surgery with small amount of clear jello.

## 2022-01-17 ENCOUNTER — APPOINTMENT (OUTPATIENT)
Dept: ULTRASOUND IMAGING | Age: 72
End: 2022-01-17
Attending: UROLOGY
Payer: MEDICARE

## 2022-01-17 ENCOUNTER — HOSPITAL ENCOUNTER (OUTPATIENT)
Age: 72
Setting detail: OUTPATIENT SURGERY
Discharge: HOME | End: 2022-01-17
Attending: UROLOGY | Admitting: UROLOGY
Payer: MEDICARE

## 2022-01-17 ENCOUNTER — ANESTHESIA EVENT (OUTPATIENT)
Dept: OPERATING ROOM | Age: 72
End: 2022-01-17
Payer: MEDICARE

## 2022-01-17 ENCOUNTER — ANESTHESIA (OUTPATIENT)
Dept: OPERATING ROOM | Age: 72
End: 2022-01-17
Payer: MEDICARE

## 2022-01-17 VITALS — DIASTOLIC BLOOD PRESSURE: 56 MMHG | SYSTOLIC BLOOD PRESSURE: 96 MMHG | OXYGEN SATURATION: 100 %

## 2022-01-17 VITALS
BODY MASS INDEX: 22.06 KG/M2 | TEMPERATURE: 97.3 F | RESPIRATION RATE: 18 BRPM | WEIGHT: 124.5 LBS | HEART RATE: 64 BPM | SYSTOLIC BLOOD PRESSURE: 107 MMHG | OXYGEN SATURATION: 97 % | DIASTOLIC BLOOD PRESSURE: 56 MMHG | HEIGHT: 63 IN

## 2022-01-17 DIAGNOSIS — G89.18 POST-OP PAIN: Primary | ICD-10-CM

## 2022-01-17 PROCEDURE — 6360000002 HC RX W HCPCS: Performed by: ANESTHESIOLOGY

## 2022-01-17 PROCEDURE — 2709999900 HC NON-CHARGEABLE SUPPLY: Performed by: UROLOGY

## 2022-01-17 PROCEDURE — 87081 CULTURE SCREEN ONLY: CPT

## 2022-01-17 PROCEDURE — 7100000011 HC PHASE II RECOVERY - ADDTL 15 MIN: Performed by: UROLOGY

## 2022-01-17 PROCEDURE — 3700000000 HC ANESTHESIA ATTENDED CARE: Performed by: UROLOGY

## 2022-01-17 PROCEDURE — C1894 INTRO/SHEATH, NON-LASER: HCPCS | Performed by: UROLOGY

## 2022-01-17 PROCEDURE — 3600000003 HC SURGERY LEVEL 3 BASE: Performed by: UROLOGY

## 2022-01-17 PROCEDURE — 3700000001 HC ADD 15 MINUTES (ANESTHESIA): Performed by: UROLOGY

## 2022-01-17 PROCEDURE — 2500000003 HC RX 250 WO HCPCS: Performed by: UROLOGY

## 2022-01-17 PROCEDURE — 7100000010 HC PHASE II RECOVERY - FIRST 15 MIN: Performed by: UROLOGY

## 2022-01-17 PROCEDURE — 2580000003 HC RX 258: Performed by: ANESTHESIOLOGY

## 2022-01-17 PROCEDURE — 76998 US GUIDE INTRAOP: CPT

## 2022-01-17 PROCEDURE — 3600000013 HC SURGERY LEVEL 3 ADDTL 15MIN: Performed by: UROLOGY

## 2022-01-17 RX ORDER — ONDANSETRON 2 MG/ML
INJECTION INTRAMUSCULAR; INTRAVENOUS PRN
Status: DISCONTINUED | OUTPATIENT
Start: 2022-01-17 | End: 2022-01-17 | Stop reason: SDUPTHER

## 2022-01-17 RX ORDER — FENTANYL CITRATE 50 UG/ML
INJECTION, SOLUTION INTRAMUSCULAR; INTRAVENOUS PRN
Status: DISCONTINUED | OUTPATIENT
Start: 2022-01-17 | End: 2022-01-17 | Stop reason: SDUPTHER

## 2022-01-17 RX ORDER — TRAMADOL HYDROCHLORIDE 50 MG/1
50 TABLET ORAL EVERY 4 HOURS PRN
Qty: 12 TABLET | Refills: 0 | Status: SHIPPED | OUTPATIENT
Start: 2022-01-17 | End: 2022-01-20

## 2022-01-17 RX ORDER — CEFAZOLIN SODIUM 2 G/100ML
2000 INJECTION, SOLUTION INTRAVENOUS
Status: DISCONTINUED | OUTPATIENT
Start: 2022-01-17 | End: 2022-01-17 | Stop reason: HOSPADM

## 2022-01-17 RX ORDER — SODIUM CHLORIDE 9 MG/ML
INJECTION, SOLUTION INTRAVENOUS CONTINUOUS PRN
Status: DISCONTINUED | OUTPATIENT
Start: 2022-01-17 | End: 2022-01-17 | Stop reason: SDUPTHER

## 2022-01-17 RX ORDER — CEFAZOLIN SODIUM 1 G/3ML
INJECTION, POWDER, FOR SOLUTION INTRAMUSCULAR; INTRAVENOUS PRN
Status: DISCONTINUED | OUTPATIENT
Start: 2022-01-17 | End: 2022-01-17 | Stop reason: SDUPTHER

## 2022-01-17 RX ORDER — SODIUM CHLORIDE 9 MG/ML
INJECTION, SOLUTION INTRAVENOUS CONTINUOUS
Status: DISCONTINUED | OUTPATIENT
Start: 2022-01-17 | End: 2022-01-17 | Stop reason: HOSPADM

## 2022-01-17 RX ORDER — PROPOFOL 10 MG/ML
INJECTION, EMULSION INTRAVENOUS PRN
Status: DISCONTINUED | OUTPATIENT
Start: 2022-01-17 | End: 2022-01-17 | Stop reason: SDUPTHER

## 2022-01-17 RX ORDER — SULFAMETHOXAZOLE AND TRIMETHOPRIM 800; 160 MG/1; MG/1
1 TABLET ORAL 2 TIMES DAILY
Qty: 14 TABLET | Refills: 0 | Status: SHIPPED | OUTPATIENT
Start: 2022-01-17 | End: 2022-01-24

## 2022-01-17 RX ORDER — LIDOCAINE HYDROCHLORIDE 10 MG/ML
INJECTION, SOLUTION EPIDURAL; INFILTRATION; INTRACAUDAL; PERINEURAL PRN
Status: DISCONTINUED | OUTPATIENT
Start: 2022-01-17 | End: 2022-01-17 | Stop reason: ALTCHOICE

## 2022-01-17 RX ADMIN — PROPOFOL 20 MG: 10 INJECTION, EMULSION INTRAVENOUS at 10:57

## 2022-01-17 RX ADMIN — PROPOFOL 20 MG: 10 INJECTION, EMULSION INTRAVENOUS at 10:41

## 2022-01-17 RX ADMIN — SODIUM CHLORIDE: 9 INJECTION, SOLUTION INTRAVENOUS at 10:28

## 2022-01-17 RX ADMIN — FENTANYL CITRATE 50 MCG: 50 INJECTION, SOLUTION INTRAMUSCULAR; INTRAVENOUS at 10:37

## 2022-01-17 RX ADMIN — Medication 60 MG: at 10:37

## 2022-01-17 RX ADMIN — ONDANSETRON 4 MG: 2 INJECTION INTRAMUSCULAR; INTRAVENOUS at 10:54

## 2022-01-17 RX ADMIN — PROPOFOL 40 MG: 10 INJECTION, EMULSION INTRAVENOUS at 10:48

## 2022-01-17 RX ADMIN — CEFAZOLIN 1000 MG: 1 INJECTION, POWDER, FOR SOLUTION INTRAMUSCULAR; INTRAVENOUS at 10:52

## 2022-01-17 ASSESSMENT — PULMONARY FUNCTION TESTS
PIF_VALUE: 1

## 2022-01-17 ASSESSMENT — PAIN SCALES - GENERAL
PAINLEVEL_OUTOF10: 0
PAINLEVEL_OUTOF10: 0

## 2022-01-17 NOTE — PROGRESS NOTES
Pt returned to [facility] room 16. Vitals and assessment as charted. Pt has juice. Family at the bedside. Pt and family verbalized understanding of discharge criteria and call light use. Call light in reach.

## 2022-01-17 NOTE — PROGRESS NOTES
ADMITTED TO [facility] AND ORIENTED TO UNIT. SCDS ON. FALL BAND ON. PT VERBALIZED APPROVAL FOR FIRST NAME, LAST INITIAL AND PHYSICIAN NAME ON UNIT WHITEBOARD. Spouse Reagan with the patient. The patient was placed in bed with assistance from four staff members. Lex Rater lift will be use at discharge.

## 2022-01-17 NOTE — OP NOTE
FACILITY:  [address] [place] [address]  1950  [id number]    DATE: 1/17/22   SURGEON: Dr. Kevon Beebe MD , M.PABLO Webster Null: Dr. Kevon Beebe MD MD  PREOPERATIVE DIAGNOSIS:  NGB   POSTOPERATIVE DIAGNOSIS: same  OPERATION:  1. Cystoscopy suprapubic catheter placement. 2. Intraoperative ultrasound  ANESTHESIA:  General.   COMPLICATIONS:  None.   ESTIMATED BLOOD LOSS:  Minimal.  FLUIDS:  Crystalloid. DRAINS:  A 16 [place]an suprapubic Stanford catheter. SPECIMENS:  None.     INDICATIONS FOR THE PROCEDURE:  Vipin Post is a 66 y.o. female with a history of neurogenic bladder. After treatment options were discussed including risks, benefits, alternatives, goals and possible complications,  the patient elected to proceed with today's procedure.     NARRATIVE SUMMARY OF THE PROCEDURE:  After informed consent was reviewed in the preoperative area the patient was taken back to the operating room and transferred to the operating table in the supine position. EPC cuffs were placed and the machine was turned on. Anesthesia was induced and the antibiotics were started. Patient was placed in modified dorsal lithotomy position, sterilely prepped and draped in a standard fashion. We entered the urethra with the cystoscope and advanced into the bladder easily. We then used a spinal needle to project our trajectory of the trochar. We could see the needle going into the dome of the bladder. We then made a small skin incision using a 15 blade scalpel. Using a 16 fr Rouch trochar we then entered the bladder under direct visualization. The stylet was removed and the sheath was left in place and a 16 [place]an Stanford catheter was inserted through the sheath and the balloon was instilled with 10 mL of sterile water. The sheath was then broken down. The catheter was hooked up to gravity drainage. It was stitched in place using a 2-0 Nylon suture.  The patient was then cleaned off, awakened, extubated, and discharged back to the PACU in good and stable condition.     FOLLOW UP:  The patient will need to follow up in 1 month for suprapubic catheter exchange.     4-6 weeks for first catheter exchange

## 2022-01-17 NOTE — H&P
[facility]  Urology H&P Note     Patient:  Rahat See  MRN: [id number]  YOB: 1950    ATTENDING: Scar Boo MD     CHIEF COMPLAINT:  Neurogenic bladder    HISTORY OF PRESENT ILLNESS:   The patient is a 66 y.o. female who presents with neurogenic bladder, managed with indwelling vega catheter. She is here for SPT placement with ultrasound guidance. Risks benefits and alternative procedures are explained, informed consent is obtained, and the patient elects to proceed. Patient's old records, notes and chart reviewed and summarized above. Past Medical History:    Past Medical History:   Diagnosis Date    Arthritis     Difficulty in swallowing     Hyperlipidemia     Hypertension     Intra-abdominal lymphadenopathy     MS (multiple sclerosis) (HCC)     Pancreatitis     Pneumonia     Seizures (HCC)     UTI (urinary tract infection)        Past Surgical History:    Past Surgical History:   Procedure Laterality Date    CHOLECYSTECTOMY  April 1st 2016    laparoscopic    CYSTOSCOPY N/A 1/2/2019    CYSTO, CLOT EVACUATION, [facility]/6/2020    LEFT HEEL WOUND I&D performed by Javier Allen DPM at [facility] [address] Right 12/17/2018    EYE CATARACT EMULSIFICATION IOL IMPLANT, RIGHT performed by Akin Patrick MD at [address] INS IO LENS PROSTH W/O ECP Left 11/15/2018    EYE CATARACT EMULSIFICATION IOL IMPLANT LEFT EYE performed by Akin Patrick MD at [address] [place] [address]       Medications Prior to Admission:   Prior to Admission medications    Medication Sig Start Date End Date Taking?  Authorizing Provider   ciprofloxacin (CIPRO) 500 MG tablet Take 1 tablet by mouth 2 times daily for 10 days 1/7/22 1/17/22 Yes BROOK Mccann - CNP   acetic acid 0.25 % irrigation Irrigate as directed 21  Yes BROOK Stafford CNP   Water For Irrigation, Sterile (STERILE WATER FOR IRRIGATION) Irrigate with 50 ml of sterile water twice a day. 21  Yes BROOK Rodriguez CNP   fluticasone (FLONASE) 50 MCG/ACT nasal spray 2 sprays by Each Nostril route 2 times daily 20  Yes Lyle Cage MD   potassium bicarb-citric acid (EFFER-K) 20 MEQ TBEF effervescent tablet Take 1 tablet by mouth daily 20  Yes Lyle Cage MD   aspirin (RA ASPIRIN EC) 81 MG EC tablet take 1 tablet by mouth once daily 20  Yes Abida Grant MD   metoprolol tartrate (LOPRESSOR) 25 MG tablet take 1/2 tablet by mouth twice a day 9/3/19  Yes Cody Foss MD   Cholecalciferol (VITAMIN D3 PO) Take by mouth daily   Yes Historical Provider, MD   carBAMazepine (TEGRETOL) 100 MG chewable tablet Take 100 mg by mouth 3 times daily    Yes Historical Provider, MD       Allergies:  Patient has no known allergies.     Social History:    Social History     Socioeconomic History    Marital status: Life Partner     Spouse name: Not on file    Number of children: 2    Years of education: Not on file    Highest education level: Not on file   Occupational History    Not on file   Tobacco Use    Smoking status: Former Smoker     Packs/day: 2.00     Years: 40.00     Pack years: 80.00     Types: Cigarettes     Quit date: 10/2/2013     Years since quittin.2    Smokeless tobacco: Former User     Quit date: 11/3/2015   Vaping Use    Vaping Use: Former    Substances: Unknown   Substance and Sexual Activity    Alcohol use: No    Drug use: No    Sexual activity: Not Currently   Other Topics Concern    Not on file   Social History Narrative    Not on file     Social Determinants of Health     Financial Resource Strain:     Difficulty of Paying Living Expenses: Not on file   Food Insecurity:     Worried About [address] in the Last Year: Not on file    [address] in the Last Year: Not on file   Transportation Needs:     Lack of Transportation (Medical): Not on file    Lack of Transportation (Non-Medical): Not on file   Physical Activity:     Days of Exercise per Week: Not on file    Minutes of Exercise per Session: Not on file   Stress:     Feeling of Stress : Not on file   Social Connections:     Frequency of Communication with Friends and Family: Not on file    Frequency of Social Gatherings with Friends and Family: Not on file    Attends [employer] Services: Not on file    Active Member of [address] [place] [address] [employer] or Organizations: Not on file    Attends Club or Organization Meetings: Not on file    Marital Status: Not on file   Intimate Partner Violence:     Fear of Current or Ex-Partner: Not on file    Emotionally Abused: Not on file    Physically Abused: Not on file    Sexually Abused: Not on file   Housing Stability:     Unable to Pay for Housing in the Last Year: Not on file    Number of [place] in the Last Year: Not on file    Unstable Housing in the Last Year: Not on file       Family History:    Family History   Problem Relation Age of Onset    Cancer Mother         colon    Heart Disease Father     Diabetes Neg Hx     High Blood Pressure Neg Hx     Stroke Neg Hx        REVIEW OF SYSTEMS:  All systems reviewed and negative except for that already noted in the HPI. Physical Exam:      No data found. Constitutional: Patient in no acute distress; Neuro: alert and oriented to person place and time. Psych: Mood and affect normal.  Skin: Normal  Lungs: Respiratory effort normal  Cardiovascular:  Normal peripheral pulses  Abdomen: Soft, non-tender, non-distended with no CVA, flank pain, hepatosplenomegaly or hernia. Kidneys normal.  Bladder non-tender and not distended.   Lymphatics: no palpable lymphadenopathy        Assessment and Plan   Impression:    Patient Active Problem List   Diagnosis    [facility]    Seizure disorder ([facility]    Frequent PVCs  Hematuria    Nicotine abuse    Hematuria, gross    Sepsis ([facility]    HTN (hypertension)    HLD (hyperlipidemia)    Multiple sclerosis (HCC)    Metabolic acidosis, normal anion gap (NAG)    Open wound of foot, complicated, left, initial encounter    COVID-19 virus infection    Pressure injury of right ischium, stage 3 (HCC)    Pressure injury of left heel, stage 3 (HCC)    Pressure injury of coccygeal region, stage 2 (HCC)    Physical deconditioning       Plan: To OR for placement of suprapubic tube  Risks benefits and alternative procedures are explained, informed consent is obtained, and the patient elects to proceed.

## 2022-01-17 NOTE — PROGRESS NOTES
MRSA nares swab sent to lab. The patient and family was re-educated regarding the discharge phase after surgery. No further needs voiced.

## 2022-01-17 NOTE — ANESTHESIA PRE PROCEDURE
Department of Anesthesiology  Preprocedure Note       Name:  Zhanna Lara   Age:  66 y.o.  :  1950                                          MRN:  [id number]         Date:  2022      Surgeon: Seth Milton):  Olga Vasquez MD    Procedure: Procedure(s):  CYSTO, SUPRAPUBIC CATHETER PLACEMENT WITH ULTRASOUND    Medications prior to admission:   Prior to Admission medications    Medication Sig Start Date End Date Taking? Authorizing Provider   ciprofloxacin (CIPRO) 500 MG tablet Take 1 tablet by mouth 2 times daily for 10 days 22 Yes Oval Lone, APRN - CNP   acetic acid 0.25 % irrigation Irrigate as directed 21  Yes Oval Lone, APRN - CNP   Water For Irrigation, Sterile (STERILE WATER FOR IRRIGATION) Irrigate with 50 ml of sterile water twice a day.  21  Yes Dasia Rondon, APRN - CNP   fluticasone (FLONASE) 50 MCG/ACT nasal spray 2 sprays by Each Nostril route 2 times daily 20  Yes Zeeshan Lee MD   potassium bicarb-citric acid (EFFER-K) 20 MEQ TBEF effervescent tablet Take 1 tablet by mouth daily 20  Yes Zeeshan Lee MD   aspirin (RA ASPIRIN EC) 81 MG EC tablet take 1 tablet by mouth once daily 20  Yes Tasneem Ricci MD   metoprolol tartrate (LOPRESSOR) 25 MG tablet take 1/2 tablet by mouth twice a day 9/3/19  Yes Morenita Jacob MD   Cholecalciferol (VITAMIN D3 PO) Take by mouth daily   Yes Historical Provider, MD   carBAMazepine (TEGRETOL) 100 MG chewable tablet Take 100 mg by mouth 3 times daily    Yes Historical Provider, MD       Current medications:    Current Facility-Administered Medications   Medication Dose Route Frequency Provider Last Rate Last Admin    0.9 % sodium chloride infusion   IntraVENous Continuous Olga Vasquez MD        ceFAZolin (ANCEF) 2000 mg in dextrose 4 % 100 mL IVPB (premix)  2,000 mg IntraVENous 30 Min Pre-Op Olga Vasquez MD           Allergies:  No Known Allergies    Problem List:    Patient Active Problem List   Diagnosis Code    [facility]) ([facility] G35    Seizure disorder ([facility] G40.909    Frequent PVCs I49.3    Hematuria R31.9    Nicotine abuse Z72.0    Hematuria, gross R31.0    Sepsis ([facility] A41.9    HTN (hypertension) I10    HLD (hyperlipidemia) E78.5    Multiple sclerosis ([facility] I63    Metabolic acidosis, normal anion gap (NAG) E87.2    Open wound of foot, complicated, left, initial encounter S91.302A    COVID-19 virus infection U07.1    Pressure injury of right ischium, stage 3 (HCC) L89.313    Pressure injury of left heel, stage 3 (HCC) L89.623    Pressure injury of coccygeal region, stage 2 ([facility] L89.152    Physical deconditioning R53.81       Past Medical History:        Diagnosis Date    Arthritis     Difficulty in swallowing     Hyperlipidemia     Hypertension     Intra-abdominal lymphadenopathy     MS (multiple sclerosis) (HCC)     Pancreatitis     Pneumonia     Seizures ([facility]     UTI (urinary tract infection)        Past Surgical History:        Procedure Laterality Date    CHOLECYSTECTOMY  2016    laparoscopic    CYSTOSCOPY N/A 2019    CYSTO, CLOT EVACUATION, TURBT performed by Reather Fleischer, MD at [facility] Left 2020    LEFT HEEL WOUND I&D performed by Cleve Holder DPM at [facility] Right 2018    EYE CATARACT EMULSIFICATION IOL IMPLANT, RIGHT performed by Mike Bella MD at [facility] IO LENS PROSTH W/O ECP Left 11/15/2018    EYE CATARACT EMULSIFICATION IOL IMPLANT LEFT EYE performed by Mike Bella MD at [facility]       Social History:    Social History     Tobacco Use    Smoking status: Former Smoker     Packs/day: 2.00     Years: 40.00     Pack years: 80.00     Types: Cigarettes     Quit date: 10/2/2013     Years since quittin.2    Smokeless tobacco: Former User     Quit date: 11/3/2015 Neck ROM: full  Mouth opening: > = 3 FB Dental:          Pulmonary:   (+) decreased breath sounds,                             Cardiovascular:    (+) hypertension:,         Rhythm: regular                      Neuro/Psych:   (+) seizures:, neuromuscular disease: multiple sclerosis,             GI/Hepatic/Renal:   (+) renal disease:,           Endo/Other:                     Abdominal:             Vascular: Other Findings:             Anesthesia Plan      MAC     ASA 4     (Possible GA  HISTORY OF MULTIPLE SCLEROSIS, NUROGENIC BLADDER  )  Induction: intravenous. MIPS: Postoperative opioids intended and Prophylactic antiemetics administered. Anesthetic plan and risks discussed with patient. Plan discussed with CRNA.                 Keyonna Augustin MD   1/17/2022

## 2022-01-17 NOTE — ANESTHESIA POSTPROCEDURE EVALUATION
Department of Anesthesiology  Postprocedure Note    Patient: Rahat See  MRN: [id number]  YOB: 1950  Date of evaluation: 1/17/2022  Time:  11:12 AM     Procedure Summary     Date: 01/17/22 Room / Location: [facility]    Anesthesia Start: 5802 Anesthesia Stop: 9973    Procedure: CYSTO, SUPRAPUBIC CATHETER PLACEMENT WITH ULTRASOUND (N/A ) Diagnosis: (NEUROGENIC BLADDER)    Surgeons: Annabelle Cano MD Responsible Provider: Ellis Epstein MD    Anesthesia Type: MAC ASA Status: 4          Anesthesia Type: MAC    Collin Phase I: Collin Score: 10    Collin Phase II:      Last vitals: Reviewed and per EMR flowsheets.        Anesthesia Post Evaluation    Patient location during evaluation: bedside  Patient participation: complete - patient participated  Level of consciousness: awake  Airway patency: patent  Nausea & Vomiting: no vomiting and no nausea  Complications: no  Cardiovascular status: hemodynamically stable  Respiratory status: acceptable  Hydration status: stable

## 2022-01-18 LAB — MRSA SCREEN: NORMAL

## 2022-01-20 ENCOUNTER — TELEPHONE (OUTPATIENT)
Dept: UROLOGY | Age: 72
End: 2022-01-20

## 2022-01-20 NOTE — TELEPHONE ENCOUNTER
Edna Wan stated the urine is clear and the catheter is draining. She will monitor it and stop the irrigations. If the urine develops sediment and needs irrigated they use sterile water as needed. If they continue to develop problems after irrigating prn, they will call back to check if the acetic acid irrigations should be restarted.     Thank you

## 2022-01-20 NOTE — TELEPHONE ENCOUNTER
Doretha Mallory @ [address] [place] [address] Drive would like to clarify if the acetic acid irrigations and sterile water irrigations need to continue since sp was placed on 01/17/2022. Please advise.  Thank you

## 2022-01-24 RX ORDER — OXYBUTYNIN CHLORIDE 10 MG/1
10 TABLET, EXTENDED RELEASE ORAL DAILY
Qty: 30 TABLET | Refills: 11 | Status: SHIPPED | OUTPATIENT
Start: 2022-01-24 | End: 2022-01-24 | Stop reason: ALTCHOICE

## 2022-01-24 RX ORDER — OXYBUTYNIN CHLORIDE 5 MG/1
5 TABLET ORAL 3 TIMES DAILY PRN
Qty: 90 TABLET | Refills: 11 | Status: SHIPPED | OUTPATIENT
Start: 2022-01-24 | End: 2022-09-30

## 2022-01-24 NOTE — TELEPHONE ENCOUNTER
Patient advised prescription was at the . She voiced understanding and will  the prescription tomorrow.

## 2022-01-24 NOTE — TELEPHONE ENCOUNTER
Patient would like it changed to something that can be crushed? She has a hard time swallowing pills. Please advise.  Thank you

## 2022-01-24 NOTE — TELEPHONE ENCOUNTER
Patient was incontinent of urine last night. She irrigated the catheter last night. The catheter is patent. It irrigated ok. The urine is clear and denies sediment. She was having spasms. She has not been incontinent since last night. Should they start and irrigate the catheter again? Should she take something for spasms? Please advise.  Thank you

## 2022-01-24 NOTE — TELEPHONE ENCOUNTER
Can switch to immediate release  Script printed she will need to  due to epic being down for scripts sent electronically

## 2022-01-27 ENCOUNTER — HOSPITAL ENCOUNTER (OUTPATIENT)
Dept: WOUND CARE | Age: 72
Discharge: HOME | End: 2022-01-27
Payer: MEDICARE

## 2022-01-27 DIAGNOSIS — L89.313 PRESSURE INJURY OF RIGHT ISCHIUM, STAGE 3 (HCC): ICD-10-CM

## 2022-01-27 PROBLEM — L89.152 PRESSURE INJURY OF COCCYGEAL REGION, STAGE 2 (HCC): Status: RESOLVED | Noted: 2021-07-08 | Resolved: 2022-01-27

## 2022-01-27 PROBLEM — S91.302A OPEN WOUND OF FOOT, COMPLICATED, LEFT, INITIAL ENCOUNTER: Status: RESOLVED | Noted: 2020-11-03 | Resolved: 2022-01-27

## 2022-01-27 PROBLEM — L89.623 PRESSURE INJURY OF LEFT HEEL, STAGE 3 (HCC): Status: RESOLVED | Noted: 2021-07-08 | Resolved: 2022-01-27

## 2022-01-27 PROCEDURE — 99212 OFFICE O/P EST SF 10 MIN: CPT | Performed by: NURSE PRACTITIONER

## 2022-01-27 PROCEDURE — 99212 OFFICE O/P EST SF 10 MIN: CPT

## 2022-01-27 NOTE — PROGRESS NOTES
Virtual Visit Wound Care  Clinic Level of Care   NAME:  Fred Doe OF BIRTH:  1950 GENDER: female  MEDICAL RECORD NUMBER:  [id number]   DATE:  1/27/2022     Patient Type Points   No documentation completed by nursing staff. []   0   Nursing staff documented in the navigator for an ESTABLISHED patient including Episode, Patient ID, Chief Complaint, Travel Screen, Allergies, Latex Allergy, Home Medication, History, Psychosocial Screen, C-SSRS Screen, Fall Risk, Nutritional Screen, Advanced Directive, Education and Plan of Care, and Discharge Instructions. The Functional Screening tab is only required if the patient's status changes. [x]   1   Nursing staff documented in the navigator for a NEW patient including Patient ID, Chief Complaint, Travel Screen, Allergies, Latex Allergy, Home Medication, History, Psychosocial Screen, C-SSRS Screen, Fall Risk, Nutritional Screen, Advanced Directive, Functional Screen, Education and Plan of Care, and Discharge Instructions. []   2   Nursing staff documented in the navigator for a CONSULT patient including Episode, Patient ID, Chief Complaint, Travel Screen, Allergies, Latex Allergy, Home Medication, History, Psychosocial Screen, C-SSRS Screen, Fall Risk, Nutritional Screen, Advanced Directive, Functional Screen, Education and Plan of Care, and Discharge Instructions. []   2     Wound Description Points   Unable to obtain image of Wound. For example, patient/caregiver is instructed not to remove dressing, is unable to correctly position smart phone, no smart phone is available, patient is unable to maintain connectivity or the patient's wound is healed. []   0   1-3 wound images annotated. Images of the wound(s) is obtained and annotated along with completed description in [address] [place] [address]. [x]   1   4-5 wound images annotated. Images of the wound(s) is obtained and annotated along with completed description in [address] [place] [address]. []   2   Greater than 6 wound images annotated.  Images of the wound(s) is obtained and annotated along with completed description in [address] [place] [address]. []   3     Education Points   No Education completed by nursing staff.    []   0   Patient/caregiver is educated on 1-4 topics. Nursing staff identifies learner, confirms understanding of information (verbal, demonstration, written) and documents details. May include Discharge Instructions/AVS, available documents in My Chart, or Web-based learning. [x]   1   Patient/caregiver is educated on 5-9 topics. Nursing staff identifies learner, confirms understanding of information (verbal, demonstration, written) and documents details. May include Discharge Instructions/AVS, available documents in My Chart, or Web-based learning. []   2   Patient/caregiver is educated on 10 or more topics. Nursing staff identifies learner, confirms understanding of information (verbal, demonstration, written) and documents details. May include Discharge Instructions/AVS, available documents in My Chart, or Web-based learning. []   3     Follow-up Virtual Visit Points   No contact with outside resources made. []   0   Nursing staff contacts 1-2 outside resource. For example, telephone call made to home health, primary care provider, pharmacy, or DME. May include filling out forms and writing letters, arranging transportation, communication with insurance , vendors, etc.  Discharge, instructions and/or After Visit Summary given to patient/caregiver and instructions completed. [x]   1   Nursing staff contacts 3-4 outside resource. For example, telephone calls made to home health, primary care provider, pharmacy, or DME. May include filling out forms and writing letters, arranging transportation, communication with insurance , vendors, etc.  Discharge, instructions and/or After Visit Summary given to patient/caregiver and instructions completed. []   2   Nursing contacts 5 or more outside resource.  For example, telephone calls made to home health, primary care providers, pharmacy, or DME. May include filling out forms and writing letters, arranging transportation, communication with insurance , vendors, etc.  Discharge, instructions and/or After Visit Summary given to patient/caregiver and instructions completed.    []   3     Is this the Patient's First Visit to the [address] [place] [address]  No    [facility]  Yes             Virtual Visit Clinical Level of Care Wound Care      Points  1-3  Level 1 []     Points  4-5  Level 2 [x]     Points  6-7  Level 3 []     Points  8-9  Level 4 []     Points  10-11  Level 5 []       Electronically signed by Lou Steven RN on 1/27/2022 at 2:36 PM

## 2022-01-27 NOTE — PLAN OF CARE
Problem: Wound:  Goal: Will show signs of wound healing; wound closure and no evidence of infection  Description: Will show signs of wound healing; wound closure and no evidence of infection  Outcome: Met This Shift   Pt. Seen today for virtual visit, wound is healed see AVS for new orders. Care plan reviewed with patient and nurse. Patient and nurse verbalize understanding of the plan of care and contribute to goal setting.

## 2022-01-27 NOTE — PROGRESS NOTES
Virtual Visit [facility]   Progress Note and Procedure Note    [address] RECORD NUMBER:  [id number]  AGE: 66 y.o. GENDER: female  : 1950  EPISODE DATE:  2022    Subjective:     Chief Complaint   Patient presents with    Wound Check     RIGHT BUTTOCK        Consent obtained to communicate via Virtual Visit:  Yes     HISTORY of PRESENT ILLNESS HPI     Anat Mccarthy is a 66 y.o. female who presents today via Virtual Visit for re-evaluation of left heel, right ischium and coccyx wounds.      History of Wound Context:   Patient states that wounds have been present for some time.  Patient denies pain to wounds.  Current wound care includes triad to ischial wound. Patient has history of MS, is mostly bedbound, has phillip lift at home for transfers.  Patient notes that  does slide her in her chair for repositioning and pulls phillip out from underneath her after use, sliding against her bottom.  Patient has chronic vega catheter, does have chronic leakage per her report. Lalit Gilbert states that she has \"questionable\" control of her bowels.  States that  sits her upright on stool when she needs to have a bowel movement.  She wipes herself but states that she frequently is unable to completely clean herself.   Current with home health, has aides that come several days weekly,  is primary caregiver. Lalit Gilbert states that she does not turn at home.  Recently obtained low air loss mattress.  She denies any fever or chills.  She denies any further needs or concerns     Ulcer Identification:  Ulcer Type: pressure  Contributing Factors: chronic pressure, decreased mobility, shear force, incontinence of stool and incontinence of urine     Depth of Diabetic/Pressure/Non Pressure Ulcers or Wound:  Pressure ulcer, stage 3    PAST MEDICAL HISTORY        Diagnosis Date    Arthritis     Difficulty in swallowing     Hyperlipidemia     Hypertension     Intra-abdominal lymphadenopathy     MS (multiple sclerosis) (HCC)     Pancreatitis     Pneumonia     Seizures (HCC)     UTI (urinary tract infection)        PAST SURGICAL HISTORY    Past Surgical History:   Procedure Laterality Date    CATHETER INSERTION N/A 2022    CYSTO, SUPRAPUBIC CATHETER PLACEMENT WITH ULTRASOUND performed by Zach Hernandez MD at [facility]  2016    laparoscopic    CYSTOSCOPY N/A 2019    CYSTO, CLOT EVACUATION, TURBT performed by Priyanka Reeves MD at [address] Left 2020    LEFT HEEL WOUND I&D performed by Maryann Carvajal DPM at [facility] [address] Right 2018    EYE CATARACT EMULSIFICATION IOL IMPLANT, RIGHT performed by Tony Jara MD at [address] INSJ IO LENS PROSTH W/O ECP Left 11/15/2018    EYE CATARACT EMULSIFICATION IOL IMPLANT LEFT EYE performed by Tony Jara MD at [address] [place] [address]    Family History   Problem Relation Age of Onset    Cancer Mother         colon    Heart Disease Father     Diabetes Neg Hx     High Blood Pressure Neg Hx     Stroke Neg Hx        SOCIAL HISTORY    Social History     Tobacco Use    Smoking status: Former Smoker     Packs/day: 2.00     Years: 40.00     Pack years: 80.00     Types: Cigarettes     Quit date: 10/2/2013     Years since quittin.3    Smokeless tobacco: Former User     Quit date: 11/3/2015   Vaping Use    Vaping Use: Former    Substances: Unknown   Substance Use Topics    Alcohol use: No    Drug use: No       ALLERGIES    No Known Allergies    MEDICATIONS    Current Outpatient Medications on File Prior to Encounter   Medication Sig Dispense Refill    oxybutynin (DITROPAN) 5 MG tablet Take 1 tablet by mouth 3 times daily as needed (bladder spasms) 90 tablet 11    acetic acid 0.25 % irrigation Irrigate as directed 1000 mL 5    Water For Irrigation, Sterile (STERILE WATER FOR IRRIGATION) Irrigate with 50 ml of sterile water twice a day. 1000 mL 5    fluticasone (FLONASE) 50 MCG/ACT nasal spray 2 sprays by Each Nostril route 2 times daily 1 Bottle 3    aspirin (RA ASPIRIN EC) 81 MG EC tablet take 1 tablet by mouth once daily 30 tablet 0    metoprolol tartrate (LOPRESSOR) 25 MG tablet take 1/2 tablet by mouth twice a day 30 tablet 11    Cholecalciferol (VITAMIN D3 PO) Take by mouth daily      carBAMazepine (TEGRETOL) 100 MG chewable tablet Take 100 mg by mouth 3 times daily        No current facility-administered medications on file prior to encounter. REVIEW OF SYSTEMS    Pertinent items are noted in HPI. Objective:     PHYSICAL EXAM    General Appearance: in no acute distress and alert     Healing stage 3 pressure injury to right ischium- Has healed. No open wounds observed or reported by nurse. Assessment:      Problem List Items Addressed This Visit     Pressure injury of right ischium, stage 3 ([address]        Performed by: BROOK Corral - CAYETANO    Wound/Ulcer #: 1,   Diabetic/Pressure/Non Pressure Ulcers only:  Ulcer: Pressure ulcer, Stage 3       Plan:     Please see attached Discharge Instructions    Based upon this virtual visit it is not required to have an in-person visit of this time.    -Stage 3 pressure injury to right ischium has healed. No open wounds remain. Ok to use barrier cream as needed to protect skin.       -Reinforced importance of frequent turning and repositioning to prevent future wounds. Offloading mattress and cushions do not replace turning. Avoid sliding against furniture with transfers, use lift equipment as able. Offloading techniques reviewed, patient verbalizes understanding. Follow up as needed. Discharge patient from wound clinic today due to complete healing of wound. Discontinue all previously ordered wound care. Call clinic for any further needs or concerns.     Written patient dismissal instructions available to Patient/LEONARD Giang Mayi AGE: 66 y.o. GENDER: female  : 1950 being evaluated by a Virtual Visit (video visit) encounter to address concerns as mentioned above. A caregiver was present when appropriate. Due to this being a TeleHealth encounter (During XQGDF-93 public health emergency), evaluation of the following organ systems was limited: Vitals/Constitutional/EENT/Resp/CV/GI//MS/Neuro/Skin/Heme-Lymph-Imm. Pursuant to the emergency declaration under the [address] [place] [address], [address] [place] [address] authority and the [employer] and [employer] Act, this Virtual Visit was conducted with patient's (and/or legal guardian's) consent, to reduce the patient's risk of exposure to COVID-19 and provide necessary medical care. The patient (and/or legal guardian) has also been advised to contact this office for worsening conditions or problems, and seek emergency medical treatment and/or call 911 if deemed necessary. Services were provided through a video synchronous discussion virtually to substitute for in-person clinic visit. Patient was located at their individual homes. Provider was located in [place] [address].     Electronically signed by Governor BROOK Huston CNP on 2022 at 1:28 PM

## 2022-01-31 ENCOUNTER — TELEPHONE (OUTPATIENT)
Dept: UROLOGY | Age: 72
End: 2022-01-31

## 2022-01-31 NOTE — TELEPHONE ENCOUNTER
They were doing acetic acid was 2 times and saline was daily and prn. Do you want the acetic acid irrigations?

## 2022-01-31 NOTE — TELEPHONE ENCOUNTER
Patient wants to stop the hiprex. She has a hard time swallowing medications. Patient stated she is having periods of incontinence. She is taking oxybutynin TID. She started it Friday. Should they restart the catheter irrigations? The urine is strawberry color with sediment. She is taking aspirin. Patient encouraged to increase her fluid intake. [facility] nurse can be reached [phone]    Please advise.  Thank you

## 2022-02-01 NOTE — TELEPHONE ENCOUNTER
Elaine Sexton from home health and patient aware to irrigate the catheter like before. They voiced understanding.

## 2022-02-07 NOTE — TELEPHONE ENCOUNTER
[facility] [phone] office [phone] fax [phone] Since she started the ditropan was she no longer has the incontinence. Can they change the acetic irrigations to prn? Should they continue the saline irrigations? The urine has very little sediment and looks good. Please advise. Thank you.

## 2022-02-18 ENCOUNTER — TELEPHONE (OUTPATIENT)
Dept: UROLOGY | Age: 72
End: 2022-02-18

## 2022-02-18 NOTE — TELEPHONE ENCOUNTER
Message left for Kim Sarah to continue the irrigations and there is no need to send a urine if she is not having symptoms. Will evaluate at the appointment and send a urine if needed at that time.

## 2022-02-18 NOTE — TELEPHONE ENCOUNTER
Janice Lloyd at [facility] left a message asking if an urine can be checked. The sp catheter is patent with a little blood in the tubing, mucus, and sediment. The urine is a darker swann yellow. They are irrigating the catheter 2 times a week with acetic acid and sterile water in between. She denies fever or pain. The spasms are better. Catheter to be exchanged in the office on 02/28/2022    Fax [phone]. Please advise. Thank you.

## 2022-02-18 NOTE — TELEPHONE ENCOUNTER
Cont irrigations  No need to check Ua unless she is having symptoms  Would prefer to send Ua after exchange  Can evaluate 2/28, will send Ua then if needed

## 2022-02-22 ENCOUNTER — TELEPHONE (OUTPATIENT)
Dept: UROLOGY | Age: 72
End: 2022-02-22

## 2022-02-22 DIAGNOSIS — R30.0 DYSURIA: Primary | ICD-10-CM

## 2022-02-22 NOTE — TELEPHONE ENCOUNTER
Patient c/o burning yesterday. The urine is still cloudy with a lot of sediment. She denies fever or chills. She would like a urine check. The 1st catheter will be changed on 02/28/2022. Please advise.  Thank you

## 2022-02-23 NOTE — TELEPHONE ENCOUNTER
Message left for [facility] to collect the urine and the order for culture was faxed to [facility]. Patient aware and voiced understanding.

## 2022-02-28 ENCOUNTER — OFFICE VISIT (OUTPATIENT)
Dept: UROLOGY | Age: 72
End: 2022-02-28
Payer: MEDICARE

## 2022-02-28 VITALS
WEIGHT: 124 LBS | DIASTOLIC BLOOD PRESSURE: 80 MMHG | BODY MASS INDEX: 21.97 KG/M2 | SYSTOLIC BLOOD PRESSURE: 122 MMHG | HEIGHT: 63 IN

## 2022-02-28 DIAGNOSIS — N31.9 NEUROGENIC BLADDER: Primary | ICD-10-CM

## 2022-02-28 PROCEDURE — 51705 CHANGE OF BLADDER TUBE: CPT | Performed by: UROLOGY

## 2022-02-28 NOTE — PROGRESS NOTES
Follow up after SPT placement. Patient had a 16F SP tube exchanged without difficulty today. Existing suprapubic catheter was removed under sterile technique. A new 16F suprapubic catheter was placed. Irrigation was performed to ensure proper placement. The patient will follow up as planned for routine exchanges.

## 2022-03-01 LAB
BILIRUBIN URINE: NORMAL
BLOOD, URINE: NORMAL
CLARITY: CLEAR
COLOR: YELLOW
GLUCOSE URINE: NEGATIVE
KETONES, URINE: NEGATIVE
LEUKOCYTE ESTERASE, URINE: NORMAL
NITRITE, URINE: POSITIVE
PH UA: 6.5 (ref 4.5–8)
PROTEIN UA: NORMAL
SPECIFIC GRAVITY UA: 1.02 (ref 1–1.03)
UROBILINOGEN, URINE: NORMAL

## 2022-03-02 ENCOUNTER — TELEPHONE (OUTPATIENT)
Dept: WOUND CARE | Age: 72
End: 2022-03-02

## 2022-03-02 ENCOUNTER — TELEPHONE (OUTPATIENT)
Dept: UROLOGY | Age: 72
End: 2022-03-02

## 2022-03-02 RX ORDER — LEVOFLOXACIN 500 MG/1
500 TABLET, FILM COATED ORAL DAILY
Qty: 7 TABLET | Refills: 0 | Status: SHIPPED | OUTPATIENT
Start: 2022-03-02 | End: 2022-03-09

## 2022-03-02 NOTE — TELEPHONE ENCOUNTER
----- Message from BROOK Amador CNP sent at 3/2/2022  8:37 AM EST -----  Im sending levaquin in for pt

## 2022-03-25 ENCOUNTER — TELEPHONE (OUTPATIENT)
Dept: UROLOGY | Age: 72
End: 2022-03-25

## 2022-03-25 NOTE — TELEPHONE ENCOUNTER
Exchange every 4-6 wks and as needed  Can irrigate with at least 60ml of sterile solution for obstruction/hematuria  Last exchange 2/28/22 in our office  See previous telephone correspondence for irrigation orders and spasm order

## 2022-03-25 NOTE — TELEPHONE ENCOUNTER
Harrison at [facility] stated the patient is leaking around the catheter and voiding on the bedpan. They did not receive the orders from last office visit on 02/28/2022 to continue SPT exchanges monthly at home by visiting nurse. Orders faxed    They will irrigate the catheter and if occluded exchange the sp catheter.

## 2022-06-16 LAB
BILIRUBIN URINE: ABNORMAL
BLOOD, URINE: ABNORMAL
CLARITY: ABNORMAL
COLOR: ABNORMAL
GLUCOSE URINE: NEGATIVE
KETONES, URINE: NEGATIVE
LEUKOCYTE ESTERASE, URINE: ABNORMAL
NITRITE, URINE: POSITIVE
PH UA: 7.5 (ref 4.5–8)
PROTEIN UA: ABNORMAL
SPECIFIC GRAVITY UA: 1.01 (ref 1–1.03)
UROBILINOGEN, URINE: NORMAL

## 2022-07-07 LAB
BILIRUBIN URINE: ABNORMAL
BLOOD, URINE: ABNORMAL
CLARITY: ABNORMAL
COLOR: YELLOW
GLUCOSE URINE: NEGATIVE
KETONES, URINE: NEGATIVE
LEUKOCYTE ESTERASE, URINE: ABNORMAL
NITRITE, URINE: NEGATIVE
PH UA: 7 (ref 4.5–8)
PROTEIN UA: ABNORMAL
SPECIFIC GRAVITY UA: 1.02 (ref 1–1.03)
UROBILINOGEN, URINE: NORMAL

## 2022-07-14 ENCOUNTER — TELEPHONE (OUTPATIENT)
Dept: UROLOGY | Age: 72
End: 2022-07-14

## 2022-07-14 LAB
BILIRUBIN URINE: ABNORMAL
BLOOD, URINE: ABNORMAL
CLARITY: ABNORMAL
COLOR: ABNORMAL
GLUCOSE URINE: NEGATIVE
KETONES, URINE: NEGATIVE
LEUKOCYTE ESTERASE, URINE: ABNORMAL
NITRITE, URINE: NEGATIVE
PH UA: 6 (ref 4.5–8)
PROTEIN UA: ABNORMAL
SPECIFIC GRAVITY UA: ABNORMAL
UROBILINOGEN, URINE: ABNORMAL

## 2022-07-14 NOTE — TELEPHONE ENCOUNTER
Spoke to [employer], they do not have an recent urine cultures. Spoke to Dr Linad Cordova office. They will fax over the urine dips and notes. · Blood Pressure: 137/77 - acceptable  · Continue on Norvasc 2 5mg BID

## 2022-07-25 ENCOUNTER — HOSPITAL ENCOUNTER (EMERGENCY)
Age: 72
Discharge: HOME | End: 2022-07-25
Payer: MEDICARE

## 2022-07-25 ENCOUNTER — APPOINTMENT (OUTPATIENT)
Dept: CT IMAGING | Age: 72
End: 2022-07-25
Payer: MEDICARE

## 2022-07-25 VITALS
HEART RATE: 67 BPM | OXYGEN SATURATION: 98 % | BODY MASS INDEX: 22.14 KG/M2 | TEMPERATURE: 97.9 F | SYSTOLIC BLOOD PRESSURE: 110 MMHG | DIASTOLIC BLOOD PRESSURE: 52 MMHG | WEIGHT: 125 LBS | RESPIRATION RATE: 18 BRPM

## 2022-07-25 DIAGNOSIS — B37.41 YEAST CYSTITIS: Primary | ICD-10-CM

## 2022-07-25 DIAGNOSIS — L89.300 PRESSURE INJURY OF BUTTOCK, UNSTAGEABLE, UNSPECIFIED LATERALITY (HCC): ICD-10-CM

## 2022-07-25 DIAGNOSIS — K59.00 CONSTIPATION, UNSPECIFIED CONSTIPATION TYPE: ICD-10-CM

## 2022-07-25 DIAGNOSIS — N82.3 RECTOVAGINAL FISTULA: ICD-10-CM

## 2022-07-25 LAB
ALBUMIN SERPL-MCNC: 3.1 G/DL (ref 3.5–5.1)
ALP BLD-CCNC: 137 U/L (ref 38–126)
ALT SERPL-CCNC: 20 U/L (ref 11–66)
AMORPHOUS: ABNORMAL
ANION GAP SERPL CALCULATED.3IONS-SCNC: 7 MEQ/L (ref 8–16)
AST SERPL-CCNC: 39 U/L (ref 5–40)
BACTERIA: ABNORMAL /HPF
BASOPHILS # BLD: 1 %
BASOPHILS ABSOLUTE: 0 THOU/MM3 (ref 0–0.1)
BILIRUB SERPL-MCNC: 0.4 MG/DL (ref 0.3–1.2)
BILIRUBIN URINE: NEGATIVE
BLOOD, URINE: ABNORMAL
BUN BLDV-MCNC: 13 MG/DL (ref 7–22)
C-REACTIVE PROTEIN: 0.46 MG/DL (ref 0–1)
CALCIUM SERPL-MCNC: 9.1 MG/DL (ref 8.5–10.5)
CASTS UA: ABNORMAL /LPF
CHARACTER, URINE: ABNORMAL
CHLORIDE BLD-SCNC: 105 MEQ/L (ref 98–111)
CO2: 28 MEQ/L (ref 23–33)
COLOR: YELLOW
CREAT SERPL-MCNC: 0.5 MG/DL (ref 0.4–1.2)
CRYSTALS, UA: ABNORMAL
EOSINOPHIL # BLD: 5 %
EOSINOPHILS ABSOLUTE: 0.2 THOU/MM3 (ref 0–0.4)
EPITHELIAL CELLS, UA: ABNORMAL /HPF
ERYTHROCYTE [DISTWIDTH] IN BLOOD BY AUTOMATED COUNT: 12.6 % (ref 11.5–14.5)
ERYTHROCYTE [DISTWIDTH] IN BLOOD BY AUTOMATED COUNT: 47.2 FL (ref 35–45)
GFR SERPL CREATININE-BSD FRML MDRD: > 90 ML/MIN/1.73M2
GLUCOSE BLD-MCNC: 86 MG/DL (ref 70–108)
GLUCOSE URINE: NEGATIVE MG/DL
HCT VFR BLD CALC: 39.9 % (ref 37–47)
HEMOGLOBIN: 12.5 GM/DL (ref 12–16)
IMMATURE GRANS (ABS): 0 THOU/MM3 (ref 0–0.07)
IMMATURE GRANULOCYTES: 0 %
KETONES, URINE: NEGATIVE
LACTIC ACID: 0.8 MMOL/L (ref 0.5–2)
LEUKOCYTE ESTERASE, URINE: ABNORMAL
LYMPHOCYTES # BLD: 26.9 %
LYMPHOCYTES ABSOLUTE: 1.3 THOU/MM3 (ref 1–4.8)
MCH RBC QN AUTO: 31.6 PG (ref 26–33)
MCHC RBC AUTO-ENTMCNC: 31.3 GM/DL (ref 32.2–35.5)
MCV RBC AUTO: 100.8 FL (ref 81–99)
MONOCYTES # BLD: 9.5 %
MONOCYTES ABSOLUTE: 0.5 THOU/MM3 (ref 0.4–1.3)
MUCUS: ABNORMAL
NITRITE, URINE: NEGATIVE
NUCLEATED RED BLOOD CELLS: 0 /100 WBC
OSMOLALITY CALCULATION: 278.8 MOSMOL/KG (ref 275–300)
PH UA: 8 (ref 5–9)
PLATELET # BLD: 134 THOU/MM3 (ref 130–400)
PMV BLD AUTO: 10.9 FL (ref 9.4–12.4)
POTASSIUM REFLEX MAGNESIUM: 4.2 MEQ/L (ref 3.5–5.2)
PROCALCITONIN: 0.05 NG/ML (ref 0.01–0.09)
PROTEIN UA: ABNORMAL
RBC # BLD: 3.96 MILL/MM3 (ref 4.2–5.4)
RBC URINE: ABNORMAL /HPF
RENAL EPITHELIAL, UA: ABNORMAL
SEDIMENTATION RATE, ERYTHROCYTE: 100 MM/HR (ref 0–20)
SEG NEUTROPHILS: 57.6 %
SEGMENTED NEUTROPHILS ABSOLUTE COUNT: 2.8 THOU/MM3 (ref 1.8–7.7)
SODIUM BLD-SCNC: 140 MEQ/L (ref 135–145)
SPECIFIC GRAVITY, URINE: 1.02 (ref 1–1.03)
TOTAL PROTEIN: 8.3 G/DL (ref 6.1–8)
UROBILINOGEN, URINE: 0.2 EU/DL (ref 0–1)
WBC # BLD: 4.8 THOU/MM3 (ref 4.8–10.8)
WBC UA: ABNORMAL /HPF
YEAST: ABNORMAL

## 2022-07-25 PROCEDURE — 87106 FUNGI IDENTIFICATION YEAST: CPT

## 2022-07-25 PROCEDURE — 96375 TX/PRO/DX INJ NEW DRUG ADDON: CPT

## 2022-07-25 PROCEDURE — 83605 ASSAY OF LACTIC ACID: CPT

## 2022-07-25 PROCEDURE — 85025 COMPLETE CBC W/AUTO DIFF WBC: CPT

## 2022-07-25 PROCEDURE — 99285 EMERGENCY DEPT VISIT HI MDM: CPT

## 2022-07-25 PROCEDURE — 2580000003 HC RX 258: Performed by: PHYSICIAN ASSISTANT

## 2022-07-25 PROCEDURE — 85651 RBC SED RATE NONAUTOMATED: CPT

## 2022-07-25 PROCEDURE — 96374 THER/PROPH/DIAG INJ IV PUSH: CPT

## 2022-07-25 PROCEDURE — 74177 CT ABD & PELVIS W/CONTRAST: CPT

## 2022-07-25 PROCEDURE — 6360000002 HC RX W HCPCS: Performed by: PHYSICIAN ASSISTANT

## 2022-07-25 PROCEDURE — 84145 PROCALCITONIN (PCT): CPT

## 2022-07-25 PROCEDURE — 86140 C-REACTIVE PROTEIN: CPT

## 2022-07-25 PROCEDURE — 81001 URINALYSIS AUTO W/SCOPE: CPT

## 2022-07-25 PROCEDURE — 71250 CT THORAX DX C-: CPT

## 2022-07-25 PROCEDURE — 87086 URINE CULTURE/COLONY COUNT: CPT

## 2022-07-25 PROCEDURE — 6360000004 HC RX CONTRAST MEDICATION: Performed by: PHYSICIAN ASSISTANT

## 2022-07-25 PROCEDURE — 80053 COMPREHEN METABOLIC PANEL: CPT

## 2022-07-25 RX ORDER — ONDANSETRON 2 MG/ML
4 INJECTION INTRAMUSCULAR; INTRAVENOUS ONCE
Status: COMPLETED | OUTPATIENT
Start: 2022-07-25 | End: 2022-07-25

## 2022-07-25 RX ORDER — FLUCONAZOLE 100 MG/1
200 TABLET ORAL DAILY
Qty: 28 TABLET | Refills: 0 | Status: SHIPPED | OUTPATIENT
Start: 2022-07-25 | End: 2022-08-08

## 2022-07-25 RX ORDER — 0.9 % SODIUM CHLORIDE 0.9 %
500 INTRAVENOUS SOLUTION INTRAVENOUS ONCE
Status: COMPLETED | OUTPATIENT
Start: 2022-07-25 | End: 2022-07-25

## 2022-07-25 RX ORDER — MORPHINE SULFATE 2 MG/ML
2 INJECTION, SOLUTION INTRAMUSCULAR; INTRAVENOUS ONCE
Status: COMPLETED | OUTPATIENT
Start: 2022-07-25 | End: 2022-07-25

## 2022-07-25 RX ADMIN — ONDANSETRON 4 MG: 2 INJECTION INTRAMUSCULAR; INTRAVENOUS at 11:01

## 2022-07-25 RX ADMIN — SODIUM CHLORIDE 500 ML: 9 INJECTION, SOLUTION INTRAVENOUS at 11:00

## 2022-07-25 RX ADMIN — MORPHINE SULFATE 2 MG: 2 INJECTION, SOLUTION INTRAMUSCULAR; INTRAVENOUS at 11:01

## 2022-07-25 RX ADMIN — IOPAMIDOL 80 ML: 755 INJECTION, SOLUTION INTRAVENOUS at 13:03

## 2022-07-25 ASSESSMENT — PAIN DESCRIPTION - PAIN TYPE
TYPE: ACUTE PAIN

## 2022-07-25 ASSESSMENT — PAIN SCALES - GENERAL
PAINLEVEL_OUTOF10: 7
PAINLEVEL_OUTOF10: 5
PAINLEVEL_OUTOF10: 7
PAINLEVEL_OUTOF10: 8
PAINLEVEL_OUTOF10: 5
PAINLEVEL_OUTOF10: 7
PAINLEVEL_OUTOF10: 9
PAINLEVEL_OUTOF10: 5

## 2022-07-25 ASSESSMENT — PAIN DESCRIPTION - FREQUENCY
FREQUENCY: CONTINUOUS

## 2022-07-25 ASSESSMENT — PAIN DESCRIPTION - DESCRIPTORS
DESCRIPTORS: PATIENT UNABLE TO DESCRIBE

## 2022-07-25 ASSESSMENT — PAIN DESCRIPTION - LOCATION
LOCATION: BUTTOCKS
LOCATION: BUTTOCKS;VAGINA
LOCATION: BUTTOCKS

## 2022-07-25 ASSESSMENT — PAIN - FUNCTIONAL ASSESSMENT
PAIN_FUNCTIONAL_ASSESSMENT: 0-10

## 2022-07-25 NOTE — ED NOTES
In for hourly rounding. Pt resting on cot in position of comfort. Pt remains A&Ox4, resps easy and unlabored. IV shows no s/s of infection or infiltration. Pt pain remains unchanged from previous assessment at this time. Updated pt and  (on cell phone) on POC. Will monitor.      Marcelino Finn RN  07/25/22 [id number]

## 2022-07-25 NOTE — ED NOTES
In for hourly rounding. Pt resting on cot in position of comfort. Pt remains A&Ox4, resps easy and unlabored. IV shows no s/s of infection or infiltration. Pt pain improved at this time. Monitor remains in place. Updated pt on POC. Will monitor.        Reese Gutierrez RN  07/25/22 [id number]

## 2022-07-25 NOTE — ED PROVIDER NOTES
[facility]  eMERGENCY dEPARTMENT eNCOUnter          CHIEF COMPLAINT       Chief Complaint   Patient presents with    Other     Bed Sores       Nurses Notes reviewed and I agree except as noted inthe HPI. HISTORY OF PRESENT Renita Vivar is a 66 y.o. female who presents to the Emergency Department for the evaluation of bedsores. Patient states she has a history of bedsores in the past for which she has seen the wound clinic. States that her previous had healed and she was released from their care in January. States that over the past month she has had recurrence and they have been getting worse despite treatment with the previous provided barrier cream from the wound clinic. Denies any other formal treatment, though she has been on recurrent antibiotic treatment over the past month as well for UTIs associated with her suprapubic catheter which was placed in February due to neurogenic bladder from her MS. Denies any current MS treatment, no recent steroids or immunosuppressants and she does not follow with neurology. She is bed ridden, Carlo lift, states that she does not turn or reposition throughout the day as she is unable to do this on her own. .  States she is starting to notice some soreness from the bedsores as well as intermittent bleeding though she denies knowledge of any purulent drainage, fevers, chills, diarrhea, abdominal pain. States the pain is present between and down her legs, and her bottom and sacrum. She will occasionally take Tylenol for pain which does provide some improvement. Rates her current pain 9/10 and describes it as \"not severe, but noticeable. \"       The HPI was provided by the patient. REVIEW OF SYSTEMS     Review of Systems   Skin:  Positive for wound. All other systems reviewed and are negative.     PAST MEDICAL HISTORY    has a past medical history of Arthritis, Difficulty in swallowing, Hyperlipidemia, Hypertension, Intra-abdominal lymphadenopathy, MS (multiple sclerosis) ([facility] [address] Pancreatitis, Pneumonia, Seizures ([address] and UTI (urinary tract infection). SURGICAL HISTORY      has a past surgical history that includes Cholecystectomy (2016); pr xcapsl ctrc rmvl insj io lens prosth w/o ecp (Left, 11/15/2018); Intracapsular cataract extraction (Right, 2018); Cystoscopy (N/A, 2019); Foot Debridement (Left, 2020); and Catheter Insertion (N/A, 2022). CURRENT MEDICATIONS       Discharge Medication List as of 2022  4:23 PM        CONTINUE these medications which have NOT CHANGED    Details   oxybutynin (DITROPAN) 5 MG tablet Take 1 tablet by mouth 3 times daily as needed (bladder spasms), Disp-90 tablet, R-11Print      acetic acid 0.25 % irrigation Irrigate as directed, Disp-1000 mL, R-5, Normal      Water For Irrigation, Sterile (STERILE WATER FOR IRRIGATION) Disp-1000 mL, R-5, NormalIrrigate with 50 ml of sterile water twice a day. fluticasone (FLONASE) 50 MCG/ACT nasal spray 2 sprays by Each Nostril route 2 times daily, Disp-1 Bottle,R-3DC to SNF      aspirin (RA ASPIRIN EC) 81 MG EC tablet take 1 tablet by mouth once daily, Disp-30 tablet, R-0Normal      metoprolol tartrate (LOPRESSOR) 25 MG tablet take 1/2 tablet by mouth twice a day, Disp-30 tablet, R-11Normal      Cholecalciferol (VITAMIN D3 PO) Take by mouth dailyHistorical Med      carBAMazepine (TEGRETOL) 100 MG chewable tablet Take 100 mg by mouth 3 times daily Historical Med             ALLERGIES     has No Known Allergies. FAMILY HISTORY     She indicated that her mother is . She indicated that her father is . She indicated that the status of her neg hx is unknown.   family history includes Cancer in her mother; Heart Disease in her father. SOCIAL HISTORY      reports that she quit smoking about 8 years ago. Her smoking use included cigarettes. She has a 80.00 pack-year smoking history.  She quit smokeless tobacco use about contain minor errors which are inherent in voice recognition technology. **      Final report electronically signed by Dr. Jose Peralta on 7/25/2022 3:42 PM      CT ABDOMEN PELVIS W IV CONTRAST Additional Contrast? Rectal   Final Result   Addendum (preliminary) 1 of 1   ** ADDENDUM #1 **      ADDENDUM:       This is an addendum to original report dated 7/25/2022. Contrast is seen within the vaginal region that can suggest the presence    of a rectovaginal fistula. The finding(s) was called to Douglas Nogueira PA-C at 1547 hours on 7/25/2022 by Dr. Baltazar Magaña. Verbal acknowledgment and    readback was given. The first impression should read \"a prominent decubitus ulcer is seen in    the lower pelvis. \"            ** ORIGINAL    REPORT**      PROCEDURE: CT ABDOMEN PELVIS W IV CONTRAST      CLINICAL INFORMATION: sacral wounds - air expelled from sacral wound -    fistula      COMPARISON: 12/31/2018. TECHNIQUE:    Helical CT acquisition of the abdomen and pelvis was performed with    administration of intravenous contrast. Multiplanar reformats are    provided. All CT scans at this facility use dose modulation, iterative    reconstruction, and/or weight based dosing when appropriate to reduce the    radiation dose to as low as reasonably achievable. CONTRAST: 80  cc of Isovue-370  intravenously         FINDINGS:          Micronodular opacities are seen in the lung bases. Spleen is not enlarged. The gallbladder is surgically absent. There are    multiple hyperenhancing tiny foci within the liver. A 4.1 cm calculus is    seen in the inferior pole of the left kidney. A 5 mm calculus is seen in    the sinus midpole of the left    kidney. No hydronephrosis. No obstructing ureteral calculus. Marked    atrophy of the pancreas. Large amount of stool fills the colon. A    suprapubic bladder catheter is seen. A rectal tube is seen in place. A    decubitus ulcer is noted in the lower pelvis    region. Otherwise, the biliary tree, and adrenal glands are unremarkable. No bowel obstruction or acute inflammatory bowel process. The appendix is    not readily identified. No inflammatory changes in the right lower    quadrant. The abdominal aorta is not aneurysmal. Aortoiliac calcifications. About    50% stenosis of the distal abdominal aorta aorta. High-grade stenosis in    the common iliac arteries. No significantly enlarged lymph nodes are seen. The uterus is unremarkable. Bones: Marked impingement. Degenerative changes of the visualized    thoracolumbar spine. Degenerative changes of the SI joints and both hips. Final   1. A prominent decubitus ulcer is seen in the talus pelvis. Correlate with clinical examination. A fistula is not clearly demonstrated. 2. There are tiny hyperenhancing foci in the liver that cannot be characterized on this exam. Consider obtaining an MRI abdomen without and with contrast for further evaluation. 3. There are micronodular densities in the visualized lung bases. A CT chest without contrast can be obtained to further evaluate. These may relate to infection versus metastases. 4. Marked osteopenia. 5. Other findings as described above. **This report has been created using voice recognition software. It may contain minor errors which are inherent in voice recognition technology. **      Final report electronically signed by Dr Silvia Calvert on 7/25/2022 3:49 PM      ** ORIGINAL REPORT **   PROCEDURE: CT ABDOMEN PELVIS W IV CONTRAST      CLINICAL INFORMATION: sacral wounds - air expelled from sacral wound - fistula      COMPARISON: 12/31/2018. TECHNIQUE:    Helical CT acquisition of the abdomen and pelvis was performed with administration of intravenous contrast. Multiplanar reformats are provided.       All CT scans at this facility use dose modulation, iterative reconstruction, and/or weight based dosing when appropriate to reduce the radiation dose to as low as reasonably achievable. CONTRAST: 80  cc of Isovue-370  intravenously         FINDINGS:          Micronodular opacities are seen in the lung bases. Spleen is not enlarged. The gallbladder is surgically absent. There are multiple hyperenhancing tiny foci within the liver. A 4.1 cm calculus is seen in the inferior pole of the left kidney. A 5 mm calculus is seen in the sinus midpole of the left    kidney. No hydronephrosis. No obstructing ureteral calculus. Marked atrophy of the pancreas. Large amount of stool fills the colon. A suprapubic bladder catheter is seen. A rectal tube is seen in place. A decubitus ulcer is noted in the lower pelvis    region. Otherwise, the biliary tree, and adrenal glands are unremarkable. No bowel obstruction or acute inflammatory bowel process. The appendix is not readily identified. No inflammatory changes in the right lower quadrant. The abdominal aorta is not aneurysmal. Aortoiliac calcifications. About 50% stenosis of the distal abdominal aorta aorta. High-grade stenosis in the common iliac arteries. No significantly enlarged lymph nodes are seen. The uterus is unremarkable. Bones: Marked impingement. Degenerative changes of the visualized thoracolumbar spine. Degenerative changes of the SI joints and both hips. IMPRESSION:   1. A prominent decubitus ulcer is seen in the talus pelvis. Correlate with clinical examination. A fistula is not clearly demonstrated. 2. There are tiny hyperenhancing foci in the liver that cannot be characterized on this exam. Consider obtaining an MRI abdomen without and with contrast for further evaluation. 3. There are micronodular densities in the visualized lung bases. A CT chest without contrast can be obtained to further evaluate. These may relate to infection versus metastases. 4. Marked osteopenia. 5. Other findings as described above. **This report has been created using voice recognition software. It may contain minor errors which are inherent in voice recognition technology. **      Final report electronically signed by Dr Saeed Mckeon on 7/25/2022 1:56 PM          LABS:      Labs Reviewed   CULTURE, REFLEXED, URINE - Abnormal; Notable for the following components:       Result Value    Organism Yeast (*)     All other components within normal limits    Narrative:     Source: cath urine       Site: catheter          Current Antibiotics: not stated   CBC WITH AUTO DIFFERENTIAL - Abnormal; Notable for the following components:    RBC 3.96 (*)     .8 (*)     MCHC 31.3 (*)     RDW-SD 47.2 (*)     All other components within normal limits   COMPREHENSIVE METABOLIC PANEL W/ REFLEX TO MG FOR LOW K - Abnormal; Notable for the following components:    Alkaline Phosphatase 137 (*)     Total Protein 8.3 (*)     Albumin 3.1 (*)     All other components within normal limits   SEDIMENTATION RATE - Abnormal; Notable for the following components:    Sed Rate 100 (*)     All other components within normal limits   URINE WITH REFLEXED MICRO - Abnormal; Notable for the following components:    Blood, Urine TRACE (*)     Protein, UA TRACE (*)     Leukocyte Esterase, Urine SMALL (*)     Character, Urine CLOUDY (*)     All other components within normal limits   ANION GAP - Abnormal; Notable for the following components:    Anion Gap 7.0 (*)     All other components within normal limits   PROCALCITONIN   C-REACTIVE PROTEIN   LACTIC ACID   GLOMERULAR FILTRATION RATE, ESTIMATED   OSMOLALITY       EMERGENCY DEPARTMENT COURSE:   Vitals:    Vitals:    07/25/22 1154 07/25/22 1320 07/25/22 1420 07/25/22 1535   BP: (!) 100/56 (!) 100/50 (!) 110/52    Pulse: 65 65 67 67   Resp: 18 18 18 18   Temp:       TempSrc:       SpO2: 97% 95% 98% 98%   Weight:          11:04 AM EDT: The patient was seen and evaluated.     Patient presents with reassuring vital signs for complaints of bleeding from some bedsores. On my evaluation, there is no bleeding associated with the wounds. Difficult to ascertain from recent wound clinic photos any notable progression as the most recent of reasonable quality is 1 year ago. There does not appear to be any superimposed cellulitis. She has reassuring vital signs aside from sedimentation rate 100. urinalysis overall improved in comparison to previous though she is now noted to have moderate pseudohyphae. CT of the abdomen showed a prominent decubitus ulcer in the pelvis with tiny hyperenhancing foci in the liver and micronodular densities in the visualized lung bases as well as marked osteopenia a large amount of stool filling the colon. Degenerative changes in the thoracic and lumbar spine as well as SI joints and both hips were noted as well. Suspect the radicular pain could be somewhat attributed to this. I was later notified from radiology that they do question a rectovaginal fistula as there is visualized contrast in the vagina. Follow-up CT chest for the abnormalities in the lung bases did not reveal any acute abnormality. Results were discussed with the patient as well as attending provider. Discussed with general surgery in regards to the rectovaginal fistula and they are agreeable with outpatient follow-up. Patient should reestablish with the wound clinic. We will treat the yeast cystitis and she should follow with urology as well patient and family were agreeable with this plan and they denied further needs upon discharge. CRITICAL CARE:   None    CONSULTS:  Dr. Tanisha Balbuena, agreeable with outpatient follow-up    PROCEDURES:  None    FINAL IMPRESSION      1. Yeast cystitis    2. Rectovaginal fistula    3. Constipation, unspecified constipation type    4.  Pressure injury of buttock, unstageable, unspecified laterality [facility])          DISPOSITION/PLAN   Discharge    PATIENT REFERRED TO:  Jessica Rodas MD  [address] [place] [address] [address] [facility]  [phone]    Call   for follow-up of possible fistula    Governor Ari Alexandre, APRN - CNP  [address] 24 23    Call in 1 day  for wound clinic follow-up    Kevin Downing MD  [address] [phone]      as scheduled    [address]  [address] [facility]  [phone]    If symptoms worsen    DISCHARGEMEDICATIONS:  Discharge Medication List as of 7/25/2022  4:23 PM        START taking these medications    Details   fluconazole (DIFLUCAN) 100 MG tablet Take 2 tablets by mouth in the morning for 14 days. , Disp-28 tablet, R-0Normal             (Please note that portions of this note were completedwith a voice recognition program.  Efforts were made to edit the dictations but occasionally words are mis-transcribed.)        Camellia Lennox, PA-C  07/27/22 1037

## 2022-07-25 NOTE — ED NOTES
In for hourly rounding. Pt resting on cot in position of comfort. Pt remains A&Ox4, resps easy and unlabored. IV infusing and shows no s/s of infection or infiltration. Pt pain improved at this time. Updated pt on POC. Will monitor.      Marquita Maharaj RN  07/25/22 4937

## 2022-07-25 NOTE — ED NOTES
Pt presents to ED via [facility] for c/o bed sores. Pt reports on-going bed sores that are not getting better. Pt reports following with Dr Louis Mae office and being prescribed multiple antibiotics for on-going UTI. Pt has chronic suprapubic catheter. Pt reports noticing blood on bed pads and scooter, so decided to be checked out here. Upon initial assessment, pt is A&ox4, resps easy and unlabored. Pt has history of MS and is bed bound. Pt has contractures of the bilateral the lower extremities. Pt reports increased pain of the buttocks. Pt turned and wounds noted at the sacrum and gluteal cleft. Please see images obtained by provider. During assessment of wounds, pt has \"poof\" of air escape. Pt denies passing gas. \"Poof\" noted to be coming from a wound. Pt denies taking anything at home for pain. IV established with blood drawn and sent to lab. VS as noted. Awaiting further orders.      Asim Cabello RN  07/25/22 2954

## 2022-07-28 LAB
ORGANISM: ABNORMAL
URINE CULTURE REFLEX: ABNORMAL

## 2022-08-01 ENCOUNTER — HOSPITAL ENCOUNTER (OUTPATIENT)
Dept: WOUND CARE | Age: 72
Discharge: HOME | End: 2022-08-01
Payer: MEDICARE

## 2022-08-01 VITALS
SYSTOLIC BLOOD PRESSURE: 118 MMHG | HEART RATE: 71 BPM | DIASTOLIC BLOOD PRESSURE: 68 MMHG | RESPIRATION RATE: 18 BRPM | TEMPERATURE: 97.7 F | OXYGEN SATURATION: 92 %

## 2022-08-01 DIAGNOSIS — L89.323 PRESSURE INJURY OF LEFT ISCHIUM, STAGE 3 (HCC): ICD-10-CM

## 2022-08-01 DIAGNOSIS — L89.313 PRESSURE INJURY OF RIGHT ISCHIUM, STAGE 3 (HCC): Primary | ICD-10-CM

## 2022-08-01 PROCEDURE — 99213 OFFICE O/P EST LOW 20 MIN: CPT

## 2022-08-01 PROCEDURE — 99212 OFFICE O/P EST SF 10 MIN: CPT | Performed by: NURSE PRACTITIONER

## 2022-08-01 RX ORDER — NITROFURANTOIN 25; 75 MG/1; MG/1
100 CAPSULE ORAL DAILY
COMMUNITY
End: 2022-08-08

## 2022-08-01 NOTE — PROGRESS NOTES
Raymond Marco, was evaluated through a synchronous (real-time) audio-video encounter. The patient (or guardian if applicable) is aware that this is a billable service, which includes applicable co-pays. This Virtual Visit was conducted with patient's (and/or legal guardian's) consent. The visit was conducted pursuant to the emergency declaration under the [address] [place] [address] authority and the [employer] General Act. Patient identification was verified, and a caregiver was present when appropriate. The patient was located at Home: [address] [place] [address]. Provider was located at [address]: [place] [address] [facility],  [facility]. Total time spent for this encounter:  30 minutes    --Marga Cohn RN on 8/1/2022 at 3:30 PM    An electronic signature was used to authenticate this note.

## 2022-08-01 NOTE — PROGRESS NOTES
Virtual Visit Via [facility]   Progress Note and Procedure Note    [address] RECORD NUMBER:  [id number]  AGE: 66 y.o. GENDER: female  : 1950  EPISODE DATE:  2022    Subjective:     Chief Complaint   Patient presents with    Wound Check        Consent obtained to communicate via Virtual Visit:  Yes     HISTORY of PRESENT ILLNESS KETAN Maria is a 66 y.o. female who presents today via Virtual Visit for re-evaluation of bilateral ischium wounds. History of Wound Context:   Patient has previously followed at wound care center for similar wounds, last seen in 2022 at which time they were healed. Current wounds are reported to have opened over the last few weeks. Patient denies pain to wounds. Current wound care includes triad and bordered foams to ischial wounds. Patient has history of MS, is mostly bedbound, has phillip lift at home for transfers. Straps for phillip are reported to be in contact with wounds when used. Patient has chronic vega catheter, does have chronic leakage per her report. Patient states that she has \"questionable\" control of her bowels. Current with home health, has aides that come several days weekly,  is primary caregiver. Has low air loss mattress. She denies any fever or chills.   She denies any further needs or concerns     Ulcer Identification:  Ulcer Type: pressure  Contributing Factors: chronic pressure, decreased mobility, shear force, incontinence of stool and incontinence of urine     Depth of Diabetic/Pressure/Non Pressure Ulcers or Wound:  Pressure ulcer, stage 3    PAST MEDICAL HISTORY        Diagnosis Date    Arthritis     Difficulty in swallowing     Hyperlipidemia     Hypertension     Intra-abdominal lymphadenopathy     MS (multiple sclerosis) (HCC)     Pancreatitis     Pneumonia     Seizures (HCC)     UTI (urinary tract infection)        PAST SURGICAL HISTORY    Past Surgical History:   Procedure Laterality Date    CATHETER INSERTION N/A 2022    CYSTO, SUPRAPUBIC CATHETER PLACEMENT WITH ULTRASOUND performed by Nikolay Arriaza MD at [facility]  2016    laparoscopic    CYSTOSCOPY N/A 2019    CYSTO, CLOT EVACUATION, TURBT performed by Katie Toribio MD at [facility] [address] Left 2020    LEFT HEEL WOUND I&D performed by Shea Baxter DPM at [address] [place] [address] Right 2018    EYE CATARACT EMULSIFICATION IOL IMPLANT, RIGHT performed by Denise Andre MD at [address] [place] [address] INSJ IO LENS PROSTH W/O ECP Left 11/15/2018    EYE CATARACT EMULSIFICATION IOL IMPLANT LEFT EYE performed by Denise Andre MD at [address] [place] [address]    Family History   Problem Relation Age of Onset    Cancer Mother         colon    Heart Disease Father     Diabetes Neg Hx     High Blood Pressure Neg Hx     Stroke Neg Hx        SOCIAL HISTORY    Social History     Tobacco Use    Smoking status: Former     Packs/day: 2.00     Years: 40.00     Pack years: 80.00     Types: Cigarettes     Quit date: 10/2/2013     Years since quittin.8    Smokeless tobacco: Former     Quit date: 11/3/2015   Vaping Use    Vaping Use: Former    Substances: Unknown   Substance Use Topics    Alcohol use: No    Drug use: No       ALLERGIES    No Known Allergies    MEDICATIONS    Current Outpatient Medications on File Prior to Encounter   Medication Sig Dispense Refill    nitrofurantoin, macrocrystal-monohydrate, (MACROBID) 100 MG capsule Take 100 mg by mouth in the morning. prophylactic. fluconazole (DIFLUCAN) 100 MG tablet Take 2 tablets by mouth in the morning for 14 days.  28 tablet 0    oxybutynin (DITROPAN) 5 MG tablet Take 1 tablet by mouth 3 times daily as needed (bladder spasms) 90 tablet 11    acetic acid 0.25 % irrigation Irrigate as directed 1000 mL 5    Water For Irrigation, Sterile (STERILE WATER FOR IRRIGATION) Irrigate with 50 ml of sterile water twice a day. 1000 mL 5    fluticasone (FLONASE) 50 MCG/ACT nasal spray 2 sprays by Each Nostril route 2 times daily (Patient not taking: Reported on 8/1/2022) 1 Bottle 3    aspirin (RA ASPIRIN EC) 81 MG EC tablet take 1 tablet by mouth once daily 30 tablet 0    metoprolol tartrate (LOPRESSOR) 25 MG tablet take 1/2 tablet by mouth twice a day 30 tablet 11    Cholecalciferol (VITAMIN D3 PO) Take by mouth daily      carBAMazepine (TEGRETOL) 100 MG chewable tablet Take 100 mg by mouth 3 times daily        No current facility-administered medications on file prior to encounter. REVIEW OF SYSTEMS    Pertinent items are noted in HPI. Objective:     PHYSICAL EXAM    General Appearance: in no acute distress and alert     Healing stage 3 pressure injury to bilateral ischium-   Left:      Right:      Assessment:      Problem List Items Addressed This Visit          Other    * (Principal) Pressure injury of left ischium, stage 3 ([facility])    Pressure injury of right ischium, stage 3 ([address] - Primary       Performed by: BROOK Velázquez - CNP    Wound/Ulcer #: 1,   Diabetic/Pressure/Non Pressure Ulcers only:  Ulcer: Pressure ulcer, Stage 3       Plan:     Please see attached Discharge Instructions    Based upon this virtual visit it is not required to have an in-person visit of this time.    -Stage 3 pressure injury to bilateral ischium have re-opened. Continue use of Triad and foam dressings. Wounds may be very difficult to heal as phillip lift appears to be creating significant pressure/friction/shear with use. Use of phillip is unavoidable, however, as patient requires total assistance. Reinforced importance of frequent turning and repositioning to prevent future wounds. Offloading mattress and cushions do not replace turning. Avoid sliding against furniture with transfers, use lift equipment as able.   Offloading techniques reviewed, patient verbalizes understanding. Follow up 4 weeks via virtual visit. Call clinic for any further needs or concerns prior to scheduled visit. Written patient dismissal instructions available to Patient/POA       Orders Placed This Encounter   Procedures    Misc nursing order (specify)     Visit Discharge/Physician Orders:  - Turn and reposition every 2 hours to decrease pressure on wounds. - No prolonged time on commode to limit pressure on wounds. - Limit shearing/sliding/friction. Home Care: [facility]    Wound Location: Left buttock, Right buttock    Dressing orders:     1) Gather wound care supplies and arrange on clean table. 2) Wash your hands with soap and water or use alcohol based hand  for 20 seconds (sing \"Happy Birthday\" twice). 3) Cleanse wounds with normal saline or wound cleanser and gauze. Pat dry with clean gauze. 4) Left buttock, Right buttock- Apply thin layer of triad paste. Cover with silicone bordered foam dressing. Change daily and as needed if soiled. Keep all dressings clean & dry. Follow up visit: Monday September 5th at 1:00 pm     Standing Status:   Standing     Number of Occurrences:   1         Discharge Instructions         Visit Discharge/Physician Orders:  - Turn and reposition every 2 hours to decrease pressure on wounds. - No prolonged time on commode to limit pressure on wounds. - Limit shearing/sliding/friction. Home Care: [facility]    Wound Location: Left buttock, Right buttock    Dressing orders:     1) Gather wound care supplies and arrange on clean table. 2) Wash your hands with soap and water or use alcohol based hand  for 20 seconds (sing \"Happy Birthday\" twice). 3) Cleanse wounds with normal saline or wound cleanser and gauze. Pat dry with clean gauze. 4) Left buttock, Right buttock- Apply thin layer of triad paste. Cover with silicone bordered foam dressing. Change daily and as needed if soiled.      Keep all dressings clean & dry. Follow up visit:  at 1:00 pm    Keep next scheduled appointment. Please give 24 hour notice if unable to keep appointment. [phone]    If you experience any of the following, please call the Wound Care Service during business hours: Monday through Friday 8:00 am - 4:30 pm  ([phone]). *Increase in pain   *Temperature over 101   *Increase in drainage from your wound or a foul odor   *Uncontrolled swelling   *Need for compression bandage changes due to slippage, breakthrough drainage    If you need medical attention outside of business hours, please contact your Primary Care Doctor or go to the nearest emergency room. Alexa Quintero AGE: 66 y.o. GENDER: female  : 1950 being evaluated by a Virtual Visit (video visit) encounter to address concerns as mentioned above. A caregiver was present when appropriate. Due to this being a TeleHealth encounter (During [facility]- public health emergency), evaluation of the following organ systems was limited: Vitals/Constitutional/EENT/Resp/CV/GI//MS/Neuro/Skin/Heme-Lymph-Imm. Pursuant to the emergency declaration under the [facility], [address] [place] [address] authority and the [employer] Act, this Virtual Visit was conducted with patient's (and/or legal guardian's) consent, to reduce the patient's risk of exposure to COVID-19 and provide necessary medical care. The patient (and/or legal guardian) has also been advised to contact this office for worsening conditions or problems, and seek emergency medical treatment and/or call 911 if deemed necessary. Services were provided through a video synchronous discussion virtually to substitute for in-person clinic visit. Patient was located at their individual homes. Provider was located in [place] [address].     Electronically signed by BROOK Lazar CNP on 8/1/2022 at 2:48 PM

## 2022-08-01 NOTE — DISCHARGE INSTRUCTIONS
Visit Discharge/Physician Orders:  - Turn and reposition every 2 hours to decrease pressure on wounds. - No prolonged time on commode to limit pressure on wounds. - Limit shearing/sliding/friction. Home Care: [facility]    Wound Location: Left buttock, Right buttock    Dressing orders:     1) Gather wound care supplies and arrange on clean table. 2) Wash your hands with soap and water or use alcohol based hand  for 20 seconds (sing \"Happy Birthday\" twice). 3) Cleanse wounds with normal saline or wound cleanser and gauze. Pat dry with clean gauze. 4) Left buttock, Right buttock- Apply thin layer of triad paste. Cover with silicone bordered foam dressing. Change daily and as needed if soiled. Keep all dressings clean & dry. Follow up visit: Monday September 5th at 1:00 pm    Keep next scheduled appointment. Please give 24 hour notice if unable to keep appointment. [phone]    If you experience any of the following, please call the Wound Care Service during business hours: Monday through Friday 8:00 am - 4:30 pm  ([phone]). *Increase in pain   *Temperature over 101   *Increase in drainage from your wound or a foul odor   *Uncontrolled swelling   *Need for compression bandage changes due to slippage, breakthrough drainage    If you need medical attention outside of business hours, please contact your Primary Care Doctor or go to the nearest emergency room.

## 2022-08-01 NOTE — PLAN OF CARE
Problem: Wound:  Goal: Will show signs of wound healing; wound closure and no evidence of infection  Description: Will show signs of wound healing; wound closure and no evidence of infection  Outcome: Progressing  Virtual visit completed with patient and [facility] nurse-Lucy. Discharge instructions/dressing orders faxed to [address]. Left buttock, Right buttock- Apply thin layer of triad paste. Cover with silicone bordered foam dressing. Change daily and as needed if soiled. Follow up visit: Monday September 5th at 1:00 pm.    Care plan reviewed with patient and [facility] nurse. Patient and [facility] nurse verbalize understanding of the plan of care and contribute to goal setting.

## 2022-08-01 NOTE — PROGRESS NOTES
Virtual Visit Wound Care  Clinic Level of Care   NAME:  Jen Williamson OF BIRTH:  1950 GENDER: female  MEDICAL RECORD NUMBER:  [id number]   DATE:  8/1/2022     Patient Type Points   No documentation completed by nursing staff. []   0   Nursing staff documented in the navigator for an ESTABLISHED patient including Episode, Patient ID, Chief Complaint, Travel Screen, Allergies, Latex Allergy, Home Medication, History, Psychosocial Screen, C-SSRS Screen, Fall Risk, Nutritional Screen, Advanced Directive, Education and Plan of Care, and Discharge Instructions. The Functional Screening tab is only required if the patient's status changes. []   1   Nursing staff documented in the navigator for a NEW patient including Patient ID, Chief Complaint, Travel Screen, Allergies, Latex Allergy, Home Medication, History, Psychosocial Screen, C-SSRS Screen, Fall Risk, Nutritional Screen, Advanced Directive, Functional Screen, Education and Plan of Care, and Discharge Instructions. [x]   2   Nursing staff documented in the navigator for a CONSULT patient including Episode, Patient ID, Chief Complaint, Travel Screen, Allergies, Latex Allergy, Home Medication, History, Psychosocial Screen, C-SSRS Screen, Fall Risk, Nutritional Screen, Advanced Directive, Functional Screen, Education and Plan of Care, and Discharge Instructions. []   2     Wound Description Points   Unable to obtain image of Wound. For example, patient/caregiver is instructed not to remove dressing, is unable to correctly position smart phone, no smart phone is available, patient is unable to maintain connectivity or the patient's wound is healed. []   0   1-3 wound images annotated. Images of the wound(s) is obtained and annotated along with completed description in [facility]. [x]   1   4-5 wound images annotated. Images of the wound(s) is obtained and annotated along with completed description in [facility]. []   2   Greater than 6 wound images annotated.  Images of the wound(s) is obtained and annotated along with completed description in [address] [place] [address]. []   3     Education Points   No Education completed by nursing staff.    []   0   Patient/caregiver is educated on 1-4 topics. Nursing staff identifies learner, confirms understanding of information (verbal, demonstration, written) and documents details. May include Discharge Instructions/AVS, available documents in My Chart, or Web-based learning.  []   1   Patient/caregiver is educated on 5-9 topics. Nursing staff identifies learner, confirms understanding of information (verbal, demonstration, written) and documents details. May include Discharge Instructions/AVS, available documents in My Chart, or Web-based learning. [x]   2   Patient/caregiver is educated on 10 or more topics. Nursing staff identifies learner, confirms understanding of information (verbal, demonstration, written) and documents details. May include Discharge Instructions/AVS, available documents in My Chart, or Web-based learning. []   3     Follow-up Virtual Visit Points   No contact with outside resources made. []   0   Nursing staff contacts 1-2 outside resource. For example, telephone call made to home health, primary care provider, pharmacy, or DME. May include filling out forms and writing letters, arranging transportation, communication with insurance , vendors, etc.  Discharge, instructions and/or After Visit Summary given to patient/caregiver and instructions completed. [x]   1   Nursing staff contacts 3-4 outside resource. For example, telephone calls made to home health, primary care provider, pharmacy, or DME. May include filling out forms and writing letters, arranging transportation, communication with insurance , vendors, etc.  Discharge, instructions and/or After Visit Summary given to patient/caregiver and instructions completed. []   2   Nursing contacts 5 or more outside resource.  For example, telephone calls made to home health, primary care providers, pharmacy, or DME. May include filling out forms and writing letters, arranging transportation, communication with insurance , vendors, etc.  Discharge, instructions and/or After Visit Summary given to patient/caregiver and instructions completed.    []   3     Is this the Patient's First Visit to the [address]  Yes    Is this Patient Established @ [facility]  No             Virtual Visit Clinical Level of Care Wound Care      Points  1-3  Level 1 []     Points  4-5  Level 2 []     Points  6-7  Level 3 [x]     Points  8-9  Level 4 []     Points  10-11  Level 5 []       Electronically signed by Vladimir Felipe RN on 8/1/2022 at @ 3:32 PM

## 2022-08-08 ENCOUNTER — OFFICE VISIT (OUTPATIENT)
Dept: UROLOGY | Age: 72
End: 2022-08-08
Payer: MEDICARE

## 2022-08-08 VITALS — BODY MASS INDEX: 22.14 KG/M2 | SYSTOLIC BLOOD PRESSURE: 110 MMHG | DIASTOLIC BLOOD PRESSURE: 70 MMHG | HEIGHT: 63 IN

## 2022-08-08 DIAGNOSIS — N39.0 RECURRENT UTI: ICD-10-CM

## 2022-08-08 DIAGNOSIS — N31.9 NEUROGENIC BLADDER: Primary | ICD-10-CM

## 2022-08-08 PROCEDURE — 99213 OFFICE O/P EST LOW 20 MIN: CPT | Performed by: UROLOGY

## 2022-08-08 PROCEDURE — 1036F TOBACCO NON-USER: CPT | Performed by: UROLOGY

## 2022-08-08 PROCEDURE — 1090F PRES/ABSN URINE INCON ASSESS: CPT | Performed by: UROLOGY

## 2022-08-08 PROCEDURE — 1124F ACP DISCUSS-NO DSCNMKR DOCD: CPT | Performed by: UROLOGY

## 2022-08-08 PROCEDURE — G8427 DOCREV CUR MEDS BY ELIG CLIN: HCPCS | Performed by: UROLOGY

## 2022-08-08 PROCEDURE — G8420 CALC BMI NORM PARAMETERS: HCPCS | Performed by: UROLOGY

## 2022-08-08 PROCEDURE — 3017F COLORECTAL CA SCREEN DOC REV: CPT | Performed by: UROLOGY

## 2022-08-08 PROCEDURE — G8400 PT W/DXA NO RESULTS DOC: HCPCS | Performed by: UROLOGY

## 2022-08-08 RX ORDER — NITROFURANTOIN 25; 75 MG/1; MG/1
100 CAPSULE ORAL DAILY
COMMUNITY

## 2022-08-08 NOTE — PROGRESS NOTES
MD MD [facility] Consultation / New Patient Visit    Patient:  Wendy Gomez  YOB: 1950  Date: 8/8/2022  Consult requested from Clint Epperson MD     HISTORY OF PRESENT ILLNESS:   The patient is a 66 y.o. female who presents today for follow-up for the following problem(s): neurogenic bladder, recurrent UTI  Overall the problem(s) : are worsening. Associated Symptoms: No dysuria, gross hematuria. Pain Severity:      Today visit:   8/8/22   Prestents after SPT placement for NGB. Doing well  On PPx antibiotics prescribed by PCP - had one yeast infection. Summary of old records:   (Patient's old records, notes and chart reviewed and summarized above.)    Urinalysis today:  No results found for this visit on 08/08/22.     Last BUN and creatinine:  Lab Results   Component Value Date    BUN 13 07/25/2022     Lab Results   Component Value Date    CREATININE 0.5 07/25/2022       Imaging Reviewed during this Office Visit:   (results were independently reviewed by physician and radiology report verified)    PAST MEDICAL, FAMILY AND SOCIAL HISTORY:  Past Medical History:   Diagnosis Date    Arthritis     Difficulty in swallowing     Hyperlipidemia     Hypertension     Intra-abdominal lymphadenopathy     MS (multiple sclerosis) ([address]     Pancreatitis     Pneumonia     Seizures ([address]     UTI (urinary tract infection)      Past Surgical History:   Procedure Laterality Date    CATHETER INSERTION N/A 1/17/2022    CYSTO, SUPRAPUBIC CATHETER PLACEMENT WITH ULTRASOUND performed by Adrian Orozco MD at [facility]  April 1st 2016    laparoscopic    CYSTOSCOPY N/A 1/2/2019    CYSTO, CLOT EVACUATION, TURBT performed by Giulia Rivera MD at [address] [place] [address] Left 11/6/2020    LEFT HEEL WOUND I&D performed by Cj Renteria DPM at [address] [place] [address] Right 12/17/2018    EYE CATARACT EMULSIFICATION IOL IMPLANT, RIGHT performed by Ginger Skaggs MD at [address] [facility] INSJ IO LENS PROSTH W/O ECP Left 11/15/2018    EYE CATARACT EMULSIFICATION IOL IMPLANT LEFT EYE performed by Ginger Skaggs MD at [address]     Family History   Problem Relation Age of Onset    Cancer Mother         colon    Heart Disease Father     Diabetes Neg Hx     High Blood Pressure Neg Hx     Stroke Neg Hx      Outpatient Medications Marked as Taking for the 22 encounter (Office Visit) with Sanjeev Leonard MD   Medication Sig Dispense Refill    nitrofurantoin, macrocrystal-monohydrate, (MACROBID) 100 MG capsule Take 100 mg by mouth in the morning. fluconazole (DIFLUCAN) 100 MG tablet Take 2 tablets by mouth in the morning for 14 days. 28 tablet 0    oxybutynin (DITROPAN) 5 MG tablet Take 1 tablet by mouth 3 times daily as needed (bladder spasms) 90 tablet 11    acetic acid 0.25 % irrigation Irrigate as directed 1000 mL 5    Water For Irrigation, Sterile (STERILE WATER FOR IRRIGATION) Irrigate with 50 ml of sterile water twice a day. 1000 mL 5    aspirin (RA ASPIRIN EC) 81 MG EC tablet take 1 tablet by mouth once daily 30 tablet 0    metoprolol tartrate (LOPRESSOR) 25 MG tablet take 1/2 tablet by mouth twice a day 30 tablet 11    Cholecalciferol (VITAMIN D3 PO) Take by mouth daily      carBAMazepine (TEGRETOL) 100 MG chewable tablet Take 100 mg by mouth 3 times daily          Patient has no known allergies.   Social History     Tobacco Use   Smoking Status Former    Packs/day: 2.00    Years: 40.00    Pack years: 80.00    Types: Cigarettes    Quit date: 10/2/2013    Years since quittin.8   Smokeless Tobacco Former    Quit date: 11/3/2015       Social History     Substance and Sexual Activity   Alcohol Use No       REVIEW OF SYSTEMS:  Constitutional: negative  Eyes: negative  Respiratory: negative  Cardiovascular: negative  Gastrointestinal: negative  Genitourinary: negative  Musculoskeletal: negative  Skin: negative   Neurological: negative  Hematological/Lymphatic: negative  Psychological: negative    Physical Exam:    This a 66 y.o. female      Vitals:    08/08/22 1454   BP: 110/70     Constitutional: Patient in no acute distress   Neuro: alert and oriented to person place and time. Psych: Mood and affect normal.  Head: atraumatic normocephalic  Eyes: EOMi  HEENT: neck supple, trachea midline  Lungs: Respiratory effort normal  Cardiovascular:  Normal peripheral pulses  Abdomen: Soft, non-tender, non-distended, No CVA  Bladder: non-tender and not distended. FROMx4, no cyanosis clubbing edema  Skin: warm and dry      Assessment and Plan      1. Neurogenic bladder    2. Recurrent UTI           Plan:      No follow-ups on file.   SPT exchanged monthly  Follow up 6 months  - May consider different PPx regimen if she continues to get fungal infections of urine

## 2022-08-15 ENCOUNTER — TELEPHONE (OUTPATIENT)
Dept: WOUND CARE | Age: 72
End: 2022-08-15

## 2022-08-15 NOTE — TELEPHONE ENCOUNTER
Returned call to Nadeem Sparks. Jeff MONTEIRO evaluated and is treating patient for pressure ulcers. Advised nurse to notify PCP or surgeon. She states she is going to contact Dr. Deepa Chavez office.

## 2022-08-15 NOTE — TELEPHONE ENCOUNTER
----- Message from Remington Guerra sent at 8/15/2022 11:18 AM EDT -----  Stacia [address] [phone] ASIF @ [facility] STATES THAT SHE NEEDS TO KNOW WHAT THE NOTES SAY ABOUT [facility].  ALSO WANTED TO RELAY THE MESSAGE THAT SHE PUT IN A NEW CATH TODAY, AND IT CAME OUT HER RECTUM

## 2022-08-16 ENCOUNTER — HOSPITAL ENCOUNTER (OUTPATIENT)
Dept: NURSING | Age: 72
Discharge: HOME | End: 2022-08-16
Payer: MEDICARE

## 2022-08-16 ENCOUNTER — HOSPITAL ENCOUNTER (OUTPATIENT)
Dept: CT IMAGING | Age: 72
Discharge: HOME | End: 2022-08-16
Payer: MEDICARE

## 2022-08-16 VITALS
TEMPERATURE: 97.4 F | SYSTOLIC BLOOD PRESSURE: 120 MMHG | HEART RATE: 84 BPM | BODY MASS INDEX: 22.14 KG/M2 | WEIGHT: 125 LBS | DIASTOLIC BLOOD PRESSURE: 80 MMHG | RESPIRATION RATE: 18 BRPM | OXYGEN SATURATION: 96 %

## 2022-08-16 DIAGNOSIS — N32.1 COLOVESICAL FISTULA: Primary | ICD-10-CM

## 2022-08-16 DIAGNOSIS — N32.1 INTESTINOVESICAL FISTULA: ICD-10-CM

## 2022-08-16 PROCEDURE — 72194 CT PELVIS W/O & W/DYE: CPT

## 2022-08-16 PROCEDURE — 6360000004 HC RX CONTRAST MEDICATION: Performed by: SURGERY

## 2022-08-16 PROCEDURE — 99211 OFF/OP EST MAY X REQ PHY/QHP: CPT

## 2022-08-16 RX ADMIN — IOTHALAMATE MEGLUMINE 5 ML: 600 INJECTION INTRAVASCULAR at 17:52

## 2022-08-16 ASSESSMENT — PAIN - FUNCTIONAL ASSESSMENT: PAIN_FUNCTIONAL_ASSESSMENT: 0-10

## 2022-08-16 ASSESSMENT — PAIN DESCRIPTION - DESCRIPTORS: DESCRIPTORS: ACHING

## 2022-08-16 NOTE — PROGRESS NOTES
1620 discharge instructions given and explained and she denies questions.   Patient transported to ct on stretcher with all belongings

## 2022-08-16 NOTE — PROGRESS NOTES
1250 pt admitted to [facility] per wheelchair  for a consultation with dr Meg Cao and possible suprapubic catheter exchanged. PT RIGHTS AND RESPONSIBILITIES OFFERED TO PT.  PT WITH CARA LIFT AND ASSIST TO BED. PT NOTED WITH MS.    [address] DID EXCHANGE THAT SUPRAPUBIC CATHETER YESTERDAY. PT NOTED WITH WOUND DRESSING TO EACH BUTTOCK. Paullette Rakes AREA NOTED WITH SOME OOZING. 1256 DR GIVEN MESSAGE PER PERFECT SERVE AND READ   1340 DR MIKE ABDULLAHI HERE AT BEDSIDE TO SEE PATIENT. 1345 dr Meg Cao states if office unable to get ct today, pt is discharged and office will get in touch with patient for further instructions. 1400 crystal at office called and states ct is sending for patient. Patient informed   1430 ct called. States it may be up to an hour before getting patient. Pt informed. 1500 jodell, ct called and working on order    1530 kailey, ct called and hopefully will be soon. Working on getting order.    1545 report given to desmond bhatia                         __m__ Safety:       (Environmental)  Martin to environment  Ensure ID band is correct and in place/ allergy band as needed  Assess for fall risk  Initiate fall precautions as applicable (fall band, side rails, etc.)  Call light within reach  Bed in low position/ wheels locked    __m__ Pain:       Assess pain level and characteristics  Administer analgesics as ordered  Assess effectiveness of pain management and report to MD as needed    ___m_ Knowledge Deficit:  Assess baseline knowledge  Provide teaching at level of understanding  Provide teaching via preferred learning method  Evaluate teaching effectiveness    _m___ Hemodynamic/Respiratory Status:       (Pre and Post Procedure Monitoring)  Assess/Monitor vital signs and LOC  Assess Baseline SpO2 prior to any sedation  Obtain weight/height  Assess vital signs/ LOC until patient meets discharge criteria  Monitor procedure site and notify MD of any issue

## 2022-08-18 ENCOUNTER — TELEPHONE (OUTPATIENT)
Dept: UROLOGY | Age: 72
End: 2022-08-18

## 2022-08-18 ENCOUNTER — TELEPHONE (OUTPATIENT)
Dept: WOUND CARE | Age: 72
End: 2022-08-18

## 2022-08-18 NOTE — TELEPHONE ENCOUNTER
Martina Loving from [facility] home health called to clarify last orders for patient. Martina Loving mentioned a possible new wound to patient coccyx. Martina Loving states they are only able to go to patient home for dressing changes 2 times a week as this is what insurance allow.  is teachable to do dressings on off days but nurse states he \"is not hands on\"   Will notify provider. Patient rescheduled for 8/26 to evaluate new and existing wounds and dressing changes.

## 2022-08-18 NOTE — TELEPHONE ENCOUNTER
Ludin Santiago from  called to state that [address] sp cath was changed on Monday. The nurse who changed the sp cath stated that once the cath was changed she turned her over for dressing changes and noticed the catheter coming out of her rectum. Cath was removed and a new one placed. Ct urogram was ordered by dr. Bernadette Deutsch. Results are completed. Currently they have stopped all flushes. Urine is neon yellow in the tube. She has no fever or chills. No pain. No acute distress noted per the nurse. Pt has no feeling in the lower region as she has ms. She is currently on daily macrobid. Please advise.     Ludin Santiago [phone]  [facility] office [phone]

## 2022-08-18 NOTE — TELEPHONE ENCOUNTER
Ct cystogram does not show fistula    1. Contrast is not seen within bowel loops to suggest enterovesical fistula this time. I do not see bowel adhering to the bladder to suggest the presence of a enterovesical fistula.     Cont current tx

## 2022-08-26 ENCOUNTER — HOSPITAL ENCOUNTER (OUTPATIENT)
Dept: WOUND CARE | Age: 72
Discharge: HOME | End: 2022-08-26
Payer: MEDICARE

## 2022-08-26 VITALS
HEART RATE: 81 BPM | OXYGEN SATURATION: 97 % | WEIGHT: 125 LBS | SYSTOLIC BLOOD PRESSURE: 110 MMHG | DIASTOLIC BLOOD PRESSURE: 60 MMHG | HEIGHT: 63 IN | TEMPERATURE: 96.9 F | RESPIRATION RATE: 18 BRPM | BODY MASS INDEX: 22.15 KG/M2

## 2022-08-26 DIAGNOSIS — L89.323 PRESSURE INJURY OF LEFT ISCHIUM, STAGE 3 (HCC): Primary | ICD-10-CM

## 2022-08-26 DIAGNOSIS — L89.313 PRESSURE INJURY OF RIGHT ISCHIUM, STAGE 3 (HCC): ICD-10-CM

## 2022-08-26 PROCEDURE — 99212 OFFICE O/P EST SF 10 MIN: CPT

## 2022-08-26 PROCEDURE — 99212 OFFICE O/P EST SF 10 MIN: CPT | Performed by: NURSE PRACTITIONER

## 2022-08-26 NOTE — PROGRESS NOTES
Virtual Visit Wound Care  Clinic Level of Care   NAME:  Umm Quiñonez OF BIRTH:  1950 GENDER: female  MEDICAL RECORD NUMBER:  [id number]   DATE:  8/26/2022     Patient Type Points   No documentation completed by nursing staff. []   0   Nursing staff documented in the navigator for an ESTABLISHED patient including Episode, Patient ID, Chief Complaint, Travel Screen, Allergies, Latex Allergy, Home Medication, History, Psychosocial Screen, C-SSRS Screen, Fall Risk, Nutritional Screen, Advanced Directive, Education and Plan of Care, and Discharge Instructions. The Functional Screening tab is only required if the patient's status changes. [x]   1   Nursing staff documented in the navigator for a NEW patient including Patient ID, Chief Complaint, Travel Screen, Allergies, Latex Allergy, Home Medication, History, Psychosocial Screen, C-SSRS Screen, Fall Risk, Nutritional Screen, Advanced Directive, Functional Screen, Education and Plan of Care, and Discharge Instructions. []   2   Nursing staff documented in the navigator for a CONSULT patient including Episode, Patient ID, Chief Complaint, Travel Screen, Allergies, Latex Allergy, Home Medication, History, Psychosocial Screen, C-SSRS Screen, Fall Risk, Nutritional Screen, Advanced Directive, Functional Screen, Education and Plan of Care, and Discharge Instructions. []   2     Wound Description Points   Unable to obtain image of Wound. For example, patient/caregiver is instructed not to remove dressing, is unable to correctly position smart phone, no smart phone is available, patient is unable to maintain connectivity or the patient's wound is healed. []   0   1-3 wound images annotated. Images of the wound(s) is obtained and annotated along with completed description in [facility]. [x]   1   4-5 wound images annotated. Images of the wound(s) is obtained and annotated along with completed description in [facility]. []   2   Greater than 6 wound images annotated.  Images of the wound(s) is obtained and annotated along with completed description in [facility]. []   3     Education Points   No Education completed by nursing staff.    []   0   Patient/caregiver is educated on 1-4 topics. Nursing staff identifies learner, confirms understanding of information (verbal, demonstration, written) and documents details. May include Discharge Instructions/AVS, available documents in My Chart, or Web-based learning. [x]   1   Patient/caregiver is educated on 5-9 topics. Nursing staff identifies learner, confirms understanding of information (verbal, demonstration, written) and documents details. May include Discharge Instructions/AVS, available documents in My Chart, or Web-based learning. []   2   Patient/caregiver is educated on 10 or more topics. Nursing staff identifies learner, confirms understanding of information (verbal, demonstration, written) and documents details. May include Discharge Instructions/AVS, available documents in My Chart, or Web-based learning. []   3     Follow-up Virtual Visit Points   No contact with outside resources made. []   0   Nursing staff contacts 1-2 outside resource. For example, telephone call made to home health, primary care provider, pharmacy, or DME. May include filling out forms and writing letters, arranging transportation, communication with insurance , vendors, etc.  Discharge, instructions and/or After Visit Summary given to patient/caregiver and instructions completed. [x]   1   Nursing staff contacts 3-4 outside resource. For example, telephone calls made to home health, primary care provider, pharmacy, or DME. May include filling out forms and writing letters, arranging transportation, communication with insurance , vendors, etc.  Discharge, instructions and/or After Visit Summary given to patient/caregiver and instructions completed. []   2   Nursing contacts 5 or more outside resource.  For example, telephone calls made to home health, primary care providers, pharmacy, or DME. May include filling out forms and writing letters, arranging transportation, communication with insurance , vendors, etc.  Discharge, instructions and/or After Visit Summary given to patient/caregiver and instructions completed.    []   3     Is this the Patient's First Visit to the [address] [place] [address] Road  No    Is this Patient Established @ [facility]  Yes             Virtual Visit Clinical Level of Care Wound Care      Points  1-3  Level 1 []     Points  4-5  Level 2 [x]     Points  6-7  Level 3 []     Points  8-9  Level 4 []     Points  10-11  Level 5 []       Electronically signed by Melissa Padron LPN on 8/83/6011 at @NO

## 2022-08-26 NOTE — DISCHARGE INSTRUCTIONS
Visit Discharge/Physician Orders:  - Turn and reposition every 2 hours to decrease pressure on wounds. - No prolonged time on commode to limit pressure on wounds. - Limit shearing/sliding/friction. Home Care: [facility]     Wound Location: Left buttock, Right buttock     Dressing orders:      1) Gather wound care supplies and arrange on clean table. 2) Wash your hands with soap and water or use alcohol based hand  for 20 seconds (sing \"Happy Birthday\" twice). 3) Cleanse wounds with normal saline or wound cleanser and gauze. Pat dry with clean gauze. 4) Left buttock, Right buttock- Apply thin layer of triad paste. Cover with silicone bordered foam dressing. Change daily and as needed if soiled. Keep all dressings clean & dry. Follow up visit: 4 weeks for virtual visit with home health 09/23/2022 at 1:00     Keep next scheduled appointment. Please give 24 hour notice if unable to keep appointment. [phone]     If you experience any of the following, please call the Wound Care Service during business hours: Monday through Friday 8:00 am - 4:30 pm  ([phone]). *Increase in pain              *Temperature over 101              *Increase in drainage from your wound or a foul odor              *Uncontrolled swelling              *Need for compression bandage changes due to slippage, breakthrough drainage     If you need medical attention outside of business hours, please contact your Primary Care Doctor or go to the nearest emergency room.

## 2022-08-26 NOTE — PROGRESS NOTES
Virtual Visit Via [employer]   Progress Note and Procedure Note    [address] RECORD NUMBER:  [id number]  AGE: 66 y.o. GENDER: female  : 1950  EPISODE DATE:  2022    Subjective:     Chief Complaint   Patient presents with    Wound Check        Consent obtained to communicate via Virtual Visit:  Yes     HISTORY of PRESENT ILLNESS HPI     Javier Krishnamurthy is a 66 y.o. female who presents today via Virtual Visit for re-evaluation of bilateral ischium wounds. History of Wound Context:   Patient has previously followed at wound care center for similar wounds, last seen in 2022 at which time they were healed. Current wounds are reported to have opened beginning of July. Patient denies pain to wounds. Current wound care includes triad and bordered foams to ischial wounds. Patient has history of MS, is mostly bedbound, has phillip lift at home for transfers. Straps for phillip are reported to be in contact with wounds when used. Patient has chronic vega catheter, does have chronic leakage per her report. Patient states that she has \"questionable\" control of her bowels. Current with home health, has aides that come several days weekly,  is primary caregiver. Has low air loss mattress. She states that she does not turn or reposition at home. She denies any fever or chills.   She denies any further needs or concerns     Ulcer Identification:  Ulcer Type: pressure  Contributing Factors: chronic pressure, decreased mobility, shear force, incontinence of stool and incontinence of urine     Depth of Diabetic/Pressure/Non Pressure Ulcers or Wound:  Pressure ulcer, stage 3    PAST MEDICAL HISTORY        Diagnosis Date    Arthritis     Difficulty in swallowing     Hyperlipidemia     Hypertension     Intra-abdominal lymphadenopathy     MS (multiple sclerosis) (HCC)     Pancreatitis     Pneumonia     Seizures (HCC)     UTI (urinary tract infection)        PAST SURGICAL HISTORY    Past Surgical History:   Procedure Laterality Date    CATHETER INSERTION N/A 2022    CYSTO, SUPRAPUBIC CATHETER PLACEMENT WITH ULTRASOUND performed by Brittni Young MD at [facility]  2016    laparoscopic    CYSTOSCOPY N/A 2019    CYSTO, CLOT EVACUATION, TURBT performed by Efraín Marks MD at [facility] Left 2020    LEFT HEEL WOUND I&D performed by Inga Boyce DPM at [facility] Right 2018    EYE CATARACT EMULSIFICATION IOL IMPLANT, RIGHT performed by Cindy Mckenzie MD at [address] [place] [address] RMVL INSJ IO LENS PROSTH W/O ECP Left 11/15/2018    EYE CATARACT EMULSIFICATION IOL IMPLANT LEFT EYE performed by Cindy Mckenzie MD at [address] [place] [address]    Family History   Problem Relation Age of Onset    Cancer Mother         colon    Heart Disease Father     Diabetes Neg Hx     High Blood Pressure Neg Hx     Stroke Neg Hx        SOCIAL HISTORY    Social History     Tobacco Use    Smoking status: Former     Packs/day: 2.00     Years: 40.00     Pack years: 80.00     Types: Cigarettes     Quit date: 10/2/2013     Years since quittin.9    Smokeless tobacco: Former     Quit date: 11/3/2015   Vaping Use    Vaping Use: Former    Substances: Unknown   Substance Use Topics    Alcohol use: No    Drug use: No       ALLERGIES    No Known Allergies    MEDICATIONS    Current Outpatient Medications on File Prior to Encounter   Medication Sig Dispense Refill    nitrofurantoin, macrocrystal-monohydrate, (MACROBID) 100 MG capsule Take 100 mg by mouth in the morning.       oxybutynin (DITROPAN) 5 MG tablet Take 1 tablet by mouth 3 times daily as needed (bladder spasms) 90 tablet 11    acetic acid 0.25 % irrigation Irrigate as directed 1000 mL 5    Water For Irrigation, Sterile (STERILE WATER FOR IRRIGATION) Irrigate with 50 ml of sterile water twice a day. 1000 mL 5    aspirin (RA ASPIRIN EC) 81 MG EC tablet take 1 tablet by mouth once daily 30 tablet 0    metoprolol tartrate (LOPRESSOR) 25 MG tablet take 1/2 tablet by mouth twice a day 30 tablet 11    Cholecalciferol (VITAMIN D3 PO) Take by mouth daily      carBAMazepine (TEGRETOL) 100 MG chewable tablet Take 100 mg by mouth 3 times daily        No current facility-administered medications on file prior to encounter. REVIEW OF SYSTEMS    Pertinent items are noted in HPI. Objective:     PHYSICAL EXAM    General Appearance: in no acute distress and alert     Healing stage 3 pressure injury to bilateral ischium-   Left:            Right:          Assessment:      Problem List Items Addressed This Visit          Other    Pressure injury of left ischium, stage 3 (HCC) - Primary    Pressure injury of right ischium, stage 3 ([facility])         Performed by: [address] [place], [place] [address] - [facility]    Wound/Ulcer #: 1,   Diabetic/Pressure/Non Pressure Ulcers only:  Ulcer: Pressure ulcer, Stage 3       Plan:     Please see attached Discharge Instructions    Based upon this virtual visit it is not required to have an in-person visit of this time.    -Stage 3 pressure injury to bilateral ischium-Continue. Right sided wound measuring larger today, left sided wound improved. Continue use of Triad and foam dressings. Wounds may be very difficult to heal as phillip lift appears to be creating significant pressure/friction/shear with use. Use of phillip is unavoidable, however, as patient requires total assistance. Reinforced importance of frequent turning and repositioning to prevent future wounds. Offloading mattress and cushions do not replace turning. Advised patient that wounds will not heal and may continue to worsen with ongoing pressure, friction, and shearing insult. Avoid sliding against furniture with transfers, use lift equipment as able.   Offloading techniques reviewed, patient verbalizes appropriate. Due to this being a TeleHealth encounter ([facility] public [facility]), evaluation of the following organ systems was limited: Vitals/Constitutional/EENT/Resp/CV/GI//MS/Neuro/Skin/Heme-Lymph-Imm. Pursuant to the emergency declaration under the [address] [place] [address], [address] [place] [address] and the [employer] Act, this Virtual Visit was conducted with patient's (and/or legal guardian's) consent, to reduce the patient's risk of exposure to COVID-19 and provide necessary medical care. The patient (and/or legal guardian) has also been advised to contact this office for worsening conditions or problems, and seek emergency medical treatment and/or call 911 if deemed necessary. Services were provided through a video synchronous discussion virtually to substitute for in-person clinic visit. Patient was located at their individual homes. Provider was located in [place] [address].     Electronically signed by BROOK Subramanian CNP on 8/26/2022 at 2:22 PM

## 2022-08-26 NOTE — PLAN OF CARE
Problem: Wound:  Goal: Will show signs of wound healing; wound closure and no evidence of infection  Description: Will show signs of wound healing; wound closure and no evidence of infection  Outcome: Progressing   Patient seen via virtual visit for bilateral buttock wounds. Wound shows signs of proper closure and healing. No s/s of infection noted. Follow up in 4 weeks for virtual visit with home health. See AVS for order changes. Care plan reviewed with patient and caregiver. Patient and caregiver verbalize understanding of the plan of care and contribute to goal setting.

## 2022-08-31 ENCOUNTER — TELEPHONE (OUTPATIENT)
Dept: WOUND CARE | Age: 72
End: 2022-08-31

## 2022-08-31 NOTE — TELEPHONE ENCOUNTER
Called and left a voicemail with Hannah Abreu from [facility] her the provider wants monitor the buttock wound for signs and symptoms of infection (fever,chills,redness,warmth). If there are new concerns call the clinic or go to the ED. Also order given for skin prep to the reddened heel and to offload and turn frequently.

## 2022-09-23 ENCOUNTER — HOSPITAL ENCOUNTER (OUTPATIENT)
Dept: WOUND CARE | Age: 72
Discharge: HOME | End: 2022-09-23
Payer: MEDICARE

## 2022-09-23 VITALS
RESPIRATION RATE: 18 BRPM | DIASTOLIC BLOOD PRESSURE: 62 MMHG | HEART RATE: 96 BPM | SYSTOLIC BLOOD PRESSURE: 104 MMHG | OXYGEN SATURATION: 96 % | TEMPERATURE: 98.2 F

## 2022-09-23 DIAGNOSIS — L89.313 PRESSURE INJURY OF RIGHT ISCHIUM, STAGE 3 (HCC): Primary | ICD-10-CM

## 2022-09-23 DIAGNOSIS — L89.323 PRESSURE INJURY OF LEFT ISCHIUM, STAGE 3 (HCC): ICD-10-CM

## 2022-09-23 PROCEDURE — 99212 OFFICE O/P EST SF 10 MIN: CPT | Performed by: NURSE PRACTITIONER

## 2022-09-23 PROCEDURE — 99212 OFFICE O/P EST SF 10 MIN: CPT

## 2022-09-23 NOTE — PROGRESS NOTES
Virtual Visit Via [facility]   Progress Note and Procedure Note    [address] RECORD NUMBER:  [id number]  AGE: 66 y.o. GENDER: female  : 1950  EPISODE DATE:  2022    Subjective:     Chief Complaint   Patient presents with    Wound Check     Bilateral buttocks          Consent obtained to communicate via Virtual Visit:  Yes     HISTORY of PRESENT ILLNESS KETAN John is a 66 y.o. female who presents today via Virtual Visit for re-evaluation of bilateral ischium wounds. History of Wound Context:   Patient has previously followed at wound care center for similar wounds, last seen in 2022 at which time they were healed. Current wounds are reported to have opened beginning of July. Patient denies pain to wounds. Current wound care includes triad and bordered foams to ischial wounds. Patient has history of MS, is mostly bedbound, has phillip lift at home for transfers. Straps for phillip are reported to be in contact with wounds when used. Patient has chronic vega catheter, does have chronic leakage per her report. Patient states that she has \"questionable\" control of her bowels. Current with home health, has aides that come several days weekly,  is primary caregiver. Has low air loss mattress. She states that she does not turn or reposition at home. She denies any fever or chills.   She denies any further needs or concerns     Ulcer Identification:  Ulcer Type: pressure  Contributing Factors: chronic pressure, decreased mobility, shear force, incontinence of stool and incontinence of urine     Depth of Diabetic/Pressure/Non Pressure Ulcers or Wound:  Pressure ulcer, stage 3    PAST MEDICAL HISTORY        Diagnosis Date    Arthritis     Difficulty in swallowing     Hyperlipidemia     Hypertension     Intra-abdominal lymphadenopathy     MS (multiple sclerosis) (HCC)     Pancreatitis     Pneumonia     Seizures ([facility] [address]     UTI (urinary tract infection)        PAST SURGICAL HISTORY    Past Surgical History:   Procedure Laterality Date    CATHETER INSERTION N/A 2022    CYSTO, SUPRAPUBIC CATHETER PLACEMENT WITH ULTRASOUND performed by Jacque Anders MD at [facility]  2016    laparoscopic    CYSTOSCOPY N/A 2019    CYSTO, CLOT EVACUATION, TURBT performed by Nelsy Maldonado MD at [address] Left 2020    LEFT HEEL WOUND I&D performed by David Villanueva DPM at [facility] Right 2018    EYE CATARACT EMULSIFICATION IOL IMPLANT, RIGHT performed by Isabella Gordon MD at [address] [place] [address] RMVL INSJ IO LENS PROSTH W/O ECP Left 11/15/2018    EYE CATARACT EMULSIFICATION IOL IMPLANT LEFT EYE performed by Isabella Gordon MD at [address] [place] [address]    Family History   Problem Relation Age of Onset    Cancer Mother         colon    Heart Disease Father     Diabetes Neg Hx     High Blood Pressure Neg Hx     Stroke Neg Hx        SOCIAL HISTORY    Social History     Tobacco Use    Smoking status: Former     Packs/day: 2.00     Years: 40.00     Pack years: 80.00     Types: Cigarettes     Quit date: 10/2/2013     Years since quittin.9    Smokeless tobacco: Former     Quit date: 11/3/2015   Vaping Use    Vaping Use: Former    Substances: Unknown   Substance Use Topics    Alcohol use: No    Drug use: No       ALLERGIES    No Known Allergies    MEDICATIONS    Current Outpatient Medications on File Prior to Encounter   Medication Sig Dispense Refill    nitrofurantoin, macrocrystal-monohydrate, (MACROBID) 100 MG capsule Take 100 mg by mouth in the morning.       oxybutynin (DITROPAN) 5 MG tablet Take 1 tablet by mouth 3 times daily as needed (bladder spasms) 90 tablet 11    acetic acid 0.25 % irrigation Irrigate as directed 1000 mL 5    Water For Irrigation, Sterile (STERILE WATER FOR IRRIGATION) Irrigate with 50 ml of sterile water twice a day. 1000 mL 5    aspirin (RA ASPIRIN EC) 81 MG EC tablet take 1 tablet by mouth once daily 30 tablet 0    metoprolol tartrate (LOPRESSOR) 25 MG tablet take 1/2 tablet by mouth twice a day 30 tablet 11    Cholecalciferol (VITAMIN D3 PO) Take by mouth daily      carBAMazepine (TEGRETOL) 100 MG chewable tablet Take 100 mg by mouth 3 times daily        No current facility-administered medications on file prior to encounter. REVIEW OF SYSTEMS    Pertinent items are noted in HPI. Objective:     PHYSICAL EXAM    General Appearance: in no acute distress and alert     Healing stage 3 pressure injury to left ischium has healed. Left:        Right ischial wound bed granular. Periwound hyperpigmented. Assessment:      Problem List Items Addressed This Visit          Other    Pressure injury of left ischium, stage 3 ([facility])    * (Principal) Pressure injury of right ischium, stage 3 ([address] - Primary       Performed by: BROOK Singh - CNP    Wound/Ulcer #: 1,   Diabetic/Pressure/Non Pressure Ulcers only:  Ulcer: Pressure ulcer, Stage 3       Plan:     Please see attached Discharge Instructions    Based upon this virtual visit it is not required to have an in-person visit of this time.    -Stage 3 pressure injury to left ischium has healed. Discontinue use of foams. Triad as needed to protect area. -Stage 3 pressure injury to right ischium-Wound appearance worsened today, measuring larger. Home health nurse states that they have been coming twice weekly for changes, dressings area rolled up and saturated when changing. Will stop use of Triad to wound bed, start use of calcium alaginate and foams. Change three times weekly. Nurse notes significant sliding against linens and furniture at home with transfers/repositioning. [place] lift also appears to be creating significant pressure/friction/shear with use.   Use of phillip is unavoidable, however, as patient requires total assistance. Wounds will be very difficult if not impossible to heal with ongoing pressure, friction, and shearing injuries. Reinforced importance of frequent turning and repositioning to prevent future wounds. Offloading mattress and cushions do not replace turning. Avoid sliding against furniture with transfers, use lift equipment as able. Offloading techniques reviewed, patient verbalizes understanding. No indications of infection reported at today's visit. Call clinic or seek emergency care should these occur. Follow up 4 weeks via virtual visit. Call clinic for any further needs or concerns prior to scheduled visit. Written patient dismissal instructions available to Patient/POA         Discharge Instructions         Visit Discharge/Physician Orders:  - Turn and reposition every 2 hours to decrease pressure on wounds. - No prolonged time on commode to limit pressure on wounds. - Limit shearing/sliding/friction. [facility] Care: [facility]     Wound Location: Left buttock, Right buttock     Dressing orders:      1) Gather wound care supplies and arrange on clean table. 2) Wash your hands with soap and water or use alcohol based hand  for 20 seconds (sing \"Happy Birthday\" twice). 3) Cleanse wounds with normal saline or wound cleanser and gauze. Pat dry with clean gauze. Apply skin prep to sulema wound. 4) Left buttock, Right buttock- Apply thin layer of triad paste to edges. Apply calcium alginate to wound bed. Cover with silicone bordered foam dressing. Change 3 times a week and as needed if soiled. Keep all dressings clean & dry. Follow up visit: 4 weeks for virtual visit with home health      Keep next scheduled appointment. Please give 24 hour notice if unable to keep appointment. [phone]     If you experience any of the following, please call the Wound Care Service during business hours: Monday through Friday 8:00 am - 4:30 pm  ([phone]). *Increase in pain              *Temperature over 101              *Increase in drainage from your wound or a foul odor              *Uncontrolled swelling              *Need for compression bandage changes due to slippage, breakthrough drainage     If you need medical attention outside of business hours, please contact your Primary Care Doctor or go to the nearest emergency room. Ernesto Tamayo AGE: 66 y.o. GENDER: female  : 1950 being evaluated by a Virtual Visit (video visit) encounter to address concerns as mentioned above. A caregiver was present when appropriate. Due to this being a TeleHealth encounter (During JPTLV-79 public health emergency), evaluation of the following organ systems was limited: Vitals/Constitutional/EENT/Resp/CV/GI//MS/Neuro/Skin/Heme-Lymph-Imm. Pursuant to the emergency declaration under the [address] [place] [address] authority and the [employer] Act, this Virtual Visit was conducted with patient's (and/or legal guardian's) consent, to reduce the patient's risk of exposure to COVID-19 and provide necessary medical care. The patient (and/or legal guardian) has also been advised to contact this office for worsening conditions or problems, and seek emergency medical treatment and/or call 911 if deemed necessary. Services were provided through a video synchronous discussion virtually to substitute for in-person clinic visit. Patient was located at their individual homes. Provider was located in [place] [address].     Electronically signed by BROOK Palmer CNP on 2022 at 1:21 PM

## 2022-09-23 NOTE — PROGRESS NOTES
Roger Becker, was evaluated through a synchronous (real-time) audio-video encounter. The patient (or guardian if applicable) is aware that this is a billable service, which includes applicable co-pays. This Virtual Visit was conducted with patient's (and/or legal guardian's) consent. The visit was conducted pursuant to the emergency declaration under the [address] [place] [address] and the [employer] General Act. Patient identification was verified, and a caregiver was present when appropriate. The patient was located at Home: [address] [place] [address]. Provider was located at [place] [address]): [address] [facility],  [address]. Total time spent for this encounter:  20 minutes    --Ana Diamond LPN on 4/77/8450 at 7:16 PM    An electronic signature was used to authenticate this note.

## 2022-09-23 NOTE — DISCHARGE INSTRUCTIONS
Visit Discharge/Physician Orders:  - Turn and reposition every 2 hours to decrease pressure on wounds. - No prolonged time on commode to limit pressure on wounds. - Limit shearing/sliding/friction. Home Care: [facility] fax # [phone]     Wound Location: Left buttock, Right buttock     Dressing orders:      1) Gather wound care supplies and arrange on clean table. 2) Wash your hands with soap and water or use alcohol based hand  for 20 seconds (sing \"Happy Birthday\" twice). 3) Cleanse wounds with normal saline or wound cleanser and gauze. Pat dry with clean gauze. Apply skin prep to sulema wound. 4) Left buttock, Right buttock- Apply thin layer of triad paste to skin. Apply calcium alginate to wound bed on right and cover with silicone bordered foam dressing. Change 3 times a week and as needed if soiled. Keep all dressings clean & dry. Follow up visit: 4 weeks for virtual visit with home health 10/21/2022 at 11:00     Keep next scheduled appointment. Please give 24 hour notice if unable to keep appointment. [phone]     If you experience any of the following, please call the Wound Care Service during business hours: Monday through Friday 8:00 am - 4:30 pm  ([phone]). *Increase in pain              *Temperature over 101              *Increase in drainage from your wound or a foul odor              *Uncontrolled swelling              *Need for compression bandage changes due to slippage, breakthrough drainage     If you need medical attention outside of business hours, please contact your Primary Care Doctor or go to the nearest emergency room.

## 2022-09-23 NOTE — PROGRESS NOTES
Virtual Visit Wound Care  Clinic Level of Care   NAME:  Torrey Yu OF BIRTH:  1950 GENDER: female  MEDICAL RECORD NUMBER:  [id number]   DATE:  9/23/2022     Patient Type Points   No documentation completed by nursing staff. []   0   Nursing staff documented in the navigator for an ESTABLISHED patient including Episode, Patient ID, Chief Complaint, Travel Screen, Allergies, Latex Allergy, Home Medication, History, Psychosocial Screen, C-SSRS Screen, Fall Risk, Nutritional Screen, Advanced Directive, Education and Plan of Care, and Discharge Instructions. The Functional Screening tab is only required if the patient's status changes. [x]   1   Nursing staff documented in the navigator for a NEW patient including Patient ID, Chief Complaint, Travel Screen, Allergies, Latex Allergy, Home Medication, History, Psychosocial Screen, C-SSRS Screen, Fall Risk, Nutritional Screen, Advanced Directive, Functional Screen, Education and Plan of Care, and Discharge Instructions. []   2   Nursing staff documented in the navigator for a CONSULT patient including Episode, Patient ID, Chief Complaint, Travel Screen, Allergies, Latex Allergy, Home Medication, History, Psychosocial Screen, C-SSRS Screen, Fall Risk, Nutritional Screen, Advanced Directive, Functional Screen, Education and Plan of Care, and Discharge Instructions. []   2     Wound Description Points   Unable to obtain image of Wound. For example, patient/caregiver is instructed not to remove dressing, is unable to correctly position smart phone, no smart phone is available, patient is unable to maintain connectivity or the patient's wound is healed. []   0   1-3 wound images annotated. Images of the wound(s) is obtained and annotated along with completed description in [address] [place] [address]. [x]   1   4-5 wound images annotated. Images of the wound(s) is obtained and annotated along with completed description in [address] [place] [address]. []   2   Greater than 6 wound images annotated.  Images of the wound(s) is obtained and annotated along with completed description in [facility]. []   3     Education Points   No Education completed by nursing staff.    []   0   Patient/caregiver is educated on 1-4 topics. Nursing staff identifies learner, confirms understanding of information (verbal, demonstration, written) and documents details. May include Discharge Instructions/AVS, available documents in My Chart, or Web-based learning. [x]   1   Patient/caregiver is educated on 5-9 topics. Nursing staff identifies learner, confirms understanding of information (verbal, demonstration, written) and documents details. May include Discharge Instructions/AVS, available documents in My Chart, or Web-based learning. []   2   Patient/caregiver is educated on 10 or more topics. Nursing staff identifies learner, confirms understanding of information (verbal, demonstration, written) and documents details. May include Discharge Instructions/AVS, available documents in My Chart, or Web-based learning. []   3     Follow-up Virtual Visit Points   No contact with outside resources made. []   0   Nursing staff contacts 1-2 outside resource. For example, telephone call made to home health, primary care provider, pharmacy, or DME. May include filling out forms and writing letters, arranging transportation, communication with insurance , vendors, etc.  Discharge, instructions and/or After Visit Summary given to patient/caregiver and instructions completed. [x]   1   Nursing staff contacts 3-4 outside resource. For example, telephone calls made to home health, primary care provider, pharmacy, or DME. May include filling out forms and writing letters, arranging transportation, communication with insurance , vendors, etc.  Discharge, instructions and/or After Visit Summary given to patient/caregiver and instructions completed. []   2   Nursing contacts 5 or more outside resource.  For example, telephone calls made to home health, primary care providers, pharmacy, or DME. May include filling out forms and writing letters, arranging transportation, communication with insurance , vendors, etc.  Discharge, instructions and/or After Visit Summary given to patient/caregiver and instructions completed.    []   3     Is this the Patient's First Visit to the [address] [place] [address] Road  No    Is this Patient Established @ [facility]  Yes             Virtual Visit Clinical Level of Care Wound Care      Points  1-3  Level 1 []     Points  4-5  Level 2 [x]     Points  6-7  Level 3 []     Points  8-9  Level 4 []     Points  10-11  Level 5 []       Electronically signed by Ana Diamond LPN on 4/63/8512 at @NO

## 2022-09-30 ENCOUNTER — TELEPHONE (OUTPATIENT)
Dept: UROLOGY | Age: 72
End: 2022-09-30

## 2022-09-30 RX ORDER — FLAVOXATE HYDROCHLORIDE 100 MG/1
100 TABLET ORAL 3 TIMES DAILY PRN
Qty: 90 TABLET | Refills: 3 | Status: SHIPPED | OUTPATIENT
Start: 2022-09-30 | End: 2022-10-04

## 2022-09-30 NOTE — TELEPHONE ENCOUNTER
Ramon Rhodes at [address] [place] [address] states the patient has been having an increase in bladder spasms. She is taking ditropan 5 mg TID. Can the dose be adjusted? The sp catheter is patent and urine leaks from around the catheter with the spasms. The urine is darker yellow with sediment and a little cloudy. Next exchange is around 10/15/2022. She also asked what should be used for site care. I advised to cleanse with soap and water and apply split 4 X 4. The site looks fine, no increase in redness or foul drainage. She does not have any other symptoms of urine infection.

## 2022-09-30 NOTE — TELEPHONE ENCOUNTER
Trial stopping ditropan 5 mg TID and starting urispas 100 mg TID. Script sent. Cleanse around catheter daily with antibacterial soap and water and apply split drain gauze.

## 2022-10-03 NOTE — TELEPHONE ENCOUNTER
Sherri Borrego at [facility] advised of sp dressing orders and medication changes. She voiced understanding. Patient advised and will stop the ditropan and start urispas.

## 2022-10-04 RX ORDER — TROSPIUM CHLORIDE 20 MG/1
20 TABLET, FILM COATED ORAL 2 TIMES DAILY
Qty: 60 TABLET | Refills: 3 | Status: SHIPPED | OUTPATIENT
Start: 2022-10-04

## 2022-10-04 NOTE — TELEPHONE ENCOUNTER
Patient insurance did not cover the urispas. She paid out of pocket. Is there something else she could try instead? She can not afford it even with [facility].

## 2022-10-17 ENCOUNTER — TELEPHONE (OUTPATIENT)
Dept: WOUND CARE | Age: 72
End: 2022-10-17

## 2022-10-17 NOTE — TELEPHONE ENCOUNTER
Mack Schuster from [address] [facility] called stating patient has a dark area to the center of the wound. Tona sent image below. Mack Schuster also stated there is moderate drainage noted. Will update Brenda Gil CNP.

## 2022-10-17 NOTE — TELEPHONE ENCOUNTER
Updated Anastasia Blizzard CNP. Brenda recommended to turn and offload the patient. Called and updated Tona MEANS with [facility].

## 2022-10-21 ENCOUNTER — HOSPITAL ENCOUNTER (INPATIENT)
Age: 72
LOS: 4 days | Discharge: NURSING, REHAB OR LONG-TERM CARE | DRG: 853 | End: 2022-10-27
Attending: FAMILY MEDICINE | Admitting: SURGERY
Payer: MEDICARE

## 2022-10-21 ENCOUNTER — HOSPITAL ENCOUNTER (OUTPATIENT)
Dept: WOUND CARE | Age: 72
Discharge: HOME | End: 2022-10-21
Payer: MEDICARE

## 2022-10-21 VITALS
RESPIRATION RATE: 18 BRPM | SYSTOLIC BLOOD PRESSURE: 107 MMHG | TEMPERATURE: 97.5 F | HEART RATE: 100 BPM | DIASTOLIC BLOOD PRESSURE: 79 MMHG

## 2022-10-21 DIAGNOSIS — L89.320 PRESSURE INJURY OF LEFT ISCHIUM, UNSTAGEABLE (HCC): Primary | ICD-10-CM

## 2022-10-21 DIAGNOSIS — S31.819A WOUND OF RIGHT BUTTOCK, INITIAL ENCOUNTER: Primary | ICD-10-CM

## 2022-10-21 DIAGNOSIS — L89.310 PRESSURE INJURY OF RIGHT ISCHIUM, UNSTAGEABLE (HCC): ICD-10-CM

## 2022-10-21 DIAGNOSIS — L89.300 PRESSURE INJURY OF BUTTOCK, UNSTAGEABLE, UNSPECIFIED LATERALITY (HCC): ICD-10-CM

## 2022-10-21 LAB
ALBUMIN SERPL-MCNC: 3 G/DL (ref 3.5–5.1)
ALP BLD-CCNC: 153 U/L (ref 38–126)
ALT SERPL-CCNC: 23 U/L (ref 11–66)
ANION GAP SERPL CALCULATED.3IONS-SCNC: 6 MEQ/L (ref 8–16)
AST SERPL-CCNC: 36 U/L (ref 5–40)
BACTERIA: ABNORMAL /HPF
BASOPHILS # BLD: 1 %
BASOPHILS ABSOLUTE: 0.1 THOU/MM3 (ref 0–0.1)
BILIRUB SERPL-MCNC: 0.3 MG/DL (ref 0.3–1.2)
BILIRUBIN URINE: ABNORMAL
BLOOD, URINE: ABNORMAL
BUN BLDV-MCNC: 15 MG/DL (ref 7–22)
C-REACTIVE PROTEIN: 0.91 MG/DL (ref 0–1)
CALCIUM SERPL-MCNC: 8.8 MG/DL (ref 8.5–10.5)
CASTS 2: ABNORMAL /LPF
CASTS UA: ABNORMAL /LPF
CHARACTER, URINE: ABNORMAL
CHLORIDE BLD-SCNC: 105 MEQ/L (ref 98–111)
CO2: 28 MEQ/L (ref 23–33)
COLOR: ABNORMAL
CREAT SERPL-MCNC: 0.5 MG/DL (ref 0.4–1.2)
CRYSTALS, UA: ABNORMAL
EKG ATRIAL RATE: 87 BPM
EKG P AXIS: 59 DEGREES
EKG P-R INTERVAL: 138 MS
EKG Q-T INTERVAL: 388 MS
EKG QRS DURATION: 102 MS
EKG QTC CALCULATION (BAZETT): 466 MS
EKG R AXIS: -50 DEGREES
EKG T AXIS: 68 DEGREES
EKG VENTRICULAR RATE: 87 BPM
EOSINOPHIL # BLD: 3.9 %
EOSINOPHILS ABSOLUTE: 0.3 THOU/MM3 (ref 0–0.4)
EPITHELIAL CELLS, UA: ABNORMAL /HPF
ERYTHROCYTE [DISTWIDTH] IN BLOOD BY AUTOMATED COUNT: 12.9 % (ref 11.5–14.5)
ERYTHROCYTE [DISTWIDTH] IN BLOOD BY AUTOMATED COUNT: 48.1 FL (ref 35–45)
GFR SERPL CREATININE-BSD FRML MDRD: > 60 ML/MIN/1.73M2
GLUCOSE BLD-MCNC: 87 MG/DL (ref 70–108)
GLUCOSE URINE: NEGATIVE MG/DL
HCT VFR BLD CALC: 37.6 % (ref 37–47)
HEMOGLOBIN: 11.9 GM/DL (ref 12–16)
ICTOTEST: NEGATIVE
IMMATURE GRANS (ABS): 0.02 THOU/MM3 (ref 0–0.07)
IMMATURE GRANULOCYTES: 0.3 %
KETONES, URINE: ABNORMAL
LACTIC ACID, SEPSIS: 0.9 MMOL/L (ref 0.5–1.9)
LEUKOCYTE ESTERASE, URINE: ABNORMAL
LYMPHOCYTES # BLD: 18.3 %
LYMPHOCYTES ABSOLUTE: 1.3 THOU/MM3 (ref 1–4.8)
MCH RBC QN AUTO: 31.9 PG (ref 26–33)
MCHC RBC AUTO-ENTMCNC: 31.6 GM/DL (ref 32.2–35.5)
MCV RBC AUTO: 100.8 FL (ref 81–99)
MISCELLANEOUS 2: ABNORMAL
MONOCYTES # BLD: 8.6 %
MONOCYTES ABSOLUTE: 0.6 THOU/MM3 (ref 0.4–1.3)
NITRITE, URINE: NEGATIVE
NUCLEATED RED BLOOD CELLS: 0 /100 WBC
OSMOLALITY CALCULATION: 277.7 MOSMOL/KG (ref 275–300)
PH UA: 6.5 (ref 5–9)
PLATELET # BLD: 167 THOU/MM3 (ref 130–400)
PMV BLD AUTO: 10.3 FL (ref 9.4–12.4)
POTASSIUM REFLEX MAGNESIUM: 5.1 MEQ/L (ref 3.5–5.2)
PROTEIN UA: 30
RBC # BLD: 3.73 MILL/MM3 (ref 4.2–5.4)
RBC URINE: ABNORMAL /HPF
RENAL EPITHELIAL, UA: ABNORMAL
SEG NEUTROPHILS: 67.9 %
SEGMENTED NEUTROPHILS ABSOLUTE COUNT: 5 THOU/MM3 (ref 1.8–7.7)
SODIUM BLD-SCNC: 139 MEQ/L (ref 135–145)
SPECIFIC GRAVITY, URINE: 1.02 (ref 1–1.03)
TOTAL PROTEIN: 7.9 G/DL (ref 6.1–8)
UROBILINOGEN, URINE: 1 EU/DL (ref 0–1)
WBC # BLD: 7.3 THOU/MM3 (ref 4.8–10.8)
WBC UA: > 100 /HPF
YEAST: ABNORMAL

## 2022-10-21 PROCEDURE — 99212 OFFICE O/P EST SF 10 MIN: CPT

## 2022-10-21 PROCEDURE — 83605 ASSAY OF LACTIC ACID: CPT

## 2022-10-21 PROCEDURE — 99223 1ST HOSP IP/OBS HIGH 75: CPT | Performed by: SURGERY

## 2022-10-21 PROCEDURE — 93005 ELECTROCARDIOGRAM TRACING: CPT | Performed by: EMERGENCY MEDICINE

## 2022-10-21 PROCEDURE — 93010 ELECTROCARDIOGRAM REPORT: CPT | Performed by: INTERNAL MEDICINE

## 2022-10-21 PROCEDURE — 36415 COLL VENOUS BLD VENIPUNCTURE: CPT

## 2022-10-21 PROCEDURE — 2580000003 HC RX 258: Performed by: SURGERY

## 2022-10-21 PROCEDURE — 99285 EMERGENCY DEPT VISIT HI MDM: CPT

## 2022-10-21 PROCEDURE — 87186 SC STD MICRODIL/AGAR DIL: CPT

## 2022-10-21 PROCEDURE — 87106 FUNGI IDENTIFICATION YEAST: CPT

## 2022-10-21 PROCEDURE — 87086 URINE CULTURE/COLONY COUNT: CPT

## 2022-10-21 PROCEDURE — 85025 COMPLETE CBC W/AUTO DIFF WBC: CPT

## 2022-10-21 PROCEDURE — 96375 TX/PRO/DX INJ NEW DRUG ADDON: CPT

## 2022-10-21 PROCEDURE — 86140 C-REACTIVE PROTEIN: CPT

## 2022-10-21 PROCEDURE — G0378 HOSPITAL OBSERVATION PER HR: HCPCS

## 2022-10-21 PROCEDURE — 80053 COMPREHEN METABOLIC PANEL: CPT

## 2022-10-21 PROCEDURE — 96365 THER/PROPH/DIAG IV INF INIT: CPT

## 2022-10-21 PROCEDURE — 81001 URINALYSIS AUTO W/SCOPE: CPT

## 2022-10-21 PROCEDURE — 99212 OFFICE O/P EST SF 10 MIN: CPT | Performed by: NURSE PRACTITIONER

## 2022-10-21 PROCEDURE — 87077 CULTURE AEROBIC IDENTIFY: CPT

## 2022-10-21 RX ORDER — ONDANSETRON 2 MG/ML
4 INJECTION INTRAMUSCULAR; INTRAVENOUS EVERY 6 HOURS PRN
Status: DISCONTINUED | OUTPATIENT
Start: 2022-10-21 | End: 2022-10-25

## 2022-10-21 RX ORDER — SODIUM CHLORIDE 0.9 % (FLUSH) 0.9 %
5-40 SYRINGE (ML) INJECTION EVERY 12 HOURS SCHEDULED
Status: DISCONTINUED | OUTPATIENT
Start: 2022-10-21 | End: 2022-10-22 | Stop reason: HOSPADM

## 2022-10-21 RX ORDER — DIPHENHYDRAMINE HYDROCHLORIDE 50 MG/ML
12.5 INJECTION INTRAMUSCULAR; INTRAVENOUS EVERY 6 HOURS PRN
Status: DISCONTINUED | OUTPATIENT
Start: 2022-10-21 | End: 2022-10-27 | Stop reason: HOSPADM

## 2022-10-21 RX ORDER — MORPHINE SULFATE 0.5 MG/ML
2.5 INJECTION, SOLUTION EPIDURAL; INTRATHECAL; INTRAVENOUS
Status: DISCONTINUED | OUTPATIENT
Start: 2022-10-21 | End: 2022-10-21

## 2022-10-21 RX ORDER — SODIUM CHLORIDE 9 MG/ML
INJECTION, SOLUTION INTRAVENOUS PRN
Status: DISCONTINUED | OUTPATIENT
Start: 2022-10-21 | End: 2022-10-22 | Stop reason: HOSPADM

## 2022-10-21 RX ORDER — MORPHINE SULFATE 4 MG/ML
2.5 INJECTION, SOLUTION INTRAMUSCULAR; INTRAVENOUS
Status: DISCONTINUED | OUTPATIENT
Start: 2022-10-21 | End: 2022-10-22 | Stop reason: SDUPTHER

## 2022-10-21 RX ORDER — SODIUM CHLORIDE 0.9 % (FLUSH) 0.9 %
5-40 SYRINGE (ML) INJECTION PRN
Status: DISCONTINUED | OUTPATIENT
Start: 2022-10-21 | End: 2022-10-22 | Stop reason: HOSPADM

## 2022-10-21 RX ADMIN — SODIUM CHLORIDE, PRESERVATIVE FREE 10 ML: 5 INJECTION INTRAVENOUS at 20:15

## 2022-10-21 ASSESSMENT — ENCOUNTER SYMPTOMS
WHEEZING: 0
EYE DISCHARGE: 0
SORE THROAT: 0
NAUSEA: 0
EYE PAIN: 0
CONSTIPATION: 0
DIARRHEA: 0
SHORTNESS OF BREATH: 0
ABDOMINAL DISTENTION: 0
COLOR CHANGE: 0
RHINORRHEA: 0
VOMITING: 0
COUGH: 0

## 2022-10-21 ASSESSMENT — LIFESTYLE VARIABLES
HOW MANY STANDARD DRINKS CONTAINING ALCOHOL DO YOU HAVE ON A TYPICAL DAY: PATIENT DECLINED
HOW OFTEN DO YOU HAVE A DRINK CONTAINING ALCOHOL: NEVER

## 2022-10-21 ASSESSMENT — PAIN SCALES - GENERAL: PAINLEVEL_OUTOF10: 0

## 2022-10-21 NOTE — DISCHARGE INSTRUCTIONS
Visit Discharge/Physician Orders: recommend going to the ER for evaluation of the wound  - call after hospital discharge/ ER visit for follow up     Home Care: [facility]    Wound Location: right buttock    Dressing orders:     1) Gather wound care supplies and arrange on clean table. 2) Wash your hands with soap and water or use alcohol based hand  for 20 seconds (sing \"Happy Birthday\" twice). 3) Cleanse wounds with normal saline or wound cleanser and gauze. Pat dry with clean gauze. 4) Apply alginate and a silicone bordered foam and go to the ER. Keep all dressings clean & dry. Follow up visit:   after hospital visit    Keep next scheduled appointment. Please give 24 hour notice if unable to keep appointment. [phone]    If you experience any of the following, please call the Wound Care Service during business hours: Monday through Friday 8:00 am - 4:30 pm  ([phone]). *Increase in pain   *Temperature over 101   *Increase in drainage from your wound or a foul odor   *Uncontrolled swelling   *Need for compression bandage changes due to slippage, breakthrough drainage    If you need medical attention outside of business hours, please contact your Primary Care Doctor or go to the nearest emergency room.

## 2022-10-21 NOTE — PROGRESS NOTES
Virtual Visit Wound Care  Clinic Level of Care   NAME:  Monet Presley OF BIRTH:  1950 GENDER: female  MEDICAL RECORD NUMBER:  [id number]   DATE:  10/21/2022     Patient Type Points   No documentation completed by nursing staff. []   0   Nursing staff documented in the navigator for an ESTABLISHED patient including Episode, Patient ID, Chief Complaint, Travel Screen, Allergies, Latex Allergy, Home Medication, History, Psychosocial Screen, C-SSRS Screen, Fall Risk, Nutritional Screen, Advanced Directive, Education and Plan of Care, and Discharge Instructions. The Functional Screening tab is only required if the patient's status changes. [x]   1   Nursing staff documented in the navigator for a NEW patient including Patient ID, Chief Complaint, Travel Screen, Allergies, Latex Allergy, Home Medication, History, Psychosocial Screen, C-SSRS Screen, Fall Risk, Nutritional Screen, Advanced Directive, Functional Screen, Education and Plan of Care, and Discharge Instructions. []   2   Nursing staff documented in the navigator for a CONSULT patient including Episode, Patient ID, Chief Complaint, Travel Screen, Allergies, Latex Allergy, Home Medication, History, Psychosocial Screen, C-SSRS Screen, Fall Risk, Nutritional Screen, Advanced Directive, Functional Screen, Education and Plan of Care, and Discharge Instructions. []   2     Wound Description Points   Unable to obtain image of Wound. For example, patient/caregiver is instructed not to remove dressing, is unable to correctly position smart phone, no smart phone is available, patient is unable to maintain connectivity or the patient's wound is healed. []   0   1-3 wound images annotated. Images of the wound(s) is obtained and annotated along with completed description in [address] [place] [address]. [x]   1   4-5 wound images annotated. Images of the wound(s) is obtained and annotated along with completed description in [address] [place] [address]. []   2   Greater than 6 wound images annotated. Images of the wound(s) is obtained and annotated along with completed description in [address] [place] [address]. []   3     Education Points   No Education completed by nursing staff.    []   0   Patient/caregiver is educated on 1-4 topics. Nursing staff identifies learner, confirms understanding of information (verbal, demonstration, written) and documents details. May include Discharge Instructions/AVS, available documents in My Chart, or Web-based learning. [x]   1   Patient/caregiver is educated on 5-9 topics. Nursing staff identifies learner, confirms understanding of information (verbal, demonstration, written) and documents details. May include Discharge Instructions/AVS, available documents in My Chart, or Web-based learning. []   2   Patient/caregiver is educated on 10 or more topics. Nursing staff identifies learner, confirms understanding of information (verbal, demonstration, written) and documents details. May include Discharge Instructions/AVS, available documents in My Chart, or Web-based learning. []   3     Follow-up Virtual Visit Points   No contact with outside resources made. []   0   Nursing staff contacts 1-2 outside resource. For example, telephone call made to home health, primary care provider, pharmacy, or DME. May include filling out forms and writing letters, arranging transportation, communication with insurance , vendors, etc.  Discharge, instructions and/or After Visit Summary given to patient/caregiver and instructions completed. [x]   1   Nursing staff contacts 3-4 outside resource. For example, telephone calls made to home health, primary care provider, pharmacy, or DME. May include filling out forms and writing letters, arranging transportation, communication with insurance , vendors, etc.  Discharge, instructions and/or After Visit Summary given to patient/caregiver and instructions completed. []   2   Nursing contacts 5 or more outside resource.  For example, telephone calls made to home health, primary care providers, pharmacy, or DME. May include filling out forms and writing letters, arranging transportation, communication with insurance , vendors, etc.  Discharge, instructions and/or After Visit Summary given to patient/caregiver and instructions completed.    []   3     Is this the Patient's First Visit to the [address]  No    Is this Patient Established @ [facility]  Yes             Virtual Visit Clinical Level of Care Wound Care      Points  1-3  Level 1 []     Points  4-5  Level 2 [x]     Points  6-7  Level 3 []     Points  8-9  Level 4 []     Points  10-11  Level 5 []       Electronically signed by Nadia Oreilly RN on 10/21/2022 at 1:29 PM

## 2022-10-21 NOTE — PLAN OF CARE
Problem: Wound:  Goal: Will show signs of wound healing; wound closure and no evidence of infection  Description: Will show signs of wound healing; wound closure and no evidence of infection  Outcome: Not Progressing   Pt. Seen today for right buttock wound by virtual visit see AVS for orders. Follow up after hospital visit. Care plan reviewed with patient and nurse. Patient and nurse verbalize understanding of the plan of care and contribute to goal setting.

## 2022-10-21 NOTE — ED NOTES
Pt sent in for further evaluation of known bedsores- states her wound care nurse did a virtual visit with her today and advised she come in so they could evaluate her wounds further. States they seem to have gotten worse over the last few days. Pt has home health nurses and aides that come to her house otherwise her  cares for her at home- pt is a total lift. States she spends most of her time either in bed or in her motorized scooter, she tries to keep herself off her bottom as much as possible but states she still spends a lot of time directly on her bottom. Pt otherwise states she feels fine, has no other complaints or concerns at this time. Pt resting in position of comfort- warm blankets provided.       Kenn Herrera RN  10/21/22 [id number]

## 2022-10-21 NOTE — PROGRESS NOTES
Virtual Visit Via [facility]   Progress Note and Procedure Note    [address] RECORD NUMBER:  [id number]  AGE: 66 y.o. GENDER: female  : 1950  EPISODE DATE:  10/21/2022    Subjective:     Chief Complaint   Patient presents with    Wound Check     RIGHT BUTTOCK        Consent obtained to communicate via Virtual Visit:  Yes     HISTORY of PRESENT ILLNESS HPI     Trina Espana is a 66 y.o. female who presents today via Virtual Visit for re-evaluation of bilateral ischium wounds. History of Wound Context:   Patient has previously followed at wound care center for similar wounds, last seen in 2022 at which time they were healed. Current wounds are reported to have opened beginning of July. Patient denies pain to wounds. Current wound care includes alginate and bordered foams to ischial wounds. Patient has history of MS, is mostly bedbound, has phillip lift at home for transfers. Straps for phillip are reported to be in contact with wounds when used. Patient has chronic vega catheter, does have chronic leakage per her report. Patient states that she has \"questionable\" control of her bowels. Current with home health, has aides that come several days weekly,  is primary caregiver. Has low air loss mattress. She states that she does not turn or reposition at home. Today, home health nurse reports significant worsening of wound. She states that at her visit 2 days ago, the wound was beefy red, granular. Today, she reports moderate amounts of foul smelling drainage, necrosis of wound bed and fluctuant area to wound bed that now protrudes several centimeters past the level of the skin. No changes or events identified as possible cause for worsening. Patient denies any fevers or chills. No further needs or concerns identified.     Ulcer Identification:  Ulcer Type: pressure  Contributing Factors: chronic pressure, decreased mobility, shear force, incontinence of stool and incontinence of urine     Depth of Diabetic/Pressure/Non Pressure Ulcers or Wound:  Pressure ulcer, unstageable    PAST MEDICAL HISTORY        Diagnosis Date    Arthritis     Difficulty in swallowing     Hyperlipidemia     Hypertension     Intra-abdominal lymphadenopathy     MS (multiple sclerosis) (HCC)     Pancreatitis     Pneumonia     Seizures (HCC)     UTI (urinary tract infection)        PAST SURGICAL HISTORY    Past Surgical History:   Procedure Laterality Date    CATHETER INSERTION N/A 2022    CYSTO, SUPRAPUBIC CATHETER PLACEMENT WITH ULTRASOUND performed by Brandon Ruggiero MD at [address] [facility]  2016    laparoscopic    CYSTOSCOPY N/A 2019    CYSTO, CLOT EVACUATION, TURBT performed by Julianna Flynn MD at [address] [facility] Left 2020    LEFT HEEL WOUND I&D performed by Alvarado Zurita DPM at [address] [facility] Right 2018    EYE CATARACT EMULSIFICATION IOL IMPLANT, RIGHT performed by Mendel Mackintosh, MD at [address] [place] [address] [facility] INSJ IO LENS PROSTH W/O ECP Left 11/15/2018    EYE CATARACT EMULSIFICATION IOL IMPLANT LEFT EYE performed by Mendel Mackintosh, MD at [address] [place] [address]    Family History   Problem Relation Age of Onset    Cancer Mother         colon    Heart Disease Father     Diabetes Neg Hx     High Blood Pressure Neg Hx     Stroke Neg Hx        SOCIAL HISTORY    Social History     Tobacco Use    Smoking status: Former     Packs/day: 2.00     Years: 40.00     Pack years: 80.00     Types: Cigarettes     Quit date: 10/2/2013     Years since quittin.0     Passive exposure: Never    Smokeless tobacco: Former     Quit date: 11/3/2015   Vaping Use    Vaping Use: Former    Substances: Unknown   Substance Use Topics    Alcohol use: No    Drug use: No       ALLERGIES    No Known Allergies    MEDICATIONS    Current Outpatient Medications on File Prior to Encounter   Medication Sig Dispense Refill    trospium (SANCTURA) 20 MG tablet Take 1 tablet by mouth 2 times daily 60 tablet 3    nitrofurantoin, macrocrystal-monohydrate, (MACROBID) 100 MG capsule Take 100 mg by mouth in the morning. acetic acid 0.25 % irrigation Irrigate as directed 1000 mL 5    Water For Irrigation, Sterile (STERILE WATER FOR IRRIGATION) Irrigate with 50 ml of sterile water twice a day. 1000 mL 5    aspirin (RA ASPIRIN EC) 81 MG EC tablet take 1 tablet by mouth once daily 30 tablet 0    metoprolol tartrate (LOPRESSOR) 25 MG tablet take 1/2 tablet by mouth twice a day 30 tablet 11    Cholecalciferol (VITAMIN D3 PO) Take by mouth daily      carBAMazepine (TEGRETOL) 100 MG chewable tablet Take 100 mg by mouth 3 times daily        No current facility-administered medications on file prior to encounter. REVIEW OF SYSTEMS    Pertinent items are noted in HPI. Objective:     PHYSICAL EXAM    General Appearance: in no acute distress and alert       Right ischial wound bed  necrotic with slough and fluctuance-protruding outside level of skin. Periwound erythematous. Wound 10/21/22        Wound 9/23/22:      Assessment:      Problem List Items Addressed This Visit          Other    RESOLVED: Pressure injury of left ischium, unstageable ([facility]) - Primary    * (Principal) Pressure injury of right ischium, unstageable ([facility] [address]         Performed by: BROOK Ziegler - CNP    Wound/Ulcer #: 1,   Diabetic/Pressure/Non Pressure Ulcers only:  Ulcer: Pressure ulcer, unstageable      Plan:     Please see attached Discharge Instructions    Based upon this virtual visit it is not required to have an in-person visit of this time.    -Stage 3 pressure injury to right ischium-Wound appearance significantly worsened. Concern for possible abscess or wound infection, especially given rapid deterioration.    Nurse present for today's visit reports fluctuance to wound bed and surrounding area. Recommend ER evaluation for further evaluation and treatment. Patient agreeable. Wound has continued to decline. Patient is bed bound due to history of MS, states that she does not turn or reposition during the day as she is mostly home by herself. The home health nurse reports significant sliding against linens and furniture at home with transfers/repositioning. Karen Ripplemead lift also appears to be creating significant pressure/friction/shear with use. Wounds will be very difficult if not impossible to heal with ongoing pressure, friction, and shearing injuries. Reinforced importance of frequent turning and repositioning to prevent future wounds. Patient would benefit from home health aide or caregiver to help with her ADLs. Education has been provided to patient on the importance of turnign and repositioning as offloading mattress and cushions do not replace this. Avoid sliding against furniture with transfers, use lift equipment as able. Offloading techniques reviewed, patient verbalizes understanding. Follow up to be scheduled after hospital evaluation. Patient states she is going to go to the hospital today after her  gets home. She was advised of risk of untreated wound including but not limited to infection, sepsis, and death. She verbalizes understanding. She was advised to call clinic for any further needs or concerns    Written patient dismissal instructions available to Patient/POA         Discharge Instructions         Visit Discharge/Physician Orders: recommend going to the ER for evaluation of the wound  - call after hospital discharge/ ER visit for follow up     Home Care: [facility]    Wound Location: right buttock    Dressing orders:     1) Gather wound care supplies and arrange on clean table. 2) Wash your hands with soap and water or use alcohol based hand  for 20 seconds (sing \"Happy Birthday\" twice).     3) Cleanse wounds with normal saline or wound cleanser and gauze. Pat dry with clean gauze. 4) Apply alginate and a silicone bordered foam and go to the ER. Keep all dressings clean & dry. Follow up visit:   after hospital visit    Keep next scheduled appointment. Please give 24 hour notice if unable to keep appointment. [phone]    If you experience any of the following, please call the Wound Care Service during business hours: Monday through Friday 8:00 am - 4:30 pm  ([phone]). *Increase in pain   *Temperature over 101   *Increase in drainage from your wound or a foul odor   *Uncontrolled swelling   *Need for compression bandage changes due to slippage, breakthrough drainage    If you need medical attention outside of business hours, please contact your Primary Care Doctor or go to the nearest emergency room. Trina Espana AGE: 66 y.o. GENDER: female  : 1950 being evaluated by a Virtual Visit (video visit) encounter to address concerns as mentioned above. A caregiver was present when appropriate. Due to this being a TeleHealth encounter ([facility]), evaluation of the following organ systems was limited: Vitals/Constitutional/EENT/Resp/CV/GI//MS/Neuro/Skin/Heme-Lymph-Imm. Pursuant to the emergency declaration under the [address] [place] [address] authority and the [employer] and [employer] Act, this Virtual Visit was conducted with patient's (and/or legal guardian's) consent, to reduce the patient's risk of exposure to COVID-19 and provide necessary medical care. The patient (and/or legal guardian) has also been advised to contact this office for worsening conditions or problems, and seek emergency medical treatment and/or call 911 if deemed necessary. Services were provided through a video synchronous discussion virtually to substitute for in-person clinic visit.  Patient was located at their individual homes. Provider was located in [place] [address].     Electronically signed by BROOK Gary CNP on 10/21/2022 at 12:19 PM

## 2022-10-21 NOTE — ED NOTES
Pt continues restful on cart in position of comfort. Denies current needs or concerns. Updated on POC/admit status. Waiting transport.       Ronaldo Schmid RN  10/21/22 [id number]

## 2022-10-21 NOTE — ED PROVIDER NOTES
[facility] ENCOUNTER          Pt Name: Addison Petit  MRN: [id number]  [place] 1950  Date of evaluation: 10/21/2022  Treating Resident Physician: Vik Nesbitt MD  Supervising Physician: Christian Cruz MD    History obtained from the patient. CHIEF COMPLAINT       Chief Complaint   Patient presents with    Wound Check     Sent in per wound care for known bedsore that is worse           HISTORY OF PRESENT ILLNESS    HPI  Addison Petit is a 66 y.o. female past medical history of MS, sepsis, and chronic wounds to bilateral buttocks who presents to the emergency department for evaluation of wound to right buttock. Patient states that she has routine wound care nurse that comes to her home and states over the last week the wound to her right buttocks has become worse. Patient also states that she has noticed an odor that she believes is coming from the wound. Patient was seen via virtual visit with wound care NP today. Patient was told to come to the ED for further evaluation and possible debridement. Patient states that she occasionally has pain to buttocks but no pain at this time. Patient states patient denies any fever or increasing pain to the area. The patient has no other acute complaints at this time. REVIEW OF SYSTEMS   Review of Systems   Constitutional:  Negative for fatigue and fever. HENT:  Negative for ear pain, rhinorrhea and sore throat. Eyes:  Negative for pain and discharge. Respiratory:  Negative for cough, shortness of breath and wheezing. Cardiovascular:  Negative for chest pain, palpitations and leg swelling. Gastrointestinal:  Negative for abdominal distention, constipation, diarrhea, nausea and vomiting. Endocrine: Negative for polydipsia and polyuria. Genitourinary:  Negative for difficulty urinating and dysuria. Musculoskeletal:  Negative for arthralgias.    Skin:  Negative for color change, pallor and rash. Wound to bilateral buttocks   Neurological:  Negative for dizziness, seizures, syncope, weakness and numbness. Psychiatric/Behavioral:  Negative for agitation and confusion. PAST MEDICAL AND SURGICAL HISTORY     Past Medical History:   Diagnosis Date    Arthritis     Difficulty in swallowing     Hyperlipidemia     Hypertension     Intra-abdominal lymphadenopathy     MS (multiple sclerosis) (HCC)     Pancreatitis     Pneumonia     Seizures ([facility] [address]     UTI (urinary tract infection)      Past Surgical History:   Procedure Laterality Date    CATHETER INSERTION N/A 1/17/2022    CYSTO, SUPRAPUBIC CATHETER PLACEMENT WITH ULTRASOUND performed by Ernesto Capone MD at [address] [place] [address]  April 1st 2016    laparoscopic    CYSTOSCOPY N/A 1/2/2019    CYSTO, CLOT EVACUATION, TURBT performed by Torri Acosta MD at [address] [place] [address] Left 11/6/2020    LEFT HEEL WOUND I&D performed by Isabela Samaniego DPM at [place] [address] Right 12/17/2018    EYE CATARACT EMULSIFICATION IOL IMPLANT, RIGHT performed by Yadira Hernandez MD at [address] [place] [address] INS IO LENS PROSTH W/O ECP Left 11/15/2018    EYE CATARACT EMULSIFICATION IOL IMPLANT LEFT EYE performed by Yadira Hernandez MD at [address] [place] [address]   No current facility-administered medications for this encounter.     Current Outpatient Medications:     trospium (SANCTURA) 20 MG tablet, Take 1 tablet by mouth 2 times daily, Disp: 60 tablet, Rfl: 3    nitrofurantoin, macrocrystal-monohydrate, (MACROBID) 100 MG capsule, Take 100 mg by mouth in the morning., Disp: , Rfl:     acetic acid 0.25 % irrigation, Irrigate as directed, Disp: 1000 mL, Rfl: 5    Water For Irrigation, Sterile (STERILE WATER FOR IRRIGATION), Irrigate with 50 ml of sterile water twice a day., Disp: 1000 mL, Rfl: 5    aspirin (RA ASPIRIN EC) 81 MG EC tablet, take 1 tablet by mouth once daily, Disp: 30 tablet, Rfl: 0    metoprolol tartrate (LOPRESSOR) 25 MG tablet, take 1/2 tablet by mouth twice a day, Disp: 30 tablet, Rfl: 11    Cholecalciferol (VITAMIN D3 PO), Take by mouth daily, Disp: , Rfl:     carBAMazepine (TEGRETOL) 100 MG chewable tablet, Take 100 mg by mouth 3 times daily , Disp: , Rfl:       SOCIAL HISTORY     Social History     Social History Narrative    Not on file     Social History     Tobacco Use    Smoking status: Former     Packs/day: 2.00     Years: 40.00     Pack years: 80.00     Types: Cigarettes     Quit date: 10/2/2013     Years since quittin.0     Passive exposure: Never    Smokeless tobacco: Former     Quit date: 11/3/2015   Vaping Use    Vaping Use: Former    Substances: Unknown   Substance Use Topics    Alcohol use: No    Drug use: No         ALLERGIES   No Known Allergies      FAMILY HISTORY     Family History   Problem Relation Age of Onset    Cancer Mother         colon    Heart Disease Father     Diabetes Neg Hx     High Blood Pressure Neg Hx     Stroke Neg Hx          PREVIOUS RECORDS   Previous records reviewed:  22 thru current  . PHYSICAL EXAM     ED Triage Vitals [10/21/22 1241]   BP Temp Temp Source Heart Rate Resp SpO2 Height Weight   115/71 98.1 °F (36.7 °C) Oral (!) 106 18 99 % 5' 3\" (1.6 m) 119 lb (54 kg)     Initial vital signs and nursing assessment reviewed and normal. Body mass index is 21.08 kg/m². Pulsoximetry is normal per my interpretation. Additional Vital Signs:  Vitals:    10/21/22 1415   BP: (!) 96/54   Pulse: 93   Resp: 17   Temp:    SpO2: 100%       Physical Exam  Constitutional:       Appearance: Normal appearance. HENT:      Head: Normocephalic. Right Ear: External ear normal.      Left Ear: External ear normal.      Nose: Nose normal.      Mouth/Throat:      Mouth: Mucous membranes are moist.      Pharynx: Oropharynx is clear. Eyes:      Extraocular Movements: Extraocular movements intact.       Conjunctiva/sclera: Conjunctivae normal.      Pupils: Pupils are equal, round, and reactive to light. Cardiovascular:      Rate and Rhythm: Normal rate and regular rhythm. Pulses: Normal pulses. Heart sounds: Normal heart sounds. Pulmonary:      Effort: Pulmonary effort is normal.      Breath sounds: Normal breath sounds. Abdominal:      General: Bowel sounds are normal.      Palpations: Abdomen is soft. Musculoskeletal:         General: Normal range of motion. Cervical back: Normal range of motion and neck supple. Skin:     General: Skin is warm and dry. Capillary Refill: Capillary refill takes less than 2 seconds. Comments: Wounds to bilateral buttocks. Left buttocks noted with a stage II wound. Right buttocks noted with large wound with eschar noted to wound bed. Noted a small amount of serosanguineous drainage to dressing. Please see pictures from today's wound care visit. Foul odor noted to wound. Neurological:      General: No focal deficit present. Mental Status: She is alert and oriented to person, place, and time. Psychiatric:         Mood and Affect: Mood normal.         Behavior: Behavior normal.           MEDICAL DECISION MAKING   Initial Assessment:   Patient is a 78-year-old female who presents to the ED with past medical history of MS, sepsis, and chronic wounds to buttocks. Patient differential diagnosis today includes but is not limited to stasis ulcer, wound to buttocks, abscess, necrotizing fascitis, poorly healing wound, and sepsis. Plan:   Labs  Consult surgery    Patient noted with worsening of wound to buttocks when comparing 9/23/2022 and picture to exam today. Also noted in chart as wound from virtual wound visit today. Patient has noted eschar to wound bed on right wound buttocks. Patient also has foul-smelling odor to area.   Consult surgery at this time            ED RESULTS   Laboratory results:  Labs Reviewed   CBC WITH AUTO DIFFERENTIAL - Abnormal; Notable for the following components:       Result Value    RBC 3.73 (*)     Hemoglobin 11.9 (*)     .8 (*)     MCHC 31.6 (*)     RDW-SD 48.1 (*)     All other components within normal limits   COMPREHENSIVE METABOLIC PANEL W/ REFLEX TO MG FOR LOW K - Abnormal; Notable for the following components:    Alkaline Phosphatase 153 (*)     Albumin 3.0 (*)     All other components within normal limits   URINE WITH REFLEXED MICRO - Abnormal; Notable for the following components:    Bilirubin Urine SMALL (*)     Ketones, Urine TRACE (*)     Blood, Urine SMALL (*)     Protein, UA 30 (*)     Leukocyte Esterase, Urine LARGE (*)     Color, UA DK YELLOW (*)     Character, Urine TURBID (*)     All other components within normal limits   ANION GAP - Abnormal; Notable for the following components:    Anion Gap 6.0 (*)     All other components within normal limits   CULTURE, REFLEXED, URINE   GLOMERULAR FILTRATION RATE, ESTIMATED   BILE ACIDS, TOTAL   OSMOLALITY   LACTATE, SEPSIS   LACTATE, SEPSIS       Radiologic studies results:  No orders to display       ED Medications administered this visit: Medications - No data to display      ED COURSE     ED Course as of 10/21/22 1636   Fri Oct 21, 2022   1605 CO2: 28 [CN]      ED Course User Index  [CN] Lori Curtis MD     Consulted Dr. Mendel Ryder, surgery, who will see patient. MEDICATION CHANGES     New Prescriptions    No medications on file         FINAL DISPOSITION     Final diagnoses:   Wound of right buttock, initial encounter     Condition: condition: good  Dispo: admit to medical floor      This transcription was electronically signed. Parts of this transcriptions may have been dictated by use of voice recognition software and electronically transcribed, and parts may have been transcribed with the assistance of an ED scribe. The transcription may contain errors not detected in proofreading.   Please refer to my supervising physician's documentation if my documentation differs.     Electronically Signed: Amaury Hanson MD, 10/21/22, 3:12 PM        Amaury Hanson MD  Resident  10/21/22 7811       Amaury Hanson MD  Resident  10/21/22 6827       Amaury Hanson MD  Resident  10/21/22 6815

## 2022-10-21 NOTE — H&P
Patient Name: Damian Ojeda  Courtesy Copy PCP: PRAVEEN Rsoeate of Admission: 10/21/2022  Date of Service: 10/21/2022      CC:   Right ischial pressure ulcer  HPI:  Patient is a 66 y.o.  female who presents with bilateral ischium wounds. Patient was evaluated via virtual visit per [facility] earlier today. Home health nurses had been concerned with the recent changes in the wound status and with the patient being a [place]illip and having difficulty getting transportation to appointments we opted for a virtual visit with home health. The wound had changed significantly in 2 days from a granulated wound with a small amount of depth to a necrotic wound that was protruding with what the nurse described as feeling fluctuant. On exam, patient is awake and alert, and able to explain the progression of her wounds. Afebrile. Patient consumed PO just prior to arrival to hospital today.      PMHx:   Past Medical History:   Diagnosis Date    Arthritis     Difficulty in swallowing     Hyperlipidemia     Hypertension     Intra-abdominal lymphadenopathy     MS (multiple sclerosis) (HCC)     Pancreatitis     Pneumonia     Seizures ([address]     UTI (urinary tract infection)      PSurgHx:   Past Surgical History:   Procedure Laterality Date    CATHETER INSERTION N/A 1/17/2022    CYSTO, SUPRAPUBIC CATHETER PLACEMENT WITH ULTRASOUND performed by Vanita Esquivel MD at [facility]  April 1st 2016    laparoscopic    CYSTOSCOPY N/A 1/2/2019    CYSTO, CLOT EVACUATION, TURBT performed by Chai Arciniega MD at [address] Left 11/6/2020    LEFT HEEL WOUND I&D performed by Chilo Thompson DPM at [facility] Right 12/17/2018    EYE CATARACT EMULSIFICATION IOL IMPLANT, RIGHT performed by Nadja Cloud MD at [address] [place] [address] INS IO LENS PROSTH W/O ECP Left 11/15/2018    EYE CATARACT EMULSIFICATION IOL IMPLANT LEFT EYE performed by Toby Vitale Macie Castellanos MD at [address] [place] [address] Avenue: [unfilled]  All:No Known Allergies  FamHx: family history includes Cancer in her mother; Heart Disease in her father. (-Review to make sure family history is asked and completed)  SocHx:  reports that she quit smoking about 9 years ago. Her smoking use included cigarettes. She has a 80.00 pack-year smoking history. She has never been exposed to tobacco smoke. She quit smokeless tobacco use about 6 years ago. She reports that she does not drink alcohol and does not use drugs. Review of Systems    Review of Systems     Physical Exam:    BP (!) 96/54   Pulse 93   Temp 98.1 °F (36.7 °C) (Oral)   Resp 17   Ht 5' 3\" (1.6 m)   Wt 119 lb (54 kg)   SpO2 100%   BMI 21.08 kg/m²   Physical Exam     Data Base:  [unfilled]  [unfilled]  EKG: normal EKG, normal sinus rhythm, EKG not indicated today    Assessment/Plan:  Active Problems:    * No active hospital problems. *  Resolved Problems:    * No resolved hospital problems. *    Body mass index is 21.08 kg/m². DVT Prophlaxis - Enoxaparin  Current Planned Disposition - home  Plan discussed with significant other; questions answered; patient agrees with current plan. Current Code Status -Prior    Plan:  Admit to hospital  Regular diet   NPO after midnight  OR for debridement of ischial wound on right      45 minutes were spent in the care of this patient today; this may include family conference, nursing conference and discussion with any consultants, but does not include procedures performed.       Kevin Mohr DO

## 2022-10-21 NOTE — PROGRESS NOTES
Received call from case management questioning reasoning for patients ED visit. Explained that the patient was seen today by virtual visit for right buttock wound. Home health nurses had been concerned with the recent changes in the wound status and with the patient being a [place] and having difficulty getting transportation to appointments we opted for a virtual visit with home health. The wound had changed significantly in 2 days from a granulated wound with a small amount of depth to a necrotic wound that was protruding with what the nurse described as feeling fluctuant. With the wound changing significantly in 2 days and it being Friday afternoon the provider opted to send the patient to the ED for evaluation of possible infection, fluid collection and possible debridement. Case management questioned why the patient wasn't direct admitted and this nurse informed her that the provider is a CNP who can not direct admit and the collaborating provider doesn't typically direct admit.

## 2022-10-21 NOTE — ED NOTES
ED to inpatient nurses report    Chief Complaint   Patient presents with    Wound Check     Sent in per wound care for known bedsore that is worse      Present to ED from home  LOC: alert and orientated to name, place, date  Vital signs   Vitals:    10/21/22 1241 10/21/22 1415 10/21/22 1607   BP: 115/71 (!) 96/54 103/72   Pulse: (!) 106 93 94   Resp: 18 17    Temp: 98.1 °F (36.7 °C)     TempSrc: Oral     SpO2: 99% 100% 100%   Weight: 119 lb (54 kg)     Height: 5' 3\" (1.6 m)        Oxygen Baseline RA    Current needs required RA  Bipap/Cpap No  LDAs:   Peripheral IV 10/21/22 Right Other (Comment) (Active)   Site Assessment Clean, dry & intact 10/21/22 1640   Line Status Brisk blood return;Flushed 10/21/22 1640     Mobility: pt is a total lift. she can roll to the side w/assistance but she cannot reposition herself at all.    Pending ED orders: None    C-SSRS Risk of Suicide: No Risk  Swallow Screening    Preferred Language: English     Electronically signed by Lorraine Saab RN on 10/21/2022 at 5:09 PM       Jonh Gunn RN  10/21/22 6388

## 2022-10-22 ENCOUNTER — ANESTHESIA EVENT (OUTPATIENT)
Dept: OPERATING ROOM | Age: 72
End: 2022-10-22
Payer: MEDICARE

## 2022-10-22 ENCOUNTER — ANESTHESIA (OUTPATIENT)
Dept: OPERATING ROOM | Age: 72
End: 2022-10-22
Payer: MEDICARE

## 2022-10-22 LAB
BASOPHILS # BLD: 0.2 %
BASOPHILS ABSOLUTE: 0 THOU/MM3 (ref 0–0.1)
EKG ATRIAL RATE: 112 BPM
EKG P AXIS: 63 DEGREES
EKG P-R INTERVAL: 142 MS
EKG Q-T INTERVAL: 364 MS
EKG QRS DURATION: 102 MS
EKG QTC CALCULATION (BAZETT): 496 MS
EKG R AXIS: -62 DEGREES
EKG T AXIS: 91 DEGREES
EKG VENTRICULAR RATE: 112 BPM
EOSINOPHIL # BLD: 0.2 %
EOSINOPHILS ABSOLUTE: 0 THOU/MM3 (ref 0–0.4)
ERYTHROCYTE [DISTWIDTH] IN BLOOD BY AUTOMATED COUNT: 13 % (ref 11.5–14.5)
ERYTHROCYTE [DISTWIDTH] IN BLOOD BY AUTOMATED COUNT: 49.1 FL (ref 35–45)
HCT VFR BLD CALC: 37.7 % (ref 37–47)
HEMOGLOBIN: 11.7 GM/DL (ref 12–16)
IMMATURE GRANS (ABS): 0.03 THOU/MM3 (ref 0–0.07)
IMMATURE GRANULOCYTES: 0.7 %
LACTIC ACID: 2.2 MMOL/L (ref 0.5–2)
LYMPHOCYTES # BLD: 13 %
LYMPHOCYTES ABSOLUTE: 0.6 THOU/MM3 (ref 1–4.8)
MCH RBC QN AUTO: 31.9 PG (ref 26–33)
MCHC RBC AUTO-ENTMCNC: 31 GM/DL (ref 32.2–35.5)
MCV RBC AUTO: 102.7 FL (ref 81–99)
MONOCYTES # BLD: 1.6 %
MONOCYTES ABSOLUTE: 0.1 THOU/MM3 (ref 0.4–1.3)
NUCLEATED RED BLOOD CELLS: 0 /100 WBC
PLATELET # BLD: 141 THOU/MM3 (ref 130–400)
PMV BLD AUTO: 10.8 FL (ref 9.4–12.4)
PRO-BNP: 795.4 PG/ML (ref 0–900)
PROCALCITONIN: 0.06 NG/ML (ref 0.01–0.09)
RBC # BLD: 3.67 MILL/MM3 (ref 4.2–5.4)
SEG NEUTROPHILS: 84.3 %
SEGMENTED NEUTROPHILS ABSOLUTE COUNT: 3.6 THOU/MM3 (ref 1.8–7.7)
TSH SERPL DL<=0.05 MIU/L-ACNC: 1.56 UIU/ML (ref 0.4–4.2)
WBC # BLD: 4.3 THOU/MM3 (ref 4.8–10.8)

## 2022-10-22 PROCEDURE — G0378 HOSPITAL OBSERVATION PER HR: HCPCS

## 2022-10-22 PROCEDURE — 2580000003 HC RX 258: Performed by: PHYSICIAN ASSISTANT

## 2022-10-22 PROCEDURE — 36415 COLL VENOUS BLD VENIPUNCTURE: CPT

## 2022-10-22 PROCEDURE — 93005 ELECTROCARDIOGRAM TRACING: CPT | Performed by: PHYSICIAN ASSISTANT

## 2022-10-22 PROCEDURE — 93010 ELECTROCARDIOGRAM REPORT: CPT | Performed by: INTERNAL MEDICINE

## 2022-10-22 PROCEDURE — 7100000000 HC PACU RECOVERY - FIRST 15 MIN: Performed by: SURGERY

## 2022-10-22 PROCEDURE — 6360000002 HC RX W HCPCS: Performed by: SURGERY

## 2022-10-22 PROCEDURE — 2580000003 HC RX 258: Performed by: SURGERY

## 2022-10-22 PROCEDURE — 7100000001 HC PACU RECOVERY - ADDTL 15 MIN: Performed by: SURGERY

## 2022-10-22 PROCEDURE — 2500000003 HC RX 250 WO HCPCS: Performed by: NURSE ANESTHETIST, CERTIFIED REGISTERED

## 2022-10-22 PROCEDURE — 3700000000 HC ANESTHESIA ATTENDED CARE: Performed by: SURGERY

## 2022-10-22 PROCEDURE — 6360000002 HC RX W HCPCS: Performed by: NURSE ANESTHETIST, CERTIFIED REGISTERED

## 2022-10-22 PROCEDURE — 83880 ASSAY OF NATRIURETIC PEPTIDE: CPT

## 2022-10-22 PROCEDURE — 3700000001 HC ADD 15 MINUTES (ANESTHESIA): Performed by: SURGERY

## 2022-10-22 PROCEDURE — 88304 TISSUE EXAM BY PATHOLOGIST: CPT

## 2022-10-22 PROCEDURE — 84443 ASSAY THYROID STIM HORMONE: CPT

## 2022-10-22 PROCEDURE — 3600000012 HC SURGERY LEVEL 2 ADDTL 15MIN: Performed by: SURGERY

## 2022-10-22 PROCEDURE — 83605 ASSAY OF LACTIC ACID: CPT

## 2022-10-22 PROCEDURE — 11043 DBRDMT MUSC&/FSCA 1ST 20/<: CPT | Performed by: SURGERY

## 2022-10-22 PROCEDURE — 6370000000 HC RX 637 (ALT 250 FOR IP): Performed by: PHYSICIAN ASSISTANT

## 2022-10-22 PROCEDURE — 0KBN0ZZ EXCISION OF RIGHT HIP MUSCLE, OPEN APPROACH: ICD-10-PCS | Performed by: SURGERY

## 2022-10-22 PROCEDURE — 3600000002 HC SURGERY LEVEL 2 BASE: Performed by: SURGERY

## 2022-10-22 PROCEDURE — 84145 PROCALCITONIN (PCT): CPT

## 2022-10-22 PROCEDURE — 85025 COMPLETE CBC W/AUTO DIFF WBC: CPT

## 2022-10-22 PROCEDURE — 6370000000 HC RX 637 (ALT 250 FOR IP): Performed by: SURGERY

## 2022-10-22 PROCEDURE — 2500000003 HC RX 250 WO HCPCS: Performed by: SURGERY

## 2022-10-22 PROCEDURE — 2709999900 HC NON-CHARGEABLE SUPPLY: Performed by: SURGERY

## 2022-10-22 RX ORDER — ACETAMINOPHEN 325 MG/1
650 TABLET ORAL EVERY 4 HOURS PRN
Status: DISCONTINUED | OUTPATIENT
Start: 2022-10-22 | End: 2022-10-22

## 2022-10-22 RX ORDER — ENOXAPARIN SODIUM 100 MG/ML
40 INJECTION SUBCUTANEOUS DAILY
Status: DISCONTINUED | OUTPATIENT
Start: 2022-10-23 | End: 2022-10-27 | Stop reason: HOSPADM

## 2022-10-22 RX ORDER — ONDANSETRON 4 MG/1
4 TABLET, ORALLY DISINTEGRATING ORAL EVERY 8 HOURS PRN
Status: DISCONTINUED | OUTPATIENT
Start: 2022-10-22 | End: 2022-10-27 | Stop reason: HOSPADM

## 2022-10-22 RX ORDER — SODIUM CHLORIDE 0.9 % (FLUSH) 0.9 %
5-40 SYRINGE (ML) INJECTION PRN
Status: DISCONTINUED | OUTPATIENT
Start: 2022-10-22 | End: 2022-10-27 | Stop reason: HOSPADM

## 2022-10-22 RX ORDER — DEXAMETHASONE SODIUM PHOSPHATE 10 MG/ML
INJECTION, EMULSION INTRAMUSCULAR; INTRAVENOUS PRN
Status: DISCONTINUED | OUTPATIENT
Start: 2022-10-22 | End: 2022-10-22 | Stop reason: SDUPTHER

## 2022-10-22 RX ORDER — SODIUM CHLORIDE 9 MG/ML
INJECTION, SOLUTION INTRAVENOUS PRN
Status: DISCONTINUED | OUTPATIENT
Start: 2022-10-22 | End: 2022-10-27 | Stop reason: HOSPADM

## 2022-10-22 RX ORDER — MORPHINE SULFATE 4 MG/ML
4 INJECTION, SOLUTION INTRAMUSCULAR; INTRAVENOUS
Status: DISCONTINUED | OUTPATIENT
Start: 2022-10-22 | End: 2022-10-27 | Stop reason: HOSPADM

## 2022-10-22 RX ORDER — BUPIVACAINE HYDROCHLORIDE AND EPINEPHRINE 5; 5 MG/ML; UG/ML
INJECTION, SOLUTION EPIDURAL; INTRACAUDAL; PERINEURAL PRN
Status: DISCONTINUED | OUTPATIENT
Start: 2022-10-22 | End: 2022-10-22 | Stop reason: ALTCHOICE

## 2022-10-22 RX ORDER — CARBAMAZEPINE 100 MG/1
100 TABLET, CHEWABLE ORAL 3 TIMES DAILY
Status: DISCONTINUED | OUTPATIENT
Start: 2022-10-22 | End: 2022-10-25

## 2022-10-22 RX ORDER — ONDANSETRON 2 MG/ML
4 INJECTION INTRAMUSCULAR; INTRAVENOUS EVERY 6 HOURS PRN
Status: DISCONTINUED | OUTPATIENT
Start: 2022-10-22 | End: 2022-10-27 | Stop reason: HOSPADM

## 2022-10-22 RX ORDER — SODIUM CHLORIDE 9 MG/ML
INJECTION, SOLUTION INTRAVENOUS CONTINUOUS
Status: DISCONTINUED | OUTPATIENT
Start: 2022-10-22 | End: 2022-10-22

## 2022-10-22 RX ORDER — TROSPIUM CHLORIDE 20 MG/1
20 TABLET, FILM COATED ORAL
Status: DISCONTINUED | OUTPATIENT
Start: 2022-10-22 | End: 2022-10-27 | Stop reason: HOSPADM

## 2022-10-22 RX ORDER — OXYCODONE HYDROCHLORIDE 5 MG/1
5 TABLET ORAL EVERY 6 HOURS PRN
Status: DISCONTINUED | OUTPATIENT
Start: 2022-10-22 | End: 2022-10-27 | Stop reason: HOSPADM

## 2022-10-22 RX ORDER — LIDOCAINE HYDROCHLORIDE 20 MG/ML
INJECTION, SOLUTION EPIDURAL; INFILTRATION; INTRACAUDAL; PERINEURAL PRN
Status: DISCONTINUED | OUTPATIENT
Start: 2022-10-22 | End: 2022-10-22 | Stop reason: SDUPTHER

## 2022-10-22 RX ORDER — 0.9 % SODIUM CHLORIDE 0.9 %
1000 INTRAVENOUS SOLUTION INTRAVENOUS ONCE
Status: COMPLETED | OUTPATIENT
Start: 2022-10-22 | End: 2022-10-22

## 2022-10-22 RX ORDER — SODIUM CHLORIDE 9 MG/ML
INJECTION, SOLUTION INTRAVENOUS CONTINUOUS
Status: DISCONTINUED | OUTPATIENT
Start: 2022-10-22 | End: 2022-10-27 | Stop reason: HOSPADM

## 2022-10-22 RX ORDER — PROPOFOL 10 MG/ML
INJECTION, EMULSION INTRAVENOUS PRN
Status: DISCONTINUED | OUTPATIENT
Start: 2022-10-22 | End: 2022-10-22 | Stop reason: SDUPTHER

## 2022-10-22 RX ORDER — SODIUM CHLORIDE 0.9 % (FLUSH) 0.9 %
5-40 SYRINGE (ML) INJECTION EVERY 12 HOURS SCHEDULED
Status: DISCONTINUED | OUTPATIENT
Start: 2022-10-22 | End: 2022-10-22 | Stop reason: SDUPTHER

## 2022-10-22 RX ORDER — ONDANSETRON 2 MG/ML
INJECTION INTRAMUSCULAR; INTRAVENOUS PRN
Status: DISCONTINUED | OUTPATIENT
Start: 2022-10-22 | End: 2022-10-22 | Stop reason: SDUPTHER

## 2022-10-22 RX ORDER — SODIUM CHLORIDE 9 MG/ML
INJECTION, SOLUTION INTRAVENOUS PRN
Status: DISCONTINUED | OUTPATIENT
Start: 2022-10-22 | End: 2022-10-22 | Stop reason: HOSPADM

## 2022-10-22 RX ORDER — 0.9 % SODIUM CHLORIDE 0.9 %
500 INTRAVENOUS SOLUTION INTRAVENOUS ONCE
Status: DISCONTINUED | OUTPATIENT
Start: 2022-10-22 | End: 2022-10-22

## 2022-10-22 RX ORDER — MORPHINE SULFATE 2 MG/ML
1 INJECTION, SOLUTION INTRAMUSCULAR; INTRAVENOUS EVERY 5 MIN PRN
Status: DISCONTINUED | OUTPATIENT
Start: 2022-10-22 | End: 2022-10-22 | Stop reason: HOSPADM

## 2022-10-22 RX ORDER — NITROFURANTOIN 25; 75 MG/1; MG/1
100 CAPSULE ORAL DAILY
Status: DISCONTINUED | OUTPATIENT
Start: 2022-10-22 | End: 2022-10-27 | Stop reason: HOSPADM

## 2022-10-22 RX ORDER — METOPROLOL TARTRATE 50 MG/1
50 TABLET, FILM COATED ORAL 2 TIMES DAILY
Status: DISCONTINUED | OUTPATIENT
Start: 2022-10-22 | End: 2022-10-22

## 2022-10-22 RX ORDER — SODIUM CHLORIDE 0.9 % (FLUSH) 0.9 %
5-40 SYRINGE (ML) INJECTION EVERY 12 HOURS SCHEDULED
Status: DISCONTINUED | OUTPATIENT
Start: 2022-10-22 | End: 2022-10-27 | Stop reason: HOSPADM

## 2022-10-22 RX ORDER — 0.9 % SODIUM CHLORIDE 0.9 %
500 INTRAVENOUS SOLUTION INTRAVENOUS ONCE
Status: DISCONTINUED | OUTPATIENT
Start: 2022-10-22 | End: 2022-10-27 | Stop reason: HOSPADM

## 2022-10-22 RX ORDER — 0.9 % SODIUM CHLORIDE 0.9 %
500 INTRAVENOUS SOLUTION INTRAVENOUS ONCE
Status: COMPLETED | OUTPATIENT
Start: 2022-10-22 | End: 2022-10-23

## 2022-10-22 RX ORDER — MORPHINE SULFATE 2 MG/ML
2 INJECTION, SOLUTION INTRAMUSCULAR; INTRAVENOUS EVERY 5 MIN PRN
Status: DISCONTINUED | OUTPATIENT
Start: 2022-10-22 | End: 2022-10-22 | Stop reason: HOSPADM

## 2022-10-22 RX ORDER — HYDRALAZINE HYDROCHLORIDE 20 MG/ML
10 INJECTION INTRAMUSCULAR; INTRAVENOUS
Status: DISCONTINUED | OUTPATIENT
Start: 2022-10-22 | End: 2022-10-22 | Stop reason: HOSPADM

## 2022-10-22 RX ORDER — FENTANYL CITRATE 50 UG/ML
INJECTION, SOLUTION INTRAMUSCULAR; INTRAVENOUS PRN
Status: DISCONTINUED | OUTPATIENT
Start: 2022-10-22 | End: 2022-10-22 | Stop reason: SDUPTHER

## 2022-10-22 RX ORDER — SODIUM CHLORIDE 0.9 % (FLUSH) 0.9 %
5-40 SYRINGE (ML) INJECTION PRN
Status: DISCONTINUED | OUTPATIENT
Start: 2022-10-22 | End: 2022-10-22 | Stop reason: SDUPTHER

## 2022-10-22 RX ORDER — MORPHINE SULFATE 2 MG/ML
2 INJECTION, SOLUTION INTRAMUSCULAR; INTRAVENOUS
Status: DISCONTINUED | OUTPATIENT
Start: 2022-10-22 | End: 2022-10-27 | Stop reason: HOSPADM

## 2022-10-22 RX ADMIN — CEFAZOLIN 2000 MG: 10 INJECTION, POWDER, FOR SOLUTION INTRAVENOUS at 09:16

## 2022-10-22 RX ADMIN — ACETAMINOPHEN 650 MG: 325 SUPPOSITORY RECTAL at 05:51

## 2022-10-22 RX ADMIN — TROSPIUM CHLORIDE 20 MG: 20 TABLET, FILM COATED ORAL at 16:06

## 2022-10-22 RX ADMIN — PHENYLEPHRINE HYDROCHLORIDE 100 MCG: 10 INJECTION INTRAVENOUS at 09:30

## 2022-10-22 RX ADMIN — ONDANSETRON 4 MG: 2 INJECTION INTRAMUSCULAR; INTRAVENOUS at 09:20

## 2022-10-22 RX ADMIN — SODIUM CHLORIDE 1000 ML: 9 INJECTION, SOLUTION INTRAVENOUS at 12:12

## 2022-10-22 RX ADMIN — CARBAMAZEPINE 100 MG: 100 TABLET, CHEWABLE ORAL at 13:25

## 2022-10-22 RX ADMIN — SODIUM CHLORIDE: 9 INJECTION, SOLUTION INTRAVENOUS at 14:52

## 2022-10-22 RX ADMIN — PHENYLEPHRINE HYDROCHLORIDE 100 MCG: 10 INJECTION INTRAVENOUS at 09:26

## 2022-10-22 RX ADMIN — FENTANYL CITRATE 50 MCG: 50 INJECTION, SOLUTION INTRAMUSCULAR; INTRAVENOUS at 09:13

## 2022-10-22 RX ADMIN — DEXAMETHASONE SODIUM PHOSPHATE 8 MG: 10 INJECTION, EMULSION INTRAMUSCULAR; INTRAVENOUS at 09:20

## 2022-10-22 RX ADMIN — SODIUM CHLORIDE 500 ML: 9 INJECTION, SOLUTION INTRAVENOUS at 23:07

## 2022-10-22 RX ADMIN — CARBAMAZEPINE 100 MG: 100 TABLET, CHEWABLE ORAL at 23:03

## 2022-10-22 RX ADMIN — SODIUM CHLORIDE: 9 INJECTION, SOLUTION INTRAVENOUS at 07:38

## 2022-10-22 RX ADMIN — PHENYLEPHRINE HYDROCHLORIDE 100 MCG: 10 INJECTION INTRAVENOUS at 09:14

## 2022-10-22 RX ADMIN — NITROFURANTOIN MONOHYDRATE/MACROCRYSTALLINE 100 MG: 25; 75 CAPSULE ORAL at 13:25

## 2022-10-22 RX ADMIN — SODIUM CHLORIDE: 9 INJECTION, SOLUTION INTRAVENOUS at 00:07

## 2022-10-22 RX ADMIN — Medication 100 MG: at 09:14

## 2022-10-22 RX ADMIN — Medication 500 ML: at 05:32

## 2022-10-22 RX ADMIN — PHENYLEPHRINE HYDROCHLORIDE 100 MCG: 10 INJECTION INTRAVENOUS at 09:21

## 2022-10-22 RX ADMIN — PROPOFOL 150 MG: 10 INJECTION, EMULSION INTRAVENOUS at 09:14

## 2022-10-22 ASSESSMENT — PAIN SCALES - GENERAL
PAINLEVEL_OUTOF10: 5
PAINLEVEL_OUTOF10: 7

## 2022-10-22 NOTE — PLAN OF CARE
Problem: Discharge Planning  Goal: Discharge to home or other facility with appropriate resources  10/22/2022 0016 by Antione Haines RN  Outcome: Progressing  Flowsheets (Taken 10/22/2022 0016)  Discharge to home or other facility with appropriate resources:   Identify barriers to discharge with patient and caregiver   Identify discharge learning needs (meds, wound care, etc)     Problem: Safety - Adult  Goal: Free from fall injury  10/22/2022 0016 by Antione Haines RN  Outcome: Progressing  Flowsheets (Taken 10/22/2022 0016)  Free From Fall Injury: Instruct family/caregiver on patient safety  Note: Patient up with staff assistance. Able to use call light. Patient has remained free of falls during this shift. Appropriate fall prevention measures in place. Problem: Skin/Tissue Integrity  Goal: Absence of new skin breakdown  Description: 1. Monitor for areas of redness and/or skin breakdown  2. Assess vascular access sites hourly  3. Every 4-6 hours minimum:  Change oxygen saturation probe site  4. Every 4-6 hours:  If on nasal continuous positive airway pressure, respiratory therapy assess nares and determine need for appliance change or resting period. 10/22/2022 0016 by Antione Haines RN  Outcome: Progressing  Note: Assess skin every 4 hours.      Problem: Skin/Tissue Integrity - Adult  Goal: Incisions, wounds, or drain sites healing without S/S of infection  10/22/2022 0016 by Antione Haines RN  Outcome: Progressing  Flowsheets (Taken 10/22/2022 0016)  Incisions, Wounds, or Drain Sites Healing Without Sign and Symptoms of Infection:   ADMISSION and DAILY: Assess and document risk factors for pressure ulcer development   Initiate isolation precautions as appropriate   Implement wound care per orders     Problem: Genitourinary - Adult  Goal: Urinary catheter remains patent  10/22/2022 0016 by Antione Haines RN  Outcome: Progressing  Flowsheets (Taken 10/22/2022 0016)  Urinary catheter remains patent: Assess patency of urinary catheter     Problem: Infection - Adult  Goal: Absence of infection at discharge  Outcome: Progressing  Flowsheets (Taken 10/22/2022 0016)  Absence of infection at discharge:   Assess and monitor for signs and symptoms of infection   Monitor lab/diagnostic results     Problem: Infection - Adult  Goal: Absence of infection during hospitalization  Outcome: Progressing  Flowsheets (Taken 10/22/2022 0016)  Absence of infection during hospitalization:   Assess and monitor for signs and symptoms of infection   Monitor lab/diagnostic results     Problem: ABCDS Injury Assessment  Goal: Absence of physical injury  10/22/2022 0016 by Maranda Loja RN  Outcome: Progressing  Flowsheets (Taken 10/22/2022 0016)  Absence of Physical Injury: Implement safety measures based on patient assessment  Note: Patient up with staff assistance. Able to use call light. Patient has remained free of falls during this shift. Appropriate fall prevention measures in place. Problem: Chronic Conditions and Co-morbidities  Goal: Patient's chronic conditions and co-morbidity symptoms are monitored and maintained or improved  Outcome: Progressing  Flowsheets (Taken 10/22/2022 0016)  Care Plan - Patient's Chronic Conditions and Co-Morbidity Symptoms are Monitored and Maintained or Improved:   Monitor and assess patient's chronic conditions and comorbid symptoms for stability, deterioration, or improvement   Collaborate with multidisciplinary team to address chronic and comorbid conditions and prevent exacerbation or deterioration     Care plan reviewed with patient. Patient verbalize understanding of the plan of care and contribute to goal setting.

## 2022-10-22 NOTE — ANESTHESIA POSTPROCEDURE EVALUATION
Department of Anesthesiology  Postprocedure Note    Patient: Tigre Connor  MRN: [id number]  YOB: 1950  Date of evaluation: 10/22/2022      Procedure Summary     Date: 10/22/22 Room / Location: [address] [facility]    Anesthesia Start: 0433 Anesthesia Stop: 2004    Procedure: DECUBITUS ULCER DEBRIDEMENT SUPINE WITH CANDY CANES (Right) Diagnosis:       Pressure injury of buttock, unstageable, unspecified laterality ([address]      (Pressure injury of buttock, unstageable, unspecified laterality ([address] [L89.300])    Surgeons: Radha Dunbar DO Responsible Provider: Roshan Leggett MD    Anesthesia Type: general ASA Status: 4          Anesthesia Type: No value filed.     Collin Phase I:      Collin Phase II:        Anesthesia Post Evaluation    Patient location during evaluation: PACU  Patient participation: complete - patient participated  Level of consciousness: awake  Airway patency: patent  Nausea & Vomiting: no vomiting and no nausea  Complications: no  Cardiovascular status: hemodynamically stable  Respiratory status: acceptable and nasal cannula  Hydration status: stable

## 2022-10-22 NOTE — PROGRESS NOTES
8013: pt arrives to pacu. Pt on room air. Pt responds to verbal stimulation. VSS. Respirations unlabored. Pt denies pain   1001: pt resting in bed with eyes closed   1010: pt denies pain. Pt resting in bed   [facility]   1021: pt meets discharge criteria from pacu  1032: transport arrives.  Pt transported to [address] [place] [address]

## 2022-10-22 NOTE — OP NOTE
OPERATIVE REPORT    Patient Name:  Cortez Bartlett    YOB: 1950    MRN:  [id number]    Date of Surgery:  10/22/2022    Preoperative Diagnosis:  Right ischial decubitus ulcer    Postoperative Diagnosis:  Same    Procedure Performed:  Open Surgical Debridement of right ischial decubitus ulcer    Surgeon:  Cassandra Hanna DO    Surgical Staff:  Circulator: Viridiana Nelson RN  Scrub Person First: Jaylen Buddyjohn  Circulator Assist: Huy Blair RN; Newton Crane RN    Anesthesia:  General    Estimated Blood Loss: Minimal     Complications:  None    Findings:  Focal 3 cm eschar at the center    Specimen removed:  Eschar at right ischium    Indication:  Cortez Bartlett is a 66 y.o. female who presented with bilateral wounds at the ischial tuberosities. The patient's exam and diagnosis were consistent with right infected ischial decubitus ulcer, at right hip requiring definitive reduction of damaged/infected tissue. I have discussed the details of this condition with the patient and family, the treatment alternatives, the details of the procedure, the needs and benefits, the risks and potential complications, and the fact that there is no guarantee for success. They fully understand and wish to proceed with the surgery at this time. Description of Technique:    Cortez Bartlett was brought into the operating room and placed on the operating table in the lying supine and anesthesia induced / prepared in his hospital Patient tolerated treatment well bed. Time out was performed confirming patient identification, signed consent,  operative site/laterality, planned procedure, intercurrent administration of antibiotics, and appropriate equipment in room. The operative team was introduced and the procedure plan reviewed. The operative site was exposed and prepared with betadine, and draped with sterile towels and drapes.     The incision areas were infiltrated with a total of 30 cc of local, including the subfascial tissues proximally and the subcutaneous tissues in the palm . The operative site had an infected condition. It had involvement with skin, subcutaneous tissue and muscle. Incisions of 3 centimeters in width and 3 centimeters length extending in depth to  muscle were made. Undermining was carried out to complete removal of affected tissues. The wound was irrigated with saline, hemostasis obtained. At the conclusion of debridement the wound appeared to have proud flesh. The wound was cauterized for hemostasis and locally anesthetized with 0.5% Marcaine with epinephrine, and  dressed with 4x4s and covered by dry gauze. The patient tolerated the procedure without complications or difficulty, and was taken to [address] [place] [address] Unit in Stable/good condition. Written and oral post operative instructions were given. All questions were answered to the satisfaction of the patient and family.       Justino Phelps DO

## 2022-10-22 NOTE — ANESTHESIA PRE PROCEDURE
Department of Anesthesiology  Preprocedure Note       Name:  Angie Gill   Age:  66 y.o.  :  1950                                          MRN:  [id number]         Date:  10/22/2022      Surgeon: Jinny Whitehead):  Luan Gomez DO    Procedure: Procedure(s):  DECUBITUS ULCER DEBRIDEMENT SUPINE WITH CANDY CANES    Medications prior to admission:   Prior to Admission medications    Medication Sig Start Date End Date Taking? Authorizing Provider   trospium (SANCTURA) 20 MG tablet Take 1 tablet by mouth 2 times daily 10/4/22  Yes BROOK Ness CNP   nitrofurantoin, macrocrystal-monohydrate, (MACROBID) 100 MG capsule Take 100 mg by mouth in the morning.    Yes Historical Provider, MD   acetic acid 0.25 % irrigation Irrigate as directed  Patient taking differently: Irrigate suprapubic catheter as needed 21  Yes BROOK Shukla CNP   aspirin (RA ASPIRIN EC) 81 MG EC tablet take 1 tablet by mouth once daily 20  Yes Vernell Day MD   metoprolol tartrate (LOPRESSOR) 25 MG tablet take 1/2 tablet by mouth twice a day 9/3/19  Yes Melanie Parish MD   Cholecalciferol (VITAMIN D3 PO) Take by mouth daily   Yes Historical Provider, MD   carBAMazepine (TEGRETOL) 100 MG chewable tablet Take 100 mg by mouth 3 times daily    Yes Historical Provider, MD       Current medications:    Current Facility-Administered Medications   Medication Dose Route Frequency Provider Last Rate Last Admin    0.9 % sodium chloride infusion   IntraVENous Continuous Marquise Gramajo PA-C 100 mL/hr at 10/22/22 0738 New Bag at 10/22/22 0738    0.9 % sodium chloride bolus  500 mL IntraVENous Once Marquise Gramajo PA-C        acetaminophen (TYLENOL) suppository 650 mg  650 mg Rectal Q4H PRN Marquise Gramajo PA-C   650 mg at 10/22/22 0551    sodium chloride flush 0.9 % injection 5-40 mL  5-40 mL IntraVENous 2 times per day Luan Gomez DO   10 mL at 10/21/22 2015    sodium chloride flush 0.9 % injection 5-40 mL Ирина Case MD at [address] [facility] INSJ IO LENS PROSTH W/O ECP Left 11/15/2018    EYE CATARACT EMULSIFICATION IOL IMPLANT LEFT EYE performed by Terry Welch MD at [address] [address] [address] History:    Social History     Tobacco Use    Smoking status: Former     Packs/day: 2.00     Years: 40.00     Pack years: 80.00     Types: Cigarettes     Quit date: 10/2/2013     Years since quittin.0     Passive exposure: Never    Smokeless tobacco: Former     Quit date: 11/3/2015   Substance Use Topics    Alcohol use: No                                Counseling given: Not Answered      Vital Signs (Current):   Vitals:    10/22/22 0000 10/22/22 0345 10/22/22 0500 10/22/22 0800   BP: 100/62 92/61 90/65 (!) 94/58   Pulse: 88 98 97 (!) 107   Resp:  18  17   Temp: 97.6 °F (36.4 °C) 98.1 °F (36.7 °C)  98.2 °F (36.8 °C)   TempSrc: Oral Oral  Oral   SpO2: 98% 97%  97%   Weight:       Height:                                                  BP Readings from Last 3 Encounters:   10/22/22 (!) 94/58   10/21/22 107/79   22 104/62       NPO Status:                                                                                 BMI:   Wt Readings from Last 3 Encounters:   10/21/22 119 lb (54 kg)   22 125 lb (56.7 kg)   22 125 lb (56.7 kg)     Body mass index is 21.08 kg/m².     CBC:   Lab Results   Component Value Date/Time    WBC 7.3 10/21/2022 02:17 PM    RBC 3.73 10/21/2022 02:17 PM    HGB 11.9 10/21/2022 02:17 PM    HCT 37.6 10/21/2022 02:17 PM    .8 10/21/2022 02:17 PM    RDW 13.6 2017 04:37 AM     10/21/2022 02:17 PM       CMP:   Lab Results   Component Value Date/Time     10/21/2022 02:17 PM    K 5.1 10/21/2022 02:17 PM     10/21/2022 02:17 PM    CO2 28 10/21/2022 02:17 PM    BUN 15 10/21/2022 02:17 PM    CREATININE 0.5 10/21/2022 02:17 PM    LABGLOM >60 10/21/2022 01:51 PM    GLUCOSE 87 10/21/2022 02:17 PM    PROT 7.9 10/21/2022

## 2022-10-22 NOTE — PROGRESS NOTES
Been communicating with AL Valdez and patient got an EKA and CBC done. IV bolus was completed, patient still hypotensive. Julia Sanches put in transfer orders for stepdown. Called down to [facility] and gave report to [facility]. Patients  called and informed of move to [facility]. Patient packed up and ready to be moved when room is clean.

## 2022-10-23 ENCOUNTER — APPOINTMENT (OUTPATIENT)
Dept: GENERAL RADIOLOGY | Age: 72
DRG: 853 | End: 2022-10-23
Payer: MEDICARE

## 2022-10-23 PROBLEM — S31.819A WOUND OF RIGHT BUTTOCK: Status: ACTIVE | Noted: 2022-10-23

## 2022-10-23 PROBLEM — L89.319: Status: ACTIVE | Noted: 2022-10-23

## 2022-10-23 LAB
ALBUMIN SERPL-MCNC: 2.4 G/DL (ref 3.5–5.1)
ALP BLD-CCNC: 123 U/L (ref 38–126)
ALT SERPL-CCNC: 16 U/L (ref 11–66)
ANION GAP SERPL CALCULATED.3IONS-SCNC: 7 MEQ/L (ref 8–16)
AST SERPL-CCNC: 27 U/L (ref 5–40)
BASOPHILS # BLD: 0.4 %
BASOPHILS ABSOLUTE: 0 THOU/MM3 (ref 0–0.1)
BILIRUB SERPL-MCNC: 0.2 MG/DL (ref 0.3–1.2)
BUN BLDV-MCNC: 12 MG/DL (ref 7–22)
CALCIUM SERPL-MCNC: 8.1 MG/DL (ref 8.5–10.5)
CHLORIDE BLD-SCNC: 111 MEQ/L (ref 98–111)
CO2: 22 MEQ/L (ref 23–33)
CORTISOL COLLECTION INFO: NORMAL
CORTISOL: 3.68 UG/DL
CREAT SERPL-MCNC: 0.3 MG/DL (ref 0.4–1.2)
EKG ATRIAL RATE: 104 BPM
EKG P AXIS: 62 DEGREES
EKG P-R INTERVAL: 140 MS
EKG Q-T INTERVAL: 370 MS
EKG QRS DURATION: 102 MS
EKG QTC CALCULATION (BAZETT): 486 MS
EKG R AXIS: -48 DEGREES
EKG T AXIS: 84 DEGREES
EKG VENTRICULAR RATE: 104 BPM
EOSINOPHIL # BLD: 0.6 %
EOSINOPHILS ABSOLUTE: 0 THOU/MM3 (ref 0–0.4)
ERYTHROCYTE [DISTWIDTH] IN BLOOD BY AUTOMATED COUNT: 13 % (ref 11.5–14.5)
ERYTHROCYTE [DISTWIDTH] IN BLOOD BY AUTOMATED COUNT: 47.3 FL (ref 35–45)
GFR SERPL CREATININE-BSD FRML MDRD: > 60 ML/MIN/1.73M2
GLUCOSE BLD-MCNC: 86 MG/DL (ref 70–108)
HCT VFR BLD CALC: 30.5 % (ref 37–47)
HEMOGLOBIN: 9.7 GM/DL (ref 12–16)
IMMATURE GRANS (ABS): 0.02 THOU/MM3 (ref 0–0.07)
IMMATURE GRANULOCYTES: 0.4 %
LACTIC ACID: 1 MMOL/L (ref 0.5–2)
LACTIC ACID: 1.2 MMOL/L (ref 0.5–2)
LYMPHOCYTES # BLD: 21.7 %
LYMPHOCYTES ABSOLUTE: 1.2 THOU/MM3 (ref 1–4.8)
MAGNESIUM: 1.8 MG/DL (ref 1.6–2.4)
MCH RBC QN AUTO: 31.7 PG (ref 26–33)
MCHC RBC AUTO-ENTMCNC: 31.8 GM/DL (ref 32.2–35.5)
MCV RBC AUTO: 99.7 FL (ref 81–99)
MONOCYTES # BLD: 9.4 %
MONOCYTES ABSOLUTE: 0.5 THOU/MM3 (ref 0.4–1.3)
NUCLEATED RED BLOOD CELLS: 0 /100 WBC
PLATELET # BLD: 133 THOU/MM3 (ref 130–400)
PMV BLD AUTO: 10.6 FL (ref 9.4–12.4)
POTASSIUM SERPL-SCNC: 4.1 MEQ/L (ref 3.5–5.2)
RBC # BLD: 3.06 MILL/MM3 (ref 4.2–5.4)
SEG NEUTROPHILS: 67.5 %
SEGMENTED NEUTROPHILS ABSOLUTE COUNT: 3.7 THOU/MM3 (ref 1.8–7.7)
SODIUM BLD-SCNC: 140 MEQ/L (ref 135–145)
TOTAL PROTEIN: 6.2 G/DL (ref 6.1–8)
TROPONIN T: < 0.01 NG/ML
TROPONIN T: < 0.01 NG/ML
WBC # BLD: 5.5 THOU/MM3 (ref 4.8–10.8)

## 2022-10-23 PROCEDURE — 6360000002 HC RX W HCPCS: Performed by: PEDIATRICS

## 2022-10-23 PROCEDURE — 83735 ASSAY OF MAGNESIUM: CPT

## 2022-10-23 PROCEDURE — 96366 THER/PROPH/DIAG IV INF ADDON: CPT

## 2022-10-23 PROCEDURE — 6370000000 HC RX 637 (ALT 250 FOR IP): Performed by: SURGERY

## 2022-10-23 PROCEDURE — 87040 BLOOD CULTURE FOR BACTERIA: CPT

## 2022-10-23 PROCEDURE — 87186 SC STD MICRODIL/AGAR DIL: CPT

## 2022-10-23 PROCEDURE — 1200000003 HC TELEMETRY R&B

## 2022-10-23 PROCEDURE — 84484 ASSAY OF TROPONIN QUANT: CPT

## 2022-10-23 PROCEDURE — 93010 ELECTROCARDIOGRAM REPORT: CPT | Performed by: INTERNAL MEDICINE

## 2022-10-23 PROCEDURE — 96367 TX/PROPH/DG ADDL SEQ IV INF: CPT

## 2022-10-23 PROCEDURE — 36415 COLL VENOUS BLD VENIPUNCTURE: CPT

## 2022-10-23 PROCEDURE — 96361 HYDRATE IV INFUSION ADD-ON: CPT

## 2022-10-23 PROCEDURE — 93005 ELECTROCARDIOGRAM TRACING: CPT | Performed by: PEDIATRICS

## 2022-10-23 PROCEDURE — 6370000000 HC RX 637 (ALT 250 FOR IP): Performed by: PEDIATRICS

## 2022-10-23 PROCEDURE — 2580000003 HC RX 258: Performed by: STUDENT IN AN ORGANIZED HEALTH CARE EDUCATION/TRAINING PROGRAM

## 2022-10-23 PROCEDURE — 99024 POSTOP FOLLOW-UP VISIT: CPT | Performed by: SURGERY

## 2022-10-23 PROCEDURE — 87106 FUNGI IDENTIFICATION YEAST: CPT

## 2022-10-23 PROCEDURE — 87077 CULTURE AEROBIC IDENTIFY: CPT

## 2022-10-23 PROCEDURE — 6360000002 HC RX W HCPCS: Performed by: SURGERY

## 2022-10-23 PROCEDURE — 2580000003 HC RX 258: Performed by: PEDIATRICS

## 2022-10-23 PROCEDURE — 2580000003 HC RX 258: Performed by: SURGERY

## 2022-10-23 PROCEDURE — 83605 ASSAY OF LACTIC ACID: CPT

## 2022-10-23 PROCEDURE — 96365 THER/PROPH/DIAG IV INF INIT: CPT

## 2022-10-23 PROCEDURE — 80053 COMPREHEN METABOLIC PANEL: CPT

## 2022-10-23 PROCEDURE — 96372 THER/PROPH/DIAG INJ SC/IM: CPT

## 2022-10-23 PROCEDURE — 87086 URINE CULTURE/COLONY COUNT: CPT

## 2022-10-23 PROCEDURE — 82533 TOTAL CORTISOL: CPT

## 2022-10-23 PROCEDURE — 85025 COMPLETE CBC W/AUTO DIFF WBC: CPT

## 2022-10-23 PROCEDURE — 71045 X-RAY EXAM CHEST 1 VIEW: CPT

## 2022-10-23 PROCEDURE — 99222 1ST HOSP IP/OBS MODERATE 55: CPT | Performed by: PEDIATRICS

## 2022-10-23 PROCEDURE — 82530 CORTISOL FREE: CPT

## 2022-10-23 RX ORDER — 0.9 % SODIUM CHLORIDE 0.9 %
1000 INTRAVENOUS SOLUTION INTRAVENOUS ONCE
Status: COMPLETED | OUTPATIENT
Start: 2022-10-23 | End: 2022-10-23

## 2022-10-23 RX ORDER — SODIUM CHLORIDE, SODIUM LACTATE, POTASSIUM CHLORIDE, AND CALCIUM CHLORIDE .6; .31; .03; .02 G/100ML; G/100ML; G/100ML; G/100ML
500 INJECTION, SOLUTION INTRAVENOUS ONCE
Status: COMPLETED | OUTPATIENT
Start: 2022-10-23 | End: 2022-10-23

## 2022-10-23 RX ORDER — COSYNTROPIN 0.25 MG/ML
250 INJECTION, POWDER, FOR SOLUTION INTRAMUSCULAR; INTRAVENOUS ONCE
Status: COMPLETED | OUTPATIENT
Start: 2022-10-24 | End: 2022-10-24

## 2022-10-23 RX ORDER — ACETAMINOPHEN 325 MG/1
650 TABLET ORAL EVERY 4 HOURS PRN
Status: DISCONTINUED | OUTPATIENT
Start: 2022-10-23 | End: 2022-10-27 | Stop reason: HOSPADM

## 2022-10-23 RX ORDER — 0.9 % SODIUM CHLORIDE 0.9 %
1000 INTRAVENOUS SOLUTION INTRAVENOUS ONCE
Status: DISCONTINUED | OUTPATIENT
Start: 2022-10-23 | End: 2022-10-27 | Stop reason: HOSPADM

## 2022-10-23 RX ADMIN — PIPERACILLIN AND TAZOBACTAM 3375 MG: 3; .375 INJECTION, POWDER, FOR SOLUTION INTRAVENOUS at 10:52

## 2022-10-23 RX ADMIN — TROSPIUM CHLORIDE 20 MG: 20 TABLET, FILM COATED ORAL at 07:01

## 2022-10-23 RX ADMIN — Medication 1000 ML: at 11:19

## 2022-10-23 RX ADMIN — ENOXAPARIN SODIUM 40 MG: 100 INJECTION SUBCUTANEOUS at 09:02

## 2022-10-23 RX ADMIN — CARBAMAZEPINE 100 MG: 100 TABLET, CHEWABLE ORAL at 09:02

## 2022-10-23 RX ADMIN — VANCOMYCIN HYDROCHLORIDE 750 MG: 5 INJECTION, POWDER, LYOPHILIZED, FOR SOLUTION INTRAVENOUS at 14:55

## 2022-10-23 RX ADMIN — CARBAMAZEPINE 100 MG: 100 TABLET, CHEWABLE ORAL at 20:40

## 2022-10-23 RX ADMIN — PIPERACILLIN AND TAZOBACTAM 3375 MG: 3; .375 INJECTION, POWDER, FOR SOLUTION INTRAVENOUS at 04:46

## 2022-10-23 RX ADMIN — SODIUM CHLORIDE, POTASSIUM CHLORIDE, SODIUM LACTATE AND CALCIUM CHLORIDE 500 ML: 600; 310; 30; 20 INJECTION, SOLUTION INTRAVENOUS at 01:50

## 2022-10-23 RX ADMIN — ACETAMINOPHEN 650 MG: 325 TABLET ORAL at 20:40

## 2022-10-23 RX ADMIN — ACETAMINOPHEN 650 MG: 325 TABLET ORAL at 09:02

## 2022-10-23 RX ADMIN — ACETAMINOPHEN 650 MG: 325 SUPPOSITORY RECTAL at 04:36

## 2022-10-23 RX ADMIN — PIPERACILLIN AND TAZOBACTAM 3375 MG: 3; .375 INJECTION, POWDER, FOR SOLUTION INTRAVENOUS at 18:25

## 2022-10-23 RX ADMIN — SODIUM CHLORIDE: 9 INJECTION, SOLUTION INTRAVENOUS at 08:59

## 2022-10-23 RX ADMIN — VANCOMYCIN HYDROCHLORIDE 750 MG: 5 INJECTION, POWDER, LYOPHILIZED, FOR SOLUTION INTRAVENOUS at 03:17

## 2022-10-23 RX ADMIN — SODIUM CHLORIDE 1000 ML: 9 INJECTION, SOLUTION INTRAVENOUS at 16:02

## 2022-10-23 RX ADMIN — CARBAMAZEPINE 100 MG: 100 TABLET, CHEWABLE ORAL at 14:58

## 2022-10-23 RX ADMIN — TROSPIUM CHLORIDE 20 MG: 20 TABLET, FILM COATED ORAL at 14:57

## 2022-10-23 ASSESSMENT — PAIN DESCRIPTION - DESCRIPTORS: DESCRIPTORS: ACHING

## 2022-10-23 ASSESSMENT — PAIN DESCRIPTION - LOCATION: LOCATION: BUTTOCKS

## 2022-10-23 ASSESSMENT — PAIN SCALES - GENERAL
PAINLEVEL_OUTOF10: 9
PAINLEVEL_OUTOF10: 8
PAINLEVEL_OUTOF10: 3
PAINLEVEL_OUTOF10: 3

## 2022-10-23 ASSESSMENT — PAIN DESCRIPTION - ORIENTATION: ORIENTATION: RIGHT;LEFT

## 2022-10-23 ASSESSMENT — PAIN DESCRIPTION - ONSET: ONSET: ON-GOING

## 2022-10-23 ASSESSMENT — PAIN DESCRIPTION - PAIN TYPE: TYPE: SURGICAL PAIN

## 2022-10-23 ASSESSMENT — PAIN - FUNCTIONAL ASSESSMENT: PAIN_FUNCTIONAL_ASSESSMENT: PREVENTS OR INTERFERES SOME ACTIVE ACTIVITIES AND ADLS

## 2022-10-23 ASSESSMENT — PAIN DESCRIPTION - FREQUENCY: FREQUENCY: CONTINUOUS

## 2022-10-23 NOTE — PROGRESS NOTES
[facility] Pharmacokinetic Monitoring Service - Vancomycin     Ewelina Rob is a 66 y.o. female starting on vancomycin therapy for sepsis of unknown etiology. Pharmacy consulted by Dr. Aubrie Rose for monitoring and adjustment. Target Concentration: Goal AUC/SAMY 400-600 mg*hr/L    Additional Antimicrobials: Zosyn    Pertinent Laboratory Values: Wt Readings from Last 1 Encounters:   10/21/22 119 lb (54 kg)     Temp Readings from Last 1 Encounters:   10/22/22 98 °F (36.7 °C) (Oral)     Estimated Creatinine Clearance: 83 mL/min (based on SCr of 0.5 mg/dL). Recent Labs     10/21/22  1417 10/22/22  1400   CREATININE 0.5  --    WBC 7.3 4.3*     Procalcitonin: 0.06    Pertinent Cultures:  Culture Date Source Results   10/21/22 Urine Sent    Blood x 2 Ordered   MRSA Nasal Swab: N/A. Non-respiratory infection. Plan:  Dosing recommendations based on Bayesian software  Start vancomycin 750 mg IV every 12 hours  Anticipated AUC of 430 and trough concentration of 12.9 at steady state  Renal labs as indicated   Vancomycin concentration not ordered at this time.    Pharmacy will continue to monitor patient and adjust therapy as indicated    Thank you for the consult,  Violette Conrad, [address] [facility]  10/23/2022 12:41 AM

## 2022-10-23 NOTE — FLOWSHEET NOTE
10/23/22 1734   Safe Environment   Safety Measures Other (comment)  (virtual safety round complete)     Virtual RN rounds, patient denies needs at this time

## 2022-10-23 NOTE — PLAN OF CARE
Patient is hypotensive. Urine culture demonstrated gram-negative bacilli with low CFU, and yeast cells. Plan is to do a 24 hr urine cortisol and IV fluid 2L bolus over 2 hours. PLAN:     Fluid resistant hypotension, rule out shock:  Patient is postop day #0 status post open surgical debridement of right ischial decubitus ulcer. Persistent hypotension with systolics in the 24O and 95I despite 2 L of normal saline IV fluid bolus by primary team.   10/23/2022 give 1 L bolus of normal saline now over 2 hours. Then give another 1 L over another 2 hours.  -Awaiting results for 24-hour urine cortisol. Total urine cortisol is 3.68. Cosyntropin stimulation test results pending. Awaiting results for renin levels. Rule out septic shock:  Also tachycardic up in the 120s and tachypneic with mild leukopenia meeting at least 3 out of 4 SIRS criteria for sepsis. positive urinalysis from 10/21/2022, with gram-negative bacilli in the urine culture. Not significant CFU   Lactic acid of 2.2 on arrival, with a decreasing trend and a normal procalcitonin at 0.06. Received empiric cefazolin preoperatively and is on oral nitrofurantoin. Awaiting results from blood and urine cultures. Start broad-spectrum empiric IV antibiotic coverage to include gram-positive, gram-negative, anaerobic organisms using zosyn and vancomycin. Continue IV bolus fluid hydration for hemodynamic support PRN keeping MAP at or above 65. Rule out cardiogenic shock:  Troponin negative x2. Twelve-lead EKG with no acute ischemic changes and only sinus tachycardia. Last echocardiogram on file 1/2017 with normal left ventricular function and ejection fraction of 50% and grade 1 diastolic dysfunction. No need to continue to trend troponin at this time. Continues telemetry monitoring,   Echocardiogram pending.      Neurogenic shock or autonomic neuropathy:  Patient has multiple sclerosis or neuromuscular disorder and reports history of running low blood pressures especially after surgery. Possible prolonged effect of anesthesia from surgery. Symptomatic and supportive care with IV fluids as above. Hypovolemic rule out hypovolemic or hemorrhagic shock:  Unlikely given the stable hemoglobin and absence clinical signs of intra-abdominal bleeding. Continue to monitor H&H daily. Chronic medical conditions including urinary incontinence, MS, and seizure disorder:  Continue current medical management and home medications at the same.

## 2022-10-23 NOTE — PROGRESS NOTES
General Surgery Progress Note      Admit Date: 10/21/2022    Date of Service: 10/23/2022    CC: POD #2 right decubitus ulcer debridement    Subjective     Patient has no complaint of pain at the wound. Has had soft BP since surgery; no active bleeding. Tolerates regular diet.     Current Facility-Administered Medications   Medication Dose Route Frequency Provider Last Rate Last Admin    piperacillin-tazobactam (ZOSYN) 3,375 mg in dextrose 5 % 50 mL IVPB extended infusion (mini-bag)  3,375 mg IntraVENous Q8H Kyle Zuluaga MD   Stopped at 10/23/22 1417    vancomycin (VANCOCIN) intermittent dosing (placeholder)   Other RX Placeholder Kyle Zuluaga MD        vancomycin (VANCOCIN) 750 mg in dextrose 5 % 250 mL IVPB  750 mg IntraVENous Q12H Kyle Zuluaga MD   Stopped at 10/23/22 0458    acetaminophen (TYLENOL) tablet 650 mg  650 mg Oral Q4H PRN Kyle Zuluaga MD   650 mg at 10/23/22 0902    0.9 % sodium chloride bolus  1,000 mL IntraVENous Once Christiane Beasley DO        [Held by provider] 0.9 % sodium chloride infusion   IntraVENous Continuous Janel Sahu,  mL/hr at 10/23/22 0859 New Bag at 10/23/22 0859    0.9 % sodium chloride bolus  500 mL IntraVENous Once Janel Goodmanbert, DO        acetaminophen (TYLENOL) suppository 650 mg  650 mg Rectal Q4H PRN Janel Filbert, DO   650 mg at 10/23/22 0436    carBAMazepine (TEGRETOL) chewable tablet 100 mg  100 mg Oral TID Janel Goodmanbert, DO   100 mg at 10/23/22 0902    [Held by provider] nitrofurantoin (macrocrystal-monohydrate) (MACROBID) capsule 100 mg  100 mg Oral Daily Janel Filbert, DO   100 mg at 10/22/22 1325    trospium (SANCTURA) tablet 20 mg  20 mg Oral BID AC Janel Filbert, DO   20 mg at 10/23/22 0701    sodium chloride flush 0.9 % injection 5-40 mL  5-40 mL IntraVENous 2 times per day Janel Filbert, DO        sodium chloride flush 0.9 % injection 5-40 mL  5-40 mL IntraVENous PRN Janel Sahu, DO        0.9 % sodium chloride infusion   IntraVENous PRN Posey Signs Omar,         ondansetron (ZOFRAN-ODT) disintegrating tablet 4 mg  4 mg Oral Q8H PRN Audria Nunnery, DO        Or    ondansetron [facility]) injection 4 mg  4 mg IntraVENous Q6H PRN Audria Nunnery, DO        enoxaparin (LOVENOX) injection 40 mg  40 mg SubCUTAneous Daily Audria Nunnery, DO   40 mg at 10/23/22 4849    morphine (PF) injection 2 mg  2 mg IntraVENous Q2H PRN Audria Nunnery, DO        Or    morphine injection 4 mg  4 mg IntraVENous Q2H PRN Audria Nunnery, DO        oxyCODONE (ROXICODONE) immediate release tablet 5 mg  5 mg Oral Q6H PRN Audria Nunnery, DO        [Held by provider] metoprolol tartrate (LOPRESSOR) tablet 25 mg  25 mg Oral BID AL Holt        ondansetron (ZOFRAN) injection 4 mg  4 mg IntraVENous Q6H PRN Audria Nunnery, DO        diphenhydrAMINE (BENADRYL) injection 12.5 mg  12.5 mg IntraVENous Q6H PRN Audria Nunnery, DO           Objective     Patient Vitals for the past 8 hrs:   BP Temp Temp src Pulse Resp SpO2   10/23/22 1230 94/63 -- -- -- -- --   10/23/22 1229 (!) 92/58 -- -- -- -- --   10/23/22 1200 (!) 88/57 -- -- -- -- --   10/23/22 1100 88/73 97.9 °F (36.6 °C) Oral (!) 115 18 96 %   10/23/22 1054 97/62 -- -- -- -- --   10/23/22 1045 116/83 -- -- -- -- --   10/23/22 0900 (!) 100/57 -- -- -- -- --   10/23/22 0751 95/62 97.8 °F (36.6 °C) Oral (!) 107 23 96 %       Intake/Output Summary (Last 24 hours) at 10/23/2022 1419  Last data filed at 10/23/2022 1415  Gross per 24 hour   Intake 720 ml   Output 1050 ml   Net -330 ml         BP 94/63   Pulse (!) 115   Temp 97.9 °F (36.6 °C) (Oral)   Resp 18   Ht 5' 3\" (1.6 m)   Wt 116 lb (52.6 kg)   SpO2 96%   BMI 20.55 kg/m²   Physical Exam    Physical Exam  Constitutional:       Appearance: Normal appearance. She is normal weight. HENT:      Head: Normocephalic and atraumatic. Nose: Nose normal.      Mouth/Throat:      Mouth: Mucous membranes are moist.      Pharynx: Oropharynx is clear.    Eyes:      Extraocular Movements: Extraocular movements intact. Conjunctiva/sclera: Conjunctivae normal.      Pupils: Pupils are equal, round, and reactive to light. Cardiovascular:      Rate and Rhythm: Regular rhythm. Tachycardia present. Pulses: Normal pulses. Heart sounds: Normal heart sounds. Pulmonary:      Effort: Pulmonary effort is normal.      Breath sounds: Normal breath sounds. Abdominal:      General: Abdomen is flat. Bowel sounds are normal.      Palpations: Abdomen is soft. Musculoskeletal:         General: Tenderness (appropriate tenderness) present. Cervical back: Normal range of motion and neck supple. Skin:     General: Skin is warm and dry. Capillary Refill: Capillary refill takes less than 2 seconds. Neurological:      General: No focal deficit present. Mental Status: She is alert and oriented to person, place, and time. Mental status is at baseline. Psychiatric:         Mood and Affect: Mood normal.         Data Review: CBC:   Lab Results   Component Value Date/Time    WBC 5.5 10/23/2022 01:00 AM    RBC 3.06 10/23/2022 01:00 AM     BMP:   Lab Results   Component Value Date/Time    GLUCOSE 86 10/23/2022 01:00 AM    CO2 22 10/23/2022 01:00 AM    BUN 12 10/23/2022 01:00 AM    CREATININE 0.3 10/23/2022 01:00 AM    CALCIUM 8.1 10/23/2022 01:00 AM     Coagulation:   Lab Results   Component Value Date/Time    INR 1.28 11/03/2020 12:29 PM    APTT 31.9 10/02/2015 01:48 AM     Cardiac markers:   Lab Results   Component Value Date/Time    TROPONINT < 0.010 10/23/2022 09:49 AM       Assessment: POD #2 sacral debridement    Principal Problem:    Decubitus ulcer of buttock, unstageable (HCC)  Active Problems:    Wound of right buttock  Resolved Problems:    * No resolved hospital problems.  *      Plan:Daily wound care - Mepilex dressing change   Monitor pressures  See orders    Onetha Zullinger, DO

## 2022-10-23 NOTE — SIGNIFICANT EVENT
Subjective:  Nursing staff originally contacted this provider at  stating patient returned from decubitus ulcer debridement, BP 87/58 with a MAP of 65. With further questioning patient is asymptomatic, EKG ordered, 1 L saline bolus ordered, CBC ordered for after completion of bolus. Metoprolol being held per nursing staff. [facility] through bolus BP improved to 101/62, pulse not elevated 134, EKG done showing sinus tachycardia, no significant changes from admission EKG. 1425 1 L bolus finish blood pressure worsens to 88/59, nursing staff confirm no significant bleeding. Hemoglobin resulted with slight downtrend in hemoglobin from 11.9 24 hours ago to 11.7 patient continues to be asymptomatic. Patient transferred to [facility] stepdown. 1831 patient becoming increasingly tachycardic, however pressures improved to \"low 100/upper 90s\" per nursing staff. Lactic acid, procalcitonin, proBNP, and TSH ordered. 0052 nursing staff contacted this provider again stating BP now 85/53, MAP 63, continues to be asymptomatic. Additional 500 L saline bolus ordered, noted elevated lactic acid to 2.2. Patient noted to have no allergies,  Objective:   I present to evaluate the patient, patient continues to be asymptomatic, reports no prior history of adverse reactions to anesthesia, no significant cardiac history per patient, no history of murmurs or palpitations, heart attacks or heart failure. Denies nausea, vomiting, shortness of breath, chest pain, dizziness, lightheadedness, fever, cold chills. On physical exam he has an elderly female, laying supine in bed, resting comfortably, in no acute distress. Patient mucous membranes appear moist, PERRL 3 mm. Lung sounds are clear and equal bilaterally. S1-S2 sounds strong and regular though tachycardic with an apparent holosystolic murmur that according to patient is new. Abdomen is flat, soft to palpation, NTTP in all quadrants, normoactive bowel sounds.   No significant staining of Chux pad underneath the patient. 2+ bilateral radial, and DP pulses. GCS 15, PMS intact in extremities x4.  strength 5/5 bilaterally. Skin is warm, dry, and appropriate for ethnicity, no notable swelling, rashes, or excoriations. Assessment and plan:  Patient is undifferentiated shock that is not volume responsive indicated by lack maintain improvement to multiple fluid boluses and ovation in lactic acid. Patient hemoglobins noted to be stable after initial bolus given therefore low likelihood of hemorrhagic pathology. WBC within normal limits, procalcitonin 0.06, patient afebrile suggesting low likelihood of distributive shock from sepsis. Given holosystolic murmur, patient recent surgery, and age, concern for cardiogenic shock. Hospitalist consulted for further evaluation and management of hypotension and shock.   Electronically signed by Nely Garcia PA-C on 10/23/2022 at 12:15 AM

## 2022-10-23 NOTE — CONSULTS
Hospitalist Consult History & Physical      Patient:  Joan Zuniga  YOB: 1950  MRN: [id number]     Acct: [de-identified]        ASSESSMENT:    Active Hospital Problems    Diagnosis Date Noted    Decubitus ulcer of buttock, unstageable ([facility] [address] [L89.300] 10/21/2022     Priority: Medium       PLAN:    Fluid resistant hypotension, rule out shock:  Patient is postop day #0 status post open surgical debridement of right ischial decubitus ulcer. Persistent hypotension with systolics in the 25L and 13J despite 2 L of normal saline IV fluid bolus by primary team.    Rule out septic shock:  Also tachycardic up in the 120s and tachypneic with mild leukopenia meeting at least 3 out of 4 SIRS criteria for sepsis. Strongly positive urinalysis from 10/21/2022, with gram-negative bacilli in the urine culture but no final ID or sensitivity report yet. Lactic acid of 2.2 with a decreasing trend and a normal procalcitonin at 0.06. Received empiric cefazolin preoperatively and is on oral nitrofurantoin. Will send 2 sets of blood cultures and urine cultures as well. Start broad-spectrum empiric IV antibiotic coverage to include gram-positive, gram-negative, anaerobic, and ESBL organisms using meropenem and vancomycin. Continue IV fluid hydration for hemodynamic support keeping MAP at or above 65. Rule out cardiogenic shock:  Troponin negative x1. Twelve-lead EKG with no acute ischemic changes and only sinus tachycardia. Last echocardiogram on file 1/2017 with normal left ventricular function and ejection fraction of 50% and grade 1 diastolic dysfunction. Continue serial troponins, telemetry monitoring, and order echocardiogram.    Neurogenic shock or autonomic neuropathy:  Patient has multiple sclerosis or neuromuscular disorder and reports history of running low blood pressures especially after surgery. Possible prolonged effect of anesthesia from surgery.   Symptomatic and supportive care with IV fluids as above. Hypovolemic rule out hypovolemic or hemorrhagic shock:  Unlikely given the stable hemoglobin and absence clinical signs of intra-abdominal bleeding. Continue to monitor H&H. Chronic medical conditions including urinary incontinence, MS, and seizure disorder:  Continue current medical management and home medications at the same. Thank you, Alfredo Bingham DO, for the consultation. Hospitalist service will continue to follow along. Electronically signed by Manfred Peraza MD on 10/23/2022 at 5:48 AM      ===================================================================      Chief Complaint: Hypotension. Date of Service: Pt seen/examined in consultation on 10/23/22. History Of Present Illness:  Genny Rosario is a 66 y.o. female who we are asked to see/evaluate by Alfredo Bingham DO for medical management of refractory hypotension despite 2 L of IV fluid boluses in patient who is postop day #0 status post open surgical debridement of right ischial decubitus ulcer. Patient apparently has received a total of 2 L of normal saline fluid boluses throughout the day with her systolic blood pressures remaining in the 70s to 80s. Patient reports normally runs low blood pressures and has had dangerously low blood pressures after surgical procedures in the past.  She remains oriented and responsive and interactive despite her low blood pressure. No fevers or chills and no signs of active bleeding. Her lactic acid has also returned back at 2.2 although normal procalcitonin. Hospitalist team was consulted to assist with further management.     Past Medical History:        Diagnosis Date    Difficulty in swallowing     Intra-abdominal lymphadenopathy     MS (multiple sclerosis) (HCC)     Neuromuscular disorder (HCC)     Pancreatitis     Seizures ([facility] [address]        Past Surgical History:        Procedure Laterality Date    CATHETER INSERTION N/A 1/17/2022    CYSTO, SUPRAPUBIC CATHETER PLACEMENT WITH ULTRASOUND performed by Arden Dove MD at [facility]  April 1st 2016    laparoscopic    CYSTOSCOPY N/A 1/2/2019    CYSTO, CLOT EVACUATION, TURBT performed by Sarmad Fisher MD at [address] Left 11/6/2020    LEFT HEEL WOUND I&D performed by Leia Hernandez DPM at [facility] Right 12/17/2018    EYE CATARACT EMULSIFICATION IOL IMPLANT, RIGHT performed by Sarah Severino MD at [address] INSJ IO LENS PROSTH W/O ECP Left 11/15/2018    EYE CATARACT EMULSIFICATION IOL IMPLANT LEFT EYE performed by Sarah Severino MD at [address] [place] [address]       Medications Prior to Admission:    Prior to Admission medications    Medication Sig Start Date End Date Taking? Authorizing Provider   trospium (SANCTURA) 20 MG tablet Take 1 tablet by mouth 2 times daily 10/4/22  Yes BROOK Gresham CNP   nitrofurantoin, macrocrystal-monohydrate, (MACROBID) 100 MG capsule Take 100 mg by mouth in the morning. Yes Historical Provider, MD   acetic acid 0.25 % irrigation Irrigate as directed  Patient taking differently: Irrigate suprapubic catheter as needed 11/22/21  Yes BROOK Soto CNP   aspirin (RA ASPIRIN EC) 81 MG EC tablet take 1 tablet by mouth once daily 5/8/20  Yes Yosvany Hendrix MD   metoprolol tartrate (LOPRESSOR) 25 MG tablet take 1/2 tablet by mouth twice a day 9/3/19  Yes Opal Strong MD   Cholecalciferol (VITAMIN D3 PO) Take by mouth daily   Yes Historical Provider, MD   carBAMazepine (TEGRETOL) 100 MG chewable tablet Take 100 mg by mouth 3 times daily    Yes Historical Provider, MD       Allergies:  Patient has no known allergies. Social History:      The patient currently lives independently. TOBACCO:   reports that she quit smoking about 9 years ago. Her smoking use included cigarettes. She has a 80.00 pack-year smoking history.  She has never been exposed to tobacco smoke. She quit smokeless tobacco use about 6 years ago. ETOH:   reports no history of alcohol use. Family History:        Problem Relation Age of Onset    Cancer Mother         colon    Heart Disease Father     Diabetes Neg Hx     High Blood Pressure Neg Hx     Stroke Neg Hx          REVIEW OF SYSTEMS:   Pertinent positives and negatives as noted in the HPI. Otherwise, complete ROS negative. PHYSICAL EXAM:  /63   Pulse (!) 111   Temp 98.5 °F (36.9 °C) (Oral)   Resp 23   Ht 5' 3\" (1.6 m)   Wt 116 lb (52.6 kg)   SpO2 98%   BMI 20.55 kg/m²     General appearance: No apparent distress, appears stated age. Eyes:  Pupils equal, round, and reactive to light. Conjunctivae/corneas clear. HENT: Head normal in appearance. External nares normal.  Oral mucosa moist without lesions. Hearing grossly intact. Neck: Supple, with full range of motion. Trachea midline. No gross JVD appreciated. Respiratory:  Normal respiratory effort. Clear to auscultation, bilaterally without rales or wheezes or rhonchi. Cardiovascular: Normal rate, regular rhythm with normal S1/S2 without murmurs. No lower extremity edema. Abdomen: Soft, non-tender, non-distended with normal bowel sounds. Musculoskeletal: No joint swelling or tenderness. Normal tone. No abnormal movements. Skin: Warm and dry. No rashes or lesions. Neurologic:  No focal sensory/motor deficits in the upper and lower extremities. Cranial nerves:  grossly non-focal 2-12. Psychiatric: Alert and oriented, normal insight and thought content. Capillary Refill: Brisk,< 3 seconds. Peripheral Pulses: +2 palpable, equal bilaterally.      Labs:     Recent Labs     10/21/22  1417 10/22/22  1400 10/23/22  0100   WBC 7.3 4.3* 5.5   HGB 11.9* 11.7* 9.7*   HCT 37.6 37.7 30.5*    141 133     Recent Labs     10/21/22  1417 10/23/22  0100    140   K 5.1 4.1    111   CO2 28 22*   BUN 15 12   CREATININE 0.5 0.3*   CALCIUM 8.8 8.1*     Recent Labs     10/21/22  1417 10/23/22  0100   AST 36 27   ALT 23 16   BILITOT 0.3 0.2*   ALKPHOS 153* 123     No results for input(s): INR in the last 72 hours. No results for input(s): [place] Knock in the last 72 hours. Lab Results   Component Value Date/Time    NITRU NEGATIVE 10/21/2022 02:25 PM    [address] 10/21/2022 02:25 PM    BACTERIA FEW 10/21/2022 02:25 PM    RBCUA 3-5 10/21/2022 02:25 PM    BLOODU SMALL 10/21/2022 02:25 PM    [address]  07/14/2022 12:00 AM      Comment:      >=1.030    GLUCOSEU NEGATIVE 10/21/2022 02:25 PM       Radiology:    XR CHEST PORTABLE   Final Result   Impression:      No acute processes. This document has been electronically signed by: Justine Sandoval MD on    10/23/2022 02:53 AM             EKG:  I have reviewed the EKG with the following interpretation: Sinus tachycardia. DVT prophylaxis: per primary    Diet: ADULT DIET; Regular    Fluids: per primary and currently normal saline at 100 cc an hour.     Code Status: Full Code    PT/OT Eval Status: Active and ongoing    Electronically signed by Colin Taylor MD on 10/23/2022 at 5:48 AM

## 2022-10-23 NOTE — PLAN OF CARE
Problem: Discharge Planning  Goal: Discharge to home or other facility with appropriate resources  Outcome: Progressing  Flowsheets (Taken 10/23/2022 0900)  Discharge to home or other facility with appropriate resources:   Identify barriers to discharge with patient and caregiver   Arrange for needed discharge resources and transportation as appropriate   Identify discharge learning needs (meds, wound care, etc)   Refer to discharge planning if patient needs post-hospital services based on physician order or complex needs related to functional status, cognitive ability or social support system     Problem: Safety - Adult  Goal: Free from fall injury  Outcome: Progressing  Flowsheets (Taken 10/23/2022 1255)  Free From Fall Injury: Instruct family/caregiver on patient safety  Note: Pt remains free from falls. Bed alarm on, call light and personal items within reach. Problem: Skin/Tissue Integrity  Goal: Absence of new skin breakdown  Description: 1. Monitor for areas of redness and/or skin breakdown  2. Assess vascular access sites hourly  3. Every 4-6 hours minimum:  Change oxygen saturation probe site  4. Every 4-6 hours:  If on nasal continuous positive airway pressure, respiratory therapy assess nares and determine need for appliance change or resting period. Outcome: Progressing  Note: Pt shows no signs of new skin breakdown. Q2 turns in place as patient allows. Education provided on the importance of repositioning.        Problem: Cardiovascular - Adult  Goal: Maintains optimal cardiac output and hemodynamic stability  Outcome: Progressing  Flowsheets (Taken 10/23/2022 0900)  Maintains optimal cardiac output and hemodynamic stability: Monitor blood pressure and heart rate     Problem: Skin/Tissue Integrity - Adult  Goal: Incisions, wounds, or drain sites healing without S/S of infection  Outcome: Progressing  Flowsheets  Taken 10/23/2022 0900  Incisions, Wounds, or Drain Sites Healing Without Sign and Symptoms of Infection: ADMISSION and DAILY: Assess and document risk factors for pressure ulcer development  Taken 10/23/2022 0652  Incisions, Wounds, or Drain Sites Healing Without Sign and Symptoms of Infection: ADMISSION and DAILY: Assess and document risk factors for pressure ulcer development     Problem: Genitourinary - Adult  Goal: Urinary catheter remains patent  Outcome: Progressing  Flowsheets  Taken 10/23/2022 1255  Urinary catheter remains patent: Assess patency of urinary catheter  Taken 10/23/2022 0900  Urinary catheter remains patent: Assess patency of urinary catheter     Problem: Infection - Adult  Goal: Absence of infection at discharge  Outcome: Progressing  Flowsheets  Taken 10/23/2022 1255  Absence of infection at discharge:   Assess and monitor for signs and symptoms of infection   Monitor lab/diagnostic results   Monitor all insertion sites i.e., indwelling lines, tubes and drains  Taken 10/23/2022 0900  Absence of infection at discharge: Assess and monitor for signs and symptoms of infection     Problem: Infection - Adult  Goal: Absence of infection during hospitalization  Outcome: Progressing  Flowsheets  Taken 10/23/2022 1255  Absence of infection during hospitalization:   Assess and monitor for signs and symptoms of infection   Monitor lab/diagnostic results  Taken 10/23/2022 0900  Absence of infection during hospitalization: Assess and monitor for signs and symptoms of infection     Problem: ABCDS Injury Assessment  Goal: Absence of physical injury  Outcome: Progressing  Flowsheets (Taken 10/23/2022 1255)  Absence of Physical Injury: Implement safety measures based on patient assessment     Problem: Chronic Conditions and Co-morbidities  Goal: Patient's chronic conditions and co-morbidity symptoms are monitored and maintained or improved  Outcome: Progressing  Flowsheets  Taken 10/23/2022 [address] - Patient's Chronic Conditions and Co-Morbidity Symptoms are Monitored and Maintained or Improved:   Monitor and assess patient's chronic conditions and comorbid symptoms for stability, deterioration, or improvement   Collaborate with multidisciplinary team to address chronic and comorbid conditions and prevent exacerbation or deterioration   Update acute care plan with appropriate goals if chronic or comorbid symptoms are exacerbated and prevent overall improvement and discharge    Problem: Pain  Goal: Verbalizes/displays adequate comfort level or baseline comfort level  Outcome: Progressing  Flowsheets (Taken 10/23/2022 1255)  Verbalizes/displays adequate comfort level or baseline comfort level:   Encourage patient to monitor pain and request assistance   Assess pain using appropriate pain scale   Administer analgesics based on type and severity of pain and evaluate response     Problem: Infection - Adult  Goal: Absence of fever/infection during anticipated neutropenic period  Outcome: Completed  Flowsheets (Taken 10/23/2022 0900)  Absence of fever/infection during anticipated neutropenic period: Monitor white blood cell count     Care plan reviewed with patient. Patient verbalize understanding of the plan of care and contribute to goal setting.

## 2022-10-24 LAB
ANION GAP SERPL CALCULATED.3IONS-SCNC: 9 MEQ/L (ref 8–16)
BUN BLDV-MCNC: 7 MG/DL (ref 7–22)
CALCIUM IONIZED: 1.11 MMOL/L (ref 1.12–1.32)
CALCIUM SERPL-MCNC: 8.4 MG/DL (ref 8.5–10.5)
CHLORIDE BLD-SCNC: 110 MEQ/L (ref 98–111)
CO2: 21 MEQ/L (ref 23–33)
CORTISOL COLLECTION INFO: NORMAL
CORTISOL: 12.42 UG/DL
CORTISOL: 22.42 UG/DL
CORTISOL: 26.15 UG/DL
CREAT SERPL-MCNC: 0.4 MG/DL (ref 0.4–1.2)
ERYTHROCYTE [DISTWIDTH] IN BLOOD BY AUTOMATED COUNT: 13.2 % (ref 11.5–14.5)
ERYTHROCYTE [DISTWIDTH] IN BLOOD BY AUTOMATED COUNT: 47.3 FL (ref 35–45)
GFR SERPL CREATININE-BSD FRML MDRD: > 60 ML/MIN/1.73M2
GLUCOSE BLD-MCNC: 84 MG/DL (ref 70–108)
HCT VFR BLD CALC: 34.2 % (ref 37–47)
HEMOGLOBIN: 11.3 GM/DL (ref 12–16)
LV EF: 60 %
LVEF MODALITY: NORMAL
MCH RBC QN AUTO: 32.5 PG (ref 26–33)
MCHC RBC AUTO-ENTMCNC: 33 GM/DL (ref 32.2–35.5)
MCV RBC AUTO: 98.3 FL (ref 81–99)
ORGANISM: ABNORMAL
ORGANISM: ABNORMAL
PLATELET # BLD: 143 THOU/MM3 (ref 130–400)
PMV BLD AUTO: 10.4 FL (ref 9.4–12.4)
POTASSIUM SERPL-SCNC: 3.9 MEQ/L (ref 3.5–5.2)
RBC # BLD: 3.48 MILL/MM3 (ref 4.2–5.4)
SODIUM BLD-SCNC: 140 MEQ/L (ref 135–145)
URINE CULTURE REFLEX: ABNORMAL
URINE CULTURE REFLEX: ABNORMAL
WBC # BLD: 4.1 THOU/MM3 (ref 4.8–10.8)

## 2022-10-24 PROCEDURE — 2580000003 HC RX 258: Performed by: SURGERY

## 2022-10-24 PROCEDURE — 6360000002 HC RX W HCPCS: Performed by: PEDIATRICS

## 2022-10-24 PROCEDURE — 84244 ASSAY OF RENIN: CPT

## 2022-10-24 PROCEDURE — 99024 POSTOP FOLLOW-UP VISIT: CPT | Performed by: SURGERY

## 2022-10-24 PROCEDURE — 6360000002 HC RX W HCPCS: Performed by: STUDENT IN AN ORGANIZED HEALTH CARE EDUCATION/TRAINING PROGRAM

## 2022-10-24 PROCEDURE — 6370000000 HC RX 637 (ALT 250 FOR IP): Performed by: PEDIATRICS

## 2022-10-24 PROCEDURE — 80048 BASIC METABOLIC PNL TOTAL CA: CPT

## 2022-10-24 PROCEDURE — 82533 TOTAL CORTISOL: CPT

## 2022-10-24 PROCEDURE — 2580000003 HC RX 258: Performed by: PEDIATRICS

## 2022-10-24 PROCEDURE — 1200000003 HC TELEMETRY R&B

## 2022-10-24 PROCEDURE — 82088 ASSAY OF ALDOSTERONE: CPT

## 2022-10-24 PROCEDURE — 6370000000 HC RX 637 (ALT 250 FOR IP): Performed by: SURGERY

## 2022-10-24 PROCEDURE — 99233 SBSQ HOSP IP/OBS HIGH 50: CPT | Performed by: STUDENT IN AN ORGANIZED HEALTH CARE EDUCATION/TRAINING PROGRAM

## 2022-10-24 PROCEDURE — 36415 COLL VENOUS BLD VENIPUNCTURE: CPT

## 2022-10-24 PROCEDURE — 6360000002 HC RX W HCPCS: Performed by: SURGERY

## 2022-10-24 PROCEDURE — 93306 TTE W/DOPPLER COMPLETE: CPT

## 2022-10-24 PROCEDURE — 85027 COMPLETE CBC AUTOMATED: CPT

## 2022-10-24 PROCEDURE — 82607 VITAMIN B-12: CPT

## 2022-10-24 PROCEDURE — 82746 ASSAY OF FOLIC ACID SERUM: CPT

## 2022-10-24 PROCEDURE — 82330 ASSAY OF CALCIUM: CPT

## 2022-10-24 PROCEDURE — 82024 ASSAY OF ACTH: CPT

## 2022-10-24 RX ADMIN — SODIUM CHLORIDE, PRESERVATIVE FREE 20 ML: 5 INJECTION INTRAVENOUS at 08:00

## 2022-10-24 RX ADMIN — CARBAMAZEPINE 100 MG: 100 TABLET, CHEWABLE ORAL at 08:04

## 2022-10-24 RX ADMIN — ENOXAPARIN SODIUM 40 MG: 100 INJECTION SUBCUTANEOUS at 08:04

## 2022-10-24 RX ADMIN — CARBAMAZEPINE 100 MG: 100 TABLET, CHEWABLE ORAL at 19:32

## 2022-10-24 RX ADMIN — CARBAMAZEPINE 100 MG: 100 TABLET, CHEWABLE ORAL at 14:50

## 2022-10-24 RX ADMIN — SODIUM CHLORIDE, PRESERVATIVE FREE 10 ML: 5 INJECTION INTRAVENOUS at 21:37

## 2022-10-24 RX ADMIN — ACETAMINOPHEN 650 MG: 325 TABLET ORAL at 19:32

## 2022-10-24 RX ADMIN — TROSPIUM CHLORIDE 20 MG: 20 TABLET, FILM COATED ORAL at 14:49

## 2022-10-24 RX ADMIN — VANCOMYCIN HYDROCHLORIDE 750 MG: 5 INJECTION, POWDER, LYOPHILIZED, FOR SOLUTION INTRAVENOUS at 02:19

## 2022-10-24 RX ADMIN — TROSPIUM CHLORIDE 20 MG: 20 TABLET, FILM COATED ORAL at 06:53

## 2022-10-24 RX ADMIN — PIPERACILLIN AND TAZOBACTAM 3375 MG: 3; .375 INJECTION, POWDER, FOR SOLUTION INTRAVENOUS at 03:40

## 2022-10-24 RX ADMIN — COSYNTROPIN 250 MCG: 0.25 INJECTION, POWDER, LYOPHILIZED, FOR SOLUTION INTRAMUSCULAR; INTRAVENOUS at 08:00

## 2022-10-24 ASSESSMENT — PAIN SCALES - GENERAL
PAINLEVEL_OUTOF10: 0
PAINLEVEL_OUTOF10: 0
PAINLEVEL_OUTOF10: 3

## 2022-10-24 NOTE — PROGRESS NOTES
Internal Medicine Resident Progress Note    Patient:  Tigre Connor    YOB: 1950  Unit/Bed:4K-24/024-A  MRN: [id number]    Acct: [de-identified]   PCP: Jason Cheng MD    Date of Admission: 10/21/2022      Assessment/Plan:    Hypotension:   Etiology septic versus adrenal insufficiency Vs neurogenic from multiple sclerosis Vs Anesthesia induced. Hx of low blood pressures after surgeries. ,  Patient is postop day #0 status post open surgical debridement of right ischial decubitus ulcer. Persistent hypotension with systolics in the 38Y and 01Z despite 2 L of normal saline IV fluid bolus by primary team.   10/23/2022 give 1 L bolus of normal saline now over 2 hours. Then give another 1 L over another 2 hours. She is fluid responsive.  -Total urine cortisol was 3.68 10/24/2022. Cosyntropin stimulation test at 12.412 0, 22.4 to 30 minutes, and 26.25 at 60 minutes. Total cortisol level was 26.15. Hypotension unlikely to be due to adrenal insufficiency. ACTH levels and renin levels still pending. Blood pressure currently stable. Continue to hold metoprolol because of hypotension. Continue to measure vitals 4 hourly every shift. Septic shock:   She was tachycardic up to 120s, tachypneic with mild leukopenia at presentation. Urinalysis was positive for leukocyte esterase. Lactic acid was 2.2.,  And procalcitonin was 0.06. Received empiric cefazolin preoperatively and is on oral nitrofurantoin at home. Etiology possibly UTI. Urinalysis done demonstrated large number of leukocyte esterase. However urine culture demonstrated  Stenotrophomonas maltophilia and candida tropicalis with insignificant CFU. Zosyn was discontinued. Because of very low CFU of these organisms. A clean catch urine on 10/23/2022, demonstrated gram-negative bacilli and yeast. Gram-negative bacilli 50,000-90,000 CFU per mL. -Discontinue Zosyn, and vancomycin.  -Continue home nitrofurantoin.     UTI:  -See #2 above.  -continue home medication nitrofurantoin. Normocytic anemia:   hemoglobin at presentation 11.9. Baseline 12.5. Could be as a result of blood loss during surgery. Hemoglobin now fairly stable. Lab investigations for B12, folate, ferritin, iron levels, TIBC, pending. Continue to monitor H&H daily. Transfuse if hemodynamically unstable, or if hemoglobin drops below 7 g per DL. Hypocalcemia:  serum calcium 8.4. Serum calcium at presentation was 8.8.  - Labs for ionized calcium pending.  -Start calcium replacement protocol. Multiple sclerosis;   Continue home medications for multiple sclerosis. Expected discharge date:      Disposition:   [x] Home  [] TCU  [] Rehab  [] Psych  [] SNF  [] [facility]  [] Other-    ===================================================================      Chief Complaint: Consult for hypotension    Hospital Course:      Subjective (past 24 hours):    Saw patient at the bedside today. She denies headache, chest pain, chest tightness, palpitations, shortness of breath, abdominal pains, diarrhea, constipation, nausea and vomiting. ROS: reviewed complete ROS unchanged unless otherwise stated in hospital course/subjective portion.        Medications:  Reviewed    Infusion Medications    [Held by provider] sodium chloride Stopped (10/23/22 1233)    sodium chloride       Scheduled Medications    sodium chloride  1,000 mL IntraVENous Once    sodium chloride  500 mL IntraVENous Once    carBAMazepine  100 mg Oral TID    nitrofurantoin (macrocrystal-monohydrate)  100 mg Oral Daily    trospium  20 mg Oral BID AC    sodium chloride flush  5-40 mL IntraVENous 2 times per day    enoxaparin  40 mg SubCUTAneous Daily    [Held by provider] metoprolol tartrate  25 mg Oral BID     PRN Meds: acetaminophen, acetaminophen, sodium chloride flush, sodium chloride, ondansetron **OR** ondansetron, morphine **OR** morphine, oxyCODONE, ondansetron, diphenhydrAMINE        Intake/Output Summary (Last 24 hours) at 10/24/2022 1647  Last data filed at 10/24/2022 1429  Gross per 24 hour   Intake 50 ml   Output 1950 ml   Net -1900 ml       Exam:  /72   Pulse (!) 121   Temp 98.3 °F (36.8 °C) (Oral)   Resp 18   Ht 5' 3\" (1.6 m)   Wt 116 lb (52.6 kg)   SpO2 96%   BMI 20.55 kg/m²     General: Calm and in short no distress, appears stated age. Eyes:  PERRL. Conjunctivae/corneas clear. HENT: Head normal appearing. Nares normal. Oral mucosa moist.  Hearing intact. Neck: Supple, with full range of motion. Trachea midline. No gross JVD appreciated. Respiratory:  Normal effort. Clear to auscultation, without rales or wheezes or rhonchi. Cardiovascular: Normal rate, regular rhythm with normal S1/S2 without murmurs. No lower extremity edema. Abdomen: Soft, non-tender, non-distended with normal bowel sounds. Musculoskeletal: Right ischial tuberosity decubitus ulcer, stage 4 with no joint swelling or tenderness. Normal tone. No abnormal movements. Skin: Warm and dry. No rashes or lesions. Neurologic: focal motor deficits in the  lower extremities. muscle strength is 3/5 b/L in LE. Psychiatric: Alert and oriented, normal insight and thought content. Capillary Refill: Brisk,< 3 seconds. Peripheral Pulses: +2 palpable, equal bilaterally. Labs:   Recent Labs     10/22/22  1400 10/23/22  0100 10/24/22  0909   WBC 4.3* 5.5 4.1*   HGB 11.7* 9.7* 11.3*   HCT 37.7 30.5* 34.2*    133 143     Recent Labs     10/23/22  0100 10/24/22  0909    140   K 4.1 3.9    110   CO2 22* 21*   BUN 12 7   CREATININE 0.3* 0.4   CALCIUM 8.1* 8.4*     Recent Labs     10/23/22  0100   AST 27   ALT 16   BILITOT 0.2*   ALKPHOS 123     No results for input(s): INR in the last 72 hours.   Recent Labs     10/23/22  0100 10/23/22  0949   TROPONINT < 0.010 < 0.010     Recent Labs     10/22/22  1903   PROCAL 0.06      Lab Results   Component Value Date/Time NITRU NEGATIVE 10/21/2022 02:25 PM    WBCUA > 100 10/21/2022 02:25 PM    BACTERIA FEW 10/21/2022 02:25 PM    RBCUA 3-5 10/21/2022 02:25 PM    BLOODU SMALL 10/21/2022 02:25 PM    [address]  07/14/2022 12:00 AM      Comment:      >=1.030    GLUCOSEU NEGATIVE 10/21/2022 02:25 PM       Radiology (48 hours):  XR CHEST PORTABLE    Result Date: 10/23/2022  Impression: No acute processes. This document has been electronically signed by: Justine Sandoval MD on 10/23/2022 02:53 AM       DVT prophylaxis:    [x] Lovenox  [] SCDs  [] SQ Heparin  [] Encourage ambulation   [] Already on Anticoagulation       Diet: ADULT DIET;  Regular  Code Status: Full Code  PT/OT: TBA  Tele: continuous telemetry   IVF: None     Electronically signed by May Sigala DO on 10/24/2022 at 4:47 PM    Case was discussed with Attending, Dr. Alek Earl

## 2022-10-24 NOTE — PLAN OF CARE
Problem: Discharge Planning  Goal: Discharge to home or other facility with appropriate resources  10/24/2022 1129 by JOEY Cardoza  Outcome: Progressing  SW consult received and completed. See SW note.

## 2022-10-24 NOTE — PROGRESS NOTES
10/24/22 1010   Encounter Summary   Encounter Overview/Reason  Spiritual/Emotional Needs   Service Provided For: Patient;Significant other   Referral/Consult From: Rounding   Support System Significant other;Children   Last Encounter  10/24/22  (ACP)   Complexity of Encounter Moderate   Begin Time 0955   End Time  1010   Total Time Calculated 15 min   Encounter    Type Initial Screen/Assessment   Spiritual/Emotional needs   Type Spiritual Support   Advance Care Planning   Type ACP conversation   Assessment/Intervention/Outcome   Assessment Coping   Intervention Nurtured Hope;Prayer (assurance of)/[place]; Active listening   Outcome Comfort   Advance Care Planning     Advance Care Planning Inpatient Note  Spiritual Care Department    Today's Date: 10/24/2022  Unit: [facility] ICU STEPDOWN TELEMETRY 4K    Received request from IDT Member. Upon review of chart and communication with care team, patient's decision making abilities are not in question. . Patient and Significant Other  was/were present in the room during visit. Goals of ACP Conversation:  Discuss advance care planning documents    Health Care Decision Makers:       Primary Decision Maker: Yusuf Lemus - Other - [phone]  Summary:  No Decision Maker named by patient at this time    Advance Care Planning Documents (Patient Wishes):  None     Assessment:  The patient was in bed and her significant other Yusuf Lemus was present. The pt requested a copy of the Advance Directive according to the Spiritual Care consult.      Interventions:  Encouraged ongoing ACP conversation with future decision makers and loved ones    Care Preferences Communicated:   No    Outcomes/Plan:  ACP Discussion: Postponed    Electronically signed by Guerrero Dubon [facility] on 10/24/2022 at 11:52 AM

## 2022-10-24 NOTE — PLAN OF CARE
Problem: Discharge Planning  Goal: Discharge to home or other facility with appropriate resources  10/23/2022 2330 by Miguel Wagner RN  Outcome: Progressing  Flowsheets (Taken 10/23/2022 2330)  Discharge to home or other facility with appropriate resources:   Identify barriers to discharge with patient and caregiver   Arrange for needed discharge resources and transportation as appropriate   Refer to discharge planning if patient needs post-hospital services based on physician order or complex needs related to functional status, cognitive ability or social support system     Problem: Safety - Adult  Goal: Free from fall injury  10/23/2022 2330 by Miguel Wagner RN  Outcome: Progressing  Flowsheets (Taken 10/23/2022 2330)  Free From Fall Injury: Instruct family/caregiver on patient safety     Problem: Skin/Tissue Integrity  Goal: Absence of new skin breakdown  Description: 1. Monitor for areas of redness and/or skin breakdown  2. Assess vascular access sites hourly  3. Every 4-6 hours minimum:  Change oxygen saturation probe site  4. Every 4-6 hours:  If on nasal continuous positive airway pressure, respiratory therapy assess nares and determine need for appliance change or resting period.   10/23/2022 2330 by Miguel Wagner RN  Outcome: Progressing     Problem: Cardiovascular - Adult  Goal: Maintains optimal cardiac output and hemodynamic stability  10/23/2022 2330 by Miguel Wagner RN  Outcome: Progressing  Flowsheets (Taken 10/23/2022 2330)  Maintains optimal cardiac output and hemodynamic stability:   Monitor blood pressure and heart rate   Monitor urine output and notify Licensed Independent Practitioner for values outside of normal range   Assess for signs of decreased cardiac output     Problem: Skin/Tissue Integrity - Adult  Goal: Incisions, wounds, or drain sites healing without S/S of infection  10/23/2022 2330 by Miguel Wagner RN  Outcome: Progressing  Flowsheets (Taken 10/23/2022 2330)  Incisions, Wounds, or Drain Sites Healing Without Sign and Symptoms of Infection:   ADMISSION and DAILY: Assess and document risk factors for pressure ulcer development   TWICE DAILY: Assess and document skin integrity   TWICE DAILY: Assess and document dressing/incision, wound bed, drain sites and surrounding tissue   Implement wound care per orders     Problem: Genitourinary - Adult  Goal: Urinary catheter remains patent  10/23/2022 2330 by Raymond Ron RN  Outcome: Progressing  Flowsheets (Taken 10/23/2022 2330)  Urinary catheter remains patent:   Assess patency of urinary catheter   Irrigate catheter per Licensed Independent Practitioner order if indicated and notify Licensed Independent Practitioner if unable to irrigate     Problem: Infection - Adult  Goal: Absence of infection at discharge  10/23/2022 2330 by Raymond Ron RN  Outcome: Progressing  Flowsheets (Taken 10/23/2022 2330)  Absence of infection at discharge:   Assess and monitor for signs and symptoms of infection   Monitor lab/diagnostic results   Monitor all insertion sites i.e., indwelling lines, tubes and drains   Monitor endotracheal (as able) and nasal secretions for changes in amount and color   Administer medications as ordered     Problem: Infection - Adult  Goal: Absence of infection during hospitalization  10/23/2022 2330 by Raymond Ron RN  Outcome: Progressing  Flowsheets (Taken 10/23/2022 2330)  Absence of infection during hospitalization:   Assess and monitor for signs and symptoms of infection   Monitor lab/diagnostic results   Monitor all insertion sites i.e., indwelling lines, tubes and drains     Problem: ABCDS Injury Assessment  Goal: Absence of physical injury  10/23/2022 2330 by Raymond Ron RN  Outcome: Progressing  Flowsheets (Taken 10/23/2022 2330)  Absence of Physical Injury: Implement safety measures based on patient assessment     Problem: Chronic Conditions and Co-morbidities  Goal: Patient's chronic conditions and co-morbidity symptoms are monitored and maintained or improved  10/23/2022 2330 by Seth Malcolm RN  Outcome: Progressing  Flowsheets (Taken 10/23/2022 2330)  Care Plan - Patient's Chronic Conditions and Co-Morbidity Symptoms are Monitored and Maintained or Improved:   Monitor and assess patient's chronic conditions and comorbid symptoms for stability, deterioration, or improvement   Collaborate with multidisciplinary team to address chronic and comorbid conditions and prevent exacerbation or deterioration   Update acute care plan with appropriate goals if chronic or comorbid symptoms are exacerbated and prevent overall improvement and discharge     Problem: Pain  Goal: Verbalizes/displays adequate comfort level or baseline comfort level  10/23/2022 2330 by Seth Malcolm RN  Outcome: Progressing  Flowsheets (Taken 10/23/2022 2330)  Verbalizes/displays adequate comfort level or baseline comfort level:   Encourage patient to monitor pain and request assistance   Administer analgesics based on type and severity of pain and evaluate response   Assess pain using appropriate pain scale   Implement non-pharmacological measures as appropriate and evaluate response   Notify Licensed Independent Practitioner if interventions unsuccessful or patient reports new pain

## 2022-10-24 NOTE — PROGRESS NOTES
Physician Progress Note      PATIENT:               Chavez Sprain  CSN #:                  [id number]  :                       1950  ADMIT DATE:       10/21/2022 12:37 PM  [address] [place] [place] DATE:  Samuel Hoyt  PROVIDER #:        Cassandra Hanna DO          QUERY TEXT:    Dr Vicki Le,    Pt admitted with Right ischial decubitus ulcer and has shock documented. If   possible, please document in progress notes and discharge summary further   specificity regarding the type of shock: The medical record reflects the following:  Risk Factors: Right ischial decubitus ulcer  Clinical Indicators: BP 30'D systolic, pulse up to 996, WBC 4.3, Lactic Acid   2.2  Treatment: 0.9 NS 4500 ml bolus, IV Ancef, Zosyn & Vanc. I/D. Options provided:  -- Sepsis with Septic Shock-POA  -- Cardiogenic Shock  -- Hypovolemic Shock  -- Other - I will add my own diagnosis  -- Disagree - Not applicable / Not valid  -- Disagree - Clinically unable to determine / Unknown  -- Refer to Clinical Documentation Reviewer    PROVIDER RESPONSE TEXT:    This patient is in septic shock with sepsis, which was present on admission.     Query created by: Cleo Zaldivar on 10/24/2022 6:54 AM      Electronically signed by:  Cassandra Hanna DO 10/24/2022 12:53 PM

## 2022-10-24 NOTE — CARE COORDINATION
DISCHARGE/PLANNING EVALUATION  10/24/22, 11:26 AM EDT    Reason for Referral: Discharge planning  Mental Status: Alert and Oriented  Decision Making: Self  Family/Social/Home Environment: Patient lives with Nichelle November. Patient has supportive family and friends. Current Services including food security, transportation and housekeeping: Patient is current with [employer] and has Ba Bower, [phone]. Patient has [employer] RN and aides. Current Equipment: unknown  Payment Source: Medicare and Medicaid  Concerns or Barriers to Discharge: None  Post-acute [facility]) provider list was provided to patient. Patient was informed of their freedom to choose [facility] provider. Discussed and offered to show the patient the relevant [facility] Providers quality and resource use measures on Medicare Compare web site via computer based on patient's goals of care and treatment preferences. Questions regarding selection process were answered. Teach Back Method used with patient and Nichelle November regarding care plan and needs. Patient and [facility] verbalize understanding of the plan of care and contribute to goal setting. Patient goals, treatment preferences and discharge plan: Patient requested a referral to [place] [address] for rehab. Patient reported that she has been there before and she feels she needs rehab before heading home. Second choice would be [facility]. SW called [place] and made a referral to [place] [address]. 10/24/22, 2:25 PM EDT  DISCHARGE PLANNING EVALUATION    SW received a call from Robert at the [place] [address] and they have accepted patient at discharge. JAIDA called Nichelle November and notified him of [facility]ana accepting patient at discharge.        Electronically signed by JOEY Patiño on 10/24/2022 at 11:26 AM

## 2022-10-24 NOTE — DISCHARGE INSTR - COC
Continuity of Care Form    Patient Name: Charles Rose   :    MRN:  [id number]    Admit date:  10/21/2022  Discharge date:  10/27/2022    Code Status Order: Full Code   Advance Directives:     Admitting Physician:  Sb Landin DO  PCP: Gregory Turcios MD    Discharging Nurse: Haydee Esparza [facility] Unit/Room#: 9T-82/733-N  Discharging Unit Phone Number: [phone]    Emergency Contact:   Extended Emergency Contact Information  Primary Emergency Contact: Radha Bennett [facility] [address] [facility] Phone: [phone]  Relation: Other  Secondary Emergency Contact: Merissa Troncoso  Address: CELL  [address] [place] [address], [address] [place] [address] Phone: [phone]  Relation: Child    Past Surgical History:  Past Surgical History:   Procedure Laterality Date    CATHETER INSERTION N/A 2022    CYSTO, SUPRAPUBIC CATHETER PLACEMENT WITH ULTRASOUND performed by Vera Curry MD at [facility]  2016    laparoscopic    CYSTOSCOPY N/A 2019    CYSTO, CLOT EVACUATION, TURBT performed by Meredith Noe MD at [address] [employer] Left 2020    LEFT HEEL WOUND I&D performed by Gladys Reis DPM at [address] [place] [address] Right 2018    EYE CATARACT EMULSIFICATION IOL IMPLANT, RIGHT performed by Ale Ruff MD at [address] [place] [address] INSJ IO LENS PROSTH W/O ECP Left 11/15/2018    EYE CATARACT EMULSIFICATION IOL IMPLANT LEFT EYE performed by Ale Ruff MD at [address] [facility] Right 10/22/2022    DECUBITUS ULCER DEBRIDEMENT SUPINE WITH CANDY CANES performed by Sb Landin DO at [place] [facility]       Immunization History: There is no immunization history for the selected administration types on file for this patient.     Active Problems:  Patient Active Problem List   Diagnosis Code    MS (multiple sclerosis) ([facility] G35    Seizure disorder ([facility] G40.909    Frequent PVCs I49.3    Hematuria R31.9    Nicotine abuse Z72.0    Hematuria, gross R31.0    Sepsis ([facility] A41.9    HTN (hypertension) I10    HLD (hyperlipidemia) E78.5    Multiple sclerosis (HCC) S21    Metabolic acidosis, normal anion gap (NAG) E87.20    COVID-19 virus infection U07.1    Pressure injury of right ischium, unstageable (HCC) L89.310    Physical deconditioning R53.81    Decubitus ulcer of buttock, unstageable (HCC) L89.300    Wound of right buttock S31.819A    Decubitus ulcer of ischial area, right, unspecified pressure ulcer stage L89.319       Isolation/Infection:   Isolation            Contact          Patient Infection Status       Infection Onset Added Last Indicated Last Indicated By Review Planned Expiration Resolved Resolved By    MRSA 08/29/19 08/31/19 08/29/19 Urine Culture, Reflexed        Urine 8/2019    Resolved    COVID-19 11/28/20 11/29/20 11/28/20 COVID-19   12/02/20 Coty Avery RN    +11/28 from [place] [address].   Stated + 11/21  Came in with increase SOB    COVID-19 (Rule Out) 11/28/20 11/28/20 11/28/20 COVID-19 (Ordered)   11/29/20 Rule-Out Test Resulted    COVID-19 (Rule Out) 11/05/20 11/05/20 11/05/20 COVID-19 (Ordered)   11/05/20 Rule-Out Test Resulted    ESBL (Extended Spectrum Beta Lactamase)  01/26/17 01/26/17 Coty Avery RN   08/29/19 Coty Avery RN    E coli in urine 1/17            Nurse Assessment:  Last Vital Signs: BP 97/78   Pulse (!) 130   Temp 97.8 °F (36.6 °C) (Oral)   Resp 24   Ht 5' 3\" (1.6 m)   Wt 116 lb (52.6 kg)   SpO2 96%   BMI 20.55 kg/m²     Last documented pain score (0-10 scale): Pain Level: 0  Last Weight:   Wt Readings from Last 1 Encounters:   10/23/22 116 lb (52.6 kg)     Mental Status:  oriented and alert    IV Access:  - None    Nursing Mobility/ADLs:  Walking   Dependent  Transfer  Dependent  Bathing  Dependent  Dressing  Dependent  Toileting  Dependent  Feeding  Assisted  Med Admin  Assisted  Med Delivery   crushed and prefers mixed with applesauce    Wound Care Documentation and Therapy:  Wound 08/01/22 Buttocks Left (Active)   Number of days: 84       Wound 08/01/22 Buttocks Right (Active)   Wound Image   10/21/22 1110   Wound Etiology Pressure Unstageable 10/24/22 0920   Dressing Status Clean;Dry; Intact 10/24/22 0920   Wound Cleansed Cleansed with saline 10/21/22 1110   Dressing/Treatment Protective barrier; Foam 10/23/22 2037   Offloading for Diabetic Foot Ulcers Offloading not required 10/23/22 2037   Wound Length (cm) 4 cm 10/21/22 1110   Wound Width (cm) 4 cm 10/21/22 1110   Wound Depth (cm) 0 cm 10/21/22 1110   Wound Surface Area (cm^2) 16 cm^2 10/21/22 1110   Change in Wound Size % (l*w) -2033.33 10/21/22 1110   Wound Volume (cm^3) 0 cm^3 10/21/22 1110   Wound Healing % 100 10/21/22 1110   Wound Assessment Other (Comment) 10/23/22 2037   Drainage Amount None 10/23/22 2037   Drainage Description Brown;Green;Yellow 10/23/22 2037   Odor None 10/23/22 2037   Tati-wound Assessment Hyperpigmented;Blanchable erythema;Dry/flaky 10/23/22 2037   Margins Attached edges 10/23/22 2037   Wound Thickness Description not for Pressure Injury Full thickness 10/23/22 2037   Number of days: 84       Incision 10/22/22 Buttocks (Active)   Dressing Status Clean;Dry; Intact 10/24/22 0920   Dressing/Treatment Foam 10/24/22 0815   Drainage Amount None 10/24/22 0815   Drainage Description Serosanguinous 10/24/22 0815   Odor None 10/24/22 0815   Number of days: 2        Elimination:  Continence: Bowel: No  Bladder: No  Urinary Catheter: Insertion Date: 10/25 Suprapubic    Colostomy/Ileostomy/Ileal Conduit: No       Date of Last BM:     Intake/Output Summary (Last 24 hours) at 10/24/2022 1434  Last data filed at 10/24/2022 1429  Gross per 24 hour   Intake 4016.2 ml   Output 2200 ml   Net 1816.2 ml     I/O last 3 completed shifts: In: 4586.2 [P.O.:620; I.V.:2527.1;  IV Piggyback:1439.1]  Out: 2100 [XVSYD:5147]    Safety Concerns:     None    Impairments/Disabilities:      MS; Bedbound    Nutrition Therapy:  Current Nutrition Therapy:   - Oral Diet:  Dental Soft    Routes of Feeding: Oral  Liquids: Thin Liquids  Daily Fluid Restriction: no  Last Modified Barium Swallow with Video (Video Swallowing Test): not done    Treatments at the Time of Hospital Discharge:   Respiratory Treatments:   Oxygen Therapy:  is not on home oxygen therapy. Ventilator:    - No ventilator support    Rehab Therapies: Physical Therapy and Occupational Therapy  Weight Bearing Status/Restrictions: No weight bearing restrictions  Other Medical Equipment (for information only, NOT a DME order):  hospital bed  Other Treatments:     Patient's personal belongings (please select all that are sent with patient):  Glasses    RN SIGNATURE:  Electronically signed by Layo Troncoso RN on 10/27/22 at 5:57 PM EDT    CASE MANAGEMENT/SOCIAL WORK SECTION    Inpatient Status Date: 10/23/2022    Readmission Risk Assessment Score:  Readmission Risk              Risk of Unplanned Readmission:  13           Discharging to Facility/ Agency   Name: [facility]ichi Cruz  Address: [address] [place] [address]  Phone: [phone]  Fax: [phone]    Dialysis Facility (if applicable)   Name:  Address:  Dialysis Schedule:  Phone:  Fax:    / signature: Electronically signed by JOEY Montelongo on 10/24/22 at 2:34 PM EDT    PHYSICIAN SECTION    Prognosis: Fair    Condition at Discharge: Stable    Rehab Potential (if transferring to Rehab): Fair    Recommended Labs or Other Treatments After Discharge:     Physician Certification: I certify the above information and transfer of Regla Langford  is necessary for the continuing treatment of the diagnosis listed and that she requires [facility] for less 30 days.      Update Admission H&P: No change in H&P    PHYSICIAN SIGNATURE:  Jeovanny Peña, [facility]  Electronincally signed 10/27/2022 at 4:13 PM

## 2022-10-24 NOTE — CARE COORDINATION
Case Management Assessment  Initial Evaluation    Date/Time of Evaluation: 10/24/2022 11:49 AM  Assessment Completed by: Giles Myrick RN    If patient is discharged prior to next notation, then this note serves as note for discharge by case management. Patient Name: Damian Ojeda                   YOB: 1950  Diagnosis: Decubitus ulcer of buttock, unstageable ([facility] [L89.300]  Wound of right buttock, initial encounter Paddy Hunt                   Date / Time: 10/21/2022 12:37 PM    Patient Admission Status: Inpatient     Current PCP: Phyllis Law MD  PCP verified by CM? Yes    Chart Reviewed: Yes      Patient Orientation: Alert and Oriented    Patient Cognition: Alert  History Provided by: Patient    Hospitalization in the last 30 days (Readmission):  No    If yes, Readmission Assessment in  Navigator will be completed. Advance Directives:     Code Status: Full Code       Discharge Planning  Patient lives with: Spouse/Significant Other Type of Home: Skilled Nursing Facility   Primary Caregiver: Spouse  Patient Support Systems include: Spouse/Significant Other   Current Financial resources: Medicare, Medicaid  Current community resources:  LONG TERM ACUTE CARE [facility]  Current services prior to admission: Home Care   Type of Home Care services:  [place]    ADLS  Prior functional level: Assistance with the following:, Bathing, Dressing, Toileting  Current functional level: Assistance with the following:, Bathing, Dressing, Toileting      Family can provide assistance at DC: Yes  Would you like Case Management to discuss the discharge plan with any other family members/significant others, and if so, who?     Plans to Return to Present Housing: Other (see comment) (planning ECF)  Other Identified Issues/Barriers to RETURNING to current housing: medical complications  Potential Assistance needed at discharge: Ba Gordon  Patient expects to discharge to: Skilled nursing facility  Plan for transportation at discharge: Family    Financial  Payor: MEDICARE / Plan: MEDICARE PART A AND B / Product Type: *No Product type* /     Does insurance require precert for SNF: No    Potential assistance Purchasing Medications:    Meds-to-Beds request: Yes      [id number] - [address]  [place] [address]  Phone: [phone] Fax: [phone]    [facility] [address], [address] [phone] Lovena Manner [phone] [address] [place] [address]  Phone: [phone] Fax: [phone]      Factors facilitating achievement of predicted outcomes: Caregiver support    Barriers to discharge: Medical complications  Procedures:   10/22 Open Surgical Debridement of right ischial decubitus ulcer  10/23 CXR No acute processes. 10/24 ECHO  Additional Case Management Notes: From ED, pt has been followed by wound clinic as OP. Urine (+), gram (-) bacilli, yeast. Hospitalist consult, telemetry, Lovenox, IV Zosyn, IV Vancomycin. The Plan for Transition of Care is related to the following treatment goals of Decubitus ulcer of buttock, unstageable ([address] [L89.300]  Wound of right buttock, initial encounter [S31.519C]    Patient Goals/Plan/Treatment Preferences: Met with Sharri. She currently lives at home with spouse. Planning ECF at discharge. SW following. Transportation/Food Security/Housekeeping Addressed:  No issues identified.      Michael Patton RN  Case Management Department

## 2022-10-24 NOTE — PLAN OF CARE
Problem: Discharge Planning  Goal: Discharge to home or other facility with appropriate resources  10/24/2022 1520 by Mendel Boeck, RN  Outcome: Progressing  10/24/2022 1129 by JOEY Tanner  Outcome: Progressing  Flowsheets (Taken 10/24/2022 0815 by Mendel Boeck, RN)  Discharge to home or other facility with appropriate resources:   Identify barriers to discharge with patient and caregiver   Arrange for needed discharge resources and transportation as appropriate   Identify discharge learning needs (meds, wound care, etc)     Problem: Safety - Adult  Goal: Free from fall injury  Outcome: Progressing     Problem: Skin/Tissue Integrity  Goal: Absence of new skin breakdown  Description: 1. Monitor for areas of redness and/or skin breakdown  2. Assess vascular access sites hourly  3. Every 4-6 hours minimum:  Change oxygen saturation probe site  4. Every 4-6 hours:  If on nasal continuous positive airway pressure, respiratory therapy assess nares and determine need for appliance change or resting period.   Outcome: Progressing     Problem: Cardiovascular - Adult  Goal: Maintains optimal cardiac output and hemodynamic stability  Outcome: Progressing  Flowsheets (Taken 10/24/2022 0815)  Maintains optimal cardiac output and hemodynamic stability:   Monitor blood pressure and heart rate   Monitor urine output and notify Licensed Independent Practitioner for values outside of normal range   Assess for signs of decreased cardiac output     Problem: Skin/Tissue Integrity - Adult  Goal: Incisions, wounds, or drain sites healing without S/S of infection  Outcome: Progressing  Flowsheets (Taken 10/24/2022 0815)  Incisions, Wounds, or Drain Sites Healing Without Sign and Symptoms of Infection:   ADMISSION and DAILY: Assess and document risk factors for pressure ulcer development   TWICE DAILY: Assess and document skin integrity   TWICE DAILY: Assess and document dressing/incision, wound bed, drain sites Verbalizes/displays adequate comfort level or baseline comfort level  Outcome: Progressing  Flowsheets  Taken 10/24/2022 1512  Verbalizes/displays adequate comfort level or baseline comfort level:   Assess pain using appropriate pain scale   Encourage patient to monitor pain and request assistance   Administer analgesics based on type and severity of pain and evaluate response  Taken 10/24/2022 1104  Verbalizes/displays adequate comfort level or baseline comfort level:   Assess pain using appropriate pain scale   Encourage patient to monitor pain and request assistance   Administer analgesics based on type and severity of pain and evaluate response   Consider cultural and social influences on pain and pain management   Notify Licensed Independent Practitioner if interventions unsuccessful or patient reports new pain     Care plan reviewed with patient and family. Patient and family verbalize understanding of the plan of care and contribute to goal setting.

## 2022-10-24 NOTE — PROGRESS NOTES
General Surgery Progress Note      Admit Date: 10/21/2022    Date of Service: 10/24/2022    CC: Right ischial decubitus ulcer    Subjective     Patient has no complaint of hip or wound pain. Tolerates regular diet.     Current Facility-Administered Medications   Medication Dose Route Frequency Provider Last Rate Last Admin    acetaminophen (TYLENOL) tablet 650 mg  650 mg Oral Q4H PRN Lisa Leyden, MD   650 mg at 10/24/22 1932    0.9 % sodium chloride bolus  1,000 mL IntraVENous Once Fayrene FarhadbaDO oumar        [Held by provider] 0.9 % sodium chloride infusion   IntraVENous Continuous Orange Fell, DO   Stopped at 10/23/22 1233    0.9 % sodium chloride bolus  500 mL IntraVENous Once Endy Fell, DO        acetaminophen (TYLENOL) suppository 650 mg  650 mg Rectal Q4H PRN Endy Fell, DO   650 mg at 10/23/22 0436    carBAMazepine (TEGRETOL) chewable tablet 100 mg  100 mg Oral TID Endy Fell, DO   100 mg at 10/24/22 1932    nitrofurantoin (macrocrystal-monohydrate) (MACROBID) capsule 100 mg  100 mg Oral Daily Endyer Fell, DO   100 mg at 10/22/22 1325    trospium (SANCTURA) tablet 20 mg  20 mg Oral BID AC Endy Fell, DO   20 mg at 10/24/22 1449    sodium chloride flush 0.9 % injection 5-40 mL  5-40 mL IntraVENous 2 times per day Endy Fell, DO   20 mL at 10/24/22 0800    sodium chloride flush 0.9 % injection 5-40 mL  5-40 mL IntraVENous PRN Endy Fell, DO        0.9 % sodium chloride infusion   IntraVENous PRN Endy Fell, DO        ondansetron (ZOFRAN-ODT) disintegrating tablet 4 mg  4 mg Oral Q8H PRN Endy Fell, DO        Or    ondansetron [facility]) injection 4 mg  4 mg IntraVENous Q6H PRN Endy Fell, DO        enoxaparin (LOVENOX) injection 40 mg  40 mg SubCUTAneous Daily Endy Fell, DO   40 mg at 10/24/22 0804    morphine (PF) injection 2 mg  2 mg IntraVENous Q2H PRN Endy Fell, DO        Or    morphine injection 4 mg  4 mg IntraVENous Q2H PRN Endy Harman, DO        oxyCODONE (Bernerd Born) immediate release tablet 5 mg  5 mg Oral Q6H PRN Josy Glasgow DO        [Held by provider] metoprolol tartrate (LOPRESSOR) tablet 25 mg  25 mg Oral BID AL Holt        ondansetron (ZOFRAN) injection 4 mg  4 mg IntraVENous Q6H PRN Josy Glasgow, DO        diphenhydrAMINE (BENADRYL) injection 12.5 mg  12.5 mg IntraVENous Q6H PRN Josy Glasgow DO           Objective     Patient Vitals for the past 8 hrs:   BP Temp Temp src Pulse Resp SpO2   10/24/22 1930 106/69 98.1 °F (36.7 °C) Oral (!) 123 22 96 %   10/24/22 1512 111/72 98.3 °F (36.8 °C) Oral (!) 121 18 96 %       Intake/Output Summary (Last 24 hours) at 10/24/2022 1951  Last data filed at 10/24/2022 1429  Gross per 24 hour   Intake 50 ml   Output 1950 ml   Net -1900 ml          /69   Pulse (!) 123   Temp 98.1 °F (36.7 °C) (Oral)   Resp 22   Ht 5' 3\" (1.6 m)   Wt 116 lb (52.6 kg)   SpO2 96%   BMI 20.55 kg/m²   Physical Exam    Physical Exam  Constitutional:       Appearance: Normal appearance. She is normal weight. HENT:      Head: Normocephalic and atraumatic. Nose: Nose normal.      Mouth/Throat:      Mouth: Mucous membranes are moist.      Pharynx: Oropharynx is clear. Eyes:      Extraocular Movements: Extraocular movements intact. Conjunctiva/sclera: Conjunctivae normal.      Pupils: Pupils are equal, round, and reactive to light. Cardiovascular:      Rate and Rhythm: Normal rate and regular rhythm. Pulses: Normal pulses. Heart sounds: Normal heart sounds. Pulmonary:      Effort: Pulmonary effort is normal.      Breath sounds: Normal breath sounds. Abdominal:      General: Abdomen is flat. Bowel sounds are normal.      Palpations: Abdomen is soft. Musculoskeletal:         General: Normal range of motion. Cervical back: Normal range of motion and neck supple. Skin:     General: Skin is warm and dry. Capillary Refill: Capillary refill takes less than 2 seconds. Findings: Erythema present. Neurological:      General: No focal deficit present. Mental Status: She is alert and oriented to person, place, and time. Mental status is at baseline. Data Review: CBC:   Lab Results   Component Value Date/Time    WBC 4.1 10/24/2022 09:09 AM    RBC 3.48 10/24/2022 09:09 AM     BMP:   Lab Results   Component Value Date/Time    GLUCOSE 84 10/24/2022 09:09 AM    CO2 21 10/24/2022 09:09 AM    BUN 7 10/24/2022 09:09 AM    CREATININE 0.4 10/24/2022 09:09 AM    CALCIUM 8.4 10/24/2022 09:09 AM     Coagulation:   Lab Results   Component Value Date/Time    INR 1.28 11/03/2020 12:29 PM    APTT 31.9 10/02/2015 01:48 AM     Cardiac markers:   Lab Results   Component Value Date/Time    TROPONINT < 0.010 10/23/2022 09:49 AM       Assessment: Right ischial decubitus ulcer - POD #2     Principal Problem:    Decubitus ulcer of buttock, unstageable (HCC)  Active Problems:    Wound of right buttock    Decubitus ulcer of ischial area, right, unspecified pressure ulcer stage  Resolved Problems:    * No resolved hospital problems.  *      Plan:Daily wound care     See orders    Josy Glasgow DO

## 2022-10-24 NOTE — PROGRESS NOTES
Pharmacy Medication History Note      List of current medications patient is taking is complete. Source of information: Patient    Changes made to medication list:  Medications removed (include reason, ex. therapy complete or physician discontinued):  N/A    Medications added/doses adjusted:  Adjusted Vitamin D to 25mcg daily. Formerly no dose was listed. Adjusted Carbamazepine 100mg TID to 100mg BID per patient. Other notes (ex. Recent course of antibiotics, Coumadin dosing):  Patient is not currently taking aspirin in the hospital d/t blood pressure per patient. Trospium is the third antispasmodic agent she has used for her bladder. The first medication did not help and the second made her nauseous. She is unsure if this one works. Denies use of other OTC or herbal medications.       Allergies reviewed      Electronically signed by Alejandro Cardenas on 10/24/2022 at 11:41 AM

## 2022-10-25 LAB
ALDOSTERONE: 5.1 NG/DL
ANION GAP SERPL CALCULATED.3IONS-SCNC: 7 MEQ/L (ref 8–16)
BUN BLDV-MCNC: 6 MG/DL (ref 7–22)
CALCIUM SERPL-MCNC: 8.4 MG/DL (ref 8.5–10.5)
CHLORIDE BLD-SCNC: 110 MEQ/L (ref 98–111)
CO2: 23 MEQ/L (ref 23–33)
CREAT SERPL-MCNC: 0.3 MG/DL (ref 0.4–1.2)
EKG ATRIAL RATE: 147 BPM
EKG P AXIS: 44 DEGREES
EKG P-R INTERVAL: 126 MS
EKG Q-T INTERVAL: 286 MS
EKG QRS DURATION: 92 MS
EKG QTC CALCULATION (BAZETT): 447 MS
EKG R AXIS: -47 DEGREES
EKG T AXIS: 125 DEGREES
EKG VENTRICULAR RATE: 147 BPM
ERYTHROCYTE [DISTWIDTH] IN BLOOD BY AUTOMATED COUNT: 13.2 % (ref 11.5–14.5)
ERYTHROCYTE [DISTWIDTH] IN BLOOD BY AUTOMATED COUNT: 48.8 FL (ref 35–45)
FERRITIN: 110 NG/ML (ref 10–291)
FOLATE: 4.1 NG/ML (ref 4.8–24.2)
GFR SERPL CREATININE-BSD FRML MDRD: > 60 ML/MIN/1.73M2
GLUCOSE BLD-MCNC: 81 MG/DL (ref 70–108)
HCT VFR BLD CALC: 34 % (ref 37–47)
HEMOGLOBIN: 10.9 GM/DL (ref 12–16)
IRON SATURATION: 40 % (ref 20–50)
IRON: 63 UG/DL (ref 50–170)
MCH RBC QN AUTO: 32.2 PG (ref 26–33)
MCHC RBC AUTO-ENTMCNC: 32.1 GM/DL (ref 32.2–35.5)
MCV RBC AUTO: 100.3 FL (ref 81–99)
PLATELET # BLD: 141 THOU/MM3 (ref 130–400)
PMV BLD AUTO: 10.3 FL (ref 9.4–12.4)
POTASSIUM SERPL-SCNC: 4.1 MEQ/L (ref 3.5–5.2)
RBC # BLD: 3.39 MILL/MM3 (ref 4.2–5.4)
SODIUM BLD-SCNC: 140 MEQ/L (ref 135–145)
TOTAL IRON BINDING CAPACITY: 159 UG/DL (ref 171–450)
VITAMIN B-12: 585 PG/ML (ref 211–911)
WBC # BLD: 4 THOU/MM3 (ref 4.8–10.8)

## 2022-10-25 PROCEDURE — 83540 ASSAY OF IRON: CPT

## 2022-10-25 PROCEDURE — 83550 IRON BINDING TEST: CPT

## 2022-10-25 PROCEDURE — 93005 ELECTROCARDIOGRAM TRACING: CPT | Performed by: STUDENT IN AN ORGANIZED HEALTH CARE EDUCATION/TRAINING PROGRAM

## 2022-10-25 PROCEDURE — 1200000003 HC TELEMETRY R&B

## 2022-10-25 PROCEDURE — 99024 POSTOP FOLLOW-UP VISIT: CPT | Performed by: SURGERY

## 2022-10-25 PROCEDURE — 6370000000 HC RX 637 (ALT 250 FOR IP): Performed by: STUDENT IN AN ORGANIZED HEALTH CARE EDUCATION/TRAINING PROGRAM

## 2022-10-25 PROCEDURE — 2580000003 HC RX 258: Performed by: STUDENT IN AN ORGANIZED HEALTH CARE EDUCATION/TRAINING PROGRAM

## 2022-10-25 PROCEDURE — 99233 SBSQ HOSP IP/OBS HIGH 50: CPT | Performed by: STUDENT IN AN ORGANIZED HEALTH CARE EDUCATION/TRAINING PROGRAM

## 2022-10-25 PROCEDURE — 6370000000 HC RX 637 (ALT 250 FOR IP): Performed by: SURGERY

## 2022-10-25 PROCEDURE — 2580000003 HC RX 258: Performed by: SURGERY

## 2022-10-25 PROCEDURE — 93010 ELECTROCARDIOGRAM REPORT: CPT | Performed by: INTERNAL MEDICINE

## 2022-10-25 PROCEDURE — 80048 BASIC METABOLIC PNL TOTAL CA: CPT

## 2022-10-25 PROCEDURE — 85027 COMPLETE CBC AUTOMATED: CPT

## 2022-10-25 PROCEDURE — 6360000002 HC RX W HCPCS: Performed by: SURGERY

## 2022-10-25 PROCEDURE — 36415 COLL VENOUS BLD VENIPUNCTURE: CPT

## 2022-10-25 PROCEDURE — 82728 ASSAY OF FERRITIN: CPT

## 2022-10-25 PROCEDURE — 6370000000 HC RX 637 (ALT 250 FOR IP)

## 2022-10-25 PROCEDURE — 6370000000 HC RX 637 (ALT 250 FOR IP): Performed by: PEDIATRICS

## 2022-10-25 RX ORDER — 0.9 % SODIUM CHLORIDE 0.9 %
1000 INTRAVENOUS SOLUTION INTRAVENOUS ONCE
Status: DISCONTINUED | OUTPATIENT
Start: 2022-10-25 | End: 2022-10-25

## 2022-10-25 RX ORDER — FOLIC ACID 1 MG/1
1 TABLET ORAL DAILY
Status: DISCONTINUED | OUTPATIENT
Start: 2022-10-25 | End: 2022-10-27 | Stop reason: HOSPADM

## 2022-10-25 RX ORDER — CARBAMAZEPINE 100 MG/1
100 TABLET, CHEWABLE ORAL 2 TIMES DAILY
Status: DISCONTINUED | OUTPATIENT
Start: 2022-10-25 | End: 2022-10-27 | Stop reason: HOSPADM

## 2022-10-25 RX ORDER — 0.9 % SODIUM CHLORIDE 0.9 %
1000 INTRAVENOUS SOLUTION INTRAVENOUS ONCE
Status: COMPLETED | OUTPATIENT
Start: 2022-10-25 | End: 2022-10-25

## 2022-10-25 RX ORDER — MIDODRINE HYDROCHLORIDE 5 MG/1
5 TABLET ORAL 3 TIMES DAILY PRN
Status: DISCONTINUED | OUTPATIENT
Start: 2022-10-25 | End: 2022-10-25

## 2022-10-25 RX ADMIN — SODIUM CHLORIDE 1000 ML: 9 INJECTION, SOLUTION INTRAVENOUS at 13:33

## 2022-10-25 RX ADMIN — CARBAMAZEPINE 100 MG: 100 TABLET, CHEWABLE ORAL at 09:23

## 2022-10-25 RX ADMIN — CARBAMAZEPINE 100 MG: 100 TABLET, CHEWABLE ORAL at 21:17

## 2022-10-25 RX ADMIN — ENOXAPARIN SODIUM 40 MG: 100 INJECTION SUBCUTANEOUS at 09:43

## 2022-10-25 RX ADMIN — TROSPIUM CHLORIDE 20 MG: 20 TABLET, FILM COATED ORAL at 17:13

## 2022-10-25 RX ADMIN — SODIUM CHLORIDE, PRESERVATIVE FREE 10 ML: 5 INJECTION INTRAVENOUS at 21:17

## 2022-10-25 RX ADMIN — SODIUM CHLORIDE, PRESERVATIVE FREE 10 ML: 5 INJECTION INTRAVENOUS at 09:24

## 2022-10-25 RX ADMIN — SODIUM CHLORIDE 1000 ML: 9 INJECTION, SOLUTION INTRAVENOUS at 11:09

## 2022-10-25 RX ADMIN — NITROFURANTOIN MONOHYDRATE/MACROCRYSTALLINE 100 MG: 25; 75 CAPSULE ORAL at 09:23

## 2022-10-25 RX ADMIN — TROSPIUM CHLORIDE 20 MG: 20 TABLET, FILM COATED ORAL at 05:36

## 2022-10-25 RX ADMIN — METOPROLOL TARTRATE 12.5 MG: 25 TABLET, FILM COATED ORAL at 21:17

## 2022-10-25 RX ADMIN — FOLIC ACID 1 MG: 1 TABLET ORAL at 09:43

## 2022-10-25 RX ADMIN — METOPROLOL TARTRATE 12.5 MG: 25 TABLET, FILM COATED ORAL at 17:13

## 2022-10-25 ASSESSMENT — PAIN SCALES - GENERAL
PAINLEVEL_OUTOF10: 0

## 2022-10-25 NOTE — PROGRESS NOTES
Internal Medicine Resident Progress Note    Patient:  Jens Graf    YOB: 1950  Unit/Bed:4K-24/024-A  MRN: [id number]    Acct: [de-identified]   PCP: Yolanda Griffiths MD    Date of Admission: 10/21/2022      Assessment/Plan:       Hypotension:   Etiology septic versus adrenal insufficiency Vs neurogenic from multiple sclerosis Vs Anesthesia induced. Hx of low blood pressures after surgeries. ,  Patient is postop day #0 status post open surgical debridement of right ischial decubitus ulcer. Persistent hypotension with systolics in the 83D and 03V despite 2 L of normal saline IV fluid bolus by primary team.   10/23/2022 give 1 L bolus of normal saline now over 2 hours. Then give another 1 L over another 2 hours. She is fluid responsive.  -Total urine cortisol was 3.68 10/24/2022. Cosyntropin stimulation test at 12.412 0, 22.4 to 30 minutes, and 26.25 at 60 minutes. Total cortisol level was 26.15. Hypotension unlikely to be due to adrenal insufficiency. ACTH levels and renin levels still pending.   -Blood pressure still soft. - Start 2 L IV fluid normal saline bolus  - Measure orthostatic blood pressure. - His blood pressure is stable. Start Metoprolol 12.5 mg twice daily with parameters.   -Continue to measure vitals 4 hourly every shift. Septic shock:   She was tachycardic up to 120s, tachypneic with mild leukopenia at presentation. Urinalysis was positive for leukocyte esterase. Lactic acid was 2.2.,  And procalcitonin was 0.06. Received empiric cefazolin preoperatively and is on oral nitrofurantoin at home. Etiology possibly UTI. Urinalysis done demonstrated large number of leukocyte esterase. However urine culture demonstrated  Stenotrophomonas maltophilia and candida tropicalis with insignificant CFU. Zosyn was discontinued. Because of very low CFU of these organisms.   A clean catch urine on 10/23/2022, demonstrated gram-negative bacilli and yeast. Gram-negative bacilli 50,000-90,000 CFU per mL. Possibly colonization of the supra pubic catheter as her vital signs are stable and there is no leukocytosis.  -Discontinue Zosyn, and vancomycin as bacteria is likely contaminant.   -Continue home nitrofurantoin. Normocytic anemia:   hemoglobin at presentation 11.9. Baseline 12.5. Could be as a result of blood loss during surgery. Hemoglobin now fairly stable. Lab investigations for B12- 585, folate-4.1, ferritin 110, iron levels 63, TIBC 159,   Continue to monitor H&H daily.  -Start folic acid supplements daily. Transfuse if hemodynamically unstable, or if hemoglobin drops below 7 g per DL. Hypocalcemia:  serum calcium 8.4. Serum calcium at presentation was 8.8.  - Labs for ionized calcium is 1.11  -Start calcium replacement protocol. Multiple sclerosis;   Continue home medications for multiple sclerosis. Expected discharge date: 10/27/2022    Disposition:   [x] Home  [] TCU  [] Rehab  [] Psych  [] SNF  [] [facility]  [] Other-    ===================================================================      Chief Complaint: Hypotension    Hospital Course:     Joan Zuniga is a 66 y.o. female who we are asked to see/evaluate by Chilo Sandhu DO for medical management of refractory hypotension despite 2 L of IV fluid boluses in patient who is postop day #0 status post open surgical debridement of right ischial decubitus ulcer. Patient apparently has received a total of 2 L of normal saline fluid boluses throughout the day with her systolic blood pressures remaining in the 70s to 80s. Patient reports normally runs low blood pressures and has had dangerously low blood pressures after surgical procedures in the past.  She remains oriented and responsive and interactive despite her low blood pressure. No fevers or chills and no signs of active bleeding. Her lactic acid has also returned back at 2.2 although normal procalcitonin.      Hospitalist team was consulted to assist with further management. Subjective (past 24 hours):    Saw patient at bedside today. She is doing okay. She sees she feels spasms in her bladder. She denied abdominal pains, nausea, shortness of breath, fever, chills, chest pain, chest tightness, constipation, and diarrhea. ROS: reviewed complete ROS unchanged unless otherwise stated in hospital course/subjective portion. Medications:  Reviewed    Infusion Medications    [Held by provider] sodium chloride Stopped (10/23/22 1233)    sodium chloride       Scheduled Medications    calcium replacement protocol   Other RX Placeholder    folic acid  1 mg Oral Daily    carBAMazepine  100 mg Oral BID    metoprolol tartrate  12.5 mg Oral Q12H    metoprolol tartrate  12.5 mg Oral Once    sodium chloride  1,000 mL IntraVENous Once    sodium chloride  500 mL IntraVENous Once    nitrofurantoin (macrocrystal-monohydrate)  100 mg Oral Daily    trospium  20 mg Oral BID AC    sodium chloride flush  5-40 mL IntraVENous 2 times per day    enoxaparin  40 mg SubCUTAneous Daily     PRN Meds: acetaminophen, acetaminophen, sodium chloride flush, sodium chloride, ondansetron **OR** ondansetron, morphine **OR** morphine, oxyCODONE, diphenhydrAMINE        Intake/Output Summary (Last 24 hours) at 10/25/2022 1559  Last data filed at 10/25/2022 1331  Gross per 24 hour   Intake 1327.08 ml   Output 425 ml   Net 902.08 ml       Exam:  BP 95/65   Pulse (!) 134   Temp 98.2 °F (36.8 °C) (Oral)   Resp 25   Ht 5' 3\" (1.6 m)   Wt 118 lb 14.4 oz (53.9 kg)   SpO2 96%   BMI 21.06 kg/m²        General: Calm and in  no distress, appears stated age. Eyes:  PERRL. Conjunctivae/corneas clear. HENT: Head normal appearing. Nares normal. Oral mucosa moist.  Hearing intact. Neck: Supple, with full range of motion. Trachea midline. No gross JVD appreciated. Respiratory:  Normal effort.  Clear to auscultation, without rales or wheezes or rhonchi. Cardiovascular: tachycardic, regular rhythm with normal S1/S2 without murmurs. No lower extremity edema. Abdomen: Soft, non-tender, non-distended, leaking suprapubic catheter in place, with normal bowel sounds. Musculoskeletal: Right ischial tuberosity decubitus ulcer, stage 4 with no joint swelling or tenderness. Normal tone. No abnormal movements. Skin: Bullae on her right groin, with urine from the leaking suprapubic on her thighs, No rashes. Neurologic: focal motor deficits in the  lower extremities. muscle strength is 3/5 b/L in LE. Psychiatric: Alert and oriented, normal insight and thought content. Capillary Refill: Brisk,< 3 seconds. Peripheral Pulses: +2 palpable, equal bilaterally. Labs:   Recent Labs     10/23/22  0100 10/24/22  0909 10/25/22  0448   WBC 5.5 4.1* 4.0*   HGB 9.7* 11.3* 10.9*   HCT 30.5* 34.2* 34.0*    143 141     Recent Labs     10/23/22  0100 10/24/22  0909 10/25/22  0448    140 140   K 4.1 3.9 4.1    110 110   CO2 22* 21* 23   BUN 12 7 6*   CREATININE 0.3* 0.4 0.3*   CALCIUM 8.1* 8.4* 8.4*     Recent Labs     10/23/22  0100   AST 27   ALT 16   BILITOT 0.2*   ALKPHOS 123     No results for input(s): INR in the last 72 hours. Recent Labs     10/23/22  0100 10/23/22  0949   TROPONINT < 0.010 < 0.010     Recent Labs     10/22/22  1903   PROCAL 0.06      Lab Results   Component Value Date/Time    NITRU NEGATIVE 10/21/2022 02:25 PM    WBCUA > 100 10/21/2022 02:25 PM    BACTERIA FEW 10/21/2022 02:25 PM    RBCUA 3-5 10/21/2022 02:25 PM    BLOODU SMALL 10/21/2022 02:25 PM    SPECGRAV  07/14/2022 12:00 AM      Comment:      >=1.030    GLUCOSEU NEGATIVE 10/21/2022 02:25 PM       Radiology (48 hours):  No results found. DVT prophylaxis:    [x] Lovenox  [] SCDs  [] SQ Heparin  [] Encourage ambulation   [] Already on Anticoagulation       Diet: ADULT DIET; Regular  Code Status: Full Code  PT/OT: As tolerated. Tele:  On continuous telemetry monitoring. IVF: 2 2 L bolus IV fluids normal saline.     Electronically signed by Carmina Matamoros DO on 10/25/2022 at 3:59 PM    Case was discussed with Attending, Dr. Iain Ambrose

## 2022-10-25 NOTE — PLAN OF CARE
Problem: Discharge Planning  Goal: Discharge to home or other facility with appropriate resources  10/25/2022 0107 by Mary Brice RN  Outcome: Progressing  Flowsheets (Taken 10/25/2022 0107)  Discharge to home or other facility with appropriate resources:   Arrange for needed discharge resources and transportation as appropriate   Identify barriers to discharge with patient and caregiver   Identify discharge learning needs (meds, wound care, etc)   Refer to discharge planning if patient needs post-hospital services based on physician order or complex needs related to functional status, cognitive ability or social support system     Problem: Safety - Adult  Goal: Free from fall injury  10/25/2022 0107 by Mary Brice RN  Outcome: Progressing  Flowsheets (Taken 10/25/2022 0107)  Free From Fall Injury: Instruct family/caregiver on patient safety     Problem: Skin/Tissue Integrity  Goal: Absence of new skin breakdown  Description: 1. Monitor for areas of redness and/or skin breakdown  2. Assess vascular access sites hourly  3. Every 4-6 hours minimum:  Change oxygen saturation probe site  4. Every 4-6 hours:  If on nasal continuous positive airway pressure, respiratory therapy assess nares and determine need for appliance change or resting period. 10/25/2022 0107 by Mary Brice RN  Outcome: Progressing  Note: No new skin lesions noted this shift. Patient encouraged to reposition every two hours. Skin assessments completed and ongoing.         Problem: Cardiovascular - Adult  Goal: Maintains optimal cardiac output and hemodynamic stability  10/25/2022 0107 by Mary Brice RN  Outcome: Progressing  Flowsheets (Taken 10/25/2022 0107)  Maintains optimal cardiac output and hemodynamic stability:   Monitor blood pressure and heart rate   Monitor urine output and notify Licensed Independent Practitioner for values outside of normal range   Assess for signs of decreased cardiac output     Problem: Skin/Tissue Integrity - Adult  Goal: Incisions, wounds, or drain sites healing without S/S of infection  10/25/2022 0107 by Olga Drummond RN  Outcome: Progressing  Flowsheets (Taken 10/25/2022 0107)  Incisions, Wounds, or Drain Sites Healing Without Sign and Symptoms of Infection:   ADMISSION and DAILY: Assess and document risk factors for pressure ulcer development   TWICE DAILY: Assess and document skin integrity   TWICE DAILY: Assess and document dressing/incision, wound bed, drain sites and surrounding tissue     Problem: Genitourinary - Adult  Goal: Urinary catheter remains patent  10/25/2022 0107 by Olga Drummond RN  Outcome: Progressing     Problem: Infection - Adult  Goal: Absence of infection at discharge  10/25/2022 0107 by Olga Drummond RN  Outcome: Progressing  Flowsheets (Taken 10/25/2022 0107)  Absence of infection at discharge:   Assess and monitor for signs and symptoms of infection   Monitor all insertion sites i.e., indwelling lines, tubes and drains   Monitor lab/diagnostic results     Problem: Infection - Adult  Goal: Absence of infection during hospitalization  10/25/2022 0107 by Olga Drummond RN  Outcome: Progressing  Flowsheets (Taken 10/25/2022 0107)  Absence of infection during hospitalization:   Assess and monitor for signs and symptoms of infection   Monitor all insertion sites i.e., indwelling lines, tubes and drains   Monitor lab/diagnostic results     Problem: ABCDS Injury Assessment  Goal: Absence of physical injury  10/25/2022 0107 by Olga Drummond RN  Outcome: Progressing  Flowsheets (Taken 10/25/2022 0107)  Absence of Physical Injury: Implement safety measures based on patient assessment     Problem: Chronic Conditions and Co-morbidities  Goal: Patient's chronic conditions and co-morbidity symptoms are monitored and maintained or improved  10/25/2022 0107 by Olga Drummond RN  Outcome: Progressing  Flowsheets (Taken 10/25/2022 0107)  Care Plan - Patient's Chronic Conditions and Co-Morbidity Symptoms are Monitored and Maintained or Improved:   Monitor and assess patient's chronic conditions and comorbid symptoms for stability, deterioration, or improvement   Collaborate with multidisciplinary team to address chronic and comorbid conditions and prevent exacerbation or deterioration   Update acute care plan with appropriate goals if chronic or comorbid symptoms are exacerbated and prevent overall improvement and discharge     Problem: Pain  Goal: Verbalizes/displays adequate comfort level or baseline comfort level  10/25/2022 0107 by Ramin Campuzano RN  Outcome: Progressing  Flowsheets (Taken 10/25/2022 0107)  Verbalizes/displays adequate comfort level or baseline comfort level:   Encourage patient to monitor pain and request assistance   Assess pain using appropriate pain scale   Administer analgesics based on type and severity of pain and evaluate response

## 2022-10-25 NOTE — FLOWSHEET NOTE
10/25/22 1106   Safe Environment   Safety Measures Other (comment)  (vn safety check)   Staff attending pt at this time

## 2022-10-25 NOTE — PROGRESS NOTES
General Surgery Progress Note      Admit Date: 10/21/2022    Date of Service: 10/25/2022    CC: s/p right decubitus ulcer debridement    Subjective     Patient has no complaint of chest pain or SOB, despite new onset tachycardia. Awake and alert. Responds to questions appropriately.  EKG performed this AM    Current Facility-Administered Medications   Medication Dose Route Frequency Provider Last Rate Last Admin    calcium replacement protocol   Other RX Placeholder Veronica Amador DO        folic acid (FOLVITE) tablet 1 mg  1 mg Oral Daily Veronica Amador DO   1 mg at 10/25/22 0943    0.9 % sodium chloride bolus  1,000 mL IntraVENous Once Hiro Brandt, .9 mL/hr at 10/25/22 1109 1,000 mL at 10/25/22 1109    0.9 % sodium chloride bolus  1,000 mL IntraVENous Once Valerie Hunter DO        carBAMazepine (TEGRETOL) chewable tablet 100 mg  100 mg Oral BID Aleena Lindsey DO        acetaminophen (TYLENOL) tablet 650 mg  650 mg Oral Q4H PRN Darinel Weeks MD   650 mg at 10/24/22 1932    0.9 % sodium chloride bolus  1,000 mL IntraVENous Once Wes Mcmillan DO        [Held by provider] 0.9 % sodium chloride infusion   IntraVENous Continuous Sb Russian, DO   Stopped at 10/23/22 1233    0.9 % sodium chloride bolus  500 mL IntraVENous Once [place] Russian, DO        acetaminophen (TYLENOL) suppository 650 mg  650 mg Rectal Q4H PRN Sb Russian, DO   650 mg at 10/23/22 0436    nitrofurantoin (macrocrystal-monohydrate) (MACROBID) capsule 100 mg  100 mg Oral Daily [place] Russian, DO   100 mg at 10/25/22 2606    trospium (SANCTURA) tablet 20 mg  20 mg Oral BID AC Sb Russian, DO   20 mg at 10/25/22 0536    sodium chloride flush 0.9 % injection 5-40 mL  5-40 mL IntraVENous 2 times per day Sb Russian, DO   10 mL at 10/25/22 0783    sodium chloride flush 0.9 % injection 5-40 mL  5-40 mL IntraVENous PRN Sb Russian, DO        0.9 % sodium chloride infusion   IntraVENous PRN Sb Russian, DO        ondansetron (ZOFRAN-ODT) disintegrating tablet 4 mg  4 mg Oral Q8H PRN Marv [place], DO        Or    ondansetron [facility]) injection 4 mg  4 mg IntraVENous Q6H PRN Marv [place], DO        enoxaparin (LOVENOX) injection 40 mg  40 mg SubCUTAneous Daily Marv Karlos, DO   40 mg at 10/25/22 0943    morphine (PF) injection 2 mg  2 mg IntraVENous Q2H PRN Marv Karlos, DO        Or    morphine injection 4 mg  4 mg IntraVENous Q2H PRN Marv [place], DO        oxyCODONE (ROXICODONE) immediate release tablet 5 mg  5 mg Oral Q6H PRN Marv [place], DO        [Held by provider] metoprolol tartrate (LOPRESSOR) tablet 25 mg  25 mg Oral BID AL Holt        ondansetron (ZOFRAN) injection 4 mg  4 mg IntraVENous Q6H PRN Marv [place], DO        diphenhydrAMINE (BENADRYL) injection 12.5 mg  12.5 mg IntraVENous Q6H PRN Marv Karlos, DO           Objective     Patient Vitals for the past 8 hrs:   BP Temp Temp src Pulse Resp SpO2   10/25/22 1132 (!) 89/59 98.2 °F (36.8 °C) Oral (!) 138 18 96 %   10/25/22 0915 -- -- Oral -- -- --   10/25/22 0404 98/71 98 °F (36.7 °C) Oral (!) 113 16 94 %       Intake/Output Summary (Last 24 hours) at 10/25/2022 1157  Last data filed at 10/25/2022 1133  Gross per 24 hour   Intake 250 ml   Output 1575 ml   Net -1325 ml          BP (!) 89/59   Pulse (!) 138   Temp 98.2 °F (36.8 °C) (Oral)   Resp 18   Ht 5' 3\" (1.6 m)   Wt 118 lb 14.4 oz (53.9 kg)   SpO2 96%   BMI 21.06 kg/m²   Physical Exam    Physical Exam  Constitutional:       Appearance: Normal appearance. She is normal weight. HENT:      Head: Normocephalic and atraumatic. Nose: Nose normal.      Mouth/Throat:      Mouth: Mucous membranes are moist.      Pharynx: Oropharynx is clear. Eyes:      Extraocular Movements: Extraocular movements intact. Conjunctiva/sclera: Conjunctivae normal.      Pupils: Pupils are equal, round, and reactive to light. Cardiovascular:      Rate and Rhythm: Regular rhythm. Tachycardia present. Pulses: Normal pulses. Heart sounds: Normal heart sounds. Pulmonary:      Effort: Pulmonary effort is normal.      Breath sounds: Normal breath sounds. Abdominal:      General: Abdomen is flat. Bowel sounds are normal.      Palpations: Abdomen is soft. Musculoskeletal:         General: Normal range of motion. Cervical back: Normal range of motion and neck supple. Skin:     General: Skin is warm and dry. Capillary Refill: Capillary refill takes less than 2 seconds. Neurological:      General: No focal deficit present. Mental Status: She is alert and oriented to person, place, and time. Mental status is at baseline. Data Review: CBC:   Lab Results   Component Value Date/Time    WBC 4.0 10/25/2022 04:48 AM    RBC 3.39 10/25/2022 04:48 AM     BMP:   Lab Results   Component Value Date/Time    GLUCOSE 81 10/25/2022 04:48 AM    CO2 23 10/25/2022 04:48 AM    BUN 6 10/25/2022 04:48 AM    CREATININE 0.3 10/25/2022 04:48 AM    CALCIUM 8.4 10/25/2022 04:48 AM     Coagulation:   Lab Results   Component Value Date/Time    INR 1.28 11/03/2020 12:29 PM    APTT 31.9 10/02/2015 01:48 AM     Cardiac markers:   Lab Results   Component Value Date/Time    TROPONINT < 0.010 10/23/2022 09:49 AM       Assessment: s/p decubitus debridement    Acute onset tachycardia    Principal Problem:    Decubitus ulcer of buttock, unstageable (HCC)  Active Problems:    Wound of right buttock    Decubitus ulcer of ischial area, right, unspecified pressure ulcer stage  Resolved Problems:    * No resolved hospital problems.  *      Plan:EKG   Low threshold for Echo   Resume home med (B-blocker)   Daily wound care  See orders    Joshua Isbell,

## 2022-10-26 PROBLEM — E43 SEVERE MALNUTRITION (HCC): Status: ACTIVE | Noted: 2022-10-26

## 2022-10-26 LAB
ACTH: 22.4 PG/ML (ref 7.2–63.3)
ANION GAP SERPL CALCULATED.3IONS-SCNC: 9 MEQ/L (ref 8–16)
BUN BLDV-MCNC: 8 MG/DL (ref 7–22)
CALCIUM IONIZED: 1.09 MMOL/L (ref 1.12–1.32)
CALCIUM SERPL-MCNC: 8 MG/DL (ref 8.5–10.5)
CHLORIDE BLD-SCNC: 108 MEQ/L (ref 98–111)
CO2: 22 MEQ/L (ref 23–33)
CREAT SERPL-MCNC: 0.3 MG/DL (ref 0.4–1.2)
ERYTHROCYTE [DISTWIDTH] IN BLOOD BY AUTOMATED COUNT: 13.4 % (ref 11.5–14.5)
ERYTHROCYTE [DISTWIDTH] IN BLOOD BY AUTOMATED COUNT: 49 FL (ref 35–45)
GFR SERPL CREATININE-BSD FRML MDRD: > 60 ML/MIN/1.73M2
GLUCOSE BLD-MCNC: 73 MG/DL (ref 70–108)
HCT VFR BLD CALC: 33.2 % (ref 37–47)
HEMOGLOBIN: 10.8 GM/DL (ref 12–16)
MCH RBC QN AUTO: 32.4 PG (ref 26–33)
MCHC RBC AUTO-ENTMCNC: 32.5 GM/DL (ref 32.2–35.5)
MCV RBC AUTO: 99.7 FL (ref 81–99)
PLATELET # BLD: 156 THOU/MM3 (ref 130–400)
PMV BLD AUTO: 10.5 FL (ref 9.4–12.4)
POTASSIUM SERPL-SCNC: 4.2 MEQ/L (ref 3.5–5.2)
RBC # BLD: 3.33 MILL/MM3 (ref 4.2–5.4)
SODIUM BLD-SCNC: 139 MEQ/L (ref 135–145)
WBC # BLD: 7.1 THOU/MM3 (ref 4.8–10.8)

## 2022-10-26 PROCEDURE — 6370000000 HC RX 637 (ALT 250 FOR IP): Performed by: STUDENT IN AN ORGANIZED HEALTH CARE EDUCATION/TRAINING PROGRAM

## 2022-10-26 PROCEDURE — 6370000000 HC RX 637 (ALT 250 FOR IP): Performed by: SURGERY

## 2022-10-26 PROCEDURE — 36415 COLL VENOUS BLD VENIPUNCTURE: CPT

## 2022-10-26 PROCEDURE — 80048 BASIC METABOLIC PNL TOTAL CA: CPT

## 2022-10-26 PROCEDURE — 99232 SBSQ HOSP IP/OBS MODERATE 35: CPT | Performed by: INTERNAL MEDICINE

## 2022-10-26 PROCEDURE — 1200000003 HC TELEMETRY R&B

## 2022-10-26 PROCEDURE — 6370000000 HC RX 637 (ALT 250 FOR IP): Performed by: INTERNAL MEDICINE

## 2022-10-26 PROCEDURE — 82330 ASSAY OF CALCIUM: CPT

## 2022-10-26 PROCEDURE — 2580000003 HC RX 258: Performed by: SURGERY

## 2022-10-26 PROCEDURE — 6360000002 HC RX W HCPCS

## 2022-10-26 PROCEDURE — 6360000002 HC RX W HCPCS: Performed by: SURGERY

## 2022-10-26 PROCEDURE — 6370000000 HC RX 637 (ALT 250 FOR IP): Performed by: PEDIATRICS

## 2022-10-26 PROCEDURE — 85027 COMPLETE CBC AUTOMATED: CPT

## 2022-10-26 PROCEDURE — 6370000000 HC RX 637 (ALT 250 FOR IP)

## 2022-10-26 PROCEDURE — 99024 POSTOP FOLLOW-UP VISIT: CPT | Performed by: SURGERY

## 2022-10-26 RX ORDER — CALCIUM GLUCONATE 10 MG/ML
1000 INJECTION, SOLUTION INTRAVENOUS ONCE
Status: COMPLETED | OUTPATIENT
Start: 2022-10-26 | End: 2022-10-26

## 2022-10-26 RX ORDER — MIDODRINE HYDROCHLORIDE 5 MG/1
5 TABLET ORAL
Status: DISCONTINUED | OUTPATIENT
Start: 2022-10-26 | End: 2022-10-27

## 2022-10-26 RX ADMIN — SODIUM CHLORIDE, PRESERVATIVE FREE 10 ML: 5 INJECTION INTRAVENOUS at 20:42

## 2022-10-26 RX ADMIN — CARBAMAZEPINE 100 MG: 100 TABLET, CHEWABLE ORAL at 20:34

## 2022-10-26 RX ADMIN — ACETAMINOPHEN 650 MG: 325 TABLET ORAL at 20:34

## 2022-10-26 RX ADMIN — NITROFURANTOIN MONOHYDRATE/MACROCRYSTALLINE 100 MG: 25; 75 CAPSULE ORAL at 08:21

## 2022-10-26 RX ADMIN — CALCIUM GLUCONATE 1000 MG: 10 INJECTION, SOLUTION INTRAVENOUS at 17:35

## 2022-10-26 RX ADMIN — MIDODRINE HYDROCHLORIDE 5 MG: 5 TABLET ORAL at 13:44

## 2022-10-26 RX ADMIN — TROSPIUM CHLORIDE 20 MG: 20 TABLET, FILM COATED ORAL at 05:46

## 2022-10-26 RX ADMIN — METOPROLOL TARTRATE 12.5 MG: 25 TABLET, FILM COATED ORAL at 11:24

## 2022-10-26 RX ADMIN — MIDODRINE HYDROCHLORIDE 5 MG: 5 TABLET ORAL at 08:21

## 2022-10-26 RX ADMIN — SODIUM CHLORIDE, PRESERVATIVE FREE 10 ML: 5 INJECTION INTRAVENOUS at 08:22

## 2022-10-26 RX ADMIN — ENOXAPARIN SODIUM 40 MG: 100 INJECTION SUBCUTANEOUS at 08:21

## 2022-10-26 RX ADMIN — FOLIC ACID 1 MG: 1 TABLET ORAL at 08:21

## 2022-10-26 RX ADMIN — MIDODRINE HYDROCHLORIDE 5 MG: 5 TABLET ORAL at 17:40

## 2022-10-26 RX ADMIN — TROSPIUM CHLORIDE 20 MG: 20 TABLET, FILM COATED ORAL at 17:40

## 2022-10-26 RX ADMIN — CARBAMAZEPINE 100 MG: 100 TABLET, CHEWABLE ORAL at 08:21

## 2022-10-26 ASSESSMENT — PAIN SCALES - GENERAL
PAINLEVEL_OUTOF10: 0
PAINLEVEL_OUTOF10: 3
PAINLEVEL_OUTOF10: 10
PAINLEVEL_OUTOF10: 0
PAINLEVEL_OUTOF10: 3
PAINLEVEL_OUTOF10: 0

## 2022-10-26 ASSESSMENT — PAIN DESCRIPTION - DESCRIPTORS
DESCRIPTORS: DULL;ACHING
DESCRIPTORS: ACHING

## 2022-10-26 ASSESSMENT — PAIN DESCRIPTION - DIRECTION: RADIATING_TOWARDS: LEFT LEG

## 2022-10-26 ASSESSMENT — PAIN - FUNCTIONAL ASSESSMENT: PAIN_FUNCTIONAL_ASSESSMENT: PREVENTS OR INTERFERES WITH MANY ACTIVE NOT PASSIVE ACTIVITIES

## 2022-10-26 ASSESSMENT — PAIN DESCRIPTION - LOCATION
LOCATION: LEG
LOCATION: LEG

## 2022-10-26 ASSESSMENT — PAIN DESCRIPTION - FREQUENCY: FREQUENCY: CONTINUOUS

## 2022-10-26 ASSESSMENT — PAIN DESCRIPTION - PAIN TYPE: TYPE: CHRONIC PAIN

## 2022-10-26 ASSESSMENT — PAIN DESCRIPTION - ONSET: ONSET: ON-GOING

## 2022-10-26 ASSESSMENT — PAIN DESCRIPTION - ORIENTATION: ORIENTATION: RIGHT

## 2022-10-26 NOTE — PROGRESS NOTES
Internal Medicine Resident Progress Note    Patient:  Tigre Connor    YOB: 1950  Unit/Bed:4K-24/024-A  MRN: [id number]    Acct: [de-identified]   PCP: Jason Cheng MD    Date of Admission: 10/21/2022      Assessment/Plan:    Hypotension:   Previous history of hypotension after anesthesia. Etiology chronic hypotension Vs septic versus adrenal insufficiency Vs neurogenic from multiple sclerosis, Vs cardiogenic Vs Anesthesia induced. Hx of low blood pressures after surgeries. ,  Patient is postop day #0 status post open surgical debridement of right ischial decubitus ulcer. Persistent hypotension with systolics in the 21K and 44F despite 2 L of normal saline IV fluid bolus by primary team.   10/23/2022 give 1 L bolus of normal saline now over 2 hours. Then give another 1 L over another 2 hours. She is fluid responsive.  -Total urine cortisol was 3.68 10/24/2022. Cosyntropin stimulation test at 12.412 0, 22.4 to 30 minutes, and 26.25 at 60 minutes. Total cortisol level was 26.15. Hypotension unlikely to be due to adrenal insufficiency. ACTH levels 22.4 and aldosterone levels of 5.1. renin levels still pending.   -Echo on 10/24 demonstrated no regional left ventriclar wall motion abnormalities. - Blood pressure is currently stable. - had another  2 L IV fluid normal saline bolus fluid challenge. - orthostatic blood pressure was 88/66 lying and 99/66 sitting.   - Start Metoprolol 12.5 mg twice daily with parameters. - Continue to measure vitals 4 hourly every shift. Septic shock:   She was tachycardic up to 120s, tachypneic with mild leukopenia at presentation. Urinalysis was positive for leukocyte esterase. Lactic acid was 2.2.,  And procalcitonin was 0.06. Received empiric cefazolin preoperatively and is on oral nitrofurantoin at home. Etiology possibly UTI. Urinalysis done demonstrated large number of leukocyte esterase.   However urine culture demonstrated  Stenotrophomonas maltophilia and candida tropicalis with insignificant CFU. Zosyn was discontinued. Because of very low CFU of these organisms. A clean catch urine on 10/23/2022, demonstrated gram-negative bacilli and yeast. Gram-negative bacilli 50,000-90,000 CFU per mL. Possibly colonization of the supra pubic catheter as her vital signs are stable and there is no leukocytosis.  -Discontinue Zosyn, and vancomycin as bacteria is likely contaminant.   -Continue home nitrofurantoin. Normocytic anemia:   hemoglobin at presentation 11.9. Baseline 12.5. Could be as a result of blood loss during surgery. Hemoglobin now fairly stable. Lab investigations for B12- 585, folate-4.1, ferritin 110, iron levels 63, TIBC 159,   Continue to monitor H&H daily.  -Start folic acid supplements daily. Transfuse if hemodynamically unstable, or if hemoglobin drops below 7 g per DL. Hypocalcemia:  serum calcium 8.4. Serum calcium at presentation was 8.8.  - Labs for ionized calcium is 1.09  - Start calcium replacement protocol. Multiple sclerosis;   Continue home medications for multiple sclerosis. Expected discharge date:  TBA    Disposition:   [x] Home  [] TCU  [] Rehab  [] Psych  [] SNF  [] [place]  [] Other-    ===================================================================      Chief Complaint: Hypotension     Hospital Course:     Anna Miller is a 66 y.o. female who we are asked to see/evaluate by Amy Cordova DO for medical management of refractory hypotension despite 2 L of IV fluid boluses in patient who is postop day #0 status post open surgical debridement of right ischial decubitus ulcer. Patient apparently has received a total of 2 L of normal saline fluid boluses throughout the day with her systolic blood pressures remaining in the 70s to 80s.   Patient reports normally runs low blood pressures and has had dangerously low blood pressures after surgical procedures in the past. She remains oriented and responsive and interactive despite her low blood pressure. No fevers or chills and no signs of active bleeding. Her lactic acid has also returned back at 2.2 although normal procalcitonin. Hospitalist team was consulted to assist with further management. 10/26 Her suprapubic catheter was changed by the nurse. She says it is no longer leaking. She was given 2L of IVF on 10/25. Echo done on the 10/24 demonstrated an EF of 60% with no regional left ventricular wall motion abnormalities. Blood pressure improved with the IV fluid bolus challenge test on 10/25. She resumed her home dose  of B-blockers thereafter. Subjective (past 24 hours):     Saw pt at bed side. She has no new symptoms. She denies nausea, vomiting, chest pain, palpitations, SOB, dysuria, abdominal pain, constipation, diarrhea, fever and chills. ROS: reviewed complete ROS unchanged unless otherwise stated in hospital course/subjective portion.        Medications:  Reviewed    Infusion Medications    sodium chloride Stopped (10/23/22 1233)    sodium chloride       Scheduled Medications    midodrine  5 mg Oral TID     calcium replacement protocol   Other RX Placeholder    folic acid  1 mg Oral Daily    carBAMazepine  100 mg Oral BID    metoprolol tartrate  12.5 mg Oral Q12H    sodium chloride  1,000 mL IntraVENous Once    sodium chloride  500 mL IntraVENous Once    nitrofurantoin (macrocrystal-monohydrate)  100 mg Oral Daily    trospium  20 mg Oral BID AC    sodium chloride flush  5-40 mL IntraVENous 2 times per day    enoxaparin  40 mg SubCUTAneous Daily     PRN Meds: acetaminophen, acetaminophen, sodium chloride flush, sodium chloride, ondansetron **OR** ondansetron, morphine **OR** morphine, oxyCODONE, diphenhydrAMINE        Intake/Output Summary (Last 24 hours) at 10/26/2022 1441  Last data filed at 10/26/2022 1200  Gross per 24 hour   Intake 100 ml   Output 1725 ml   Net -1625 ml       Exam:  /65 Pulse (!) 117   Temp 98.8 °F (37.1 °C) (Oral)   Resp 18   Ht 5' 3\" (1.6 m)   Wt 115 lb 8 oz (52.4 kg)   SpO2 94%   BMI 20.46 kg/m²        General: sitting in bed, watching tv, in no distress, appears stated age. Eyes:  PERRL. Conjunctivae/corneas clear. HENT: Head normal appearing. Nares normal. Oral mucosa moist.  Hearing intact. Neck: Supple, with full range of motion. Trachea midline. No gross JVD appreciated. Respiratory:  Normal effort. Clear to auscultation, without rales or wheezes or rhonchi. Cardiovascular: tachycardic, regular rhythm with normal S1/S2 without murmurs. No lower extremity edema. Abdomen: Soft, non-tender, non-distended, leaking suprapubic catheter in place, with normal bowel sounds. Musculoskeletal: Right ischial tuberosity decubitus ulcer, stage 4 clean dressing, with no joint swelling or tenderness. Normal tone. No abnormal movements. Skin: Bullae on her right groin, with urine from the leaking suprapubic on her thighs, No rashes. Neurologic: focal motor deficits in the  lower extremities. muscle strength is 3/5 b/L in LE. Psychiatric: Alert and oriented, normal insight and thought content. Capillary Refill: Brisk,< 3 seconds. Peripheral Pulses: +2 palpable, equal bilaterally. Labs:   Recent Labs     10/24/22  0909 10/25/22  0448 10/26/22  0349   WBC 4.1* 4.0* 7.1   HGB 11.3* 10.9* 10.8*   HCT 34.2* 34.0* 33.2*    141 156     Recent Labs     10/24/22  0909 10/25/22  0448 10/26/22  0349    140 139   K 3.9 4.1 4.2    110 108   CO2 21* 23 22*   BUN 7 6* 8   CREATININE 0.4 0.3* 0.3*   CALCIUM 8.4* 8.4* 8.0*     No results for input(s): AST, ALT, BILIDIR, BILITOT, ALKPHOS in the last 72 hours. No results for input(s): INR in the last 72 hours. No results for input(s): TROPONINT in the last 72 hours. No results for input(s): PROCAL in the last 72 hours.    Lab Results   Component Value Date/Time    NITRU NEGATIVE 10/21/2022 02:25 PM    45 Maddi Myles > 100 10/21/2022 02:25 PM    BACTERIA FEW 10/21/2022 02:25 PM    RBCUA 3-5 10/21/2022 02:25 PM    BLOODU SMALL 10/21/2022 02:25 PM    Diantha Cutting  07/14/2022 12:00 AM      Comment:      >=1.030    GLUCOSEU NEGATIVE 10/21/2022 02:25 PM       Radiology (48 hours):  No results found. DVT prophylaxis:    [x] Lovenox  [] SCDs  [] SQ Heparin  [] Encourage ambulation   [] Already on Anticoagulation       Diet: ADULT ORAL NUTRITION SUPPLEMENT; Lunch, Dinner; Frozen Oral Supplement  ADULT ORAL NUTRITION SUPPLEMENT; Breakfast, Dinner; Wound Healing Oral Supplement  ADULT DIET;  Regular; pt prefers soft moist  foods with ground meats & sauce on meats pt request  Code Status: Full Code  PT/OT: TBA   Tele: continuous telemetry monitoring   IVF: None     Electronically signed by Giselle Frank DO on 10/26/2022 at 2:41 PM    Case was discussed with Attending, Dr. Анна Bella

## 2022-10-26 NOTE — PROGRESS NOTES
Comprehensive Nutrition Assessment    Type and Reason for Visit:  Initial, Positive Nutrition Screen, Wound    Nutrition Recommendations/Plan:   Consider SLP swallow eval to insure swallowing safety as appropriate. Hx MS. Pt vague re: swallowing difficulty. Sometimes goes off subject. Will send soft foods with ground meats & sauce on meats per pt request. She requests moist foods to help with dry mouth. Notice Folate 4.1 (10/26) & on Folvite supplement. Physician to advise  Send Magic cup BID. Ebbie Durie with water to mix it with BID. Consider MVI as appropriate. Malnutrition Assessment:  Malnutrition Status:  Severe malnutrition (10/26/22 1344)    Context:  Chronic Illness     Findings of the 6 clinical characteristics of malnutrition:  Energy Intake:  75% or less estimated energy requirements for 1 month or longer  Weight Loss:   (7.3% wt loss over 8 months , 4# wt loss in 5 days)     Body Fat Loss:  Severe body fat loss Orbital   Muscle Mass Loss:  Severe muscle mass loss Temples (temporalis), Clavicles (pectoralis & deltoids)  Fluid Accumulation:  No significant fluid accumulation     Strength:  Not Performed    Nutrition Assessment:     Pt. severely malnourished AEB criteria listed above. At risk for further nutritional compromise r/t Dx: Hypotension, Septic Shock, Decubitus Ulcer of Buttock, Increased needs for wound healing, Normocytic Anemia, Hypocalcemia and underlying medical condition (MS,Hx difficulty swallowing, Intra-Abdominal Lymphadenopathy, Pancreatitis, Seizures). Nutrition Related Findings:    Pt. Report/Treatments/Miscellaneous: Pt seen,appears thin & much older than age & leans forward often. She mentions he upper dentures don't fit well although she is wearing them. Pt reports she needs soft moist foods, ground meats with sauce on them \" dry mouth\" Hx of swallowing difficulty noted but pt appears vague re: this. Hx of MS.   Tends to consume ~1/2 of a meal at home from [employer],  does the cooking. GI Status: BM x 1 (10/23)  Pertinent Labs: (10/26) Ca Ionized 1.09, Folate 4.1, Ca Serum 8  Pertinent Meds: Calcium replacement, Folvite     Wound Type:  (unstageable buttocks rt, open surgical debridement of rt iscial decub ulcer (10/22),)       Current Nutrition Intake & Therapies:    Average Meal Intake: 26-50%, 51-75% (po varies)  Average Supplements Intake:  (new)  ADULT ORAL NUTRITION SUPPLEMENT; Lunch, Dinner; Frozen Oral Supplement  ADULT ORAL NUTRITION SUPPLEMENT; Breakfast, Dinner; Wound Healing Oral Supplement  ADULT DIET; Regular; pt prefers soft moist  foods with ground meats & sauce on meats pt request    Anthropometric Measures:  Height: 5' 3\" (160 cm)  Ideal Body Weight (IBW): 115 lbs (52 kg)    Admission Body Weight: 119 lb (54 kg) ((10/21) no edema)  Current Body Weight: 115 lb 8 oz (52.4 kg) ((10/26) no edema),   IBW. Current BMI (kg/m2): 20.5  Usual Body Weight:  (per EMR: (2/28) 124#, (`1/17) 124# 8oz bedscale, pt unsure)                       BMI Categories: Underweight (BMI less than 22) age over 72    Estimated Daily Nutrient Needs:  Energy Requirements Based On: Kcal/kg ())  Weight Used for Energy Requirements:  ((52.4kgm) (10/26)  Energy (kcal/day): 1834-2096kcals (35-40kcals/kgm, wt gain, wound healinng)  Weight Used for Protein Requirements:  (52.4kgm (10/26))  Protein (g/day):  grams (1.5-2 grams protein/kgm)       Nutrition Diagnosis:    In context of chronic illness, Severe malnutrition related to catabolic illness, inadequate protein-energy intake as evidenced by Criteria as identified in malnutrition assessment    Nutrition Interventions:   Food and/or Nutrient Delivery: Start Oral Nutrition Supplement (Consider SLP swallow eval to insure swallowing safety as appropriate)  Nutrition Education/Counseling: Education not appropriate  Coordination of Nutrition Care: Continue to monitor while inpatient       Goals:     Goals: PO intake 75% or greater, by next RD assessment       Nutrition Monitoring and Evaluation:   Behavioral-Environmental Outcomes: Other (Comment) (limited historian)     Physical Signs/Symptoms Outcomes: Biochemical Data, Chewing or Swallowing, GI Status, Fluid Status or Edema, Hemodynamic Status, Nutrition Focused Physical Findings, Skin, Weight    Discharge Planning:     Too soon to determine     Mahsa Telles RD, LD  Contact: [phone]

## 2022-10-26 NOTE — PROGRESS NOTES
[facility] Ostomy Contin[facility] Nurse  Progress Note       Anna Miller  AGE: 66 y.o. GENDER: female  : 1950  UNIT: 4K-24/024-A  TODAY'S DATE:  10/26/2022  ADMISSION DATE: 10/21/2022 12:37 PM  Subjective:     Reason for [address] [facility] Evaluation and Assessment: decubitus ulcer - need recommendations for wound care      nAna Miller is a 66 y.o. female referred by:   [] Physician/PA/APRN  [x] Nursing  [] Other:     Wound Identification:  Wound Type: pressure  Contributing Factors: chronic pressure, decreased mobility, and shear force    Objective:     Kelton Risk Score: Kelton Scale Score: 14    Assessment:     Encounter: Present to patient room. Patient in bed upon arrival. Assessment and photo to follow. Primary RN in room to assist. Patient turned onto right side. Sacral dressing removed from left ischium. Patient noted to have DTPI, POA. Cleansed wound with normal saline and gauze. Pat dry with clean gauze. Triad applied. Staff to apply BID and PRN. Assisted patient onto left side. Dressing consisting of Tegaderm and gauze to right ischium saturated with sanguinous drainage, tati wound macerated. Cleansed wound with normal saline and gauze. Pat dry with clean gauze. Patient s/p surgical debridement of unstageable pressure injury to right ischium with Dr Mukesh Schultz. Normal saline moistened gauze applied to wound, covered with 1/2 ABD pad, secured with tape. Chux pad changed. Offloaded with pillows. Bed in low, call light in reach. Defer treatment plan to provider. Wound ostomy to sign off at this time. Call as needed.      10/26/22    Wound type: DPTI to left ishcium, POA  Wound size: 1cm x 3.5cm x 0.1cm  Undermining or Tunneling: None  Wound assessment/color: Purple, red  Drainage amount: Minimal  Drainage description: Serosanguinous  Odor: None  Margins: Attached  Tati wound: Intact  Exposed structure: None      Wound type: Right ischial decubitus ulcer, debrided  Wound size: 3cm x 3.5cm x 0.1cm  Undermining or Tunneling: None  Wound assessment/color: Red  Drainage amount: Large  Drainage description: Serosanguinous, sanguinous  Odor: None  Margins: Attached  Tati wound: Macerated, intact  Exposed structure: None          Response to treatment:  Well tolerated by patient., With complaints of pain. Plan:     Treatment Recommendations:   Left ischium- Cleanse wound with normal saline and gauze. Pat dry with clean gauze. Apply Triad barrier cream to wounds twice daily and PRN. If cream becomes soiled, wipe off top layer and reapply. Right ischium- Defer to surgeon.     Specialty Bed Required :   [x] Low Air Loss   [x] Pressure Redistribution  [] Fluid Immersion- Dolphin  [] Bariatric  [] RotoProne   [] Other:     Discharge Plan:  Placement for patient upon discharge: SNF for rehab   Patient appropriate for Outpatient [address]: Follow up with Dr Vicki Le as scheduled

## 2022-10-26 NOTE — PROGRESS NOTES
General Surgery Progress Note      Admit Date: 10/21/2022    Date of Service: 10/26/2022    CC: s/p debridement of right ischial ulcer    Subjective     Patient has no complaint of pain. Remains tachycardic albeit improved and on home meds. Wound ok.       Current Facility-Administered Medications   Medication Dose Route Frequency Provider Last Rate Last Admin    midodrine (PROAMATINE) tablet 5 mg  5 mg Oral TID  Mike Valle MD   5 mg at 10/26/22 1344    calcium gluconate 1000 mg in sodium chloride 100 mL  1,000 mg IntraVENous Once Ogvincent Amador DO        calcium replacement protocol   Other RX Placeholder Veronica Amador DO        folic acid (FOLVITE) tablet 1 mg  1 mg Oral Daily Veronica Amador DO   1 mg at 10/26/22 1165    carBAMazepine (TEGRETOL) chewable tablet 100 mg  100 mg Oral BID Marisol Cotter, DO   100 mg at 10/26/22 8504    metoprolol tartrate (LOPRESSOR) tablet 12.5 mg  12.5 mg Oral Q12H Valerie Hunter DO   12.5 mg at 10/26/22 1124    acetaminophen (TYLENOL) tablet 650 mg  650 mg Oral Q4H PRN Itz Mayorga MD   650 mg at 10/24/22 1932    0.9 % sodium chloride bolus  1,000 mL IntraVENous Once Veronica Amador DO        0.9 % sodium chloride infusion   IntraVENous Continuous Cee Dom, DO   Stopped at 10/23/22 1233    0.9 % sodium chloride bolus  500 mL IntraVENous Once Cee Dom, DO        acetaminophen (TYLENOL) suppository 650 mg  650 mg Rectal Q4H PRN Cee Dom, DO   650 mg at 10/23/22 0436    nitrofurantoin (macrocrystal-monohydrate) (MACROBID) capsule 100 mg  100 mg Oral Daily Cee Dom, DO   100 mg at 10/26/22 4822    trospium (SANCTURA) tablet 20 mg  20 mg Oral BID AC Cee Dom, DO   20 mg at 10/26/22 0546    sodium chloride flush 0.9 % injection 5-40 mL  5-40 mL IntraVENous 2 times per day Cee Dom, DO   10 mL at 10/26/22 6030    sodium chloride flush 0.9 % injection 5-40 mL  5-40 mL IntraVENous PRN Cee Dom, DO        0.9 % sodium chloride infusion   IntraVENous PRN Verneda Lawrence, DO        ondansetron (ZOFRAN-ODT) disintegrating tablet 4 mg  4 mg Oral Q8H PRN Verneda Miranda, DO        Or    ondansetron [facility]) injection 4 mg  4 mg IntraVENous Q6H PRN Verneda Lawrence, DO        enoxaparin (LOVENOX) injection 40 mg  40 mg SubCUTAneous Daily Verneda Miranda, DO   40 mg at 10/26/22 1608    morphine (PF) injection 2 mg  2 mg IntraVENous Q2H PRN Verneda Miranda, DO        Or    morphine injection 4 mg  4 mg IntraVENous Q2H PRN Verneda Lawrence, DO        oxyCODONE (ROXICODONE) immediate release tablet 5 mg  5 mg Oral Q6H PRN Verneda Lawrence, DO        diphenhydrAMINE (BENADRYL) injection 12.5 mg  12.5 mg IntraVENous Q6H PRN Verneda Lawrence, DO           Objective     Patient Vitals for the past 8 hrs:   BP Temp Temp src Pulse Resp SpO2   10/26/22 1521 101/63 98.8 °F (37.1 °C) Oral (!) 116 22 95 %   10/26/22 1124 102/65 -- -- (!) 117 -- --   10/26/22 1123 102/65 -- -- -- -- --   10/26/22 1117 101/61 98.8 °F (37.1 °C) Oral (!) 117 18 94 %       Intake/Output Summary (Last 24 hours) at 10/26/2022 1618  Last data filed at 10/26/2022 1200  Gross per 24 hour   Intake 100 ml   Output 1275 ml   Net -1175 ml       /63   Pulse (!) 116   Temp 98.8 °F (37.1 °C) (Oral)   Resp 22   Ht 5' 3\" (1.6 m)   Wt 115 lb 8 oz (52.4 kg)   SpO2 95%   BMI 20.46 kg/m²   Physical Exam    Physical Exam  Constitutional:       Appearance: Normal appearance. She is normal weight. HENT:      Head: Normocephalic and atraumatic. Nose: Nose normal.      Mouth/Throat:      Mouth: Mucous membranes are moist.      Pharynx: Oropharynx is clear. Eyes:      Extraocular Movements: Extraocular movements intact. Conjunctiva/sclera: Conjunctivae normal.      Pupils: Pupils are equal, round, and reactive to light. Cardiovascular:      Rate and Rhythm: Normal rate and regular rhythm. Pulses: Normal pulses. Heart sounds: Normal heart sounds.    Pulmonary:      Effort: Pulmonary effort is normal. No respiratory distress. Breath sounds: Normal breath sounds. No wheezing. Abdominal:      General: Abdomen is flat. Bowel sounds are normal. There is no distension. Palpations: Abdomen is soft. Tenderness: There is no abdominal tenderness. Musculoskeletal:         General: No swelling or tenderness. Cervical back: Normal range of motion and neck supple. Right lower leg: No edema. Skin:     General: Skin is warm and dry. Capillary Refill: Capillary refill takes less than 2 seconds. Findings: Lesion present. Neurological:      General: No focal deficit present. Mental Status: She is alert. Mental status is at baseline. Data Review: CBC:   Lab Results   Component Value Date/Time    WBC 7.1 10/26/2022 03:49 AM    RBC 3.33 10/26/2022 03:49 AM     BMP:   Lab Results   Component Value Date/Time    GLUCOSE 73 10/26/2022 03:49 AM    CO2 22 10/26/2022 03:49 AM    BUN 8 10/26/2022 03:49 AM    CREATININE 0.3 10/26/2022 03:49 AM    CALCIUM 8.0 10/26/2022 03:49 AM     Coagulation:   Lab Results   Component Value Date/Time    INR 1.28 11/03/2020 12:29 PM    APTT 31.9 10/02/2015 01:48 AM     Cardiac markers:   Lab Results   Component Value Date/Time    TROPONINT < 0.010 10/23/2022 09:49 AM       Assessment: s/p debridement of right ischial ulcer    Principal Problem:    Decubitus ulcer of buttock, unstageable (HCC)  Active Problems:    Wound of right buttock    Decubitus ulcer of ischial area, right, unspecified pressure ulcer stage    Severe malnutrition (HCC)  Resolved Problems:    * No resolved hospital problems.  *      Plan:Change dressing daily   Continue B blockers   D/c planning     See orders    Joshua Isbell, DO

## 2022-10-26 NOTE — PROGRESS NOTES
Physician Progress Note      PATIENT:               Funmi Whitaker  CSN #:                  [id number]  :                       1950  ADMIT DATE:       10/21/2022 12:37 PM  [address] [place] DATE:  Shelbi Mena  PROVIDER #:        Flo Ca DO          QUERY TEXT:    Dr Juanis Huston, Dr Lombardo,    Pt admitted with Right ischial decubitus ulcer. Per 10/24 & 10/25 notes Pt   noted to have Sepsis With Septic Shock- POA. Also on 10/24 progress note   states: Does not appear to be septic as patient has no fever or leukocytosis. Urinary culture likely represents colonization from chronic vega catheter. <100k CFUs on culture. Ok to DC antibiotics and monitor fever curve today. If   possible, please document in progress notes and discharge summary the present   on admission status of Sepsis:    The medical record reflects the following:  Risk Factors: Right ischial decubitus ulcer-BP 91'R systolic, pulse up to 503,   WBC 4.3, Lactic Acid  2.2  Clinical Indicators: Per 10/24 & 10/25 notes Pt noted to have Sepsis With   Septic Shock- POA. - 10/24 progress note: Does not appear to be septic as   patient has no fever or leukocytosis. Urinary culture likely represents   colonization from chronic vega catheter. <100k CFUs on culture. Ok to DC   antibiotics and monitor fever curve today. Treatment: 0.9 NS 4500 ml bolus, IV Ancef, Zosyn & Vanc. I/D. Options provided:  -- Yes, septic shock with sepsis was present at the time of the order to admit   to the hospital  -- Yes, septic shock with sepsis was present at the time of the order to admit   to the hospital and has since resolved.   -- No, septic shock with sepsis was not present on admission and developed   during the inpatient stay  -- Other - I will add my own diagnosis  -- Disagree - Not applicable / Not valid  -- Disagree - Clinically unable to determine / Unknown  -- Refer to Clinical Documentation Reviewer    PROVIDER RESPONSE TEXT:    Yes, septic shock with sepsis was present at the time of the order to admit to   the hospital and has since resolved.     Query created by: Pasha Mcdonnell on 10/26/2022 8:20 AM      Electronically signed by:  Justino Phelps DO 10/26/2022 2:55 PM

## 2022-10-27 VITALS
RESPIRATION RATE: 25 BRPM | TEMPERATURE: 97.9 F | SYSTOLIC BLOOD PRESSURE: 115 MMHG | DIASTOLIC BLOOD PRESSURE: 65 MMHG | HEIGHT: 63 IN | HEART RATE: 103 BPM | BODY MASS INDEX: 20.46 KG/M2 | OXYGEN SATURATION: 97 % | WEIGHT: 115.5 LBS

## 2022-10-27 LAB
ANION GAP SERPL CALCULATED.3IONS-SCNC: 9 MEQ/L (ref 8–16)
BUN BLDV-MCNC: 11 MG/DL (ref 7–22)
CALCIUM IONIZED: 1.17 MMOL/L (ref 1.12–1.32)
CALCIUM SERPL-MCNC: 8.5 MG/DL (ref 8.5–10.5)
CHLORIDE BLD-SCNC: 108 MEQ/L (ref 98–111)
CO2: 23 MEQ/L (ref 23–33)
CREAT SERPL-MCNC: 0.3 MG/DL (ref 0.4–1.2)
ERYTHROCYTE [DISTWIDTH] IN BLOOD BY AUTOMATED COUNT: 13.4 % (ref 11.5–14.5)
ERYTHROCYTE [DISTWIDTH] IN BLOOD BY AUTOMATED COUNT: 48.4 FL (ref 35–45)
GFR SERPL CREATININE-BSD FRML MDRD: > 60 ML/MIN/1.73M2
GLUCOSE BLD-MCNC: 94 MG/DL (ref 70–108)
HCT VFR BLD CALC: 32.7 % (ref 37–47)
HEMOGLOBIN: 10.6 GM/DL (ref 12–16)
MCH RBC QN AUTO: 32 PG (ref 26–33)
MCHC RBC AUTO-ENTMCNC: 32.4 GM/DL (ref 32.2–35.5)
MCV RBC AUTO: 98.8 FL (ref 81–99)
ORGANISM: ABNORMAL
ORGANISM: ABNORMAL
PLATELET # BLD: 160 THOU/MM3 (ref 130–400)
PMV BLD AUTO: 10.4 FL (ref 9.4–12.4)
POTASSIUM SERPL-SCNC: 3.9 MEQ/L (ref 3.5–5.2)
RBC # BLD: 3.31 MILL/MM3 (ref 4.2–5.4)
SODIUM BLD-SCNC: 140 MEQ/L (ref 135–145)
URINE CULTURE, ROUTINE: ABNORMAL
URINE CULTURE, ROUTINE: ABNORMAL
WBC # BLD: 6 THOU/MM3 (ref 4.8–10.8)

## 2022-10-27 PROCEDURE — 6370000000 HC RX 637 (ALT 250 FOR IP)

## 2022-10-27 PROCEDURE — 6370000000 HC RX 637 (ALT 250 FOR IP): Performed by: STUDENT IN AN ORGANIZED HEALTH CARE EDUCATION/TRAINING PROGRAM

## 2022-10-27 PROCEDURE — 2580000003 HC RX 258: Performed by: SURGERY

## 2022-10-27 PROCEDURE — 82330 ASSAY OF CALCIUM: CPT

## 2022-10-27 PROCEDURE — 99232 SBSQ HOSP IP/OBS MODERATE 35: CPT | Performed by: INTERNAL MEDICINE

## 2022-10-27 PROCEDURE — 6370000000 HC RX 637 (ALT 250 FOR IP): Performed by: SURGERY

## 2022-10-27 PROCEDURE — 36415 COLL VENOUS BLD VENIPUNCTURE: CPT

## 2022-10-27 PROCEDURE — 85027 COMPLETE CBC AUTOMATED: CPT

## 2022-10-27 PROCEDURE — 6370000000 HC RX 637 (ALT 250 FOR IP): Performed by: INTERNAL MEDICINE

## 2022-10-27 PROCEDURE — 80048 BASIC METABOLIC PNL TOTAL CA: CPT

## 2022-10-27 PROCEDURE — 6360000002 HC RX W HCPCS: Performed by: SURGERY

## 2022-10-27 PROCEDURE — 2580000003 HC RX 258

## 2022-10-27 RX ORDER — 0.9 % SODIUM CHLORIDE 0.9 %
500 INTRAVENOUS SOLUTION INTRAVENOUS ONCE
Status: DISCONTINUED | OUTPATIENT
Start: 2022-10-27 | End: 2022-10-27

## 2022-10-27 RX ORDER — NYSTATIN 100000 [USP'U]/G
POWDER TOPICAL 3 TIMES DAILY
Qty: 15 G | Refills: 1 | Status: SHIPPED | OUTPATIENT
Start: 2022-10-27

## 2022-10-27 RX ORDER — 0.9 % SODIUM CHLORIDE 0.9 %
1000 INTRAVENOUS SOLUTION INTRAVENOUS ONCE
Status: COMPLETED | OUTPATIENT
Start: 2022-10-27 | End: 2022-10-27

## 2022-10-27 RX ORDER — MIDODRINE HYDROCHLORIDE 10 MG/1
10 TABLET ORAL 3 TIMES DAILY
Qty: 90 TABLET | Refills: 1 | Status: SHIPPED | OUTPATIENT
Start: 2022-10-27

## 2022-10-27 RX ORDER — MIDODRINE HYDROCHLORIDE 10 MG/1
10 TABLET ORAL
Status: DISCONTINUED | OUTPATIENT
Start: 2022-10-27 | End: 2022-10-27 | Stop reason: HOSPADM

## 2022-10-27 RX ADMIN — FOLIC ACID 1 MG: 1 TABLET ORAL at 08:52

## 2022-10-27 RX ADMIN — TROSPIUM CHLORIDE 20 MG: 20 TABLET, FILM COATED ORAL at 16:09

## 2022-10-27 RX ADMIN — MIDODRINE HYDROCHLORIDE 5 MG: 5 TABLET ORAL at 08:52

## 2022-10-27 RX ADMIN — NITROFURANTOIN MONOHYDRATE/MACROCRYSTALLINE 100 MG: 25; 75 CAPSULE ORAL at 08:52

## 2022-10-27 RX ADMIN — SODIUM CHLORIDE, PRESERVATIVE FREE 10 ML: 5 INJECTION INTRAVENOUS at 08:52

## 2022-10-27 RX ADMIN — CARBAMAZEPINE 100 MG: 100 TABLET, CHEWABLE ORAL at 08:52

## 2022-10-27 RX ADMIN — ENOXAPARIN SODIUM 40 MG: 100 INJECTION SUBCUTANEOUS at 08:52

## 2022-10-27 RX ADMIN — MIDODRINE HYDROCHLORIDE 10 MG: 5 TABLET ORAL at 16:09

## 2022-10-27 RX ADMIN — TROSPIUM CHLORIDE 20 MG: 20 TABLET, FILM COATED ORAL at 06:24

## 2022-10-27 RX ADMIN — MIDODRINE HYDROCHLORIDE 10 MG: 5 TABLET ORAL at 12:12

## 2022-10-27 RX ADMIN — SODIUM CHLORIDE 1000 ML: 9 INJECTION, SOLUTION INTRAVENOUS at 03:36

## 2022-10-27 RX ADMIN — SODIUM CHLORIDE: 9 INJECTION, SOLUTION INTRAVENOUS at 07:33

## 2022-10-27 NOTE — CARE COORDINATION
Collaborative Discharge Planning    Ewelina oRb  :  1950  MRN:  [id number]    ADMIT DATE:  10/21/2022      Discharge Planning Discharge Planning  Type of Residence: [facility]  Living Arrangements: Spouse/Significant Other  Support Systems: Spouse/Significant Other  Current Services Prior To Admission: Home Care  Potential Assistance Needed: Ba Evan  DME Ordered?: No  Type of Home Care Services: [address], Nursing Services  Patient expects to be discharged to[de-identified] Skilled nursing facility  Lucy Santos Board Notes /Social Work Whiteboard Notes  /Social Work Whiteboard: 10/25; [facility] [employer]. No precert.  Current with [facility] (RN and aides thru [address])    Discharge Plan SNF    Discharge Milestones and Delays: Clinical status  Hypotension today treated w IVF        SIGNED:  Shantanu Coates RN   10/27/2022, 1:23 PM

## 2022-10-27 NOTE — PROGRESS NOTES
Pt being d/c to SNF. HERNÁN completed and AVS printed out and placed in packet for transport and copy faxed to facility. PIV removed per policy. Report called to Jerald [phone] and callback number provided for any additional questions. Family notified. Transport picked patient up and wheeled patient off unit.

## 2022-10-27 NOTE — PLAN OF CARE
Problem: Discharge Planning  Goal: Discharge to home or other facility with appropriate resources  Outcome: Progressing  Flowsheets (Taken 10/27/2022 0043)  Discharge to home or other facility with appropriate resources:   Identify barriers to discharge with patient and caregiver   Arrange for needed discharge resources and transportation as appropriate   Identify discharge learning needs (meds, wound care, etc)   Refer to discharge planning if patient needs post-hospital services based on physician order or complex needs related to functional status, cognitive ability or social support system     Problem: Safety - Adult  Goal: Free from fall injury  Outcome: Progressing  Flowsheets (Taken 10/27/2022 0043)  Free From Fall Injury: Instruct family/caregiver on patient safety     Problem: Skin/Tissue Integrity  Goal: Absence of new skin breakdown  Description: 1. Monitor for areas of redness and/or skin breakdown  2. Assess vascular access sites hourly  3. Every 4-6 hours minimum:  Change oxygen saturation probe site  4. Every 4-6 hours:  If on nasal continuous positive airway pressure, respiratory therapy assess nares and determine need for appliance change or resting period. Outcome: Progressing  Note: No new skin lesions noted this shift. Patient encouraged to reposition every two hours. Skin assessments completed and ongoing.         Problem: Cardiovascular - Adult  Goal: Maintains optimal cardiac output and hemodynamic stability  Outcome: Progressing  Flowsheets (Taken 10/27/2022 0043)  Maintains optimal cardiac output and hemodynamic stability:   Monitor blood pressure and heart rate   Monitor urine output and notify Licensed Independent Practitioner for values outside of normal range   Assess for signs of decreased cardiac output     Problem: Skin/Tissue Integrity - Adult  Goal: Incisions, wounds, or drain sites healing without S/S of infection  Outcome: Progressing  Flowsheets (Taken 10/27/2022 4891)  Incisions, Wounds, or Drain Sites Healing Without Sign and Symptoms of Infection:   ADMISSION and DAILY: Assess and document risk factors for pressure ulcer development   TWICE DAILY: Assess and document skin integrity   TWICE DAILY: Assess and document dressing/incision, wound bed, drain sites and surrounding tissue   Implement wound care per orders   Initiate isolation precautions as appropriate     Problem: Genitourinary - Adult  Goal: Urinary catheter remains patent  Outcome: Progressing  Flowsheets (Taken 10/27/2022 0043)  Urinary catheter remains patent: Assess patency of urinary catheter     Problem: Infection - Adult  Goal: Absence of infection at discharge  Outcome: Progressing  Flowsheets (Taken 10/27/2022 0043)  Absence of infection at discharge:   Assess and monitor for signs and symptoms of infection   Monitor lab/diagnostic results   Monitor all insertion sites i.e., indwelling lines, tubes and drains     Problem: Infection - Adult  Goal: Absence of infection during hospitalization  Outcome: Progressing  Flowsheets (Taken 10/27/2022 0043)  Absence of infection during hospitalization:   Assess and monitor for signs and symptoms of infection   Monitor lab/diagnostic results   Monitor all insertion sites i.e., indwelling lines, tubes and drains   Monitor endotracheal (as able) and nasal secretions for changes in amount and color     Problem: ABCDS Injury Assessment  Goal: Absence of physical injury  Outcome: Progressing  Flowsheets (Taken 10/27/2022 0043)  Absence of Physical Injury: Implement safety measures based on patient assessment     Problem: Chronic Conditions and Co-morbidities  Goal: Patient's chronic conditions and co-morbidity symptoms are monitored and maintained or improved  Outcome: Progressing  Flowsheets (Taken 10/27/2022 0043)  Care Plan - Patient's Chronic Conditions and Co-Morbidity Symptoms are Monitored and Maintained or Improved:   Monitor and assess patient's chronic conditions and comorbid symptoms for stability, deterioration, or improvement   Collaborate with multidisciplinary team to address chronic and comorbid conditions and prevent exacerbation or deterioration   Update acute care plan with appropriate goals if chronic or comorbid symptoms are exacerbated and prevent overall improvement and discharge     Problem: Pain  Goal: Verbalizes/displays adequate comfort level or baseline comfort level  Outcome: Progressing  Flowsheets (Taken 10/27/2022 0043)  Verbalizes/displays adequate comfort level or baseline comfort level:   Encourage patient to monitor pain and request assistance   Assess pain using appropriate pain scale   Implement non-pharmacological measures as appropriate and evaluate response   Administer analgesics based on type and severity of pain and evaluate response   Notify Licensed Independent Practitioner if interventions unsuccessful or patient reports new pain     Problem: Nutrition Deficit:  Goal: Optimize nutritional status  10/27/2022 0043 by Carlitos Jordan RN  Outcome: Progressing

## 2022-10-27 NOTE — PROGRESS NOTES
Internal Medicine Resident Progress Note    Patient:  Kirby Spicer    YOB: 1950  Unit/Bed:4K-24/024-A  MRN: [id number]    Acct: [de-identified]   PCP: Cain Ham MD    Date of Admission: 10/21/2022      Assessment/Plan:     Hypotension:   Previous history of hypotension after anesthesia. Etiology chronic hypotension Vs septic versus adrenal insufficiency Vs neurogenic from multiple sclerosis, Vs cardiogenic Vs Anesthesia induced. Hx of low blood pressures after surgeries. ,  Patient is status post open surgical debridement of right ischial decubitus ulcer stage 4. Persistent hypotension with systolics in the 95R and 27H despite 2 L of normal saline IV fluid bolus by primary team.   10/23/2022 give 1 L bolus of normal saline now over 2 hours. Then give another 1 L over another 2 hours. She is fluid responsive.  -Total urine cortisol was 3.68 10/24/2022. Cosyntropin stimulation test at 12.412 0, 22.4 to 30 minutes, and 26.25 at 60 minutes. Total cortisol level was 26.15. Hypotension unlikely to be due to adrenal insufficiency. ACTH levels 22.4 and aldosterone levels of 5.1. renin levels still pending.   -Echo on 10/24 demonstrated no regional left ventriclar wall motion abnormalities. - Blood pressure is currently stable. - had another  2 L IV fluid normal saline bolus fluid challenge. - orthostatic blood pressure was 88/66 lying and 99/66 sitting.   - Start Metoprolol 12.5 mg twice daily with parameters.   -Continue maintenance 0.9% IV fluids 100 cc/h. -Midodrine increased to 10mg TID.  - Continue to measure vitals 4 hourly every shift. Septic shock:   She was tachycardic up to 120s, tachypneic with mild leukopenia at presentation. Urinalysis was positive for leukocyte esterase. Lactic acid was 2.2.,  And procalcitonin was 0.06. Received empiric cefazolin preoperatively and is on oral nitrofurantoin at home. Etiology possibly UTI.   Urinalysis done demonstrated large number of leukocyte esterase. However urine culture demonstrated  Stenotrophomonas maltophilia and candida tropicalis with insignificant CFU. Zosyn was discontinued. Because of very low CFU of these organisms. A clean catch urine on 10/23/2022, demonstrated gram-negative bacilli and yeast. Gram-negative bacilli 50,000-90,000 CFU per mL. Possibly colonization of the supra pubic catheter as her vital signs are stable and there is no leukocytosis.  -Discontinue Zosyn, and vancomycin as bacteria is likely contaminant.   -Continue home nitrofurantoin. Normocytic anemia:   hemoglobin at presentation 11.9. Baseline 12.5. Could be as a result of blood loss during surgery. Hemoglobin now fairly stable. Lab investigations for B12- 585, folate-4.1, ferritin 110, iron levels 63, TIBC 159,   Continue to monitor H&H daily.  -Start folic acid supplements daily.   -Transfuse if hemodynamically unstable, or if hemoglobin drops below 7 g per DL.  -Follow-up with PCP outpatient. Hypocalcemia: Resolved  serum calcium 8.4. Serum calcium at presentation was 8.8.  - Labs for ionized calcium is 1.09. And denies calcium on 10/27/2022 is 1.17.  - calcium replacement protocol in place. Multiple sclerosis;   Continue home medications for multiple sclerosis. Expected discharge date:  TBA     Disposition:   [x] Home  [] TCU  [] Rehab  [] Psych  [] SNF  [] [facility]  [] Other-    ===================================================================      Chief Complaint: Hypotension     Hospital Course:      Karen Whalen is a 66 y.o. female who we are asked to see/evaluate by Terrance Moreno DO for medical management of refractory hypotension despite 2 L of IV fluid boluses in patient who is postop day #0 status post open surgical debridement of right ischial decubitus ulcer.   Patient apparently has received a total of 2 L of normal saline fluid boluses throughout the day with her systolic blood pressures remaining in the 70s to [de-identified]. Patient reports normally runs low blood pressures and has had dangerously low blood pressures after surgical procedures in the past.  She remains oriented and responsive and interactive despite her low blood pressure. No fevers or chills and no signs of active bleeding. Her lactic acid has also returned back at 2.2 although normal procalcitonin. Hospitalist team was consulted to assist with further management. 10/26 Her suprapubic catheter was changed by the nurse. She says it is no longer leaking. She was given 2L of IVF on 10/25. Echo done on the 10/24 demonstrated an EF of 60% with no regional left ventricular wall motion abnormalities. Blood pressure improved with the IV fluid bolus challenge test on 10/25. She resumed her home dose  of B-blockers thereafter. 10/27 She is still hypotensive. Her maintenance IVF was turned off yesterday and not restarted. Maintenance Ivf 0.9% n/s 100cc/hr was restarted at 7.33 am as per nursing chart. Subjective (past 24 hours): She pt at bed side. She is doing okay. She complains about blister om the inner part of her right groin. She denies changes in vision. , headache, dizziness, light headedness, nausea, vomiting, fever, chills, abdominal pain. , palpitations, chest pain, chest tightness, constipation and diarrhea. ROS: reviewed complete ROS unchanged unless otherwise stated in hospital course/subjective portion.        Medications:  Reviewed    Infusion Medications    sodium chloride 100 mL/hr at 10/27/22 4976    sodium chloride       Scheduled Medications    midodrine  10 mg Oral TID WC    metoprolol tartrate  25 mg Oral Q12H    calcium replacement protocol   Other RX Placeholder    folic acid  1 mg Oral Daily    carBAMazepine  100 mg Oral BID    sodium chloride  1,000 mL IntraVENous Once    sodium chloride  500 mL IntraVENous Once    nitrofurantoin (macrocrystal-monohydrate)  100 mg Oral Daily    trospium  20 mg Oral BID AC    sodium chloride flush  5-40 mL IntraVENous 2 times per day    enoxaparin  40 mg SubCUTAneous Daily     PRN Meds: acetaminophen, acetaminophen, sodium chloride flush, sodium chloride, ondansetron **OR** ondansetron, morphine **OR** morphine, oxyCODONE, diphenhydrAMINE        Intake/Output Summary (Last 24 hours) at 10/27/2022 1613  Last data filed at 10/27/2022 1105  Gross per 24 hour   Intake 240 ml   Output --   Net 240 ml       Exam:  /79   Pulse (!) 103   Temp 98 °F (36.7 °C) (Oral)   Resp 18   Ht 5' 3\" (1.6 m)   Wt 115 lb 8 oz (52.4 kg)   SpO2 96%   BMI 20.46 kg/m²        General: eating breakfast, in no distress, appears stated age. Eyes:  PERRL and accomodation. Conjunctivae/corneas clear. HENT: Head normal appearing. Nares normal. Oral mucosa moist.  Hearing intact. Neck: Supple, with full range of motion. Trachea midline. No gross JVD appreciated. Respiratory:  Normal effort. Clear to auscultation, without rales or wheezes or rhonchi. Cardiovascular: tachycardic, regular rhythm with normal S1/S2 without murmurs. No lower extremity edema. Abdomen: Soft, non-tender, non-distended, leaking suprapubic catheter in place in supra pubic region, with normal bowel sounds. Musculoskeletal: Right ischial tuberosity decubitus ulcer, stage 4 clean cleanly packed and dressed with no joint swelling or tenderness. Normal tone. No abnormal movements. Skin: Bullae on her right groin, with urine from the leaking suprapubic on her thighs, No rashes. Neurologic: focal motor deficits in the  lower extremities. muscle strength is 3/5 b/L in LE. Psychiatric: Alert and oriented, normal insight and thought content. Capillary Refill: Brisk,< 3 seconds. Peripheral Pulses: +2 palpable, equal bilaterally.         Labs:   Recent Labs     10/25/22  0448 10/26/22  0349 10/27/22  0142   WBC 4.0* 7.1 6.0   HGB 10.9* 10.8* 10.6*   HCT 34.0* 33.2* 32.7*    156 160     Recent Labs     10/25/22  0448 10/26/22  0349 10/27/22  0142    139 140   K 4.1 4.2 3.9    108 108   CO2 23 22* 23   BUN 6* 8 11   CREATININE 0.3* 0.3* 0.3*   CALCIUM 8.4* 8.0* 8.5     No results for input(s): AST, ALT, BILIDIR, BILITOT, ALKPHOS in the last 72 hours. No results for input(s): INR in the last 72 hours. No results for input(s): TROPONINT in the last 72 hours. No results for input(s): PROCAL in the last 72 hours. Lab Results   Component Value Date/Time    NITRU NEGATIVE 10/21/2022 02:25 PM    WBCUA > 100 10/21/2022 02:25 PM    BACTERIA FEW 10/21/2022 02:25 PM    RBCUA 3-5 10/21/2022 02:25 PM    BLOODU SMALL 10/21/2022 02:25 PM    Magali Ivans  07/14/2022 12:00 AM      Comment:      >=1.030    GLUCOSEU NEGATIVE 10/21/2022 02:25 PM       Radiology (48 hours):  No results found. DVT prophylaxis:    [x] Lovenox  [] SCDs  [] SQ Heparin  [] Encourage ambulation   [] Already on Anticoagulation       Diet: ADULT ORAL NUTRITION SUPPLEMENT; Lunch, Dinner; Frozen Oral Supplement  ADULT ORAL NUTRITION SUPPLEMENT; Breakfast, Dinner; Wound Healing Oral Supplement  ADULT DIET; Regular; pt prefers soft moist  foods with ground meats & sauce on meats pt request  Code Status: Full Code  PT/OT: TBA  Tele: On continuous telemetry monitoring  IVF: Maintenance fluid  0.9% sodium chloride infusion at 100 cc/h. Electronically signed by Hernando Schmid DO on 10/27/2022 at 4:13 PM. Thank you for letting us care for this patient.      Case was discussed with Attending, Dr. Real Black

## 2022-10-27 NOTE — DISCHARGE SUMMARY
Alyssa Skinner. [facility] 48 SUMMARY       Pt Name: Kishor Fraire  MRN: [id number]  YOB: 1950  Primary Care Physician: Gigi Huerta MD    Admit date:  10/21/2022 12:37 PM      Discharge date:  No discharge date for patient encounter. Admitting Diagnosis:   1. Wound of right buttock, initial encounter    2. Pressure injury of buttock, unstageable, unspecified laterality ([facility]        Discharge Diagnosis:   1. Wound of right buttock, initial encounter    2. Pressure injury of buttock, unstageable, unspecified laterality [facility])        Admitting Service: General Surgery, Ricky Ramey DO      Consultants:  none    History and Physical:    Procedures/Diagnostic Tests:  Debridement of decubitus ulcer    Hospital Course:  Patient is a 66 y.o.  female who presents with bilateral ischial wounds. Patient was evaluated via virtual visit per Kaya Keita earlier today. Home health nurses had been concerned with the recent changes in the wound status and with the patient being a phillip and having difficulty getting transportation to appointments we opted for a virtual visit with home health. The wound had changed significantly in 2 days from a granulated wound with a small amount of depth to a necrotic wound that was protruding with what the nurse described as feeling fluctuant. Taken to the OR for debridement of right sided ischial ulcer. Patient has recovered uneventfully from debridement. In the recovery period, developed a small yeast infection near the genitalia on the right. Minimal drainage and no odor. Will arrange transfer to long-term care facility. Continue to use Desitin (or other drying powders) for therapy.        Discharge Condition: stable    Disposition: long term care facility    Labs:    Discharge Instructions:  Discharge Medications:        Medication List        ASK your doctor about these medications      acetic acid 0.25 % irrigation  Irrigate as directed     aspirin 81 MG EC tablet  Commonly known as: RA Aspirin EC  take 1 tablet by mouth once daily     carBAMazepine 100 MG chewable tablet  Commonly known as: TEGRETOL     metoprolol tartrate 25 MG tablet  Commonly known as: LOPRESSOR  take 1/2 tablet by mouth twice a day     nitrofurantoin (macrocrystal-monohydrate) 100 MG capsule  Commonly known as: MACROBID     trospium 20 MG tablet  Commonly known as: SANCTURA  Take 1 tablet by mouth 2 times daily     VITAMIN D3 PO              Diet: General/Regular diet as tolerated    Activity: activity as tolerated and no lifting more than 10-20 lbs for next two weeks    Wound Care: Daily wound care     Follow-up:  in 2 weeks with Aaron Mcintosh MD  Follow up with Tamara Lee DO in 2 weeks.     Tamara Lee, [facility], [facility]  Electronincally signed 10/27/2022 at 4:13 PM    A total of 37 minutes was spent in preparing the patient for discharge with greater than 50% of the time involved with education, counseling and coordinating care

## 2022-10-27 NOTE — DISCHARGE INSTRUCTIONS
F/U with Antoniette Numbers in 7-10 days  Rx: None, None  Keep area near incision(s) clean and dry  May remove bulky dressing (Tegaderm, 2x2 gauze) in 24 hours  Leave steri-strips in place for 5-7 days or until office visit  No lifting greater than 10 lbs x 1 week  May shower at home after 24 hours  No dietary restrictions  No driving until no longer taking narcotics for pain and/or no longer guarding abdomen  Please use Milk of Magnesia liberally while taking pain meds

## 2022-10-27 NOTE — PROGRESS NOTES
2030 report received from [facility] ([facility]) she stated that the fluids aren't running.    0300 This RN reviewing patients documentation found order for normal saline fluid to be running at 100ml/hr and reached out to Dr Cornelia Veliz to clarify infusion order. Dr Cornelia Veliz responded to leave them off since they were not currently running. 0730 Reporting off to [facility]. Sarah unit manager came in to deliver message from Dr Claudia Cazares that the fluids should be running and to please get them started.      6294 Normal saline infusion started

## 2022-10-28 LAB
BLOOD CULTURE, ROUTINE: NORMAL
BLOOD CULTURE, ROUTINE: NORMAL
CORTISOL (UR), FREE: NORMAL
RENIN ACTIVITY: 0.3 NG/ML/HR

## 2022-10-28 NOTE — CARE COORDINATION
10/28/22, 6:59 AM EDT    Patient goals/plan/ treatment preferences discussed by  and . Patient goals/plan/ treatment preferences reviewed with patient/ family. Patient/ family verbalize understanding of discharge plan and are in agreement with goal/plan/treatment preferences. Understanding was demonstrated using the teach back method. AVS provided by RN at time of discharge, which includes all necessary medical information pertaining to the patients current course of illness, treatment, post-discharge goals of care, and treatment preferences. Services At/After Discharge: [facility]) [facility] [place]    Patient discharged to [facility] [employer].           IMM Letter  IMM Letter given to Patient/Family/Significant other/Guardian/POA/by[de-identified] Radha Acosta RN   IMM Letter date given[de-identified] 10/27/22  IMM Letter time given[de-identified] 1045  Observation Status Letter date given[de-identified] 10/21/22  Observation Status Letter time given[de-identified] 5286

## 2022-11-02 ENCOUNTER — OFFICE VISIT (OUTPATIENT)
Dept: SURGERY | Age: 72
End: 2022-11-02

## 2022-11-02 DIAGNOSIS — Z98.890 S/P EXCISIONAL DEBRIDEMENT: Primary | ICD-10-CM

## 2022-11-02 PROCEDURE — 99024 POSTOP FOLLOW-UP VISIT: CPT | Performed by: SURGERY

## 2022-11-02 NOTE — PROGRESS NOTES
Date of Service:  11/2/2022    Subjective:     Patient ID: Kirby Spicer is a 66 y.o.  female. Chief Complaint: Follow up appointment from procedure for right ischial decubitus ulcer debridement. No residual eschar; no active bleeding    Wound looks good. Past Medical History:   Diagnosis Date    Difficulty in swallowing     Intra-abdominal lymphadenopathy     MS (multiple sclerosis) (HCC)     Neuromuscular disorder (HCC)     Pancreatitis     Seizures ([address]      Family History   Problem Relation Age of Onset    Cancer Mother         colon    Heart Disease Father     Diabetes Neg Hx     High Blood Pressure Neg Hx     Stroke Neg Hx      Past Surgical History:   Procedure Laterality Date    CATHETER INSERTION N/A 1/17/2022    CYSTO, SUPRAPUBIC CATHETER PLACEMENT WITH ULTRASOUND performed by Peace Lopez MD at [address] [place] [address]  April 1st 2016    laparoscopic    CYSTOSCOPY N/A 1/2/2019    CYSTO, CLOT EVACUATION, TURBT performed by Subha Mujica MD at [facility] [address] Left 11/6/2020    LEFT HEEL WOUND I&D performed by Vy Lugo DPM at [address] [place] [address] Right 12/17/2018    EYE CATARACT EMULSIFICATION IOL IMPLANT, RIGHT performed by Bishop Smith MD at [address] [place] [address] INSJ IO LENS PROSTH W/O ECP Left 11/15/2018    EYE CATARACT EMULSIFICATION IOL IMPLANT LEFT EYE performed by Bishop Smith MD at [address] [place] [address] Right 10/22/2022    DECUBITUS ULCER DEBRIDEMENT SUPINE WITH CANDY CANES performed by Marv Everett DO at [facility]     Current Outpatient Medications   Medication Sig Dispense Refill    midodrine (PROAMATINE) 10 MG tablet Take 1 tablet by mouth 3 times daily 90 tablet 1    nystatin (MYCOSTATIN) 918246 UNIT/GM powder Apply topically 3 times daily Apply 3 times daily.  15 g 1    trospium (SANCTURA) 20 MG tablet Take 1 tablet by mouth 2 times daily 60 tablet 3 nitrofurantoin, macrocrystal-monohydrate, (MACROBID) 100 MG capsule Take 100 mg by mouth in the morning. acetic acid 0.25 % irrigation Irrigate as directed (Patient taking differently: Irrigate suprapubic catheter as needed) 1000 mL 5    aspirin (RA ASPIRIN EC) 81 MG EC tablet take 1 tablet by mouth once daily 30 tablet 0    metoprolol tartrate (LOPRESSOR) 25 MG tablet take 1/2 tablet by mouth twice a day 30 tablet 11    Cholecalciferol (VITAMIN D3 PO) Take 25 mcg by mouth daily      carBAMazepine (TEGRETOL) 100 MG chewable tablet Take 100 mg by mouth 2 times daily       No current facility-administered medications for this visit. No Known Allergies  Social History     Socioeconomic History    Marital status:      Spouse name: Diane Flores    Number of children: 2    Years of education: 12    Highest education level: Not on file   Occupational History    Not on file   Tobacco Use    Smoking status: Former     Packs/day: 2.00     Years: 40.00     Pack years: 80.00     Types: Cigarettes     Quit date: 10/2/2013     Years since quittin.0     Passive exposure: Never    Smokeless tobacco: Former     Quit date: 11/3/2015   Vaping Use    Vaping Use: Former    Substances: Unknown   Substance and Sexual Activity    Alcohol use: No    Drug use: No    Sexual activity: Not Currently   Other Topics Concern    Not on file   Social History Narrative    Not on file     Social Determinants of Health     Financial Resource Strain: Not on file   Food Insecurity: Not on file   Transportation Needs: Not on file   Physical Activity: Not on file   Stress: Not on file   Social Connections: Not on file   Intimate Partner Violence: Not on file   Housing Stability: Not on file       Review of Systems  Refer to HPI. Objective:  Resp. rate (P) 18, height (P) 5' 3\" (1.6 m), not currently breastfeeding. General: No Apparent Distress. Head: Normocephalic, atraumatic.   Eyes: anicteric sclera  Cardiovascular: Regular rate and rhythm. Normal S1, S2. No lower extremity edema. Respiratory: Clear to auscultation bilaterally. No wheezes or crackles. Abdomen: Soft, non-tender, non-distended. Positive for bowel sounds. Wound healing well. No evidence of hernia or infection. Assessment:  [facility]    Plan:  No follow-ups on file. Patient is recovering well from surgery. I see no sign of procedural complications. Patient will follow up with me as needed. Thank you for allowing me to participate in the care of your patient. My best effort and attention was provided. DR. Bia Abreu.  Daniel Parish DO.11/2/2022 3:01 PM

## 2022-12-22 RX ORDER — ACETIC ACID 0.25 G/100ML
IRRIGANT IRRIGATION
Qty: 1000 ML | Refills: 5 | Status: SHIPPED | OUTPATIENT
Start: 2022-12-22

## 2022-12-22 NOTE — TELEPHONE ENCOUNTER
Joan Zuniga called requesting a refill on the following medications:  Requested Prescriptions     Pending Prescriptions Disp Refills    acetic acid 0.25 % irrigation 1000 mL 5     Sig: Irrigate as directed     Pharmacy verified:  .[email or web address]      Date of last visit:   Date of next visit (if applicable): 5/36/6472

## 2023-01-19 ENCOUNTER — TELEPHONE (OUTPATIENT)
Dept: UROLOGY | Age: 73
End: 2023-01-19

## 2023-01-19 DIAGNOSIS — N39.0 RECURRENT UTI: Primary | ICD-10-CM

## 2023-01-19 NOTE — TELEPHONE ENCOUNTER
Jacey Natarajan at [facility] advised of the orders and orders faxed to her. Spoke to Dr Reed Michael office to check if aspirin can be held until hematuria resolves and advised it may be held for 3-5 days. Jacey Natarajan aware to also hold aspirin.

## 2023-01-19 NOTE — TELEPHONE ENCOUNTER
Exchange vega catheter now. Obtain urinalysis and culture off new catheter. Increase oral fluid intake. See if ASA can be held until hematuria resolves.

## 2023-01-19 NOTE — TELEPHONE ENCOUNTER
Sandy Martinezr [phone] from [facility] stated the sp catheter is due to be exchanged tomorrow. The urine is coffee brown. She does not know of any other symptoms. Fax [phone]    Do you want a urine collected off the new catheter?

## 2023-01-21 LAB
BILIRUBIN URINE: ABNORMAL
BLOOD, URINE: ABNORMAL
CLARITY: ABNORMAL
COLOR: ABNORMAL
GLUCOSE URINE: ABNORMAL
KETONES, URINE: ABNORMAL
LEUKOCYTE ESTERASE, URINE: ABNORMAL
NITRITE, URINE: ABNORMAL
PH UA: ABNORMAL
PROTEIN UA: ABNORMAL
SPECIFIC GRAVITY UA: 1.02 (ref 1–1.03)
UROBILINOGEN, URINE: ABNORMAL

## 2023-01-25 RX ORDER — FLUCONAZOLE 100 MG/1
100 TABLET ORAL 2 TIMES DAILY
Qty: 14 TABLET | Refills: 0 | Status: SHIPPED | OUTPATIENT
Start: 2023-01-25 | End: 2023-01-25

## 2023-01-25 RX ORDER — FLUCONAZOLE 10 MG/ML
100 POWDER, FOR SUSPENSION ORAL 2 TIMES DAILY
Qty: 200 ML | Refills: 0 | Status: SHIPPED | OUTPATIENT
Start: 2023-01-25 | End: 2023-02-04

## 2023-01-25 RX ORDER — FLUCONAZOLE 100 MG/1
100 TABLET ORAL DAILY
Qty: 7 TABLET | Refills: 0 | Status: SHIPPED | OUTPATIENT
Start: 2023-01-25 | End: 2023-01-25

## 2023-01-25 NOTE — TELEPHONE ENCOUNTER
Jose Dee from [facility] called regarding new orders. Advised of urine results and diflucan sent to the pharmacy.

## 2023-01-30 NOTE — TELEPHONE ENCOUNTER
Pradeep Field called requesting a refill on the following medications:  Requested Prescriptions     Pending Prescriptions Disp Refills    trospium (SANCTURA) 20 MG tablet 60 tablet 3     Sig: Take 1 tablet by mouth 2 times daily     Pharmacy verified: [facility] in [place]  . [email or web address]      Date of last visit: 8/08/2022  Date of next visit (if applicable): 5/6/6524

## 2023-02-01 RX ORDER — TROSPIUM CHLORIDE 20 MG/1
20 TABLET, FILM COATED ORAL 2 TIMES DAILY
Qty: 60 TABLET | Refills: 3 | Status: SHIPPED | OUTPATIENT
Start: 2023-02-01

## 2023-02-07 ENCOUNTER — OFFICE VISIT (OUTPATIENT)
Dept: UROLOGY | Age: 73
End: 2023-02-07
Payer: MEDICARE

## 2023-02-07 VITALS — RESPIRATION RATE: 16 BRPM | HEIGHT: 63 IN | WEIGHT: 115 LBS | BODY MASS INDEX: 20.38 KG/M2

## 2023-02-07 DIAGNOSIS — N20.0 KIDNEY STONE: ICD-10-CM

## 2023-02-07 DIAGNOSIS — N31.9 NEUROGENIC BLADDER: Primary | ICD-10-CM

## 2023-02-07 PROCEDURE — 3017F COLORECTAL CA SCREEN DOC REV: CPT | Performed by: NURSE PRACTITIONER

## 2023-02-07 PROCEDURE — 1090F PRES/ABSN URINE INCON ASSESS: CPT | Performed by: NURSE PRACTITIONER

## 2023-02-07 PROCEDURE — 1036F TOBACCO NON-USER: CPT | Performed by: NURSE PRACTITIONER

## 2023-02-07 PROCEDURE — 1123F ACP DISCUSS/DSCN MKR DOCD: CPT | Performed by: NURSE PRACTITIONER

## 2023-02-07 PROCEDURE — G8400 PT W/DXA NO RESULTS DOC: HCPCS | Performed by: NURSE PRACTITIONER

## 2023-02-07 PROCEDURE — G8427 DOCREV CUR MEDS BY ELIG CLIN: HCPCS | Performed by: NURSE PRACTITIONER

## 2023-02-07 PROCEDURE — G8420 CALC BMI NORM PARAMETERS: HCPCS | Performed by: NURSE PRACTITIONER

## 2023-02-07 PROCEDURE — 99214 OFFICE O/P EST MOD 30 MIN: CPT | Performed by: NURSE PRACTITIONER

## 2023-02-07 PROCEDURE — G8484 FLU IMMUNIZE NO ADMIN: HCPCS | Performed by: NURSE PRACTITIONER

## 2023-02-07 ASSESSMENT — ENCOUNTER SYMPTOMS
ABDOMINAL PAIN: 0
NAUSEA: 0
VOMITING: 0
BACK PAIN: 0

## 2023-02-07 NOTE — PROGRESS NOTES
[address] [place], [place] [address]  Dept: [phone]  Loc: [phone]    Visit Date: 2/7/2023        HPI:     Fred Hoyt is a 74 y.o. female who presents today for:  Chief Complaint   Patient presents with    Follow-up     Patient has SP cath. Home health is changing         HPI  Pt seen in follow up for neurogenic bladder      Pt has a hx of MS and is wheelchair bound. She has chronic urinary retention from neurogenic bladder with SP catheter. SP initially placed 1/17/22 by Dr. Shruti Phelps. Undergoes every 4 week exchanges and twice weekly acetic acid irrigations. On Macrobid daily prophylaxis prescribed by PCP. Takes Trospium 20 mg BID. Recent urine culture 1/20/23 with Yeast.  Treated with fluconazole. Had hematuria recently that coincided with the infection. Leah Room on held per PCP--sees PCP next week to discuss resuming. CT imaging in July with left renal/renal pelvis stones. Denies flank pain or abdominal pain. Was unaware of the stones and denies hx of kidney stones previously. No fever/chills. Current Outpatient Medications   Medication Sig Dispense Refill    trospium (SANCTURA) 20 MG tablet Take 1 tablet by mouth 2 times daily 60 tablet 3    acetic acid 0.25 % irrigation Irrigate as directed 1000 mL 5    midodrine (PROAMATINE) 10 MG tablet Take 1 tablet by mouth 3 times daily 90 tablet 1    nystatin (MYCOSTATIN) 629361 UNIT/GM powder Apply topically 3 times daily Apply 3 times daily. 15 g 1    nitrofurantoin, macrocrystal-monohydrate, (MACROBID) 100 MG capsule Take 100 mg by mouth in the morning.       metoprolol tartrate (LOPRESSOR) 25 MG tablet take 1/2 tablet by mouth twice a day 30 tablet 11    Cholecalciferol (VITAMIN D3 PO) Take 25 mcg by mouth daily      carBAMazepine (TEGRETOL) 100 MG chewable tablet Take 100 mg by mouth 2 times daily      aspirin (RA ASPIRIN EC) 81 MG EC tablet take 1 tablet by mouth once daily (Patient not taking: Reported on 2/7/2023) 30 tablet 0     No current facility-administered medications for this visit. Past Medical History  Sparkle Guajardo  has a past medical history of Difficulty in swallowing, Intra-abdominal lymphadenopathy, MS (multiple sclerosis) ([address] Neuromuscular disorder ([address] Pancreatitis, and Seizures ([facility] [address] Past Surgical History  The patient  has a past surgical history that includes Cholecystectomy (April 1st 2016); pr xcapsl ctrc rmvl insj io lens prosth w/o ecp (Left, 11/15/2018); Intracapsular cataract extraction (Right, 12/17/2018); Cystoscopy (N/A, 1/2/2019); Foot Debridement (Left, 11/6/2020); Catheter Insertion (N/A, 1/17/2022); and Pressure ulcer debridement (Right, 10/22/2022). Family History  This patient's family history includes Cancer in her mother; Heart Disease in her father. Social History  Sharri  reports that she quit smoking about 9 years ago. Her smoking use included cigarettes. She has a 80.00 pack-year smoking history. She has never been exposed to tobacco smoke. She quit smokeless tobacco use about 7 years ago. She reports that she does not drink alcohol and does not use drugs. Subjective:      Review of Systems   Constitutional:  Negative for activity change, appetite change, chills, diaphoresis, fatigue, fever and unexpected weight change. Gastrointestinal:  Negative for abdominal pain, nausea and vomiting. Genitourinary:  Negative for decreased urine volume, difficulty urinating, dysuria, flank pain, frequency, hematuria and urgency. Musculoskeletal:  Negative for back pain. Objective:   Resp 16   Ht 5' 3\" (1.6 m)   Wt 115 lb (52.2 kg)   BMI 20.37 kg/m²     Physical Exam  Vitals reviewed. Constitutional:       General: She is not in acute distress. Appearance: Normal appearance. She is well-developed. She is not ill-appearing or diaphoretic. HENT:      Head: Normocephalic and atraumatic.       Right Ear: External ear normal.      Left Ear: External ear normal.      Nose: Nose normal.      Mouth/Throat:      Mouth: Mucous membranes are moist.   Eyes:      General: No scleral icterus. Right eye: No discharge. Left eye: No discharge. Neck:      Vascular: No JVD. Trachea: No tracheal deviation. Cardiovascular:      Rate and Rhythm: Normal rate and regular rhythm. Heart sounds: Normal heart sounds. Pulmonary:      Effort: Pulmonary effort is normal. No respiratory distress. Breath sounds: Normal breath sounds. Abdominal:      General: There is no distension. Tenderness: There is no abdominal tenderness. There is no right CVA tenderness or left CVA tenderness. Musculoskeletal:      Comments: Seated in motorized wheelchair. Skin:     General: Skin is warm and dry. Neurological:      Mental Status: She is alert and oriented to person, place, and time. Mental status is at baseline. Psychiatric:         Mood and Affect: Mood normal.         Behavior: Behavior normal.         Thought Content: Thought content normal.       POC  No results found for this visit on 02/07/23. Patients recent PSA values are as follows  No results found for: PSA, PSADIA     Recent BUN/Creatinine:  Lab Results   Component Value Date/Time    BUN 11 10/27/2022 01:42 AM    CREATININE 0.3 10/27/2022 01:42 AM     CT cystogram 8/2022  PROCEDURE: CT CYSTOGRAM W WO CONTRAST       CLINICAL INFORMATION: Intestinovesical fistula       COMPARISON: CT abdomen and pelvis 7/25/2022       TECHNIQUE:    CT cystogram without and with intravesical contrast was obtained. Helical CT acquisition of the abdomen and pelvis from the mid abdomen to the mid femoral diaphysis was performed without and with intravesical contrast. Multiplanar reformats are provided. Post evacuation images were also obtained.        All CT scans at this facility use dose modulation, iterative reconstruction, and/or weight based dosing when appropriate to reduce the radiation dose to as low as reasonably achievable. CONTRAST: Diluted contrast was used containing about 5 mL of [place]. FINDINGS:        The lack of intracranial contrast limits the evaluation for solid organ pathology, vascular pathology, and lymphadenopathy. Aortoiliac calcifications. Moderate amount of stool is seen in the colon. Colonic diverticulosis. There is a 5 x 3.3 x 2.4 cm area of subcutaneous thickening along the left inferior buttock region that raises concern for development of a decubitus ulcer. The bladder is cannulated with the suprapubic catheter. Bladder wall thickening is noted as can be seen with chronic urinary retention. There is reflux of contrast into the ureters. No bladder mass is seen. Contrast is not seen within bowel loops to    suggest a enterovesical fistula at this time. No bowel obstruction or acute inflammatory bowel process. The appendix is unremarkable. The abdominal aorta is not aneurysmal.       No significantly enlarged lymph nodes are seen. The uterus is unremarkable on CT. Bones: The bones are markedly demineralized. Degenerative changes of the SI joints and both hips. Degenerative changes of the visualized lumbar spine. Impression   1. Contrast is not seen within bowel loops to suggest enterovesical fistula this time. I do not see bowel adhering to the bladder to suggest the presence of a enterovesical fistula. 2. There is vesicoureteral reflux. 3. A 5 x 3.3 x 2.4 cm area of soft tissue subcutaneous thickening in the left inferior buttock region that extends to the rectum is worrisome for an area of decubitus ulcer development. Direct visualization should be made. 4. Colonic diverticulosis. 5. Marked osteopenia. 6. Extensive aortoiliac atherosclerotic disease.                      CT 7/2022  ADDENDUM #1        ADDENDUM:        This is an addendum to original report dated 7/25/2022. Contrast is seen within the vaginal region that can suggest the presence    of a rectovaginal fistula. The finding(s) was called to Joey Hawkins PA-C at 1547 hours on 7/25/2022 by Dr. Db Beltran. Verbal acknowledgment and    readback was given. The first impression should read \"a prominent decubitus ulcer is seen in    the lower pelvis. \"               *ORIGINAL    REPORT*       PROCEDURE: CT ABDOMEN PELVIS W IV CONTRAST       CLINICAL INFORMATION: sacral wounds - air expelled from sacral wound -    fistula       COMPARISON: 12/31/2018. TECHNIQUE:    Helical CT acquisition of the abdomen and pelvis was performed with    administration of intravenous contrast. Multiplanar reformats are    provided. All CT scans at this facility use dose modulation, iterative    reconstruction, and/or weight based dosing when appropriate to reduce the    radiation dose to as low as reasonably achievable. CONTRAST: 80  cc of Isovue-370  intravenously           FINDINGS:            Micronodular opacities are seen in the lung bases. Spleen is not enlarged. The gallbladder is surgically absent. There are    multiple hyperenhancing tiny foci within the liver. A 4.1 cm calculus is    seen in the inferior pole of the left kidney. A 5 mm calculus is seen in    the sinus midpole of the left    kidney. No hydronephrosis. No obstructing ureteral calculus. Marked    atrophy of the pancreas. Large amount of stool fills the colon. A    suprapubic bladder catheter is seen. A rectal tube is seen in place. A    decubitus ulcer is noted in the lower pelvis    region. Otherwise, the biliary tree, and adrenal glands are unremarkable. No bowel obstruction or acute inflammatory bowel process. The appendix is    not readily identified. No inflammatory changes in the right lower    quadrant. The abdominal aorta is not aneurysmal. Aortoiliac calcifications.  About    50% stenosis of the distal abdominal aorta aorta. High-grade stenosis in    the common iliac arteries. No significantly enlarged lymph nodes are seen. The uterus is unremarkable. Bones: Marked impingement. Degenerative changes of the visualized    thoracolumbar spine. Degenerative changes of the SI joints and both hips. Narrative   PROCEDURE: CT ABDOMEN PELVIS W IV CONTRAST       CLINICAL INFORMATION: sacral wounds - air expelled from sacral wound - fistula       COMPARISON: 12/31/2018. TECHNIQUE:    Helical CT acquisition of the abdomen and pelvis was performed with administration of intravenous contrast. Multiplanar reformats are provided. All CT scans at this facility use dose modulation, iterative reconstruction, and/or weight based dosing when appropriate to reduce the radiation dose to as low as reasonably achievable. CONTRAST: 80  cc of Isovue-370  intravenously           FINDINGS:            Micronodular opacities are seen in the lung bases. Spleen is not enlarged. The gallbladder is surgically absent. There are multiple hyperenhancing tiny foci within the liver. A 4.1 cm calculus is seen in the inferior pole of the left kidney. A 5 mm calculus is seen in the sinus midpole of the left    kidney. No hydronephrosis. No obstructing ureteral calculus. Marked atrophy of the pancreas. Large amount of stool fills the colon. A suprapubic bladder catheter is seen. A rectal tube is seen in place. A decubitus ulcer is noted in the lower pelvis    region. Otherwise, the biliary tree, and adrenal glands are unremarkable. No bowel obstruction or acute inflammatory bowel process. The appendix is not readily identified. No inflammatory changes in the right lower quadrant. The abdominal aorta is not aneurysmal. Aortoiliac calcifications. About 50% stenosis of the distal abdominal aorta aorta. High-grade stenosis in the common iliac arteries.        No significantly enlarged lymph nodes are seen. The uterus is unremarkable. Bones: Marked impingement. Degenerative changes of the visualized thoracolumbar spine. Degenerative changes of the SI joints and both hips. Impression   1. A prominent decubitus ulcer is seen in the talus pelvis. Correlate with clinical examination. A fistula is not clearly demonstrated. 2. There are tiny hyperenhancing foci in the liver that cannot be characterized on this exam. Consider obtaining an MRI abdomen without and with contrast for further evaluation. 3. There are micronodular densities in the visualized lung bases. A CT chest without contrast can be obtained to further evaluate. These may relate to infection versus metastases. 4. Marked osteopenia. 5. Other findings as described above. **This report has been created using voice recognition software. It may contain minor errors which are inherent in voice recognition technology. **       Final report electronically signed by Dr Jeane Fiore on 7/25/2022 3:49 PM       *ORIGINAL REPORT    PROCEDURE: CT ABDOMEN PELVIS W IV CONTRAST       CLINICAL INFORMATION: sacral wounds - air expelled from sacral wound - fistula       COMPARISON: 12/31/2018. TECHNIQUE:    Helical CT acquisition of the abdomen and pelvis was performed with administration of intravenous contrast. Multiplanar reformats are provided. All CT scans at this facility use dose modulation, iterative reconstruction, and/or weight based dosing when appropriate to reduce the radiation dose to as low as reasonably achievable. CONTRAST: 80  cc of Isovue-370  intravenously           FINDINGS:            Micronodular opacities are seen in the lung bases. Spleen is not enlarged. The gallbladder is surgically absent. There are multiple hyperenhancing tiny foci within the liver. A 4.1 cm calculus is seen in the inferior pole of the left kidney.  A 5 mm calculus is seen in the sinus midpole of the left    kidney. No hydronephrosis. No obstructing ureteral calculus. Marked atrophy of the pancreas. Large amount of stool fills the colon. A suprapubic bladder catheter is seen. A rectal tube is seen in place. A decubitus ulcer is noted in the lower pelvis    region. Otherwise, the biliary tree, and adrenal glands are unremarkable. No bowel obstruction or acute inflammatory bowel process. The appendix is not readily identified. No inflammatory changes in the right lower quadrant. The abdominal aorta is not aneurysmal. Aortoiliac calcifications. About 50% stenosis of the distal abdominal aorta aorta. High-grade stenosis in the common iliac arteries. No significantly enlarged lymph nodes are seen. The uterus is unremarkable. Bones: Marked impingement. Degenerative changes of the visualized thoracolumbar spine. Degenerative changes of the SI joints and both hips. IMPRESSION:   1. A prominent decubitus ulcer is seen in the talus pelvis. Correlate with clinical examination. A fistula is not clearly demonstrated. 2. There are tiny hyperenhancing foci in the liver that cannot be characterized on this exam. Consider obtaining an MRI abdomen without and with contrast for further evaluation. 3. There are micronodular densities in the visualized lung bases. A CT chest without contrast can be obtained to further evaluate. These may relate to infection versus metastases. 4. Marked osteopenia. 5. Other findings as described above. Assessment:   Neurogenic bladder  Recurrent UTIs  L Nephrolithiasis  MS    Plan:     Given pt's recent episode of hematuria, hx of left renal pelvis kidney stones, and recurrent infections recommend proceeding with CT abd/pelvis without contrast at this time to assure no obstructing stones. Continue every 4 week exchanges, acetic acid irrigations 2 x every week.       Continue trospium 20 mg BID for spasms. CT abd/pelvis and tele visit to review results.

## 2023-02-08 ENCOUNTER — TELEPHONE (OUTPATIENT)
Dept: UROLOGY | Age: 73
End: 2023-02-08

## 2023-02-08 NOTE — TELEPHONE ENCOUNTER
Patient scheduled for CT ABD PELV WO  at [address] on 2/23/23 ARRIVAL OF 1030AM FOR A 11AM SCAN.     Order mailed with instructions to the patient

## 2023-02-23 ENCOUNTER — HOSPITAL ENCOUNTER (OUTPATIENT)
Dept: CT IMAGING | Age: 73
Discharge: HOME | End: 2023-02-23
Payer: MEDICARE

## 2023-02-23 DIAGNOSIS — N20.0 KIDNEY STONE: ICD-10-CM

## 2023-02-23 PROCEDURE — 74176 CT ABD & PELVIS W/O CONTRAST: CPT

## 2023-03-03 ENCOUNTER — SCHEDULED TELEPHONE ENCOUNTER (OUTPATIENT)
Dept: UROLOGY | Age: 73
End: 2023-03-03
Payer: MEDICARE

## 2023-03-03 DIAGNOSIS — R31.0 GROSS HEMATURIA: Primary | ICD-10-CM

## 2023-03-03 PROCEDURE — 99442 PR PHYS/QHP TELEPHONE EVALUATION 11-20 MIN: CPT | Performed by: NURSE PRACTITIONER

## 2023-03-03 NOTE — PROGRESS NOTES
Kelsie Torres is a 74 y.o. female evaluated via telephone on 3/3/2023 for Other (Bladder spasms)  . Documentation:  I communicated with the patient and/or health care decision maker about CT results. Details of this discussion including any medical advice provided:     Pt has a hx of MS and is wheelchair bound. She has chronic urinary retention from neurogenic bladder with SP catheter. SP initially placed 1/17/22 by Dr. Marilee Arenas. Undergoes every 4 week exchanges and twice weekly acetic acid irrigations. On Macrobid daily prophylaxis prescribed by PCP. Takes Trospium 20 mg BID. Recent urine culture 1/20/23 with Yeast.  Treated with fluconazole. Had hematuria recently that coincided with the infection. Saturnino Owusuching on held per PCP--sees PCP next week to discuss resuming. CT imaging in July with left renal/renal pelvis stones. CT completed 2/7/23 with cluster of calculi in left renal pelvis and left kidney with largest group measuring up to 13 x 15 mm in conglomerate, 2 and 4 mm calculi along the posterior wall of the urinary bladder. Reviewed with Dr. Marilee Arenas. Discussed with Sharri possible ESWL or ureteroscopic treatment. Maxine Villalobos would like to proceed with scheduling ureteroscopic treatment of her L renal and renal pelvis stones and cystolitholapaxy. I described the procedure in detail and also described the associated risks and benefits at length with Sharri and her friend Srinath Willett. We discussed possible alternative therapies. We discussed the risks and benefits of not undergoing therapy. Patient understands these risks and benefits and desires to proceed. Staff to facilitate scheduling of Cystoscopy, cystolitholapaxy, left ureteroscopy, laser lithotripsy, basket retrieval, stent placement by Dr. Marilee Arenas with appropriate medical clearance prior. Total Time: minutes: 11-20 minutes    Kelsie Torres was evaluated through a synchronous (real-time) audio encounter.  Patient identification was verified at the start of the visit. She (or guardian if applicable) is aware that this is a billable service, which includes applicable co-pays. This visit was conducted with the patient's (and/or legal guardian's) verbal consent. She has not had a related appointment within my department in the past 7 days or scheduled within the next 24 hours.   The patient was located at Home: [address] [place] [address].  The provider was located at Facility (Appt Dept): [address] [place] [address].    Note: not billable if this call serves to triage the patient into an appointment for the relevant concern    Amanda Rendon, APRN - CNP

## 2023-03-07 ENCOUNTER — TELEPHONE (OUTPATIENT)
Dept: UROLOGY | Age: 73
End: 2023-03-07

## 2023-03-07 DIAGNOSIS — N21.0 BLADDER STONE: ICD-10-CM

## 2023-03-07 DIAGNOSIS — R31.0 GROSS HEMATURIA: ICD-10-CM

## 2023-03-07 DIAGNOSIS — N20.0 KIDNEY STONE: Primary | ICD-10-CM

## 2023-03-07 DIAGNOSIS — Z01.818 PRE-OP TESTING: ICD-10-CM

## 2023-03-07 NOTE — TELEPHONE ENCOUNTER
SURGERY [address]  [address] [place] [address] [address] [facility], [address]      Phone *[phone] *[phone]   Surgical Scheduling Direct Line Phone *[phone] Fax *[phone]      Malcolm Soler 1950 female    [address] [facility]   Marital Status:          Home Phone: [phone]      Cell Phone:    Telephone Information:   Mobile [phone]          Surgeon: Dr. Damaris Ryan Surgery Date: 4/17/23   Time: 12:00 PM    Procedure: Cystoscopy, cystolitholapaxy, left ureteroscopy, laser lithotripsy, basket retrieval of stone fragments, and possible left ureteral stent placement. Diagnosis: Kidney stones, Bladder stones     Important Medical History:  In [facility]    Special Inst/Equip:     CPT Codes:    33280, 15233  Latex Allergy: No     Cardiac Device:  No    Anesthesia:  General          Admission Type:  Same Day                        Admit Prior to Day of Surgery: No    Case Location:  Main OR            Preadmission Testing:  Phone Call          PAT Date and Time:______________________________________________________    PAT Confirmation #: ______________________________________________________    Post Op Visit: ___________________________________________________________    Need Preop Cardiac Clearance: No    Does Patient have Cardiologist/physician?      Dr Orlando Archer Confirmation #: __________________________________________________    Pee Melissa: ________________________   Date: __________________________     [employer] Name: Medicare

## 2023-03-07 NOTE — TELEPHONE ENCOUNTER
DO NOT TAKE ASPIRIN,  FISH OIL, MOBIC,COUMADIN, IBUPROFEN, MOTRIN-LIKE DRUGS AND ANY MULTIVITAMINS OR OVER THE COUNTER SUPPLEMENTS 14 DAYS PRIOR TO SURGERY. MUST HAVE AN ADULT OVER THE AGE OF 18 WITH YOU AT THE TIME OF THE DISCHARGE AND WITH YOU AT HOME AFTER THE PROCEDURE FOR 24 HOURS          Jose Worley 1950 Diagnosis:     Surgical Physician: Dr. Nai Rdz have been scheduled for the procedure marked below:      Surgery: Cystoscopy, cystolitholapaxy, left ureteroscopy, laser lithotripsy, basket retrieval of stone fragments, and possible left ureteral stent placement         Date: 4/17/23     Anesthesia: Anesthesiologist (General/Spinal)     Place of Service: [address] [place] [address] Second Floor Same Day Surgery         Arrive to same day surgery by:  10:00 AM  (Surgery time is subject to change)      INSTRUCTIONS AS MARKED BELOW:    1.  DO NOT eat or drink anything after midnight before surgery. 2.  We prefer you shower or bathe with an antibacterial soap (Dial) the morning of surgery. 3  Please bring a current medication list, photo ID and insurance card(s) with you  4. Okay to take Tylenol  5. If you take Glucophage, Metformin or Janumet, hold 48-hours prior to surgery  6  Take blood pressure or heart medication as directed, if taken in the morning take with a small sip of water  7. The office will call you in 1-2 days after your procedure to schedule a follow up. DATE SENSITIVE TESTING-DO ON THE DATE LISTED *WALK IN *NO APPOINTMENT    DO URINE CULTURE ON 4/3/23. ORDER INCLUDED. DR KEYSHA HILL TO CLEAR YOU FOR SURGERY AND YOU NEED 8 HOUR FASTING LAB WORK DONE ORDERS ARE INCLUDED IN YOUR PACKET. IF YOU HAVE QUESTIONS PLEASE CALL ME. THANK YOU.         Date: 3/7/2023

## 2023-03-07 NOTE — TELEPHONE ENCOUNTER
Patient is scheduled for surgery with Dr Scott Chavarria on 4/17/23. Patient to do pre op testing on 4/3/23. Dr Nadeen Mallory to clear. Patient will have an adult over the age of 25 with them at discharge and 24 hours after procedure. Surgery instructions gone over verbally and mailed to patient.

## 2023-03-07 NOTE — TELEPHONE ENCOUNTER
MEDICAL CLEARANCE FORM    Clearance From  Dr Amber Girard     Appointment Date    Time       Erich Parker  1950  Surgeon:  Dr Chavez Neville    Procedure:  Cystoscopy, cystolitholapaxy, left ureteroscopy, laser lithotripsy, basket retrieval of stone fragments, and possible left ureteral stent placement  Date:  4/17/23  Facility: [address] HISTORY  DM CAD PVD CVA DVT/PE MI CHFMalignant Hyperthemia HTN Tobacco/ETOH Sleep Apnea GERD Hyperlipidemia Renal Insufficiency COPD/Asthma Bleeding Disorder Pacemaker/AICD  II. CURRENT MEDICATIONS: Attach list or complete     Pt is on following meds that need special instructions for surgery:  Anticoagulants Heart Meds ASA Insulin Oral anti-diabetics NSAIDS Diuretics     K replacements  III. ALLERGIES:   IV.  FUNCTIONAL CAPACITY  >4 METS (CAN VACUUM/HOUSEWORK,CLIMB FLIGHT STAIRS WITHOUT DYSPNEA)  <4METS (FLIGHT OF STEPS CAUSES DYSPNEA/CARDIAC SYMPTOMS)   Stress Test Recommended:    Stress tests or Cardiac Cath in last 5 years:  Yes (attach report)  No   Results: WNL   ABN  Any Change in Cardiac symptoms: Yes  NO  Comments:    Revascularization in last 5 years: Yes  NO  CABG: Yes  No   Comments:     Stents:  Date: ________  Any change in cardiac symptoms  Yes  NO  Comments:   V.  REVIEW OF SYSTEMS:  (Pertinent positive or negative)    VI. PHYSICIAL EXAM  HEENT:1.  Dentations   Good Poor          HT:_______ WT:______      2. Neck Pathology: Rheumatoid DDD C-spine  BP:______ P:________  PULMONARY:  CARDIAC  ABDOMEN  EXTREMITIES  OTHER  VII.   Testing Ordered by Surgeon  Reviewed by Clearance Physician  Test      Result  Plan,if Abnormal  ___CBS     WNL  ABN____________________  ___BMP/BUN/CR    WNL  ABN____________________  ___K+      WNL  ABN____________________  ___UA      WNL  ABN____________________  ___CXR     WNL  ABN____________________  ___EKG     WNL  ABN____________________  ___MRSA     WNL  ABN____________________  VIII.  ___Acceptable risk for surgery ___Risk Unacceptable-Communication to Follow  Comments:_____________________________________________________  ________________________________________________________________________  Physician ___________________________  Date:_______________  Physician Printed Name:  _________________________    Stephanie Lopez  [phone]

## 2023-03-19 ENCOUNTER — TELEPHONE (OUTPATIENT)
Dept: UROLOGY | Age: 73
End: 2023-03-19

## 2023-03-19 ENCOUNTER — HOSPITAL ENCOUNTER (EMERGENCY)
Age: 73
Discharge: HOME | End: 2023-03-19
Attending: EMERGENCY MEDICINE
Payer: MEDICARE

## 2023-03-19 VITALS
OXYGEN SATURATION: 100 % | RESPIRATION RATE: 18 BRPM | TEMPERATURE: 97.5 F | HEART RATE: 97 BPM | DIASTOLIC BLOOD PRESSURE: 79 MMHG | SYSTOLIC BLOOD PRESSURE: 115 MMHG

## 2023-03-19 DIAGNOSIS — T83.010A SUPRAPUBIC CATHETER DYSFUNCTION, INITIAL ENCOUNTER (HCC): ICD-10-CM

## 2023-03-19 DIAGNOSIS — K56.41 FECAL IMPACTION (HCC): Primary | ICD-10-CM

## 2023-03-19 PROCEDURE — 99283 EMERGENCY DEPT VISIT LOW MDM: CPT | Performed by: EMERGENCY MEDICINE

## 2023-03-19 ASSESSMENT — PAIN - FUNCTIONAL ASSESSMENT
PAIN_FUNCTIONAL_ASSESSMENT: NONE - DENIES PAIN

## 2023-03-19 ASSESSMENT — ENCOUNTER SYMPTOMS: CONSTIPATION: 1

## 2023-03-19 NOTE — PROGRESS NOTES
[place] contacted Urology regarding SPT not draining  Urology recommends exchanging SPT and f/u next wk  Note sent to schedulers

## 2023-03-19 NOTE — ED PROVIDER NOTES
ATTENDING NOTE:    I supervised and discussed the history, physical exam and the management of this patient with the resident. I reviewed the resident's note and agree with the documented findings and plan of care. Please see my additional note. I personally saw and examined the patient. I have reviewed and agree with the resident's findings, including all diagnostic interpretations and treatment plans as written. I was present for the key portion of any procedures performed and the inclusive time noted in any critical care statement.     Electronically verified by Raquel Flynn MD  03/19/23 7825
use: No         ALLERGIES   No Known Allergies      FAMILY HISTORY     Family History   Problem Relation Age of Onset    Cancer Mother         colon    Heart Disease Father     Diabetes Neg Hx     High Blood Pressure Neg Hx     Stroke Neg Hx          PHYSICAL EXAM     ED Triage Vitals [03/19/23 1104]   BP Temp Temp Source Heart Rate Resp SpO2 Height Weight   114/80 97.5 °F (36.4 °C) Oral (!) 103 18 99 % -- --         Additional Vital Signs:  Vitals:    03/19/23 1434   BP: 115/79   Pulse: 97   Resp: 18   Temp:    SpO2: 100%       Physical Exam  Vitals reviewed. Constitutional:       Appearance: Normal appearance. She is not ill-appearing or diaphoretic. HENT:      Head: Normocephalic and atraumatic. Right Ear: External ear normal.      Left Ear: External ear normal.      Nose: Nose normal.      Mouth/Throat:      Mouth: Mucous membranes are moist.      Pharynx: Oropharynx is clear. Eyes:      Conjunctiva/sclera: Conjunctivae normal.   Cardiovascular:      Rate and Rhythm: Normal rate and regular rhythm. Abdominal:      General: Abdomen is flat. Palpations: Abdomen is soft. Comments: Suprapubic catheter stoma with minimal surrounding erythema but no discharge or leakage. Urinary catheter has moderate amount of sediment but otherwise clear yellow urine and no dark blood seen. Skin:     General: Skin is warm. Capillary Refill: Capillary refill takes less than 2 seconds. Neurological:      General: No focal deficit present. Mental Status: She is alert and oriented to person, place, and time. Mental status is at baseline.    Psychiatric:         Mood and Affect: Mood normal.         Behavior: Behavior normal.         DIFFERENTIALS   Initial Assessment: Given the patient's above chief complaint and findings on history and physical examination, I thought it was appropriate to consider the following emergency medical conditions: Fecal impaction, suprapubic catheter dysfunction, urinary

## 2023-03-19 NOTE — DISCHARGE INSTRUCTIONS
HPI  Jaja Stone  who complains of urinary frequency, urgency and dysuria x 2 days, without flank pain, fever, chills, or abnormal vaginal discharge or bleeding.  Jaja Stone has had urinary tract infections in the past.    Allergies as of 11/05/2022   • (No Known Allergies)       Current Outpatient Medications   Medication Sig Dispense Refill   • sulfamethoxazole-trimethoprim (Bactrim DS) 800-160 MG per tablet Take 1 tablet by mouth in the morning and 1 tablet in the evening. 14 tablet 0   • lisdexamfetamine (Vyvanse) 60 MG capsule Take 1 capsule by mouth every morning. 1 Month Supply. No early refills. 30 capsule 0   • triamcinolone (ARISTOCORT) 0.1 % cream Apply topically 3 times daily. 15 g 5   • predniSONE (DELTASONE) 10 MG tablet 3t po qam for 3 days. 9 tablet 0   • famotidine (PEPCID) 20 MG tablet Take 1 tablet by mouth in the morning and 1 tablet in the evening. 60 tablet 1   • olopatadine (PATANOL) 0.1 % ophthalmic solution Place 1 drop into left eye in the morning and 1 drop in the evening. 5 mL 0   • tacrolimus (Protopic) 0.1 % ointment Apply thin layer to affected areas on skin bid prn eczema. 60 g 1   • triamcinolone (ARISTOCORT) 0.1 % cream Apply thin layer to affected areas on skin bid prn rash/itch. Don't use on normal skin. Don't use on face. 30 g 1     No current facility-administered medications for this visit.       Social History     Socioeconomic History   • Marital status:      Spouse name: Not on file   • Number of children: 2   • Years of education: Not on file   • Highest education level: Not on file   Occupational History   • Occupation: Manager     Employer: [employer]   Tobacco Use   • Smoking status: Current Every Day Smoker     Packs/day: 0.25     Years: 20.00     Pack years: 5.00     Types: Cigarettes   • Smokeless tobacco: Never Used   • Tobacco comment: patient is will to quit smoking; doing about 5 cigs a day   Substance and Sexual Activity   • Alcohol use: Yes      Take your medication as indicated and prescribed. If you are given an antibiotic then, make sure you get the prescription filled and take the antibiotics until finished. Drink plenty of water while taking the antibiotics. Avoid drinking alcohol or drinks that have caffeine in it while taking antibiotics. If you were given the medication pyridium (or take over the counter Azo) - do not wear any contacts for the next week since this medication will turn your tears an orange color and will stain the contacts. For pain use acetaminophen (Tylenol) or ibuprofen (Motrin / Advil), unless prescribed medications that have acetaminophen or ibuprofen (or similar medications) in it. You can take over the counter acetaminophen tablets (1 - 2 tablets of the 500-mg strength every 6 hours) or ibuprofen tablets (2 tablets every 4 hours). PLEASE RETURN TO THE EMERGENCY DEPARTMENT IMMEDIATELY for worsening symptoms, inability to urinate, worsening of blood in your urine, or if you develop any concerning symptoms such as: high fever not relieved by acetaminophen (Tylenol) and/or ibuprofen (Motrin / Advil), chills, shortness of breath, chest pain, feeling of your heart fluttering or racing, persistent nausea and/or vomiting, vomiting up blood, blood in your stool, loss of consciousness, numbness, weakness or tingling in the arms or legs or change in color of the extremities, changes in mental status, persistent headache, blurry vision, loss of bladder / bowel. Alcohol/week: 0.0 standard drinks     Comment: pt states she drinks alcohol twice a week   • Drug use: Yes     Types: Marijuana   • Sexual activity: Yes     Partners: Male     Birth control/protection: Surgical     Comment: tubal ligation   Other Topics Concern   • Not on file   Social History Narrative   • Not on file     Social Determinants of Health     Financial Resource Strain: Not on file   Food Insecurity: Not on file   Transportation Needs: Not on file   Physical Activity: Not on file   Stress: Not on file   Social Connections: Not on file   Intimate Partner Violence: Not At Risk   • Social Determinants: Intimate Partner Violence Past Fear: No   • Social Determinants: Intimate Partner Violence Current Fear: No       Family History   Problem Relation Age of Onset   • Substance abuse Mother    • Hearing Loss Father    • Asthma Brother    • Learning disabilities Brother    • Cancer Maternal Grandfather    • Cancer, Colon Paternal Aunt    • Other Neg Hx          NEG - br, ov and Ut cancer       Past Medical History:   Diagnosis Date   • Acute left-sided low back pain with left-sided sciatica    • ADHD (attention deficit hyperactivity disorder)    • Anemia    • Anxiety    • H/O tubal ligation    • PMDD (premenstrual dysphoric disorder)    • Tobacco dependence        Past Surgical History:   Procedure Laterality Date   •  section, classic     • Colonoscopy diagnostic  2021    dr vizcaino   • Tubal ligation         OBJECTIVE:  VITAL SIGNS:    Visit Vitals  /72 (BP Location: LUE - Left upper extremity, Patient Position: Sitting, Cuff Size: Regular)   Pulse 86   Temp 98.5 °F (36.9 °C) (Oral)   Resp 16   Ht 5' (1.524 m)   Wt 59 kg (130 lb)   LMP 10/10/2022 Comment: tubal ligation/waiver signed   SpO2 96%   BMI 25.39 kg/m²     GENERAL:  Jaja Stone is a 42 year old-year-old female who is well-developed and well nourished.  She is in no acute distress, non-toxic appearance.  GI:  Soft, nondistended,  normal bowel sounds, nontender, no organomegaly, no mass, no rebound, no guarding. No bladder distention.  :  No costovertebral angle tenderness.  MUSCULOSKELETAL:  No edema. Gait steady.  SKIN:  Well hydrated, no rash.  NEUROLOGICAL:  Alert and awake.  PSYCHIATRIC:  Speech and behavior appropriate.    Walk In on 11/05/2022   Component Date Value Ref Range Status   • POCT Color 11/05/2022 Yellow   Final   • POCT Appearance 11/05/2022 Clear   Final   • POCT Glucose Urine 11/05/2022 Negative  Negative mg/dL Final   • POCT Bilirubin 11/05/2022 Negative  Negative Final   • POCT Ketones 11/05/2022 Negative  Negative mg/dL Final   • POCT Specific Gravity 11/05/2022 1.025  1.005 - 1.030 Final   • POCT Occult Blood 11/05/2022 Moderate (A) Negative Final   • POCT pH 11/05/2022 7.0  5 - 7 Final   • POCT Protein 11/05/2022 Negative  Negative mg/dL Final   • POCT Urobilinogen 11/05/2022 0.2  0.0 - 1.0 mg/dL Final   • Urine Nitrite 11/05/2022 Negative  Negative Final   • WBC (Leukocyte) Esterase POC 11/05/2022 Small (A) Negative Final          I have reviewed the past medical, family and social history sections including the medications, vitals and allergies listed in the above medical record as well as the nursing notes.  ASSESSMENT:  1. Urinary frequency    2. Urinary tract infection with hematuria, site unspecified        PLAN:    Orders Placed This Encounter   • POCT Urine Dip Auto   • Urine, Bacterial Culture   • sulfamethoxazole-trimethoprim (Bactrim DS) 800-160 MG per tablet     Sig: Take 1 tablet by mouth in the morning and 1 tablet in the evening.     Dispense:  14 tablet     Refill:  0         Patient is instructed to increase fluids and to follow up with primary care if any symptoms of pyelonephritis:  fever, chills, nausea, vomiting or back pain.  Explained medications given and its side effects.  Pt will follow up with primary care in 7  days if no improvement sooner if worse.  patient given written copies of  discharge instructions.  All questions answered and patient is agreeable to this plan.  Refer to AVS.        Pt masked with during exam, provider wearing  N95 mask, goggles, gloves and gown

## 2023-03-19 NOTE — ED NOTES
Pt to ED via EMS c/o suprapubic catheter clogging and not draining. Pt states it leaks around site of insertion. VSS. Pt denies any pain.       Izaiah Pina RN  03/19/23 8796

## 2023-03-24 ENCOUNTER — PREP FOR PROCEDURE (OUTPATIENT)
Dept: UROLOGY | Age: 73
End: 2023-03-24

## 2023-03-27 RX ORDER — SODIUM CHLORIDE 9 MG/ML
INJECTION, SOLUTION INTRAVENOUS PRN
Status: CANCELLED | OUTPATIENT
Start: 2023-03-27

## 2023-03-27 RX ORDER — SODIUM CHLORIDE 0.9 % (FLUSH) 0.9 %
5-40 SYRINGE (ML) INJECTION EVERY 12 HOURS SCHEDULED
Status: CANCELLED | OUTPATIENT
Start: 2023-03-27

## 2023-03-27 RX ORDER — SODIUM CHLORIDE 0.9 % (FLUSH) 0.9 %
5-40 SYRINGE (ML) INJECTION PRN
Status: CANCELLED | OUTPATIENT
Start: 2023-03-27

## 2023-04-06 ENCOUNTER — TELEPHONE (OUTPATIENT)
Dept: UROLOGY | Age: 73
End: 2023-04-06

## 2023-04-06 RX ORDER — CEPHALEXIN 500 MG/1
500 CAPSULE ORAL 4 TIMES DAILY
Qty: 40 CAPSULE | Refills: 0 | Status: SHIPPED | OUTPATIENT
Start: 2023-04-06 | End: 2023-04-16

## 2023-04-06 NOTE — TELEPHONE ENCOUNTER
Please review urine culture on 4/3/23. Surgery with Dr Izaiah Rosales on 4/17/23 for a Cystoscopy, cystolitholapaxy, left ureteroscopy, laser lithotripsy, basket retrieval of stone fragments, and possible left ureteral stent placement. Thanks.

## 2023-04-10 RX ORDER — AMOXICILLIN 250 MG/5ML
250 POWDER, FOR SUSPENSION ORAL 3 TIMES DAILY
Status: ON HOLD | COMMUNITY
Start: 2023-04-06 | End: 2023-04-17 | Stop reason: ALTCHOICE

## 2023-04-17 ENCOUNTER — HOSPITAL ENCOUNTER (INPATIENT)
Age: 73
LOS: 10 days | Discharge: NURSING, REHAB OR LONG-TERM CARE | DRG: 853 | End: 2023-04-27
Attending: UROLOGY | Admitting: UROLOGY
Payer: MEDICARE

## 2023-04-17 ENCOUNTER — APPOINTMENT (OUTPATIENT)
Dept: GENERAL RADIOLOGY | Age: 73
DRG: 853 | End: 2023-04-17
Attending: UROLOGY
Payer: MEDICARE

## 2023-04-17 ENCOUNTER — ANESTHESIA EVENT (OUTPATIENT)
Dept: OPERATING ROOM | Age: 73
End: 2023-04-17
Payer: MEDICARE

## 2023-04-17 ENCOUNTER — ANESTHESIA (OUTPATIENT)
Dept: OPERATING ROOM | Age: 73
End: 2023-04-17
Payer: MEDICARE

## 2023-04-17 PROBLEM — H59.89: Status: ACTIVE | Noted: 2023-04-17

## 2023-04-17 LAB
ALBUMIN SERPL BCG-MCNC: 2.9 G/DL (ref 3.5–5.1)
ALP SERPL-CCNC: 250 U/L (ref 38–126)
ALT SERPL W/O P-5'-P-CCNC: 41 U/L (ref 11–66)
AMORPH SED URNS QL MICRO: ABNORMAL
ANION GAP SERPL CALC-SCNC: 7 MEQ/L (ref 8–16)
AST SERPL-CCNC: 55 U/L (ref 5–40)
BACTERIA: ABNORMAL
BASOPHILS ABSOLUTE: 0 THOU/MM3 (ref 0–0.1)
BASOPHILS NFR BLD AUTO: 0.8 %
BILIRUB CONJ SERPL-MCNC: < 0.2 MG/DL (ref 0–0.3)
BILIRUB SERPL-MCNC: 0.5 MG/DL (ref 0.3–1.2)
BILIRUB UR QL STRIP: NEGATIVE
BUN SERPL-MCNC: 13 MG/DL (ref 7–22)
CALCIUM SERPL-MCNC: 8.3 MG/DL (ref 8.5–10.5)
CASTS #/AREA URNS LPF: ABNORMAL /LPF
CHARACTER UR: ABNORMAL
CHLORIDE SERPL-SCNC: 109 MEQ/L (ref 98–111)
CO2 SERPL-SCNC: 26 MEQ/L (ref 23–33)
COLOR UR: ABNORMAL
CREAT SERPL-MCNC: 0.4 MG/DL (ref 0.4–1.2)
CRYSTALS URNS QL MICRO: ABNORMAL
DEPRECATED RDW RBC AUTO: 51.5 FL (ref 35–45)
EOSINOPHIL NFR BLD AUTO: 2.4 %
EOSINOPHILS ABSOLUTE: 0.1 THOU/MM3 (ref 0–0.4)
EPITHELIAL CELLS, UA: ABNORMAL /HPF
ERYTHROCYTE [DISTWIDTH] IN BLOOD BY AUTOMATED COUNT: 13.4 % (ref 11.5–14.5)
GFR SERPL CREATININE-BSD FRML MDRD: > 60 ML/MIN/1.73M2
GLUCOSE BLD STRIP.AUTO-MCNC: 144 MG/DL (ref 70–108)
GLUCOSE BLD STRIP.AUTO-MCNC: 73 MG/DL (ref 70–108)
GLUCOSE SERPL-MCNC: 62 MG/DL (ref 70–108)
GLUCOSE UR QL STRIP.AUTO: NEGATIVE MG/DL
HCT VFR BLD AUTO: 40.1 % (ref 37–47)
HGB BLD-MCNC: 12.3 GM/DL (ref 12–16)
HGB UR QL STRIP.AUTO: ABNORMAL
IMM GRANULOCYTES # BLD AUTO: 0.01 THOU/MM3 (ref 0–0.07)
IMM GRANULOCYTES NFR BLD AUTO: 0.4 %
KETONES UR QL STRIP.AUTO: NEGATIVE
LACTATE SERPL-SCNC: 3.5 MMOL/L (ref 0.5–2)
LEUKOCYTE ESTERASE UR QL STRIP.AUTO: ABNORMAL
LYMPHOCYTES ABSOLUTE: 0.6 THOU/MM3 (ref 1–4.8)
LYMPHOCYTES NFR BLD AUTO: 23.7 %
MAGNESIUM SERPL-MCNC: 1.6 MG/DL (ref 1.6–2.4)
MCH RBC QN AUTO: 32 PG (ref 26–33)
MCHC RBC AUTO-ENTMCNC: 30.7 GM/DL (ref 32.2–35.5)
MCV RBC AUTO: 104.4 FL (ref 81–99)
MONOCYTES ABSOLUTE: 0 THOU/MM3 (ref 0.4–1.3)
MONOCYTES NFR BLD AUTO: 0.4 %
MRSA DNA SPEC QL NAA+PROBE: NEGATIVE
MUCOUS THREADS URNS QL MICRO: ABNORMAL
NEUTROPHILS NFR BLD AUTO: 72.3 %
NITRITE UR QL STRIP.AUTO: POSITIVE
NRBC BLD AUTO-RTO: 0 /100 WBC
PH UR STRIP.AUTO: 7.5 [PH] (ref 5–9)
PHOSPHATE SERPL-MCNC: 3.9 MG/DL (ref 2.4–4.7)
PLATELET # BLD AUTO: 165 THOU/MM3 (ref 130–400)
PMV BLD AUTO: 10.9 FL (ref 9.4–12.4)
POTASSIUM SERPL-SCNC: 4.1 MEQ/L (ref 3.5–5.2)
PROCALCITONIN SERPL IA-MCNC: 4.53 NG/ML (ref 0.01–0.09)
PROT SERPL-MCNC: 7.4 G/DL (ref 6.1–8)
PROT UR STRIP.AUTO-MCNC: 100 MG/DL
RBC # BLD AUTO: 3.84 MILL/MM3 (ref 4.2–5.4)
RBC #/AREA URNS HPF: > 200 /HPF
SEGMENTED NEUTROPHILS ABSOLUTE COUNT: 1.8 THOU/MM3 (ref 1.8–7.7)
SODIUM SERPL-SCNC: 142 MEQ/L (ref 135–145)
SPECIFIC GRAVITY UA: 1.02 (ref 1–1.03)
TROPONIN T: < 0.01 NG/ML
TROPONIN T: < 0.01 NG/ML
UROBILINOGEN, URINE: 1 EU/DL (ref 0–1)
VANA ISLT/SPM QL: NEGATIVE
WBC # BLD AUTO: 2.5 THOU/MM3 (ref 4.8–10.8)
WBC #/AREA URNS HPF: ABNORMAL /HPF

## 2023-04-17 PROCEDURE — 6370000000 HC RX 637 (ALT 250 FOR IP): Performed by: STUDENT IN AN ORGANIZED HEALTH CARE EDUCATION/TRAINING PROGRAM

## 2023-04-17 PROCEDURE — 85025 COMPLETE CBC W/AUTO DIFF WBC: CPT

## 2023-04-17 PROCEDURE — 87086 URINE CULTURE/COLONY COUNT: CPT

## 2023-04-17 PROCEDURE — 0TCB8ZZ EXTIRPATION OF MATTER FROM BLADDER, VIA NATURAL OR ARTIFICIAL OPENING ENDOSCOPIC: ICD-10-PCS | Performed by: UROLOGY

## 2023-04-17 PROCEDURE — 2500000003 HC RX 250 WO HCPCS

## 2023-04-17 PROCEDURE — 6360000002 HC RX W HCPCS: Performed by: NURSE ANESTHETIST, CERTIFIED REGISTERED

## 2023-04-17 PROCEDURE — 83605 ASSAY OF LACTIC ACID: CPT

## 2023-04-17 PROCEDURE — 3600000013 HC SURGERY LEVEL 3 ADDTL 15MIN: Performed by: UROLOGY

## 2023-04-17 PROCEDURE — 3600000003 HC SURGERY LEVEL 3 BASE: Performed by: UROLOGY

## 2023-04-17 PROCEDURE — 2580000003 HC RX 258: Performed by: UROLOGY

## 2023-04-17 PROCEDURE — C2617 STENT, NON-COR, TEM W/O DEL: HCPCS | Performed by: UROLOGY

## 2023-04-17 PROCEDURE — 3700000001 HC ADD 15 MINUTES (ANESTHESIA): Performed by: UROLOGY

## 2023-04-17 PROCEDURE — C1769 GUIDE WIRE: HCPCS | Performed by: UROLOGY

## 2023-04-17 PROCEDURE — 2709999900 HC NON-CHARGEABLE SUPPLY: Performed by: UROLOGY

## 2023-04-17 PROCEDURE — C1758 CATHETER, URETERAL: HCPCS | Performed by: UROLOGY

## 2023-04-17 PROCEDURE — 93005 ELECTROCARDIOGRAM TRACING: CPT | Performed by: ANESTHESIOLOGY

## 2023-04-17 PROCEDURE — BT1F1ZZ FLUOROSCOPY OF LEFT KIDNEY, URETER AND BLADDER USING LOW OSMOLAR CONTRAST: ICD-10-PCS | Performed by: UROLOGY

## 2023-04-17 PROCEDURE — 6360000002 HC RX W HCPCS: Performed by: ANESTHESIOLOGY

## 2023-04-17 PROCEDURE — 2720000010 HC SURG SUPPLY STERILE: Performed by: UROLOGY

## 2023-04-17 PROCEDURE — 36415 COLL VENOUS BLD VENIPUNCTURE: CPT

## 2023-04-17 PROCEDURE — 82948 REAGENT STRIP/BLOOD GLUCOSE: CPT

## 2023-04-17 PROCEDURE — 2500000003 HC RX 250 WO HCPCS: Performed by: ANESTHESIOLOGY

## 2023-04-17 PROCEDURE — 3700000000 HC ANESTHESIA ATTENDED CARE: Performed by: UROLOGY

## 2023-04-17 PROCEDURE — C1747 HC ENDOSCOPE, SINGLE, URINARY TRACT: HCPCS | Performed by: UROLOGY

## 2023-04-17 PROCEDURE — 84145 PROCALCITONIN (PCT): CPT

## 2023-04-17 PROCEDURE — 2500000003 HC RX 250 WO HCPCS: Performed by: NURSE ANESTHETIST, CERTIFIED REGISTERED

## 2023-04-17 PROCEDURE — 2580000003 HC RX 258: Performed by: ANESTHESIOLOGY

## 2023-04-17 PROCEDURE — 6360000002 HC RX W HCPCS: Performed by: STUDENT IN AN ORGANIZED HEALTH CARE EDUCATION/TRAINING PROGRAM

## 2023-04-17 PROCEDURE — 71045 X-RAY EXAM CHEST 1 VIEW: CPT

## 2023-04-17 PROCEDURE — 0T778DZ DILATION OF LEFT URETER WITH INTRALUMINAL DEVICE, VIA NATURAL OR ARTIFICIAL OPENING ENDOSCOPIC: ICD-10-PCS | Performed by: UROLOGY

## 2023-04-17 PROCEDURE — 83735 ASSAY OF MAGNESIUM: CPT

## 2023-04-17 PROCEDURE — 80048 BASIC METABOLIC PNL TOTAL CA: CPT

## 2023-04-17 PROCEDURE — 2500000003 HC RX 250 WO HCPCS: Performed by: STUDENT IN AN ORGANIZED HEALTH CARE EDUCATION/TRAINING PROGRAM

## 2023-04-17 PROCEDURE — 2000000000 HC ICU R&B

## 2023-04-17 PROCEDURE — 87500 VANOMYCIN DNA AMP PROBE: CPT

## 2023-04-17 PROCEDURE — 80076 HEPATIC FUNCTION PANEL: CPT

## 2023-04-17 PROCEDURE — 2580000003 HC RX 258: Performed by: STUDENT IN AN ORGANIZED HEALTH CARE EDUCATION/TRAINING PROGRAM

## 2023-04-17 PROCEDURE — 6360000004 HC RX CONTRAST MEDICATION: Performed by: UROLOGY

## 2023-04-17 PROCEDURE — 84484 ASSAY OF TROPONIN QUANT: CPT

## 2023-04-17 PROCEDURE — 82533 TOTAL CORTISOL: CPT

## 2023-04-17 PROCEDURE — 6360000002 HC RX W HCPCS

## 2023-04-17 PROCEDURE — 87070 CULTURE OTHR SPECIMN AEROBIC: CPT

## 2023-04-17 PROCEDURE — 81001 URINALYSIS AUTO W/SCOPE: CPT

## 2023-04-17 PROCEDURE — 7100000001 HC PACU RECOVERY - ADDTL 15 MIN: Performed by: UROLOGY

## 2023-04-17 PROCEDURE — 84100 ASSAY OF PHOSPHORUS: CPT

## 2023-04-17 PROCEDURE — P9047 ALBUMIN (HUMAN), 25%, 50ML: HCPCS | Performed by: STUDENT IN AN ORGANIZED HEALTH CARE EDUCATION/TRAINING PROGRAM

## 2023-04-17 PROCEDURE — 7100000000 HC PACU RECOVERY - FIRST 15 MIN: Performed by: UROLOGY

## 2023-04-17 PROCEDURE — 87641 MR-STAPH DNA AMP PROBE: CPT

## 2023-04-17 DEVICE — URETERAL STENT
Type: IMPLANTABLE DEVICE | Site: URETER | Status: FUNCTIONAL
Brand: PERCUFLEX™ PLUS

## 2023-04-17 RX ORDER — SODIUM CHLORIDE 9 MG/ML
INJECTION, SOLUTION INTRAVENOUS PRN
Status: DISCONTINUED | OUTPATIENT
Start: 2023-04-17 | End: 2023-04-17 | Stop reason: HOSPADM

## 2023-04-17 RX ORDER — ROCURONIUM BROMIDE 10 MG/ML
INJECTION, SOLUTION INTRAVENOUS PRN
Status: DISCONTINUED | OUTPATIENT
Start: 2023-04-17 | End: 2023-04-17 | Stop reason: SDUPTHER

## 2023-04-17 RX ORDER — POLYETHYLENE GLYCOL 3350 17 G/17G
17 POWDER, FOR SOLUTION ORAL DAILY PRN
Status: DISCONTINUED | OUTPATIENT
Start: 2023-04-17 | End: 2023-04-27 | Stop reason: HOSPADM

## 2023-04-17 RX ORDER — SODIUM CHLORIDE 0.9 % (FLUSH) 0.9 %
5-40 SYRINGE (ML) INJECTION PRN
Status: DISCONTINUED | OUTPATIENT
Start: 2023-04-17 | End: 2023-04-27 | Stop reason: HOSPADM

## 2023-04-17 RX ORDER — PHENAZOPYRIDINE HYDROCHLORIDE 100 MG/1
100 TABLET, FILM COATED ORAL 3 TIMES DAILY PRN
Qty: 9 TABLET | Refills: 0 | Status: SHIPPED | OUTPATIENT
Start: 2023-04-17 | End: 2023-04-20

## 2023-04-17 RX ORDER — SODIUM CHLORIDE 0.9 % (FLUSH) 0.9 %
5-40 SYRINGE (ML) INJECTION EVERY 12 HOURS SCHEDULED
Status: DISCONTINUED | OUTPATIENT
Start: 2023-04-17 | End: 2023-04-27 | Stop reason: HOSPADM

## 2023-04-17 RX ORDER — ALBUMIN (HUMAN) 12.5 G/50ML
25 SOLUTION INTRAVENOUS ONCE
Status: COMPLETED | OUTPATIENT
Start: 2023-04-17 | End: 2023-04-17

## 2023-04-17 RX ORDER — PHENYLEPHRINE HCL IN 0.9% NACL 1 MG/10 ML
200 SYRINGE (ML) INTRAVENOUS EVERY 5 MIN PRN
Status: COMPLETED | OUTPATIENT
Start: 2023-04-17 | End: 2023-04-17

## 2023-04-17 RX ORDER — CARBAMAZEPINE 100 MG/1
100 TABLET, CHEWABLE ORAL 3 TIMES DAILY
Status: DISCONTINUED | OUTPATIENT
Start: 2023-04-17 | End: 2023-04-27 | Stop reason: HOSPADM

## 2023-04-17 RX ORDER — DOXYCYCLINE HYCLATE 100 MG
100 TABLET ORAL 2 TIMES DAILY
Qty: 6 TABLET | Refills: 0 | Status: SHIPPED | OUTPATIENT
Start: 2023-04-17 | End: 2023-04-20

## 2023-04-17 RX ORDER — 0.9 % SODIUM CHLORIDE 0.9 %
30 INTRAVENOUS SOLUTION INTRAVENOUS ONCE
Status: COMPLETED | OUTPATIENT
Start: 2023-04-17 | End: 2023-04-17

## 2023-04-17 RX ORDER — ONDANSETRON 2 MG/ML
4 INJECTION INTRAMUSCULAR; INTRAVENOUS EVERY 6 HOURS PRN
Status: DISCONTINUED | OUTPATIENT
Start: 2023-04-17 | End: 2023-04-27 | Stop reason: HOSPADM

## 2023-04-17 RX ORDER — FENTANYL CITRATE 50 UG/ML
50 INJECTION, SOLUTION INTRAMUSCULAR; INTRAVENOUS EVERY 5 MIN PRN
Status: DISCONTINUED | OUTPATIENT
Start: 2023-04-17 | End: 2023-04-17 | Stop reason: HOSPADM

## 2023-04-17 RX ORDER — SODIUM CHLORIDE 9 MG/ML
INJECTION, SOLUTION INTRAVENOUS CONTINUOUS
Status: DISCONTINUED | OUTPATIENT
Start: 2023-04-17 | End: 2023-04-18

## 2023-04-17 RX ORDER — PROPOFOL 10 MG/ML
INJECTION, EMULSION INTRAVENOUS PRN
Status: DISCONTINUED | OUTPATIENT
Start: 2023-04-17 | End: 2023-04-17 | Stop reason: SDUPTHER

## 2023-04-17 RX ORDER — DEXTROSE MONOHYDRATE 25 G/50ML
25 INJECTION, SOLUTION INTRAVENOUS ONCE
Status: COMPLETED | OUTPATIENT
Start: 2023-04-17 | End: 2023-04-17

## 2023-04-17 RX ORDER — FENTANYL CITRATE 50 UG/ML
INJECTION, SOLUTION INTRAMUSCULAR; INTRAVENOUS PRN
Status: DISCONTINUED | OUTPATIENT
Start: 2023-04-17 | End: 2023-04-17 | Stop reason: SDUPTHER

## 2023-04-17 RX ORDER — PROMETHAZINE HYDROCHLORIDE 25 MG/ML
6.25 INJECTION, SOLUTION INTRAMUSCULAR; INTRAVENOUS ONCE
Status: DISCONTINUED | OUTPATIENT
Start: 2023-04-17 | End: 2023-04-18

## 2023-04-17 RX ORDER — SODIUM CHLORIDE 0.9 % (FLUSH) 0.9 %
5-40 SYRINGE (ML) INJECTION PRN
Status: DISCONTINUED | OUTPATIENT
Start: 2023-04-17 | End: 2023-04-17 | Stop reason: HOSPADM

## 2023-04-17 RX ORDER — VITAMIN B COMPLEX
1000 TABLET ORAL DAILY
Status: DISCONTINUED | OUTPATIENT
Start: 2023-04-18 | End: 2023-04-27 | Stop reason: HOSPADM

## 2023-04-17 RX ORDER — ACETAMINOPHEN 325 MG/1
650 TABLET ORAL EVERY 6 HOURS PRN
Status: DISCONTINUED | OUTPATIENT
Start: 2023-04-17 | End: 2023-04-19

## 2023-04-17 RX ORDER — ONDANSETRON 2 MG/ML
INJECTION INTRAMUSCULAR; INTRAVENOUS
Status: COMPLETED
Start: 2023-04-17 | End: 2023-04-17

## 2023-04-17 RX ORDER — DEXTROSE MONOHYDRATE 100 MG/ML
INJECTION, SOLUTION INTRAVENOUS CONTINUOUS PRN
Status: DISCONTINUED | OUTPATIENT
Start: 2023-04-17 | End: 2023-04-27 | Stop reason: HOSPADM

## 2023-04-17 RX ORDER — FENTANYL CITRATE 50 UG/ML
25 INJECTION, SOLUTION INTRAMUSCULAR; INTRAVENOUS EVERY 5 MIN PRN
Status: DISCONTINUED | OUTPATIENT
Start: 2023-04-17 | End: 2023-04-17 | Stop reason: HOSPADM

## 2023-04-17 RX ORDER — SODIUM CHLORIDE 0.9 % (FLUSH) 0.9 %
5-40 SYRINGE (ML) INJECTION EVERY 12 HOURS SCHEDULED
Status: DISCONTINUED | OUTPATIENT
Start: 2023-04-17 | End: 2023-04-17 | Stop reason: HOSPADM

## 2023-04-17 RX ORDER — LIDOCAINE HYDROCHLORIDE 20 MG/ML
INJECTION, SOLUTION INTRAVENOUS PRN
Status: DISCONTINUED | OUTPATIENT
Start: 2023-04-17 | End: 2023-04-17 | Stop reason: SDUPTHER

## 2023-04-17 RX ORDER — TROSPIUM CHLORIDE 20 MG/1
20 TABLET, FILM COATED ORAL 2 TIMES DAILY
Status: DISCONTINUED | OUTPATIENT
Start: 2023-04-17 | End: 2023-04-27 | Stop reason: HOSPADM

## 2023-04-17 RX ORDER — VASOPRESSIN 20 U/ML
INJECTION PARENTERAL PRN
Status: DISCONTINUED | OUTPATIENT
Start: 2023-04-17 | End: 2023-04-17 | Stop reason: SDUPTHER

## 2023-04-17 RX ORDER — ONDANSETRON 4 MG/1
4 TABLET, ORALLY DISINTEGRATING ORAL EVERY 8 HOURS PRN
Status: DISCONTINUED | OUTPATIENT
Start: 2023-04-17 | End: 2023-04-27 | Stop reason: HOSPADM

## 2023-04-17 RX ORDER — SODIUM CHLORIDE, SODIUM LACTATE, POTASSIUM CHLORIDE, AND CALCIUM CHLORIDE .6; .31; .03; .02 G/100ML; G/100ML; G/100ML; G/100ML
2000 INJECTION, SOLUTION INTRAVENOUS ONCE
Status: DISCONTINUED | OUTPATIENT
Start: 2023-04-17 | End: 2023-04-17

## 2023-04-17 RX ORDER — 0.9 % SODIUM CHLORIDE 0.9 %
30 INTRAVENOUS SOLUTION INTRAVENOUS ONCE
Status: COMPLETED | OUTPATIENT
Start: 2023-04-17 | End: 2023-04-18

## 2023-04-17 RX ORDER — SODIUM CHLORIDE 9 MG/ML
INJECTION, SOLUTION INTRAVENOUS PRN
Status: DISCONTINUED | OUTPATIENT
Start: 2023-04-17 | End: 2023-04-27 | Stop reason: HOSPADM

## 2023-04-17 RX ORDER — ACETAMINOPHEN 650 MG/1
650 SUPPOSITORY RECTAL EVERY 6 HOURS PRN
Status: DISCONTINUED | OUTPATIENT
Start: 2023-04-17 | End: 2023-04-19

## 2023-04-17 RX ORDER — NOREPINEPHRINE BIT/0.9 % NACL 16MG/250ML
1-100 INFUSION BOTTLE (ML) INTRAVENOUS CONTINUOUS
Status: DISCONTINUED | OUTPATIENT
Start: 2023-04-17 | End: 2023-04-21

## 2023-04-17 RX ORDER — ACETIC ACID 0.25 G/100ML
250 IRRIGANT IRRIGATION SEE ADMIN INSTRUCTIONS
Status: DISCONTINUED | OUTPATIENT
Start: 2023-04-17 | End: 2023-04-18

## 2023-04-17 RX ORDER — NOREPINEPHRINE BIT/0.9 % NACL 16MG/250ML
INFUSION BOTTLE (ML) INTRAVENOUS
Status: COMPLETED
Start: 2023-04-17 | End: 2023-04-17

## 2023-04-17 RX ORDER — DEXTROSE MONOHYDRATE 25 G/50ML
INJECTION, SOLUTION INTRAVENOUS
Status: COMPLETED
Start: 2023-04-17 | End: 2023-04-17

## 2023-04-17 RX ORDER — ONDANSETRON 2 MG/ML
INJECTION INTRAMUSCULAR; INTRAVENOUS PRN
Status: DISCONTINUED | OUTPATIENT
Start: 2023-04-17 | End: 2023-04-17 | Stop reason: SDUPTHER

## 2023-04-17 RX ORDER — MAGNESIUM SULFATE HEPTAHYDRATE 500 MG/ML
2000 INJECTION, SOLUTION INTRAMUSCULAR; INTRAVENOUS ONCE
Status: DISCONTINUED | OUTPATIENT
Start: 2023-04-18 | End: 2023-04-17

## 2023-04-17 RX ADMIN — CEFTRIAXONE SODIUM 1000 MG: 1 INJECTION, POWDER, FOR SOLUTION INTRAMUSCULAR; INTRAVENOUS at 16:51

## 2023-04-17 RX ADMIN — PHENYLEPHRINE HYDROCHLORIDE 200 MCG: 10 INJECTION INTRAVENOUS at 14:59

## 2023-04-17 RX ADMIN — DEXTROSE MONOHYDRATE 500 ML: 50 INJECTION, SOLUTION INTRAVENOUS at 21:10

## 2023-04-17 RX ADMIN — PIPERACILLIN AND TAZOBACTAM 4500 MG: 4; .5 INJECTION, POWDER, LYOPHILIZED, FOR SOLUTION INTRAVENOUS at 21:00

## 2023-04-17 RX ADMIN — ROCURONIUM BROMIDE 30 MG: 10 INJECTION INTRAVENOUS at 14:46

## 2023-04-17 RX ADMIN — LIDOCAINE HYDROCHLORIDE 100 MG: 20 INJECTION, SOLUTION INTRAVENOUS at 14:46

## 2023-04-17 RX ADMIN — Medication 2000 MG: at 14:55

## 2023-04-17 RX ADMIN — SODIUM CHLORIDE: 9 INJECTION, SOLUTION INTRAVENOUS at 11:51

## 2023-04-17 RX ADMIN — PHENYLEPHRINE HYDROCHLORIDE 100 MCG: 10 INJECTION INTRAVENOUS at 14:52

## 2023-04-17 RX ADMIN — PHENYLEPHRINE HYDROCHLORIDE 100 MCG: 10 INJECTION INTRAVENOUS at 14:47

## 2023-04-17 RX ADMIN — DEXTROSE MONOHYDRATE 25 G: 25 INJECTION, SOLUTION INTRAVENOUS at 17:38

## 2023-04-17 RX ADMIN — ACETAMINOPHEN 650 MG: 325 TABLET ORAL at 23:07

## 2023-04-17 RX ADMIN — SUGAMMADEX 200 MG: 100 INJECTION, SOLUTION INTRAVENOUS at 15:26

## 2023-04-17 RX ADMIN — SODIUM CHLORIDE: 9 INJECTION, SOLUTION INTRAVENOUS at 20:54

## 2023-04-17 RX ADMIN — SODIUM CHLORIDE 1000 ML: 9 INJECTION, SOLUTION INTRAVENOUS at 22:49

## 2023-04-17 RX ADMIN — ONDANSETRON 4 MG: 2 INJECTION INTRAMUSCULAR; INTRAVENOUS at 15:22

## 2023-04-17 RX ADMIN — Medication 5 MCG/MIN: at 18:24

## 2023-04-17 RX ADMIN — ALBUMIN (HUMAN) 25 G: 0.25 INJECTION, SOLUTION INTRAVENOUS at 21:47

## 2023-04-17 RX ADMIN — SODIUM CHLORIDE 1578 ML: 9 INJECTION, SOLUTION INTRAVENOUS at 18:19

## 2023-04-17 RX ADMIN — ONDANSETRON 4 MG: 2 INJECTION INTRAMUSCULAR; INTRAVENOUS at 22:55

## 2023-04-17 RX ADMIN — VASOPRESSIN 2 UNITS: 20 INJECTION INTRAVENOUS at 15:03

## 2023-04-17 RX ADMIN — FENTANYL CITRATE 50 MCG: 50 INJECTION, SOLUTION INTRAMUSCULAR; INTRAVENOUS at 14:46

## 2023-04-17 RX ADMIN — VASOPRESSIN 2 UNITS: 20 INJECTION INTRAVENOUS at 15:12

## 2023-04-17 RX ADMIN — ONDANSETRON 4 MG: 2 INJECTION INTRAMUSCULAR; INTRAVENOUS at 16:32

## 2023-04-17 RX ADMIN — VASOPRESSIN 2 UNITS: 20 INJECTION INTRAVENOUS at 15:16

## 2023-04-17 RX ADMIN — Medication 0.2 MG: at 17:30

## 2023-04-17 RX ADMIN — Medication 0.2 MG: at 17:33

## 2023-04-17 RX ADMIN — MICONAZOLE NITRATE: 2 POWDER TOPICAL at 23:08

## 2023-04-17 RX ADMIN — PROPOFOL 120 MG: 10 INJECTION, EMULSION INTRAVENOUS at 14:46

## 2023-04-17 ASSESSMENT — PAIN - FUNCTIONAL ASSESSMENT: PAIN_FUNCTIONAL_ASSESSMENT: 0-10

## 2023-04-17 NOTE — ANESTHESIA POSTPROCEDURE EVALUATION
Department of Anesthesiology  Postprocedure Note    Patient: Olamide Nava  MRN: [id number]  YOB: 1950  Date of evaluation: 4/17/2023      Procedure Summary     Date: 04/17/23 Room / Location: [facility]    Anesthesia Start: 1440 Anesthesia Stop: 2363    Procedure: CYSTOSCOPY, CYSTOLITHOLAPAXY, LEFT URETEROSCOPY, LASER LITHOTRIPSY, LEFT URETERAL STENT PLACEMENT,suprapubic catheter exchange (Left: Ureter) Diagnosis:       Kidney stones      Bladder stones      (Kidney stones [N20.0])      (Bladder stones [N21.0])    Surgeons: Prosper Meng MD Responsible Provider: Orquidea Galarza DO    Anesthesia Type: General ASA Status: 3          Anesthesia Type: General    Collin Phase I: Collin Score: 8    Collin Phase II:        Anesthesia Post Evaluation    Comments: Roe Gale 60  POST-ANESTHESIA NOTE       Name:  Olamide Nava                                         Age:  74 y.o. MRN:  [id number]      Last Vitals:  /69   Pulse (!) 105   Temp 96.8 °F (36 °C) (Temporal)   Resp 20   Ht 5' 3\" (1.6 m)   Wt 116 lb (52.6 kg)   SpO2 98%   BMI 20.55 kg/m²   Patient Vitals in the past 4 hrs:  04/17/23 1545, BP:119/69, Pulse:(!) 105, Resp:20, SpO2:98 %  04/17/23 1540, BP:135/79, Pulse:88, Resp:21, SpO2:96 %  04/17/23 1536, BP:(!) 98/53, Temp:96.8 °F (36 °C), Temp src:Temporal, Pulse:99, Resp:20, SpO2:98 %  04/17/23 1155, Temp:(!) 96 °F (35.6 °C), Temp src:Tympanic    Level of Consciousness:  Awake    Respiratory:  Stable    Oxygen Saturation:  Stable    Cardiovascular:  Stable    Hydration:  Adequate    PONV:  Stable    Post-op Pain:  Adequate analgesia    Post-op Assessment:  No apparent anesthetic complications    Additional Follow-Up / Treatment / Comment:  None    Bing Monte DO  April 17, 2023   3:49 PM

## 2023-04-17 NOTE — ANESTHESIA PRE PROCEDURE
results found for: PREGTESTUR, PREGSERUM, HCG, HCGQUANT     ABGs: No results found for: PHART, PO2ART, VTS7EYH, FFY9WDH, BEART, F6ZKMHTT     Type & Screen (If Applicable):  Lab Results   Component Value Date    LABRH NEG 11/29/2020       Drug/Infectious Status (If Applicable):  No results found for: HIV, HEPCAB    COVID-19 Screening (If Applicable):   Lab Results   Component Value Date/Time    COVID19 DETECTED 11/28/2020 11:50 PM           Anesthesia Evaluation  Patient summary reviewed  Airway: Mallampati: III  TM distance: <3 FB   Neck ROM: limited  Mouth opening: < 3 FB   Dental:          Pulmonary:                              Cardiovascular:    (+) hypertension:,       ECG reviewed                        Neuro/Psych:   (+) neuromuscular disease: multiple sclerosis,             GI/Hepatic/Renal:             Endo/Other:                     Abdominal:             Vascular: Other Findings:           Anesthesia Plan      general     ASA 3       Induction: intravenous. MIPS: Postoperative opioids intended and Prophylactic antiemetics administered. Anesthetic plan and risks discussed with patient and spouse. Plan discussed with CRNA. Ferny Ashley.  DO Mell   4/17/2023

## 2023-04-18 ENCOUNTER — APPOINTMENT (OUTPATIENT)
Dept: GENERAL RADIOLOGY | Age: 73
DRG: 853 | End: 2023-04-18
Attending: UROLOGY
Payer: MEDICARE

## 2023-04-18 ENCOUNTER — APPOINTMENT (OUTPATIENT)
Dept: CT IMAGING | Age: 73
DRG: 853 | End: 2023-04-18
Attending: UROLOGY
Payer: MEDICARE

## 2023-04-18 LAB
% INHIBITION AA: 7.1 %
% INHIBITION ADP: 51.6 %
25(OH)D3 SERPL-MCNC: 22 NG/ML (ref 30–100)
AA % AGGREGATION: 92.9 %
ABO: NORMAL
ADP PERCENT AGGREGATION: 48.4 %
ALBUMIN SERPL BCG-MCNC: 3.1 G/DL (ref 3.5–5.1)
ALBUMIN SERPL BCG-MCNC: 3.4 G/DL (ref 3.5–5.1)
ALP SERPL-CCNC: 146 U/L (ref 38–126)
ALP SERPL-CCNC: 183 U/L (ref 38–126)
ALT SERPL W/O P-5'-P-CCNC: 33 U/L (ref 11–66)
ALT SERPL W/O P-5'-P-CCNC: 36 U/L (ref 11–66)
ANION GAP SERPL CALC-SCNC: 11 MEQ/L (ref 8–16)
ANION GAP SERPL CALC-SCNC: 13 MEQ/L (ref 8–16)
ANION GAP SERPL CALC-SCNC: 14 MEQ/L (ref 8–16)
ANTIBODY SCREEN: NORMAL
APTT PPP: 31.1 SECONDS (ref 22–38)
APTT PPP: 31.5 SECONDS (ref 22–38)
APTT PPP: 33.7 SECONDS (ref 22–38)
ARTERIAL PATENCY WRIST A: POSITIVE
AST SERPL-CCNC: 68 U/L (ref 5–40)
AST SERPL-CCNC: 71 U/L (ref 5–40)
B-OH-BUTYR SERPL-MSCNC: 0.5 MG/DL (ref 0.2–2.81)
B-OH-BUTYR SERPL-MSCNC: 2.74 MG/DL (ref 0.2–2.81)
BASE EXCESS BLDA CALC-SCNC: -6.6 MMOL/L (ref -2.5–2.5)
BDY SITE: ABNORMAL
BILIRUB SERPL-MCNC: 0.9 MG/DL (ref 0.3–1.2)
BILIRUB SERPL-MCNC: 1.1 MG/DL (ref 0.3–1.2)
BUN SERPL-MCNC: 11 MG/DL (ref 7–22)
BUN SERPL-MCNC: 12 MG/DL (ref 7–22)
BUN SERPL-MCNC: 12 MG/DL (ref 7–22)
CA-I BLD ISE-SCNC: 1.06 MMOL/L (ref 1.12–1.32)
CA-I BLD ISE-SCNC: 1.09 MMOL/L (ref 1.12–1.32)
CA-I BLD ISE-SCNC: 1.12 MMOL/L (ref 1.12–1.32)
CALCIUM SERPL-MCNC: 7.3 MG/DL (ref 8.5–10.5)
CALCIUM SERPL-MCNC: 7.9 MG/DL (ref 8.5–10.5)
CALCIUM SERPL-MCNC: 8.3 MG/DL (ref 8.5–10.5)
CHLORIDE SERPL-SCNC: 110 MEQ/L (ref 98–111)
CHLORIDE SERPL-SCNC: 111 MEQ/L (ref 98–111)
CHLORIDE SERPL-SCNC: 111 MEQ/L (ref 98–111)
CLOT ANGLE BLD TEG: 23.3 MM (ref 15–32)
CLOT INIT BLD TEG: 5 MINUTES (ref 4.6–9.1)
CLOT LYSIS 30M P MA LENFR BLD TEG: 0.2 % (ref 0–2.6)
CO2 SERPL-SCNC: 17 MEQ/L (ref 23–33)
CO2 SERPL-SCNC: 20 MEQ/L (ref 23–33)
CO2 SERPL-SCNC: 21 MEQ/L (ref 23–33)
COLLECTED BY:: ABNORMAL
CORTIS SERPL-MCNC: 29.28 UG/DL
CORTIS SERPL-MCNC: 48.39 UG/DL
CREAT SERPL-MCNC: 0.4 MG/DL (ref 0.4–1.2)
CREAT SERPL-MCNC: 0.5 MG/DL (ref 0.4–1.2)
CREAT SERPL-MCNC: 0.6 MG/DL (ref 0.4–1.2)
DEPRECATED RDW RBC AUTO: 53.1 FL (ref 35–45)
DEPRECATED RDW RBC AUTO: 53.2 FL (ref 35–45)
DEPRECATED RDW RBC AUTO: 55.2 FL (ref 35–45)
DEPRECATED RDW RBC AUTO: 55.4 FL (ref 35–45)
DEVICE: ABNORMAL
ERYTHROCYTE [DISTWIDTH] IN BLOOD BY AUTOMATED COUNT: 13.7 % (ref 11.5–14.5)
ERYTHROCYTE [DISTWIDTH] IN BLOOD BY AUTOMATED COUNT: 13.9 % (ref 11.5–14.5)
ERYTHROCYTE [DISTWIDTH] IN BLOOD BY AUTOMATED COUNT: 15.5 % (ref 11.5–14.5)
ERYTHROCYTE [DISTWIDTH] IN BLOOD BY AUTOMATED COUNT: 15.9 % (ref 11.5–14.5)
FERRITIN SERPL IA-MCNC: 88 NG/ML (ref 10–291)
FIBRINOGEN PPP-MCNC: 186 MG/100ML (ref 155–475)
FIO2 ON VENT O2 ANALYZER: 2 %
FLUAV RNA RESP QL NAA+PROBE: NOT DETECTED
FLUBV RNA RESP QL NAA+PROBE: NOT DETECTED
FOLATE SERPL-MCNC: 7.4 NG/ML (ref 4.8–24.2)
FSP PPP LA-ACNC: ABNORMAL MCG/ML
GFR SERPL CREATININE-BSD FRML MDRD: > 60 ML/MIN/1.73M2
GGT SERPL-CCNC: 331 U/L (ref 8–69)
GLUCOSE BLD STRIP.AUTO-MCNC: 106 MG/DL (ref 70–108)
GLUCOSE BLD STRIP.AUTO-MCNC: 109 MG/DL (ref 70–108)
GLUCOSE BLD STRIP.AUTO-MCNC: 125 MG/DL (ref 70–108)
GLUCOSE SERPL-MCNC: 111 MG/DL (ref 70–108)
GLUCOSE SERPL-MCNC: 120 MG/DL (ref 70–108)
GLUCOSE SERPL-MCNC: 92 MG/DL (ref 70–108)
HCO3 BLDA-SCNC: 18 MMOL/L (ref 23–28)
HCT VFR BLD AUTO: 29 % (ref 37–47)
HCT VFR BLD AUTO: 35.8 % (ref 37–47)
HCT VFR BLD AUTO: 38.9 % (ref 37–47)
HCT VFR BLD AUTO: 39.3 % (ref 37–47)
HEPARIN UNFRACTIONATED: < 0.04 U/ML (ref 0.3–0.7)
HGB BLD-MCNC: 10.7 GM/DL (ref 12–16)
HGB BLD-MCNC: 12.7 GM/DL (ref 12–16)
HGB BLD-MCNC: 12.7 GM/DL (ref 12–16)
HGB BLD-MCNC: 12.9 GM/DL (ref 12–16)
HGB BLD-MCNC: 9.2 GM/DL (ref 12–16)
HOMOCYSTEINE: 10 UMOL/L
INR PPP: 1.12 (ref 0.85–1.13)
INR PPP: 1.24 (ref 0.85–1.13)
INR PPP: 1.31 (ref 0.85–1.13)
IRON SATN MFR SERPL: 46 % (ref 20–50)
IRON SERPL-MCNC: 87 UG/DL (ref 50–170)
LACTATE SERPL-SCNC: 2.6 MMOL/L (ref 0.5–2)
LACTATE SERPL-SCNC: 2.9 MMOL/L (ref 0.5–2)
LACTATE SERPL-SCNC: 3.2 MMOL/L (ref 0.5–2)
LACTATE SERPL-SCNC: 3.7 MMOL/L (ref 0.5–2)
LACTATE SERPL-SCNC: 3.8 MMOL/L (ref 0.5–2)
LACTATE SERPL-SCNC: 4.6 MMOL/L (ref 0.5–2)
LDH SERPL L TO P-CCNC: 156 U/L (ref 100–190)
MA AA BLD RES TEG: 59.4 MM (ref 51–71)
MA ACTF BLD RES TEG: 13.9 MM (ref 2–19)
MA ADP BLD RES TEG: 37.6 MM (ref 45–69)
MA KAOLIN P HPASE BLD RES TEG: 62.9 MM (ref 53–68)
MAGNESIUM SERPL-MCNC: 1.3 MG/DL (ref 1.6–2.4)
MAGNESIUM SERPL-MCNC: 1.9 MG/DL (ref 1.6–2.4)
MAGNESIUM SERPL-MCNC: 2 MG/DL (ref 1.6–2.4)
MCF.EXTRINSIC BLD ROTEM: 61.5 MM (ref 52–70)
MCH RBC QN AUTO: 31.2 PG (ref 26–33)
MCH RBC QN AUTO: 31.3 PG (ref 26–33)
MCH RBC QN AUTO: 31.4 PG (ref 26–33)
MCH RBC QN AUTO: 32.7 PG (ref 26–33)
MCHC RBC AUTO-ENTMCNC: 29.9 GM/DL (ref 32.2–35.5)
MCHC RBC AUTO-ENTMCNC: 31.7 GM/DL (ref 32.2–35.5)
MCHC RBC AUTO-ENTMCNC: 32.3 GM/DL (ref 32.2–35.5)
MCHC RBC AUTO-ENTMCNC: 33.2 GM/DL (ref 32.2–35.5)
MCV RBC AUTO: 103.2 FL (ref 81–99)
MCV RBC AUTO: 105 FL (ref 81–99)
MCV RBC AUTO: 94.4 FL (ref 81–99)
MCV RBC AUTO: 96.6 FL (ref 81–99)
PCO2 BLDA: 30 MMHG (ref 35–45)
PH BLDA: 7.37 [PH] (ref 7.35–7.45)
PHOSPHATE SERPL-MCNC: 1.3 MG/DL (ref 2.4–4.7)
PHOSPHATE SERPL-MCNC: 4.3 MG/DL (ref 2.4–4.7)
PLATELET # BLD AUTO: 149 THOU/MM3 (ref 130–400)
PLATELET # BLD AUTO: 153 THOU/MM3 (ref 130–400)
PLATELET # BLD AUTO: 159 THOU/MM3 (ref 130–400)
PLATELET # BLD AUTO: 208 THOU/MM3 (ref 130–400)
PMV BLD AUTO: 11.6 FL (ref 9.4–12.4)
PMV BLD AUTO: 11.6 FL (ref 9.4–12.4)
PMV BLD AUTO: 11.8 FL (ref 9.4–12.4)
PMV BLD AUTO: 11.8 FL (ref 9.4–12.4)
PO2 BLDA: 76 MMHG (ref 71–104)
POTASSIUM SERPL-SCNC: 3.1 MEQ/L (ref 3.5–5.2)
POTASSIUM SERPL-SCNC: 3.4 MEQ/L (ref 3.5–5.2)
POTASSIUM SERPL-SCNC: 3.6 MEQ/L (ref 3.5–5.2)
POTASSIUM SERPL-SCNC: 4.1 MEQ/L (ref 3.5–5.2)
PREALB SERPL-MCNC: 7.9 MG/DL (ref 20–40)
PROT SERPL-MCNC: 6.2 G/DL (ref 6.1–8)
PROT SERPL-MCNC: 6.7 G/DL (ref 6.1–8)
RBC # BLD AUTO: 2.81 MILL/MM3 (ref 4.2–5.4)
RBC # BLD AUTO: 3.41 MILL/MM3 (ref 4.2–5.4)
RBC # BLD AUTO: 4.07 MILL/MM3 (ref 4.2–5.4)
RBC # BLD AUTO: 4.12 MILL/MM3 (ref 4.2–5.4)
REVIEWED BY: NORMAL
REVIEWED BY: NORMAL
RH FACTOR: NORMAL
SAO2 % BLDA: 95 %
SARS-COV-2 RNA RESP QL NAA+PROBE: NOT DETECTED
SCAN OF BLOOD SMEAR: NORMAL
SMEAR REVIEW: NORMAL
SMEAR REVIEW: NORMAL
SODIUM SERPL-SCNC: 142 MEQ/L (ref 135–145)
SODIUM SERPL-SCNC: 142 MEQ/L (ref 135–145)
SODIUM SERPL-SCNC: 144 MEQ/L (ref 135–145)
TIBC SERPL-MCNC: 188 UG/DL (ref 171–450)
TROPONIN T: < 0.01 NG/ML
TSH SERPL DL<=0.005 MIU/L-ACNC: 3.05 UIU/ML (ref 0.4–4.2)
VIT B12 SERPL-MCNC: 461 PG/ML (ref 211–911)
WBC # BLD AUTO: 18.8 THOU/MM3 (ref 4.8–10.8)
WBC # BLD AUTO: 28.1 THOU/MM3 (ref 4.8–10.8)
WBC # BLD AUTO: 31.7 THOU/MM3 (ref 4.8–10.8)
WBC # BLD AUTO: 32.6 THOU/MM3 (ref 4.8–10.8)

## 2023-04-18 PROCEDURE — 85576 BLOOD PLATELET AGGREGATION: CPT

## 2023-04-18 PROCEDURE — 82977 ASSAY OF GGT: CPT

## 2023-04-18 PROCEDURE — 87040 BLOOD CULTURE FOR BACTERIA: CPT

## 2023-04-18 PROCEDURE — 82728 ASSAY OF FERRITIN: CPT

## 2023-04-18 PROCEDURE — 84443 ASSAY THYROID STIM HORMONE: CPT

## 2023-04-18 PROCEDURE — 84484 ASSAY OF TROPONIN QUANT: CPT

## 2023-04-18 PROCEDURE — 86923 COMPATIBILITY TEST ELECTRIC: CPT

## 2023-04-18 PROCEDURE — 85347 COAGULATION TIME ACTIVATED: CPT

## 2023-04-18 PROCEDURE — 71045 X-RAY EXAM CHEST 1 VIEW: CPT

## 2023-04-18 PROCEDURE — 83010 ASSAY OF HAPTOGLOBIN QUANT: CPT

## 2023-04-18 PROCEDURE — 99232 SBSQ HOSP IP/OBS MODERATE 35: CPT | Performed by: UROLOGY

## 2023-04-18 PROCEDURE — 6360000004 HC RX CONTRAST MEDICATION

## 2023-04-18 PROCEDURE — 82010 KETONE BODYS QUAN: CPT

## 2023-04-18 PROCEDURE — 30233N1 TRANSFUSION OF NONAUTOLOGOUS RED BLOOD CELLS INTO PERIPHERAL VEIN, PERCUTANEOUS APPROACH: ICD-10-PCS

## 2023-04-18 PROCEDURE — P9047 ALBUMIN (HUMAN), 25%, 50ML: HCPCS | Performed by: STUDENT IN AN ORGANIZED HEALTH CARE EDUCATION/TRAINING PROGRAM

## 2023-04-18 PROCEDURE — 83735 ASSAY OF MAGNESIUM: CPT

## 2023-04-18 PROCEDURE — 93005 ELECTROCARDIOGRAM TRACING: CPT | Performed by: STUDENT IN AN ORGANIZED HEALTH CARE EDUCATION/TRAINING PROGRAM

## 2023-04-18 PROCEDURE — 2500000003 HC RX 250 WO HCPCS: Performed by: STUDENT IN AN ORGANIZED HEALTH CARE EDUCATION/TRAINING PROGRAM

## 2023-04-18 PROCEDURE — 6360000002 HC RX W HCPCS: Performed by: STUDENT IN AN ORGANIZED HEALTH CARE EDUCATION/TRAINING PROGRAM

## 2023-04-18 PROCEDURE — 30233R1 TRANSFUSION OF NONAUTOLOGOUS PLATELETS INTO PERIPHERAL VEIN, PERCUTANEOUS APPROACH: ICD-10-PCS

## 2023-04-18 PROCEDURE — 83090 ASSAY OF HOMOCYSTEINE: CPT

## 2023-04-18 PROCEDURE — 2000000000 HC ICU R&B

## 2023-04-18 PROCEDURE — 82533 TOTAL CORTISOL: CPT

## 2023-04-18 PROCEDURE — 83615 LACTATE (LD) (LDH) ENZYME: CPT

## 2023-04-18 PROCEDURE — 93010 ELECTROCARDIOGRAM REPORT: CPT | Performed by: INTERNAL MEDICINE

## 2023-04-18 PROCEDURE — 74018 RADEX ABDOMEN 1 VIEW: CPT

## 2023-04-18 PROCEDURE — 84134 ASSAY OF PREALBUMIN: CPT

## 2023-04-18 PROCEDURE — 85384 FIBRINOGEN ACTIVITY: CPT

## 2023-04-18 PROCEDURE — 80053 COMPREHEN METABOLIC PANEL: CPT

## 2023-04-18 PROCEDURE — 82306 VITAMIN D 25 HYDROXY: CPT

## 2023-04-18 PROCEDURE — 6370000000 HC RX 637 (ALT 250 FOR IP): Performed by: STUDENT IN AN ORGANIZED HEALTH CARE EDUCATION/TRAINING PROGRAM

## 2023-04-18 PROCEDURE — 85610 PROTHROMBIN TIME: CPT

## 2023-04-18 PROCEDURE — 71275 CT ANGIOGRAPHY CHEST: CPT

## 2023-04-18 PROCEDURE — 36430 TRANSFUSION BLD/BLD COMPNT: CPT

## 2023-04-18 PROCEDURE — 85362 FIBRIN DEGRADATION PRODUCTS: CPT

## 2023-04-18 PROCEDURE — 83605 ASSAY OF LACTIC ACID: CPT

## 2023-04-18 PROCEDURE — C1751 CATH, INF, PER/CENT/MIDLINE: HCPCS

## 2023-04-18 PROCEDURE — 83550 IRON BINDING TEST: CPT

## 2023-04-18 PROCEDURE — 02HV33Z INSERTION OF INFUSION DEVICE INTO SUPERIOR VENA CAVA, PERCUTANEOUS APPROACH: ICD-10-PCS

## 2023-04-18 PROCEDURE — 83540 ASSAY OF IRON: CPT

## 2023-04-18 PROCEDURE — 36569 INSJ PICC 5 YR+ W/O IMAGING: CPT

## 2023-04-18 PROCEDURE — 2500000003 HC RX 250 WO HCPCS

## 2023-04-18 PROCEDURE — 86850 RBC ANTIBODY SCREEN: CPT

## 2023-04-18 PROCEDURE — 85027 COMPLETE CBC AUTOMATED: CPT

## 2023-04-18 PROCEDURE — 82607 VITAMIN B-12: CPT

## 2023-04-18 PROCEDURE — 93005 ELECTROCARDIOGRAM TRACING: CPT | Performed by: NURSE PRACTITIONER

## 2023-04-18 PROCEDURE — 82746 ASSAY OF FOLIC ACID SERUM: CPT

## 2023-04-18 PROCEDURE — 05HN33Z INSERTION OF INFUSION DEVICE INTO LEFT INTERNAL JUGULAR VEIN, PERCUTANEOUS APPROACH: ICD-10-PCS | Performed by: STUDENT IN AN ORGANIZED HEALTH CARE EDUCATION/TRAINING PROGRAM

## 2023-04-18 PROCEDURE — 85390 FIBRINOLYSINS SCREEN I&R: CPT

## 2023-04-18 PROCEDURE — 82803 BLOOD GASES ANY COMBINATION: CPT

## 2023-04-18 PROCEDURE — 6370000000 HC RX 637 (ALT 250 FOR IP)

## 2023-04-18 PROCEDURE — 76937 US GUIDE VASCULAR ACCESS: CPT

## 2023-04-18 PROCEDURE — 87636 SARSCOV2 & INF A&B AMP PRB: CPT

## 2023-04-18 PROCEDURE — 36415 COLL VENOUS BLD VENIPUNCTURE: CPT

## 2023-04-18 PROCEDURE — 6360000002 HC RX W HCPCS

## 2023-04-18 PROCEDURE — 99291 CRITICAL CARE FIRST HOUR: CPT | Performed by: INTERNAL MEDICINE

## 2023-04-18 PROCEDURE — 85730 THROMBOPLASTIN TIME PARTIAL: CPT

## 2023-04-18 PROCEDURE — 86900 BLOOD TYPING SEROLOGIC ABO: CPT

## 2023-04-18 PROCEDURE — 36600 WITHDRAWAL OF ARTERIAL BLOOD: CPT

## 2023-04-18 PROCEDURE — 94640 AIRWAY INHALATION TREATMENT: CPT

## 2023-04-18 PROCEDURE — 85520 HEPARIN ASSAY: CPT

## 2023-04-18 PROCEDURE — P9016 RBC LEUKOCYTES REDUCED: HCPCS

## 2023-04-18 PROCEDURE — 84132 ASSAY OF SERUM POTASSIUM: CPT

## 2023-04-18 PROCEDURE — 83921 ORGANIC ACID SINGLE QUANT: CPT

## 2023-04-18 PROCEDURE — 85018 HEMOGLOBIN: CPT

## 2023-04-18 PROCEDURE — 82330 ASSAY OF CALCIUM: CPT

## 2023-04-18 PROCEDURE — 2580000003 HC RX 258: Performed by: STUDENT IN AN ORGANIZED HEALTH CARE EDUCATION/TRAINING PROGRAM

## 2023-04-18 PROCEDURE — 74176 CT ABD & PELVIS W/O CONTRAST: CPT

## 2023-04-18 PROCEDURE — 86901 BLOOD TYPING SEROLOGIC RH(D): CPT

## 2023-04-18 PROCEDURE — 82948 REAGENT STRIP/BLOOD GLUCOSE: CPT

## 2023-04-18 PROCEDURE — 2580000003 HC RX 258

## 2023-04-18 PROCEDURE — 84100 ASSAY OF PHOSPHORUS: CPT

## 2023-04-18 PROCEDURE — 2700000000 HC OXYGEN THERAPY PER DAY

## 2023-04-18 RX ORDER — LEVALBUTEROL INHALATION SOLUTION 1.25 MG/3ML
1.25 SOLUTION RESPIRATORY (INHALATION) EVERY 8 HOURS PRN
Status: DISCONTINUED | OUTPATIENT
Start: 2023-04-18 | End: 2023-04-27 | Stop reason: HOSPADM

## 2023-04-18 RX ORDER — HEPARIN SODIUM 1000 [USP'U]/ML
80 INJECTION, SOLUTION INTRAVENOUS; SUBCUTANEOUS PRN
Status: DISCONTINUED | OUTPATIENT
Start: 2023-04-18 | End: 2023-04-19

## 2023-04-18 RX ORDER — MORPHINE SULFATE 2 MG/ML
1 INJECTION, SOLUTION INTRAMUSCULAR; INTRAVENOUS ONCE
Status: COMPLETED | OUTPATIENT
Start: 2023-04-18 | End: 2023-04-18

## 2023-04-18 RX ORDER — CALCIUM GLUCONATE 20 MG/ML
2000 INJECTION, SOLUTION INTRAVENOUS ONCE
Status: COMPLETED | OUTPATIENT
Start: 2023-04-18 | End: 2023-04-18

## 2023-04-18 RX ORDER — LORAZEPAM 2 MG/ML
INJECTION INTRAMUSCULAR
Status: COMPLETED
Start: 2023-04-18 | End: 2023-04-18

## 2023-04-18 RX ORDER — HEPARIN SODIUM 1000 [USP'U]/ML
40 INJECTION, SOLUTION INTRAVENOUS; SUBCUTANEOUS PRN
Status: DISCONTINUED | OUTPATIENT
Start: 2023-04-18 | End: 2023-04-19

## 2023-04-18 RX ORDER — MORPHINE SULFATE 2 MG/ML
2 INJECTION, SOLUTION INTRAMUSCULAR; INTRAVENOUS ONCE
Status: COMPLETED | OUTPATIENT
Start: 2023-04-18 | End: 2023-04-18

## 2023-04-18 RX ORDER — CALCIUM GLUCONATE 20 MG/ML
2000 INJECTION, SOLUTION INTRAVENOUS ONCE
Status: COMPLETED | OUTPATIENT
Start: 2023-04-18 | End: 2023-04-19

## 2023-04-18 RX ORDER — LIDOCAINE 4 G/G
1 PATCH TOPICAL DAILY
Status: DISCONTINUED | OUTPATIENT
Start: 2023-04-18 | End: 2023-04-27 | Stop reason: HOSPADM

## 2023-04-18 RX ORDER — ACETIC ACID 0.25 G/100ML
250 IRRIGANT IRRIGATION
Status: DISCONTINUED | OUTPATIENT
Start: 2023-04-18 | End: 2023-04-27 | Stop reason: HOSPADM

## 2023-04-18 RX ORDER — LORAZEPAM 2 MG/ML
1 INJECTION INTRAMUSCULAR ONCE
Status: COMPLETED | OUTPATIENT
Start: 2023-04-18 | End: 2023-04-18

## 2023-04-18 RX ORDER — SODIUM CHLORIDE, SODIUM LACTATE, POTASSIUM CHLORIDE, CALCIUM CHLORIDE 600; 310; 30; 20 MG/100ML; MG/100ML; MG/100ML; MG/100ML
INJECTION, SOLUTION INTRAVENOUS CONTINUOUS
Status: DISCONTINUED | OUTPATIENT
Start: 2023-04-18 | End: 2023-04-18

## 2023-04-18 RX ORDER — SODIUM CHLORIDE 9 MG/ML
INJECTION, SOLUTION INTRAVENOUS PRN
Status: DISCONTINUED | OUTPATIENT
Start: 2023-04-18 | End: 2023-04-24

## 2023-04-18 RX ORDER — MORPHINE SULFATE 4 MG/ML
4 INJECTION, SOLUTION INTRAMUSCULAR; INTRAVENOUS EVERY 4 HOURS PRN
Status: DISCONTINUED | OUTPATIENT
Start: 2023-04-18 | End: 2023-04-19

## 2023-04-18 RX ORDER — MAGNESIUM SULFATE IN WATER 40 MG/ML
2000 INJECTION, SOLUTION INTRAVENOUS PRN
Status: DISCONTINUED | OUTPATIENT
Start: 2023-04-18 | End: 2023-04-27 | Stop reason: HOSPADM

## 2023-04-18 RX ORDER — MORPHINE SULFATE 2 MG/ML
2 INJECTION, SOLUTION INTRAMUSCULAR; INTRAVENOUS EVERY 4 HOURS PRN
Status: DISCONTINUED | OUTPATIENT
Start: 2023-04-18 | End: 2023-04-19

## 2023-04-18 RX ORDER — SODIUM CHLORIDE, SODIUM LACTATE, POTASSIUM CHLORIDE, AND CALCIUM CHLORIDE .6; .31; .03; .02 G/100ML; G/100ML; G/100ML; G/100ML
2000 INJECTION, SOLUTION INTRAVENOUS ONCE
Status: COMPLETED | OUTPATIENT
Start: 2023-04-18 | End: 2023-04-18

## 2023-04-18 RX ORDER — ALBUMIN (HUMAN) 12.5 G/50ML
25 SOLUTION INTRAVENOUS ONCE
Status: COMPLETED | OUTPATIENT
Start: 2023-04-18 | End: 2023-04-18

## 2023-04-18 RX ORDER — MILRINONE LACTATE 0.2 MG/ML
0.12 INJECTION, SOLUTION INTRAVENOUS CONTINUOUS
Status: DISCONTINUED | OUTPATIENT
Start: 2023-04-18 | End: 2023-04-18

## 2023-04-18 RX ORDER — FENTANYL CITRATE 50 UG/ML
25 INJECTION, SOLUTION INTRAMUSCULAR; INTRAVENOUS ONCE
Status: COMPLETED | OUTPATIENT
Start: 2023-04-18 | End: 2023-04-18

## 2023-04-18 RX ORDER — CALCIUM CHLORIDE 100 MG/ML
1000 INJECTION INTRAVENOUS; INTRAVENTRICULAR PRN
Status: DISCONTINUED | OUTPATIENT
Start: 2023-04-18 | End: 2023-04-27 | Stop reason: HOSPADM

## 2023-04-18 RX ORDER — POTASSIUM CHLORIDE 29.8 MG/ML
20 INJECTION INTRAVENOUS PRN
Status: DISCONTINUED | OUTPATIENT
Start: 2023-04-18 | End: 2023-04-27 | Stop reason: HOSPADM

## 2023-04-18 RX ORDER — FENTANYL CITRATE 50 UG/ML
INJECTION, SOLUTION INTRAMUSCULAR; INTRAVENOUS
Status: COMPLETED
Start: 2023-04-18 | End: 2023-04-18

## 2023-04-18 RX ORDER — HEPARIN SODIUM 10000 [USP'U]/100ML
5-30 INJECTION, SOLUTION INTRAVENOUS CONTINUOUS
Status: DISCONTINUED | OUTPATIENT
Start: 2023-04-18 | End: 2023-04-19

## 2023-04-18 RX ORDER — HEPARIN SODIUM 1000 [USP'U]/ML
80 INJECTION, SOLUTION INTRAVENOUS; SUBCUTANEOUS ONCE
Status: COMPLETED | OUTPATIENT
Start: 2023-04-18 | End: 2023-04-18

## 2023-04-18 RX ORDER — SODIUM CHLORIDE 9 MG/ML
INJECTION, SOLUTION INTRAVENOUS PRN
Status: DISCONTINUED | OUTPATIENT
Start: 2023-04-18 | End: 2023-04-18

## 2023-04-18 RX ORDER — LORAZEPAM 2 MG/ML
2 INJECTION INTRAMUSCULAR ONCE
Status: DISCONTINUED | OUTPATIENT
Start: 2023-04-18 | End: 2023-04-18

## 2023-04-18 RX ORDER — SODIUM CHLORIDE 9 MG/ML
INJECTION, SOLUTION INTRAVENOUS PRN
Status: DISCONTINUED | OUTPATIENT
Start: 2023-04-18 | End: 2023-04-21

## 2023-04-18 RX ORDER — MAGNESIUM SULFATE IN WATER 40 MG/ML
4000 INJECTION, SOLUTION INTRAVENOUS ONCE
Status: COMPLETED | OUTPATIENT
Start: 2023-04-18 | End: 2023-04-18

## 2023-04-18 RX ORDER — POTASSIUM CHLORIDE 7.45 MG/ML
10 INJECTION INTRAVENOUS PRN
Status: DISCONTINUED | OUTPATIENT
Start: 2023-04-18 | End: 2023-04-27 | Stop reason: HOSPADM

## 2023-04-18 RX ADMIN — SODIUM CHLORIDE, POTASSIUM CHLORIDE, SODIUM LACTATE AND CALCIUM CHLORIDE 2000 ML: 600; 310; 30; 20 INJECTION, SOLUTION INTRAVENOUS at 01:02

## 2023-04-18 RX ADMIN — ACETIC ACID 250 ML: 0.25 IRRIGANT IRRIGATION at 10:24

## 2023-04-18 RX ADMIN — CALCIUM GLUCONATE 2000 MG: 20 INJECTION, SOLUTION INTRAVENOUS at 23:43

## 2023-04-18 RX ADMIN — FENTANYL CITRATE 25 MCG: 50 INJECTION, SOLUTION INTRAMUSCULAR; INTRAVENOUS at 04:42

## 2023-04-18 RX ADMIN — CARBAMAZEPINE 100 MG: 100 TABLET, CHEWABLE ORAL at 14:07

## 2023-04-18 RX ADMIN — AZITHROMYCIN MONOHYDRATE 500 MG: 500 INJECTION, POWDER, LYOPHILIZED, FOR SOLUTION INTRAVENOUS at 01:11

## 2023-04-18 RX ADMIN — ALBUMIN (HUMAN) 25 G: 0.25 INJECTION, SOLUTION INTRAVENOUS at 03:57

## 2023-04-18 RX ADMIN — CARBAMAZEPINE 100 MG: 100 TABLET, CHEWABLE ORAL at 21:12

## 2023-04-18 RX ADMIN — MORPHINE SULFATE 1 MG: 2 INJECTION, SOLUTION INTRAMUSCULAR; INTRAVENOUS at 23:06

## 2023-04-18 RX ADMIN — PIPERACILLIN AND TAZOBACTAM 3375 MG: 3; .375 INJECTION, POWDER, LYOPHILIZED, FOR SOLUTION INTRAVENOUS at 09:40

## 2023-04-18 RX ADMIN — CARBAMAZEPINE 100 MG: 100 TABLET, CHEWABLE ORAL at 09:46

## 2023-04-18 RX ADMIN — SODIUM CHLORIDE, PRESERVATIVE FREE 10 ML: 5 INJECTION INTRAVENOUS at 09:42

## 2023-04-18 RX ADMIN — MORPHINE SULFATE 2 MG: 2 INJECTION, SOLUTION INTRAMUSCULAR; INTRAVENOUS at 10:41

## 2023-04-18 RX ADMIN — BENZOCAINE, BUTAMBEN, AND TETRACAINE HYDROCHLORIDE 1 SPRAY: .028; .004; .004 AEROSOL, SPRAY TOPICAL at 16:19

## 2023-04-18 RX ADMIN — CALCIUM GLUCONATE 2000 MG: 20 INJECTION, SOLUTION INTRAVENOUS at 05:34

## 2023-04-18 RX ADMIN — MILRINONE LACTATE 0.12 MCG/KG/MIN: 1 INJECTION, SOLUTION INTRAVENOUS at 18:04

## 2023-04-18 RX ADMIN — POTASSIUM CHLORIDE 20 MEQ: 29.8 INJECTION, SOLUTION INTRAVENOUS at 23:39

## 2023-04-18 RX ADMIN — IOPAMIDOL 80 ML: 755 INJECTION, SOLUTION INTRAVENOUS at 17:27

## 2023-04-18 RX ADMIN — POTASSIUM CHLORIDE 10 MEQ: 7.46 INJECTION, SOLUTION INTRAVENOUS at 06:48

## 2023-04-18 RX ADMIN — HEPARIN SODIUM 18 UNITS/KG/HR: 10000 INJECTION, SOLUTION INTRAVENOUS at 18:50

## 2023-04-18 RX ADMIN — Medication 1000 UNITS: at 09:46

## 2023-04-18 RX ADMIN — MAGNESIUM SULFATE HEPTAHYDRATE 4000 MG: 40 INJECTION, SOLUTION INTRAVENOUS at 03:03

## 2023-04-18 RX ADMIN — Medication 24 MCG/MIN: at 09:34

## 2023-04-18 RX ADMIN — MORPHINE SULFATE 2 MG: 2 INJECTION, SOLUTION INTRAMUSCULAR; INTRAVENOUS at 20:28

## 2023-04-18 RX ADMIN — LEVALBUTEROL HYDROCHLORIDE 1.25 MG: 1.25 SOLUTION RESPIRATORY (INHALATION) at 04:47

## 2023-04-18 RX ADMIN — VASOPRESSIN 0.03 UNITS/MIN: 20 INJECTION INTRAVENOUS at 23:31

## 2023-04-18 RX ADMIN — POTASSIUM CHLORIDE 10 MEQ: 7.46 INJECTION, SOLUTION INTRAVENOUS at 05:54

## 2023-04-18 RX ADMIN — MICONAZOLE NITRATE: 2 POWDER TOPICAL at 14:05

## 2023-04-18 RX ADMIN — Medication 26 MCG/MIN: at 23:06

## 2023-04-18 RX ADMIN — BUMETANIDE 1 MG/HR: 0.25 INJECTION INTRAMUSCULAR; INTRAVENOUS at 14:02

## 2023-04-18 RX ADMIN — MICONAZOLE NITRATE: 2 POWDER TOPICAL at 10:00

## 2023-04-18 RX ADMIN — SODIUM CHLORIDE, POTASSIUM CHLORIDE, SODIUM LACTATE AND CALCIUM CHLORIDE: 600; 310; 30; 20 INJECTION, SOLUTION INTRAVENOUS at 05:59

## 2023-04-18 RX ADMIN — LORAZEPAM 1 MG: 2 INJECTION INTRAMUSCULAR; INTRAVENOUS at 04:45

## 2023-04-18 RX ADMIN — POTASSIUM BICARBONATE 40 MEQ: 782 TABLET, EFFERVESCENT ORAL at 05:45

## 2023-04-18 RX ADMIN — HEPARIN SODIUM 3860 UNITS: 1000 INJECTION INTRAVENOUS; SUBCUTANEOUS at 18:39

## 2023-04-18 RX ADMIN — MORPHINE SULFATE 4 MG: 4 INJECTION, SOLUTION INTRAMUSCULAR; INTRAVENOUS at 15:16

## 2023-04-18 RX ADMIN — SODIUM PHOSPHATE, MONOBASIC, MONOHYDRATE AND SODIUM PHOSPHATE, DIBASIC, ANHYDROUS 20 MMOL: 142; 276 INJECTION, SOLUTION INTRAVENOUS at 10:38

## 2023-04-18 RX ADMIN — PIPERACILLIN AND TAZOBACTAM 3375 MG: 3; .375 INJECTION, POWDER, LYOPHILIZED, FOR SOLUTION INTRAVENOUS at 18:12

## 2023-04-18 RX ADMIN — PIPERACILLIN AND TAZOBACTAM 3375 MG: 3; .375 INJECTION, POWDER, LYOPHILIZED, FOR SOLUTION INTRAVENOUS at 04:01

## 2023-04-18 RX ADMIN — ACETAMINOPHEN 650 MG: 325 TABLET ORAL at 09:18

## 2023-04-18 RX ADMIN — LORAZEPAM 1 MG: 2 INJECTION INTRAMUSCULAR; INTRAVENOUS at 03:16

## 2023-04-18 RX ADMIN — MICONAZOLE NITRATE: 2 POWDER TOPICAL at 20:27

## 2023-04-18 RX ADMIN — LORAZEPAM 1 MG: 2 INJECTION INTRAMUSCULAR at 03:16

## 2023-04-18 ASSESSMENT — PAIN DESCRIPTION - ORIENTATION
ORIENTATION: LEFT

## 2023-04-18 ASSESSMENT — PAIN SCALES - GENERAL
PAINLEVEL_OUTOF10: 10
PAINLEVEL_OUTOF10: 10
PAINLEVEL_OUTOF10: 9
PAINLEVEL_OUTOF10: 8

## 2023-04-18 ASSESSMENT — PAIN DESCRIPTION - LOCATION
LOCATION: SHOULDER;NECK
LOCATION: NECK;SHOULDER
LOCATION: NECK;SHOULDER

## 2023-04-18 ASSESSMENT — PAIN DESCRIPTION - DESCRIPTORS: DESCRIPTORS: ACHING;TENDER

## 2023-04-19 ENCOUNTER — APPOINTMENT (OUTPATIENT)
Dept: INTERVENTIONAL RADIOLOGY/VASCULAR | Age: 73
DRG: 853 | End: 2023-04-19
Attending: UROLOGY
Payer: MEDICARE

## 2023-04-19 ENCOUNTER — APPOINTMENT (OUTPATIENT)
Dept: GENERAL RADIOLOGY | Age: 73
DRG: 853 | End: 2023-04-19
Attending: UROLOGY
Payer: MEDICARE

## 2023-04-19 ENCOUNTER — APPOINTMENT (OUTPATIENT)
Dept: MRI IMAGING | Age: 73
DRG: 853 | End: 2023-04-19
Attending: UROLOGY
Payer: MEDICARE

## 2023-04-19 ENCOUNTER — APPOINTMENT (OUTPATIENT)
Dept: CT IMAGING | Age: 73
DRG: 853 | End: 2023-04-19
Attending: UROLOGY
Payer: MEDICARE

## 2023-04-19 DIAGNOSIS — N20.0 KIDNEY STONE: Primary | ICD-10-CM

## 2023-04-19 PROBLEM — I95.9 ARTERIAL HYPOTENSION: Status: ACTIVE | Noted: 2023-04-19

## 2023-04-19 PROBLEM — H59.89: Status: RESOLVED | Noted: 2023-04-17 | Resolved: 2023-04-19

## 2023-04-19 LAB
ALBUMIN SERPL BCG-MCNC: 3.1 G/DL (ref 3.5–5.1)
ALP SERPL-CCNC: 171 U/L (ref 38–126)
ALT SERPL W/O P-5'-P-CCNC: 28 U/L (ref 11–66)
ANION GAP SERPL CALC-SCNC: 14 MEQ/L (ref 8–16)
AST SERPL-CCNC: 47 U/L (ref 5–40)
BACTERIA SPEC AEROBE CULT: NORMAL
BACTERIA UR CULT: ABNORMAL
BILIRUB SERPL-MCNC: 1.1 MG/DL (ref 0.3–1.2)
BUN SERPL-MCNC: 12 MG/DL (ref 7–22)
CA-I BLD ISE-SCNC: 1.08 MMOL/L (ref 1.12–1.32)
CA-I BLD ISE-SCNC: 1.18 MMOL/L (ref 1.12–1.32)
CALCIUM SERPL-MCNC: 8.7 MG/DL (ref 8.5–10.5)
CHLORIDE SERPL-SCNC: 108 MEQ/L (ref 98–111)
CO2 SERPL-SCNC: 20 MEQ/L (ref 23–33)
CREAT SERPL-MCNC: 0.5 MG/DL (ref 0.4–1.2)
DEPRECATED RDW RBC AUTO: 56.5 FL (ref 35–45)
DEPRECATED RDW RBC AUTO: 58.4 FL (ref 35–45)
ERYTHROCYTE [DISTWIDTH] IN BLOOD BY AUTOMATED COUNT: 16.3 % (ref 11.5–14.5)
ERYTHROCYTE [DISTWIDTH] IN BLOOD BY AUTOMATED COUNT: 16.6 % (ref 11.5–14.5)
FIBRINOGEN PPP-MCNC: 347 MG/100ML (ref 155–475)
GFR SERPL CREATININE-BSD FRML MDRD: > 60 ML/MIN/1.73M2
GLUCOSE BLD STRIP.AUTO-MCNC: 133 MG/DL (ref 70–108)
GLUCOSE BLD STRIP.AUTO-MCNC: 141 MG/DL (ref 70–108)
GLUCOSE SERPL-MCNC: 132 MG/DL (ref 70–108)
HCT VFR BLD AUTO: 26.8 % (ref 37–47)
HCT VFR BLD AUTO: 27.4 % (ref 37–47)
HCT VFR BLD AUTO: 31 % (ref 37–47)
HCT VFR BLD AUTO: 37.8 % (ref 37–47)
HCT VFR BLD AUTO: 37.8 % (ref 37–47)
HEPARIN UNFRACTIONATED: 1.02 U/ML (ref 0.3–0.7)
HGB BLD-MCNC: 10.3 GM/DL (ref 12–16)
HGB BLD-MCNC: 12.8 GM/DL (ref 12–16)
HGB BLD-MCNC: 12.8 GM/DL (ref 12–16)
HGB BLD-MCNC: 8.8 GM/DL (ref 12–16)
HGB BLD-MCNC: 8.9 GM/DL (ref 12–16)
LACTATE SERPL-SCNC: 0.9 MMOL/L (ref 0.5–2)
LACTATE SERPL-SCNC: 1.4 MMOL/L (ref 0.5–2)
LACTATE SERPL-SCNC: 1.6 MMOL/L (ref 0.5–2)
LACTATE SERPL-SCNC: 2.1 MMOL/L (ref 0.5–2)
LACTATE SERPL-SCNC: 2.4 MMOL/L (ref 0.5–2)
LACTATE SERPL-SCNC: 2.9 MMOL/L (ref 0.5–2)
LV EF: 45 %
LVEF MODALITY: NORMAL
MCH RBC QN AUTO: 31 PG (ref 26–33)
MCH RBC QN AUTO: 31 PG (ref 26–33)
MCHC RBC AUTO-ENTMCNC: 32.5 GM/DL (ref 32.2–35.5)
MCHC RBC AUTO-ENTMCNC: 33 GM/DL (ref 32.2–35.5)
MCV RBC AUTO: 94.1 FL (ref 81–99)
MCV RBC AUTO: 95.5 FL (ref 81–99)
ORGANISM: ABNORMAL
PLATELET # BLD AUTO: 143 THOU/MM3 (ref 130–400)
PLATELET # BLD AUTO: 147 THOU/MM3 (ref 130–400)
PMV BLD AUTO: 10.7 FL (ref 9.4–12.4)
PMV BLD AUTO: 12.2 FL (ref 9.4–12.4)
POTASSIUM SERPL-SCNC: 4.2 MEQ/L (ref 3.5–5.2)
PROT SERPL-MCNC: 6.6 G/DL (ref 6.1–8)
RBC # BLD AUTO: 2.87 MILL/MM3 (ref 4.2–5.4)
RBC # BLD AUTO: 4.06 MILL/MM3 (ref 4.2–5.4)
SODIUM SERPL-SCNC: 142 MEQ/L (ref 135–145)
TROPONIN T: 0.01 NG/ML
TROPONIN T: < 0.01 NG/ML
WBC # BLD AUTO: 17.6 THOU/MM3 (ref 4.8–10.8)
WBC # BLD AUTO: 31.1 THOU/MM3 (ref 4.8–10.8)

## 2023-04-19 PROCEDURE — 82948 REAGENT STRIP/BLOOD GLUCOSE: CPT

## 2023-04-19 PROCEDURE — 2000000000 HC ICU R&B

## 2023-04-19 PROCEDURE — 32551 INSERTION OF CHEST TUBE: CPT | Performed by: INTERNAL MEDICINE

## 2023-04-19 PROCEDURE — 36215 PLACE CATHETER IN ARTERY: CPT

## 2023-04-19 PROCEDURE — 85520 HEPARIN ASSAY: CPT

## 2023-04-19 PROCEDURE — 6360000002 HC RX W HCPCS

## 2023-04-19 PROCEDURE — 71045 X-RAY EXAM CHEST 1 VIEW: CPT

## 2023-04-19 PROCEDURE — 36415 COLL VENOUS BLD VENIPUNCTURE: CPT

## 2023-04-19 PROCEDURE — 93307 TTE W/O DOPPLER COMPLETE: CPT

## 2023-04-19 PROCEDURE — 32551 INSERTION OF CHEST TUBE: CPT

## 2023-04-19 PROCEDURE — 85018 HEMOGLOBIN: CPT

## 2023-04-19 PROCEDURE — 6370000000 HC RX 637 (ALT 250 FOR IP): Performed by: STUDENT IN AN ORGANIZED HEALTH CARE EDUCATION/TRAINING PROGRAM

## 2023-04-19 PROCEDURE — 85014 HEMATOCRIT: CPT

## 2023-04-19 PROCEDURE — 37236 OPEN/PERQ PLACE STENT 1ST: CPT

## 2023-04-19 PROCEDURE — 82330 ASSAY OF CALCIUM: CPT

## 2023-04-19 PROCEDURE — B3121ZZ FLUOROSCOPY OF LEFT SUBCLAVIAN ARTERY USING LOW OSMOLAR CONTRAST: ICD-10-PCS | Performed by: RADIOLOGY

## 2023-04-19 PROCEDURE — 6360000002 HC RX W HCPCS: Performed by: INTERNAL MEDICINE

## 2023-04-19 PROCEDURE — 93005 ELECTROCARDIOGRAM TRACING: CPT | Performed by: STUDENT IN AN ORGANIZED HEALTH CARE EDUCATION/TRAINING PROGRAM

## 2023-04-19 PROCEDURE — 93010 ELECTROCARDIOGRAM REPORT: CPT | Performed by: INTERNAL MEDICINE

## 2023-04-19 PROCEDURE — 2709999900 IR ANGIOGRAM EXTREMITY LEFT

## 2023-04-19 PROCEDURE — C1874 STENT, COATED/COV W/DEL SYS: HCPCS | Performed by: RADIOLOGY

## 2023-04-19 PROCEDURE — 0W9B30Z DRAINAGE OF LEFT PLEURAL CAVITY WITH DRAINAGE DEVICE, PERCUTANEOUS APPROACH: ICD-10-PCS | Performed by: INTERNAL MEDICINE

## 2023-04-19 PROCEDURE — 80053 COMPREHEN METABOLIC PANEL: CPT

## 2023-04-19 PROCEDURE — P9012 CRYOPRECIPITATE EACH UNIT: HCPCS

## 2023-04-19 PROCEDURE — 83605 ASSAY OF LACTIC ACID: CPT

## 2023-04-19 PROCEDURE — 2580000003 HC RX 258

## 2023-04-19 PROCEDURE — 36430 TRANSFUSION BLD/BLD COMPNT: CPT

## 2023-04-19 PROCEDURE — 2580000003 HC RX 258: Performed by: STUDENT IN AN ORGANIZED HEALTH CARE EDUCATION/TRAINING PROGRAM

## 2023-04-19 PROCEDURE — 85384 FIBRINOGEN ACTIVITY: CPT

## 2023-04-19 PROCEDURE — 2500000003 HC RX 250 WO HCPCS

## 2023-04-19 PROCEDURE — 6360000004 HC RX CONTRAST MEDICATION: Performed by: RADIOLOGY

## 2023-04-19 PROCEDURE — 6360000002 HC RX W HCPCS: Performed by: RADIOLOGY

## 2023-04-19 PROCEDURE — 85027 COMPLETE CBC AUTOMATED: CPT

## 2023-04-19 PROCEDURE — 2500000003 HC RX 250 WO HCPCS: Performed by: STUDENT IN AN ORGANIZED HEALTH CARE EDUCATION/TRAINING PROGRAM

## 2023-04-19 PROCEDURE — 75710 ARTERY X-RAYS ARM/LEG: CPT

## 2023-04-19 PROCEDURE — 6370000000 HC RX 637 (ALT 250 FOR IP)

## 2023-04-19 PROCEDURE — 03743DZ DILATION OF LEFT SUBCLAVIAN ARTERY WITH INTRALUMINAL DEVICE, PERCUTANEOUS APPROACH: ICD-10-PCS | Performed by: RADIOLOGY

## 2023-04-19 PROCEDURE — B3101ZZ FLUOROSCOPY OF THORACIC AORTA USING LOW OSMOLAR CONTRAST: ICD-10-PCS | Performed by: RADIOLOGY

## 2023-04-19 PROCEDURE — 84484 ASSAY OF TROPONIN QUANT: CPT

## 2023-04-19 PROCEDURE — 99291 CRITICAL CARE FIRST HOUR: CPT | Performed by: INTERNAL MEDICINE

## 2023-04-19 PROCEDURE — 6360000004 HC RX CONTRAST MEDICATION: Performed by: INTERNAL MEDICINE

## 2023-04-19 PROCEDURE — 6360000002 HC RX W HCPCS: Performed by: STUDENT IN AN ORGANIZED HEALTH CARE EDUCATION/TRAINING PROGRAM

## 2023-04-19 PROCEDURE — P9035 PLATELET PHERES LEUKOREDUCED: HCPCS

## 2023-04-19 PROCEDURE — 73218 MRI UPPER EXTREMITY W/O DYE: CPT

## 2023-04-19 PROCEDURE — 94761 N-INVAS EAR/PLS OXIMETRY MLT: CPT

## 2023-04-19 PROCEDURE — 6370000000 HC RX 637 (ALT 250 FOR IP): Performed by: INTERNAL MEDICINE

## 2023-04-19 PROCEDURE — 75605 CONTRAST EXAM THORACIC AORTA: CPT

## 2023-04-19 PROCEDURE — 70491 CT SOFT TISSUE NECK W/DYE: CPT

## 2023-04-19 RX ORDER — ADENOSINE 3 MG/ML
INJECTION, SOLUTION INTRAVENOUS
Status: COMPLETED
Start: 2023-04-19 | End: 2023-04-19

## 2023-04-19 RX ORDER — ADENOSINE 3 MG/ML
6 INJECTION, SOLUTION INTRAVENOUS ONCE
Status: COMPLETED | OUTPATIENT
Start: 2023-04-19 | End: 2023-04-19

## 2023-04-19 RX ORDER — CLOPIDOGREL BISULFATE 75 MG/1
75 TABLET ORAL DAILY
Status: DISCONTINUED | OUTPATIENT
Start: 2023-04-19 | End: 2023-04-27 | Stop reason: HOSPADM

## 2023-04-19 RX ORDER — SODIUM CHLORIDE 9 MG/ML
INJECTION, SOLUTION INTRAVENOUS PRN
Status: DISCONTINUED | OUTPATIENT
Start: 2023-04-19 | End: 2023-04-24

## 2023-04-19 RX ORDER — CALCIUM GLUCONATE 20 MG/ML
2000 INJECTION, SOLUTION INTRAVENOUS ONCE
Status: COMPLETED | OUTPATIENT
Start: 2023-04-19 | End: 2023-04-19

## 2023-04-19 RX ORDER — ENOXAPARIN SODIUM 100 MG/ML
40 INJECTION SUBCUTANEOUS DAILY
Status: DISCONTINUED | OUTPATIENT
Start: 2023-04-19 | End: 2023-04-19

## 2023-04-19 RX ORDER — HYDROCODONE BITARTRATE AND ACETAMINOPHEN 5; 325 MG/1; MG/1
1 TABLET ORAL EVERY 6 HOURS PRN
Status: DISCONTINUED | OUTPATIENT
Start: 2023-04-19 | End: 2023-04-27 | Stop reason: HOSPADM

## 2023-04-19 RX ORDER — ADENOSINE 3 MG/ML
12 INJECTION, SOLUTION INTRAVENOUS ONCE
Status: COMPLETED | OUTPATIENT
Start: 2023-04-19 | End: 2023-04-19

## 2023-04-19 RX ORDER — HYDROCODONE BITARTRATE AND ACETAMINOPHEN 5; 325 MG/1; MG/1
2 TABLET ORAL EVERY 6 HOURS PRN
Status: DISCONTINUED | OUTPATIENT
Start: 2023-04-19 | End: 2023-04-27 | Stop reason: HOSPADM

## 2023-04-19 RX ORDER — MORPHINE SULFATE 2 MG/ML
2 INJECTION, SOLUTION INTRAMUSCULAR; INTRAVENOUS
Status: DISCONTINUED | OUTPATIENT
Start: 2023-04-19 | End: 2023-04-27 | Stop reason: HOSPADM

## 2023-04-19 RX ORDER — SODIUM CHLORIDE, SODIUM LACTATE, POTASSIUM CHLORIDE, AND CALCIUM CHLORIDE .6; .31; .03; .02 G/100ML; G/100ML; G/100ML; G/100ML
1000 INJECTION, SOLUTION INTRAVENOUS ONCE
Status: COMPLETED | OUTPATIENT
Start: 2023-04-19 | End: 2023-04-19

## 2023-04-19 RX ORDER — MIDAZOLAM HYDROCHLORIDE 1 MG/ML
INJECTION INTRAMUSCULAR; INTRAVENOUS PRN
Status: COMPLETED | OUTPATIENT
Start: 2023-04-19 | End: 2023-04-19

## 2023-04-19 RX ORDER — FENTANYL CITRATE 50 UG/ML
INJECTION, SOLUTION INTRAMUSCULAR; INTRAVENOUS PRN
Status: COMPLETED | OUTPATIENT
Start: 2023-04-19 | End: 2023-04-19

## 2023-04-19 RX ORDER — ACETAMINOPHEN 325 MG/1
650 TABLET ORAL EVERY 6 HOURS PRN
Status: DISCONTINUED | OUTPATIENT
Start: 2023-04-19 | End: 2023-04-27 | Stop reason: HOSPADM

## 2023-04-19 RX ORDER — ENOXAPARIN SODIUM 100 MG/ML
30 INJECTION SUBCUTANEOUS DAILY
Status: DISCONTINUED | OUTPATIENT
Start: 2023-04-20 | End: 2023-04-25

## 2023-04-19 RX ADMIN — SODIUM CHLORIDE: 9 INJECTION, SOLUTION INTRAVENOUS at 03:35

## 2023-04-19 RX ADMIN — Medication 8 MCG/MIN: at 20:41

## 2023-04-19 RX ADMIN — HYDROCODONE BITARTRATE AND ACETAMINOPHEN 2 TABLET: 5; 325 TABLET ORAL at 11:43

## 2023-04-19 RX ADMIN — AMIODARONE HYDROCHLORIDE 150 MG: 1.5 INJECTION, SOLUTION INTRAVENOUS at 02:46

## 2023-04-19 RX ADMIN — FENTANYL CITRATE 25 MCG: 50 INJECTION, SOLUTION INTRAMUSCULAR; INTRAVENOUS at 15:21

## 2023-04-19 RX ADMIN — CLOPIDOGREL BISULFATE 75 MG: 75 TABLET ORAL at 17:52

## 2023-04-19 RX ADMIN — IOPAMIDOL 30 ML: 612 INJECTION, SOLUTION INTRAVENOUS at 15:53

## 2023-04-19 RX ADMIN — MORPHINE SULFATE 4 MG: 4 INJECTION, SOLUTION INTRAMUSCULAR; INTRAVENOUS at 00:43

## 2023-04-19 RX ADMIN — TRANEXAMIC ACID 1000 MG: 100 INJECTION, SOLUTION INTRAVENOUS at 12:38

## 2023-04-19 RX ADMIN — MORPHINE SULFATE 2 MG: 2 INJECTION, SOLUTION INTRAMUSCULAR; INTRAVENOUS at 16:53

## 2023-04-19 RX ADMIN — MORPHINE SULFATE 4 MG: 4 INJECTION, SOLUTION INTRAMUSCULAR; INTRAVENOUS at 04:43

## 2023-04-19 RX ADMIN — Medication 1000 UNITS: at 08:49

## 2023-04-19 RX ADMIN — AMIODARONE HYDROCHLORIDE 0.5 MG/MIN: 1.8 INJECTION, SOLUTION INTRAVENOUS at 20:24

## 2023-04-19 RX ADMIN — AMIODARONE HYDROCHLORIDE 0.5 MG/MIN: 1.8 INJECTION, SOLUTION INTRAVENOUS at 08:45

## 2023-04-19 RX ADMIN — PIPERACILLIN AND TAZOBACTAM 3375 MG: 3; .375 INJECTION, POWDER, LYOPHILIZED, FOR SOLUTION INTRAVENOUS at 03:38

## 2023-04-19 RX ADMIN — CARBAMAZEPINE 100 MG: 100 TABLET, CHEWABLE ORAL at 16:56

## 2023-04-19 RX ADMIN — PIPERACILLIN AND TAZOBACTAM 3375 MG: 3; .375 INJECTION, POWDER, LYOPHILIZED, FOR SOLUTION INTRAVENOUS at 17:52

## 2023-04-19 RX ADMIN — IOPAMIDOL 80 ML: 755 INJECTION, SOLUTION INTRAVENOUS at 11:05

## 2023-04-19 RX ADMIN — ADENOSINE 6 MG: 3 INJECTION, SOLUTION INTRAVENOUS at 01:59

## 2023-04-19 RX ADMIN — MIDAZOLAM 0.5 MG: 1 INJECTION INTRAMUSCULAR; INTRAVENOUS at 15:33

## 2023-04-19 RX ADMIN — MICONAZOLE NITRATE: 2 POWDER TOPICAL at 09:18

## 2023-04-19 RX ADMIN — VASOPRESSIN 0.03 UNITS/MIN: 20 INJECTION INTRAVENOUS at 20:21

## 2023-04-19 RX ADMIN — PIPERACILLIN AND TAZOBACTAM 3375 MG: 3; .375 INJECTION, POWDER, LYOPHILIZED, FOR SOLUTION INTRAVENOUS at 11:39

## 2023-04-19 RX ADMIN — TRANEXAMIC ACID 1000 MG: 100 INJECTION, SOLUTION INTRAVENOUS at 12:24

## 2023-04-19 RX ADMIN — MORPHINE SULFATE 2 MG: 2 INJECTION, SOLUTION INTRAMUSCULAR; INTRAVENOUS at 08:55

## 2023-04-19 RX ADMIN — SODIUM CHLORIDE, PRESERVATIVE FREE 5 ML: 5 INJECTION INTRAVENOUS at 09:18

## 2023-04-19 RX ADMIN — VASOPRESSIN 0.03 UNITS/MIN: 20 INJECTION INTRAVENOUS at 09:10

## 2023-04-19 RX ADMIN — ADENOSINE 6 MG: 3 INJECTION INTRAVENOUS at 01:59

## 2023-04-19 RX ADMIN — ACETAMINOPHEN 650 MG: 325 TABLET ORAL at 04:08

## 2023-04-19 RX ADMIN — POTASSIUM CHLORIDE 20 MEQ: 29.8 INJECTION, SOLUTION INTRAVENOUS at 01:02

## 2023-04-19 RX ADMIN — MICONAZOLE NITRATE: 2 POWDER TOPICAL at 21:36

## 2023-04-19 RX ADMIN — CALCIUM GLUCONATE 2000 MG: 20 INJECTION, SOLUTION INTRAVENOUS at 11:36

## 2023-04-19 RX ADMIN — ADENOSINE 12 MG: 3 INJECTION, SOLUTION INTRAVENOUS at 02:35

## 2023-04-19 RX ADMIN — AMIODARONE HYDROCHLORIDE 1 MG/MIN: 1.8 INJECTION, SOLUTION INTRAVENOUS at 02:56

## 2023-04-19 RX ADMIN — MIDAZOLAM 0.5 MG: 1 INJECTION INTRAMUSCULAR; INTRAVENOUS at 15:07

## 2023-04-19 RX ADMIN — SODIUM CHLORIDE, POTASSIUM CHLORIDE, SODIUM LACTATE AND CALCIUM CHLORIDE 1000 ML: 600; 310; 30; 20 INJECTION, SOLUTION INTRAVENOUS at 02:30

## 2023-04-19 RX ADMIN — MICONAZOLE NITRATE: 2 POWDER TOPICAL at 16:57

## 2023-04-19 RX ADMIN — CARBAMAZEPINE 100 MG: 100 TABLET, CHEWABLE ORAL at 21:15

## 2023-04-19 RX ADMIN — ADENOSINE 12 MG: 3 INJECTION INTRAVENOUS at 02:35

## 2023-04-19 RX ADMIN — MORPHINE SULFATE 2 MG: 2 INJECTION, SOLUTION INTRAMUSCULAR; INTRAVENOUS at 06:50

## 2023-04-19 RX ADMIN — MORPHINE SULFATE 2 MG: 2 INJECTION, SOLUTION INTRAMUSCULAR; INTRAVENOUS at 12:38

## 2023-04-19 RX ADMIN — FENTANYL CITRATE 25 MCG: 50 INJECTION, SOLUTION INTRAMUSCULAR; INTRAVENOUS at 15:07

## 2023-04-19 RX ADMIN — CARBAMAZEPINE 100 MG: 100 TABLET, CHEWABLE ORAL at 08:49

## 2023-04-19 ASSESSMENT — PAIN DESCRIPTION - ORIENTATION
ORIENTATION: LEFT

## 2023-04-19 ASSESSMENT — PAIN DESCRIPTION - DESCRIPTORS
DESCRIPTORS: ACHING;TINGLING
DESCRIPTORS: ACHING;NUMBNESS
DESCRIPTORS: ACHING
DESCRIPTORS: ACHING;NUMBNESS
DESCRIPTORS: ACHING
DESCRIPTORS: ACHING

## 2023-04-19 ASSESSMENT — PAIN DESCRIPTION - LOCATION
LOCATION: SHOULDER
LOCATION: ARM;NECK;SHOULDER
LOCATION: ARM;SHOULDER
LOCATION: ARM;SHOULDER
LOCATION: ARM;NECK;SHOULDER
LOCATION: ARM;SHOULDER
LOCATION: ARM;SHOULDER

## 2023-04-19 ASSESSMENT — PAIN SCALES - GENERAL
PAINLEVEL_OUTOF10: 5
PAINLEVEL_OUTOF10: 10
PAINLEVEL_OUTOF10: 8
PAINLEVEL_OUTOF10: 5
PAINLEVEL_OUTOF10: 10
PAINLEVEL_OUTOF10: 10

## 2023-04-20 LAB
ALBUMIN SERPL BCG-MCNC: 2.8 G/DL (ref 3.5–5.1)
ALP SERPL-CCNC: 119 U/L (ref 38–126)
ALT SERPL W/O P-5'-P-CCNC: 18 U/L (ref 11–66)
ANION GAP SERPL CALC-SCNC: 9 MEQ/L (ref 8–16)
AST SERPL-CCNC: 26 U/L (ref 5–40)
BILIRUB SERPL-MCNC: 0.5 MG/DL (ref 0.3–1.2)
BUN SERPL-MCNC: 12 MG/DL (ref 7–22)
CA-I BLD ISE-SCNC: 1.12 MMOL/L (ref 1.12–1.32)
CALCIUM SERPL-MCNC: 8.3 MG/DL (ref 8.5–10.5)
CHLORIDE SERPL-SCNC: 110 MEQ/L (ref 98–111)
CO2 SERPL-SCNC: 24 MEQ/L (ref 23–33)
CREAT SERPL-MCNC: 0.4 MG/DL (ref 0.4–1.2)
DEPRECATED RDW RBC AUTO: 59.3 FL (ref 35–45)
ERYTHROCYTE [DISTWIDTH] IN BLOOD BY AUTOMATED COUNT: 16.5 % (ref 11.5–14.5)
GFR SERPL CREATININE-BSD FRML MDRD: > 60 ML/MIN/1.73M2
GLUCOSE BLD STRIP.AUTO-MCNC: 60 MG/DL (ref 70–108)
GLUCOSE BLD STRIP.AUTO-MCNC: 78 MG/DL (ref 70–108)
GLUCOSE SERPL-MCNC: 130 MG/DL (ref 70–108)
HAPTOGLOB SERPL-MCNC: 53 MG/DL (ref 30–200)
HCT VFR BLD AUTO: 25.9 % (ref 37–47)
HCT VFR BLD AUTO: 25.9 % (ref 37–47)
HCT VFR BLD AUTO: 26.5 % (ref 37–47)
HCT VFR BLD AUTO: 31.6 % (ref 37–47)
HCT VFR BLD AUTO: 32 % (ref 37–47)
HGB BLD-MCNC: 10.3 GM/DL (ref 12–16)
HGB BLD-MCNC: 10.3 GM/DL (ref 12–16)
HGB BLD-MCNC: 8.3 GM/DL (ref 12–16)
HGB BLD-MCNC: 8.4 GM/DL (ref 12–16)
HGB BLD-MCNC: 8.6 GM/DL (ref 12–16)
LACTATE SERPL-SCNC: 0.8 MMOL/L (ref 0.5–2)
LACTATE SERPL-SCNC: 0.9 MMOL/L (ref 0.5–2)
MCH RBC QN AUTO: 31.6 PG (ref 26–33)
MCHC RBC AUTO-ENTMCNC: 32.4 GM/DL (ref 32.2–35.5)
MCV RBC AUTO: 97.4 FL (ref 81–99)
PLATELET # BLD AUTO: 121 THOU/MM3 (ref 130–400)
PMV BLD AUTO: 10.6 FL (ref 9.4–12.4)
POTASSIUM SERPL-SCNC: 3 MEQ/L (ref 3.5–5.2)
POTASSIUM SERPL-SCNC: 4.5 MEQ/L (ref 3.5–5.2)
PROT SERPL-MCNC: 5.9 G/DL (ref 6.1–8)
RBC # BLD AUTO: 2.66 MILL/MM3 (ref 4.2–5.4)
SODIUM SERPL-SCNC: 143 MEQ/L (ref 135–145)
WBC # BLD AUTO: 13 THOU/MM3 (ref 4.8–10.8)

## 2023-04-20 PROCEDURE — 84132 ASSAY OF SERUM POTASSIUM: CPT

## 2023-04-20 PROCEDURE — 6360000002 HC RX W HCPCS: Performed by: STUDENT IN AN ORGANIZED HEALTH CARE EDUCATION/TRAINING PROGRAM

## 2023-04-20 PROCEDURE — 36430 TRANSFUSION BLD/BLD COMPNT: CPT

## 2023-04-20 PROCEDURE — 99291 CRITICAL CARE FIRST HOUR: CPT | Performed by: INTERNAL MEDICINE

## 2023-04-20 PROCEDURE — 83605 ASSAY OF LACTIC ACID: CPT

## 2023-04-20 PROCEDURE — 2580000003 HC RX 258: Performed by: STUDENT IN AN ORGANIZED HEALTH CARE EDUCATION/TRAINING PROGRAM

## 2023-04-20 PROCEDURE — 85018 HEMOGLOBIN: CPT

## 2023-04-20 PROCEDURE — 2100000000 HC CCU R&B

## 2023-04-20 PROCEDURE — 85027 COMPLETE CBC AUTOMATED: CPT

## 2023-04-20 PROCEDURE — 82948 REAGENT STRIP/BLOOD GLUCOSE: CPT

## 2023-04-20 PROCEDURE — 6370000000 HC RX 637 (ALT 250 FOR IP)

## 2023-04-20 PROCEDURE — 6370000000 HC RX 637 (ALT 250 FOR IP): Performed by: STUDENT IN AN ORGANIZED HEALTH CARE EDUCATION/TRAINING PROGRAM

## 2023-04-20 PROCEDURE — 82330 ASSAY OF CALCIUM: CPT

## 2023-04-20 PROCEDURE — 6360000002 HC RX W HCPCS: Performed by: INTERNAL MEDICINE

## 2023-04-20 PROCEDURE — 85014 HEMATOCRIT: CPT

## 2023-04-20 PROCEDURE — 80053 COMPREHEN METABOLIC PANEL: CPT

## 2023-04-20 PROCEDURE — 6370000000 HC RX 637 (ALT 250 FOR IP): Performed by: INTERNAL MEDICINE

## 2023-04-20 PROCEDURE — 2500000003 HC RX 250 WO HCPCS: Performed by: STUDENT IN AN ORGANIZED HEALTH CARE EDUCATION/TRAINING PROGRAM

## 2023-04-20 PROCEDURE — 36415 COLL VENOUS BLD VENIPUNCTURE: CPT

## 2023-04-20 RX ORDER — SODIUM CHLORIDE 9 MG/ML
INJECTION, SOLUTION INTRAVENOUS PRN
Status: DISCONTINUED | OUTPATIENT
Start: 2023-04-20 | End: 2023-04-21

## 2023-04-20 RX ADMIN — PIPERACILLIN AND TAZOBACTAM 3375 MG: 3; .375 INJECTION, POWDER, LYOPHILIZED, FOR SOLUTION INTRAVENOUS at 02:34

## 2023-04-20 RX ADMIN — CLOPIDOGREL BISULFATE 75 MG: 75 TABLET ORAL at 08:10

## 2023-04-20 RX ADMIN — AMIODARONE HYDROCHLORIDE 0.5 MG/MIN: 1.8 INJECTION, SOLUTION INTRAVENOUS at 08:29

## 2023-04-20 RX ADMIN — POTASSIUM CHLORIDE 20 MEQ: 29.8 INJECTION, SOLUTION INTRAVENOUS at 09:33

## 2023-04-20 RX ADMIN — ENOXAPARIN SODIUM 30 MG: 100 INJECTION SUBCUTANEOUS at 08:18

## 2023-04-20 RX ADMIN — ONDANSETRON 4 MG: 2 INJECTION INTRAMUSCULAR; INTRAVENOUS at 01:52

## 2023-04-20 RX ADMIN — MICONAZOLE NITRATE: 2 POWDER TOPICAL at 21:46

## 2023-04-20 RX ADMIN — CARBAMAZEPINE 100 MG: 100 TABLET, CHEWABLE ORAL at 21:15

## 2023-04-20 RX ADMIN — ACETIC ACID 250 ML: 0.25 IRRIGANT IRRIGATION at 08:24

## 2023-04-20 RX ADMIN — MICONAZOLE NITRATE: 2 POWDER TOPICAL at 13:47

## 2023-04-20 RX ADMIN — CARBAMAZEPINE 100 MG: 100 TABLET, CHEWABLE ORAL at 08:18

## 2023-04-20 RX ADMIN — PIPERACILLIN AND TAZOBACTAM 3375 MG: 3; .375 INJECTION, POWDER, LYOPHILIZED, FOR SOLUTION INTRAVENOUS at 17:33

## 2023-04-20 RX ADMIN — SODIUM CHLORIDE, PRESERVATIVE FREE 10 ML: 5 INJECTION INTRAVENOUS at 21:47

## 2023-04-20 RX ADMIN — PIPERACILLIN AND TAZOBACTAM 3375 MG: 3; .375 INJECTION, POWDER, LYOPHILIZED, FOR SOLUTION INTRAVENOUS at 09:34

## 2023-04-20 RX ADMIN — MICONAZOLE NITRATE: 2 POWDER TOPICAL at 08:22

## 2023-04-20 RX ADMIN — POTASSIUM CHLORIDE 20 MEQ: 29.8 INJECTION, SOLUTION INTRAVENOUS at 08:17

## 2023-04-20 RX ADMIN — CARBAMAZEPINE 100 MG: 100 TABLET, CHEWABLE ORAL at 13:42

## 2023-04-20 RX ADMIN — Medication 1000 UNITS: at 08:10

## 2023-04-20 RX ADMIN — POTASSIUM CHLORIDE 20 MEQ: 29.8 INJECTION, SOLUTION INTRAVENOUS at 07:41

## 2023-04-20 RX ADMIN — AMIODARONE HYDROCHLORIDE 0.5 MG/MIN: 1.8 INJECTION, SOLUTION INTRAVENOUS at 20:41

## 2023-04-20 RX ADMIN — DEXTROSE MONOHYDRATE 125 ML: 100 INJECTION, SOLUTION INTRAVENOUS at 20:58

## 2023-04-20 RX ADMIN — HYDROCODONE BITARTRATE AND ACETAMINOPHEN 1 TABLET: 5; 325 TABLET ORAL at 08:12

## 2023-04-20 RX ADMIN — ONDANSETRON 4 MG: 2 INJECTION INTRAMUSCULAR; INTRAVENOUS at 23:57

## 2023-04-20 ASSESSMENT — PAIN SCALES - GENERAL
PAINLEVEL_OUTOF10: 5

## 2023-04-20 ASSESSMENT — PAIN DESCRIPTION - LOCATION
LOCATION: RIB CAGE
LOCATION: CHEST

## 2023-04-20 ASSESSMENT — PAIN DESCRIPTION - DESCRIPTORS: DESCRIPTORS: SORE

## 2023-04-20 ASSESSMENT — PAIN DESCRIPTION - ORIENTATION
ORIENTATION: LEFT
ORIENTATION: LEFT

## 2023-04-21 ENCOUNTER — APPOINTMENT (OUTPATIENT)
Dept: GENERAL RADIOLOGY | Age: 73
DRG: 853 | End: 2023-04-21
Attending: UROLOGY
Payer: MEDICARE

## 2023-04-21 LAB
ANION GAP SERPL CALC-SCNC: 10 MEQ/L (ref 8–16)
ANION GAP SERPL CALC-SCNC: 8 MEQ/L (ref 8–16)
BUN SERPL-MCNC: 11 MG/DL (ref 7–22)
BUN SERPL-MCNC: 9 MG/DL (ref 7–22)
CA-I BLD ISE-SCNC: 1.1 MMOL/L (ref 1.12–1.32)
CA-I BLD ISE-SCNC: 1.15 MMOL/L (ref 1.12–1.32)
CALCIUM SERPL-MCNC: 7.5 MG/DL (ref 8.5–10.5)
CALCIUM SERPL-MCNC: 8 MG/DL (ref 8.5–10.5)
CHLORIDE SERPL-SCNC: 110 MEQ/L (ref 98–111)
CHLORIDE SERPL-SCNC: 110 MEQ/L (ref 98–111)
CO2 SERPL-SCNC: 22 MEQ/L (ref 23–33)
CO2 SERPL-SCNC: 25 MEQ/L (ref 23–33)
CREAT SERPL-MCNC: 0.4 MG/DL (ref 0.4–1.2)
CREAT SERPL-MCNC: 0.4 MG/DL (ref 0.4–1.2)
DEPRECATED RDW RBC AUTO: 58.9 FL (ref 35–45)
DEPRECATED RDW RBC AUTO: 59.3 FL (ref 35–45)
ERYTHROCYTE [DISTWIDTH] IN BLOOD BY AUTOMATED COUNT: 17.2 % (ref 11.5–14.5)
ERYTHROCYTE [DISTWIDTH] IN BLOOD BY AUTOMATED COUNT: 17.2 % (ref 11.5–14.5)
GFR SERPL CREATININE-BSD FRML MDRD: > 60 ML/MIN/1.73M2
GFR SERPL CREATININE-BSD FRML MDRD: > 60 ML/MIN/1.73M2
GLUCOSE BLD STRIP.AUTO-MCNC: 173 MG/DL (ref 70–108)
GLUCOSE BLD STRIP.AUTO-MCNC: 63 MG/DL (ref 70–108)
GLUCOSE BLD STRIP.AUTO-MCNC: 69 MG/DL (ref 70–108)
GLUCOSE BLD STRIP.AUTO-MCNC: 74 MG/DL (ref 70–108)
GLUCOSE BLD STRIP.AUTO-MCNC: 83 MG/DL (ref 70–108)
GLUCOSE BLD STRIP.AUTO-MCNC: 97 MG/DL (ref 70–108)
GLUCOSE SERPL-MCNC: 75 MG/DL (ref 70–108)
GLUCOSE SERPL-MCNC: 94 MG/DL (ref 70–108)
HCT VFR BLD AUTO: 31.1 % (ref 37–47)
HCT VFR BLD AUTO: 31.4 % (ref 37–47)
HCT VFR BLD AUTO: 31.6 % (ref 37–47)
HCT VFR BLD AUTO: 32 % (ref 37–47)
HGB BLD-MCNC: 10.1 GM/DL (ref 12–16)
HGB BLD-MCNC: 10.1 GM/DL (ref 12–16)
HGB BLD-MCNC: 10.2 GM/DL (ref 12–16)
HGB BLD-MCNC: 10.3 GM/DL (ref 12–16)
MAGNESIUM SERPL-MCNC: 1.3 MG/DL (ref 1.6–2.4)
MCH RBC QN AUTO: 29.9 PG (ref 26–33)
MCH RBC QN AUTO: 30.4 PG (ref 26–33)
MCHC RBC AUTO-ENTMCNC: 32 GM/DL (ref 32.2–35.5)
MCHC RBC AUTO-ENTMCNC: 32.2 GM/DL (ref 32.2–35.5)
MCV RBC AUTO: 93.5 FL (ref 81–99)
MCV RBC AUTO: 94.4 FL (ref 81–99)
METHYLMALONATE SERPL-SCNC: NORMAL
PLATELET # BLD AUTO: 104 THOU/MM3 (ref 130–400)
PLATELET # BLD AUTO: 107 THOU/MM3 (ref 130–400)
PMV BLD AUTO: 10.2 FL (ref 9.4–12.4)
PMV BLD AUTO: 10.8 FL (ref 9.4–12.4)
POTASSIUM SERPL-SCNC: 3.1 MEQ/L (ref 3.5–5.2)
POTASSIUM SERPL-SCNC: 3.5 MEQ/L (ref 3.5–5.2)
POTASSIUM SERPL-SCNC: 4.3 MEQ/L (ref 3.5–5.2)
RBC # BLD AUTO: 3.38 MILL/MM3 (ref 4.2–5.4)
RBC # BLD AUTO: 3.39 MILL/MM3 (ref 4.2–5.4)
SODIUM SERPL-SCNC: 142 MEQ/L (ref 135–145)
SODIUM SERPL-SCNC: 143 MEQ/L (ref 135–145)
TROPONIN T: < 0.01 NG/ML
WBC # BLD AUTO: 7.6 THOU/MM3 (ref 4.8–10.8)
WBC # BLD AUTO: 8.3 THOU/MM3 (ref 4.8–10.8)

## 2023-04-21 PROCEDURE — 97110 THERAPEUTIC EXERCISES: CPT

## 2023-04-21 PROCEDURE — 2580000003 HC RX 258: Performed by: STUDENT IN AN ORGANIZED HEALTH CARE EDUCATION/TRAINING PROGRAM

## 2023-04-21 PROCEDURE — 6370000000 HC RX 637 (ALT 250 FOR IP): Performed by: INTERNAL MEDICINE

## 2023-04-21 PROCEDURE — 6370000000 HC RX 637 (ALT 250 FOR IP): Performed by: STUDENT IN AN ORGANIZED HEALTH CARE EDUCATION/TRAINING PROGRAM

## 2023-04-21 PROCEDURE — 74018 RADEX ABDOMEN 1 VIEW: CPT

## 2023-04-21 PROCEDURE — 93005 ELECTROCARDIOGRAM TRACING: CPT

## 2023-04-21 PROCEDURE — 84484 ASSAY OF TROPONIN QUANT: CPT

## 2023-04-21 PROCEDURE — 99291 CRITICAL CARE FIRST HOUR: CPT | Performed by: INTERNAL MEDICINE

## 2023-04-21 PROCEDURE — 97530 THERAPEUTIC ACTIVITIES: CPT

## 2023-04-21 PROCEDURE — 80048 BASIC METABOLIC PNL TOTAL CA: CPT

## 2023-04-21 PROCEDURE — 99232 SBSQ HOSP IP/OBS MODERATE 35: CPT | Performed by: UROLOGY

## 2023-04-21 PROCEDURE — 82948 REAGENT STRIP/BLOOD GLUCOSE: CPT

## 2023-04-21 PROCEDURE — 83735 ASSAY OF MAGNESIUM: CPT

## 2023-04-21 PROCEDURE — 6370000000 HC RX 637 (ALT 250 FOR IP)

## 2023-04-21 PROCEDURE — 97163 PT EVAL HIGH COMPLEX 45 MIN: CPT

## 2023-04-21 PROCEDURE — 85018 HEMOGLOBIN: CPT

## 2023-04-21 PROCEDURE — 97166 OT EVAL MOD COMPLEX 45 MIN: CPT

## 2023-04-21 PROCEDURE — 82330 ASSAY OF CALCIUM: CPT

## 2023-04-21 PROCEDURE — 6360000002 HC RX W HCPCS: Performed by: INTERNAL MEDICINE

## 2023-04-21 PROCEDURE — 71045 X-RAY EXAM CHEST 1 VIEW: CPT

## 2023-04-21 PROCEDURE — 6360000002 HC RX W HCPCS: Performed by: STUDENT IN AN ORGANIZED HEALTH CARE EDUCATION/TRAINING PROGRAM

## 2023-04-21 PROCEDURE — 93005 ELECTROCARDIOGRAM TRACING: CPT | Performed by: STUDENT IN AN ORGANIZED HEALTH CARE EDUCATION/TRAINING PROGRAM

## 2023-04-21 PROCEDURE — 84132 ASSAY OF SERUM POTASSIUM: CPT

## 2023-04-21 PROCEDURE — 85027 COMPLETE CBC AUTOMATED: CPT

## 2023-04-21 PROCEDURE — 2100000000 HC CCU R&B

## 2023-04-21 PROCEDURE — 36415 COLL VENOUS BLD VENIPUNCTURE: CPT

## 2023-04-21 PROCEDURE — 85014 HEMATOCRIT: CPT

## 2023-04-21 PROCEDURE — 2500000003 HC RX 250 WO HCPCS

## 2023-04-21 PROCEDURE — 92610 EVALUATE SWALLOWING FUNCTION: CPT

## 2023-04-21 RX ORDER — METOPROLOL TARTRATE 5 MG/5ML
INJECTION INTRAVENOUS
Status: COMPLETED
Start: 2023-04-21 | End: 2023-04-21

## 2023-04-21 RX ORDER — 0.9 % SODIUM CHLORIDE 0.9 %
500 INTRAVENOUS SOLUTION INTRAVENOUS ONCE
Status: COMPLETED | OUTPATIENT
Start: 2023-04-21 | End: 2023-04-21

## 2023-04-21 RX ORDER — BISACODYL 10 MG
10 SUPPOSITORY, RECTAL RECTAL DAILY
Status: DISCONTINUED | OUTPATIENT
Start: 2023-04-21 | End: 2023-04-27 | Stop reason: HOSPADM

## 2023-04-21 RX ORDER — METOPROLOL TARTRATE 5 MG/5ML
5 INJECTION INTRAVENOUS
Status: COMPLETED | OUTPATIENT
Start: 2023-04-21 | End: 2023-04-21

## 2023-04-21 RX ADMIN — HYDROCODONE BITARTRATE AND ACETAMINOPHEN 2 TABLET: 5; 325 TABLET ORAL at 00:05

## 2023-04-21 RX ADMIN — MICONAZOLE NITRATE: 2 POWDER TOPICAL at 08:47

## 2023-04-21 RX ADMIN — DEXTROSE MONOHYDRATE 125 ML: 100 INJECTION, SOLUTION INTRAVENOUS at 15:30

## 2023-04-21 RX ADMIN — SODIUM CHLORIDE, PRESERVATIVE FREE 10 ML: 5 INJECTION INTRAVENOUS at 20:54

## 2023-04-21 RX ADMIN — MICONAZOLE NITRATE: 2 POWDER TOPICAL at 13:48

## 2023-04-21 RX ADMIN — ENOXAPARIN SODIUM 30 MG: 100 INJECTION SUBCUTANEOUS at 08:43

## 2023-04-21 RX ADMIN — HYDROCODONE BITARTRATE AND ACETAMINOPHEN 1 TABLET: 5; 325 TABLET ORAL at 20:49

## 2023-04-21 RX ADMIN — DEXTROSE MONOHYDRATE 125 ML: 100 INJECTION, SOLUTION INTRAVENOUS at 01:35

## 2023-04-21 RX ADMIN — PIPERACILLIN AND TAZOBACTAM 3375 MG: 3; .375 INJECTION, POWDER, LYOPHILIZED, FOR SOLUTION INTRAVENOUS at 10:51

## 2023-04-21 RX ADMIN — METOPROLOL TARTRATE 5 MG: 5 INJECTION, SOLUTION INTRAVENOUS at 19:17

## 2023-04-21 RX ADMIN — METOPROLOL TARTRATE 12.5 MG: 25 TABLET, FILM COATED ORAL at 21:10

## 2023-04-21 RX ADMIN — POTASSIUM CHLORIDE 20 MEQ: 29.8 INJECTION, SOLUTION INTRAVENOUS at 22:14

## 2023-04-21 RX ADMIN — BISACODYL 10 MG: 10 SUPPOSITORY RECTAL at 13:48

## 2023-04-21 RX ADMIN — Medication 1000 UNITS: at 13:48

## 2023-04-21 RX ADMIN — CLOPIDOGREL BISULFATE 75 MG: 75 TABLET ORAL at 13:48

## 2023-04-21 RX ADMIN — SODIUM CHLORIDE, PRESERVATIVE FREE 10 ML: 5 INJECTION INTRAVENOUS at 08:44

## 2023-04-21 RX ADMIN — MAGNESIUM SULFATE HEPTAHYDRATE 2000 MG: 40 INJECTION, SOLUTION INTRAVENOUS at 22:49

## 2023-04-21 RX ADMIN — POTASSIUM CHLORIDE 20 MEQ: 29.8 INJECTION, SOLUTION INTRAVENOUS at 06:31

## 2023-04-21 RX ADMIN — PIPERACILLIN AND TAZOBACTAM 3375 MG: 3; .375 INJECTION, POWDER, LYOPHILIZED, FOR SOLUTION INTRAVENOUS at 19:22

## 2023-04-21 RX ADMIN — POTASSIUM CHLORIDE 20 MEQ: 29.8 INJECTION, SOLUTION INTRAVENOUS at 07:51

## 2023-04-21 RX ADMIN — METOPROLOL TARTRATE 5 MG: 5 INJECTION INTRAVENOUS at 19:17

## 2023-04-21 RX ADMIN — CARBAMAZEPINE 100 MG: 100 TABLET, CHEWABLE ORAL at 20:54

## 2023-04-21 RX ADMIN — MICONAZOLE NITRATE: 2 POWDER TOPICAL at 20:56

## 2023-04-21 RX ADMIN — MAGNESIUM SULFATE HEPTAHYDRATE 2000 MG: 40 INJECTION, SOLUTION INTRAVENOUS at 21:46

## 2023-04-21 RX ADMIN — SODIUM CHLORIDE 500 ML: 9 INJECTION, SOLUTION INTRAVENOUS at 19:20

## 2023-04-21 RX ADMIN — POTASSIUM CHLORIDE 20 MEQ: 29.8 INJECTION, SOLUTION INTRAVENOUS at 21:18

## 2023-04-21 RX ADMIN — PIPERACILLIN AND TAZOBACTAM 3375 MG: 3; .375 INJECTION, POWDER, LYOPHILIZED, FOR SOLUTION INTRAVENOUS at 02:50

## 2023-04-21 RX ADMIN — CARBAMAZEPINE 100 MG: 100 TABLET, CHEWABLE ORAL at 13:48

## 2023-04-21 ASSESSMENT — PAIN SCALES - GENERAL
PAINLEVEL_OUTOF10: 7
PAINLEVEL_OUTOF10: 0
PAINLEVEL_OUTOF10: 6
PAINLEVEL_OUTOF10: 0

## 2023-04-21 ASSESSMENT — PAIN DESCRIPTION - LOCATION
LOCATION: NECK;THROAT
LOCATION: NECK

## 2023-04-21 ASSESSMENT — PAIN DESCRIPTION - DESCRIPTORS: DESCRIPTORS: ACHING;STABBING

## 2023-04-22 ENCOUNTER — APPOINTMENT (OUTPATIENT)
Dept: INTERVENTIONAL RADIOLOGY/VASCULAR | Age: 73
DRG: 853 | End: 2023-04-22
Attending: UROLOGY
Payer: MEDICARE

## 2023-04-22 PROBLEM — S25.102A: Status: ACTIVE | Noted: 2023-04-22

## 2023-04-22 LAB
ANION GAP SERPL CALC-SCNC: 9 MEQ/L (ref 8–16)
BUN SERPL-MCNC: 8 MG/DL (ref 7–22)
CA-I BLD ISE-SCNC: 1.15 MMOL/L (ref 1.12–1.32)
CALCIUM SERPL-MCNC: 7.2 MG/DL (ref 8.5–10.5)
CHLORIDE SERPL-SCNC: 110 MEQ/L (ref 98–111)
CO2 SERPL-SCNC: 23 MEQ/L (ref 23–33)
CREAT SERPL-MCNC: 0.3 MG/DL (ref 0.4–1.2)
DEPRECATED RDW RBC AUTO: 58.8 FL (ref 35–45)
ERYTHROCYTE [DISTWIDTH] IN BLOOD BY AUTOMATED COUNT: 17.1 % (ref 11.5–14.5)
GFR SERPL CREATININE-BSD FRML MDRD: > 60 ML/MIN/1.73M2
GLUCOSE BLD STRIP.AUTO-MCNC: 104 MG/DL (ref 70–108)
GLUCOSE BLD STRIP.AUTO-MCNC: 117 MG/DL (ref 70–108)
GLUCOSE BLD STRIP.AUTO-MCNC: 127 MG/DL (ref 70–108)
GLUCOSE BLD STRIP.AUTO-MCNC: 68 MG/DL (ref 70–108)
GLUCOSE BLD STRIP.AUTO-MCNC: 71 MG/DL (ref 70–108)
GLUCOSE BLD STRIP.AUTO-MCNC: 82 MG/DL (ref 70–108)
GLUCOSE SERPL-MCNC: 72 MG/DL (ref 70–108)
HCT VFR BLD AUTO: 29.8 % (ref 37–47)
HGB BLD-MCNC: 9.6 GM/DL (ref 12–16)
MAGNESIUM SERPL-MCNC: 2.2 MG/DL (ref 1.6–2.4)
MCH RBC QN AUTO: 30.4 PG (ref 26–33)
MCHC RBC AUTO-ENTMCNC: 32.2 GM/DL (ref 32.2–35.5)
MCV RBC AUTO: 94.3 FL (ref 81–99)
PHOSPHATE SERPL-MCNC: 1.6 MG/DL (ref 2.4–4.7)
PHOSPHATE SERPL-MCNC: 1.8 MG/DL (ref 2.4–4.7)
PLATELET # BLD AUTO: 105 THOU/MM3 (ref 130–400)
PMV BLD AUTO: 10.3 FL (ref 9.4–12.4)
POTASSIUM SERPL-SCNC: 3.7 MEQ/L (ref 3.5–5.2)
RBC # BLD AUTO: 3.16 MILL/MM3 (ref 4.2–5.4)
SODIUM SERPL-SCNC: 142 MEQ/L (ref 135–145)
WBC # BLD AUTO: 6.9 THOU/MM3 (ref 4.8–10.8)

## 2023-04-22 PROCEDURE — 36415 COLL VENOUS BLD VENIPUNCTURE: CPT

## 2023-04-22 PROCEDURE — 6360000002 HC RX W HCPCS: Performed by: STUDENT IN AN ORGANIZED HEALTH CARE EDUCATION/TRAINING PROGRAM

## 2023-04-22 PROCEDURE — 2580000003 HC RX 258: Performed by: STUDENT IN AN ORGANIZED HEALTH CARE EDUCATION/TRAINING PROGRAM

## 2023-04-22 PROCEDURE — 83735 ASSAY OF MAGNESIUM: CPT

## 2023-04-22 PROCEDURE — 99291 CRITICAL CARE FIRST HOUR: CPT | Performed by: SURGERY

## 2023-04-22 PROCEDURE — 2580000003 HC RX 258: Performed by: INTERNAL MEDICINE

## 2023-04-22 PROCEDURE — 6360000002 HC RX W HCPCS: Performed by: INTERNAL MEDICINE

## 2023-04-22 PROCEDURE — 6370000000 HC RX 637 (ALT 250 FOR IP): Performed by: STUDENT IN AN ORGANIZED HEALTH CARE EDUCATION/TRAINING PROGRAM

## 2023-04-22 PROCEDURE — 82330 ASSAY OF CALCIUM: CPT

## 2023-04-22 PROCEDURE — 93010 ELECTROCARDIOGRAM REPORT: CPT | Performed by: INTERNAL MEDICINE

## 2023-04-22 PROCEDURE — 2500000003 HC RX 250 WO HCPCS: Performed by: INTERNAL MEDICINE

## 2023-04-22 PROCEDURE — 82948 REAGENT STRIP/BLOOD GLUCOSE: CPT

## 2023-04-22 PROCEDURE — 36592 COLLECT BLOOD FROM PICC: CPT

## 2023-04-22 PROCEDURE — 6370000000 HC RX 637 (ALT 250 FOR IP)

## 2023-04-22 PROCEDURE — 85027 COMPLETE CBC AUTOMATED: CPT

## 2023-04-22 PROCEDURE — 80048 BASIC METABOLIC PNL TOTAL CA: CPT

## 2023-04-22 PROCEDURE — 6370000000 HC RX 637 (ALT 250 FOR IP): Performed by: INTERNAL MEDICINE

## 2023-04-22 PROCEDURE — 93971 EXTREMITY STUDY: CPT

## 2023-04-22 PROCEDURE — 84100 ASSAY OF PHOSPHORUS: CPT

## 2023-04-22 PROCEDURE — 2060000000 HC ICU INTERMEDIATE R&B

## 2023-04-22 PROCEDURE — 99232 SBSQ HOSP IP/OBS MODERATE 35: CPT | Performed by: INTERNAL MEDICINE

## 2023-04-22 RX ORDER — CALCIUM GLUCONATE 20 MG/ML
2000 INJECTION, SOLUTION INTRAVENOUS ONCE
Status: COMPLETED | OUTPATIENT
Start: 2023-04-22 | End: 2023-04-22

## 2023-04-22 RX ORDER — CALCIUM GLUCONATE 20 MG/ML
2000 INJECTION, SOLUTION INTRAVENOUS ONCE
Status: DISCONTINUED | OUTPATIENT
Start: 2023-04-22 | End: 2023-04-22

## 2023-04-22 RX ADMIN — METOPROLOL TARTRATE 12.5 MG: 25 TABLET, FILM COATED ORAL at 08:45

## 2023-04-22 RX ADMIN — MORPHINE SULFATE 2 MG: 2 INJECTION, SOLUTION INTRAMUSCULAR; INTRAVENOUS at 15:50

## 2023-04-22 RX ADMIN — SODIUM CHLORIDE, PRESERVATIVE FREE 30 ML: 5 INJECTION INTRAVENOUS at 20:14

## 2023-04-22 RX ADMIN — PIPERACILLIN AND TAZOBACTAM 3375 MG: 3; .375 INJECTION, POWDER, LYOPHILIZED, FOR SOLUTION INTRAVENOUS at 02:16

## 2023-04-22 RX ADMIN — CARBAMAZEPINE 100 MG: 100 TABLET, CHEWABLE ORAL at 14:03

## 2023-04-22 RX ADMIN — ENOXAPARIN SODIUM 30 MG: 100 INJECTION SUBCUTANEOUS at 08:42

## 2023-04-22 RX ADMIN — CARBAMAZEPINE 100 MG: 100 TABLET, CHEWABLE ORAL at 20:14

## 2023-04-22 RX ADMIN — PIPERACILLIN AND TAZOBACTAM 3375 MG: 3; .375 INJECTION, POWDER, LYOPHILIZED, FOR SOLUTION INTRAVENOUS at 17:45

## 2023-04-22 RX ADMIN — MICONAZOLE NITRATE: 2 POWDER TOPICAL at 14:02

## 2023-04-22 RX ADMIN — CARBAMAZEPINE 100 MG: 100 TABLET, CHEWABLE ORAL at 08:44

## 2023-04-22 RX ADMIN — MICONAZOLE NITRATE: 2 POWDER TOPICAL at 08:48

## 2023-04-22 RX ADMIN — CALCIUM GLUCONATE 2000 MG: 20 INJECTION, SOLUTION INTRAVENOUS at 06:58

## 2023-04-22 RX ADMIN — BISACODYL 10 MG: 10 SUPPOSITORY RECTAL at 08:42

## 2023-04-22 RX ADMIN — DEXTROSE MONOHYDRATE 125 ML: 100 INJECTION, SOLUTION INTRAVENOUS at 02:43

## 2023-04-22 RX ADMIN — HYDROCODONE BITARTRATE AND ACETAMINOPHEN 2 TABLET: 5; 325 TABLET ORAL at 06:32

## 2023-04-22 RX ADMIN — Medication 1000 UNITS: at 08:42

## 2023-04-22 RX ADMIN — HYDROCODONE BITARTRATE AND ACETAMINOPHEN 1 TABLET: 5; 325 TABLET ORAL at 14:02

## 2023-04-22 RX ADMIN — PIPERACILLIN AND TAZOBACTAM 3375 MG: 3; .375 INJECTION, POWDER, LYOPHILIZED, FOR SOLUTION INTRAVENOUS at 10:54

## 2023-04-22 RX ADMIN — METOPROLOL TARTRATE 12.5 MG: 25 TABLET, FILM COATED ORAL at 20:14

## 2023-04-22 RX ADMIN — CLOPIDOGREL BISULFATE 75 MG: 75 TABLET ORAL at 08:42

## 2023-04-22 RX ADMIN — SODIUM CHLORIDE, PRESERVATIVE FREE 10 ML: 5 INJECTION INTRAVENOUS at 08:43

## 2023-04-22 RX ADMIN — MICONAZOLE NITRATE: 2 POWDER TOPICAL at 23:00

## 2023-04-22 RX ADMIN — SODIUM PHOSPHATE, MONOBASIC, MONOHYDRATE AND SODIUM PHOSPHATE, DIBASIC, ANHYDROUS 15 MMOL: 142; 276 INJECTION, SOLUTION INTRAVENOUS at 20:32

## 2023-04-22 RX ADMIN — ACETAMINOPHEN 650 MG: 325 TABLET ORAL at 08:53

## 2023-04-22 ASSESSMENT — PAIN DESCRIPTION - DESCRIPTORS
DESCRIPTORS: ACHING
DESCRIPTORS: SHARP

## 2023-04-22 ASSESSMENT — PAIN DESCRIPTION - LOCATION
LOCATION: CHEST
LOCATION: NECK;BACK
LOCATION: NECK
LOCATION: GENERALIZED
LOCATION: LEG

## 2023-04-22 ASSESSMENT — PAIN DESCRIPTION - ORIENTATION
ORIENTATION: RIGHT;LEFT
ORIENTATION: LEFT
ORIENTATION: RIGHT

## 2023-04-22 ASSESSMENT — PAIN SCALES - GENERAL
PAINLEVEL_OUTOF10: 3
PAINLEVEL_OUTOF10: 10
PAINLEVEL_OUTOF10: 5
PAINLEVEL_OUTOF10: 8
PAINLEVEL_OUTOF10: 0
PAINLEVEL_OUTOF10: 3
PAINLEVEL_OUTOF10: 0

## 2023-04-23 LAB
ANION GAP SERPL CALC-SCNC: 8 MEQ/L (ref 8–16)
BACTERIA BLD AEROBE CULT: NORMAL
BACTERIA BLD AEROBE CULT: NORMAL
BUN SERPL-MCNC: 8 MG/DL (ref 7–22)
CALCIUM SERPL-MCNC: 7.7 MG/DL (ref 8.5–10.5)
CHLORIDE SERPL-SCNC: 108 MEQ/L (ref 98–111)
CO2 SERPL-SCNC: 24 MEQ/L (ref 23–33)
CREAT SERPL-MCNC: 0.3 MG/DL (ref 0.4–1.2)
DEPRECATED RDW RBC AUTO: 56.4 FL (ref 35–45)
ERYTHROCYTE [DISTWIDTH] IN BLOOD BY AUTOMATED COUNT: 16.9 % (ref 11.5–14.5)
GFR SERPL CREATININE-BSD FRML MDRD: > 60 ML/MIN/1.73M2
GLUCOSE BLD STRIP.AUTO-MCNC: 82 MG/DL (ref 70–108)
GLUCOSE BLD STRIP.AUTO-MCNC: 99 MG/DL (ref 70–108)
GLUCOSE SERPL-MCNC: 80 MG/DL (ref 70–108)
HCT VFR BLD AUTO: 31.6 % (ref 37–47)
HGB BLD-MCNC: 10.3 GM/DL (ref 12–16)
MCH RBC QN AUTO: 30.6 PG (ref 26–33)
MCHC RBC AUTO-ENTMCNC: 32.6 GM/DL (ref 32.2–35.5)
MCV RBC AUTO: 93.8 FL (ref 81–99)
PHOSPHATE SERPL-MCNC: 3.9 MG/DL (ref 2.4–4.7)
PLATELET # BLD AUTO: 142 THOU/MM3 (ref 130–400)
PMV BLD AUTO: 10.7 FL (ref 9.4–12.4)
POTASSIUM SERPL-SCNC: 3.4 MEQ/L (ref 3.5–5.2)
POTASSIUM SERPL-SCNC: 3.9 MEQ/L (ref 3.5–5.2)
RBC # BLD AUTO: 3.37 MILL/MM3 (ref 4.2–5.4)
REASON FOR REJECTION: NORMAL
REJECTED TEST: NORMAL
SODIUM SERPL-SCNC: 140 MEQ/L (ref 135–145)
WBC # BLD AUTO: 7.2 THOU/MM3 (ref 4.8–10.8)

## 2023-04-23 PROCEDURE — 84100 ASSAY OF PHOSPHORUS: CPT

## 2023-04-23 PROCEDURE — 99232 SBSQ HOSP IP/OBS MODERATE 35: CPT | Performed by: INTERNAL MEDICINE

## 2023-04-23 PROCEDURE — 82948 REAGENT STRIP/BLOOD GLUCOSE: CPT

## 2023-04-23 PROCEDURE — 80048 BASIC METABOLIC PNL TOTAL CA: CPT

## 2023-04-23 PROCEDURE — 84132 ASSAY OF SERUM POTASSIUM: CPT

## 2023-04-23 PROCEDURE — 6370000000 HC RX 637 (ALT 250 FOR IP): Performed by: STUDENT IN AN ORGANIZED HEALTH CARE EDUCATION/TRAINING PROGRAM

## 2023-04-23 PROCEDURE — 36592 COLLECT BLOOD FROM PICC: CPT

## 2023-04-23 PROCEDURE — 2060000000 HC ICU INTERMEDIATE R&B

## 2023-04-23 PROCEDURE — 6360000002 HC RX W HCPCS: Performed by: STUDENT IN AN ORGANIZED HEALTH CARE EDUCATION/TRAINING PROGRAM

## 2023-04-23 PROCEDURE — 2580000003 HC RX 258: Performed by: STUDENT IN AN ORGANIZED HEALTH CARE EDUCATION/TRAINING PROGRAM

## 2023-04-23 PROCEDURE — 6370000000 HC RX 637 (ALT 250 FOR IP)

## 2023-04-23 PROCEDURE — 6370000000 HC RX 637 (ALT 250 FOR IP): Performed by: INTERNAL MEDICINE

## 2023-04-23 PROCEDURE — 36415 COLL VENOUS BLD VENIPUNCTURE: CPT

## 2023-04-23 PROCEDURE — 6360000002 HC RX W HCPCS: Performed by: INTERNAL MEDICINE

## 2023-04-23 PROCEDURE — 85027 COMPLETE CBC AUTOMATED: CPT

## 2023-04-23 RX ADMIN — SODIUM CHLORIDE, PRESERVATIVE FREE 10 ML: 5 INJECTION INTRAVENOUS at 08:58

## 2023-04-23 RX ADMIN — PIPERACILLIN AND TAZOBACTAM 3375 MG: 3; .375 INJECTION, POWDER, LYOPHILIZED, FOR SOLUTION INTRAVENOUS at 17:33

## 2023-04-23 RX ADMIN — ACETAMINOPHEN 650 MG: 325 TABLET ORAL at 15:47

## 2023-04-23 RX ADMIN — HYDROCODONE BITARTRATE AND ACETAMINOPHEN 2 TABLET: 5; 325 TABLET ORAL at 21:21

## 2023-04-23 RX ADMIN — CLOPIDOGREL BISULFATE 75 MG: 75 TABLET ORAL at 08:59

## 2023-04-23 RX ADMIN — METOPROLOL TARTRATE 12.5 MG: 25 TABLET, FILM COATED ORAL at 21:21

## 2023-04-23 RX ADMIN — PIPERACILLIN AND TAZOBACTAM 3375 MG: 3; .375 INJECTION, POWDER, LYOPHILIZED, FOR SOLUTION INTRAVENOUS at 02:15

## 2023-04-23 RX ADMIN — POTASSIUM CHLORIDE 20 MEQ: 29.8 INJECTION, SOLUTION INTRAVENOUS at 06:48

## 2023-04-23 RX ADMIN — Medication 1000 UNITS: at 08:59

## 2023-04-23 RX ADMIN — MICONAZOLE NITRATE: 2 POWDER TOPICAL at 14:25

## 2023-04-23 RX ADMIN — POTASSIUM CHLORIDE 20 MEQ: 29.8 INJECTION, SOLUTION INTRAVENOUS at 08:05

## 2023-04-23 RX ADMIN — HYDROCODONE BITARTRATE AND ACETAMINOPHEN 2 TABLET: 5; 325 TABLET ORAL at 08:59

## 2023-04-23 RX ADMIN — CARBAMAZEPINE 100 MG: 100 TABLET, CHEWABLE ORAL at 08:58

## 2023-04-23 RX ADMIN — ENOXAPARIN SODIUM 30 MG: 100 INJECTION SUBCUTANEOUS at 08:58

## 2023-04-23 RX ADMIN — BISACODYL 10 MG: 10 SUPPOSITORY RECTAL at 08:58

## 2023-04-23 RX ADMIN — CARBAMAZEPINE 100 MG: 100 TABLET, CHEWABLE ORAL at 21:21

## 2023-04-23 RX ADMIN — PIPERACILLIN AND TAZOBACTAM 3375 MG: 3; .375 INJECTION, POWDER, LYOPHILIZED, FOR SOLUTION INTRAVENOUS at 10:18

## 2023-04-23 RX ADMIN — CARBAMAZEPINE 100 MG: 100 TABLET, CHEWABLE ORAL at 14:25

## 2023-04-23 RX ADMIN — METOPROLOL TARTRATE 12.5 MG: 25 TABLET, FILM COATED ORAL at 08:58

## 2023-04-23 RX ADMIN — MICONAZOLE NITRATE: 2 POWDER TOPICAL at 08:59

## 2023-04-23 ASSESSMENT — PAIN - FUNCTIONAL ASSESSMENT: PAIN_FUNCTIONAL_ASSESSMENT: PREVENTS OR INTERFERES WITH ALL ACTIVE AND SOME PASSIVE ACTIVITIES

## 2023-04-23 ASSESSMENT — PAIN DESCRIPTION - PAIN TYPE: TYPE: CHRONIC PAIN

## 2023-04-23 ASSESSMENT — PAIN DESCRIPTION - FREQUENCY: FREQUENCY: CONTINUOUS

## 2023-04-23 ASSESSMENT — PAIN SCALES - GENERAL
PAINLEVEL_OUTOF10: 10
PAINLEVEL_OUTOF10: 0
PAINLEVEL_OUTOF10: 0
PAINLEVEL_OUTOF10: 8
PAINLEVEL_OUTOF10: 0
PAINLEVEL_OUTOF10: 9

## 2023-04-23 ASSESSMENT — PAIN DESCRIPTION - ORIENTATION
ORIENTATION: LEFT

## 2023-04-23 ASSESSMENT — PAIN SCALES - WONG BAKER
WONGBAKER_NUMERICALRESPONSE: 0
WONGBAKER_NUMERICALRESPONSE: 0

## 2023-04-23 ASSESSMENT — PAIN DESCRIPTION - LOCATION
LOCATION: ARM;HAND
LOCATION: ARM
LOCATION: ARM;HAND

## 2023-04-23 ASSESSMENT — PAIN DESCRIPTION - ONSET: ONSET: ON-GOING

## 2023-04-23 ASSESSMENT — PAIN DESCRIPTION - DESCRIPTORS
DESCRIPTORS: ACHING;DISCOMFORT

## 2023-04-24 PROBLEM — G54.0 BRACHIAL PLEXOPATHY: Status: ACTIVE | Noted: 2023-04-24

## 2023-04-24 PROBLEM — M24.50 CONTRACTURE OF JOINT, MULTIPLE SITES: Status: ACTIVE | Noted: 2023-04-24

## 2023-04-24 LAB
ANION GAP SERPL CALC-SCNC: 7 MEQ/L (ref 8–16)
BUN SERPL-MCNC: 12 MG/DL (ref 7–22)
CALCIUM SERPL-MCNC: 7.9 MG/DL (ref 8.5–10.5)
CHLORIDE SERPL-SCNC: 114 MEQ/L (ref 98–111)
CO2 SERPL-SCNC: 24 MEQ/L (ref 23–33)
CREAT SERPL-MCNC: 0.4 MG/DL (ref 0.4–1.2)
DEPRECATED RDW RBC AUTO: 56.9 FL (ref 35–45)
ERYTHROCYTE [DISTWIDTH] IN BLOOD BY AUTOMATED COUNT: 17.5 % (ref 11.5–14.5)
GFR SERPL CREATININE-BSD FRML MDRD: > 60 ML/MIN/1.73M2
GLUCOSE SERPL-MCNC: 81 MG/DL (ref 70–108)
HCT VFR BLD AUTO: 32.4 % (ref 37–47)
HGB BLD-MCNC: 10.1 GM/DL (ref 12–16)
MAGNESIUM SERPL-MCNC: 1.7 MG/DL (ref 1.6–2.4)
MCH RBC QN AUTO: 30 PG (ref 26–33)
MCHC RBC AUTO-ENTMCNC: 31.2 GM/DL (ref 32.2–35.5)
MCV RBC AUTO: 96.1 FL (ref 81–99)
PLATELET # BLD AUTO: 151 THOU/MM3 (ref 130–400)
PMV BLD AUTO: 10.6 FL (ref 9.4–12.4)
POTASSIUM SERPL-SCNC: 3.8 MEQ/L (ref 3.5–5.2)
RBC # BLD AUTO: 3.37 MILL/MM3 (ref 4.2–5.4)
SODIUM SERPL-SCNC: 145 MEQ/L (ref 135–145)
T4 FREE SERPL-MCNC: 1.38 NG/DL (ref 0.93–1.76)
TSH SERPL DL<=0.005 MIU/L-ACNC: 6.9 UIU/ML (ref 0.4–4.2)
WBC # BLD AUTO: 7.5 THOU/MM3 (ref 4.8–10.8)

## 2023-04-24 PROCEDURE — 2060000000 HC ICU INTERMEDIATE R&B

## 2023-04-24 PROCEDURE — 97530 THERAPEUTIC ACTIVITIES: CPT

## 2023-04-24 PROCEDURE — 6370000000 HC RX 637 (ALT 250 FOR IP)

## 2023-04-24 PROCEDURE — 84439 ASSAY OF FREE THYROXINE: CPT

## 2023-04-24 PROCEDURE — 83735 ASSAY OF MAGNESIUM: CPT

## 2023-04-24 PROCEDURE — 2580000003 HC RX 258: Performed by: STUDENT IN AN ORGANIZED HEALTH CARE EDUCATION/TRAINING PROGRAM

## 2023-04-24 PROCEDURE — 6360000002 HC RX W HCPCS: Performed by: STUDENT IN AN ORGANIZED HEALTH CARE EDUCATION/TRAINING PROGRAM

## 2023-04-24 PROCEDURE — 6360000002 HC RX W HCPCS

## 2023-04-24 PROCEDURE — 6370000000 HC RX 637 (ALT 250 FOR IP): Performed by: INTERNAL MEDICINE

## 2023-04-24 PROCEDURE — 80048 BASIC METABOLIC PNL TOTAL CA: CPT

## 2023-04-24 PROCEDURE — 2500000003 HC RX 250 WO HCPCS: Performed by: STUDENT IN AN ORGANIZED HEALTH CARE EDUCATION/TRAINING PROGRAM

## 2023-04-24 PROCEDURE — 85027 COMPLETE CBC AUTOMATED: CPT

## 2023-04-24 PROCEDURE — 99222 1ST HOSP IP/OBS MODERATE 55: CPT | Performed by: PHYSICAL MEDICINE & REHABILITATION

## 2023-04-24 PROCEDURE — 99232 SBSQ HOSP IP/OBS MODERATE 35: CPT | Performed by: UROLOGY

## 2023-04-24 PROCEDURE — 99232 SBSQ HOSP IP/OBS MODERATE 35: CPT | Performed by: INTERNAL MEDICINE

## 2023-04-24 PROCEDURE — 6370000000 HC RX 637 (ALT 250 FOR IP): Performed by: PHYSICAL MEDICINE & REHABILITATION

## 2023-04-24 PROCEDURE — 97110 THERAPEUTIC EXERCISES: CPT

## 2023-04-24 PROCEDURE — 92526 ORAL FUNCTION THERAPY: CPT

## 2023-04-24 PROCEDURE — 6360000002 HC RX W HCPCS: Performed by: INTERNAL MEDICINE

## 2023-04-24 PROCEDURE — 6370000000 HC RX 637 (ALT 250 FOR IP): Performed by: STUDENT IN AN ORGANIZED HEALTH CARE EDUCATION/TRAINING PROGRAM

## 2023-04-24 PROCEDURE — 84443 ASSAY THYROID STIM HORMONE: CPT

## 2023-04-24 PROCEDURE — 36415 COLL VENOUS BLD VENIPUNCTURE: CPT

## 2023-04-24 RX ORDER — GABAPENTIN 100 MG/1
100 CAPSULE ORAL EVERY 8 HOURS
Status: DISCONTINUED | OUTPATIENT
Start: 2023-04-24 | End: 2023-04-27 | Stop reason: HOSPADM

## 2023-04-24 RX ORDER — DEXAMETHASONE SODIUM PHOSPHATE 4 MG/ML
2 INJECTION, SOLUTION INTRA-ARTICULAR; INTRALESIONAL; INTRAMUSCULAR; INTRAVENOUS; SOFT TISSUE EVERY 8 HOURS
Status: DISCONTINUED | OUTPATIENT
Start: 2023-04-24 | End: 2023-04-27 | Stop reason: HOSPADM

## 2023-04-24 RX ORDER — HYDRALAZINE HYDROCHLORIDE 20 MG/ML
10 INJECTION INTRAMUSCULAR; INTRAVENOUS EVERY 6 HOURS PRN
Status: DISCONTINUED | OUTPATIENT
Start: 2023-04-24 | End: 2023-04-27 | Stop reason: HOSPADM

## 2023-04-24 RX ADMIN — METOPROLOL TARTRATE 12.5 MG: 25 TABLET, FILM COATED ORAL at 21:29

## 2023-04-24 RX ADMIN — CLOPIDOGREL BISULFATE 75 MG: 75 TABLET ORAL at 08:45

## 2023-04-24 RX ADMIN — ACETIC ACID 250 ML: 0.25 IRRIGANT IRRIGATION at 10:10

## 2023-04-24 RX ADMIN — MICONAZOLE NITRATE: 2 POWDER TOPICAL at 19:37

## 2023-04-24 RX ADMIN — CARBAMAZEPINE 100 MG: 100 TABLET, CHEWABLE ORAL at 21:29

## 2023-04-24 RX ADMIN — DEXAMETHASONE SODIUM PHOSPHATE 2 MG: 4 INJECTION, SOLUTION INTRA-ARTICULAR; INTRALESIONAL; INTRAMUSCULAR; INTRAVENOUS; SOFT TISSUE at 19:34

## 2023-04-24 RX ADMIN — PIPERACILLIN AND TAZOBACTAM 3375 MG: 3; .375 INJECTION, POWDER, LYOPHILIZED, FOR SOLUTION INTRAVENOUS at 17:50

## 2023-04-24 RX ADMIN — METOPROLOL TARTRATE 12.5 MG: 25 TABLET, FILM COATED ORAL at 08:45

## 2023-04-24 RX ADMIN — DEXAMETHASONE SODIUM PHOSPHATE 2 MG: 4 INJECTION, SOLUTION INTRA-ARTICULAR; INTRALESIONAL; INTRAMUSCULAR; INTRAVENOUS; SOFT TISSUE at 13:00

## 2023-04-24 RX ADMIN — MICONAZOLE NITRATE: 2 POWDER TOPICAL at 13:00

## 2023-04-24 RX ADMIN — ENOXAPARIN SODIUM 30 MG: 100 INJECTION SUBCUTANEOUS at 08:45

## 2023-04-24 RX ADMIN — GABAPENTIN 100 MG: 100 CAPSULE ORAL at 21:29

## 2023-04-24 RX ADMIN — PIPERACILLIN AND TAZOBACTAM 3375 MG: 3; .375 INJECTION, POWDER, LYOPHILIZED, FOR SOLUTION INTRAVENOUS at 03:02

## 2023-04-24 RX ADMIN — HYDROCODONE BITARTRATE AND ACETAMINOPHEN 2 TABLET: 5; 325 TABLET ORAL at 04:54

## 2023-04-24 RX ADMIN — MICONAZOLE NITRATE: 2 POWDER TOPICAL at 08:45

## 2023-04-24 RX ADMIN — Medication 1000 UNITS: at 08:45

## 2023-04-24 RX ADMIN — SODIUM CHLORIDE, PRESERVATIVE FREE 10 ML: 5 INJECTION INTRAVENOUS at 08:45

## 2023-04-24 RX ADMIN — CARBAMAZEPINE 100 MG: 100 TABLET, CHEWABLE ORAL at 08:45

## 2023-04-24 RX ADMIN — HYDROCODONE BITARTRATE AND ACETAMINOPHEN 2 TABLET: 5; 325 TABLET ORAL at 13:00

## 2023-04-24 RX ADMIN — PIPERACILLIN AND TAZOBACTAM 3375 MG: 3; .375 INJECTION, POWDER, LYOPHILIZED, FOR SOLUTION INTRAVENOUS at 10:15

## 2023-04-24 RX ADMIN — SODIUM CHLORIDE, PRESERVATIVE FREE 10 ML: 5 INJECTION INTRAVENOUS at 19:39

## 2023-04-24 RX ADMIN — HYDROCODONE BITARTRATE AND ACETAMINOPHEN 2 TABLET: 5; 325 TABLET ORAL at 21:30

## 2023-04-24 ASSESSMENT — PAIN DESCRIPTION - LOCATION
LOCATION: ARM
LOCATION: NECK;SHOULDER;ARM
LOCATION: SHOULDER;NECK
LOCATION: ARM;NECK
LOCATION: NECK;SHOULDER

## 2023-04-24 ASSESSMENT — ENCOUNTER SYMPTOMS
WHEEZING: 0
BACK PAIN: 0
ABDOMINAL PAIN: 0
EYE PAIN: 0
NAUSEA: 0
TROUBLE SWALLOWING: 0
DIARRHEA: 0
COUGH: 0
EYE DISCHARGE: 0
VOMITING: 0
SORE THROAT: 0
RHINORRHEA: 0
SHORTNESS OF BREATH: 0
CONSTIPATION: 0

## 2023-04-24 ASSESSMENT — PAIN DESCRIPTION - ORIENTATION
ORIENTATION: RIGHT;LEFT
ORIENTATION: RIGHT
ORIENTATION: LEFT;RIGHT
ORIENTATION: RIGHT;LEFT
ORIENTATION: LEFT

## 2023-04-24 ASSESSMENT — PAIN SCALES - GENERAL
PAINLEVEL_OUTOF10: 0
PAINLEVEL_OUTOF10: 9
PAINLEVEL_OUTOF10: 8
PAINLEVEL_OUTOF10: 9
PAINLEVEL_OUTOF10: 8
PAINLEVEL_OUTOF10: 8
PAINLEVEL_OUTOF10: 10
PAINLEVEL_OUTOF10: 8

## 2023-04-24 ASSESSMENT — PAIN DESCRIPTION - ONSET
ONSET: ON-GOING

## 2023-04-24 ASSESSMENT — PAIN DESCRIPTION - FREQUENCY
FREQUENCY: CONTINUOUS

## 2023-04-24 ASSESSMENT — PAIN DESCRIPTION - PAIN TYPE
TYPE: CHRONIC PAIN

## 2023-04-24 ASSESSMENT — PAIN DESCRIPTION - DESCRIPTORS
DESCRIPTORS: ACHING;CRAMPING;DULL
DESCRIPTORS: ACHING
DESCRIPTORS: ACHING;DISCOMFORT;DULL
DESCRIPTORS: ACHING;DISCOMFORT
DESCRIPTORS: ACHING;DISCOMFORT;DULL

## 2023-04-24 ASSESSMENT — PAIN - FUNCTIONAL ASSESSMENT
PAIN_FUNCTIONAL_ASSESSMENT: PREVENTS OR INTERFERES SOME ACTIVE ACTIVITIES AND ADLS
PAIN_FUNCTIONAL_ASSESSMENT: PREVENTS OR INTERFERES WITH MANY ACTIVE NOT PASSIVE ACTIVITIES
PAIN_FUNCTIONAL_ASSESSMENT: PREVENTS OR INTERFERES WITH ALL ACTIVE AND SOME PASSIVE ACTIVITIES
PAIN_FUNCTIONAL_ASSESSMENT: PREVENTS OR INTERFERES WITH ALL ACTIVE AND SOME PASSIVE ACTIVITIES
PAIN_FUNCTIONAL_ASSESSMENT: PREVENTS OR INTERFERES WITH MANY ACTIVE NOT PASSIVE ACTIVITIES
PAIN_FUNCTIONAL_ASSESSMENT: PREVENTS OR INTERFERES SOME ACTIVE ACTIVITIES AND ADLS
PAIN_FUNCTIONAL_ASSESSMENT: PREVENTS OR INTERFERES SOME ACTIVE ACTIVITIES AND ADLS

## 2023-04-24 NOTE — CARE COORDINATION
4/24/23, 7:15 AM EDT    DISCHARGE BARRIERS        Patient transferred to [facility]. Report given to unit SWDebra, regarding discharge plan for this patient.       Electronically signed by JOEY Resendiz on 4/24/2023 at 7:15 AM

## 2023-04-24 NOTE — CARE COORDINATION
4/24/23, 1:13 PM EDT  DISCHARGE PLANNING EVALUATION    JAIDA was notified by CM that patient is wanting ECF. CM was notified by therapy. JAIDA spoke with patient and patient reported that she would like referral to [address] SENA called and made referral to [place] [address].

## 2023-04-24 NOTE — CARE COORDINATION
4/24/23, 3:10 PM EDT    DISCHARGE ON GOING EVALUATION    [facility] day: 7  Location: -26/026-A Reason for admit: Kidney stones [N20.0]  Bladder stones [N21.0]  Postoperative surgical complication involving both eyes associated with non-ophthalmic procedure, unspecified complication [E77.37]   Procedure:   4/17 Cystoscopy left retrograde pyelogram; left ureteroscopy with holmium laser lithotripsy, left stent placement, cystolithalopaxy; [address] exchanged  4/17 CXR: Left lower lung atelectasis/infiltrate  0/95 PICC right basilic  9/14 CT Abd/pelvis: Multiple nonobstructive left renal stones. Interval decrease stones in the left renal pelvis and interval resolution of urinary bladder stones. Interval placement of left ureteral stent. Sigmoid diverticulosis without evidence for acute diverticulitis. Distention of the stomach and proximal duodenum. There is an air-fluid   level in the stomach. Finding may be transient or secondary to gastroparesis/ileus. Atherosclerosis with severe narrowing of the distal abdominal aorta and bilateral common iliac arteries. Partially visualized lower thorax shows bilateral pleural effusions, small on the right and small-to-moderate on the left  4/18 KUB: Gastric and small bowel distention concerning for obstruction  4/19 Left pigtail chest tube for hemothorax  4/19 MRI left Brachial plexus: Left lower neck/supraclavicular soft tissue hematoma exhibiting mass effect upon the left brachial plexus; Bilateral pleural effusions. The pleural effusion is larger on the left than the right. There is a fluid fluid level suggesting a hemothorax on the left; Thickening of the prevertebral soft tissues suggesting a prevertebral hematoma versus prevertebral edema  4/19 Echo with EF 45%; mild global hypokinesis of LV  4/19 CT Soft tissue neck: Large hematoma in the lower left neck soft tissues with active extravasation of contrast within it.  This appears to be arising from the

## 2023-04-24 NOTE — DISCHARGE INSTR - COC
Continuity of Care Form    Patient Name: Cortez Bartlett   :  [id number]  MRN:  [id number]    Admit date:  2023  Discharge date:      Code Status Order: Full Code   Advance Directives:   [facility] Documentation       Date/Time Healthcare Directive Type of Healthcare Directive Copy in [address] Agent's Name Healthcare Agent's Phone Number    23 1205 No, patient does not have an advance directive for healthcare treatment -- -- -- -- --            Admitting Physician:  Ofelia Roa MD  PCP: Belle Katz MD    Discharging Nurse: [facility] Unit/Room#: 4K-26/026-A  Discharging Unit Phone Number: [phone]    Emergency Contact:   Extended Emergency Contact Information  Primary Emergency Contact: Terry Jay of [address] [place] [address] Phone: [phone]  Relation: Other  Secondary Emergency Contact: Sandeep Bancroft  Address: [place]  [address] [place] [address], [address] [place] [address] Phone: [phone]  Relation: Child    Past Surgical History:  Past Surgical History:   Procedure Laterality Date    CATHETER INSERTION N/A 2022    CYSTO, SUPRAPUBIC CATHETER PLACEMENT WITH ULTRASOUND performed by Ofelia Roa MD at [facility]  2016    laparoscopic    CYSTOSCOPY N/A 2019    CYSTO, CLOT EVACUATION, TURBT performed by Jailyn Brown MD at [address] [employer] Left 2020    LEFT HEEL WOUND I&D performed by Marilyn Aguilar DPM at [address] [place] [address] Right 2018    EYE CATARACT EMULSIFICATION IOL IMPLANT, RIGHT performed by Radah Schrader MD at [place] [address] W/O ECP Left 11/15/2018    EYE CATARACT EMULSIFICATION IOL IMPLANT LEFT EYE performed by Radha Schrader MD at [address] [place] [address] Right 10/22/2022    DECUBITUS ULCER DEBRIDEMENT

## 2023-04-25 ENCOUNTER — APPOINTMENT (OUTPATIENT)
Dept: GENERAL RADIOLOGY | Age: 73
DRG: 853 | End: 2023-04-25
Attending: UROLOGY
Payer: MEDICARE

## 2023-04-25 LAB
ANION GAP SERPL CALC-SCNC: 7 MEQ/L (ref 8–16)
BUN SERPL-MCNC: 15 MG/DL (ref 7–22)
CALCIUM SERPL-MCNC: 8.4 MG/DL (ref 8.5–10.5)
CHLORIDE SERPL-SCNC: 113 MEQ/L (ref 98–111)
CO2 SERPL-SCNC: 24 MEQ/L (ref 23–33)
CREAT SERPL-MCNC: 0.3 MG/DL (ref 0.4–1.2)
DEPRECATED RDW RBC AUTO: 58.1 FL (ref 35–45)
ERYTHROCYTE [DISTWIDTH] IN BLOOD BY AUTOMATED COUNT: 17.8 % (ref 11.5–14.5)
GFR SERPL CREATININE-BSD FRML MDRD: > 60 ML/MIN/1.73M2
GLUCOSE SERPL-MCNC: 103 MG/DL (ref 70–108)
HCT VFR BLD AUTO: 31.7 % (ref 37–47)
HGB BLD-MCNC: 10.1 GM/DL (ref 12–16)
MAGNESIUM SERPL-MCNC: 1.9 MG/DL (ref 1.6–2.4)
MCH RBC QN AUTO: 31.1 PG (ref 26–33)
MCHC RBC AUTO-ENTMCNC: 31.9 GM/DL (ref 32.2–35.5)
MCV RBC AUTO: 97.5 FL (ref 81–99)
PLATELET # BLD AUTO: 191 THOU/MM3 (ref 130–400)
PMV BLD AUTO: 10.3 FL (ref 9.4–12.4)
POTASSIUM SERPL-SCNC: 4.2 MEQ/L (ref 3.5–5.2)
RBC # BLD AUTO: 3.25 MILL/MM3 (ref 4.2–5.4)
SODIUM SERPL-SCNC: 144 MEQ/L (ref 135–145)
WBC # BLD AUTO: 9.1 THOU/MM3 (ref 4.8–10.8)

## 2023-04-25 PROCEDURE — 80048 BASIC METABOLIC PNL TOTAL CA: CPT

## 2023-04-25 PROCEDURE — 36592 COLLECT BLOOD FROM PICC: CPT

## 2023-04-25 PROCEDURE — 2500000003 HC RX 250 WO HCPCS: Performed by: INTERNAL MEDICINE

## 2023-04-25 PROCEDURE — 83735 ASSAY OF MAGNESIUM: CPT

## 2023-04-25 PROCEDURE — 6370000000 HC RX 637 (ALT 250 FOR IP): Performed by: STUDENT IN AN ORGANIZED HEALTH CARE EDUCATION/TRAINING PROGRAM

## 2023-04-25 PROCEDURE — 2580000003 HC RX 258: Performed by: STUDENT IN AN ORGANIZED HEALTH CARE EDUCATION/TRAINING PROGRAM

## 2023-04-25 PROCEDURE — 6360000002 HC RX W HCPCS

## 2023-04-25 PROCEDURE — 92611 MOTION FLUOROSCOPY/SWALLOW: CPT

## 2023-04-25 PROCEDURE — 74230 X-RAY XM SWLNG FUNCJ C+: CPT

## 2023-04-25 PROCEDURE — 36415 COLL VENOUS BLD VENIPUNCTURE: CPT

## 2023-04-25 PROCEDURE — 6370000000 HC RX 637 (ALT 250 FOR IP): Performed by: PHYSICAL MEDICINE & REHABILITATION

## 2023-04-25 PROCEDURE — 6360000002 HC RX W HCPCS: Performed by: INTERNAL MEDICINE

## 2023-04-25 PROCEDURE — 85027 COMPLETE CBC AUTOMATED: CPT

## 2023-04-25 PROCEDURE — 6370000000 HC RX 637 (ALT 250 FOR IP): Performed by: INTERNAL MEDICINE

## 2023-04-25 PROCEDURE — 99223 1ST HOSP IP/OBS HIGH 75: CPT | Performed by: INTERNAL MEDICINE

## 2023-04-25 PROCEDURE — 2060000000 HC ICU INTERMEDIATE R&B

## 2023-04-25 PROCEDURE — 6370000000 HC RX 637 (ALT 250 FOR IP)

## 2023-04-25 RX ORDER — ENOXAPARIN SODIUM 100 MG/ML
40 INJECTION SUBCUTANEOUS DAILY
Status: DISCONTINUED | OUTPATIENT
Start: 2023-04-26 | End: 2023-04-27 | Stop reason: HOSPADM

## 2023-04-25 RX ORDER — LEVOTHYROXINE SODIUM 0.05 MG/1
50 TABLET ORAL DAILY
Status: DISCONTINUED | OUTPATIENT
Start: 2023-04-26 | End: 2023-04-27 | Stop reason: HOSPADM

## 2023-04-25 RX ADMIN — BISACODYL 10 MG: 10 SUPPOSITORY RECTAL at 08:16

## 2023-04-25 RX ADMIN — GABAPENTIN 100 MG: 100 CAPSULE ORAL at 05:45

## 2023-04-25 RX ADMIN — METOPROLOL TARTRATE 12.5 MG: 25 TABLET, FILM COATED ORAL at 08:16

## 2023-04-25 RX ADMIN — ENOXAPARIN SODIUM 30 MG: 100 INJECTION SUBCUTANEOUS at 08:16

## 2023-04-25 RX ADMIN — MICONAZOLE NITRATE: 2 POWDER TOPICAL at 21:32

## 2023-04-25 RX ADMIN — METOPROLOL TARTRATE 37.5 MG: 25 TABLET, FILM COATED ORAL at 21:33

## 2023-04-25 RX ADMIN — SODIUM CHLORIDE, PRESERVATIVE FREE 10 ML: 5 INJECTION INTRAVENOUS at 08:16

## 2023-04-25 RX ADMIN — HYDROCODONE BITARTRATE AND ACETAMINOPHEN 2 TABLET: 5; 325 TABLET ORAL at 08:16

## 2023-04-25 RX ADMIN — DEXAMETHASONE SODIUM PHOSPHATE 2 MG: 4 INJECTION, SOLUTION INTRA-ARTICULAR; INTRALESIONAL; INTRAMUSCULAR; INTRAVENOUS; SOFT TISSUE at 03:48

## 2023-04-25 RX ADMIN — SODIUM CHLORIDE, PRESERVATIVE FREE 10 ML: 5 INJECTION INTRAVENOUS at 21:37

## 2023-04-25 RX ADMIN — CLOPIDOGREL BISULFATE 75 MG: 75 TABLET ORAL at 08:16

## 2023-04-25 RX ADMIN — ONDANSETRON 4 MG: 4 TABLET, ORALLY DISINTEGRATING ORAL at 16:50

## 2023-04-25 RX ADMIN — GABAPENTIN 100 MG: 100 CAPSULE ORAL at 13:20

## 2023-04-25 RX ADMIN — CARBAMAZEPINE 100 MG: 100 TABLET, CHEWABLE ORAL at 13:20

## 2023-04-25 RX ADMIN — Medication 1000 UNITS: at 08:16

## 2023-04-25 RX ADMIN — MICONAZOLE NITRATE: 2 POWDER TOPICAL at 13:20

## 2023-04-25 RX ADMIN — DEXAMETHASONE SODIUM PHOSPHATE 2 MG: 4 INJECTION, SOLUTION INTRA-ARTICULAR; INTRALESIONAL; INTRAMUSCULAR; INTRAVENOUS; SOFT TISSUE at 21:33

## 2023-04-25 RX ADMIN — CARBAMAZEPINE 100 MG: 100 TABLET, CHEWABLE ORAL at 08:16

## 2023-04-25 RX ADMIN — GABAPENTIN 100 MG: 100 CAPSULE ORAL at 21:37

## 2023-04-25 RX ADMIN — DEXAMETHASONE SODIUM PHOSPHATE 2 MG: 4 INJECTION, SOLUTION INTRA-ARTICULAR; INTRALESIONAL; INTRAMUSCULAR; INTRAVENOUS; SOFT TISSUE at 11:30

## 2023-04-25 RX ADMIN — BARIUM SULFATE 80 ML: 0.81 POWDER, FOR SUSPENSION ORAL at 09:26

## 2023-04-25 RX ADMIN — CARBAMAZEPINE 100 MG: 100 TABLET, CHEWABLE ORAL at 21:35

## 2023-04-25 RX ADMIN — BARIUM SULFATE 20 ML: 400 PASTE ORAL at 09:26

## 2023-04-25 RX ADMIN — HYDROCODONE BITARTRATE AND ACETAMINOPHEN 2 TABLET: 5; 325 TABLET ORAL at 21:34

## 2023-04-25 RX ADMIN — MICONAZOLE NITRATE: 2 POWDER TOPICAL at 08:17

## 2023-04-25 ASSESSMENT — PAIN DESCRIPTION - DESCRIPTORS
DESCRIPTORS: ACHING;DISCOMFORT
DESCRIPTORS: ACHING
DESCRIPTORS: ACHING;DISCOMFORT;DULL

## 2023-04-25 ASSESSMENT — PAIN DESCRIPTION - LOCATION
LOCATION: SHOULDER

## 2023-04-25 ASSESSMENT — PAIN DESCRIPTION - ONSET
ONSET: ON-GOING
ONSET: ON-GOING

## 2023-04-25 ASSESSMENT — PAIN SCALES - GENERAL
PAINLEVEL_OUTOF10: 8
PAINLEVEL_OUTOF10: 6
PAINLEVEL_OUTOF10: 8

## 2023-04-25 ASSESSMENT — PAIN DESCRIPTION - ORIENTATION
ORIENTATION: RIGHT;LEFT
ORIENTATION: LEFT;RIGHT
ORIENTATION: RIGHT;LEFT

## 2023-04-25 ASSESSMENT — PAIN DESCRIPTION - FREQUENCY
FREQUENCY: CONTINUOUS

## 2023-04-25 ASSESSMENT — PAIN - FUNCTIONAL ASSESSMENT
PAIN_FUNCTIONAL_ASSESSMENT: PREVENTS OR INTERFERES SOME ACTIVE ACTIVITIES AND ADLS
PAIN_FUNCTIONAL_ASSESSMENT: PREVENTS OR INTERFERES WITH MANY ACTIVE NOT PASSIVE ACTIVITIES
PAIN_FUNCTIONAL_ASSESSMENT: PREVENTS OR INTERFERES WITH MANY ACTIVE NOT PASSIVE ACTIVITIES

## 2023-04-25 ASSESSMENT — PAIN DESCRIPTION - PAIN TYPE
TYPE: CHRONIC PAIN

## 2023-04-25 NOTE — CONSULTS
(DULCOLAX) suppository 10 mg, 10 mg, Rectal, Daily, Britney Gordon DO, 10 mg at 04/25/23 0816    phenol 1.4 % mouth spray 1 spray, 1 spray, Mouth/Throat, Q2H PRN, Mercedes Boyd MD    acetaminophen (TYLENOL) tablet 650 mg, 650 mg, Oral, Q6H PRN, Mike Urena MD, 650 mg at 04/23/23 1547    HYDROcodone-acetaminophen (NORCO) 5-325 MG per tablet 1 tablet, 1 tablet, Oral, Q6H PRN, 1 tablet at 04/22/23 1402 **OR** HYDROcodone-acetaminophen (NORCO) 5-325 MG per tablet 2 tablet, 2 tablet, Oral, Q6H PRN, Mike Urena MD, 2 tablet at 04/25/23 0816    morphine (PF) injection 2 mg, 2 mg, IntraVENous, Q2H PRN, Mike Urena MD, 2 mg at 04/22/23 1550    enoxaparin Sodium (LOVENOX) injection 30 mg, 30 mg, SubCUTAneous, Daily, Mike Urena MD, 30 mg at 04/25/23 0816    clopidogrel (PLAVIX) tablet 75 mg, 75 mg, Oral, Daily, Britney Gordon DO, 75 mg at 04/25/23 0816    potassium chloride 20 mEq/50 mL IVPB (Central Line), 20 mEq, IntraVENous, PRN, Stopped at 04/23/23 0905 **OR** potassium chloride 10 mEq/100 mL IVPB (Peripheral Line), 10 mEq, IntraVENous, PRN, Mercedes Boyd MD, Stopped at 04/18/23 0802    magnesium sulfate 2000 mg in 50 mL IVPB premix, 2,000 mg, IntraVENous, PRN, Mercedes Boyd MD, Stopped at 04/22/23 0047    sodium phosphate 10 mmol in sodium chloride 0.9 % 250 mL IVPB, 10 mmol, IntraVENous, PRN **OR** sodium phosphate 15 mmol in sodium chloride 0.9 % 250 mL IVPB, 15 mmol, IntraVENous, PRN **OR** sodium phosphate 20 mmol in sodium chloride 0.9 % 500 mL IVPB, 20 mmol, IntraVENous, PRN, Mercedes Boyd MD, Stopped at 04/18/23 1646    calcium replacement protocol, , Other, RX Placeholder, Mercedes Boyd MD    levalbuterol Jordan Gains) nebulizer solution 1.25 mg, 1.25 mg, Nebulization, Q8H PRN, Nereida Sanders MD, 1.25 mg at 04/18/23 0447    calcium chloride 10 % injection 1,000 mg, 1,000 mg, IntraVENous, PRN, Britney Gordon DO    acetic acid 0.25 % irrigation 250
anemia. The patient is followed by primary team and was seen by Orthopedics for left brachial plexus compression. Anemia was noted, she has required PRBC transfusion, Hgb today is 10.1. High risk for recurrent bleeding, expanding hematoma. Once hematoma resolves, Hgb is stable and patient is cleared by surgery, [facility] [place] [facility] be considered  Monitor CBC  Plan a 30 day cardiac event monitor on discharge   Echocardiogram on 4/19/2023 revealed a new drop in EF to 45% (was normal in 2022), with left atrial enlargement  Patient denies chest pain  Further work up including ischemic evaluation as outpatient, after resolution of current acute conditions, follow up in office     Above findings and plan of care were discussed with patient, questions were answered, agreeable to plan. Thank you for allowing me to participate in the care of this patient. Please let me know if I can be of any further assistance.       Mary Baker MD, Kyaw Brood   12:35 PM  4/25/2023
rehabilitation is not medically indicated because it will not reverse the chronic impairment/disability for multiple sclerosis in very short period of time. SNF subacute rehab will be more appropriate to allow her more time to recover to her previous function level. The patient likely starts experiencing neuropathic pain at her left upper extremity due to brachial plexopathy from hematoma compression. I will start the patient on gabapentin for neuropathic pain begin with 100 mg      Recommendations:  Continue PT, OT and speech therapy while the patient remains in acute hospital  Start Neurontin 100 mg every 8 hours for left upper extremity neuropathic pain  Suggest referral for electrodiagnostic study (NCV/needle EMG) of left upper extremity as outpatient in about 3~4 weeks  Intensive inpatient rehabilitation treatment is not indicated because the patient's current functional level is not far from her baseline  Agree with [facility] subacute rehab program placement at [place] [id number] to allow longer time of rehabilitation intervention specific for her new left upper extremity weakness from brachial plexopathy        It was my pleasure to evaluate Trina Espana today. Please call with questions.     Tesha Butler MD

## 2023-04-25 NOTE — CARE COORDINATION
from Pharmacy is calling regarding [place] [facility] prescription:    Medication:oxyCODONE-acetaminophen (PERCOCET)  Dose: 5-325 MG   Quantity:20   Dosing Instructions:Take 1-2 tablets by mouth daily as needed for Pain.  Refills requested:     Following information being requested:    Urgency: Routine    Please call pharmacy back at :    [id number] [address] AT [address] [address]  Phone: [phone] Fax: [phone]      Pharmacy was advised that the Clinic will call them back within 24-72 hours, unless this is a STAT request.         4/25/23, 7:58 AM EDT  DISCHARGE PLANNING EVALUATION    JAIDA called Marlene from [place] [address] and she reported that she will have an answer for JAIDA after their morning meeting at [address] notified her that patient is getting close to discharge. 4/25/23, 9:27 AM EDT  DISCHARGE PLANNING EVALUATION    JAIDA received a call from [address] and Nieves Martinez reported that they will accept at discharge. JAIDA updated patient.

## 2023-04-26 PROBLEM — I48.0 PAROXYSMAL ATRIAL FIBRILLATION (HCC): Status: ACTIVE | Noted: 2023-04-26

## 2023-04-26 LAB
ANION GAP SERPL CALC-SCNC: 5 MEQ/L (ref 8–16)
BUN SERPL-MCNC: 18 MG/DL (ref 7–22)
CA-I BLD ISE-SCNC: 1.19 MMOL/L (ref 1.12–1.32)
CALCIUM SERPL-MCNC: 8.3 MG/DL (ref 8.5–10.5)
CHLORIDE SERPL-SCNC: 111 MEQ/L (ref 98–111)
CO2 SERPL-SCNC: 26 MEQ/L (ref 23–33)
CREAT SERPL-MCNC: 0.3 MG/DL (ref 0.4–1.2)
DEPRECATED RDW RBC AUTO: 59.3 FL (ref 35–45)
ERYTHROCYTE [DISTWIDTH] IN BLOOD BY AUTOMATED COUNT: 18.6 % (ref 11.5–14.5)
GFR SERPL CREATININE-BSD FRML MDRD: > 60 ML/MIN/1.73M2
GLUCOSE SERPL-MCNC: 102 MG/DL (ref 70–108)
HCT VFR BLD AUTO: 33.4 % (ref 37–47)
HGB BLD-MCNC: 10.4 GM/DL (ref 12–16)
MAGNESIUM SERPL-MCNC: 1.8 MG/DL (ref 1.6–2.4)
MCH RBC QN AUTO: 30.9 PG (ref 26–33)
MCHC RBC AUTO-ENTMCNC: 31.1 GM/DL (ref 32.2–35.5)
MCV RBC AUTO: 99.1 FL (ref 81–99)
PLATELET # BLD AUTO: 230 THOU/MM3 (ref 130–400)
PMV BLD AUTO: 9.9 FL (ref 9.4–12.4)
POTASSIUM SERPL-SCNC: 4 MEQ/L (ref 3.5–5.2)
RBC # BLD AUTO: 3.37 MILL/MM3 (ref 4.2–5.4)
SODIUM SERPL-SCNC: 142 MEQ/L (ref 135–145)
WBC # BLD AUTO: 10.9 THOU/MM3 (ref 4.8–10.8)

## 2023-04-26 PROCEDURE — 92526 ORAL FUNCTION THERAPY: CPT

## 2023-04-26 PROCEDURE — 6370000000 HC RX 637 (ALT 250 FOR IP): Performed by: INTERNAL MEDICINE

## 2023-04-26 PROCEDURE — 85027 COMPLETE CBC AUTOMATED: CPT

## 2023-04-26 PROCEDURE — 97530 THERAPEUTIC ACTIVITIES: CPT

## 2023-04-26 PROCEDURE — 6360000002 HC RX W HCPCS

## 2023-04-26 PROCEDURE — 97110 THERAPEUTIC EXERCISES: CPT

## 2023-04-26 PROCEDURE — 36592 COLLECT BLOOD FROM PICC: CPT

## 2023-04-26 PROCEDURE — 6370000000 HC RX 637 (ALT 250 FOR IP): Performed by: STUDENT IN AN ORGANIZED HEALTH CARE EDUCATION/TRAINING PROGRAM

## 2023-04-26 PROCEDURE — 99232 SBSQ HOSP IP/OBS MODERATE 35: CPT | Performed by: PHYSICIAN ASSISTANT

## 2023-04-26 PROCEDURE — 83735 ASSAY OF MAGNESIUM: CPT

## 2023-04-26 PROCEDURE — 6370000000 HC RX 637 (ALT 250 FOR IP)

## 2023-04-26 PROCEDURE — 2060000000 HC ICU INTERMEDIATE R&B

## 2023-04-26 PROCEDURE — 36415 COLL VENOUS BLD VENIPUNCTURE: CPT

## 2023-04-26 PROCEDURE — 82330 ASSAY OF CALCIUM: CPT

## 2023-04-26 PROCEDURE — 6360000002 HC RX W HCPCS: Performed by: STUDENT IN AN ORGANIZED HEALTH CARE EDUCATION/TRAINING PROGRAM

## 2023-04-26 PROCEDURE — 6370000000 HC RX 637 (ALT 250 FOR IP): Performed by: PHYSICAL MEDICINE & REHABILITATION

## 2023-04-26 PROCEDURE — 80048 BASIC METABOLIC PNL TOTAL CA: CPT

## 2023-04-26 PROCEDURE — 2580000003 HC RX 258: Performed by: STUDENT IN AN ORGANIZED HEALTH CARE EDUCATION/TRAINING PROGRAM

## 2023-04-26 PROCEDURE — APPNB30 APP NON BILLABLE TIME 0-30 MINS: Performed by: UROLOGY

## 2023-04-26 RX ADMIN — SODIUM CHLORIDE, PRESERVATIVE FREE 10 ML: 5 INJECTION INTRAVENOUS at 11:23

## 2023-04-26 RX ADMIN — GABAPENTIN 100 MG: 100 CAPSULE ORAL at 21:21

## 2023-04-26 RX ADMIN — SODIUM CHLORIDE, PRESERVATIVE FREE 10 ML: 5 INJECTION INTRAVENOUS at 09:00

## 2023-04-26 RX ADMIN — CARBAMAZEPINE 100 MG: 100 TABLET, CHEWABLE ORAL at 13:01

## 2023-04-26 RX ADMIN — SODIUM CHLORIDE, PRESERVATIVE FREE 10 ML: 5 INJECTION INTRAVENOUS at 17:07

## 2023-04-26 RX ADMIN — HYDROCODONE BITARTRATE AND ACETAMINOPHEN 2 TABLET: 5; 325 TABLET ORAL at 23:29

## 2023-04-26 RX ADMIN — MICONAZOLE NITRATE: 2 POWDER TOPICAL at 21:21

## 2023-04-26 RX ADMIN — CARBAMAZEPINE 100 MG: 100 TABLET, CHEWABLE ORAL at 22:10

## 2023-04-26 RX ADMIN — HYDROCODONE BITARTRATE AND ACETAMINOPHEN 2 TABLET: 5; 325 TABLET ORAL at 06:05

## 2023-04-26 RX ADMIN — ONDANSETRON 4 MG: 2 INJECTION INTRAMUSCULAR; INTRAVENOUS at 17:06

## 2023-04-26 RX ADMIN — DEXAMETHASONE SODIUM PHOSPHATE 2 MG: 4 INJECTION, SOLUTION INTRA-ARTICULAR; INTRALESIONAL; INTRAMUSCULAR; INTRAVENOUS; SOFT TISSUE at 11:23

## 2023-04-26 RX ADMIN — CARBAMAZEPINE 100 MG: 100 TABLET, CHEWABLE ORAL at 09:05

## 2023-04-26 RX ADMIN — LEVOTHYROXINE SODIUM 50 MCG: 0.05 TABLET ORAL at 06:04

## 2023-04-26 RX ADMIN — MICONAZOLE NITRATE: 2 POWDER TOPICAL at 08:59

## 2023-04-26 RX ADMIN — GABAPENTIN 100 MG: 100 CAPSULE ORAL at 13:02

## 2023-04-26 RX ADMIN — GABAPENTIN 100 MG: 100 CAPSULE ORAL at 06:04

## 2023-04-26 RX ADMIN — METOPROLOL TARTRATE 37.5 MG: 25 TABLET, FILM COATED ORAL at 21:21

## 2023-04-26 RX ADMIN — SODIUM CHLORIDE, PRESERVATIVE FREE 10 ML: 5 INJECTION INTRAVENOUS at 21:21

## 2023-04-26 RX ADMIN — Medication 1000 UNITS: at 09:01

## 2023-04-26 RX ADMIN — METOPROLOL TARTRATE 37.5 MG: 25 TABLET, FILM COATED ORAL at 09:01

## 2023-04-26 RX ADMIN — DEXAMETHASONE SODIUM PHOSPHATE 2 MG: 4 INJECTION, SOLUTION INTRA-ARTICULAR; INTRALESIONAL; INTRAMUSCULAR; INTRAVENOUS; SOFT TISSUE at 21:21

## 2023-04-26 RX ADMIN — MICONAZOLE NITRATE: 2 POWDER TOPICAL at 13:01

## 2023-04-26 RX ADMIN — ACETAMINOPHEN 650 MG: 325 TABLET ORAL at 09:12

## 2023-04-26 RX ADMIN — DEXAMETHASONE SODIUM PHOSPHATE 2 MG: 4 INJECTION, SOLUTION INTRA-ARTICULAR; INTRALESIONAL; INTRAMUSCULAR; INTRAVENOUS; SOFT TISSUE at 03:16

## 2023-04-26 ASSESSMENT — PAIN DESCRIPTION - DESCRIPTORS
DESCRIPTORS: ACHING

## 2023-04-26 ASSESSMENT — PAIN DESCRIPTION - LOCATION
LOCATION: NECK
LOCATION: SHOULDER

## 2023-04-26 ASSESSMENT — PAIN SCALES - WONG BAKER
WONGBAKER_NUMERICALRESPONSE: 0

## 2023-04-26 ASSESSMENT — PAIN SCALES - GENERAL
PAINLEVEL_OUTOF10: 3
PAINLEVEL_OUTOF10: 0
PAINLEVEL_OUTOF10: 8
PAINLEVEL_OUTOF10: 8
PAINLEVEL_OUTOF10: 7
PAINLEVEL_OUTOF10: 0
PAINLEVEL_OUTOF10: 9

## 2023-04-26 ASSESSMENT — PAIN DESCRIPTION - FREQUENCY: FREQUENCY: CONTINUOUS

## 2023-04-26 ASSESSMENT — PAIN DESCRIPTION - ONSET: ONSET: ON-GOING

## 2023-04-26 ASSESSMENT — PAIN DESCRIPTION - ORIENTATION
ORIENTATION: RIGHT;LEFT
ORIENTATION: LEFT
ORIENTATION: RIGHT;LEFT

## 2023-04-26 ASSESSMENT — PAIN DESCRIPTION - PAIN TYPE: TYPE: ACUTE PAIN

## 2023-04-26 ASSESSMENT — PAIN - FUNCTIONAL ASSESSMENT
PAIN_FUNCTIONAL_ASSESSMENT: PREVENTS OR INTERFERES SOME ACTIVE ACTIVITIES AND ADLS

## 2023-04-26 NOTE — CARE COORDINATION
4/26/23, 3:12 PM EDT    DISCHARGE ON GOING EVALUATION    [facility] day: 9  Location: -26/026-A Reason for admit: Kidney stones [N20.0]  Bladder stones [N21.0]  Postoperative surgical complication involving both eyes associated with non-ophthalmic procedure, unspecified complication [Z83.75]   Procedure:   4/17 Cystoscopy left retrograde pyelogram; left ureteroscopy with holmium laser lithotripsy, left stent placement, cystolithalopaxy; [address] exchanged  4/17 CXR: Left lower lung atelectasis/infiltrate  2/04 PICC right basilic  2/25 CT Abd/pelvis: Multiple nonobstructive left renal stones. Interval decrease stones in the left renal pelvis and interval resolution of urinary bladder stones. Interval placement of left ureteral stent. Sigmoid diverticulosis without evidence for acute diverticulitis. Distention of the stomach and proximal duodenum. There is an air-fluid   level in the stomach. Finding may be transient or secondary to gastroparesis/ileus. Atherosclerosis with severe narrowing of the distal abdominal aorta and bilateral common iliac arteries. Partially visualized lower thorax shows bilateral pleural effusions, small on the right and small-to-moderate on the left  4/18 KUB: Gastric and small bowel distention concerning for obstruction  4/19 Left pigtail chest tube for hemothorax  4/19 MRI left Brachial plexus: Left lower neck/supraclavicular soft tissue hematoma exhibiting mass effect upon the left brachial plexus; Bilateral pleural effusions. The pleural effusion is larger on the left than the right. There is a fluid fluid level suggesting a hemothorax on the left; Thickening of the prevertebral soft tissues suggesting a prevertebral hematoma versus prevertebral edema  4/19 Echo with EF 45%; mild global hypokinesis of LV  4/19 CT Soft tissue neck: Large hematoma in the lower left neck soft tissues with active extravasation of contrast within it.  This appears to be arising from the

## 2023-04-26 NOTE — CARE COORDINATION
4/26/23, 10:00 AM EDT  DISCHARGE PLANNING EVALUATION    SW was notified that patient's spouse wanted IPR. SW reviewed documentation and IPR declined patient 2 days ago. SW called [facility] to come speak with patient and spouse. 4/26/23, 12:28 PM EDT  DISCHARGE PLANNING EVALUATION    SW spoke with patient, attending and spouse (via phone) about discharge planning. Patient and spouse are both agreeable to [facility] at discharge. Spouse may have to come /transport patient's scooter at discharge. Tentative discharge tomorrow 4/27/2023. Marlene at [address] garcía. 4/26/23, 3:18 PM EDT  DISCHARGE PLANNING EVALUATION    SW received a call from [facility], daughter in law, and she expressed concerns about patient being discharged from hospital tomorrow. She reported that she has concerns about patient not being able to use that one arm. She reported that patient is very upset and is worried she will lose use of that arm. She asked if patient can be transferred to [facility] unit to stay at hospital for more therapy. SW explained that I[facility] declined patient due to not meeting criteria and patient's are not kept in the acute hospital for rehab. She verbalized understanding and just has concerns about patient's arm not moving before they release patient. She asked SW to pass message along to attending and reported that she will call SW tomorrow to follow up. JAIDA updated attending.

## 2023-04-27 VITALS
DIASTOLIC BLOOD PRESSURE: 56 MMHG | BODY MASS INDEX: 23.09 KG/M2 | HEART RATE: 92 BPM | HEIGHT: 63 IN | RESPIRATION RATE: 16 BRPM | OXYGEN SATURATION: 95 % | WEIGHT: 130.29 LBS | SYSTOLIC BLOOD PRESSURE: 98 MMHG | TEMPERATURE: 97.8 F

## 2023-04-27 LAB
ANION GAP SERPL CALC-SCNC: 5 MEQ/L (ref 8–16)
BUN SERPL-MCNC: 20 MG/DL (ref 7–22)
CALCIUM SERPL-MCNC: 8.5 MG/DL (ref 8.5–10.5)
CHLORIDE SERPL-SCNC: 110 MEQ/L (ref 98–111)
CO2 SERPL-SCNC: 26 MEQ/L (ref 23–33)
CREAT SERPL-MCNC: 0.3 MG/DL (ref 0.4–1.2)
DEPRECATED RDW RBC AUTO: 61.9 FL (ref 35–45)
EKG ATRIAL RATE: 119 BPM
EKG ATRIAL RATE: 140 BPM
EKG ATRIAL RATE: 142 BPM
EKG P AXIS: 39 DEGREES
EKG P AXIS: 94 DEGREES
EKG P-R INTERVAL: 128 MS
EKG P-R INTERVAL: 140 MS
EKG P-R INTERVAL: 88 MS
EKG Q-T INTERVAL: 268 MS
EKG Q-T INTERVAL: 298 MS
EKG Q-T INTERVAL: 304 MS
EKG Q-T INTERVAL: 324 MS
EKG Q-T INTERVAL: 340 MS
EKG Q-T INTERVAL: 350 MS
EKG QRS DURATION: 100 MS
EKG QRS DURATION: 104 MS
EKG QRS DURATION: 106 MS
EKG QRS DURATION: 108 MS
EKG QRS DURATION: 88 MS
EKG QRS DURATION: 92 MS
EKG QTC CALCULATION (BAZETT): 458 MS
EKG QTC CALCULATION (BAZETT): 462 MS
EKG QTC CALCULATION (BAZETT): 492 MS
EKG QTC CALCULATION (BAZETT): 494 MS
EKG QTC CALCULATION (BAZETT): 500 MS
EKG QTC CALCULATION (BAZETT): 502 MS
EKG R AXIS: -36 DEGREES
EKG R AXIS: -40 DEGREES
EKG R AXIS: -44 DEGREES
EKG R AXIS: -48 DEGREES
EKG T AXIS: 113 DEGREES
EKG T AXIS: 120 DEGREES
EKG T AXIS: 120 DEGREES
EKG T AXIS: 128 DEGREES
EKG T AXIS: 95 DEGREES
EKG T AXIS: 99 DEGREES
EKG VENTRICULAR RATE: 119 BPM
EKG VENTRICULAR RATE: 130 BPM
EKG VENTRICULAR RATE: 140 BPM
EKG VENTRICULAR RATE: 142 BPM
EKG VENTRICULAR RATE: 164 BPM
EKG VENTRICULAR RATE: 179 BPM
ERYTHROCYTE [DISTWIDTH] IN BLOOD BY AUTOMATED COUNT: 19.2 % (ref 11.5–14.5)
FLUAV RNA RESP QL NAA+PROBE: NOT DETECTED
FLUBV RNA RESP QL NAA+PROBE: NOT DETECTED
GFR SERPL CREATININE-BSD FRML MDRD: > 60 ML/MIN/1.73M2
GLUCOSE SERPL-MCNC: 104 MG/DL (ref 70–108)
HCT VFR BLD AUTO: 34.3 % (ref 37–47)
HGB BLD-MCNC: 10.7 GM/DL (ref 12–16)
MCH RBC QN AUTO: 31.1 PG (ref 26–33)
MCHC RBC AUTO-ENTMCNC: 31.2 GM/DL (ref 32.2–35.5)
MCV RBC AUTO: 99.7 FL (ref 81–99)
PLATELET # BLD AUTO: 265 THOU/MM3 (ref 130–400)
PMV BLD AUTO: 9.9 FL (ref 9.4–12.4)
POTASSIUM SERPL-SCNC: 4.2 MEQ/L (ref 3.5–5.2)
RBC # BLD AUTO: 3.44 MILL/MM3 (ref 4.2–5.4)
SARS-COV-2 RNA RESP QL NAA+PROBE: NOT DETECTED
SODIUM SERPL-SCNC: 141 MEQ/L (ref 135–145)
WBC # BLD AUTO: 10.8 THOU/MM3 (ref 4.8–10.8)

## 2023-04-27 PROCEDURE — 6370000000 HC RX 637 (ALT 250 FOR IP): Performed by: PHYSICAL MEDICINE & REHABILITATION

## 2023-04-27 PROCEDURE — 93270 REMOTE 30 DAY ECG REV/REPORT: CPT

## 2023-04-27 PROCEDURE — 36415 COLL VENOUS BLD VENIPUNCTURE: CPT

## 2023-04-27 PROCEDURE — 92526 ORAL FUNCTION THERAPY: CPT

## 2023-04-27 PROCEDURE — 6370000000 HC RX 637 (ALT 250 FOR IP)

## 2023-04-27 PROCEDURE — 85027 COMPLETE CBC AUTOMATED: CPT

## 2023-04-27 PROCEDURE — 80048 BASIC METABOLIC PNL TOTAL CA: CPT

## 2023-04-27 PROCEDURE — 6360000002 HC RX W HCPCS

## 2023-04-27 PROCEDURE — 6370000000 HC RX 637 (ALT 250 FOR IP): Performed by: INTERNAL MEDICINE

## 2023-04-27 PROCEDURE — 6370000000 HC RX 637 (ALT 250 FOR IP): Performed by: STUDENT IN AN ORGANIZED HEALTH CARE EDUCATION/TRAINING PROGRAM

## 2023-04-27 PROCEDURE — 2580000003 HC RX 258: Performed by: STUDENT IN AN ORGANIZED HEALTH CARE EDUCATION/TRAINING PROGRAM

## 2023-04-27 PROCEDURE — 87636 SARSCOV2 & INF A&B AMP PRB: CPT

## 2023-04-27 RX ORDER — DEXAMETHASONE 2 MG/1
2 TABLET ORAL EVERY 6 HOURS
Qty: 8 TABLET | Refills: 0 | Status: SHIPPED | OUTPATIENT
Start: 2023-04-27 | End: 2023-04-29

## 2023-04-27 RX ORDER — LEVOTHYROXINE SODIUM 0.05 MG/1
50 TABLET ORAL DAILY
Qty: 30 TABLET | Refills: 3 | Status: SHIPPED | OUTPATIENT
Start: 2023-04-28

## 2023-04-27 RX ADMIN — CARBAMAZEPINE 100 MG: 100 TABLET, CHEWABLE ORAL at 10:20

## 2023-04-27 RX ADMIN — MICONAZOLE NITRATE: 2 POWDER TOPICAL at 10:20

## 2023-04-27 RX ADMIN — GABAPENTIN 100 MG: 100 CAPSULE ORAL at 06:11

## 2023-04-27 RX ADMIN — Medication 1000 UNITS: at 10:20

## 2023-04-27 RX ADMIN — DEXAMETHASONE SODIUM PHOSPHATE 2 MG: 4 INJECTION, SOLUTION INTRA-ARTICULAR; INTRALESIONAL; INTRAMUSCULAR; INTRAVENOUS; SOFT TISSUE at 11:37

## 2023-04-27 RX ADMIN — SODIUM CHLORIDE, PRESERVATIVE FREE 10 ML: 5 INJECTION INTRAVENOUS at 10:20

## 2023-04-27 RX ADMIN — ACETIC ACID 250 ML: 0.25 IRRIGANT IRRIGATION at 10:21

## 2023-04-27 RX ADMIN — ONDANSETRON 4 MG: 4 TABLET, ORALLY DISINTEGRATING ORAL at 13:34

## 2023-04-27 RX ADMIN — METOPROLOL TARTRATE 37.5 MG: 25 TABLET, FILM COATED ORAL at 10:20

## 2023-04-27 RX ADMIN — DEXAMETHASONE SODIUM PHOSPHATE 2 MG: 4 INJECTION, SOLUTION INTRA-ARTICULAR; INTRALESIONAL; INTRAMUSCULAR; INTRAVENOUS; SOFT TISSUE at 05:02

## 2023-04-27 RX ADMIN — LEVOTHYROXINE SODIUM 50 MCG: 0.05 TABLET ORAL at 10:20

## 2023-04-27 ASSESSMENT — PAIN SCALES - WONG BAKER

## 2023-04-27 ASSESSMENT — PAIN DESCRIPTION - PAIN TYPE: TYPE: ACUTE PAIN

## 2023-04-27 ASSESSMENT — PAIN DESCRIPTION - ORIENTATION: ORIENTATION: LEFT

## 2023-04-27 ASSESSMENT — PAIN DESCRIPTION - FREQUENCY: FREQUENCY: CONTINUOUS

## 2023-04-27 ASSESSMENT — PAIN DESCRIPTION - LOCATION: LOCATION: SHOULDER

## 2023-04-27 ASSESSMENT — PAIN - FUNCTIONAL ASSESSMENT: PAIN_FUNCTIONAL_ASSESSMENT: PREVENTS OR INTERFERES SOME ACTIVE ACTIVITIES AND ADLS

## 2023-04-27 ASSESSMENT — PAIN DESCRIPTION - ONSET: ONSET: ON-GOING

## 2023-04-27 ASSESSMENT — PAIN DESCRIPTION - DESCRIPTORS: DESCRIPTORS: ACHING

## 2023-04-27 ASSESSMENT — PAIN SCALES - GENERAL
PAINLEVEL_OUTOF10: 9
PAINLEVEL_OUTOF10: 9

## 2023-04-27 NOTE — PLAN OF CARE
Problem: Discharge Planning  Goal: Discharge to home or other facility with appropriate resources  4/24/2023 0119 by Margaux Barger RN  Outcome: Progressing  Flowsheets (Taken 4/23/2023 2115)  Discharge to home or other facility with appropriate resources:   Identify barriers to discharge with patient and caregiver   Arrange for needed discharge resources and transportation as appropriate   Identify discharge learning needs (meds, wound care, etc)   Arrange for interpreters to assist at discharge as needed  4/23/2023 1640 by Nika Junior RN  Outcome: Progressing  Flowsheets (Taken 4/23/2023 1640)  Discharge to home or other facility with appropriate resources:   Identify barriers to discharge with patient and caregiver   Arrange for needed discharge resources and transportation as appropriate   Identify discharge learning needs (meds, wound care, etc)     Problem: Pain  Goal: Verbalizes/displays adequate comfort level or baseline comfort level  4/24/2023 0119 by Margaux Barger RN  Outcome: Progressing  4/23/2023 1640 by Nika Junior RN  Outcome: Progressing  Flowsheets (Taken 4/23/2023 1640)  Verbalizes/displays adequate comfort level or baseline comfort level:   Encourage patient to monitor pain and request assistance   Assess pain using appropriate pain scale   Administer analgesics based on type and severity of pain and evaluate response   Implement non-pharmacological measures as appropriate and evaluate response     Problem: Safety - Adult  Goal: Free from fall injury  4/24/2023 0119 by Margaux Barger RN  Outcome: Progressing  4/23/2023 1640 by Nika Junior RN  Outcome: Progressing  Flowsheets (Taken 4/23/2023 1640)  Free From Fall Injury:   Instruct family/caregiver on patient safety   Based on caregiver fall risk screen, instruct family/caregiver to ask for assistance with transferring infant if caregiver noted to have fall risk factors     Problem: Skin/Tissue Integrity  Goal: Absence of new skin
Problem: Discharge Planning  Goal: Discharge to home or other facility with appropriate resources  4/24/2023 2249 by Jose Daniel Dennison RN  Outcome: Progressing  Flowsheets (Taken 4/24/2023 1915)  Discharge to home or other facility with appropriate resources:   Identify barriers to discharge with patient and caregiver   Arrange for needed discharge resources and transportation as appropriate   Identify discharge learning needs (meds, wound care, etc)   Arrange for interpreters to assist at discharge as needed    Problem: Pain  Goal: Verbalizes/displays adequate comfort level or baseline comfort level  4/24/2023 2249 by Jose Daniel Dennison RN  Outcome: Progressing     Problem: Safety - Adult  Goal: Free from fall injury  4/24/2023 2249 by Jose Daniel Dennison RN  Outcome: Progressing     Problem: Skin/Tissue Integrity  Goal: Absence of new skin breakdown  Description: 1. Monitor for areas of redness and/or skin breakdown  2. Assess vascular access sites hourly  3. Every 4-6 hours minimum:  Change oxygen saturation probe site  4. Every 4-6 hours:  If on nasal continuous positive airway pressure, respiratory therapy assess nares and determine need for appliance change or resting period.   4/24/2023 2249 by Jose Daniel Dennison RN  Outcome: Progressing     Problem: Respiratory - Adult  Goal: Achieves optimal ventilation and oxygenation  Recent Flowsheet Documentation  Taken 4/24/2023 1915 by Jose Daniel Dennison RN  Achieves optimal ventilation and oxygenation:   Assess for changes in respiratory status   Assess for changes in mentation and behavior   Oxygen supplementation based on oxygen saturation or arterial blood gases   Assess and instruct to report shortness of breath or any respiratory difficulty   Respiratory therapy support as indicated     Problem: Cardiovascular - Adult  Goal: Absence of cardiac dysrhythmias or at baseline  4/24/2023 2249 by Jose Daniel Dennison RN  Outcome: Progressing  Flowsheets (Taken 4/24/2023 1915)  Absence of
Problem: Discharge Planning  Goal: Discharge to home or other facility with appropriate resources  Outcome: Progressing  Flowsheets (Taken 4/26/2023 1553)  Discharge to home or other facility with appropriate resources:   Identify barriers to discharge with patient and caregiver   Identify discharge learning needs (meds, wound care, etc)   Refer to discharge planning if patient needs post-hospital services based on physician order or complex needs related to functional status, cognitive ability or social support system   Arrange for needed discharge resources and transportation as appropriate     Problem: Pain  Goal: Verbalizes/displays adequate comfort level or baseline comfort level  Outcome: Progressing  Flowsheets (Taken 4/26/2023 1553)  Verbalizes/displays adequate comfort level or baseline comfort level:   Administer analgesics based on type and severity of pain and evaluate response   Encourage patient to monitor pain and request assistance   Consider cultural and social influences on pain and pain management   Assess pain using appropriate pain scale   Implement non-pharmacological measures as appropriate and evaluate response     Problem: Safety - Adult  Goal: Free from fall injury  Outcome: Progressing  Flowsheets (Taken 4/26/2023 1553)  Free From Fall Injury:   Instruct family/caregiver on patient safety   Based on caregiver fall risk screen, instruct family/caregiver to ask for assistance with transferring infant if caregiver noted to have fall risk factors     Problem: Skin/Tissue Integrity  Goal: Absence of new skin breakdown  Description: 1. Monitor for areas of redness and/or skin breakdown  2. Assess vascular access sites hourly  3. Every 4-6 hours minimum:  Change oxygen saturation probe site  4. Every 4-6 hours:  If on nasal continuous positive airway pressure, respiratory therapy assess nares and determine need for appliance change or resting period.   Outcome: Progressing  Note: Assess and
Problem: Nutrition Deficit:  Goal: Optimize nutritional status  4/26/2023 2323 by Eneida Medeiros RN  Outcome: Progressing  4/26/2023 1601 by Cassandra Hernandez RN  Outcome: Progressing  Flowsheets (Taken 4/26/2023 1601)  Nutrient intake appropriate for improving, restoring, or maintaining nutritional needs:   Assess nutritional status and recommend course of action   Monitor oral intake, labs, and treatment plans   Order, calculate, and assess calorie counts as needed   Recommend appropriate diets, oral nutritional supplements, and vitamin/mineral supplements   Provide specific nutrition education to patient or family as appropriate  4/26/2023 1102 by Holly Bernheim, RD, LD  Outcome: Not Progressing  4/26/2023 1059 by Holly Bernheim, RD, LD  Outcome: Not Progressing
at baseline  4/25/2023 1022 by Reno Ortega RN  Outcome: Progressing  Flowsheets (Taken 4/25/2023 0800)  Absence of cardiac dysrhythmias or at baseline:   Monitor cardiac rate and rhythm   Assess for signs of decreased cardiac output     Problem: Skin/Tissue Integrity - Adult  Goal: Skin integrity remains intact  4/25/2023 1022 by Reno Ortega RN  Outcome: Progressing  Flowsheets (Taken 4/25/2023 0800)  Skin Integrity Remains Intact:   Monitor for areas of redness and/or skin breakdown   Assess vascular access sites hourly   Every 4-6 hours minimum: Change oxygen saturation probe site     Problem: Skin/Tissue Integrity - Adult  Goal: Incisions, wounds, or drain sites healing without S/S of infection  4/25/2023 1022 by Reno Ortega RN  Outcome: Progressing  Flowsheets (Taken 4/25/2023 0800)  Incisions, Wounds, or Drain Sites Healing Without Sign and Symptoms of Infection:   ADMISSION and DAILY: Assess and document risk factors for pressure ulcer development   TWICE DAILY: Assess and document skin integrity   TWICE DAILY: Assess and document dressing/incision, wound bed, drain sites and surrounding tissue     Problem: Skin/Tissue Integrity - Adult  Goal: Oral mucous membranes remain intact  4/25/2023 1022 by Reno Ortega RN  Outcome: Progressing  Flowsheets (Taken 4/25/2023 0800)  Oral Mucous Membranes Remain Intact: Assess oral mucosa and hygiene practices     Problem: Musculoskeletal - Adult  Goal: Return mobility to safest level of function  4/25/2023 1022 by Reno Ortega RN  Outcome: Progressing  Flowsheets (Taken 4/25/2023 0800)  Return Mobility to Safest Level of Function:   Assess patient stability and activity tolerance for standing, transferring and ambulating with or without assistive devices   Assist with transfers and ambulation using safe patient handling equipment as needed   Ensure adequate protection for wounds/incisions during mobilization   Obtain physical
cardiac dysrhythmias or at baseline:   Assess for signs of decreased cardiac output   Monitor cardiac rate and rhythm   Administer antiarrhythmia medication and electrolyte replacement as ordered  4/24/2023 1230 by Elvira Cordova RN  Outcome: Progressing  Flowsheets (Taken 4/24/2023 0815)  Absence of cardiac dysrhythmias or at baseline:   Monitor cardiac rate and rhythm   Assess for signs of decreased cardiac output   Administer antiarrhythmia medication and electrolyte replacement as ordered     Problem: Skin/Tissue Integrity - Adult  Goal: Skin integrity remains intact  4/24/2023 2249 by Lenora Berrios RN  Outcome: Progressing  Flowsheets (Taken 4/24/2023 1915)  Skin Integrity Remains Intact:   Monitor for areas of redness and/or skin breakdown   Assess vascular access sites hourly   Every 4-6 hours: If on nasal continuous positive airway pressure, respiratory therapy assesses nares and determine need for appliance change or resting period   Every 4-6 hours minimum: Change oxygen saturation probe site  4/24/2023 1230 by Elvira Cordova RN  Outcome: Progressing  Flowsheets  Taken 4/24/2023 0815 by Elvira Cordova RN  Skin Integrity Remains Intact:   Monitor for areas of redness and/or skin breakdown   Assess vascular access sites hourly  Taken 4/24/2023 0120 by Lenora Berrios RN  Skin Integrity Remains Intact:   Monitor for areas of redness and/or skin breakdown   Assess vascular access sites hourly   Every 4-6 hours minimum: Change oxygen saturation probe site   Every 4-6 hours: If on nasal continuous positive airway pressure, respiratory therapy assesses nares and determine need for appliance change or resting period     Problem: Skin/Tissue Integrity - Adult  Goal: Incisions, wounds, or drain sites healing without S/S of infection  4/24/2023 2249 by Lenora Berrios RN  Outcome: Progressing  Flowsheets (Taken 4/24/2023 1915)  Incisions, Wounds, or Drain Sites Healing Without Sign and Symptoms of
Progressing     Problem: Arterial:  Goal: Optimize blood flow for wound healing  Description: Optimize blood flow for wound healing  Outcome: Progressing     Problem: Venous:  Goal: Signs of wound healing will improve  Description: Signs of wound healing will improve  Outcome: Progressing     Problem: Weight control:  Goal: Ability to maintain an optimal weight for height and age will be supported  Description: Ability to maintain an optimal weight for height and age will be supported  Outcome: Progressing     Problem: Falls - Risk of:  Goal: Will remain free from falls  Description: Will remain free from falls  Outcome: Progressing     Problem: Blood Glucose:  Goal: Ability to maintain appropriate glucose levels will improve  Description: Ability to maintain appropriate glucose levels will improve  Outcome: Progressing     Problem: ABCDS Injury Assessment  Goal: Absence of physical injury  Outcome: Progressing     Problem: Nutrition Deficit:  Goal: Optimize nutritional status  Outcome: Progressing     Problem: Chronic Conditions and Co-morbidities  Goal: Patient's chronic conditions and co-morbidity symptoms are monitored and maintained or improved  Outcome: Progressing  Flowsheets (Taken 4/25/2023 2133)  Care Plan - Patient's Chronic Conditions and Co-Morbidity Symptoms are Monitored and Maintained or Improved:   Update acute care plan with appropriate goals if chronic or comorbid symptoms are exacerbated and prevent overall improvement and discharge   Collaborate with multidisciplinary team to address chronic and comorbid conditions and prevent exacerbation or deterioration   Monitor and assess patient's chronic conditions and comorbid symptoms for stability, deterioration, or improvement   Care plan reviewed with patient she verbalizes understanding of the plan of care and contribute to goal setting.
breathe, incentive spirometry   Assess the need for suctioning and aspirate as needed   Assess and instruct to report shortness of breath or any respiratory difficulty   Respiratory therapy support as indicated     Problem: Gastrointestinal - Adult  Goal: Maintains or returns to baseline bowel function  Recent Flowsheet Documentation  Taken 4/24/2023 0815 by Laly Cohn RN  Maintains or returns to baseline bowel function:   Assess bowel function   Encourage oral fluids to ensure adequate hydration   Administer IV fluids as ordered to ensure adequate hydration   Administer ordered medications as needed   Encourage mobilization and activity   Nutrition consult to assist patient with appropriate food choices     Problem: Infection - Adult  Goal: Absence of fever/infection during anticipated neutropenic period  Recent Flowsheet Documentation  Taken 4/24/2023 0815 by Laly Cohn RN  Absence of fever/infection during anticipated neutropenic period: Monitor white blood cell count     Care plan reviewed with patient and family. Patient and family verbalize understanding of the plan of care and contribute to goal setting.

## 2023-04-27 NOTE — CARE COORDINATION
4/27/23, 2:08 PM EDT    Patient goals/plan/ treatment preferences discussed by  and . Patient goals/plan/ treatment preferences reviewed with patient/ family. Patient/ family verbalize understanding of discharge plan and are in agreement with goal/plan/treatment preferences. Understanding was demonstrated using the teach back method. AVS provided by RN at time of discharge, which includes all necessary medical information pertaining to the patients current course of illness, treatment, post-discharge goals of care, and treatment preferences. Services At/After Discharge: [place] ([facility])       IMM Letter  IMM Letter given to Patient/Family/Significant other/Guardian/POA/by[de-identified]  - Denisha Alicia  IMM Letter date given[de-identified] 04/27/23  IMM Letter time given[de-identified] [id number]        Patient and Jorge Midget in agreement with transport time set up for 7:30 pm this evening and bother are aware. Spoke with Leora Galan at facility and she is aware. RN has number to call report and fax AVS and last dose MAR. Patient's COVID test is negative. No further needs voiced.  Loco Lawson notified of discharge plan.

## 2023-04-27 NOTE — PROCEDURES
30 Day Cardiac Event Monitor was applied to patient. Instructions were given and skin/monitor prep and application was demonstrated. Patient was instructed to remove monitor on 5/27 and mail back to [employer].

## 2023-04-27 NOTE — DISCHARGE INSTR - DIET

## 2023-04-27 NOTE — DISCHARGE SUMMARY
Final Result   Stable chest.               **This report has been created using voice recognition software. It may contain minor errors which are inherent in voice recognition technology. **      Final report electronically signed by Dr. Blanca Darby MD on 4/21/2023 9:37 AM      XR CHEST PORTABLE   Final Result      Bilateral lower lung atelectasis/infiltrate. **This report has been created using voice recognition software. It may contain minor errors which are inherent in voice recognition technology. **      Final report electronically signed by Dr. Julisa Stacy on 4/19/2023 7:42 PM      IR ANGIOGRAM EXTREMITY LEFT   Final Result   Significant active bleeding in the left subclavian artery successfully treated with deployment of a balloon mounted covered stent. **This report has been created using voice recognition software. It may contain minor errors which are inherent in voice recognition technology. **                        Final report electronically signed by Dr Clif Phan on 4/20/2023 10:14 AM      XR CHEST PORTABLE   Final Result   The left chest tube terminates at the left lung base and the left pleural effusion has decreased in volume. No pneumothorax is observed. The nasogastric catheter terminates at the GE junction approximately 4 to 5 cm proximal to the stomach as previously reported. Chronic findings are discussed. **This report has been created using voice recognition software. It may contain minor errors which are inherent in voice recognition technology. **      Final report electronically signed by Dr Julisa Casey on 4/19/2023 12:42 PM      CT SOFT TISSUE NECK W CONTRAST   Final Result       1. Large hematoma in the lower left neck soft tissues with active extravasation of contrast within it. This appears to be arising from the thyrocervical trunk. The subclavian artery itself has a smooth margin.    2. Small prevertebral hematoma which

## 2023-04-27 NOTE — PROGRESS NOTES
[address] [facility]  STRZ ICU STEPDOWN TELEMETRY 4K  DAILY NOTE    TIME   SLP Individual Minutes  Time In:   Time Out: 9  Minutes: 13  Timed Code Treatment Minutes: 0 Minutes       Date: 2023  Patient Name: Silas Brown      CSN: [id number]   : 1950  (74 y.o.)  Gender: female   Referring Physician:  Minh Jansen MD    Diagnosis: Kidney Stones  Precautions: aspiration  Current Diet: Puree + thin liquids. Direct 1:1  Swallowing Strategies:  Full Upright Position, Small Bite/Sip, Limit Distractions, and Monitor for Fatigue     Date of Last MBS/FEES:  MBS 23    Pain:  No pain reported. Subjective:  CLARY Meyer approved completion of dysphagia tx. Upon arrival to room, patient awake in bed and agreeable to PO trials. Pleasant and cooperative throughout. Short-Term Goals:  SHORT TERM GOAL #1:  REVISED Goal 1. Patient will consume puree solids and thin liquids (advanced textures with ST only) with stable pulmonary status and use of trained compensatory strategies to meet nutrition and hydration needs. INTERVENTIONS: Patient continued to endorse preference for puree diet this date. ST completed review of recommended swallow strategies from most recent MBS - utilization of double swallow and/or liquid wash to assist with clearance of pharyngeal residue. Patient consumed PO trials of puree x5 and thin liquids via straw x5 with evidence of mildly reduced labial seal, suspected decreased bolus control/manipulation, and slow/uncoordinated ap transit with eventual complete clearance of oral residue. Consistent swallow initiation achieved and no overt s/s of aspiration. Recommend patient continues with a puree diet and thin liquids at this time.      SHORT TERM GOAL #2:  Goal 2: Patient will complete pharyngeal strengthening exercises (e.g., simón) with adequate success to improve swallow strenght and coordination  INTERVENTIONS: Did not address this session d/t
[address] [facility]  STRZ ICU STEPDOWN TELEMETRY 4K  DAILY NOTE    TIME   SLP Individual Minutes  Time In: 3799  Time Out: 8287  Minutes: 10  Timed Code Treatment Minutes: 0 Minutes       Date: 2023  Patient Name: Anna Miller      CSN: [id number]   : 1950  (74 y.o.)  Gender: female      Date: 2023  Patient Name: Anna Miller      CSN: [id number]   : 1950  (74 y.o.)  Gender: female   Referring Physician:  Aurelia Kenyon MD    Diagnosis: Kidney Stones  Precautions: aspiration  Current Diet: Puree + thin direct 1:1  Swallowing Strategies: Standard Universal Swallow Precautions  Date of Last MBS/FEES: Not Applicable    Pain:  No pain reported. Subjective:  Patient seen with CLARY arizmendi. Patient c/o being \"hot\" upon ST arrival. Patient stating she is enjoying her current diet level and she \"doesn't choke\". Agreeable to participate in dysphagia tx on this date. Short-Term Goals:  SHORT TERM GOAL #1:  Goal 1: Patient will safely consume puree diet and thin liquids (advance textures with ST only) with stable pulmonary status and use of compensatory strategies to assist with nutrition/hydration. INTERVENTIONS:PO trials of puree meal tray containing mashed potatoes and icecream completed on this date. Patient with adequate oral acceptance, functional bolus control and impaired AP transit stating, \" I can't get this potato swallowed\". Patient with increased respiration rate and c/o shortness of breath following trials of ice cream x2. Patient states \"this happens sometimes where I can't breath\". Nasal drip observed following swallow. Further trials deferred by patient. Patient does endorse that \"liquids are hard\" to swallow sometimes and \"they give me trouble\"; however, trials of thin liquids not observed on this date.     D/t inability to fully assess pharyngeal deficits at bedside alone, ST recommends completion of MBS on subsequent date as
[address] [facility]  STRZ ICU STEPDOWN TELEMETRY 4K  DAILY NOTE    TIME   SLP Individual Minutes  Time In: 9873  Time Out: 0128  Minutes: 8  Timed Code Treatment Minutes: 0 Minutes       Date: 2023  Patient Name: Tasha Carson      CSN: [id number]   : 1950  (74 y.o.)  Gender: female   Referring Physician:  Margie Haro MD    Diagnosis: Kidney Stones  Precautions: aspiration  Current Diet: Puree + thin liquids. Direct 1:1  Swallowing Strategies:  Full Upright Position, Small Bite/Sip, Limit Distractions, and Monitor for Fatigue     Date of Last MBS/FEES:  MBS 23    Pain:  No pain reported. Subjective:  Patient seen with approval from [facility]. Reported change in diet texture to puree, at patient's request because other textures were \"too dry\". Patient seen in room while in bed with visitor present. Patient agreeable to participate in ST interventions. Short-Term Goals:  SHORT TERM GOAL #1:  Goal 1: Patient will safely consume minced and moist diet and thin liquids (advance textures with ST only) with stable pulmonary status and use of compensatory strategies to assist with nutrition/hydration. GOAL NOT MET. REVISE GOAL. Patient will consume puree solids and thin liquids (advanced textures with ST only) with stable pulmonary status and use of trained compensatory strategies to meet nutrition and hydration needs. INTERVENTIONS: Patient reporting dislike of minced + moist textures and requested change back to puree solids. Per RN, dietician made change in diet order to puree. Patient reports much improved pleasure and ability to consume vs minced + moist. Patient reports baseline intake of soft solids, but prefers puree while here. Thin liquids via straw: x6 with independent use of small, single drinks resulting in no overt s/s of aspiration.  Suspected functional timeliness of pharyngeal swallow onset, however unable to identify in the absence of direct
[address] [facility] PHYSICAL THERAPY  DAILY NOTE  STRZ ICU STEPDOWN TELEMETRY 4K - 0Q-72/[id number]    Time In: 8759  Time Out: 1226  Timed Code Treatment Minutes: 45 Minutes  Minutes: 38          Date: 2023  Patient Name: Callie Gomes,  Gender:  female        MRN: [id number]  : 1950  (74 y.o.)     Referring Practitioner: BROOK Deluca CNP  Diagnosis: Kidney stones  Additional Pertinent Hx: Patient is a 64-year-old female with a past medical history multiple sclerosis, seizures, intra-abdominal lymphadenopathy, previous pancreatitis, postoperative hypotension, nephrolithiasis, cholecystectomy in 2016, and suprapubic catheter who came to the ICU after urological procedure for hypotension and tachycardia presumptively secondary to sepsis related to a low nephritis. Patient presented to [facility] on 2023 for cystoscopy and left retrograde pyelogram, left-sided ureteroscopy with holmium laser lithotripsy, left-sided stent placement, and cystolitholopaxy. No obvious intraoperative complications and the patient was initially determined to be stable to be discharged home. However, patient decompensated while in the PACU. In particular, patient developed hypotension and tachycardia requiring pressor support. Patient arrived in the ICU on 1745 hours on 2023. Patient also noted to be mildly hypoglycemic in the PACU. Patient was extremely anxious appearing. States \"I cannot talk and breathe at the same time. Affirms that she used to be a [place] smoker. \"  However, quit in 10/2/2013. Smoked 2 packs a day for 40 years for an 80-pack-year smoking history. Quit smokeless tobacco in 11/3/2015. \"  CVC was unable to be placed after multiple attempts to the left side. Patient given worsening pain and decrease in mobility of left upper extremity. CT soft tissue neck and MRI brachial plexus were ordered which showed a large hematoma with active external sensation of contrast
[facility] ICU STEPDOWN TELEMETRY 4K  Occupational Therapy  Daily Note  Time:   Time In: 268  Time Out: 8743  Timed Code Treatment Minutes: 32 Minutes  Minutes: 32          Date: 2023  Patient Name: Callie Gomes,   Gender: female      Room: [facility]026-A  MRN: [id number]  : 1950  (74 y.o.)  Referring Practitioner: BROOK Deluca CNP  Diagnosis: kidney stones  Additional Pertinent Hx: Per admitting ICU HPI \"Patient is a 64-year-old female with a past medical history multiple sclerosis, seizures, intra-abdominal lymphadenopathy, previous pancreatitis, postoperative hypotension, nephrolithiasis, cholecystectomy in 2016, and suprapubic catheter who came to the ICU after urological procedure for hypotension and tachycardia presumptively secondary to sepsis related to a low nephritis. Patient presented to [facility] on 2023 for cystoscopy and left retrograde pyelogram, left-sided ureteroscopy with holmium laser lithotripsy, left-sided stent placement, and cystolitholopaxy    Restrictions/Precautions:  Restrictions/Precautions: Fall Risk  Position Activity Restriction  Other position/activity restrictions: MS, B buttock wounds, L UE dysfunction due to hematoma near brachial plexus, chest tube     SUBJECTIVE: Pt laying in bed upon arrival, pt agreeable to OT session, RN Gave verbal approval approval for session, pt's  present during session, and educated on IP rehab vs SNF therapy    PAIN: 0/10:     Vitals: Nurse checked vitals prior to session    COGNITION: Slow Processing and Impaired Memory    ADL:   No ADL's completed this session. .    ADDITIONAL ACTIVITIES:  Pt able to tolerate PROM/AAROM with LUE in all planes in order to increase LUE strength/ROM for bed mobility. Pt able to tolerate 10 repetitions with minimal rest breaks this date.      Pt able to tolerate ulnar/radial nerve glides with LUE with 10 repetitions, with minimal rest breaks with min c/o
Abdullahi Draft, APRN - CNP  Urology Progress Note    Subjective: Manuela Renee is a 74 y.o. female. s/p CYSTOSCOPY, CYSTOLITHOLAPAXY, LEFT URETEROSCOPY, LASER LITHOTRIPSY, LEFT URETERAL STENT PLACEMENT,suprapubic catheter exchange on 4/17/2023    His/Her current Diet is: ADULT DIET; Dysphagia - Pureed; 1:1 assistance. Since the previous note, the patient reports the following:  hypotensive  No nausea or vomiting. No chest pain or shortness of breath. No calf pain. Pain Controlled. Tolerating PO Diet. Plan:  Feels well  On stepdown  Off pressor  Ux mixed growth  Blood culture neg  SPT draining concentrated urine  Ct shows stent in place, no hydro  Chest tube out  Urology following peripherally, call if questions    Vitals and Labs:  Patient Vitals for the past 24 hrs:   BP Temp Temp src Pulse Resp SpO2 Height Weight   04/24/23 0815 120/65 98 °F (36.7 °C) Oral 88 16 95 % -- --   04/24/23 0454 -- -- -- -- 24 -- -- --   04/24/23 0300 121/72 98.1 °F (36.7 °C) Oral 95 20 100 % 5' 3\" (1.6 m) 127 lb 13.9 oz (58 kg)   04/23/23 2315 113/78 98.3 °F (36.8 °C) Oral 86 18 100 % -- --   04/23/23 2151 -- -- -- -- 20 -- -- --   04/23/23 2115 126/73 98.4 °F (36.9 °C) Oral (!) 120 18 -- -- --   04/23/23 1544 (!) 117/59 99 °F (37.2 °C) Oral (!) 110 18 94 % -- --   04/23/23 1145 (!) 108/57 99.1 °F (37.3 °C) Oral (!) 114 18 94 % -- --       I/O last 3 completed shifts:   In: 648.3 [P.O.:20; I.V.:30; IV Piggyback:598.3]  Out: 1525 [Urine:1525]    Recent Labs     04/22/23  0430 04/23/23  0530 04/24/23  0300   WBC 6.9 7.2 7.5   HGB 9.6* 10.3* 10.1*   HCT 29.8* 31.6* 32.4*   MCV 94.3 93.8 96.1   * 142 151       Recent Labs     04/22/23  0430 04/22/23  1730 04/23/23  0200 04/23/23  0530 04/23/23  1200 04/24/23  0300     --   --  140  --  145   K 3.7  --   --  3.4* 3.9 3.8     --   --  108  --  114*   CO2 23  --   --  24  --  24   PHOS 1.6* 1.8* 3.9  --   --   --    BUN 8  --   --  8  --  12   CREATININE 0.3*
Cardiology Progress Note      Patient:  Callie Gomes  YOB: 1950  MRN: [id number]   Acct: [de-identified]  Admit Date:  4/17/2023  Primary Cardiologist: Adebayo Amador MD    Note per dr Chetan King:   Tachycardia, possible SVT vs Afib      CHIEF COMPLAINT:    Left side of neck swelling     HISTORY OF PRESENT ILLNESS:    Callie Gomes is a pleasant 74year old female patient with past medical history that includes:   Past Medical History        Past Medical History:   Diagnosis Date    Contracture of joint, multiple sites 4/24/2023    Difficulty in swallowing      Intra-abdominal lymphadenopathy      Kidney stone      MS (multiple sclerosis) ([facility] [address] 1982    Neuromuscular disorder ([facility] [address]      Pancreatitis      Seizures ([facility] [address] 1991      The patient was admitted to the hospital on 4/17/2023 due to hypotension post urological procedure. Per chart review, there was concern for urosepsis and CVC placement was attempted. Procedure was complicated by left neck hematoma. On 4/19/2023, patient underwent angiogram by IR when she was found to have left subclavian artery injury with active extravasation, treated with a covered stent. The patient is followed by primary team and was seen by Orthopedics for left brachial plexus compression. Anemia was noted, she has required PRBC transfusion, Hgb today is 10.1. Cardiology was consulted for newly diagnosed arrhythmia. Also, echocardiogram on 4/19/2023 revealed a new drop in EF to 45% (was normal in 2022), with left atrial enlargement. The patient denies any known h/o cardiac disease. She has been noted to be tachycardic in the past. Tachyarrhythmia was noted during this admission. She was treated with IV Amiodarone 4/19-4/20. She also received lopressor. No anticoagulation given by primary team due to hematoma, acute hemorrhagic anemia. On telemetry, patient is now in sinus tachycardia with HR in 100s-110s. Patient denies chest pain. EKGs were reviewed.
Comprehensive Nutrition Assessment    Type and Reason for Visit:  Reassess    Nutrition Recommendations/Plan:   Downgraded diet to Dysphagia Pureed per pt request. I discussed this with Anat Conde RN & she agreed. Send Ensure Enlive TID. Will monitor po, labs, & wt. Consider MVI as tolerated. Malnutrition Assessment:  Malnutrition Status:  Severe malnutrition (04/21/23 1344)    Context:  Chronic Illness     Findings of the 6 clinical characteristics of malnutrition:  Energy Intake:  75% or less estimated energy requirements for 1 month or longer  Weight Loss:   (10.1% in the last 6 months)     Body Fat Loss:  Severe body fat loss Orbital   Muscle Mass Loss:  Severe muscle mass loss Temples (temporalis)  Fluid Accumulation:  No significant fluid accumulation     Strength:  Not Performed    Nutrition Assessment:   Pt. with no  improvement from a nutritional standpoint AEB  pt reports minced & moist sausage & eggs got stuck in her throat this morning. Remains at risk for further nutritional compromise r/t severe malnutrition, dysphagia, increased nutrient needs to support wound healing, admit with kidney stones, tachycardia, septic shock, volume overload; heart failure; s/p cystoscopy, cystoscopy litholapaxy, left ureteroscopic, laser lithotripsy, left ureteral stent placement, suprapubic catheter exchange 4/17; distended abd (NG to LIWS-fell out 4/20/23), anemia (s/p 2 units PRBC), and underlying medical condition (Hx; MS, h/o severe malnutrition, difficulty swallowing, intra-abdominal lymphadenopathy, pancreatitis, seizures    Nutrition Related Findings:    Pt. Report/Treatments/Miscellaneous: Pt seen, requesting diet be downgraded to pureed due to minced & moist eggs & sausage got stuck in her throat this morning. She denies difficulty swallowing liquids today.  Pt s/p SLP MBS (4/25) Dysphagia Minced & Moist with Thin Liquids  GI Status: BM x 1 (4/25)  Pertinent Labs: (4/26) Ca Serum 8.3, Hemoglobin
Conversation with  Dr Elvie Malik and patient,  about rehab. IPR would be intense therapy 3 hours daily and patient near baseline, just not able to move left arm. Dr Elvie Malik feels subacute rehab therapy would be more appropriate for patient.  understanding. Updated CM and JAIDA.
Discharge teaching and instructions for diagnosis/procedure of kidney stone completed with patient using teachback method. AVS reviewed. Patient voiced understanding regarding prescriptions, follow up appointments, and care of self at home. Discharged via ambulance to  skilled nursing per EMS transportation.
Hospitalist Progress Note    Patient:  Angie Gill      Unit/Bed:4K-26/026-A    YOB: 1950    MRN: [id number]       Acct: [de-identified]     PCP: Margarito Cardenas MD    Date of Admission: 4/17/2023    Assessment/Plan:    Left subclavian artery hematoma:  Hematoma with mass effect on left sided brachial plexus. Iatrogenic from attempted CVC placement 4/18/23. Will start low dose dexamethasone 2mg IV Q8H. CT 4/19/23 showed active extravasation. Has continued left neck and arm swelling with decreased strength in left arm. S/p TXA, cryo. IR treated with covered stent 4/19/23. Complicated by left hemothorax. Chest tube placed 4/19/23 and removed 4/22/23. No evidence of DVT in LUE 4/22/23. Continue Plavix 75 mg daily x 8 weeks for stent. PM&R consulted, appreciate recs. PT/ OT. Tachycardia: Appears to be resolving. Tele WNL today. EKG 4/21/23 showed SVT. SVT vs Afib. Had repeated episodes of tachycardia 4/21/23, resolved with IV Lopressor. S/p amiodarone 4/19/23 - 4/21/23. Continue home Lopressor 12.5 mg 4/21/23. May need increased if continues. HFrEF: Echo 4/19/2023 EF 45%, left atrium mildly dilated, previous EF 60% 10/24/2022. Repeat echo in 6 weeks outpatient. Multiple sclerosis: Significantly limited mobility. Patient is wheelchair-bound. Complicated by neurogenic bladder with suprapubic catheter. Urology following. Recommend outpatient neurology follow up. History of seizures: Continue home Tegretol. Follow-up with PCP and neurology as an outpatient. Seizure precautions. History of smoking: Affirms that she used to be a [place] smoker. \" However, quit in 10/2/2013. Smoked 2 packs a day for 40 years for an 80-pack-year smoking history. Quit smokeless tobacco in 11/3/2015. Acute blood loss anemia, resolved    Acute hypoxic respiratory failure, resolved: required nasal cannula for low SpO2.  On room air since 4/20/23    Volume overload, resolved: s/p Bumex drip 4/18/23 - 4/19/23    S/p
[facility] Nurse  Progress Note       Tavo Bowling  AGE: 74 y.o. GENDER: female  : 1950  UNIT: 4K-26/026-A  TODAY'S DATE:  2023  ADMISSION DATE: 2023 10:03 AM  Subjective:     Reason for [address] [facility] Evaluation and Assessment: right stage 3 wound and coccyx      Tavo Bowling is a 74 y.o. female referred by:   [] Physician/PA/APRN  [x] Nursing  [] Other:     Wound Identification:  Wound Type: pressure  Contributing Factors: chronic pressure, decreased mobility, and shear force    Objective:     Kelton Risk Score: Kelton Scale Score: 10    Assessment:     Encounter: Present to patient room. Patient in bed upon arrival. Assessment and photo to follow. Patient from home, has private wound care RN who follows wounds. Assisted onto right side for evaluation. Patient has vega in place. Incontinence BM cleaned with wipes, chux pad changed. Cleansed wound with normal saline and gauze. Pat dry with clean gauze. Patient has healed prior pressure injury to left ischium and nearly healed right ischial pressure injury. MASD observed to vaginal folds and perineum. Triad applied to all wounds and MASD. Recommend to apply BID  and PRN. Pillow placed under right hip. Offloaded extremities. Patient plans for discharge today to [place] [address]. Wounds to be monitored by nursing staff and facility provider. If wounds appear to worsen, recommend referral to [address] [place] [address] to be evaluated by BROOK Rockwell. Pt on [employer] dream air support surface with alternating pressure and microclimate control. Patient in bed, call light in reach. Will continue to follow and assess wound. Call with concerns and for wound evolution.      23    Wound type: Healed left ischial ulcer      Wound type: Healing right ischium pressure ulcer  Wound size: 2.3cm x 1.5cm x 0.1cm  Undermining or Tunneling: None   Wound assessment/color: None  Drainage amount: Minimal  Drainage description:
Orthopaedic Progress Note      SUBJECTIVE   Ms. Yael Huff is hospital day 10    Seen at bedside this morning  no adverse events overnight  Cards evaluated  Pain well controlled  No changes to LUE, cockup in place    OBJECTIVE      Physical    VITALS:  BP (!) 102/53   Pulse 75   Temp 97.9 °F (36.6 °C) (Oral)   Resp 18   Ht 5' 3\" (1.6 m)   Wt 130 lb 4.7 oz (59.1 kg)   SpO2 93%   BMI 23.08 kg/m²   I/O last 3 completed shifts: In: 3414 [P.O.:1120; I.V.:50]  Out: 1275 [Urine:1275]    GENERAL APPEARANCE: Awake and oriented x4. No acute distress, except appropriate to injury. MOOD AND AFFECT: Calm appropriate to situation  HEAD: normocephalic, atraumatic   EYES: equal and reactive to light, Extraocular movements are normal. Pupils are equal in size. Hematologic/Lymphatic: no lymphadenopathy to upper or lower extremity. NICHO CROSS  Large hematoma at supraclavicular fossa, no active drainage or wounds, remainder of fossa remains soft and supple. No other lacerations or lesions. Sensation intact throughout axillary, radial, medial, musculocutaneous, ulnar, median nerve distributions. Unable to perform forward flexion, abduction, internal external rotation at the shoulder, able to weakly perform elbow flexion extension, wrist flexion extension has improved in strength since index eval, pronation supination intact, digital ROM is intact, composite fist is intact, improved since index eval, distal pulses palpable, cap refill appropriate.        Data  CBC:   Lab Results   Component Value Date/Time    WBC 10.8 04/27/2023 05:00 AM    HGB 10.7 04/27/2023 05:00 AM     04/27/2023 05:00 AM     BMP:    Lab Results   Component Value Date/Time     04/27/2023 05:00 AM    K 4.2 04/27/2023 05:00 AM    K 5.1 10/21/2022 02:17 PM     04/27/2023 05:00 AM    CO2 26 04/27/2023 05:00 AM    BUN 20 04/27/2023 05:00 AM    CREATININE 0.3 04/27/2023 05:00 AM    CALCIUM 8.5 04/27/2023 05:00 AM    GLUCOSE 104 04/27/2023 05:00 AM
PROGRESS NOTE      Patient:  Anna Miller      Unit/Bed:4K-26/026-A    YOB: 1950    MRN: [id number]       Acct: [de-identified]     PCP: Chaim Johnson MD    Date of Admission: 4/17/2023      Assessment/Plan:      Brachial plexopathy 2/2 compressing hematoma:     -- Iatrogenic, attempt to place central line on left neck 4/18/23. Injury of left subclavian artery. Required arteriogram with balloon mounted covered stenting by interventional radiology. -- CTA of the chest done on 4/18/2023 revealed lower left neck mass exerting mass effect on adjacent structures likely hematoma. MRI of the brachial plexus done on 4/19/2023 revealed soft tissue hematoma at left lower neck/suprascapular region with mass effect upon left brachial plexus, bilateral pleural effusion with left side being larger and air-fluid level suggesting left hemothorax, and prevertebral hematoma. -- Patient has pain and swelling at attempted site extending through left arm. Sensation intact. Will continue low dose dexamethasone 2mg IV Q8H x 5 days (4/24/23-). -- Orthopedic surgery following - recommend to continue watching hematoma as opposed to evacuating it considering the trauma to that area. No surgical intervention planned at this time. Her motor is improving, although slowly. Distal plexus appears intact. Aggressive ice and use of arm active and passive as tolerates. PT/ OT for AROM and PROM LUE. Cockup wrist splint to LUE if weakness continues with wrist flexion extension. Outpatient follow up in 1 week. Multiple sclerosis with neurogenic bladder, bilateral lower extremities weakness and hip/knees joint contracture: Had suprapubic cystostomy placed 1/17/22. Was found to have renal calculi and urinary bladder on 2/23/2023 CT A/P. Urological procedure was planned.  She was admitted to [facility] on 4/17/2023 and underwent cystoscopy with left pyelogram, left ureteroscopy with laser lithotripsy and stent
PROGRESS NOTE      Patient:  Trevor Marvin      Unit/Bed:4K-26/026-A    YOB: 1950    MRN: [id number]       Acct: [de-identified]     PCP: Becca Pinto MD    Date of Admission: 4/17/2023      Assessment/Plan:        Active Hospital Problems    Diagnosis Date Noted    Severe malnutrition ([address] [E43] 10/26/2022     Priority: Medium    Brachial plexopathy [G54.0] 04/24/2023    Contracture of joint, multiple sites [M24.50] 04/24/2023    Injury of left subclavian artery [S25.102A] 04/22/2023    Arterial hypotension [I95.9] 04/19/2023    Debility [R53.81]        Brachial plexopathy 2/2 compressing hematoma: Iatrogenic, attempt to place central line on left neck 4/18/23. CTA of the chest done on 4/18/2023 revealed no pulmonary embolism but moderate size bilateral pleural effusion, and lower left neck mass exerting mass effect on adjacent structures likely hematoma. MRI of the brachial plexus done on 4/19/2023 revealed soft tissue hematoma at left lower neck/suprascapular region with mass effect upon left brachial plexus, bilateral pleural effusion with left side being larger and air-fluid level suggesting left hemothorax, and prevertebral hematoma. Patient has pain and swelling at attempted site extending through left arm. Will start low dose dexamethasone 2mg IV Q8H. PM&R consulted as patient's LUE is flaccid. PT/OT. Multiple sclerosis with neurogenic bladder: Had suprapubic cystostomy placed 1/17/22. Was found to have renal calculi and urinary bladder on 2/23/2023 abdominal and pelvis CT scan. Urological procedure was planned. She was admitted to [facility] on 4/17/2023 and underwent cystoscopy with left pyelogram, left ureteroscopy with laser lithotripsy and stent placement by Dr. Alis Tyler on 4/17/2023. Oral dysphagia: Diet recommendations puree, thin liquids.     S/p bilateral eyes cataract surgeries    S/p decubital ulcer debridement      Chief Complaint: urological
Pharmacist Review and Automatic Dose Adjustment of Prophylactic Enoxaparin    *Review reason for admission/hospital problem list*    The reviewing pharmacist has made an adjustment to the ordered enoxaparin dose or converted to UFH per the approved [facility] protocol and table as identified below. Anna Miller is a 74 y.o. female. Recent Labs     04/23/23  0530 04/24/23  0300 04/25/23  0400   CREATININE 0.3* 0.4 0.3*       Estimated Creatinine Clearance: 136 mL/min (A) (based on SCr of 0.3 mg/dL (L)). Recent Labs     04/24/23  0300 04/25/23  0400   HGB 10.1* 10.1*   HCT 32.4* 31.7*    191     No results for input(s): INR in the last 72 hours. Height:   Ht Readings from Last 1 Encounters:   04/25/23 5' 3\" (1.6 m)     Weight:  Wt Readings from Last 1 Encounters:   04/25/23 126 lb 8 oz (57.4 kg)               Plan: Based upon the patient's weight and renal function, the ordered enoxaparin dose of 30 mg daily has been changed/converted to 40 mg daily.       Thank you,  Claudio Aldana, [facility]  4/25/2023, 12:41 PM
Pt is a 72y. o. female, propped up in bed watching television, in 4K-26. Thyra Notice is peaceful and softspoken but talkative. She was calm at first, but became emotional following closing prayer by . She shared that she had a surgical procedure that went well, but developed a complication where it is difficult for her to move her left arm. She said 'they are dealing with it though, so we'll see where that goes.' Herlene Hard then became tearful and emotional. She indicated that she didn't want to talk further between tears. 04/25/23 1543   Encounter Summary   Encounter Overview/Reason  Spiritual/Emotional Needs   Service Provided For: Patient   Referral/Consult From: [place]   Last Encounter  04/25/23   Complexity of Encounter Moderate   Begin Time 1305   End Time  1314   Total Time Calculated 9 min   Spiritual/Emotional needs   Type Spiritual Support   Assessment/Intervention/Outcome   Assessment Compromised coping; Hopeful;Impaired resilience;Peaceful   Intervention Active listening;Prayer (assurance of)/[place];Nurtured Hope;Explored/Affirmed feelings, thoughts, concerns; Discussed illness injury and its impact   Outcome Venting emotion;Receptive; Expressed Gratitude;Engaged in conversation;Expressed feelings, needs, and concerns
Report called to the [address] DELGADO  time changed to 1600. Paperwork faxed to F. Patient's  notified of change of  time. Patient prepped and ready for transport. Will continue to monitor.
Spt draining well  No urologic symptoms  Has follow up scheduled   Urology signing off, call with questions
PRN  acetic acid 0.25 % irrigation 250 mL, 250 mL, Irrigation, Once per day on Mon Thu  lidocaine 4 % external patch 1 patch, 1 patch, TransDERmal, Daily  glucose chewable tablet 16 g, 4 tablet, Oral, PRN  dextrose bolus 10% 125 mL, 125 mL, IntraVENous, PRN **OR** dextrose bolus 10% 250 mL, 250 mL, IntraVENous, PRN  glucagon (rDNA) injection 1 mg, 1 mg, SubCUTAneous, PRN  dextrose 10 % infusion, , IntraVENous, Continuous PRN  Vitamin D (CHOLECALCIFEROL) tablet 1,000 Units, 1,000 Units, Oral, Daily  miconazole (MICOTIN) 2 % powder, , Topical, TID  carBAMazepine (TEGRETOL) chewable tablet 100 mg, 100 mg, Oral, TID  [Held by provider] trospium (SANCTURA) tablet 20 mg, 20 mg, Oral, BID  sodium chloride flush 0.9 % injection 5-40 mL, 5-40 mL, IntraVENous, 2 times per day  sodium chloride flush 0.9 % injection 5-40 mL, 5-40 mL, IntraVENous, PRN  0.9 % sodium chloride infusion, , IntraVENous, PRN  ondansetron (ZOFRAN-ODT) disintegrating tablet 4 mg, 4 mg, Oral, Q8H PRN **OR** ondansetron (ZOFRAN) injection 4 mg, 4 mg, IntraVENous, Q6H PRN  polyethylene glycol (GLYCOLAX) packet 17 g, 17 g, Oral, Daily PRN        PLAN    Ms. Khurram Newby is hospital day 9    Ortho to continue to follow clinical progression of LUE  No surgical intervention planned at this time  PT OT for AROM and PROM LUE  Continued ice to hematoma  Cockup wrist splint to LUE if weakness continues with wrist flexion extension  Ortho to follow while in house
not enter the airway  Soft Solid:  1 = Material does not enter the airway  Mixed Consistencies: 1 = Material does not enter the airway  Hard Solid: 1 = Material does not enter the airway    ESOPHAGEAL PHASE:   No significant findings    ATTEMPTED TECHNIQUES:  Small Bolus Size Effective    Straw Effective    Cup Effective and Ineffective    Large Drinks Not Attempted    Consecutive Drinks Not Attempted    Chin Tuck Not Attempted    Head Turn Not Attempted    Spoon Presentations Effective    Volitional Cough Not Attempted    Spontaneous Cough Not Attempted           DIAGNOSTIC IMPRESSIONS:  Patient presents with mild-moderate oral dysphagia and mild pharyngeal dysphagia as evidenced by the skilled findings outlined above. Imaging obstructed with ST unable to fully visualize trachea s/t patient positioning. Oral phase characterized by poor oral acceptance with anterior lingual thrust present. Decreased bolus formation and impaired AP transit with lingual pumping observed. Decreased bolus control resulting to premature spillage to valleculae. Pharyngeal phase characterized by delayed swallow initiation with decreased TBR resulting in mild posterior lingual residue and moderate residue in valleculae. Functional hyolaryngeal elevation and excursion with decreased thyrohyoid approximation with delayed epiglottic inversion noted. Pharyngeal constriction diminished with mild residue in valleculae following the swallow. Pharyngeal residue more severe with puree textures, more easily cleared with soft solids + prompted liquid wash. Poor carryover of double swallow strategy to clear pharyngeal residue with patient stating, \"there's nothing in my mouth - I already swallowed\". No laryngeal penetration or tracheal aspiration observed at time of study; however, less than optimal visualization of trachea achieved d/t inability to achieve neutral head positioning.  At this time, ST recommends patient consume minced and moist
effusions, small    on the right and small-to-moderate on the left. This document has been electronically signed by: Karma Lopez MD on    04/18/2023 06:48 AM      All CTs at this facility use dose modulation techniques and iterative    reconstructions, and/or weight-based dosing   when appropriate to reduce radiation to a low as reasonably achievable. XR CHEST PORTABLE   Final Result      Bilateral pulmonary opacities, new. The differential diagnosis includes    pulmonary vascular congestion and pneumonia. Correlate clinically and    follow-up to resolution. This document has been electronically signed by: Karma Lopez MD on    04/18/2023 05:28 AM      XR CHEST PORTABLE   Final Result      Left lower lung atelectasis/infiltrate. **This report has been created using voice recognition software. It may contain minor errors which are inherent in voice recognition technology. **      Final report electronically signed by Dr. Yi Davis on 4/17/2023 7:40 PM          Diet: ADULT DIET;  Dysphagia - Pureed; 1:1 assistance    DVT prophylaxis: [] Lovenox                                 [] SCDs                                 [] SQ Heparin                                 [] Encourage ambulation           [] Already on Anticoagulation     Holding lovenox due to hematoma    Disposition:    [] Home       [] TCU       [] Rehab       [] Psych       [x] SNF       [] [place]       [] Other-    Code Status: Full Code    PT/OT Eval Status: working      Electronically signed by Alicia Mcleod DO on 4/25/2023 at 6:15 PM

## 2023-04-28 ENCOUNTER — HOSPITAL ENCOUNTER (EMERGENCY)
Age: 73
Discharge: HOME | End: 2023-04-28
Attending: STUDENT IN AN ORGANIZED HEALTH CARE EDUCATION/TRAINING PROGRAM
Payer: MEDICARE

## 2023-04-28 VITALS
HEIGHT: 63 IN | RESPIRATION RATE: 20 BRPM | OXYGEN SATURATION: 95 % | HEART RATE: 101 BPM | SYSTOLIC BLOOD PRESSURE: 113 MMHG | BODY MASS INDEX: 23.04 KG/M2 | TEMPERATURE: 98 F | WEIGHT: 130 LBS | DIASTOLIC BLOOD PRESSURE: 65 MMHG

## 2023-04-28 DIAGNOSIS — S10.93XS HEMATOMA OF NECK, SEQUELA: Primary | ICD-10-CM

## 2023-04-28 DIAGNOSIS — M54.2 PAIN, NECK: ICD-10-CM

## 2023-04-28 LAB
ALBUMIN SERPL BCG-MCNC: 2.7 G/DL (ref 3.5–5.1)
ALP SERPL-CCNC: 451 U/L (ref 38–126)
ALT SERPL W/O P-5'-P-CCNC: 59 U/L (ref 11–66)
ANION GAP SERPL CALC-SCNC: 8 MEQ/L (ref 8–16)
ANISOCYTOSIS BLD QL SMEAR: PRESENT
APTT PPP: 25.5 SECONDS (ref 22–38)
AST SERPL-CCNC: 117 U/L (ref 5–40)
BASOPHILS ABSOLUTE: 0 THOU/MM3 (ref 0–0.1)
BASOPHILS NFR BLD AUTO: 0.4 %
BILIRUB SERPL-MCNC: 1.2 MG/DL (ref 0.3–1.2)
BUN SERPL-MCNC: 19 MG/DL (ref 7–22)
CALCIUM SERPL-MCNC: 8.5 MG/DL (ref 8.5–10.5)
CHLORIDE SERPL-SCNC: 109 MEQ/L (ref 98–111)
CO2 SERPL-SCNC: 24 MEQ/L (ref 23–33)
CREAT SERPL-MCNC: 0.2 MG/DL (ref 0.4–1.2)
DEPRECATED RDW RBC AUTO: 68.3 FL (ref 35–45)
EKG ATRIAL RATE: 103 BPM
EKG ATRIAL RATE: 122 BPM
EKG P AXIS: 52 DEGREES
EKG P AXIS: 60 DEGREES
EKG P-R INTERVAL: 118 MS
EKG P-R INTERVAL: 124 MS
EKG Q-T INTERVAL: 332 MS
EKG Q-T INTERVAL: 350 MS
EKG Q-T INTERVAL: 366 MS
EKG QRS DURATION: 100 MS
EKG QRS DURATION: 100 MS
EKG QRS DURATION: 106 MS
EKG QTC CALCULATION (BAZETT): 473 MS
EKG QTC CALCULATION (BAZETT): 479 MS
EKG QTC CALCULATION (BAZETT): 518 MS
EKG R AXIS: -34 DEGREES
EKG R AXIS: -46 DEGREES
EKG R AXIS: -48 DEGREES
EKG T AXIS: 105 DEGREES
EKG T AXIS: 121 DEGREES
EKG T AXIS: 95 DEGREES
EKG VENTRICULAR RATE: 103 BPM
EKG VENTRICULAR RATE: 122 BPM
EKG VENTRICULAR RATE: 132 BPM
EOSINOPHIL NFR BLD AUTO: 1 %
EOSINOPHILS ABSOLUTE: 0.1 THOU/MM3 (ref 0–0.4)
ERYTHROCYTE [DISTWIDTH] IN BLOOD BY AUTOMATED COUNT: 20 % (ref 11.5–14.5)
GFR SERPL CREATININE-BSD FRML MDRD: > 60 ML/MIN/1.73M2
GLUCOSE SERPL-MCNC: 138 MG/DL (ref 70–108)
HCT VFR BLD AUTO: 36.8 % (ref 37–47)
HGB BLD-MCNC: 11.3 GM/DL (ref 12–16)
IMM GRANULOCYTES # BLD AUTO: 0.19 THOU/MM3 (ref 0–0.07)
IMM GRANULOCYTES NFR BLD AUTO: 2 %
INR PPP: 1.07 (ref 0.85–1.13)
LYMPHOCYTES ABSOLUTE: 1.4 THOU/MM3 (ref 1–4.8)
LYMPHOCYTES NFR BLD AUTO: 14.2 %
MCH RBC QN AUTO: 31.3 PG (ref 26–33)
MCHC RBC AUTO-ENTMCNC: 30.7 GM/DL (ref 32.2–35.5)
MCV RBC AUTO: 101.9 FL (ref 81–99)
MONOCYTES ABSOLUTE: 0.7 THOU/MM3 (ref 0.4–1.3)
MONOCYTES NFR BLD AUTO: 7.6 %
NEUTROPHILS NFR BLD AUTO: 74.8 %
NRBC BLD AUTO-RTO: 0 /100 WBC
OSMOLALITY SERPL CALC.SUM OF ELEC: 285.7 MOSMOL/KG (ref 275–300)
PLATELET # BLD AUTO: 256 THOU/MM3 (ref 130–400)
PMV BLD AUTO: 10.3 FL (ref 9.4–12.4)
POTASSIUM SERPL-SCNC: 3.6 MEQ/L (ref 3.5–5.2)
PROT SERPL-MCNC: 6.5 G/DL (ref 6.1–8)
RBC # BLD AUTO: 3.61 MILL/MM3 (ref 4.2–5.4)
SCAN OF BLOOD SMEAR: NORMAL
SEGMENTED NEUTROPHILS ABSOLUTE COUNT: 7.2 THOU/MM3 (ref 1.8–7.7)
SODIUM SERPL-SCNC: 141 MEQ/L (ref 135–145)
WBC # BLD AUTO: 9.6 THOU/MM3 (ref 4.8–10.8)

## 2023-04-28 PROCEDURE — 85730 THROMBOPLASTIN TIME PARTIAL: CPT

## 2023-04-28 PROCEDURE — 93005 ELECTROCARDIOGRAM TRACING: CPT | Performed by: STUDENT IN AN ORGANIZED HEALTH CARE EDUCATION/TRAINING PROGRAM

## 2023-04-28 PROCEDURE — 99284 EMERGENCY DEPT VISIT MOD MDM: CPT

## 2023-04-28 PROCEDURE — 6370000000 HC RX 637 (ALT 250 FOR IP): Performed by: STUDENT IN AN ORGANIZED HEALTH CARE EDUCATION/TRAINING PROGRAM

## 2023-04-28 PROCEDURE — 36415 COLL VENOUS BLD VENIPUNCTURE: CPT

## 2023-04-28 PROCEDURE — 93010 ELECTROCARDIOGRAM REPORT: CPT | Performed by: INTERNAL MEDICINE

## 2023-04-28 PROCEDURE — 80053 COMPREHEN METABOLIC PANEL: CPT

## 2023-04-28 PROCEDURE — 85025 COMPLETE CBC W/AUTO DIFF WBC: CPT

## 2023-04-28 PROCEDURE — 85610 PROTHROMBIN TIME: CPT

## 2023-04-28 RX ORDER — HYDROCODONE BITARTRATE AND ACETAMINOPHEN 5; 325 MG/1; MG/1
1 TABLET ORAL ONCE
Status: COMPLETED | OUTPATIENT
Start: 2023-04-28 | End: 2023-04-28

## 2023-04-28 RX ORDER — MORPHINE SULFATE 4 MG/ML
4 INJECTION, SOLUTION INTRAMUSCULAR; INTRAVENOUS ONCE
Status: DISCONTINUED | OUTPATIENT
Start: 2023-04-28 | End: 2023-04-28

## 2023-04-28 RX ORDER — LIDOCAINE 4 G/G
1 PATCH TOPICAL DAILY
Qty: 5 EACH | Refills: 0 | Status: SHIPPED | OUTPATIENT
Start: 2023-04-28 | End: 2023-04-28

## 2023-04-28 RX ORDER — LIDOCAINE 4 G/G
1 PATCH TOPICAL DAILY
Qty: 5 EACH | Refills: 0 | Status: ON HOLD | OUTPATIENT
Start: 2023-04-28 | End: 2023-05-03

## 2023-04-28 RX ORDER — LIDOCAINE 4 G/G
1 PATCH TOPICAL ONCE
Status: DISCONTINUED | OUTPATIENT
Start: 2023-04-28 | End: 2023-04-28 | Stop reason: HOSPADM

## 2023-04-28 RX ORDER — HYDROCODONE BITARTRATE AND ACETAMINOPHEN 5; 325 MG/1; MG/1
1 TABLET ORAL EVERY 6 HOURS PRN
Qty: 12 TABLET | Refills: 0 | Status: SHIPPED | OUTPATIENT
Start: 2023-04-28 | End: 2023-05-01

## 2023-04-28 RX ORDER — LIDOCAINE 4 G/G
1 PATCH TOPICAL DAILY
Qty: 5 EACH | Refills: 0 | Status: SHIPPED | OUTPATIENT
Start: 2023-04-28 | End: 2023-04-28 | Stop reason: SDUPTHER

## 2023-04-28 RX ADMIN — HYDROCODONE BITARTRATE AND ACETAMINOPHEN 1 TABLET: 5; 325 TABLET ORAL at 18:49

## 2023-04-28 RX ADMIN — HYDROCODONE BITARTRATE AND ACETAMINOPHEN 1 TABLET: 5; 325 TABLET ORAL at 11:38

## 2023-04-28 ASSESSMENT — PAIN SCALES - GENERAL
PAINLEVEL_OUTOF10: 10
PAINLEVEL_OUTOF10: 10
PAINLEVEL_OUTOF10: 9
PAINLEVEL_OUTOF10: 10
PAINLEVEL_OUTOF10: 9
PAINLEVEL_OUTOF10: 10
PAINLEVEL_OUTOF10: 9
PAINLEVEL_OUTOF10: 9

## 2023-04-28 ASSESSMENT — PAIN - FUNCTIONAL ASSESSMENT
PAIN_FUNCTIONAL_ASSESSMENT: 0-10

## 2023-04-28 ASSESSMENT — PAIN DESCRIPTION - LOCATION: LOCATION: NECK;SHOULDER

## 2023-04-28 NOTE — ED NOTES
Patient resting in bed. Respirations easy and unlabored. No distress noted. Call light within reach.        Louisa Mueller RN  04/28/23 7070

## 2023-04-28 NOTE — ED NOTES
Pt. Resting in bed with even and unlabored respirations. Pt. States pain remains a 10/10 at this time. Pt. Updated about plan of care and treatment. Pt. Has no further concerns, questions or needs at this time. Call light within reach.         Elodia Mcneill RN  04/28/23 CLARY Soliman  04/28/23 1300

## 2023-04-28 NOTE — ED NOTES
Pt. Resting in bed with even and unlabored respirations. Pt. States pain is a 10/10 at this time. Pt. Updated about plan of care and treatment. Family at bedside. Pt. Has no further concerns, questions or needs at this time. Call light within reach.  called and unable to be reached at this time. Voicemail left at this time. Melchor contacted as well.         Lashanda Jain RN  04/28/23 1783

## 2023-04-28 NOTE — ED TRIAGE NOTES
Pt to the ED for left neck hematoma pain. Pt has a hematoma on the left side of er neck from a failed IJ insertion. Pt was discharged from the hospital yesterday without pain control. States her pain was controled while admitted.

## 2023-04-28 NOTE — ED NOTES
Pt. Resting in bed with even and unlabored respirations. Pt. States pain is a 9/10 at this time. Pt. Updated about plan for transport at 2100. Pt pain medication in nurses station beside charge nurse. Pt. Has no further concerns, questions or needs at this time. Call light within reach.         Aric Cotton RN  04/28/23 4456

## 2023-04-28 NOTE — ED NOTES
Pt. Resting in bed with even and unlabored respirations. Pt. States pain is a 10/10 and medicated at this time. Pt. Updated about plan of for lidocaine patch and blood work. Pt. Has no further concerns, questions or needs at this time. Call light within reach.           Kaia Rowe RN  04/28/23 7124

## 2023-04-28 NOTE — DISCHARGE INSTRUCTIONS
You were seen today in the emergency department for pain to your neck hematoma. Your pain was controlled with Norco and lidocaine patch. You were given Norco and lidocaine patch prescription while you are in the emergency room. You are given 3 days of Norco as well as 5 days of lidocaine patch. You will need to follow-up with the doctor at the SNF facility in order to continue prescribing the Norco and lidocaine for your pain control.

## 2023-04-28 NOTE — ED NOTES
Pt. Resting in bed with even and unlabored respirations. Pt. States pain is a 9/10 at this time. Pt. Updated about plan of care and treatment. Dr. Hilary Henry at bedside. Pt. Has no further concerns, questions or needs at this time. Call light within reach.         Eder Ojeda RN  04/28/23 9000

## 2023-04-28 NOTE — ED PROVIDER NOTES
[facility] [address] [address] EMERGENCY DEPT    Pt Name: Silas Brown  MRN: [id number]  [place] 1950  Date of evaluation: 4/28/2023  Resident Physician: Terry Aguilar MD  Supervising Physician: Dr. Ramiro Barragan MD    CHIEF COMPLAINT       Chief Complaint   Patient presents with    Pain     Pain from hematoma     HISTORY OF PRESENT Harlan Peterson is a 74 y.o. female with PMHx of brachial plexopathy, hematoma, multiple sclerosis, paroxysmal A-fib, CHF, seizure disorder  who presents to the emergency department for evaluation of pain from hematoma. Pt was admitted to [facility] on 4/17/2023 and underwent cystoscopy with left pyelogram, left ureteroscopy with laser lithotripsy and stent placement by Dr. Ginger Alexis on 4/17/2023. Because of hypertension during the procedure and postoperatively, she was admitted to ICU for monitoring. Attempt to place central line on her left neck and chest were done on 4/18/2023 but failed. Pt then developed a slew of complications including hematoma 2/2 L subclavian artery puncture requiring balloon mounted covered stent, mass effect on L brachial plexus leading to pain and decreased movement. Pt was d/c yesterday 4/27/2023. Pt back to ED today d/t continual pain from the hematoma. That they did not send her home with any medication. Denies that the swelling is any worse. Pain is about the same. Just states that the pain was better while she was in the hospital because her pain was controlled with medications that she is not receiving now at [facility]. The patient has no other acute complaints at this time. Review of Systems    Negative Except as Documented Above.     PASTMEDICAL HISTORY     Past Medical History:   Diagnosis Date    Contracture of joint, multiple sites 4/24/2023    Difficulty in swallowing     Intra-abdominal lymphadenopathy     Kidney stone     MS (multiple sclerosis) [address] [address] 1982    Neuromuscular disorder (HCC)     Pancreatitis

## 2023-04-28 NOTE — ED NOTES
Pt. Resting in bed with even and unlabored respirations. Pt. States pain is a 9/10 at this time. Pt. Updated about plan for pain medication from pharmacy to send home after discharge. Pt. Has no further concerns, questions or needs at this time. Call light within reach.         Chuck Beyer RN  04/28/23 2316

## 2023-05-03 ENCOUNTER — HOSPITAL ENCOUNTER (INPATIENT)
Age: 73
LOS: 11 days | Discharge: OTHER INSTITUTION | DRG: 186 | End: 2023-05-14
Attending: EMERGENCY MEDICINE
Payer: MEDICARE

## 2023-05-03 ENCOUNTER — APPOINTMENT (OUTPATIENT)
Dept: CT IMAGING | Age: 73
DRG: 186 | End: 2023-05-03
Payer: MEDICARE

## 2023-05-03 DIAGNOSIS — S20.212A HEMATOMA OF CHEST WALL, LEFT, INITIAL ENCOUNTER: ICD-10-CM

## 2023-05-03 DIAGNOSIS — Z98.890 STATUS POST THORACENTESIS: ICD-10-CM

## 2023-05-03 DIAGNOSIS — J90 RECURRENT LEFT PLEURAL EFFUSION: Primary | ICD-10-CM

## 2023-05-03 PROBLEM — R00.0 SINUS TACHYCARDIA: Status: ACTIVE | Noted: 2023-05-03

## 2023-05-03 PROBLEM — I42.9 CARDIOMYOPATHY (HCC): Status: ACTIVE | Noted: 2023-05-03

## 2023-05-03 LAB
ABO: NORMAL
ALBUMIN SERPL BCG-MCNC: 2.8 G/DL (ref 3.5–5.1)
ALP SERPL-CCNC: 382 U/L (ref 38–126)
ALT SERPL W/O P-5'-P-CCNC: 45 U/L (ref 11–66)
ANION GAP SERPL CALC-SCNC: 10 MEQ/L (ref 8–16)
ANION GAP SERPL CALC-SCNC: 9 MEQ/L (ref 8–16)
ANISOCYTOSIS BLD QL SMEAR: PRESENT
ANTIBODY SCREEN: NORMAL
AST SERPL-CCNC: 52 U/L (ref 5–40)
BACTERIA URNS QL MICRO: ABNORMAL /HPF
BASOPHILS ABSOLUTE: 0.1 THOU/MM3 (ref 0–0.1)
BASOPHILS NFR BLD AUTO: 1 %
BILIRUB CONJ SERPL-MCNC: 0.7 MG/DL (ref 0–0.3)
BILIRUB SERPL-MCNC: 1.4 MG/DL (ref 0.3–1.2)
BILIRUB UR QL STRIP.AUTO: NEGATIVE
BUN SERPL-MCNC: 14 MG/DL (ref 7–22)
BUN SERPL-MCNC: 15 MG/DL (ref 7–22)
CALCIUM SERPL-MCNC: 8.5 MG/DL (ref 8.5–10.5)
CALCIUM SERPL-MCNC: 8.8 MG/DL (ref 8.5–10.5)
CASTS #/AREA URNS LPF: ABNORMAL /LPF
CASTS 2: ABNORMAL /LPF
CHARACTER UR: ABNORMAL
CHLORIDE SERPL-SCNC: 107 MEQ/L (ref 98–111)
CHLORIDE SERPL-SCNC: 107 MEQ/L (ref 98–111)
CO2 SERPL-SCNC: 20 MEQ/L (ref 23–33)
CO2 SERPL-SCNC: 23 MEQ/L (ref 23–33)
COLOR: ABNORMAL
CREAT SERPL-MCNC: 0.3 MG/DL (ref 0.4–1.2)
CREAT SERPL-MCNC: 0.3 MG/DL (ref 0.4–1.2)
CRYSTALS URNS MICRO: ABNORMAL
DEPRECATED RDW RBC AUTO: 74.4 FL (ref 35–45)
EKG ATRIAL RATE: 130 BPM
EKG ATRIAL RATE: 137 BPM
EKG P AXIS: 58 DEGREES
EKG P AXIS: 61 DEGREES
EKG P-R INTERVAL: 112 MS
EKG P-R INTERVAL: 112 MS
EKG Q-T INTERVAL: 286 MS
EKG Q-T INTERVAL: 312 MS
EKG QRS DURATION: 92 MS
EKG QRS DURATION: 94 MS
EKG QTC CALCULATION (BAZETT): 431 MS
EKG QTC CALCULATION (BAZETT): 459 MS
EKG R AXIS: -31 DEGREES
EKG R AXIS: -48 DEGREES
EKG T AXIS: 112 DEGREES
EKG T AXIS: 113 DEGREES
EKG VENTRICULAR RATE: 130 BPM
EKG VENTRICULAR RATE: 137 BPM
EOSINOPHIL NFR BLD AUTO: 1 %
EOSINOPHILS ABSOLUTE: 0.1 THOU/MM3 (ref 0–0.4)
EPITHELIAL CELLS, UA: ABNORMAL /HPF
ERYTHROCYTE [DISTWIDTH] IN BLOOD BY AUTOMATED COUNT: 21.2 % (ref 11.5–14.5)
GFR SERPL CREATININE-BSD FRML MDRD: > 60 ML/MIN/1.73M2
GFR SERPL CREATININE-BSD FRML MDRD: > 60 ML/MIN/1.73M2
GLUCOSE SERPL-MCNC: 116 MG/DL (ref 70–108)
GLUCOSE SERPL-MCNC: 97 MG/DL (ref 70–108)
GLUCOSE UR QL STRIP.AUTO: NEGATIVE MG/DL
HCT VFR BLD AUTO: 38.7 % (ref 37–47)
HCT VFR BLD AUTO: 38.9 % (ref 37–47)
HGB BLD-MCNC: 12.1 GM/DL (ref 12–16)
HGB BLD-MCNC: 12.5 GM/DL (ref 12–16)
HGB UR QL STRIP.AUTO: ABNORMAL
IMM GRANULOCYTES # BLD AUTO: 0.13 THOU/MM3 (ref 0–0.07)
IMM GRANULOCYTES NFR BLD AUTO: 1.3 %
KETONES UR QL STRIP.AUTO: 15
LDH SERPL L TO P-CCNC: 334 U/L (ref 100–190)
LIPASE SERPL-CCNC: 10.5 U/L (ref 5.6–51.3)
LYMPHOCYTES ABSOLUTE: 1.3 THOU/MM3 (ref 1–4.8)
LYMPHOCYTES NFR BLD AUTO: 12.6 %
MAGNESIUM SERPL-MCNC: 1.7 MG/DL (ref 1.6–2.4)
MCH RBC QN AUTO: 32.1 PG (ref 26–33)
MCHC RBC AUTO-ENTMCNC: 32.1 GM/DL (ref 32.2–35.5)
MCV RBC AUTO: 99.7 FL (ref 81–99)
MISCELLANEOUS 2: ABNORMAL
MONOCYTES ABSOLUTE: 1.1 THOU/MM3 (ref 0.4–1.3)
MONOCYTES NFR BLD AUTO: 10.3 %
NEUTROPHILS NFR BLD AUTO: 73.8 %
NITRITE UR QL STRIP: NEGATIVE
NRBC BLD AUTO-RTO: 0 /100 WBC
NT-PROBNP SERPL IA-MCNC: 371.1 PG/ML (ref 0–124)
OSMOLALITY SERPL CALC.SUM OF ELEC: 277.9 MOSMOL/KG (ref 275–300)
PH UR STRIP.AUTO: 5.5 [PH] (ref 5–9)
PLATELET # BLD AUTO: 322 THOU/MM3 (ref 130–400)
PLATELET BLD QL SMEAR: ADEQUATE
PMV BLD AUTO: 10 FL (ref 9.4–12.4)
POTASSIUM SERPL-SCNC: 4 MEQ/L (ref 3.5–5.2)
POTASSIUM SERPL-SCNC: 4.2 MEQ/L (ref 3.5–5.2)
PROT SERPL-MCNC: 7.4 G/DL (ref 6.1–8)
PROT SERPL-MCNC: 7.5 G/DL (ref 6.1–8)
PROT UR STRIP.AUTO-MCNC: 100 MG/DL
RBC # BLD AUTO: 3.9 MILL/MM3 (ref 4.2–5.4)
RBC URINE: ABNORMAL /HPF
RENAL EPI CELLS #/AREA URNS HPF: ABNORMAL /[HPF]
RH FACTOR: NORMAL
SCAN OF BLOOD SMEAR: NORMAL
SEGMENTED NEUTROPHILS ABSOLUTE COUNT: 7.5 THOU/MM3 (ref 1.8–7.7)
SODIUM SERPL-SCNC: 137 MEQ/L (ref 135–145)
SODIUM SERPL-SCNC: 139 MEQ/L (ref 135–145)
SP GR UR REFRACT.AUTO: > 1.03 (ref 1–1.03)
TROPONIN T: < 0.01 NG/ML
UROBILINOGEN, URINE: 1 EU/DL (ref 0–1)
WBC # BLD AUTO: 10.2 THOU/MM3 (ref 4.8–10.8)
WBC #/AREA URNS HPF: > 100 /HPF
WBC #/AREA URNS HPF: ABNORMAL /[HPF]
YEAST LIKE FUNGI URNS QL MICRO: ABNORMAL

## 2023-05-03 PROCEDURE — 81001 URINALYSIS AUTO W/SCOPE: CPT

## 2023-05-03 PROCEDURE — 1200000003 HC TELEMETRY R&B

## 2023-05-03 PROCEDURE — 2580000003 HC RX 258

## 2023-05-03 PROCEDURE — 99222 1ST HOSP IP/OBS MODERATE 55: CPT | Performed by: INTERNAL MEDICINE

## 2023-05-03 PROCEDURE — 93010 ELECTROCARDIOGRAM REPORT: CPT | Performed by: NUCLEAR MEDICINE

## 2023-05-03 PROCEDURE — 6370000000 HC RX 637 (ALT 250 FOR IP): Performed by: PSYCHIATRY & NEUROLOGY

## 2023-05-03 PROCEDURE — 2580000003 HC RX 258: Performed by: EMERGENCY MEDICINE

## 2023-05-03 PROCEDURE — 2500000003 HC RX 250 WO HCPCS

## 2023-05-03 PROCEDURE — 36415 COLL VENOUS BLD VENIPUNCTURE: CPT

## 2023-05-03 PROCEDURE — 82248 BILIRUBIN DIRECT: CPT

## 2023-05-03 PROCEDURE — 83690 ASSAY OF LIPASE: CPT

## 2023-05-03 PROCEDURE — 85018 HEMOGLOBIN: CPT

## 2023-05-03 PROCEDURE — 93005 ELECTROCARDIOGRAM TRACING: CPT | Performed by: EMERGENCY MEDICINE

## 2023-05-03 PROCEDURE — 86850 RBC ANTIBODY SCREEN: CPT

## 2023-05-03 PROCEDURE — 74177 CT ABD & PELVIS W/CONTRAST: CPT

## 2023-05-03 PROCEDURE — 86901 BLOOD TYPING SEROLOGIC RH(D): CPT

## 2023-05-03 PROCEDURE — 96361 HYDRATE IV INFUSION ADD-ON: CPT

## 2023-05-03 PROCEDURE — 6370000000 HC RX 637 (ALT 250 FOR IP)

## 2023-05-03 PROCEDURE — 85014 HEMATOCRIT: CPT

## 2023-05-03 PROCEDURE — 71275 CT ANGIOGRAPHY CHEST: CPT

## 2023-05-03 PROCEDURE — 0W9B3ZZ DRAINAGE OF LEFT PLEURAL CAVITY, PERCUTANEOUS APPROACH: ICD-10-PCS | Performed by: INTERNAL MEDICINE

## 2023-05-03 PROCEDURE — 6370000000 HC RX 637 (ALT 250 FOR IP): Performed by: INTERNAL MEDICINE

## 2023-05-03 PROCEDURE — 83880 ASSAY OF NATRIURETIC PEPTIDE: CPT

## 2023-05-03 PROCEDURE — 6360000002 HC RX W HCPCS

## 2023-05-03 PROCEDURE — 80053 COMPREHEN METABOLIC PANEL: CPT

## 2023-05-03 PROCEDURE — 2500000003 HC RX 250 WO HCPCS: Performed by: PHYSICIAN ASSISTANT

## 2023-05-03 PROCEDURE — 86900 BLOOD TYPING SEROLOGIC ABO: CPT

## 2023-05-03 PROCEDURE — 84155 ASSAY OF PROTEIN SERUM: CPT

## 2023-05-03 PROCEDURE — 93005 ELECTROCARDIOGRAM TRACING: CPT | Performed by: INTERNAL MEDICINE

## 2023-05-03 PROCEDURE — 83615 LACTATE (LD) (LDH) ENZYME: CPT

## 2023-05-03 PROCEDURE — 2580000003 HC RX 258: Performed by: PHYSICIAN ASSISTANT

## 2023-05-03 PROCEDURE — 84484 ASSAY OF TROPONIN QUANT: CPT

## 2023-05-03 PROCEDURE — 96360 HYDRATION IV INFUSION INIT: CPT

## 2023-05-03 PROCEDURE — 85025 COMPLETE CBC W/AUTO DIFF WBC: CPT

## 2023-05-03 PROCEDURE — 87086 URINE CULTURE/COLONY COUNT: CPT

## 2023-05-03 PROCEDURE — 99223 1ST HOSP IP/OBS HIGH 75: CPT | Performed by: INTERNAL MEDICINE

## 2023-05-03 PROCEDURE — 99285 EMERGENCY DEPT VISIT HI MDM: CPT

## 2023-05-03 PROCEDURE — 83735 ASSAY OF MAGNESIUM: CPT

## 2023-05-03 PROCEDURE — 99223 1ST HOSP IP/OBS HIGH 75: CPT

## 2023-05-03 PROCEDURE — 6360000004 HC RX CONTRAST MEDICATION: Performed by: EMERGENCY MEDICINE

## 2023-05-03 RX ORDER — LEVOTHYROXINE SODIUM 0.05 MG/1
50 TABLET ORAL DAILY
Status: DISCONTINUED | OUTPATIENT
Start: 2023-05-03 | End: 2023-05-14 | Stop reason: HOSPADM

## 2023-05-03 RX ORDER — ONDANSETRON 4 MG/1
4 TABLET, ORALLY DISINTEGRATING ORAL EVERY 8 HOURS PRN
Status: DISCONTINUED | OUTPATIENT
Start: 2023-05-03 | End: 2023-05-14 | Stop reason: HOSPADM

## 2023-05-03 RX ORDER — SODIUM CHLORIDE 9 MG/ML
INJECTION, SOLUTION INTRAVENOUS PRN
Status: DISCONTINUED | OUTPATIENT
Start: 2023-05-03 | End: 2023-05-14 | Stop reason: HOSPADM

## 2023-05-03 RX ORDER — SODIUM CHLORIDE 0.9 % (FLUSH) 0.9 %
5-40 SYRINGE (ML) INJECTION PRN
Status: DISCONTINUED | OUTPATIENT
Start: 2023-05-03 | End: 2023-05-05 | Stop reason: SDUPTHER

## 2023-05-03 RX ORDER — SODIUM CHLORIDE 9 MG/ML
INJECTION, SOLUTION INTRAVENOUS PRN
Status: DISCONTINUED | OUTPATIENT
Start: 2023-05-03 | End: 2023-05-05 | Stop reason: SDUPTHER

## 2023-05-03 RX ORDER — POTASSIUM CHLORIDE 7.45 MG/ML
10 INJECTION INTRAVENOUS PRN
Status: DISCONTINUED | OUTPATIENT
Start: 2023-05-03 | End: 2023-05-14 | Stop reason: HOSPADM

## 2023-05-03 RX ORDER — SODIUM CHLORIDE 0.9 % (FLUSH) 0.9 %
5-40 SYRINGE (ML) INJECTION EVERY 12 HOURS SCHEDULED
Status: DISCONTINUED | OUTPATIENT
Start: 2023-05-03 | End: 2023-05-14 | Stop reason: HOSPADM

## 2023-05-03 RX ORDER — HYDROXYZINE HYDROCHLORIDE 10 MG/1
10 TABLET, FILM COATED ORAL 3 TIMES DAILY PRN
Status: DISCONTINUED | OUTPATIENT
Start: 2023-05-03 | End: 2023-05-14 | Stop reason: HOSPADM

## 2023-05-03 RX ORDER — ACETAMINOPHEN 325 MG/1
650 TABLET ORAL EVERY 6 HOURS PRN
Status: DISCONTINUED | OUTPATIENT
Start: 2023-05-03 | End: 2023-05-14 | Stop reason: HOSPADM

## 2023-05-03 RX ORDER — ENOXAPARIN SODIUM 100 MG/ML
40 INJECTION SUBCUTANEOUS DAILY
Status: DISCONTINUED | OUTPATIENT
Start: 2023-05-03 | End: 2023-05-14 | Stop reason: HOSPADM

## 2023-05-03 RX ORDER — POLYETHYLENE GLYCOL 3350 17 G/17G
17 POWDER, FOR SOLUTION ORAL DAILY PRN
Status: DISCONTINUED | OUTPATIENT
Start: 2023-05-03 | End: 2023-05-14 | Stop reason: HOSPADM

## 2023-05-03 RX ORDER — MORPHINE SULFATE 4 MG/ML
4 INJECTION, SOLUTION INTRAMUSCULAR; INTRAVENOUS
Status: DISCONTINUED | OUTPATIENT
Start: 2023-05-03 | End: 2023-05-14 | Stop reason: HOSPADM

## 2023-05-03 RX ORDER — 0.9 % SODIUM CHLORIDE 0.9 %
1000 INTRAVENOUS SOLUTION INTRAVENOUS ONCE
Status: COMPLETED | OUTPATIENT
Start: 2023-05-03 | End: 2023-05-03

## 2023-05-03 RX ORDER — ONDANSETRON 2 MG/ML
4 INJECTION INTRAMUSCULAR; INTRAVENOUS EVERY 6 HOURS PRN
Status: DISCONTINUED | OUTPATIENT
Start: 2023-05-03 | End: 2023-05-14 | Stop reason: HOSPADM

## 2023-05-03 RX ORDER — BUSPIRONE HYDROCHLORIDE 10 MG/1
10 TABLET ORAL 2 TIMES DAILY
Status: DISCONTINUED | OUTPATIENT
Start: 2023-05-03 | End: 2023-05-14 | Stop reason: HOSPADM

## 2023-05-03 RX ORDER — MAGNESIUM SULFATE IN WATER 40 MG/ML
2000 INJECTION, SOLUTION INTRAVENOUS PRN
Status: DISCONTINUED | OUTPATIENT
Start: 2023-05-03 | End: 2023-05-14 | Stop reason: HOSPADM

## 2023-05-03 RX ORDER — MORPHINE SULFATE 2 MG/ML
2 INJECTION, SOLUTION INTRAMUSCULAR; INTRAVENOUS
Status: DISCONTINUED | OUTPATIENT
Start: 2023-05-03 | End: 2023-05-14 | Stop reason: HOSPADM

## 2023-05-03 RX ORDER — POTASSIUM CHLORIDE 20 MEQ/1
40 TABLET, EXTENDED RELEASE ORAL PRN
Status: DISCONTINUED | OUTPATIENT
Start: 2023-05-03 | End: 2023-05-14 | Stop reason: HOSPADM

## 2023-05-03 RX ORDER — TROSPIUM CHLORIDE 20 MG/1
20 TABLET, FILM COATED ORAL
Status: DISCONTINUED | OUTPATIENT
Start: 2023-05-03 | End: 2023-05-14 | Stop reason: HOSPADM

## 2023-05-03 RX ORDER — METOPROLOL TARTRATE 5 MG/5ML
5 INJECTION INTRAVENOUS ONCE
Status: COMPLETED | OUTPATIENT
Start: 2023-05-03 | End: 2023-05-03

## 2023-05-03 RX ORDER — CARBAMAZEPINE 100 MG/1
100 TABLET, CHEWABLE ORAL 3 TIMES DAILY
Status: DISCONTINUED | OUTPATIENT
Start: 2023-05-03 | End: 2023-05-14 | Stop reason: HOSPADM

## 2023-05-03 RX ORDER — SODIUM CHLORIDE 0.9 % (FLUSH) 0.9 %
5-40 SYRINGE (ML) INJECTION EVERY 12 HOURS SCHEDULED
Status: DISCONTINUED | OUTPATIENT
Start: 2023-05-03 | End: 2023-05-05 | Stop reason: SDUPTHER

## 2023-05-03 RX ORDER — SODIUM CHLORIDE, SODIUM LACTATE, POTASSIUM CHLORIDE, AND CALCIUM CHLORIDE .6; .31; .03; .02 G/100ML; G/100ML; G/100ML; G/100ML
500 INJECTION, SOLUTION INTRAVENOUS ONCE
Status: COMPLETED | OUTPATIENT
Start: 2023-05-03 | End: 2023-05-03

## 2023-05-03 RX ORDER — SODIUM CHLORIDE 0.9 % (FLUSH) 0.9 %
5-40 SYRINGE (ML) INJECTION PRN
Status: DISCONTINUED | OUTPATIENT
Start: 2023-05-03 | End: 2023-05-14 | Stop reason: HOSPADM

## 2023-05-03 RX ORDER — ACETAMINOPHEN 650 MG/1
650 SUPPOSITORY RECTAL EVERY 6 HOURS PRN
Status: DISCONTINUED | OUTPATIENT
Start: 2023-05-03 | End: 2023-05-14 | Stop reason: HOSPADM

## 2023-05-03 RX ORDER — VITAMIN B COMPLEX
1000 TABLET ORAL DAILY
Status: DISCONTINUED | OUTPATIENT
Start: 2023-05-03 | End: 2023-05-14 | Stop reason: HOSPADM

## 2023-05-03 RX ADMIN — METOPROLOL TARTRATE 5 MG: 5 INJECTION INTRAVENOUS at 22:20

## 2023-05-03 RX ADMIN — MORPHINE SULFATE 2 MG: 2 INJECTION, SOLUTION INTRAMUSCULAR; INTRAVENOUS at 20:46

## 2023-05-03 RX ADMIN — BUSPIRONE HYDROCHLORIDE 10 MG: 10 TABLET ORAL at 22:38

## 2023-05-03 RX ADMIN — HYDROXYZINE HYDROCHLORIDE 10 MG: 10 TABLET ORAL at 22:38

## 2023-05-03 RX ADMIN — MORPHINE SULFATE 2 MG: 2 INJECTION, SOLUTION INTRAMUSCULAR; INTRAVENOUS at 22:47

## 2023-05-03 RX ADMIN — SODIUM CHLORIDE 1000 ML: 9 INJECTION, SOLUTION INTRAVENOUS at 07:41

## 2023-05-03 RX ADMIN — MICONAZOLE NITRATE: 2 POWDER TOPICAL at 22:38

## 2023-05-03 RX ADMIN — SODIUM CHLORIDE, POTASSIUM CHLORIDE, SODIUM LACTATE AND CALCIUM CHLORIDE 500 ML: 600; 310; 30; 20 INJECTION, SOLUTION INTRAVENOUS at 22:27

## 2023-05-03 RX ADMIN — METOPROLOL TARTRATE 25 MG: 25 TABLET, FILM COATED ORAL at 18:30

## 2023-05-03 RX ADMIN — IOPAMIDOL 80 ML: 755 INJECTION, SOLUTION INTRAVENOUS at 08:29

## 2023-05-03 RX ADMIN — SODIUM CHLORIDE, PRESERVATIVE FREE 10 ML: 5 INJECTION INTRAVENOUS at 20:31

## 2023-05-03 RX ADMIN — CARBAMAZEPINE 100 MG: 100 TABLET, CHEWABLE ORAL at 20:31

## 2023-05-03 RX ADMIN — MORPHINE SULFATE 4 MG: 4 INJECTION, SOLUTION INTRAMUSCULAR; INTRAVENOUS at 12:16

## 2023-05-03 ASSESSMENT — PAIN SCALES - GENERAL
PAINLEVEL_OUTOF10: 8
PAINLEVEL_OUTOF10: 10

## 2023-05-03 ASSESSMENT — PAIN DESCRIPTION - ORIENTATION
ORIENTATION: LEFT

## 2023-05-03 ASSESSMENT — ENCOUNTER SYMPTOMS
NAUSEA: 0
CHOKING: 0
EYE PAIN: 0
BLOOD IN STOOL: 0
ABDOMINAL PAIN: 1
TROUBLE SWALLOWING: 0
VOICE CHANGE: 0
WHEEZING: 0
RHINORRHEA: 0
SORE THROAT: 0
CHEST TIGHTNESS: 0
BACK PAIN: 0
COUGH: 0
DIARRHEA: 0
ABDOMINAL DISTENTION: 0
SINUS PRESSURE: 0
SHORTNESS OF BREATH: 0
PHOTOPHOBIA: 0
CONSTIPATION: 0
EYE DISCHARGE: 0
EYE ITCHING: 0
EYE REDNESS: 0
VOMITING: 0

## 2023-05-03 ASSESSMENT — PAIN SCALES - WONG BAKER
WONGBAKER_NUMERICALRESPONSE: 0

## 2023-05-03 ASSESSMENT — PAIN - FUNCTIONAL ASSESSMENT: PAIN_FUNCTIONAL_ASSESSMENT: 0-10

## 2023-05-03 ASSESSMENT — PAIN DESCRIPTION - PAIN TYPE: TYPE: ACUTE PAIN

## 2023-05-03 ASSESSMENT — PAIN DESCRIPTION - LOCATION
LOCATION: ARM
LOCATION: RIB CAGE
LOCATION: ABDOMEN

## 2023-05-03 ASSESSMENT — PAIN DESCRIPTION - DESCRIPTORS
DESCRIPTORS: ACHING

## 2023-05-04 ENCOUNTER — APPOINTMENT (OUTPATIENT)
Dept: GENERAL RADIOLOGY | Age: 73
DRG: 186 | End: 2023-05-04
Payer: MEDICARE

## 2023-05-04 ENCOUNTER — APPOINTMENT (OUTPATIENT)
Dept: ULTRASOUND IMAGING | Age: 73
DRG: 186 | End: 2023-05-04
Payer: MEDICARE

## 2023-05-04 PROBLEM — E43 SEVERE MALNUTRITION (HCC): Chronic | Status: ACTIVE | Noted: 2022-10-26

## 2023-05-04 PROBLEM — I50.21 ACUTE HFREF (HEART FAILURE WITH REDUCED EJECTION FRACTION) (HCC): Status: ACTIVE | Noted: 2023-05-04

## 2023-05-04 PROBLEM — Z96.0 S/P URETERAL STENT PLACEMENT: Status: ACTIVE | Noted: 2023-05-04

## 2023-05-04 PROBLEM — I48.92 PAROXYSMAL ATRIAL FLUTTER (HCC): Status: ACTIVE | Noted: 2023-05-04

## 2023-05-04 PROBLEM — N31.9 NEUROGENIC BLADDER: Status: ACTIVE | Noted: 2023-05-04

## 2023-05-04 PROBLEM — D62 ACUTE BLOOD LOSS ANEMIA: Status: ACTIVE | Noted: 2023-05-04

## 2023-05-04 PROBLEM — I47.10 SVT (SUPRAVENTRICULAR TACHYCARDIA): Status: ACTIVE | Noted: 2023-05-04

## 2023-05-04 PROBLEM — R14.0 ABDOMINAL DISTENTION: Status: ACTIVE | Noted: 2023-05-04

## 2023-05-04 PROBLEM — E83.42 HYPOMAGNESEMIA: Status: ACTIVE | Noted: 2023-05-04

## 2023-05-04 PROBLEM — R65.21 SEPTIC SHOCK (HCC): Status: ACTIVE | Noted: 2019-08-29

## 2023-05-04 PROBLEM — S45.992A: Status: ACTIVE | Noted: 2023-05-04

## 2023-05-04 PROBLEM — E87.6 HYPOKALEMIA: Status: ACTIVE | Noted: 2023-05-04

## 2023-05-04 PROBLEM — E03.9 HYPOTHYROIDISM: Status: ACTIVE | Noted: 2023-05-04

## 2023-05-04 PROBLEM — I47.1 SVT (SUPRAVENTRICULAR TACHYCARDIA) (HCC): Status: ACTIVE | Noted: 2023-05-04

## 2023-05-04 LAB
ALBUMIN FLD-MCNC: 2 GM/DL
ALBUMIN SERPL BCG-MCNC: 2.6 G/DL (ref 3.5–5.1)
ANION GAP SERPL CALC-SCNC: 8 MEQ/L (ref 8–16)
ANISOCYTOSIS BLD QL SMEAR: PRESENT
BACTERIA UR CULT: ABNORMAL
BASOPHILS ABSOLUTE: 0.1 THOU/MM3 (ref 0–0.1)
BASOPHILS NFR BLD AUTO: 1.3 %
BUN SERPL-MCNC: 15 MG/DL (ref 7–22)
CALCIUM SERPL-MCNC: 8.2 MG/DL (ref 8.5–10.5)
CHARACTER, BODY FLUID: ABNORMAL
CHLORIDE SERPL-SCNC: 107 MEQ/L (ref 98–111)
CO2 SERPL-SCNC: 23 MEQ/L (ref 23–33)
COLOR FLD: ABNORMAL
CREAT SERPL-MCNC: 0.3 MG/DL (ref 0.4–1.2)
DEPRECATED RDW RBC AUTO: 79 FL (ref 35–45)
EKG ATRIAL RATE: 137 BPM
EKG P AXIS: 66 DEGREES
EKG P-R INTERVAL: 112 MS
EKG Q-T INTERVAL: 284 MS
EKG QRS DURATION: 94 MS
EKG QTC CALCULATION (BAZETT): 428 MS
EKG R AXIS: -45 DEGREES
EKG T AXIS: 110 DEGREES
EKG VENTRICULAR RATE: 137 BPM
EOSINOPHIL NFR BLD AUTO: 2.2 %
EOSINOPHILS ABSOLUTE: 0.2 THOU/MM3 (ref 0–0.4)
ERYTHROCYTE [DISTWIDTH] IN BLOOD BY AUTOMATED COUNT: 20.8 % (ref 11.5–14.5)
GFR SERPL CREATININE-BSD FRML MDRD: > 60 ML/MIN/1.73M2
GLUCOSE SERPL-MCNC: 95 MG/DL (ref 70–108)
GRANULOCYTES NFR FLD AUTO: 51.7 %
HCT VFR BLD AUTO: 38.9 % (ref 37–47)
HGB BLD-MCNC: 11.9 GM/DL (ref 12–16)
IMM GRANULOCYTES # BLD AUTO: 0.08 THOU/MM3 (ref 0–0.07)
IMM GRANULOCYTES NFR BLD AUTO: 0.8 %
LDH FLD L TO P-CCNC: 913 U/L
LYMPHOCYTES ABSOLUTE: 1.6 THOU/MM3 (ref 1–4.8)
LYMPHOCYTES NFR BLD AUTO: 17.3 %
MCH RBC QN AUTO: 32 PG (ref 26–33)
MCHC RBC AUTO-ENTMCNC: 30.6 GM/DL (ref 32.2–35.5)
MCV RBC AUTO: 104.6 FL (ref 81–99)
MONOCYTES ABSOLUTE: 1.1 THOU/MM3 (ref 0.4–1.3)
MONOCYTES NFR BLD AUTO: 12.1 %
MONONUC CELLS NFR FLD AUTO: 48.3 %
NEUTROPHILS NFR BLD AUTO: 66.3 %
NRBC BLD AUTO-RTO: 0 /100 WBC
NUC CELL # FLD AUTO: 3344 /CUMM (ref 0–500)
ORGANISM: ABNORMAL
PATHOLOGIST REVIEW: ABNORMAL
PH BIFL: 7.59 [PH]
PLATELET # BLD AUTO: 317 THOU/MM3 (ref 130–400)
PMV BLD AUTO: 10.2 FL (ref 9.4–12.4)
POTASSIUM SERPL-SCNC: 4.1 MEQ/L (ref 3.5–5.2)
PROT FLD-MCNC: 4.3 GM/DL
PROT SERPL-MCNC: 6.3 G/DL (ref 6.1–8)
RBC # BLD AUTO: 3.72 MILL/MM3 (ref 4.2–5.4)
RBC # FLD AUTO: ABNORMAL /CUMM
SEGMENTED NEUTROPHILS ABSOLUTE COUNT: 6.2 THOU/MM3 (ref 1.8–7.7)
SODIUM SERPL-SCNC: 138 MEQ/L (ref 135–145)
SPECIMEN: ABNORMAL
TOTAL VOLUME RECEIVED BODY FLUID: 73 ML
WBC # BLD AUTO: 9.4 THOU/MM3 (ref 4.8–10.8)

## 2023-05-04 PROCEDURE — 32555 ASPIRATE PLEURA W/ IMAGING: CPT

## 2023-05-04 PROCEDURE — 84157 ASSAY OF PROTEIN OTHER: CPT

## 2023-05-04 PROCEDURE — 2580000003 HC RX 258

## 2023-05-04 PROCEDURE — 83986 ASSAY PH BODY FLUID NOS: CPT

## 2023-05-04 PROCEDURE — 2580000003 HC RX 258: Performed by: STUDENT IN AN ORGANIZED HEALTH CARE EDUCATION/TRAINING PROGRAM

## 2023-05-04 PROCEDURE — 99232 SBSQ HOSP IP/OBS MODERATE 35: CPT | Performed by: PHYSICIAN ASSISTANT

## 2023-05-04 PROCEDURE — 6370000000 HC RX 637 (ALT 250 FOR IP): Performed by: PHYSICIAN ASSISTANT

## 2023-05-04 PROCEDURE — 82040 ASSAY OF SERUM ALBUMIN: CPT

## 2023-05-04 PROCEDURE — 2500000003 HC RX 250 WO HCPCS

## 2023-05-04 PROCEDURE — 1200000003 HC TELEMETRY R&B

## 2023-05-04 PROCEDURE — 88305 TISSUE EXAM BY PATHOLOGIST: CPT

## 2023-05-04 PROCEDURE — 97166 OT EVAL MOD COMPLEX 45 MIN: CPT

## 2023-05-04 PROCEDURE — 6370000000 HC RX 637 (ALT 250 FOR IP)

## 2023-05-04 PROCEDURE — 93005 ELECTROCARDIOGRAM TRACING: CPT | Performed by: PHYSICIAN ASSISTANT

## 2023-05-04 PROCEDURE — 71045 X-RAY EXAM CHEST 1 VIEW: CPT

## 2023-05-04 PROCEDURE — 36415 COLL VENOUS BLD VENIPUNCTURE: CPT

## 2023-05-04 PROCEDURE — 6360000002 HC RX W HCPCS

## 2023-05-04 PROCEDURE — 87075 CULTR BACTERIA EXCEPT BLOOD: CPT

## 2023-05-04 PROCEDURE — 93010 ELECTROCARDIOGRAM REPORT: CPT | Performed by: NUCLEAR MEDICINE

## 2023-05-04 PROCEDURE — 6370000000 HC RX 637 (ALT 250 FOR IP): Performed by: INTERNAL MEDICINE

## 2023-05-04 PROCEDURE — 84155 ASSAY OF PROTEIN SERUM: CPT

## 2023-05-04 PROCEDURE — 83615 LACTATE (LD) (LDH) ENZYME: CPT

## 2023-05-04 PROCEDURE — 80048 BASIC METABOLIC PNL TOTAL CA: CPT

## 2023-05-04 PROCEDURE — 89050 BODY FLUID CELL COUNT: CPT

## 2023-05-04 PROCEDURE — 88112 CYTOPATH CELL ENHANCE TECH: CPT

## 2023-05-04 PROCEDURE — 99233 SBSQ HOSP IP/OBS HIGH 50: CPT | Performed by: INTERNAL MEDICINE

## 2023-05-04 PROCEDURE — 6370000000 HC RX 637 (ALT 250 FOR IP): Performed by: PSYCHIATRY & NEUROLOGY

## 2023-05-04 PROCEDURE — 87070 CULTURE OTHR SPECIMN AEROBIC: CPT

## 2023-05-04 PROCEDURE — 82042 OTHER SOURCE ALBUMIN QUAN EA: CPT

## 2023-05-04 PROCEDURE — 85025 COMPLETE CBC W/AUTO DIFF WBC: CPT

## 2023-05-04 PROCEDURE — 1200000000 HC SEMI PRIVATE

## 2023-05-04 PROCEDURE — 87205 SMEAR GRAM STAIN: CPT

## 2023-05-04 PROCEDURE — 97530 THERAPEUTIC ACTIVITIES: CPT

## 2023-05-04 RX ORDER — CYCLOBENZAPRINE HCL 10 MG
10 TABLET ORAL ONCE
Status: COMPLETED | OUTPATIENT
Start: 2023-05-04 | End: 2023-05-04

## 2023-05-04 RX ORDER — MIDODRINE HYDROCHLORIDE 5 MG/1
5 TABLET ORAL
Status: DISCONTINUED | OUTPATIENT
Start: 2023-05-04 | End: 2023-05-05

## 2023-05-04 RX ADMIN — METOPROLOL TARTRATE 25 MG: 25 TABLET, FILM COATED ORAL at 17:10

## 2023-05-04 RX ADMIN — MORPHINE SULFATE 2 MG: 2 INJECTION, SOLUTION INTRAMUSCULAR; INTRAVENOUS at 21:54

## 2023-05-04 RX ADMIN — LEVOTHYROXINE SODIUM 50 MCG: 0.05 TABLET ORAL at 05:27

## 2023-05-04 RX ADMIN — SODIUM CHLORIDE, PRESERVATIVE FREE 10 ML: 5 INJECTION INTRAVENOUS at 09:28

## 2023-05-04 RX ADMIN — TROSPIUM CHLORIDE 20 MG: 20 TABLET, FILM COATED ORAL at 05:27

## 2023-05-04 RX ADMIN — MICONAZOLE NITRATE: 2 POWDER TOPICAL at 09:29

## 2023-05-04 RX ADMIN — MIDODRINE HYDROCHLORIDE 5 MG: 5 TABLET ORAL at 15:17

## 2023-05-04 RX ADMIN — CYCLOBENZAPRINE 10 MG: 10 TABLET, FILM COATED ORAL at 03:53

## 2023-05-04 RX ADMIN — SODIUM CHLORIDE, PRESERVATIVE FREE 10 ML: 5 INJECTION INTRAVENOUS at 20:47

## 2023-05-04 RX ADMIN — HYDROXYZINE HYDROCHLORIDE 10 MG: 10 TABLET ORAL at 09:27

## 2023-05-04 RX ADMIN — CARBAMAZEPINE 100 MG: 100 TABLET, CHEWABLE ORAL at 09:27

## 2023-05-04 RX ADMIN — MIDODRINE HYDROCHLORIDE 5 MG: 5 TABLET ORAL at 17:10

## 2023-05-04 RX ADMIN — TROSPIUM CHLORIDE 20 MG: 20 TABLET, FILM COATED ORAL at 15:20

## 2023-05-04 RX ADMIN — MORPHINE SULFATE 2 MG: 2 INJECTION, SOLUTION INTRAMUSCULAR; INTRAVENOUS at 05:35

## 2023-05-04 RX ADMIN — METOPROLOL TARTRATE 25 MG: 25 TABLET, FILM COATED ORAL at 23:51

## 2023-05-04 RX ADMIN — BUSPIRONE HYDROCHLORIDE 10 MG: 10 TABLET ORAL at 09:35

## 2023-05-04 RX ADMIN — CARBAMAZEPINE 100 MG: 100 TABLET, CHEWABLE ORAL at 20:46

## 2023-05-04 RX ADMIN — SODIUM CHLORIDE, PRESERVATIVE FREE 10 ML: 5 INJECTION INTRAVENOUS at 20:46

## 2023-05-04 RX ADMIN — BUSPIRONE HYDROCHLORIDE 10 MG: 10 TABLET ORAL at 20:46

## 2023-05-04 RX ADMIN — CARBAMAZEPINE 100 MG: 100 TABLET, CHEWABLE ORAL at 15:17

## 2023-05-04 RX ADMIN — SODIUM CHLORIDE, PRESERVATIVE FREE 10 ML: 5 INJECTION INTRAVENOUS at 09:29

## 2023-05-04 RX ADMIN — MICONAZOLE NITRATE: 2 POWDER TOPICAL at 20:46

## 2023-05-04 RX ADMIN — Medication 1000 UNITS: at 09:28

## 2023-05-04 RX ADMIN — METOPROLOL TARTRATE 25 MG: 25 TABLET, FILM COATED ORAL at 11:30

## 2023-05-04 RX ADMIN — MORPHINE SULFATE 4 MG: 4 INJECTION, SOLUTION INTRAMUSCULAR; INTRAVENOUS at 09:28

## 2023-05-04 RX ADMIN — SODIUM CHLORIDE, PRESERVATIVE FREE 10 ML: 5 INJECTION INTRAVENOUS at 05:34

## 2023-05-04 ASSESSMENT — PAIN SCALES - WONG BAKER
WONGBAKER_NUMERICALRESPONSE: 0

## 2023-05-04 ASSESSMENT — PAIN DESCRIPTION - ONSET
ONSET: ON-GOING
ONSET: ON-GOING

## 2023-05-04 ASSESSMENT — PAIN DESCRIPTION - ORIENTATION
ORIENTATION: LEFT
ORIENTATION: RIGHT;LEFT

## 2023-05-04 ASSESSMENT — PAIN DESCRIPTION - FREQUENCY
FREQUENCY: CONTINUOUS
FREQUENCY: CONTINUOUS

## 2023-05-04 ASSESSMENT — PAIN DESCRIPTION - LOCATION
LOCATION: LEG
LOCATION: ARM
LOCATION: ARM

## 2023-05-04 ASSESSMENT — PAIN DESCRIPTION - DESCRIPTORS
DESCRIPTORS: ACHING
DESCRIPTORS: ACHING
DESCRIPTORS: BURNING;ACHING

## 2023-05-04 ASSESSMENT — PAIN SCALES - GENERAL
PAINLEVEL_OUTOF10: 3
PAINLEVEL_OUTOF10: 7
PAINLEVEL_OUTOF10: 5
PAINLEVEL_OUTOF10: 6
PAINLEVEL_OUTOF10: 0
PAINLEVEL_OUTOF10: 10
PAINLEVEL_OUTOF10: 9

## 2023-05-04 ASSESSMENT — PAIN DESCRIPTION - PAIN TYPE
TYPE: ACUTE PAIN
TYPE: ACUTE PAIN

## 2023-05-04 ASSESSMENT — PAIN - FUNCTIONAL ASSESSMENT
PAIN_FUNCTIONAL_ASSESSMENT: PREVENTS OR INTERFERES SOME ACTIVE ACTIVITIES AND ADLS

## 2023-05-05 ENCOUNTER — APPOINTMENT (OUTPATIENT)
Dept: CT IMAGING | Age: 73
DRG: 186 | End: 2023-05-05
Payer: MEDICARE

## 2023-05-05 PROCEDURE — 1200000000 HC SEMI PRIVATE

## 2023-05-05 PROCEDURE — 2580000003 HC RX 258

## 2023-05-05 PROCEDURE — 6370000000 HC RX 637 (ALT 250 FOR IP): Performed by: PHYSICIAN ASSISTANT

## 2023-05-05 PROCEDURE — 71260 CT THORAX DX C+: CPT

## 2023-05-05 PROCEDURE — 99222 1ST HOSP IP/OBS MODERATE 55: CPT | Performed by: HOSPITALIST

## 2023-05-05 PROCEDURE — 2580000003 HC RX 258: Performed by: STUDENT IN AN ORGANIZED HEALTH CARE EDUCATION/TRAINING PROGRAM

## 2023-05-05 PROCEDURE — 6370000000 HC RX 637 (ALT 250 FOR IP): Performed by: PSYCHIATRY & NEUROLOGY

## 2023-05-05 PROCEDURE — 99232 SBSQ HOSP IP/OBS MODERATE 35: CPT | Performed by: INTERNAL MEDICINE

## 2023-05-05 PROCEDURE — 6370000000 HC RX 637 (ALT 250 FOR IP)

## 2023-05-05 PROCEDURE — 6360000002 HC RX W HCPCS

## 2023-05-05 PROCEDURE — 6360000004 HC RX CONTRAST MEDICATION: Performed by: INTERNAL MEDICINE

## 2023-05-05 PROCEDURE — 1200000003 HC TELEMETRY R&B

## 2023-05-05 RX ORDER — MIDODRINE HYDROCHLORIDE 10 MG/1
10 TABLET ORAL
Status: DISCONTINUED | OUTPATIENT
Start: 2023-05-05 | End: 2023-05-14 | Stop reason: HOSPADM

## 2023-05-05 RX ADMIN — MIDODRINE HYDROCHLORIDE 10 MG: 10 TABLET ORAL at 11:57

## 2023-05-05 RX ADMIN — MICONAZOLE NITRATE: 2 POWDER TOPICAL at 08:38

## 2023-05-05 RX ADMIN — METOPROLOL TARTRATE 25 MG: 25 TABLET, FILM COATED ORAL at 17:14

## 2023-05-05 RX ADMIN — TROSPIUM CHLORIDE 20 MG: 20 TABLET, FILM COATED ORAL at 15:41

## 2023-05-05 RX ADMIN — CARBAMAZEPINE 100 MG: 100 TABLET, CHEWABLE ORAL at 08:37

## 2023-05-05 RX ADMIN — IOPAMIDOL 80 ML: 755 INJECTION, SOLUTION INTRAVENOUS at 15:06

## 2023-05-05 RX ADMIN — MIDODRINE HYDROCHLORIDE 10 MG: 10 TABLET ORAL at 17:14

## 2023-05-05 RX ADMIN — SODIUM CHLORIDE, PRESERVATIVE FREE 10 ML: 5 INJECTION INTRAVENOUS at 08:38

## 2023-05-05 RX ADMIN — METOPROLOL TARTRATE 25 MG: 25 TABLET, FILM COATED ORAL at 11:57

## 2023-05-05 RX ADMIN — CARBAMAZEPINE 100 MG: 100 TABLET, CHEWABLE ORAL at 15:41

## 2023-05-05 RX ADMIN — CARBAMAZEPINE 100 MG: 100 TABLET, CHEWABLE ORAL at 20:17

## 2023-05-05 RX ADMIN — Medication 1000 UNITS: at 08:37

## 2023-05-05 RX ADMIN — MORPHINE SULFATE 4 MG: 4 INJECTION, SOLUTION INTRAMUSCULAR; INTRAVENOUS at 15:44

## 2023-05-05 RX ADMIN — LEVOTHYROXINE SODIUM 50 MCG: 0.05 TABLET ORAL at 06:00

## 2023-05-05 RX ADMIN — METOPROLOL TARTRATE 25 MG: 25 TABLET, FILM COATED ORAL at 23:58

## 2023-05-05 RX ADMIN — BUSPIRONE HYDROCHLORIDE 10 MG: 10 TABLET ORAL at 08:37

## 2023-05-05 RX ADMIN — MICONAZOLE NITRATE: 2 POWDER TOPICAL at 20:17

## 2023-05-05 RX ADMIN — TROSPIUM CHLORIDE 20 MG: 20 TABLET, FILM COATED ORAL at 06:00

## 2023-05-05 RX ADMIN — SODIUM CHLORIDE, PRESERVATIVE FREE 10 ML: 5 INJECTION INTRAVENOUS at 20:16

## 2023-05-05 RX ADMIN — BUSPIRONE HYDROCHLORIDE 10 MG: 10 TABLET ORAL at 20:17

## 2023-05-05 RX ADMIN — MIDODRINE HYDROCHLORIDE 10 MG: 10 TABLET ORAL at 08:37

## 2023-05-05 ASSESSMENT — PAIN SCALES - WONG BAKER
WONGBAKER_NUMERICALRESPONSE: 0

## 2023-05-05 ASSESSMENT — PAIN SCALES - GENERAL
PAINLEVEL_OUTOF10: 0
PAINLEVEL_OUTOF10: 8
PAINLEVEL_OUTOF10: 0
PAINLEVEL_OUTOF10: 0

## 2023-05-05 ASSESSMENT — PAIN - FUNCTIONAL ASSESSMENT: PAIN_FUNCTIONAL_ASSESSMENT: PREVENTS OR INTERFERES WITH ALL ACTIVE AND SOME PASSIVE ACTIVITIES

## 2023-05-05 ASSESSMENT — PAIN DESCRIPTION - FREQUENCY: FREQUENCY: INTERMITTENT

## 2023-05-05 ASSESSMENT — PAIN DESCRIPTION - LOCATION: LOCATION: ARM

## 2023-05-05 ASSESSMENT — PAIN DESCRIPTION - ORIENTATION: ORIENTATION: LEFT

## 2023-05-05 ASSESSMENT — PAIN DESCRIPTION - DESCRIPTORS: DESCRIPTORS: DULL;ACHING

## 2023-05-05 ASSESSMENT — PAIN DESCRIPTION - ONSET: ONSET: ON-GOING

## 2023-05-05 ASSESSMENT — PAIN DESCRIPTION - PAIN TYPE: TYPE: ACUTE PAIN

## 2023-05-06 LAB
ALBUMIN SERPL BCG-MCNC: 2.6 G/DL (ref 3.5–5.1)
ALP SERPL-CCNC: 364 U/L (ref 38–126)
ALT SERPL W/O P-5'-P-CCNC: 26 U/L (ref 11–66)
ANION GAP SERPL CALC-SCNC: 11 MEQ/L (ref 8–16)
AST SERPL-CCNC: 36 U/L (ref 5–40)
BILIRUB SERPL-MCNC: 0.7 MG/DL (ref 0.3–1.2)
BUN SERPL-MCNC: 20 MG/DL (ref 7–22)
CALCIUM SERPL-MCNC: 8.8 MG/DL (ref 8.5–10.5)
CHLORIDE SERPL-SCNC: 104 MEQ/L (ref 98–111)
CO2 SERPL-SCNC: 24 MEQ/L (ref 23–33)
CREAT SERPL-MCNC: 0.3 MG/DL (ref 0.4–1.2)
DEPRECATED RDW RBC AUTO: 74.7 FL (ref 35–45)
ERYTHROCYTE [DISTWIDTH] IN BLOOD BY AUTOMATED COUNT: 19.9 % (ref 11.5–14.5)
GFR SERPL CREATININE-BSD FRML MDRD: > 60 ML/MIN/1.73M2
GLUCOSE SERPL-MCNC: 85 MG/DL (ref 70–108)
HCT VFR BLD AUTO: 36.8 % (ref 37–47)
HGB BLD-MCNC: 11.4 GM/DL (ref 12–16)
MCH RBC QN AUTO: 31.8 PG (ref 26–33)
MCHC RBC AUTO-ENTMCNC: 31 GM/DL (ref 32.2–35.5)
MCV RBC AUTO: 102.8 FL (ref 81–99)
PLATELET # BLD AUTO: 320 THOU/MM3 (ref 130–400)
PMV BLD AUTO: 10.2 FL (ref 9.4–12.4)
POTASSIUM SERPL-SCNC: 4.3 MEQ/L (ref 3.5–5.2)
PROT SERPL-MCNC: 6.4 G/DL (ref 6.1–8)
RBC # BLD AUTO: 3.58 MILL/MM3 (ref 4.2–5.4)
SODIUM SERPL-SCNC: 139 MEQ/L (ref 135–145)
WBC # BLD AUTO: 9.1 THOU/MM3 (ref 4.8–10.8)

## 2023-05-06 PROCEDURE — 1200000003 HC TELEMETRY R&B

## 2023-05-06 PROCEDURE — 36415 COLL VENOUS BLD VENIPUNCTURE: CPT

## 2023-05-06 PROCEDURE — 85027 COMPLETE CBC AUTOMATED: CPT

## 2023-05-06 PROCEDURE — 80053 COMPREHEN METABOLIC PANEL: CPT

## 2023-05-06 PROCEDURE — 1200000000 HC SEMI PRIVATE

## 2023-05-06 PROCEDURE — 6370000000 HC RX 637 (ALT 250 FOR IP): Performed by: HOSPITALIST

## 2023-05-06 PROCEDURE — 2580000003 HC RX 258

## 2023-05-06 PROCEDURE — 6370000000 HC RX 637 (ALT 250 FOR IP): Performed by: PSYCHIATRY & NEUROLOGY

## 2023-05-06 PROCEDURE — 6360000002 HC RX W HCPCS

## 2023-05-06 PROCEDURE — 6370000000 HC RX 637 (ALT 250 FOR IP): Performed by: PHYSICIAN ASSISTANT

## 2023-05-06 PROCEDURE — 99222 1ST HOSP IP/OBS MODERATE 55: CPT | Performed by: HOSPITALIST

## 2023-05-06 PROCEDURE — 6370000000 HC RX 637 (ALT 250 FOR IP)

## 2023-05-06 RX ORDER — DOCUSATE SODIUM 50 MG/5ML
100 LIQUID ORAL 2 TIMES DAILY PRN
Status: DISCONTINUED | OUTPATIENT
Start: 2023-05-06 | End: 2023-05-08

## 2023-05-06 RX ADMIN — MICONAZOLE NITRATE: 2 POWDER TOPICAL at 08:30

## 2023-05-06 RX ADMIN — TROSPIUM CHLORIDE 20 MG: 20 TABLET, FILM COATED ORAL at 15:34

## 2023-05-06 RX ADMIN — ACETAMINOPHEN 650 MG: 325 TABLET ORAL at 08:34

## 2023-05-06 RX ADMIN — METOPROLOL TARTRATE 25 MG: 25 TABLET, FILM COATED ORAL at 17:52

## 2023-05-06 RX ADMIN — MIDODRINE HYDROCHLORIDE 10 MG: 10 TABLET ORAL at 08:29

## 2023-05-06 RX ADMIN — MIDODRINE HYDROCHLORIDE 10 MG: 10 TABLET ORAL at 12:18

## 2023-05-06 RX ADMIN — CARBAMAZEPINE 100 MG: 100 TABLET, CHEWABLE ORAL at 08:30

## 2023-05-06 RX ADMIN — MORPHINE SULFATE 4 MG: 4 INJECTION, SOLUTION INTRAMUSCULAR; INTRAVENOUS at 20:20

## 2023-05-06 RX ADMIN — BUSPIRONE HYDROCHLORIDE 10 MG: 10 TABLET ORAL at 08:29

## 2023-05-06 RX ADMIN — DOCUSATE SODIUM 100 MG: 50 LIQUID ORAL at 15:29

## 2023-05-06 RX ADMIN — CARBAMAZEPINE 100 MG: 100 TABLET, CHEWABLE ORAL at 20:26

## 2023-05-06 RX ADMIN — MIDODRINE HYDROCHLORIDE 10 MG: 10 TABLET ORAL at 15:34

## 2023-05-06 RX ADMIN — Medication 1000 UNITS: at 08:30

## 2023-05-06 RX ADMIN — HYDROXYZINE HYDROCHLORIDE 10 MG: 10 TABLET ORAL at 08:29

## 2023-05-06 RX ADMIN — SODIUM CHLORIDE, PRESERVATIVE FREE 10 ML: 5 INJECTION INTRAVENOUS at 20:25

## 2023-05-06 RX ADMIN — ONDANSETRON 4 MG: 2 INJECTION INTRAMUSCULAR; INTRAVENOUS at 20:20

## 2023-05-06 RX ADMIN — MICONAZOLE NITRATE: 2 POWDER TOPICAL at 20:25

## 2023-05-06 RX ADMIN — CARBAMAZEPINE 100 MG: 100 TABLET, CHEWABLE ORAL at 15:34

## 2023-05-06 RX ADMIN — TROSPIUM CHLORIDE 20 MG: 20 TABLET, FILM COATED ORAL at 06:27

## 2023-05-06 RX ADMIN — SODIUM CHLORIDE, PRESERVATIVE FREE 10 ML: 5 INJECTION INTRAVENOUS at 08:30

## 2023-05-06 RX ADMIN — BUSPIRONE HYDROCHLORIDE 10 MG: 10 TABLET ORAL at 20:26

## 2023-05-06 RX ADMIN — LEVOTHYROXINE SODIUM 50 MCG: 0.05 TABLET ORAL at 06:27

## 2023-05-06 ASSESSMENT — PAIN DESCRIPTION - LOCATION: LOCATION: ARM

## 2023-05-06 ASSESSMENT — PAIN DESCRIPTION - ORIENTATION: ORIENTATION: LEFT

## 2023-05-06 ASSESSMENT — PAIN DESCRIPTION - DESCRIPTORS: DESCRIPTORS: ACHING

## 2023-05-06 ASSESSMENT — PAIN SCALES - WONG BAKER
WONGBAKER_NUMERICALRESPONSE: 0

## 2023-05-06 ASSESSMENT — PAIN - FUNCTIONAL ASSESSMENT: PAIN_FUNCTIONAL_ASSESSMENT: ACTIVITIES ARE NOT PREVENTED

## 2023-05-06 ASSESSMENT — PAIN SCALES - GENERAL: PAINLEVEL_OUTOF10: 3

## 2023-05-07 LAB
ALBUMIN SERPL BCG-MCNC: 2.6 G/DL (ref 3.5–5.1)
ALP SERPL-CCNC: 351 U/L (ref 38–126)
ALT SERPL W/O P-5'-P-CCNC: 24 U/L (ref 11–66)
ANION GAP SERPL CALC-SCNC: 8 MEQ/L (ref 8–16)
AST SERPL-CCNC: 36 U/L (ref 5–40)
BILIRUB SERPL-MCNC: 0.7 MG/DL (ref 0.3–1.2)
BUN SERPL-MCNC: 17 MG/DL (ref 7–22)
CALCIUM SERPL-MCNC: 8.5 MG/DL (ref 8.5–10.5)
CHLORIDE SERPL-SCNC: 107 MEQ/L (ref 98–111)
CO2 SERPL-SCNC: 25 MEQ/L (ref 23–33)
CREAT SERPL-MCNC: 0.3 MG/DL (ref 0.4–1.2)
DEPRECATED RDW RBC AUTO: 69.4 FL (ref 35–45)
ERYTHROCYTE [DISTWIDTH] IN BLOOD BY AUTOMATED COUNT: 19.6 % (ref 11.5–14.5)
GFR SERPL CREATININE-BSD FRML MDRD: > 60 ML/MIN/1.73M2
GLUCOSE SERPL-MCNC: 84 MG/DL (ref 70–108)
HCT VFR BLD AUTO: 35.9 % (ref 37–47)
HGB BLD-MCNC: 11.9 GM/DL (ref 12–16)
MCH RBC QN AUTO: 32.4 PG (ref 26–33)
MCHC RBC AUTO-ENTMCNC: 33.1 GM/DL (ref 32.2–35.5)
MCV RBC AUTO: 97.8 FL (ref 81–99)
PLATELET # BLD AUTO: 251 THOU/MM3 (ref 130–400)
PMV BLD AUTO: 10.1 FL (ref 9.4–12.4)
POTASSIUM SERPL-SCNC: 4.5 MEQ/L (ref 3.5–5.2)
PROT SERPL-MCNC: 6.6 G/DL (ref 6.1–8)
RBC # BLD AUTO: 3.67 MILL/MM3 (ref 4.2–5.4)
SODIUM SERPL-SCNC: 140 MEQ/L (ref 135–145)
WBC # BLD AUTO: 8.2 THOU/MM3 (ref 4.8–10.8)

## 2023-05-07 PROCEDURE — 99222 1ST HOSP IP/OBS MODERATE 55: CPT | Performed by: HOSPITALIST

## 2023-05-07 PROCEDURE — 6370000000 HC RX 637 (ALT 250 FOR IP): Performed by: HOSPITALIST

## 2023-05-07 PROCEDURE — 6370000000 HC RX 637 (ALT 250 FOR IP)

## 2023-05-07 PROCEDURE — 6360000002 HC RX W HCPCS

## 2023-05-07 PROCEDURE — 2580000003 HC RX 258

## 2023-05-07 PROCEDURE — 99232 SBSQ HOSP IP/OBS MODERATE 35: CPT | Performed by: NURSE PRACTITIONER

## 2023-05-07 PROCEDURE — 36415 COLL VENOUS BLD VENIPUNCTURE: CPT

## 2023-05-07 PROCEDURE — 80053 COMPREHEN METABOLIC PANEL: CPT

## 2023-05-07 PROCEDURE — 6370000000 HC RX 637 (ALT 250 FOR IP): Performed by: PHYSICIAN ASSISTANT

## 2023-05-07 PROCEDURE — 6370000000 HC RX 637 (ALT 250 FOR IP): Performed by: PSYCHIATRY & NEUROLOGY

## 2023-05-07 PROCEDURE — 1200000003 HC TELEMETRY R&B

## 2023-05-07 PROCEDURE — 85027 COMPLETE CBC AUTOMATED: CPT

## 2023-05-07 PROCEDURE — 1200000000 HC SEMI PRIVATE

## 2023-05-07 RX ADMIN — MIDODRINE HYDROCHLORIDE 10 MG: 10 TABLET ORAL at 12:19

## 2023-05-07 RX ADMIN — DOCUSATE SODIUM 100 MG: 50 LIQUID ORAL at 10:43

## 2023-05-07 RX ADMIN — METOPROLOL TARTRATE 25 MG: 25 TABLET, FILM COATED ORAL at 12:19

## 2023-05-07 RX ADMIN — MIDODRINE HYDROCHLORIDE 10 MG: 10 TABLET ORAL at 09:01

## 2023-05-07 RX ADMIN — TROSPIUM CHLORIDE 20 MG: 20 TABLET, FILM COATED ORAL at 16:09

## 2023-05-07 RX ADMIN — BUSPIRONE HYDROCHLORIDE 10 MG: 10 TABLET ORAL at 20:50

## 2023-05-07 RX ADMIN — CARBAMAZEPINE 100 MG: 100 TABLET, CHEWABLE ORAL at 09:00

## 2023-05-07 RX ADMIN — METOPROLOL TARTRATE 25 MG: 25 TABLET, FILM COATED ORAL at 23:37

## 2023-05-07 RX ADMIN — MORPHINE SULFATE 2 MG: 2 INJECTION, SOLUTION INTRAMUSCULAR; INTRAVENOUS at 10:31

## 2023-05-07 RX ADMIN — MICONAZOLE NITRATE: 2 POWDER TOPICAL at 20:57

## 2023-05-07 RX ADMIN — LEVOTHYROXINE SODIUM 50 MCG: 0.05 TABLET ORAL at 06:46

## 2023-05-07 RX ADMIN — SODIUM CHLORIDE, PRESERVATIVE FREE 10 ML: 5 INJECTION INTRAVENOUS at 20:50

## 2023-05-07 RX ADMIN — Medication 1000 UNITS: at 09:01

## 2023-05-07 RX ADMIN — METOPROLOL TARTRATE 25 MG: 25 TABLET, FILM COATED ORAL at 00:14

## 2023-05-07 RX ADMIN — HYDROXYZINE HYDROCHLORIDE 10 MG: 10 TABLET ORAL at 09:01

## 2023-05-07 RX ADMIN — SODIUM CHLORIDE, PRESERVATIVE FREE 10 ML: 5 INJECTION INTRAVENOUS at 09:01

## 2023-05-07 RX ADMIN — MIDODRINE HYDROCHLORIDE 10 MG: 10 TABLET ORAL at 16:09

## 2023-05-07 RX ADMIN — BUSPIRONE HYDROCHLORIDE 10 MG: 10 TABLET ORAL at 09:00

## 2023-05-07 RX ADMIN — CARBAMAZEPINE 100 MG: 100 TABLET, CHEWABLE ORAL at 20:50

## 2023-05-07 RX ADMIN — CARBAMAZEPINE 100 MG: 100 TABLET, CHEWABLE ORAL at 16:09

## 2023-05-07 RX ADMIN — TROSPIUM CHLORIDE 20 MG: 20 TABLET, FILM COATED ORAL at 06:46

## 2023-05-07 RX ADMIN — METOPROLOL TARTRATE 25 MG: 25 TABLET, FILM COATED ORAL at 16:09

## 2023-05-07 ASSESSMENT — PAIN DESCRIPTION - ORIENTATION
ORIENTATION: LEFT

## 2023-05-07 ASSESSMENT — PAIN DESCRIPTION - DESCRIPTORS
DESCRIPTORS: ACHING

## 2023-05-07 ASSESSMENT — PAIN SCALES - GENERAL
PAINLEVEL_OUTOF10: 3
PAINLEVEL_OUTOF10: 6
PAINLEVEL_OUTOF10: 5
PAINLEVEL_OUTOF10: 4

## 2023-05-07 ASSESSMENT — PAIN - FUNCTIONAL ASSESSMENT
PAIN_FUNCTIONAL_ASSESSMENT: PREVENTS OR INTERFERES SOME ACTIVE ACTIVITIES AND ADLS
PAIN_FUNCTIONAL_ASSESSMENT: PREVENTS OR INTERFERES WITH MANY ACTIVE NOT PASSIVE ACTIVITIES

## 2023-05-07 ASSESSMENT — PAIN DESCRIPTION - LOCATION
LOCATION: ARM

## 2023-05-07 ASSESSMENT — PAIN SCALES - WONG BAKER
WONGBAKER_NUMERICALRESPONSE: 2
WONGBAKER_NUMERICALRESPONSE: 0
WONGBAKER_NUMERICALRESPONSE: 0

## 2023-05-07 ASSESSMENT — PAIN DESCRIPTION - PAIN TYPE: TYPE: ACUTE PAIN

## 2023-05-07 ASSESSMENT — PAIN DESCRIPTION - FREQUENCY: FREQUENCY: INTERMITTENT

## 2023-05-07 ASSESSMENT — PAIN DESCRIPTION - ONSET: ONSET: ON-GOING

## 2023-05-08 ENCOUNTER — APPOINTMENT (OUTPATIENT)
Dept: GENERAL RADIOLOGY | Age: 73
DRG: 186 | End: 2023-05-08
Payer: MEDICARE

## 2023-05-08 ENCOUNTER — APPOINTMENT (OUTPATIENT)
Dept: CT IMAGING | Age: 73
DRG: 186 | End: 2023-05-08
Payer: MEDICARE

## 2023-05-08 PROCEDURE — 6370000000 HC RX 637 (ALT 250 FOR IP)

## 2023-05-08 PROCEDURE — 2580000003 HC RX 258

## 2023-05-08 PROCEDURE — 6370000000 HC RX 637 (ALT 250 FOR IP): Performed by: PSYCHIATRY & NEUROLOGY

## 2023-05-08 PROCEDURE — 97140 MANUAL THERAPY 1/> REGIONS: CPT

## 2023-05-08 PROCEDURE — C1769 GUIDE WIRE: HCPCS

## 2023-05-08 PROCEDURE — 71045 X-RAY EXAM CHEST 1 VIEW: CPT

## 2023-05-08 PROCEDURE — 1200000003 HC TELEMETRY R&B

## 2023-05-08 PROCEDURE — 99232 SBSQ HOSP IP/OBS MODERATE 35: CPT | Performed by: INTERNAL MEDICINE

## 2023-05-08 PROCEDURE — 6360000002 HC RX W HCPCS

## 2023-05-08 PROCEDURE — 77012 CT SCAN FOR NEEDLE BIOPSY: CPT

## 2023-05-08 PROCEDURE — 6360000002 HC RX W HCPCS: Performed by: RADIOLOGY

## 2023-05-08 PROCEDURE — 99232 SBSQ HOSP IP/OBS MODERATE 35: CPT

## 2023-05-08 PROCEDURE — 6370000000 HC RX 637 (ALT 250 FOR IP): Performed by: PHYSICIAN ASSISTANT

## 2023-05-08 PROCEDURE — 97110 THERAPEUTIC EXERCISES: CPT

## 2023-05-08 PROCEDURE — 1200000000 HC SEMI PRIVATE

## 2023-05-08 PROCEDURE — APPSS60 APP SPLIT SHARED TIME 46-60 MINUTES: Performed by: PHYSICIAN ASSISTANT

## 2023-05-08 RX ORDER — SODIUM PHOSPHATE,MONO-DIBASIC 19G-7G/118
1 ENEMA (ML) RECTAL ONCE
Status: COMPLETED | OUTPATIENT
Start: 2023-05-09 | End: 2023-05-09

## 2023-05-08 RX ORDER — MULTIVITAMIN WITH IRON
1 TABLET ORAL DAILY
Status: DISCONTINUED | OUTPATIENT
Start: 2023-05-09 | End: 2023-05-14 | Stop reason: HOSPADM

## 2023-05-08 RX ORDER — FENTANYL CITRATE 50 UG/ML
INJECTION, SOLUTION INTRAMUSCULAR; INTRAVENOUS PRN
Status: COMPLETED | OUTPATIENT
Start: 2023-05-08 | End: 2023-05-08

## 2023-05-08 RX ORDER — DOCUSATE SODIUM 50 MG/5ML
100 LIQUID ORAL 2 TIMES DAILY
Status: DISCONTINUED | OUTPATIENT
Start: 2023-05-09 | End: 2023-05-14 | Stop reason: HOSPADM

## 2023-05-08 RX ORDER — LACTULOSE 10 G/15ML
20 SOLUTION ORAL 3 TIMES DAILY
Status: DISCONTINUED | OUTPATIENT
Start: 2023-05-09 | End: 2023-05-14 | Stop reason: HOSPADM

## 2023-05-08 RX ADMIN — LEVOTHYROXINE SODIUM 50 MCG: 0.05 TABLET ORAL at 06:16

## 2023-05-08 RX ADMIN — CARBAMAZEPINE 100 MG: 100 TABLET, CHEWABLE ORAL at 20:17

## 2023-05-08 RX ADMIN — SODIUM CHLORIDE, PRESERVATIVE FREE 10 ML: 5 INJECTION INTRAVENOUS at 20:17

## 2023-05-08 RX ADMIN — Medication 1000 UNITS: at 09:19

## 2023-05-08 RX ADMIN — TROSPIUM CHLORIDE 20 MG: 20 TABLET, FILM COATED ORAL at 06:16

## 2023-05-08 RX ADMIN — MORPHINE SULFATE 4 MG: 4 INJECTION, SOLUTION INTRAMUSCULAR; INTRAVENOUS at 11:05

## 2023-05-08 RX ADMIN — METOPROLOL TARTRATE 25 MG: 25 TABLET, FILM COATED ORAL at 11:07

## 2023-05-08 RX ADMIN — METOPROLOL TARTRATE 25 MG: 25 TABLET, FILM COATED ORAL at 06:15

## 2023-05-08 RX ADMIN — CARBAMAZEPINE 100 MG: 100 TABLET, CHEWABLE ORAL at 13:26

## 2023-05-08 RX ADMIN — MIDODRINE HYDROCHLORIDE 10 MG: 10 TABLET ORAL at 11:07

## 2023-05-08 RX ADMIN — BUSPIRONE HYDROCHLORIDE 10 MG: 10 TABLET ORAL at 20:17

## 2023-05-08 RX ADMIN — MICONAZOLE NITRATE: 2 POWDER TOPICAL at 20:16

## 2023-05-08 RX ADMIN — MIDODRINE HYDROCHLORIDE 10 MG: 10 TABLET ORAL at 17:08

## 2023-05-08 RX ADMIN — CARBAMAZEPINE 100 MG: 100 TABLET, CHEWABLE ORAL at 09:19

## 2023-05-08 RX ADMIN — BUSPIRONE HYDROCHLORIDE 10 MG: 10 TABLET ORAL at 09:20

## 2023-05-08 RX ADMIN — SODIUM CHLORIDE, PRESERVATIVE FREE 10 ML: 5 INJECTION INTRAVENOUS at 09:19

## 2023-05-08 RX ADMIN — FENTANYL CITRATE 25 MCG: 50 INJECTION, SOLUTION INTRAMUSCULAR; INTRAVENOUS at 16:07

## 2023-05-08 RX ADMIN — TROSPIUM CHLORIDE 20 MG: 20 TABLET, FILM COATED ORAL at 17:08

## 2023-05-08 RX ADMIN — MIDODRINE HYDROCHLORIDE 10 MG: 10 TABLET ORAL at 09:19

## 2023-05-08 RX ADMIN — MICONAZOLE NITRATE: 2 POWDER TOPICAL at 09:21

## 2023-05-08 RX ADMIN — METOPROLOL TARTRATE 25 MG: 25 TABLET, FILM COATED ORAL at 17:08

## 2023-05-08 ASSESSMENT — PAIN DESCRIPTION - LOCATION
LOCATION: ARM
LOCATION: ARM

## 2023-05-08 ASSESSMENT — PAIN - FUNCTIONAL ASSESSMENT: PAIN_FUNCTIONAL_ASSESSMENT: ACTIVITIES ARE NOT PREVENTED

## 2023-05-08 ASSESSMENT — PAIN SCALES - GENERAL
PAINLEVEL_OUTOF10: 0
PAINLEVEL_OUTOF10: 10
PAINLEVEL_OUTOF10: 8

## 2023-05-08 ASSESSMENT — PAIN DESCRIPTION - DESCRIPTORS
DESCRIPTORS: ACHING
DESCRIPTORS: ACHING

## 2023-05-08 ASSESSMENT — PAIN DESCRIPTION - ORIENTATION
ORIENTATION: LEFT
ORIENTATION: LEFT

## 2023-05-09 ENCOUNTER — APPOINTMENT (OUTPATIENT)
Dept: GENERAL RADIOLOGY | Age: 73
DRG: 186 | End: 2023-05-09
Payer: MEDICARE

## 2023-05-09 PROBLEM — S20.212A HEMATOMA OF CHEST WALL, LEFT, INITIAL ENCOUNTER: Status: ACTIVE | Noted: 2023-05-09

## 2023-05-09 LAB
ANION GAP SERPL CALC-SCNC: 10 MEQ/L (ref 8–16)
ANISOCYTOSIS BLD QL SMEAR: PRESENT
BACTERIA SPEC ANAEROBE CULT: NORMAL
BACTERIA SPEC BFLD CULT: NORMAL
BASOPHILS ABSOLUTE: 0.1 THOU/MM3 (ref 0–0.1)
BASOPHILS NFR BLD AUTO: 1.1 %
BUN SERPL-MCNC: 17 MG/DL (ref 7–22)
CALCIUM SERPL-MCNC: 8.5 MG/DL (ref 8.5–10.5)
CHLORIDE SERPL-SCNC: 103 MEQ/L (ref 98–111)
CO2 SERPL-SCNC: 24 MEQ/L (ref 23–33)
CREAT SERPL-MCNC: 0.3 MG/DL (ref 0.4–1.2)
DEPRECATED RDW RBC AUTO: 71.7 FL (ref 35–45)
EOSINOPHIL NFR BLD AUTO: 3.2 %
EOSINOPHILS ABSOLUTE: 0.2 THOU/MM3 (ref 0–0.4)
ERYTHROCYTE [DISTWIDTH] IN BLOOD BY AUTOMATED COUNT: 19 % (ref 11.5–14.5)
GFR SERPL CREATININE-BSD FRML MDRD: > 60 ML/MIN/1.73M2
GLUCOSE SERPL-MCNC: 83 MG/DL (ref 70–108)
GRAM STN SPEC: NORMAL
HCT VFR BLD AUTO: 40.3 % (ref 37–47)
HGB BLD-MCNC: 12.5 GM/DL (ref 12–16)
IMM GRANULOCYTES # BLD AUTO: 0.07 THOU/MM3 (ref 0–0.07)
IMM GRANULOCYTES NFR BLD AUTO: 0.9 %
LYMPHOCYTES ABSOLUTE: 1.8 THOU/MM3 (ref 1–4.8)
LYMPHOCYTES NFR BLD AUTO: 24 %
MCH RBC QN AUTO: 32.1 PG (ref 26–33)
MCHC RBC AUTO-ENTMCNC: 31 GM/DL (ref 32.2–35.5)
MCV RBC AUTO: 103.6 FL (ref 81–99)
MONOCYTES ABSOLUTE: 0.6 THOU/MM3 (ref 0.4–1.3)
MONOCYTES NFR BLD AUTO: 8.2 %
NEUTROPHILS NFR BLD AUTO: 62.6 %
NRBC BLD AUTO-RTO: 0 /100 WBC
PLATELET # BLD AUTO: 267 THOU/MM3 (ref 130–400)
PMV BLD AUTO: 10.2 FL (ref 9.4–12.4)
POTASSIUM SERPL-SCNC: 4.5 MEQ/L (ref 3.5–5.2)
RBC # BLD AUTO: 3.89 MILL/MM3 (ref 4.2–5.4)
SEGMENTED NEUTROPHILS ABSOLUTE COUNT: 4.7 THOU/MM3 (ref 1.8–7.7)
SODIUM SERPL-SCNC: 137 MEQ/L (ref 135–145)
WBC # BLD AUTO: 7.5 THOU/MM3 (ref 4.8–10.8)

## 2023-05-09 PROCEDURE — 71045 X-RAY EXAM CHEST 1 VIEW: CPT

## 2023-05-09 PROCEDURE — 99232 SBSQ HOSP IP/OBS MODERATE 35: CPT | Performed by: INTERNAL MEDICINE

## 2023-05-09 PROCEDURE — 6360000002 HC RX W HCPCS: Performed by: PHYSICIAN ASSISTANT

## 2023-05-09 PROCEDURE — 6370000000 HC RX 637 (ALT 250 FOR IP)

## 2023-05-09 PROCEDURE — 85025 COMPLETE CBC W/AUTO DIFF WBC: CPT

## 2023-05-09 PROCEDURE — 6360000002 HC RX W HCPCS

## 2023-05-09 PROCEDURE — 97110 THERAPEUTIC EXERCISES: CPT

## 2023-05-09 PROCEDURE — APPSS30 APP SPLIT SHARED TIME 16-30 MINUTES: Performed by: PHYSICIAN ASSISTANT

## 2023-05-09 PROCEDURE — 2580000003 HC RX 258: Performed by: PHYSICIAN ASSISTANT

## 2023-05-09 PROCEDURE — 80048 BASIC METABOLIC PNL TOTAL CA: CPT

## 2023-05-09 PROCEDURE — 97140 MANUAL THERAPY 1/> REGIONS: CPT

## 2023-05-09 PROCEDURE — 32561 LYSE CHEST FIBRIN INIT DAY: CPT | Performed by: PHYSICIAN ASSISTANT

## 2023-05-09 PROCEDURE — 74018 RADEX ABDOMEN 1 VIEW: CPT

## 2023-05-09 PROCEDURE — 6370000000 HC RX 637 (ALT 250 FOR IP): Performed by: PHYSICIAN ASSISTANT

## 2023-05-09 PROCEDURE — 1200000000 HC SEMI PRIVATE

## 2023-05-09 PROCEDURE — 93010 ELECTROCARDIOGRAM REPORT: CPT | Performed by: INTERNAL MEDICINE

## 2023-05-09 PROCEDURE — 99232 SBSQ HOSP IP/OBS MODERATE 35: CPT

## 2023-05-09 PROCEDURE — 93005 ELECTROCARDIOGRAM TRACING: CPT

## 2023-05-09 PROCEDURE — 36415 COLL VENOUS BLD VENIPUNCTURE: CPT

## 2023-05-09 PROCEDURE — 6370000000 HC RX 637 (ALT 250 FOR IP): Performed by: PSYCHIATRY & NEUROLOGY

## 2023-05-09 PROCEDURE — 2580000003 HC RX 258

## 2023-05-09 RX ORDER — BISACODYL 10 MG
10 SUPPOSITORY, RECTAL RECTAL DAILY PRN
Status: DISCONTINUED | OUTPATIENT
Start: 2023-05-09 | End: 2023-05-14 | Stop reason: HOSPADM

## 2023-05-09 RX ORDER — SENNA PLUS 8.6 MG/1
1 TABLET ORAL NIGHTLY
Status: DISCONTINUED | OUTPATIENT
Start: 2023-05-09 | End: 2023-05-14 | Stop reason: HOSPADM

## 2023-05-09 RX ORDER — POLYETHYLENE GLYCOL 3350 17 G/17G
17 POWDER, FOR SOLUTION ORAL 2 TIMES DAILY
Status: DISCONTINUED | OUTPATIENT
Start: 2023-05-09 | End: 2023-05-14 | Stop reason: HOSPADM

## 2023-05-09 RX ADMIN — MORPHINE SULFATE 4 MG: 4 INJECTION, SOLUTION INTRAMUSCULAR; INTRAVENOUS at 21:51

## 2023-05-09 RX ADMIN — MIDODRINE HYDROCHLORIDE 10 MG: 10 TABLET ORAL at 12:04

## 2023-05-09 RX ADMIN — MORPHINE SULFATE 4 MG: 4 INJECTION, SOLUTION INTRAMUSCULAR; INTRAVENOUS at 14:38

## 2023-05-09 RX ADMIN — TROSPIUM CHLORIDE 20 MG: 20 TABLET, FILM COATED ORAL at 17:40

## 2023-05-09 RX ADMIN — ALTEPLASE 6 MG: 2.2 INJECTION, POWDER, LYOPHILIZED, FOR SOLUTION INTRAVENOUS at 11:59

## 2023-05-09 RX ADMIN — ONDANSETRON 4 MG: 4 TABLET, ORALLY DISINTEGRATING ORAL at 09:32

## 2023-05-09 RX ADMIN — LACTULOSE 20 G: 20 SOLUTION ORAL at 21:51

## 2023-05-09 RX ADMIN — Medication 1000 UNITS: at 09:32

## 2023-05-09 RX ADMIN — BUSPIRONE HYDROCHLORIDE 10 MG: 10 TABLET ORAL at 21:53

## 2023-05-09 RX ADMIN — CARBAMAZEPINE 100 MG: 100 TABLET, CHEWABLE ORAL at 09:32

## 2023-05-09 RX ADMIN — SODIUM CHLORIDE, PRESERVATIVE FREE 10 ML: 5 INJECTION INTRAVENOUS at 21:52

## 2023-05-09 RX ADMIN — SENNOSIDES 8.6 MG: 8.6 TABLET, FILM COATED ORAL at 21:57

## 2023-05-09 RX ADMIN — ONDANSETRON 4 MG: 4 TABLET, ORALLY DISINTEGRATING ORAL at 17:41

## 2023-05-09 RX ADMIN — MIDODRINE HYDROCHLORIDE 10 MG: 10 TABLET ORAL at 09:32

## 2023-05-09 RX ADMIN — TROSPIUM CHLORIDE 20 MG: 20 TABLET, FILM COATED ORAL at 07:02

## 2023-05-09 RX ADMIN — SODIUM PHOSPHATE 1 ENEMA: 7; 19 ENEMA RECTAL at 12:09

## 2023-05-09 RX ADMIN — METOPROLOL TARTRATE 25 MG: 25 TABLET, FILM COATED ORAL at 12:04

## 2023-05-09 RX ADMIN — DOCUSATE SODIUM 100 MG: 50 LIQUID ORAL at 09:32

## 2023-05-09 RX ADMIN — METOPROLOL TARTRATE 25 MG: 25 TABLET, FILM COATED ORAL at 17:41

## 2023-05-09 RX ADMIN — DOCUSATE SODIUM 100 MG: 50 LIQUID ORAL at 21:51

## 2023-05-09 RX ADMIN — CARBAMAZEPINE 100 MG: 100 TABLET, CHEWABLE ORAL at 21:53

## 2023-05-09 RX ADMIN — BUSPIRONE HYDROCHLORIDE 10 MG: 10 TABLET ORAL at 09:32

## 2023-05-09 RX ADMIN — MIDODRINE HYDROCHLORIDE 10 MG: 10 TABLET ORAL at 17:41

## 2023-05-09 RX ADMIN — CARBAMAZEPINE 100 MG: 100 TABLET, CHEWABLE ORAL at 17:40

## 2023-05-09 RX ADMIN — LACTULOSE 20 G: 20 SOLUTION ORAL at 12:27

## 2023-05-09 RX ADMIN — MICONAZOLE NITRATE: 2 POWDER TOPICAL at 09:32

## 2023-05-09 RX ADMIN — DORNASE ALFA 5 MG: 1 SOLUTION RESPIRATORY (INHALATION) at 11:59

## 2023-05-09 RX ADMIN — MORPHINE SULFATE 4 MG: 4 INJECTION, SOLUTION INTRAMUSCULAR; INTRAVENOUS at 04:44

## 2023-05-09 RX ADMIN — LEVOTHYROXINE SODIUM 50 MCG: 0.05 TABLET ORAL at 07:02

## 2023-05-09 RX ADMIN — MICONAZOLE NITRATE: 2 POWDER TOPICAL at 21:55

## 2023-05-09 RX ADMIN — SODIUM CHLORIDE, PRESERVATIVE FREE 10 ML: 5 INJECTION INTRAVENOUS at 09:32

## 2023-05-09 ASSESSMENT — PAIN DESCRIPTION - LOCATION
LOCATION: ARM
LOCATION: INCISION;RIB CAGE
LOCATION: RIB CAGE;INCISION

## 2023-05-09 ASSESSMENT — PAIN SCALES - GENERAL
PAINLEVEL_OUTOF10: 0
PAINLEVEL_OUTOF10: 8
PAINLEVEL_OUTOF10: 9
PAINLEVEL_OUTOF10: 0
PAINLEVEL_OUTOF10: 10

## 2023-05-09 ASSESSMENT — PAIN DESCRIPTION - ORIENTATION: ORIENTATION: LEFT

## 2023-05-09 ASSESSMENT — PAIN - FUNCTIONAL ASSESSMENT: PAIN_FUNCTIONAL_ASSESSMENT: PREVENTS OR INTERFERES SOME ACTIVE ACTIVITIES AND ADLS

## 2023-05-10 ENCOUNTER — APPOINTMENT (OUTPATIENT)
Dept: GENERAL RADIOLOGY | Age: 73
DRG: 186 | End: 2023-05-10
Payer: MEDICARE

## 2023-05-10 LAB
ANION GAP SERPL CALC-SCNC: 9 MEQ/L (ref 8–16)
ANISOCYTOSIS BLD QL SMEAR: PRESENT
BASOPHILS ABSOLUTE: 0.1 THOU/MM3 (ref 0–0.1)
BASOPHILS NFR BLD AUTO: 1.3 %
BUN SERPL-MCNC: 19 MG/DL (ref 7–22)
CALCIUM SERPL-MCNC: 9.1 MG/DL (ref 8.5–10.5)
CHLORIDE SERPL-SCNC: 104 MEQ/L (ref 98–111)
CO2 SERPL-SCNC: 25 MEQ/L (ref 23–33)
CREAT SERPL-MCNC: 0.4 MG/DL (ref 0.4–1.2)
DEPRECATED RDW RBC AUTO: 75.7 FL (ref 35–45)
EKG ATRIAL RATE: 119 BPM
EKG ATRIAL RATE: 128 BPM
EKG P AXIS: 61 DEGREES
EKG P AXIS: 70 DEGREES
EKG P-R INTERVAL: 116 MS
EKG P-R INTERVAL: 118 MS
EKG Q-T INTERVAL: 328 MS
EKG Q-T INTERVAL: 342 MS
EKG QRS DURATION: 100 MS
EKG QRS DURATION: 100 MS
EKG QTC CALCULATION (BAZETT): 478 MS
EKG QTC CALCULATION (BAZETT): 481 MS
EKG R AXIS: -42 DEGREES
EKG R AXIS: -48 DEGREES
EKG T AXIS: 103 DEGREES
EKG T AXIS: 98 DEGREES
EKG VENTRICULAR RATE: 119 BPM
EKG VENTRICULAR RATE: 128 BPM
EOSINOPHIL NFR BLD AUTO: 1.6 %
EOSINOPHILS ABSOLUTE: 0.2 THOU/MM3 (ref 0–0.4)
ERYTHROCYTE [DISTWIDTH] IN BLOOD BY AUTOMATED COUNT: 18.7 % (ref 11.5–14.5)
GFR SERPL CREATININE-BSD FRML MDRD: > 60 ML/MIN/1.73M2
GLUCOSE SERPL-MCNC: 105 MG/DL (ref 70–108)
HCT VFR BLD AUTO: 46.3 % (ref 37–47)
HGB BLD-MCNC: 13.8 GM/DL (ref 12–16)
IMM GRANULOCYTES # BLD AUTO: 0.09 THOU/MM3 (ref 0–0.07)
IMM GRANULOCYTES NFR BLD AUTO: 0.8 %
LYMPHOCYTES ABSOLUTE: 2.4 THOU/MM3 (ref 1–4.8)
LYMPHOCYTES NFR BLD AUTO: 21.5 %
MCH RBC QN AUTO: 32.3 PG (ref 26–33)
MCHC RBC AUTO-ENTMCNC: 29.8 GM/DL (ref 32.2–35.5)
MCV RBC AUTO: 108.4 FL (ref 81–99)
MONOCYTES ABSOLUTE: 1.1 THOU/MM3 (ref 0.4–1.3)
MONOCYTES NFR BLD AUTO: 10 %
NEUTROPHILS NFR BLD AUTO: 64.8 %
NRBC BLD AUTO-RTO: 0 /100 WBC
PLATELET # BLD AUTO: 374 THOU/MM3 (ref 130–400)
PMV BLD AUTO: 10 FL (ref 9.4–12.4)
POTASSIUM SERPL-SCNC: 4.7 MEQ/L (ref 3.5–5.2)
RBC # BLD AUTO: 4.27 MILL/MM3 (ref 4.2–5.4)
SEGMENTED NEUTROPHILS ABSOLUTE COUNT: 7.3 THOU/MM3 (ref 1.8–7.7)
SODIUM SERPL-SCNC: 138 MEQ/L (ref 135–145)
WBC # BLD AUTO: 11.2 THOU/MM3 (ref 4.8–10.8)

## 2023-05-10 PROCEDURE — 32562 LYSE CHEST FIBRIN SUBQ DAY: CPT | Performed by: PHYSICIAN ASSISTANT

## 2023-05-10 PROCEDURE — 36415 COLL VENOUS BLD VENIPUNCTURE: CPT

## 2023-05-10 PROCEDURE — APPSS30 APP SPLIT SHARED TIME 16-30 MINUTES: Performed by: PHYSICIAN ASSISTANT

## 2023-05-10 PROCEDURE — 85025 COMPLETE CBC W/AUTO DIFF WBC: CPT

## 2023-05-10 PROCEDURE — 92610 EVALUATE SWALLOWING FUNCTION: CPT

## 2023-05-10 PROCEDURE — 6370000000 HC RX 637 (ALT 250 FOR IP)

## 2023-05-10 PROCEDURE — 97110 THERAPEUTIC EXERCISES: CPT

## 2023-05-10 PROCEDURE — 6370000000 HC RX 637 (ALT 250 FOR IP): Performed by: PSYCHIATRY & NEUROLOGY

## 2023-05-10 PROCEDURE — 2580000003 HC RX 258: Performed by: PHYSICIAN ASSISTANT

## 2023-05-10 PROCEDURE — 80048 BASIC METABOLIC PNL TOTAL CA: CPT

## 2023-05-10 PROCEDURE — 6370000000 HC RX 637 (ALT 250 FOR IP): Performed by: PHYSICIAN ASSISTANT

## 2023-05-10 PROCEDURE — 1200000000 HC SEMI PRIVATE

## 2023-05-10 PROCEDURE — 6360000002 HC RX W HCPCS: Performed by: PHYSICIAN ASSISTANT

## 2023-05-10 PROCEDURE — 2580000003 HC RX 258

## 2023-05-10 PROCEDURE — 6360000002 HC RX W HCPCS

## 2023-05-10 PROCEDURE — 97140 MANUAL THERAPY 1/> REGIONS: CPT

## 2023-05-10 RX ADMIN — LACTULOSE 20 G: 20 SOLUTION ORAL at 20:42

## 2023-05-10 RX ADMIN — Medication 1000 UNITS: at 10:19

## 2023-05-10 RX ADMIN — BISACODYL 10 MG: 10 SUPPOSITORY RECTAL at 15:06

## 2023-05-10 RX ADMIN — LACTULOSE 20 G: 20 SOLUTION ORAL at 10:20

## 2023-05-10 RX ADMIN — METOPROLOL TARTRATE 25 MG: 25 TABLET, FILM COATED ORAL at 23:43

## 2023-05-10 RX ADMIN — MORPHINE SULFATE 4 MG: 4 INJECTION, SOLUTION INTRAMUSCULAR; INTRAVENOUS at 17:02

## 2023-05-10 RX ADMIN — DOCUSATE SODIUM 100 MG: 50 LIQUID ORAL at 10:20

## 2023-05-10 RX ADMIN — CARBAMAZEPINE 100 MG: 100 TABLET, CHEWABLE ORAL at 10:20

## 2023-05-10 RX ADMIN — SENNOSIDES 8.6 MG: 8.6 TABLET, FILM COATED ORAL at 20:42

## 2023-05-10 RX ADMIN — SODIUM CHLORIDE, PRESERVATIVE FREE 10 ML: 5 INJECTION INTRAVENOUS at 10:20

## 2023-05-10 RX ADMIN — MIDODRINE HYDROCHLORIDE 10 MG: 10 TABLET ORAL at 12:40

## 2023-05-10 RX ADMIN — TROSPIUM CHLORIDE 20 MG: 20 TABLET, FILM COATED ORAL at 15:51

## 2023-05-10 RX ADMIN — BUSPIRONE HYDROCHLORIDE 10 MG: 10 TABLET ORAL at 20:42

## 2023-05-10 RX ADMIN — ONDANSETRON 4 MG: 4 TABLET, ORALLY DISINTEGRATING ORAL at 10:20

## 2023-05-10 RX ADMIN — POLYETHYLENE GLYCOL (3350) 17 G: 17 POWDER, FOR SOLUTION ORAL at 20:42

## 2023-05-10 RX ADMIN — CARBAMAZEPINE 100 MG: 100 TABLET, CHEWABLE ORAL at 20:42

## 2023-05-10 RX ADMIN — Medication 1 TABLET: at 10:20

## 2023-05-10 RX ADMIN — MIDODRINE HYDROCHLORIDE 10 MG: 10 TABLET ORAL at 15:51

## 2023-05-10 RX ADMIN — CARBAMAZEPINE 100 MG: 100 TABLET, CHEWABLE ORAL at 12:40

## 2023-05-10 RX ADMIN — MICONAZOLE NITRATE: 2 POWDER TOPICAL at 10:24

## 2023-05-10 RX ADMIN — LEVOTHYROXINE SODIUM 50 MCG: 0.05 TABLET ORAL at 06:55

## 2023-05-10 RX ADMIN — TROSPIUM CHLORIDE 20 MG: 20 TABLET, FILM COATED ORAL at 06:55

## 2023-05-10 RX ADMIN — DORNASE ALFA 5 MG: 1 SOLUTION RESPIRATORY (INHALATION) at 10:26

## 2023-05-10 RX ADMIN — ENOXAPARIN SODIUM 40 MG: 100 INJECTION SUBCUTANEOUS at 10:23

## 2023-05-10 RX ADMIN — METOPROLOL TARTRATE 25 MG: 25 TABLET, FILM COATED ORAL at 12:41

## 2023-05-10 RX ADMIN — BUSPIRONE HYDROCHLORIDE 10 MG: 10 TABLET ORAL at 10:19

## 2023-05-10 RX ADMIN — DOCUSATE SODIUM 100 MG: 50 LIQUID ORAL at 20:42

## 2023-05-10 RX ADMIN — METOPROLOL TARTRATE 25 MG: 25 TABLET, FILM COATED ORAL at 15:51

## 2023-05-10 RX ADMIN — ALTEPLASE 6 MG: 2.2 INJECTION, POWDER, LYOPHILIZED, FOR SOLUTION INTRAVENOUS at 10:26

## 2023-05-10 RX ADMIN — MIDODRINE HYDROCHLORIDE 10 MG: 10 TABLET ORAL at 10:19

## 2023-05-10 RX ADMIN — SODIUM CHLORIDE, PRESERVATIVE FREE 10 ML: 5 INJECTION INTRAVENOUS at 20:42

## 2023-05-10 RX ADMIN — POLYETHYLENE GLYCOL (3350) 17 G: 17 POWDER, FOR SOLUTION ORAL at 10:23

## 2023-05-10 ASSESSMENT — PAIN DESCRIPTION - LOCATION: LOCATION: ARM

## 2023-05-10 ASSESSMENT — PAIN SCALES - GENERAL
PAINLEVEL_OUTOF10: 10
PAINLEVEL_OUTOF10: 0

## 2023-05-10 ASSESSMENT — PAIN - FUNCTIONAL ASSESSMENT: PAIN_FUNCTIONAL_ASSESSMENT: PREVENTS OR INTERFERES SOME ACTIVE ACTIVITIES AND ADLS

## 2023-05-11 ENCOUNTER — APPOINTMENT (OUTPATIENT)
Dept: GENERAL RADIOLOGY | Age: 73
DRG: 186 | End: 2023-05-11
Payer: MEDICARE

## 2023-05-11 LAB
ALBUMIN SERPL BCG-MCNC: 2.8 G/DL (ref 3.5–5.1)
ALP SERPL-CCNC: 301 U/L (ref 38–126)
ALT SERPL W/O P-5'-P-CCNC: 16 U/L (ref 11–66)
ANION GAP SERPL CALC-SCNC: 13 MEQ/L (ref 8–16)
ANISOCYTOSIS BLD QL SMEAR: PRESENT
AST SERPL-CCNC: 28 U/L (ref 5–40)
BASOPHILS ABSOLUTE: 0.1 THOU/MM3 (ref 0–0.1)
BASOPHILS NFR BLD AUTO: 1.3 %
BILIRUB SERPL-MCNC: 0.7 MG/DL (ref 0.3–1.2)
BUN SERPL-MCNC: 19 MG/DL (ref 7–22)
CALCIUM SERPL-MCNC: 9.1 MG/DL (ref 8.5–10.5)
CHLORIDE SERPL-SCNC: 101 MEQ/L (ref 98–111)
CO2 SERPL-SCNC: 23 MEQ/L (ref 23–33)
CREAT SERPL-MCNC: 0.4 MG/DL (ref 0.4–1.2)
DEPRECATED RDW RBC AUTO: 70.2 FL (ref 35–45)
EOSINOPHIL NFR BLD AUTO: 2.8 %
EOSINOPHILS ABSOLUTE: 0.2 THOU/MM3 (ref 0–0.4)
ERYTHROCYTE [DISTWIDTH] IN BLOOD BY AUTOMATED COUNT: 18.5 % (ref 11.5–14.5)
GFR SERPL CREATININE-BSD FRML MDRD: > 60 ML/MIN/1.73M2
GLUCOSE SERPL-MCNC: 86 MG/DL (ref 70–108)
HCT VFR BLD AUTO: 44.7 % (ref 37–47)
HGB BLD-MCNC: 13.8 GM/DL (ref 12–16)
IMM GRANULOCYTES # BLD AUTO: 0.06 THOU/MM3 (ref 0–0.07)
IMM GRANULOCYTES NFR BLD AUTO: 0.7 %
LYMPHOCYTES ABSOLUTE: 2.4 THOU/MM3 (ref 1–4.8)
LYMPHOCYTES NFR BLD AUTO: 28.1 %
MCH RBC QN AUTO: 32.1 PG (ref 26–33)
MCHC RBC AUTO-ENTMCNC: 30.9 GM/DL (ref 32.2–35.5)
MCV RBC AUTO: 104 FL (ref 81–99)
MONOCYTES ABSOLUTE: 0.8 THOU/MM3 (ref 0.4–1.3)
MONOCYTES NFR BLD AUTO: 9.2 %
NEUTROPHILS NFR BLD AUTO: 57.9 %
NRBC BLD AUTO-RTO: 0 /100 WBC
PLATELET # BLD AUTO: 365 THOU/MM3 (ref 130–400)
PMV BLD AUTO: 10.4 FL (ref 9.4–12.4)
POTASSIUM SERPL-SCNC: 4.9 MEQ/L (ref 3.5–5.2)
POTASSIUM SERPL-SCNC: 4.9 MEQ/L (ref 3.5–5.2)
PROT SERPL-MCNC: 7.2 G/DL (ref 6.1–8)
RBC # BLD AUTO: 4.3 MILL/MM3 (ref 4.2–5.4)
SEGMENTED NEUTROPHILS ABSOLUTE COUNT: 4.9 THOU/MM3 (ref 1.8–7.7)
SODIUM SERPL-SCNC: 137 MEQ/L (ref 135–145)
WBC # BLD AUTO: 8.5 THOU/MM3 (ref 4.8–10.8)

## 2023-05-11 PROCEDURE — 6370000000 HC RX 637 (ALT 250 FOR IP): Performed by: PHYSICIAN ASSISTANT

## 2023-05-11 PROCEDURE — 36415 COLL VENOUS BLD VENIPUNCTURE: CPT

## 2023-05-11 PROCEDURE — APPSS30 APP SPLIT SHARED TIME 16-30 MINUTES: Performed by: PHYSICIAN ASSISTANT

## 2023-05-11 PROCEDURE — 2580000003 HC RX 258: Performed by: PHYSICIAN ASSISTANT

## 2023-05-11 PROCEDURE — 2580000003 HC RX 258

## 2023-05-11 PROCEDURE — 32562 LYSE CHEST FIBRIN SUBQ DAY: CPT | Performed by: PHYSICIAN ASSISTANT

## 2023-05-11 PROCEDURE — 1200000000 HC SEMI PRIVATE

## 2023-05-11 PROCEDURE — 6370000000 HC RX 637 (ALT 250 FOR IP): Performed by: PSYCHIATRY & NEUROLOGY

## 2023-05-11 PROCEDURE — 80053 COMPREHEN METABOLIC PANEL: CPT

## 2023-05-11 PROCEDURE — 6370000000 HC RX 637 (ALT 250 FOR IP)

## 2023-05-11 PROCEDURE — 6360000002 HC RX W HCPCS: Performed by: PHYSICIAN ASSISTANT

## 2023-05-11 PROCEDURE — 6360000002 HC RX W HCPCS

## 2023-05-11 PROCEDURE — 85025 COMPLETE CBC W/AUTO DIFF WBC: CPT

## 2023-05-11 PROCEDURE — 74018 RADEX ABDOMEN 1 VIEW: CPT

## 2023-05-11 PROCEDURE — 71045 X-RAY EXAM CHEST 1 VIEW: CPT

## 2023-05-11 PROCEDURE — 99232 SBSQ HOSP IP/OBS MODERATE 35: CPT | Performed by: HOSPITALIST

## 2023-05-11 RX ADMIN — LEVOTHYROXINE SODIUM 50 MCG: 0.05 TABLET ORAL at 06:04

## 2023-05-11 RX ADMIN — TROSPIUM CHLORIDE 20 MG: 20 TABLET, FILM COATED ORAL at 06:04

## 2023-05-11 RX ADMIN — MIDODRINE HYDROCHLORIDE 10 MG: 10 TABLET ORAL at 12:53

## 2023-05-11 RX ADMIN — ONDANSETRON 4 MG: 4 TABLET, ORALLY DISINTEGRATING ORAL at 15:33

## 2023-05-11 RX ADMIN — SODIUM CHLORIDE, PRESERVATIVE FREE 10 ML: 5 INJECTION INTRAVENOUS at 08:14

## 2023-05-11 RX ADMIN — MIDODRINE HYDROCHLORIDE 10 MG: 10 TABLET ORAL at 16:50

## 2023-05-11 RX ADMIN — ALTEPLASE 6 MG: 2.2 INJECTION, POWDER, LYOPHILIZED, FOR SOLUTION INTRAVENOUS at 12:26

## 2023-05-11 RX ADMIN — SODIUM CHLORIDE, PRESERVATIVE FREE 10 ML: 5 INJECTION INTRAVENOUS at 21:23

## 2023-05-11 RX ADMIN — CARBAMAZEPINE 100 MG: 100 TABLET, CHEWABLE ORAL at 08:13

## 2023-05-11 RX ADMIN — METOPROLOL TARTRATE 25 MG: 25 TABLET, FILM COATED ORAL at 12:53

## 2023-05-11 RX ADMIN — METOPROLOL TARTRATE 25 MG: 25 TABLET, FILM COATED ORAL at 06:05

## 2023-05-11 RX ADMIN — METOPROLOL TARTRATE 25 MG: 25 TABLET, FILM COATED ORAL at 16:50

## 2023-05-11 RX ADMIN — MORPHINE SULFATE 2 MG: 2 INJECTION, SOLUTION INTRAMUSCULAR; INTRAVENOUS at 02:50

## 2023-05-11 RX ADMIN — Medication 1 TABLET: at 08:13

## 2023-05-11 RX ADMIN — MIDODRINE HYDROCHLORIDE 10 MG: 10 TABLET ORAL at 08:13

## 2023-05-11 RX ADMIN — DORNASE ALFA 5 MG: 1 SOLUTION RESPIRATORY (INHALATION) at 12:26

## 2023-05-11 RX ADMIN — POLYETHYLENE GLYCOL (3350) 17 G: 17 POWDER, FOR SOLUTION ORAL at 21:22

## 2023-05-11 RX ADMIN — POLYETHYLENE GLYCOL (3350) 17 G: 17 POWDER, FOR SOLUTION ORAL at 08:12

## 2023-05-11 RX ADMIN — ENOXAPARIN SODIUM 40 MG: 100 INJECTION SUBCUTANEOUS at 08:12

## 2023-05-11 RX ADMIN — TROSPIUM CHLORIDE 20 MG: 20 TABLET, FILM COATED ORAL at 16:50

## 2023-05-11 RX ADMIN — CARBAMAZEPINE 100 MG: 100 TABLET, CHEWABLE ORAL at 21:23

## 2023-05-11 RX ADMIN — LACTULOSE 20 G: 20 SOLUTION ORAL at 21:23

## 2023-05-11 RX ADMIN — HYDROXYZINE HYDROCHLORIDE 10 MG: 10 TABLET ORAL at 08:13

## 2023-05-11 RX ADMIN — SENNOSIDES 8.6 MG: 8.6 TABLET, FILM COATED ORAL at 21:23

## 2023-05-11 RX ADMIN — Medication 1000 UNITS: at 08:13

## 2023-05-11 RX ADMIN — DOCUSATE SODIUM 100 MG: 50 LIQUID ORAL at 21:23

## 2023-05-11 RX ADMIN — LACTULOSE 20 G: 20 SOLUTION ORAL at 08:12

## 2023-05-11 RX ADMIN — BISACODYL 10 MG: 10 SUPPOSITORY RECTAL at 11:42

## 2023-05-11 RX ADMIN — BUSPIRONE HYDROCHLORIDE 10 MG: 10 TABLET ORAL at 08:13

## 2023-05-11 RX ADMIN — BUSPIRONE HYDROCHLORIDE 10 MG: 10 TABLET ORAL at 21:23

## 2023-05-11 RX ADMIN — ONDANSETRON 4 MG: 2 INJECTION INTRAMUSCULAR; INTRAVENOUS at 08:12

## 2023-05-11 RX ADMIN — CARBAMAZEPINE 100 MG: 100 TABLET, CHEWABLE ORAL at 14:51

## 2023-05-11 ASSESSMENT — PAIN SCALES - GENERAL
PAINLEVEL_OUTOF10: 2
PAINLEVEL_OUTOF10: 7

## 2023-05-11 ASSESSMENT — PAIN DESCRIPTION - DESCRIPTORS: DESCRIPTORS: ACHING;SORE;THROBBING

## 2023-05-11 ASSESSMENT — PAIN DESCRIPTION - LOCATION: LOCATION: ARM;RIB CAGE

## 2023-05-11 ASSESSMENT — PAIN - FUNCTIONAL ASSESSMENT: PAIN_FUNCTIONAL_ASSESSMENT: PREVENTS OR INTERFERES SOME ACTIVE ACTIVITIES AND ADLS

## 2023-05-11 ASSESSMENT — PAIN DESCRIPTION - ORIENTATION: ORIENTATION: LEFT

## 2023-05-12 ENCOUNTER — APPOINTMENT (OUTPATIENT)
Dept: GENERAL RADIOLOGY | Age: 73
DRG: 186 | End: 2023-05-12
Payer: MEDICARE

## 2023-05-12 LAB
ALBUMIN SERPL BCG-MCNC: 2.9 G/DL (ref 3.5–5.1)
ALP SERPL-CCNC: 276 U/L (ref 38–126)
ALT SERPL W/O P-5'-P-CCNC: 12 U/L (ref 11–66)
ANION GAP SERPL CALC-SCNC: 14 MEQ/L (ref 8–16)
ANISOCYTOSIS BLD QL SMEAR: PRESENT
AST SERPL-CCNC: 21 U/L (ref 5–40)
BASOPHILS ABSOLUTE: 0.1 THOU/MM3 (ref 0–0.1)
BASOPHILS NFR BLD AUTO: 1.2 %
BILIRUB SERPL-MCNC: 0.5 MG/DL (ref 0.3–1.2)
BUN SERPL-MCNC: 18 MG/DL (ref 7–22)
CALCIUM SERPL-MCNC: 8.6 MG/DL (ref 8.5–10.5)
CHLORIDE SERPL-SCNC: 102 MEQ/L (ref 98–111)
CO2 SERPL-SCNC: 23 MEQ/L (ref 23–33)
CREAT SERPL-MCNC: 0.3 MG/DL (ref 0.4–1.2)
DEPRECATED RDW RBC AUTO: 68.8 FL (ref 35–45)
EOSINOPHIL NFR BLD AUTO: 3.1 %
EOSINOPHILS ABSOLUTE: 0.3 THOU/MM3 (ref 0–0.4)
ERYTHROCYTE [DISTWIDTH] IN BLOOD BY AUTOMATED COUNT: 18.1 % (ref 11.5–14.5)
GFR SERPL CREATININE-BSD FRML MDRD: > 60 ML/MIN/1.73M2
GLUCOSE SERPL-MCNC: 80 MG/DL (ref 70–108)
HCT VFR BLD AUTO: 41.8 % (ref 37–47)
HGB BLD-MCNC: 13 GM/DL (ref 12–16)
IMM GRANULOCYTES # BLD AUTO: 0.06 THOU/MM3 (ref 0–0.07)
IMM GRANULOCYTES NFR BLD AUTO: 0.7 %
LYMPHOCYTES ABSOLUTE: 2.4 THOU/MM3 (ref 1–4.8)
LYMPHOCYTES NFR BLD AUTO: 29 %
MCH RBC QN AUTO: 32.2 PG (ref 26–33)
MCHC RBC AUTO-ENTMCNC: 31.1 GM/DL (ref 32.2–35.5)
MCV RBC AUTO: 103.5 FL (ref 81–99)
MONOCYTES ABSOLUTE: 0.8 THOU/MM3 (ref 0.4–1.3)
MONOCYTES NFR BLD AUTO: 9.6 %
NEUTROPHILS NFR BLD AUTO: 56.4 %
NRBC BLD AUTO-RTO: 0 /100 WBC
PLATELET # BLD AUTO: 361 THOU/MM3 (ref 130–400)
PMV BLD AUTO: 10.3 FL (ref 9.4–12.4)
POTASSIUM SERPL-SCNC: 4.1 MEQ/L (ref 3.5–5.2)
POTASSIUM SERPL-SCNC: 4.1 MEQ/L (ref 3.5–5.2)
PROT SERPL-MCNC: 6.8 G/DL (ref 6.1–8)
RBC # BLD AUTO: 4.04 MILL/MM3 (ref 4.2–5.4)
SEGMENTED NEUTROPHILS ABSOLUTE COUNT: 4.7 THOU/MM3 (ref 1.8–7.7)
SODIUM SERPL-SCNC: 139 MEQ/L (ref 135–145)
WBC # BLD AUTO: 8.3 THOU/MM3 (ref 4.8–10.8)

## 2023-05-12 PROCEDURE — 85025 COMPLETE CBC W/AUTO DIFF WBC: CPT

## 2023-05-12 PROCEDURE — 6370000000 HC RX 637 (ALT 250 FOR IP): Performed by: PHYSICIAN ASSISTANT

## 2023-05-12 PROCEDURE — 36415 COLL VENOUS BLD VENIPUNCTURE: CPT

## 2023-05-12 PROCEDURE — 6370000000 HC RX 637 (ALT 250 FOR IP)

## 2023-05-12 PROCEDURE — 71045 X-RAY EXAM CHEST 1 VIEW: CPT

## 2023-05-12 PROCEDURE — 6360000002 HC RX W HCPCS

## 2023-05-12 PROCEDURE — APPSS30 APP SPLIT SHARED TIME 16-30 MINUTES: Performed by: PHYSICIAN ASSISTANT

## 2023-05-12 PROCEDURE — 1200000000 HC SEMI PRIVATE

## 2023-05-12 PROCEDURE — 80053 COMPREHEN METABOLIC PANEL: CPT

## 2023-05-12 PROCEDURE — 6370000000 HC RX 637 (ALT 250 FOR IP): Performed by: PSYCHIATRY & NEUROLOGY

## 2023-05-12 PROCEDURE — 2580000003 HC RX 258

## 2023-05-12 PROCEDURE — 99232 SBSQ HOSP IP/OBS MODERATE 35: CPT | Performed by: HOSPITALIST

## 2023-05-12 RX ADMIN — METOPROLOL TARTRATE 25 MG: 25 TABLET, FILM COATED ORAL at 22:06

## 2023-05-12 RX ADMIN — POLYETHYLENE GLYCOL (3350) 17 G: 17 POWDER, FOR SOLUTION ORAL at 22:02

## 2023-05-12 RX ADMIN — ENOXAPARIN SODIUM 40 MG: 100 INJECTION SUBCUTANEOUS at 12:12

## 2023-05-12 RX ADMIN — CARBAMAZEPINE 100 MG: 100 TABLET, CHEWABLE ORAL at 11:56

## 2023-05-12 RX ADMIN — METOPROLOL TARTRATE 25 MG: 25 TABLET, FILM COATED ORAL at 00:43

## 2023-05-12 RX ADMIN — SODIUM CHLORIDE, PRESERVATIVE FREE 10 ML: 5 INJECTION INTRAVENOUS at 12:19

## 2023-05-12 RX ADMIN — DOCUSATE SODIUM 100 MG: 50 LIQUID ORAL at 12:13

## 2023-05-12 RX ADMIN — TROSPIUM CHLORIDE 20 MG: 20 TABLET, FILM COATED ORAL at 06:10

## 2023-05-12 RX ADMIN — MIDODRINE HYDROCHLORIDE 10 MG: 10 TABLET ORAL at 11:57

## 2023-05-12 RX ADMIN — Medication 1000 UNITS: at 11:56

## 2023-05-12 RX ADMIN — POLYETHYLENE GLYCOL (3350) 17 G: 17 POWDER, FOR SOLUTION ORAL at 12:13

## 2023-05-12 RX ADMIN — Medication 1 TABLET: at 11:54

## 2023-05-12 RX ADMIN — BUSPIRONE HYDROCHLORIDE 10 MG: 10 TABLET ORAL at 11:58

## 2023-05-12 RX ADMIN — SENNOSIDES 8.6 MG: 8.6 TABLET, FILM COATED ORAL at 22:00

## 2023-05-12 RX ADMIN — METOPROLOL TARTRATE 25 MG: 25 TABLET, FILM COATED ORAL at 17:09

## 2023-05-12 RX ADMIN — LEVOTHYROXINE SODIUM 50 MCG: 0.05 TABLET ORAL at 06:10

## 2023-05-12 RX ADMIN — LACTULOSE 20 G: 20 SOLUTION ORAL at 12:12

## 2023-05-12 RX ADMIN — HYDROXYZINE HYDROCHLORIDE 10 MG: 10 TABLET ORAL at 00:43

## 2023-05-12 RX ADMIN — MIDODRINE HYDROCHLORIDE 10 MG: 10 TABLET ORAL at 17:09

## 2023-05-12 RX ADMIN — CARBAMAZEPINE 100 MG: 100 TABLET, CHEWABLE ORAL at 17:08

## 2023-05-12 RX ADMIN — LACTULOSE 20 G: 20 SOLUTION ORAL at 22:00

## 2023-05-12 RX ADMIN — LACTULOSE 20 G: 20 SOLUTION ORAL at 17:08

## 2023-05-12 RX ADMIN — BUSPIRONE HYDROCHLORIDE 10 MG: 10 TABLET ORAL at 22:06

## 2023-05-12 RX ADMIN — MICONAZOLE NITRATE: 2 POWDER TOPICAL at 22:08

## 2023-05-12 RX ADMIN — ONDANSETRON 4 MG: 4 TABLET, ORALLY DISINTEGRATING ORAL at 06:10

## 2023-05-12 RX ADMIN — HYDROXYZINE HYDROCHLORIDE 10 MG: 10 TABLET ORAL at 11:55

## 2023-05-12 RX ADMIN — CARBAMAZEPINE 100 MG: 100 TABLET, CHEWABLE ORAL at 22:06

## 2023-05-12 RX ADMIN — DOCUSATE SODIUM 100 MG: 50 LIQUID ORAL at 22:00

## 2023-05-12 RX ADMIN — SODIUM CHLORIDE, PRESERVATIVE FREE 10 ML: 5 INJECTION INTRAVENOUS at 22:15

## 2023-05-12 RX ADMIN — TROSPIUM CHLORIDE 20 MG: 20 TABLET, FILM COATED ORAL at 17:09

## 2023-05-13 ENCOUNTER — APPOINTMENT (OUTPATIENT)
Dept: GENERAL RADIOLOGY | Age: 73
DRG: 186 | End: 2023-05-13
Payer: MEDICARE

## 2023-05-13 LAB
ANION GAP SERPL CALC-SCNC: 14 MEQ/L (ref 8–16)
ANISOCYTOSIS BLD QL SMEAR: PRESENT
BASOPHILS ABSOLUTE: 0.1 THOU/MM3 (ref 0–0.1)
BASOPHILS NFR BLD AUTO: 1.2 %
BUN SERPL-MCNC: 18 MG/DL (ref 7–22)
CALCIUM SERPL-MCNC: 8.7 MG/DL (ref 8.5–10.5)
CHLORIDE SERPL-SCNC: 103 MEQ/L (ref 98–111)
CO2 SERPL-SCNC: 23 MEQ/L (ref 23–33)
CREAT SERPL-MCNC: 0.3 MG/DL (ref 0.4–1.2)
DEPRECATED RDW RBC AUTO: 71.8 FL (ref 35–45)
EOSINOPHIL NFR BLD AUTO: 5 %
EOSINOPHILS ABSOLUTE: 0.3 THOU/MM3 (ref 0–0.4)
ERYTHROCYTE [DISTWIDTH] IN BLOOD BY AUTOMATED COUNT: 18.2 % (ref 11.5–14.5)
GFR SERPL CREATININE-BSD FRML MDRD: > 60 ML/MIN/1.73M2
GLUCOSE SERPL-MCNC: 88 MG/DL (ref 70–108)
HCT VFR BLD AUTO: 42.5 % (ref 37–47)
HGB BLD-MCNC: 12.9 GM/DL (ref 12–16)
IMM GRANULOCYTES # BLD AUTO: 0.05 THOU/MM3 (ref 0–0.07)
IMM GRANULOCYTES NFR BLD AUTO: 0.8 %
LYMPHOCYTES ABSOLUTE: 1.8 THOU/MM3 (ref 1–4.8)
LYMPHOCYTES NFR BLD AUTO: 27.4 %
MCH RBC QN AUTO: 32.3 PG (ref 26–33)
MCHC RBC AUTO-ENTMCNC: 30.4 GM/DL (ref 32.2–35.5)
MCV RBC AUTO: 106.5 FL (ref 81–99)
MONOCYTES ABSOLUTE: 0.6 THOU/MM3 (ref 0.4–1.3)
MONOCYTES NFR BLD AUTO: 9 %
NEUTROPHILS NFR BLD AUTO: 56.6 %
NRBC BLD AUTO-RTO: 0 /100 WBC
PLATELET # BLD AUTO: 313 THOU/MM3 (ref 130–400)
PMV BLD AUTO: 10.3 FL (ref 9.4–12.4)
POTASSIUM SERPL-SCNC: 4.6 MEQ/L (ref 3.5–5.2)
RBC # BLD AUTO: 3.99 MILL/MM3 (ref 4.2–5.4)
SEGMENTED NEUTROPHILS ABSOLUTE COUNT: 3.7 THOU/MM3 (ref 1.8–7.7)
SODIUM SERPL-SCNC: 140 MEQ/L (ref 135–145)
WBC # BLD AUTO: 6.5 THOU/MM3 (ref 4.8–10.8)

## 2023-05-13 PROCEDURE — 71045 X-RAY EXAM CHEST 1 VIEW: CPT

## 2023-05-13 PROCEDURE — 6360000002 HC RX W HCPCS

## 2023-05-13 PROCEDURE — 80048 BASIC METABOLIC PNL TOTAL CA: CPT

## 2023-05-13 PROCEDURE — 1200000000 HC SEMI PRIVATE

## 2023-05-13 PROCEDURE — 99232 SBSQ HOSP IP/OBS MODERATE 35: CPT | Performed by: PHYSICIAN ASSISTANT

## 2023-05-13 PROCEDURE — 6370000000 HC RX 637 (ALT 250 FOR IP): Performed by: PHYSICIAN ASSISTANT

## 2023-05-13 PROCEDURE — 85025 COMPLETE CBC W/AUTO DIFF WBC: CPT

## 2023-05-13 PROCEDURE — 6370000000 HC RX 637 (ALT 250 FOR IP)

## 2023-05-13 PROCEDURE — 36415 COLL VENOUS BLD VENIPUNCTURE: CPT

## 2023-05-13 PROCEDURE — 2580000003 HC RX 258

## 2023-05-13 PROCEDURE — 6370000000 HC RX 637 (ALT 250 FOR IP): Performed by: PSYCHIATRY & NEUROLOGY

## 2023-05-13 RX ADMIN — CARBAMAZEPINE 100 MG: 100 TABLET, CHEWABLE ORAL at 21:58

## 2023-05-13 RX ADMIN — METOPROLOL TARTRATE 25 MG: 25 TABLET, FILM COATED ORAL at 12:43

## 2023-05-13 RX ADMIN — CARBAMAZEPINE 100 MG: 100 TABLET, CHEWABLE ORAL at 09:30

## 2023-05-13 RX ADMIN — POLYETHYLENE GLYCOL (3350) 17 G: 17 POWDER, FOR SOLUTION ORAL at 09:30

## 2023-05-13 RX ADMIN — CARBAMAZEPINE 100 MG: 100 TABLET, CHEWABLE ORAL at 17:32

## 2023-05-13 RX ADMIN — ENOXAPARIN SODIUM 40 MG: 100 INJECTION SUBCUTANEOUS at 09:30

## 2023-05-13 RX ADMIN — METOPROLOL TARTRATE 25 MG: 25 TABLET, FILM COATED ORAL at 17:32

## 2023-05-13 RX ADMIN — SODIUM CHLORIDE, PRESERVATIVE FREE 10 ML: 5 INJECTION INTRAVENOUS at 09:29

## 2023-05-13 RX ADMIN — DOCUSATE SODIUM 100 MG: 50 LIQUID ORAL at 21:54

## 2023-05-13 RX ADMIN — LACTULOSE 20 G: 20 SOLUTION ORAL at 09:29

## 2023-05-13 RX ADMIN — SODIUM CHLORIDE, PRESERVATIVE FREE 10 ML: 5 INJECTION INTRAVENOUS at 21:57

## 2023-05-13 RX ADMIN — MIDODRINE HYDROCHLORIDE 10 MG: 10 TABLET ORAL at 17:32

## 2023-05-13 RX ADMIN — MICONAZOLE NITRATE: 2 POWDER TOPICAL at 22:06

## 2023-05-13 RX ADMIN — Medication 1000 UNITS: at 09:30

## 2023-05-13 RX ADMIN — LEVOTHYROXINE SODIUM 50 MCG: 0.05 TABLET ORAL at 06:03

## 2023-05-13 RX ADMIN — MICONAZOLE NITRATE: 2 POWDER TOPICAL at 09:29

## 2023-05-13 RX ADMIN — MIDODRINE HYDROCHLORIDE 10 MG: 10 TABLET ORAL at 12:43

## 2023-05-13 RX ADMIN — BUSPIRONE HYDROCHLORIDE 10 MG: 10 TABLET ORAL at 21:51

## 2023-05-13 RX ADMIN — SENNOSIDES 8.6 MG: 8.6 TABLET, FILM COATED ORAL at 21:54

## 2023-05-13 RX ADMIN — MIDODRINE HYDROCHLORIDE 10 MG: 10 TABLET ORAL at 09:30

## 2023-05-13 RX ADMIN — LACTULOSE 20 G: 20 SOLUTION ORAL at 21:54

## 2023-05-13 RX ADMIN — Medication 1 TABLET: at 09:30

## 2023-05-13 RX ADMIN — POLYETHYLENE GLYCOL (3350) 17 G: 17 POWDER, FOR SOLUTION ORAL at 21:53

## 2023-05-13 RX ADMIN — TROSPIUM CHLORIDE 20 MG: 20 TABLET, FILM COATED ORAL at 17:32

## 2023-05-13 RX ADMIN — BUSPIRONE HYDROCHLORIDE 10 MG: 10 TABLET ORAL at 09:29

## 2023-05-13 RX ADMIN — TROSPIUM CHLORIDE 20 MG: 20 TABLET, FILM COATED ORAL at 06:03

## 2023-05-13 RX ADMIN — DOCUSATE SODIUM 100 MG: 50 LIQUID ORAL at 09:29

## 2023-05-13 ASSESSMENT — PAIN SCALES - GENERAL
PAINLEVEL_OUTOF10: 0
PAINLEVEL_OUTOF10: 0
PAINLEVEL_OUTOF10: 7

## 2023-05-14 ENCOUNTER — APPOINTMENT (OUTPATIENT)
Dept: GENERAL RADIOLOGY | Age: 73
DRG: 186 | End: 2023-05-14
Payer: MEDICARE

## 2023-05-14 VITALS
DIASTOLIC BLOOD PRESSURE: 79 MMHG | OXYGEN SATURATION: 100 % | HEART RATE: 129 BPM | TEMPERATURE: 98.2 F | WEIGHT: 106.2 LBS | BODY MASS INDEX: 18.82 KG/M2 | RESPIRATION RATE: 16 BRPM | HEIGHT: 63 IN | SYSTOLIC BLOOD PRESSURE: 113 MMHG

## 2023-05-14 LAB
ANISOCYTOSIS BLD QL SMEAR: PRESENT
BASOPHILS ABSOLUTE: 0.1 THOU/MM3 (ref 0–0.1)
BASOPHILS NFR BLD AUTO: 0.9 %
DEPRECATED RDW RBC AUTO: 69.1 FL (ref 35–45)
EOSINOPHIL NFR BLD AUTO: 5.3 %
EOSINOPHILS ABSOLUTE: 0.3 THOU/MM3 (ref 0–0.4)
ERYTHROCYTE [DISTWIDTH] IN BLOOD BY AUTOMATED COUNT: 18.4 % (ref 11.5–14.5)
HCT VFR BLD AUTO: 40.4 % (ref 37–47)
HGB BLD-MCNC: 12.6 GM/DL (ref 12–16)
IMM GRANULOCYTES # BLD AUTO: 0.02 THOU/MM3 (ref 0–0.07)
IMM GRANULOCYTES NFR BLD AUTO: 0.3 %
LYMPHOCYTES ABSOLUTE: 1.8 THOU/MM3 (ref 1–4.8)
LYMPHOCYTES NFR BLD AUTO: 28.3 %
MCH RBC QN AUTO: 32.1 PG (ref 26–33)
MCHC RBC AUTO-ENTMCNC: 31.2 GM/DL (ref 32.2–35.5)
MCV RBC AUTO: 102.8 FL (ref 81–99)
MONOCYTES ABSOLUTE: 0.6 THOU/MM3 (ref 0.4–1.3)
MONOCYTES NFR BLD AUTO: 8.6 %
NEUTROPHILS NFR BLD AUTO: 56.6 %
NRBC BLD AUTO-RTO: 0 /100 WBC
PLATELET # BLD AUTO: 304 THOU/MM3 (ref 130–400)
PMV BLD AUTO: 10.1 FL (ref 9.4–12.4)
RBC # BLD AUTO: 3.93 MILL/MM3 (ref 4.2–5.4)
SARS-COV-2 RDRP RESP QL NAA+PROBE: NOT  DETECTED
SEGMENTED NEUTROPHILS ABSOLUTE COUNT: 3.6 THOU/MM3 (ref 1.8–7.7)
WBC # BLD AUTO: 6.4 THOU/MM3 (ref 4.8–10.8)

## 2023-05-14 PROCEDURE — 36415 COLL VENOUS BLD VENIPUNCTURE: CPT

## 2023-05-14 PROCEDURE — 85025 COMPLETE CBC W/AUTO DIFF WBC: CPT

## 2023-05-14 PROCEDURE — 6370000000 HC RX 637 (ALT 250 FOR IP): Performed by: PHYSICIAN ASSISTANT

## 2023-05-14 PROCEDURE — 6370000000 HC RX 637 (ALT 250 FOR IP)

## 2023-05-14 PROCEDURE — 6360000002 HC RX W HCPCS

## 2023-05-14 PROCEDURE — 6370000000 HC RX 637 (ALT 250 FOR IP): Performed by: PSYCHIATRY & NEUROLOGY

## 2023-05-14 PROCEDURE — 87635 SARS-COV-2 COVID-19 AMP PRB: CPT

## 2023-05-14 PROCEDURE — 71045 X-RAY EXAM CHEST 1 VIEW: CPT

## 2023-05-14 PROCEDURE — 2580000003 HC RX 258

## 2023-05-14 PROCEDURE — 74018 RADEX ABDOMEN 1 VIEW: CPT

## 2023-05-14 PROCEDURE — 99239 HOSP IP/OBS DSCHRG MGMT >30: CPT | Performed by: PHYSICIAN ASSISTANT

## 2023-05-14 RX ORDER — SENNA PLUS 8.6 MG/1
1 TABLET ORAL NIGHTLY
Qty: 30 TABLET | Refills: 0 | DISCHARGE
Start: 2023-05-14 | End: 2023-06-13

## 2023-05-14 RX ORDER — POLYETHYLENE GLYCOL 3350 17 G/17G
17 POWDER, FOR SOLUTION ORAL 2 TIMES DAILY
Qty: 527 G | Refills: 1 | DISCHARGE
Start: 2023-05-14 | End: 2023-06-13

## 2023-05-14 RX ORDER — BUSPIRONE HYDROCHLORIDE 10 MG/1
10 TABLET ORAL 2 TIMES DAILY
DISCHARGE
Start: 2023-05-14

## 2023-05-14 RX ORDER — HYDROXYZINE HYDROCHLORIDE 10 MG/1
10 TABLET, FILM COATED ORAL 3 TIMES DAILY PRN
DISCHARGE
Start: 2023-05-14 | End: 2023-05-24

## 2023-05-14 RX ORDER — DOCUSATE SODIUM 50 MG/5ML
100 LIQUID ORAL 2 TIMES DAILY
Qty: 300 ML | Refills: 0 | DISCHARGE
Start: 2023-05-14

## 2023-05-14 RX ORDER — LACTULOSE 10 G/15ML
20 SOLUTION ORAL 3 TIMES DAILY
Refills: 1 | DISCHARGE
Start: 2023-05-14

## 2023-05-14 RX ORDER — MIDODRINE HYDROCHLORIDE 10 MG/1
10 TABLET ORAL
DISCHARGE
Start: 2023-05-14

## 2023-05-14 RX ADMIN — MAGNESIUM HYDROXIDE 30 ML: 400 SUSPENSION ORAL at 10:24

## 2023-05-14 RX ADMIN — METOPROLOL TARTRATE 25 MG: 25 TABLET, FILM COATED ORAL at 06:08

## 2023-05-14 RX ADMIN — Medication 1000 UNITS: at 10:23

## 2023-05-14 RX ADMIN — Medication 1 TABLET: at 10:23

## 2023-05-14 RX ADMIN — METOPROLOL TARTRATE 25 MG: 25 TABLET, FILM COATED ORAL at 00:02

## 2023-05-14 RX ADMIN — LACTULOSE 20 G: 20 SOLUTION ORAL at 10:32

## 2023-05-14 RX ADMIN — CARBAMAZEPINE 100 MG: 100 TABLET, CHEWABLE ORAL at 13:39

## 2023-05-14 RX ADMIN — METOPROLOL TARTRATE 25 MG: 25 TABLET, FILM COATED ORAL at 16:57

## 2023-05-14 RX ADMIN — MIDODRINE HYDROCHLORIDE 10 MG: 10 TABLET ORAL at 13:38

## 2023-05-14 RX ADMIN — BUSPIRONE HYDROCHLORIDE 10 MG: 10 TABLET ORAL at 10:24

## 2023-05-14 RX ADMIN — METOPROLOL TARTRATE 25 MG: 25 TABLET, FILM COATED ORAL at 13:39

## 2023-05-14 RX ADMIN — ENOXAPARIN SODIUM 40 MG: 100 INJECTION SUBCUTANEOUS at 10:23

## 2023-05-14 RX ADMIN — MICONAZOLE NITRATE: 2 POWDER TOPICAL at 10:24

## 2023-05-14 RX ADMIN — POLYETHYLENE GLYCOL (3350) 17 G: 17 POWDER, FOR SOLUTION ORAL at 10:23

## 2023-05-14 RX ADMIN — SODIUM CHLORIDE, PRESERVATIVE FREE 10 ML: 5 INJECTION INTRAVENOUS at 10:24

## 2023-05-14 RX ADMIN — TROSPIUM CHLORIDE 20 MG: 20 TABLET, FILM COATED ORAL at 14:59

## 2023-05-14 RX ADMIN — ONDANSETRON 4 MG: 4 TABLET, ORALLY DISINTEGRATING ORAL at 03:32

## 2023-05-14 RX ADMIN — LEVOTHYROXINE SODIUM 50 MCG: 0.05 TABLET ORAL at 10:23

## 2023-05-14 RX ADMIN — CARBAMAZEPINE 100 MG: 100 TABLET, CHEWABLE ORAL at 10:24

## 2023-05-14 RX ADMIN — DOCUSATE SODIUM 100 MG: 50 LIQUID ORAL at 10:23

## 2023-05-14 RX ADMIN — MIDODRINE HYDROCHLORIDE 10 MG: 10 TABLET ORAL at 16:57

## 2023-05-14 RX ADMIN — TROSPIUM CHLORIDE 20 MG: 20 TABLET, FILM COATED ORAL at 10:23

## 2023-05-14 RX ADMIN — MIDODRINE HYDROCHLORIDE 10 MG: 10 TABLET ORAL at 10:23

## 2023-05-16 NOTE — DISCHARGE SUMMARY
Hospitalist Discharge Summary        Patient: Callie Gomes  YOB: 1950  MRN: [id number]   Acct: [de-identified]    Primary Care Physician: Todd Allen MD    Admit date  5/3/2023    Discharge date: 5/14/2023  5:19 PM    Chief Complaint on presentation :-  SOB     Discharge Assessment and Plan:-   Constipation: Pt had BM on 5/12. KUB showed no ileus or SBO. Pt received enema. Pt had bowels sounds x4. Continue bowel regimen. Recurrent pleural effusion: CTS consulted; chest tube removed now. After 3rd dose of TPA/doranase, Chest tube output 205 mL past 24 hrs. SVT/ pA Fib: HR uncontrolled. Continue Lopressor. Brachial Plexopathy due to compressive hematoma   Hypotension: Continue Midodrine. HFrEF, subacute: Pt is fluid euvolemic. Multiple Sclerosis  Hypothyroidism: Synthroid    Oral Dysphagia: SLP recommendations during last admission for minced moist food with thin liquid. Barium swallow on 4/25/2023 showed no laryngeal penetration or aspiration. Weakness    Anxiety   Severe malnutrition    Initial H and P and Hospital course:-  Per HPI documented 5/8/23: Kyung Conrad is a 74 y.o. female with PMHx of multiple sclerosis, hypothyroidism, oral dysphagia and CHF who presented to [facility] with chief complaint of shortness of breath and left rib pain. Patient reports that she was recently discharged on 4/27 to a nursing facility for continued rehab of her left arm. Reports that she started developing left rib pain and shortness of breath several days ago. She expected this pain to improve over the next few days, but it did not and she continued to have the same symptoms. With her symptoms and continued tachycardia, the nursing facility felt the patient should a evaluated in the ER. Patient reports she has some chest pressure and discomfort in the area of the sternal notch. She also notes coughing a lot and pain to the left side of her ribs.   Notes this pain is very similar to the pain she

## 2023-05-17 ENCOUNTER — TELEPHONE (OUTPATIENT)
Dept: CARDIOLOGY CLINIC | Age: 73
End: 2023-05-17

## 2023-05-17 NOTE — TELEPHONE ENCOUNTER
Review event monitor; ordered by Dr. Samantha Rockwell while pt was in house    FYI pt currently does not have a follow up appt scheduled

## 2023-05-20 ENCOUNTER — CLINICAL DOCUMENTATION (OUTPATIENT)
Dept: NON INVASIVE DIAGNOSTICS | Age: 73
End: 2023-05-20

## 2023-05-22 ENCOUNTER — TELEPHONE (OUTPATIENT)
Dept: CARDIOLOGY CLINIC | Age: 73
End: 2023-05-22

## 2023-05-22 ENCOUNTER — OFFICE VISIT (OUTPATIENT)
Dept: UROLOGY | Age: 73
End: 2023-05-22
Payer: MEDICARE

## 2023-05-22 VITALS — BODY MASS INDEX: 18.78 KG/M2 | WEIGHT: 106 LBS | HEIGHT: 63 IN | RESPIRATION RATE: 16 BRPM

## 2023-05-22 DIAGNOSIS — N20.0 KIDNEY STONE: Primary | ICD-10-CM

## 2023-05-22 DIAGNOSIS — N21.0 BLADDER STONE: ICD-10-CM

## 2023-05-22 DIAGNOSIS — N31.9 NEUROGENIC BLADDER: ICD-10-CM

## 2023-05-22 PROCEDURE — 1111F DSCHRG MED/CURRENT MED MERGE: CPT | Performed by: UROLOGY

## 2023-05-22 PROCEDURE — 99213 OFFICE O/P EST LOW 20 MIN: CPT | Performed by: UROLOGY

## 2023-05-22 PROCEDURE — 1036F TOBACCO NON-USER: CPT | Performed by: UROLOGY

## 2023-05-22 PROCEDURE — 1090F PRES/ABSN URINE INCON ASSESS: CPT | Performed by: UROLOGY

## 2023-05-22 PROCEDURE — G8420 CALC BMI NORM PARAMETERS: HCPCS | Performed by: UROLOGY

## 2023-05-22 PROCEDURE — G8427 DOCREV CUR MEDS BY ELIG CLIN: HCPCS | Performed by: UROLOGY

## 2023-05-22 PROCEDURE — 3017F COLORECTAL CA SCREEN DOC REV: CPT | Performed by: UROLOGY

## 2023-05-22 PROCEDURE — 1123F ACP DISCUSS/DSCN MKR DOCD: CPT | Performed by: UROLOGY

## 2023-05-22 PROCEDURE — G8400 PT W/DXA NO RESULTS DOC: HCPCS | Performed by: UROLOGY

## 2023-05-22 NOTE — PROGRESS NOTES
Dr. Noel Santos MD MD  [facility] Consultation / New Patient Visit    Patient:  Jermaine Pitts  YOB: 1950  Date: 5/22/2023  Consult requested from Caleb Ventura MD     HISTORY OF PRESENT ILLNESS:   The patient is a 74 y.o. female who presents today for follow-up for the following problem(s): NGB, kidnye stone  Overall the problem(s) : are worsening. Associated Symptoms: No dysuria, gross hematuria. Pain Severity:      Today visit:   5/22/23   Present with history of NGB, s/p SPT  Recently treated for kidney stone and bladder stone, KUB unremarkable. Summary of old records:   (Patient's old records, notes and chart reviewed and summarized above.)    Urinalysis today:  No results found for this visit on 05/22/23.     Last BUN and creatinine:  Lab Results   Component Value Date    BUN 18 05/13/2023     Lab Results   Component Value Date    CREATININE 0.3 (L) 05/13/2023       Imaging Reviewed during this Office Visit:   (results were independently reviewed by physician and radiology report verified)    PAST MEDICAL, FAMILY AND SOCIAL HISTORY:  Past Medical History:   Diagnosis Date    Contracture of joint, multiple sites 4/24/2023    Difficulty in swallowing     Hypothyroidism 5/4/2023    Intra-abdominal lymphadenopathy     Kidney stone     MS (multiple sclerosis) ([address] 1982    Neuromuscular disorder ([address]     Pancreatitis     Seizures ([address] 1991     Past Surgical History:   Procedure Laterality Date    CATHETER INSERTION N/A 1/17/2022    CYSTO, SUPRAPUBIC CATHETER PLACEMENT WITH ULTRASOUND performed by Noel Santos., MD at [facility]  April 1st 2016    laparoscopic    CT GUIDED CHEST TUBE  5/8/2023    CT GUIDED CHEST TUBE 5/8/2023 [facility] CT SCAN    CYSTOSCOPY N/A 1/2/2019    CYSTO, CLOT EVACUATION, TURBT performed by Melissa Betancur MD at [address] Left 11/6/2020    LEFT HEEL WOUND I&D performed by Jannie Felipe DPM at [facility]

## 2023-05-22 NOTE — TELEPHONE ENCOUNTER
Spoke with nurse and she said she is taking metoprolol 12.5 BID but nurse held it today due to low BP today 100/69 HR was 117    Nurse is asking for parameters on the metoprolol and midodrine      Appt made for after monitor

## 2023-05-22 NOTE — TELEPHONE ENCOUNTER
Lm for nurse at [facility] to call back to schedule patient for appt after event monitor is completed and checking to see if patient is taking  metoprolol is on list at 12.5 mg bid. Preventice strips faxed to office scanned in attached.  atrial flutter with variable conduction.

## 2023-05-24 NOTE — PROGRESS NOTES
Physician Progress Note      PATIENT:               Issac Lopez  CSN #:                  [id number]  :                       1950  ADMIT DATE:       5/3/2023 7:21 AM  DISCH DATE:        2023 5:19 PM  RESPONDING  PROVIDER #:        Sarah MELENDEZ          QUERY TEXT:    Pt admitted with recurrent pleural effusion. Pt noted to have previous   hemothorax and bloody drainage from chest tube. If possible, please document   in progress notes and discharge summary if you are treating the following: The medical record reflects the following:  Risk Factors: pleural effusion  Clinical Indicators: previous admission central line placement caused hematoma   that resulted in stent placement for bleeding, readmitted with pleural   effusion and CT showed bloody serous drainage  Treatment: Labs, imaging, monitoring, pleurex catheter placed, pulmonology   consult, CTS consult    Thanks, Dago Jennings, RN, BSN, CRCR  Options provided:  -- Recurrent pleural effusion and hematoma/hemothorax due to previous failed   central line placement  -- Recurrent pleural effusion and hematoma/hemothorax not due to previous   failed central line placement, pls specify. -- Other - I will add my own diagnosis  -- Disagree - Not applicable / Not valid  -- Disagree - Clinically unable to determine / Unknown  -- Refer to Clinical Documentation Reviewer    PROVIDER RESPONSE TEXT:    This patient has recurrent pleural effusion and hematoma/hemothorax due to   previous failed central line placement.     Query created by: Leticia Crouch on 2023 10:36 AM      Electronically signed by:  Kaylee MELENDEZ 2023 9:32 AM

## 2023-05-31 ENCOUNTER — HOSPITAL ENCOUNTER (OUTPATIENT)
Dept: WOUND CARE | Age: 73
Discharge: HOME | End: 2023-05-31
Payer: MEDICARE

## 2023-05-31 VITALS
RESPIRATION RATE: 16 BRPM | DIASTOLIC BLOOD PRESSURE: 64 MMHG | OXYGEN SATURATION: 98 % | HEART RATE: 129 BPM | SYSTOLIC BLOOD PRESSURE: 100 MMHG

## 2023-05-31 DIAGNOSIS — L89.310: Primary | ICD-10-CM

## 2023-05-31 PROBLEM — R65.21 SEPTIC SHOCK (HCC): Status: RESOLVED | Noted: 2019-08-29 | Resolved: 2023-05-31

## 2023-05-31 PROBLEM — L89.300 DECUBITUS ULCER OF BUTTOCK, UNSTAGEABLE (HCC): Status: RESOLVED | Noted: 2022-10-21 | Resolved: 2023-05-31

## 2023-05-31 PROBLEM — A41.9 SEPTIC SHOCK (HCC): Status: RESOLVED | Noted: 2019-08-29 | Resolved: 2023-05-31

## 2023-05-31 PROBLEM — U07.1 COVID-19 VIRUS INFECTION: Status: RESOLVED | Noted: 2020-11-29 | Resolved: 2023-05-31

## 2023-05-31 PROBLEM — L89.319: Status: RESOLVED | Noted: 2022-10-23 | Resolved: 2023-05-31

## 2023-05-31 PROBLEM — S31.819A WOUND OF RIGHT BUTTOCK: Status: RESOLVED | Noted: 2022-10-23 | Resolved: 2023-05-31

## 2023-05-31 PROCEDURE — 11042 DBRDMT SUBQ TIS 1ST 20SQCM/<: CPT

## 2023-05-31 PROCEDURE — 17250 CHEM CAUT OF GRANLTJ TISSUE: CPT

## 2023-05-31 PROCEDURE — 11042 DBRDMT SUBQ TIS 1ST 20SQCM/<: CPT | Performed by: NURSE PRACTITIONER

## 2023-05-31 PROCEDURE — 11045 DBRDMT SUBQ TISS EACH ADDL: CPT

## 2023-05-31 PROCEDURE — 6370000000 HC RX 637 (ALT 250 FOR IP): Performed by: NURSE PRACTITIONER

## 2023-05-31 PROCEDURE — 11045 DBRDMT SUBQ TISS EACH ADDL: CPT | Performed by: NURSE PRACTITIONER

## 2023-05-31 RX ORDER — BETAMETHASONE DIPROPIONATE 0.05 %
OINTMENT (GRAM) TOPICAL ONCE
OUTPATIENT
Start: 2023-05-31 | End: 2023-05-31

## 2023-05-31 RX ORDER — GENTAMICIN SULFATE 1 MG/G
OINTMENT TOPICAL ONCE
OUTPATIENT
Start: 2023-05-31 | End: 2023-05-31

## 2023-05-31 RX ORDER — SODIUM HYPOCHLORITE 1.25 MG/ML
SOLUTION TOPICAL DAILY
Qty: 473 ML | Refills: 0 | Status: SHIPPED | OUTPATIENT
Start: 2023-05-31

## 2023-05-31 RX ORDER — HYDROXYZINE HYDROCHLORIDE 10 MG/1
10 TABLET, FILM COATED ORAL 3 TIMES DAILY PRN
COMMUNITY

## 2023-05-31 RX ORDER — LIDOCAINE HYDROCHLORIDE 20 MG/ML
JELLY TOPICAL ONCE
OUTPATIENT
Start: 2023-05-31 | End: 2023-05-31

## 2023-05-31 RX ORDER — SODIUM CHLOR/HYPOCHLOROUS ACID 0.033 %
SOLUTION, IRRIGATION IRRIGATION ONCE
OUTPATIENT
Start: 2023-05-31 | End: 2023-05-31

## 2023-05-31 RX ORDER — ACETAMINOPHEN 325 MG/1
650 TABLET ORAL EVERY 6 HOURS PRN
COMMUNITY

## 2023-05-31 RX ORDER — LIDOCAINE 50 MG/G
OINTMENT TOPICAL ONCE
OUTPATIENT
Start: 2023-05-31 | End: 2023-05-31

## 2023-05-31 RX ORDER — LIDOCAINE HYDROCHLORIDE 20 MG/ML
JELLY TOPICAL ONCE
Status: COMPLETED | OUTPATIENT
Start: 2023-05-31 | End: 2023-05-31

## 2023-05-31 RX ORDER — BACITRACIN, NEOMYCIN, POLYMYXIN B 400; 3.5; 5 [USP'U]/G; MG/G; [USP'U]/G
OINTMENT TOPICAL ONCE
OUTPATIENT
Start: 2023-05-31 | End: 2023-05-31

## 2023-05-31 RX ORDER — BACITRACIN ZINC AND POLYMYXIN B SULFATE 500; 1000 [USP'U]/G; [USP'U]/G
OINTMENT TOPICAL ONCE
OUTPATIENT
Start: 2023-05-31 | End: 2023-05-31

## 2023-05-31 RX ORDER — LIDOCAINE HYDROCHLORIDE 40 MG/ML
SOLUTION TOPICAL ONCE
OUTPATIENT
Start: 2023-05-31 | End: 2023-05-31

## 2023-05-31 RX ORDER — LIDOCAINE 40 MG/G
CREAM TOPICAL ONCE
OUTPATIENT
Start: 2023-05-31 | End: 2023-05-31

## 2023-05-31 RX ORDER — HYDROCODONE BITARTRATE AND ACETAMINOPHEN 5; 325 MG/1; MG/1
1 TABLET ORAL EVERY 6 HOURS PRN
COMMUNITY

## 2023-05-31 RX ORDER — CLOBETASOL PROPIONATE 0.5 MG/G
OINTMENT TOPICAL ONCE
OUTPATIENT
Start: 2023-05-31 | End: 2023-05-31

## 2023-05-31 RX ORDER — TROSPIUM CHLORIDE 20 MG/1
TABLET, FILM COATED ORAL
Qty: 60 TABLET | Refills: 3 | Status: SHIPPED | OUTPATIENT
Start: 2023-05-31

## 2023-05-31 RX ORDER — LORAZEPAM 0.5 MG/1
0.5 TABLET ORAL 2 TIMES DAILY
COMMUNITY

## 2023-05-31 RX ORDER — LIDOCAINE 40 MG/G
CREAM TOPICAL DAILY
COMMUNITY

## 2023-05-31 RX ORDER — GINSENG 100 MG
CAPSULE ORAL ONCE
OUTPATIENT
Start: 2023-05-31 | End: 2023-05-31

## 2023-05-31 RX ADMIN — SILVER NITRATE APPLICATORS 2 EACH: 25; 75 STICK TOPICAL at 13:40

## 2023-05-31 RX ADMIN — LIDOCAINE HYDROCHLORIDE: 20 JELLY TOPICAL at 13:30

## 2023-05-31 NOTE — DISCHARGE INSTRUCTIONS
Visit Discharge/Physician Orders: debridement done today  - offload pressure changing positions every 2 hours  - silver nitrate applied today, there will be grayish black drainage for a few days this is normal  - please have the white phillip pad with clips under patient for these appointments  - increase protein in the diet to promote healing    Home Care: [facility]    Wound Location: right ischium    Dressing orders:     1) Gather wound care supplies and arrange on clean table. 2) Wash your hands with soap and water or use alcohol based hand  for 20 seconds (sing \"Happy Birthday\" twice). 3) Cleanse wounds with normal saline or wound cleanser and gauze. Pat dry with clean gauze. 4) Apply quarter strength dakins to a gauze wring out the excess, apply into the wound bed, apply zinc oxide to the good skin around the wound, cover with an ABD pad and tape into place. Change twice a day and when soiled. Keep all dressings clean & dry. Follow up visit:   2 Weeks 6/14/23 at 1:30pm    Supplies:    Duration of dressings: 90 days    We have sent your supply order to the following company:  per facility  If you don't receive the items you were expecting or don't know what the items are that you received, call the company where the order was sent. If you are unable to obtain wound supplies, continue to use the supplies you have available until you are able to reach us. It is most important to keep the wound covered at all times. It is YOUR responsibility to make sure that supplies are re-ordered before you run out. Re-order telephone numbers are included in each package. Keep next scheduled appointment. Please give 24 hour notice if unable to keep appointment. [phone]    If you experience any of the following, please call the Wound Care Service during business hours: Monday through Friday 8:00 am - 4:30 pm  ([phone]).    *Increase in pain   *Temperature over 101   *Increase in

## 2023-05-31 NOTE — TELEPHONE ENCOUNTER
Gurvinder Plascencia called requesting a refill on the following medications:  Requested Prescriptions     Pending Prescriptions Disp Refills    trospium (SANCTURA) 20 MG tablet [Pharmacy Med Name: TROSPIUM CHLORIDE 20 MG TABLET] 60 tablet 3     Sig: take 1 tablet by mouth twice a day     Pharmacy verified:  [email or web address]      Date of last visit: 05/22/2023  Date of next visit (if applicable): 57/74/4603

## 2023-05-31 NOTE — PROGRESS NOTES
[facility] and Procedure Note      [address] RECORD NUMBER:  [id number]  AGE: 74 y.o. GENDER: female  : 1950  EPISODE DATE:  2023    SUBJECTIVE:     Chief Complaint   Patient presents with    Wound Check     Right buttock         HISTORY OF PRESENT ILLNESS      Lina Fragoso is a 74 y.o. female who presents today for evaluation of right buttock wound. 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 She was last evaluated 2022 at which time wound was significantly worsened and she was recommended to go to ER for evaluation. She was admitted that day, underwent debridement of right sided ischial wound per Dr. Opal Leal. After that time she was managed by traveling wound care team until recent admission to [address] [place] [address]. Current wound care includes silicone bordered foam dressings. She denies pain to wound. Unknown amounts of drainage. She has suprapubic catheter. Does note fecal incontinence, utilizes adult brief. Review of charting shows significant, recent weight loss. Patient notes no appetite, does get protein shakes with meals but is not able to take in significant amounts. She utilizes phillip at [facility] ([place], [place]), states the straps lay across wound with use. She is mostly bed-bound, states she rarely gets up to the chair. She does sit up at 60-90 degree angle in bed, however, due to swallowing and respiratory issues secondary to MS. She does state that facility helps with repositioning at facility, utilizing pillows. She states she thinks she is on a specialty mattress as well. She denies any fevers, chills. Denies any further needs or concerns.     PAST MEDICAL HISTORY             Diagnosis Date    Contracture of joint, multiple sites 2023    Difficulty in swallowing     Hypothyroidism 2023    Intra-abdominal lymphadenopathy     Kidney stone

## 2023-06-07 ENCOUNTER — TELEPHONE (OUTPATIENT)
Dept: WOUND CARE | Age: 73
End: 2023-06-07

## 2023-06-07 NOTE — TELEPHONE ENCOUNTER
Spoke with Joseph Castleman at [place] [facility] [employer]. She is concerned that patient wound has an odor and is getting deeper. They are currently dressing wound with Dakins moist to dry 2 times daily. Spoke with provider who would like to see patient virtually sooner than her scheduled appointment to assess wound. Appointment changed to 6/9 virtual visit. Joseph Castleman voices understanding and she will probably be assisting with the appointment.

## 2023-06-07 NOTE — TELEPHONE ENCOUNTER
----- Message from Leticia Chau sent at 6/6/2023  1:52 PM EDT -----   [phone] FRANK @ [facility] WANTS TO KNOW IF CAN MOVE SANAM'S APPT UP TO A SOONER DAY?  THEY ARE CONCERNED ABOUT POSSIBLE INFECTION

## 2023-06-09 ENCOUNTER — OFFICE VISIT (OUTPATIENT)
Dept: CARDIOLOGY CLINIC | Age: 73
End: 2023-06-09

## 2023-06-09 ENCOUNTER — HOSPITAL ENCOUNTER (OUTPATIENT)
Dept: WOUND CARE | Age: 73
Discharge: HOME | End: 2023-06-09
Payer: MEDICARE

## 2023-06-09 VITALS
DIASTOLIC BLOOD PRESSURE: 80 MMHG | HEIGHT: 63 IN | HEART RATE: 112 BPM | WEIGHT: 105.19 LBS | BODY MASS INDEX: 18.64 KG/M2 | SYSTOLIC BLOOD PRESSURE: 110 MMHG

## 2023-06-09 DIAGNOSIS — Z86.79 HX OF ATRIAL FLUTTER: Primary | ICD-10-CM

## 2023-06-09 DIAGNOSIS — L89.310: ICD-10-CM

## 2023-06-09 DIAGNOSIS — L89.310 PRESSURE INJURY OF RIGHT ISCHIUM, UNSTAGEABLE (HCC): Primary | ICD-10-CM

## 2023-06-09 PROCEDURE — 99213 OFFICE O/P EST LOW 20 MIN: CPT | Performed by: NURSE PRACTITIONER

## 2023-06-09 PROCEDURE — 99212 OFFICE O/P EST SF 10 MIN: CPT

## 2023-06-09 RX ORDER — ZINC OXIDE 20 %
OINTMENT (GRAM) TOPICAL 2 TIMES DAILY
COMMUNITY

## 2023-06-09 NOTE — DISCHARGE INSTRUCTIONS
unable to keep appointment. [phone]     If you experience any of the following, please call the Wound Care Service during business hours: Monday through Friday 8:00 am - 4:30 pm  ([phone]). *Increase in pain              *Temperature over 101              *Increase in drainage from your wound or a foul odor              *Uncontrolled swelling              *Need for compression bandage changes due to slippage, breakthrough drainage     If you need medical attention outside of business hours, please contact your Primary Care Doctor or go to the nearest emergency room.

## 2023-06-09 NOTE — PROGRESS NOTES
Called patient , Sharma Brittle, to discuss case and treatment options. Discussed complexity of case due to history of MS and mobility as well as my concerns for rapidly worsening wound. Size and extent of devitalized tissue makes it impossible to debride in clinic here due to concerns for pain and bleeding. Discussed proceeding with surgical evaluation vs. Palliative wound care. Risks and benefits of both discussed. At this time patient nontoxic, no indications for need for emergency room evaluation. Would recommend outpatient evaluation by general surgeon. Should patient begin to exhibit any new or worsening symptoms I would recommend she be evaluated at the emergency room. All questions and concerns addressed during call. Referral placed for general surgeon. Patient and  agreeable.
Callie Gomes, was evaluated through a synchronous (real-time) audio-video encounter. The patient (or guardian if applicable) is aware that this is a billable service, which includes applicable co-pays. This Virtual Visit was conducted with patient's (and/or legal guardian's) consent. Patient identification was verified, and a caregiver was present when appropriate. The patient was located at Home: [address] [place] [address]  Provider was located at [place] [address]): [address] MONICA BAINS II.[facility],  [address]         Total time spent for this encounter:  Del Aldridge RN on 6/9/2023 at 3:21 PM    An electronic signature was used to authenticate this note.
Virtual Visit Wound Care  Clinic Level of Care   NAME:  Celia Huitron OF BIRTH:  1950 GENDER: female  MEDICAL RECORD NUMBER:  [id number]   DATE:  6/9/2023     Patient Type Points   No documentation completed by nursing staff. []   0   Nursing staff documented in the navigator for an ESTABLISHED patient including Episode, Patient ID, Chief Complaint, Travel Screen, Allergies, Latex Allergy, Home Medication, History, Psychosocial Screen, C-SSRS Screen, Fall Risk, Nutritional Screen, Advanced Directive, Education and Plan of Care, and Discharge Instructions. The Functional Screening tab is only required if the patient's status changes. [x]   1   Nursing staff documented in the navigator for a NEW patient including Patient ID, Chief Complaint, Travel Screen, Allergies, Latex Allergy, Home Medication, History, Psychosocial Screen, C-SSRS Screen, Fall Risk, Nutritional Screen, Advanced Directive, Functional Screen, Education and Plan of Care, and Discharge Instructions. []   2   Nursing staff documented in the navigator for a CONSULT patient including Episode, Patient ID, Chief Complaint, Travel Screen, Allergies, Latex Allergy, Home Medication, History, Psychosocial Screen, C-SSRS Screen, Fall Risk, Nutritional Screen, Advanced Directive, Functional Screen, Education and Plan of Care, and Discharge Instructions. []   2     Wound Description Points   Unable to obtain image of Wound. For example, patient/caregiver is instructed not to remove dressing, is unable to correctly position smart phone, no smart phone is available, patient is unable to maintain connectivity or the patient's wound is healed. []   0   1-3 wound images annotated. Images of the wound(s) is obtained and annotated along with completed description in [address] [place] [address]. [x]   1   4-5 wound images annotated. Images of the wound(s) is obtained and annotated along with completed description in [address] [place] [address]. []   2   Greater than 6 wound images annotated.  Images
She denies any fevers, chills. Denies any further needs or concerns.     Ulcer Identification:  Ulcer Type: pressure    Contributing Factors: chronic pressure, decreased mobility, shear force, malnutrition, incontinence of stool, and incontinence of urine    Acute Wound: N/A not an acute wound    PAST MEDICAL HISTORY        Diagnosis Date    Contracture of joint, multiple sites 4/24/2023    Difficulty in swallowing     Hypothyroidism 5/4/2023    Intra-abdominal lymphadenopathy     Kidney stone     MS (multiple sclerosis) ([facility] 1982    Neuromuscular disorder ([facility]     Pancreatitis     Seizures ([facility] [address] 1991       PAST SURGICAL HISTORY    Past Surgical History:   Procedure Laterality Date    CATHETER INSERTION N/A 1/17/2022    CYSTO, SUPRAPUBIC CATHETER PLACEMENT WITH ULTRASOUND performed by Conchita Farley MD at [facility]  April 1st 2016    laparoscopic    CT GUIDED CHEST TUBE  5/8/2023    CT GUIDED CHEST TUBE 5/8/2023 [facility] CT SCAN    CYSTOSCOPY N/A 1/2/2019    CYSTO, CLOT EVACUATION, TURBT performed by Renita Doshi MD at [address] [facility] Left 11/6/2020    LEFT HEEL WOUND I&D performed by Katie Myrick DPM at [facility] Right 12/17/2018    EYE CATARACT EMULSIFICATION IOL IMPLANT, RIGHT performed by Theo Bullard MD at [address] [place] [address] IO LENS PROSTH W/O ECP Left 11/15/2018    EYE CATARACT EMULSIFICATION IOL IMPLANT LEFT EYE performed by Theo Bullard MD at [facility] Right 10/22/2022    DECUBITUS ULCER DEBRIDEMENT SUPINE WITH CANDY CANES performed by Luan Gomez DO at [facility] Left 4/17/2023    CYSTOSCOPY, CYSTOLITHOLAPAXY, LEFT URETEROSCOPY, LASER LITHOTRIPSY, LEFT URETERAL STENT PLACEMENT,suprapubic catheter exchange performed by Conchita Farley MD at [address] [facility]    Family History   Problem Relation Age of Onset

## 2023-06-09 NOTE — PLAN OF CARE
Problem: Wound:  Goal: Will show signs of wound healing; wound closure and no evidence of infection  Description: Will show signs of wound healing; wound closure and no evidence of infection  Outcome: Not Progressing  Note: Patient seen virtual for right ischial wound. Wound is not showing signs of proper closure and healing. possible s/s of infection noted. Labs and culture ordered to be doneFollow up in 1 weeks. To be seen by Dr. Jeanne Rivera at this visit as well. See AVS for order changes. Care plan reviewed with patient and facility nurse. Patient and facility nurse verbalize understanding of the plan of care and contribute to goal setting.

## 2023-06-09 NOTE — PROGRESS NOTES
Pt here for fu monitor     Pt brought monitor with her     Pt states no c/o
62   LV PW Diastolic: EB/97 J^2XO ESV/LV ESV   0.7 cm           Index: 35 ml/23 m^2   Septum           EF Calculated: 43.6 %       RV Diastolic Dimension: 1.6   Diastolic: 0.8                               cm   cm                                                LA/Aorta: 0.8     Doppler Measurements & Calculations      MV Peak E-Wave: 89.8 cm/s  AV Peak Velocity: 142 LVOT Peak Velocity: 100   MV Peak A-Wave: 91.8 cm/s  cm/s                  cm/s   MV E/A Ratio: 0.98         AV Peak Gradient:     LVOT Peak Gradient: 4   MV Peak Gradient: 3.23     8.07 mmHg             mmHg   mmHg                                                    TV Peak E-Wave: 51.3 cm/s   MV Deceleration Time: 313                        TV Peak A-Wave: 39 cm/s   msec                              IVRT: 70 msec         TV Peak Gradient: 1.05                                                    mmHg   MV E' Septal Velocity:                           TR Velocity:296 cm/s   8.68 cm/s                  AV DVI (Vmax):0.7     TR Gradient:35.05 mmHg   MV A' Septal Velocity:                           PV Peak Velocity: 73.5   8.48 cm/s                                        cm/s   MV E' Lateral Velocity:                          PV Peak Gradient: 2.16   7.02 cm/s                                        mmHg   MV A' Lateral Velocity:   11.1 cm/s   E/E' septal: 10.35   E/E' lateral: 12.79   MR Velocity: 398 cm/s     [email or web address] [id number]       STRESS:    CATH:    Assessment/Plan       Diagnosis Orders   1.  Hx of atrial flutter            Short run of atrial flutter  Leg edema , wheel chair bound due to MS  HLD  Seizure disorder  Pancreatitis     No major cardiac symptoms  Short run of aflutter  ASN5VUCHOH:2, (age, sex)  Discussed [address]  Patient wants to think about it  She states \"ill like to let things go as they are\"  She does not ambulate and is

## 2023-06-14 ENCOUNTER — HOSPITAL ENCOUNTER (INPATIENT)
Age: 73
LOS: 9 days | Discharge: NURSING, REHAB OR LONG-TERM CARE | DRG: 579 | End: 2023-06-29
Attending: SURGERY | Admitting: SURGERY
Payer: MEDICARE

## 2023-06-14 DIAGNOSIS — L89.310: ICD-10-CM

## 2023-06-14 DIAGNOSIS — L89.319 RIGHT ISCHIAL PRESSURE SORE, UNSPECIFIED PRESSURE ULCER STAGE: Primary | ICD-10-CM

## 2023-06-14 DIAGNOSIS — L89.310 PRESSURE INJURY OF RIGHT ISCHIUM, UNSTAGEABLE (HCC): ICD-10-CM

## 2023-06-14 DIAGNOSIS — L89.150 PRESSURE INJURY OF SACRAL REGION, UNSTAGEABLE (HCC): ICD-10-CM

## 2023-06-14 DIAGNOSIS — Z43.3 COLOSTOMY CARE (HCC): ICD-10-CM

## 2023-06-14 DIAGNOSIS — L89.159 PRESSURE INJURY OF SKIN OF SACRAL REGION, UNSPECIFIED INJURY STAGE: ICD-10-CM

## 2023-06-14 DIAGNOSIS — L89.152 PRESSURE INJURY OF SACRAL REGION, STAGE 2 (HCC): ICD-10-CM

## 2023-06-14 PROCEDURE — 96361 HYDRATE IV INFUSION ADD-ON: CPT

## 2023-06-14 PROCEDURE — G0378 HOSPITAL OBSERVATION PER HR: HCPCS

## 2023-06-14 PROCEDURE — 2500000003 HC RX 250 WO HCPCS: Performed by: SURGERY

## 2023-06-14 PROCEDURE — G0379 DIRECT REFER HOSPITAL OBSERV: HCPCS

## 2023-06-14 PROCEDURE — 99024 POSTOP FOLLOW-UP VISIT: CPT | Performed by: SURGERY

## 2023-06-14 PROCEDURE — 2580000003 HC RX 258: Performed by: SURGERY

## 2023-06-14 PROCEDURE — 6370000000 HC RX 637 (ALT 250 FOR IP): Performed by: SURGERY

## 2023-06-14 RX ORDER — DOCUSATE SODIUM 100 MG/1
100 CAPSULE, LIQUID FILLED ORAL 2 TIMES DAILY
Status: DISCONTINUED | OUTPATIENT
Start: 2023-06-14 | End: 2023-06-29 | Stop reason: HOSPADM

## 2023-06-14 RX ORDER — SODIUM CHLORIDE 9 MG/ML
INJECTION, SOLUTION INTRAVENOUS PRN
Status: DISCONTINUED | OUTPATIENT
Start: 2023-06-14 | End: 2023-06-15 | Stop reason: HOSPADM

## 2023-06-14 RX ORDER — ACETAMINOPHEN 325 MG/1
650 TABLET ORAL EVERY 6 HOURS PRN
Status: DISCONTINUED | OUTPATIENT
Start: 2023-06-14 | End: 2023-06-29 | Stop reason: HOSPADM

## 2023-06-14 RX ORDER — LACTULOSE 10 G/15ML
10 SOLUTION ORAL 3 TIMES DAILY
Status: DISCONTINUED | OUTPATIENT
Start: 2023-06-14 | End: 2023-06-29 | Stop reason: HOSPADM

## 2023-06-14 RX ORDER — POLYETHYLENE GLYCOL 3350 17 G/17G
17 POWDER, FOR SOLUTION ORAL DAILY
COMMUNITY

## 2023-06-14 RX ORDER — HYDROXYZINE HYDROCHLORIDE 10 MG/1
10 TABLET, FILM COATED ORAL 3 TIMES DAILY PRN
Status: DISCONTINUED | OUTPATIENT
Start: 2023-06-14 | End: 2023-06-29 | Stop reason: HOSPADM

## 2023-06-14 RX ORDER — SODIUM CHLORIDE 0.9 % (FLUSH) 0.9 %
5-40 SYRINGE (ML) INJECTION EVERY 12 HOURS SCHEDULED
Status: DISCONTINUED | OUTPATIENT
Start: 2023-06-14 | End: 2023-06-15 | Stop reason: HOSPADM

## 2023-06-14 RX ORDER — LORAZEPAM 0.5 MG/1
0.5 TABLET ORAL 3 TIMES DAILY
Status: DISCONTINUED | OUTPATIENT
Start: 2023-06-14 | End: 2023-06-29 | Stop reason: HOSPADM

## 2023-06-14 RX ORDER — SODIUM CHLORIDE 0.9 % (FLUSH) 0.9 %
5-40 SYRINGE (ML) INJECTION PRN
Status: DISCONTINUED | OUTPATIENT
Start: 2023-06-14 | End: 2023-06-15 | Stop reason: HOSPADM

## 2023-06-14 RX ORDER — SODIUM CHLORIDE 9 MG/ML
INJECTION, SOLUTION INTRAVENOUS CONTINUOUS
Status: DISCONTINUED | OUTPATIENT
Start: 2023-06-14 | End: 2023-06-15 | Stop reason: HOSPADM

## 2023-06-14 RX ORDER — POLYETHYLENE GLYCOL 3350 17 G/17G
17 POWDER, FOR SOLUTION ORAL 2 TIMES DAILY
Status: DISCONTINUED | OUTPATIENT
Start: 2023-06-14 | End: 2023-06-15 | Stop reason: SDUPTHER

## 2023-06-14 RX ORDER — CARBAMAZEPINE 100 MG/1
100 TABLET, CHEWABLE ORAL 3 TIMES DAILY
Status: DISCONTINUED | OUTPATIENT
Start: 2023-06-14 | End: 2023-06-29 | Stop reason: HOSPADM

## 2023-06-14 RX ORDER — HYDROCODONE BITARTRATE AND ACETAMINOPHEN 7.5; 325 MG/1; MG/1
1 TABLET ORAL EVERY 4 HOURS PRN
Status: DISCONTINUED | OUTPATIENT
Start: 2023-06-14 | End: 2023-06-15 | Stop reason: SDUPTHER

## 2023-06-14 RX ORDER — BUSPIRONE HYDROCHLORIDE 10 MG/1
10 TABLET ORAL 2 TIMES DAILY
Status: DISCONTINUED | OUTPATIENT
Start: 2023-06-14 | End: 2023-06-29 | Stop reason: HOSPADM

## 2023-06-14 RX ORDER — MIDODRINE HYDROCHLORIDE 10 MG/1
10 TABLET ORAL
Status: DISCONTINUED | OUTPATIENT
Start: 2023-06-14 | End: 2023-06-29 | Stop reason: HOSPADM

## 2023-06-14 RX ORDER — LEVOTHYROXINE SODIUM 0.05 MG/1
50 TABLET ORAL DAILY
Status: DISCONTINUED | OUTPATIENT
Start: 2023-06-15 | End: 2023-06-29 | Stop reason: HOSPADM

## 2023-06-14 RX ORDER — SENNA PLUS 8.6 MG/1
1 TABLET ORAL NIGHTLY
COMMUNITY

## 2023-06-14 RX ORDER — SENNA PLUS 8.6 MG/1
1 TABLET ORAL NIGHTLY
Status: DISCONTINUED | OUTPATIENT
Start: 2023-06-14 | End: 2023-06-29 | Stop reason: HOSPADM

## 2023-06-14 RX ORDER — TROSPIUM CHLORIDE 20 MG/1
20 TABLET, FILM COATED ORAL 2 TIMES DAILY
Status: DISCONTINUED | OUTPATIENT
Start: 2023-06-14 | End: 2023-06-29 | Stop reason: HOSPADM

## 2023-06-14 RX ORDER — VITAMIN B COMPLEX
1000 TABLET ORAL DAILY
Status: DISCONTINUED | OUTPATIENT
Start: 2023-06-15 | End: 2023-06-29 | Stop reason: HOSPADM

## 2023-06-14 RX ADMIN — CARBAMAZEPINE 100 MG: 100 TABLET, CHEWABLE ORAL at 20:19

## 2023-06-14 RX ADMIN — TROSPIUM CHLORIDE 20 MG: 20 TABLET, FILM COATED ORAL at 20:19

## 2023-06-14 RX ADMIN — HYDROCODONE BITARTRATE AND ACETAMINOPHEN 1 TABLET: 7.5; 325 TABLET ORAL at 22:54

## 2023-06-14 RX ADMIN — BUSPIRONE HYDROCHLORIDE 10 MG: 10 TABLET ORAL at 20:19

## 2023-06-14 RX ADMIN — SODIUM CHLORIDE: 9 INJECTION, SOLUTION INTRAVENOUS at 17:39

## 2023-06-14 RX ADMIN — HYDROCODONE BITARTRATE AND ACETAMINOPHEN 1 TABLET: 7.5; 325 TABLET ORAL at 18:50

## 2023-06-14 RX ADMIN — MICONAZOLE NITRATE: 2 POWDER TOPICAL at 20:20

## 2023-06-14 RX ADMIN — POLYETHYLENE GLYCOL 3350 17 G: 17 POWDER, FOR SOLUTION ORAL at 20:19

## 2023-06-14 RX ADMIN — METOPROLOL TARTRATE 25 MG: 25 TABLET, FILM COATED ORAL at 20:19

## 2023-06-14 RX ADMIN — SENNOSIDES 8.6 MG: 8.6 TABLET ORAL at 20:19

## 2023-06-14 RX ADMIN — LACTULOSE 10 G: 20 SOLUTION ORAL at 20:20

## 2023-06-14 RX ADMIN — LORAZEPAM 0.5 MG: 0.5 TABLET ORAL at 20:19

## 2023-06-14 ASSESSMENT — PAIN DESCRIPTION - ORIENTATION
ORIENTATION: UPPER
ORIENTATION: UPPER

## 2023-06-14 ASSESSMENT — PAIN DESCRIPTION - ONSET
ONSET: ON-GOING
ONSET: ON-GOING

## 2023-06-14 ASSESSMENT — PAIN DESCRIPTION - LOCATION
LOCATION: SACRUM
LOCATION: SACRUM

## 2023-06-14 ASSESSMENT — PAIN DESCRIPTION - PAIN TYPE
TYPE: CHRONIC PAIN
TYPE: CHRONIC PAIN

## 2023-06-14 ASSESSMENT — PAIN DESCRIPTION - DESCRIPTORS
DESCRIPTORS: ACHING;PRESSURE
DESCRIPTORS: ACHING;PRESSURE

## 2023-06-14 ASSESSMENT — PAIN - FUNCTIONAL ASSESSMENT: PAIN_FUNCTIONAL_ASSESSMENT: PREVENTS OR INTERFERES WITH MANY ACTIVE NOT PASSIVE ACTIVITIES

## 2023-06-14 ASSESSMENT — PAIN SCALES - GENERAL
PAINLEVEL_OUTOF10: 7
PAINLEVEL_OUTOF10: 8
PAINLEVEL_OUTOF10: 6

## 2023-06-14 ASSESSMENT — PAIN DESCRIPTION - FREQUENCY
FREQUENCY: CONTINUOUS
FREQUENCY: CONTINUOUS

## 2023-06-15 PROCEDURE — 6370000000 HC RX 637 (ALT 250 FOR IP): Performed by: SURGERY

## 2023-06-15 PROCEDURE — 97597 DBRDMT OPN WND 1ST 20 CM/<: CPT | Performed by: SURGERY

## 2023-06-15 PROCEDURE — 2500000003 HC RX 250 WO HCPCS: Performed by: SURGERY

## 2023-06-15 PROCEDURE — 2580000003 HC RX 258: Performed by: SURGERY

## 2023-06-15 PROCEDURE — 3600000002 HC SURGERY LEVEL 2 BASE: Performed by: SURGERY

## 2023-06-15 PROCEDURE — 3700000001 HC ADD 15 MINUTES (ANESTHESIA): Performed by: SURGERY

## 2023-06-15 PROCEDURE — 3600000012 HC SURGERY LEVEL 2 ADDTL 15MIN: Performed by: SURGERY

## 2023-06-15 PROCEDURE — 2709999900 HC NON-CHARGEABLE SUPPLY: Performed by: SURGERY

## 2023-06-15 PROCEDURE — 97598 DBRDMT OPN WND ADDL 20CM/<: CPT | Performed by: SURGERY

## 2023-06-15 PROCEDURE — 7100000001 HC PACU RECOVERY - ADDTL 15 MIN: Performed by: SURGERY

## 2023-06-15 PROCEDURE — 7100000000 HC PACU RECOVERY - FIRST 15 MIN: Performed by: SURGERY

## 2023-06-15 PROCEDURE — 6360000002 HC RX W HCPCS: Performed by: SURGERY

## 2023-06-15 PROCEDURE — 0KBN0ZZ EXCISION OF RIGHT HIP MUSCLE, OPEN APPROACH: ICD-10-PCS | Performed by: SURGERY

## 2023-06-15 PROCEDURE — G0378 HOSPITAL OBSERVATION PER HR: HCPCS

## 2023-06-15 PROCEDURE — 88304 TISSUE EXAM BY PATHOLOGIST: CPT

## 2023-06-15 PROCEDURE — 3700000000 HC ANESTHESIA ATTENDED CARE: Performed by: SURGERY

## 2023-06-15 PROCEDURE — 96361 HYDRATE IV INFUSION ADD-ON: CPT

## 2023-06-15 PROCEDURE — 6360000002 HC RX W HCPCS

## 2023-06-15 RX ORDER — FENTANYL CITRATE 50 UG/ML
50 INJECTION, SOLUTION INTRAMUSCULAR; INTRAVENOUS EVERY 5 MIN PRN
Status: DISCONTINUED | OUTPATIENT
Start: 2023-06-15 | End: 2023-06-15 | Stop reason: HOSPADM

## 2023-06-15 RX ORDER — MORPHINE SULFATE 4 MG/ML
4 INJECTION, SOLUTION INTRAMUSCULAR; INTRAVENOUS
Status: DISCONTINUED | OUTPATIENT
Start: 2023-06-15 | End: 2023-06-29 | Stop reason: HOSPADM

## 2023-06-15 RX ORDER — SODIUM HYPOCHLORITE 1.25 MG/ML
SOLUTION TOPICAL DAILY
Status: DISCONTINUED | OUTPATIENT
Start: 2023-06-15 | End: 2023-06-29 | Stop reason: HOSPADM

## 2023-06-15 RX ORDER — POLYETHYLENE GLYCOL 3350 17 G/17G
17 POWDER, FOR SOLUTION ORAL DAILY
Status: DISCONTINUED | OUTPATIENT
Start: 2023-06-15 | End: 2023-06-29 | Stop reason: HOSPADM

## 2023-06-15 RX ORDER — SODIUM CHLORIDE 0.9 % (FLUSH) 0.9 %
5-40 SYRINGE (ML) INJECTION EVERY 12 HOURS SCHEDULED
Status: DISCONTINUED | OUTPATIENT
Start: 2023-06-15 | End: 2023-06-29 | Stop reason: HOSPADM

## 2023-06-15 RX ORDER — SENNA PLUS 8.6 MG/1
1 TABLET ORAL NIGHTLY
Status: DISCONTINUED | OUTPATIENT
Start: 2023-06-15 | End: 2023-06-15 | Stop reason: SDUPTHER

## 2023-06-15 RX ORDER — DOCUSATE SODIUM 50 MG/5ML
100 LIQUID ORAL 2 TIMES DAILY
Status: DISCONTINUED | OUTPATIENT
Start: 2023-06-15 | End: 2023-06-15 | Stop reason: SDUPTHER

## 2023-06-15 RX ORDER — LORAZEPAM 0.5 MG/1
0.5 TABLET ORAL EVERY 4 HOURS PRN
Status: DISCONTINUED | OUTPATIENT
Start: 2023-06-15 | End: 2023-06-29 | Stop reason: HOSPADM

## 2023-06-15 RX ORDER — ONDANSETRON 4 MG/1
4 TABLET, ORALLY DISINTEGRATING ORAL EVERY 8 HOURS PRN
Status: DISCONTINUED | OUTPATIENT
Start: 2023-06-15 | End: 2023-06-24 | Stop reason: SDUPTHER

## 2023-06-15 RX ORDER — SODIUM CHLORIDE 9 MG/ML
INJECTION, SOLUTION INTRAVENOUS PRN
Status: DISCONTINUED | OUTPATIENT
Start: 2023-06-15 | End: 2023-06-29 | Stop reason: HOSPADM

## 2023-06-15 RX ORDER — ENOXAPARIN SODIUM 100 MG/ML
30 INJECTION SUBCUTANEOUS DAILY
Status: DISCONTINUED | OUTPATIENT
Start: 2023-06-16 | End: 2023-06-29 | Stop reason: HOSPADM

## 2023-06-15 RX ORDER — TROSPIUM CHLORIDE 20 MG/1
20 TABLET, FILM COATED ORAL 2 TIMES DAILY
Status: DISCONTINUED | OUTPATIENT
Start: 2023-06-15 | End: 2023-06-15 | Stop reason: SDUPTHER

## 2023-06-15 RX ORDER — SODIUM HYPOCHLORITE 5 MG/ML
SOLUTION TOPICAL PRN
Status: DISCONTINUED | OUTPATIENT
Start: 2023-06-15 | End: 2023-06-15 | Stop reason: ALTCHOICE

## 2023-06-15 RX ORDER — HYDROCODONE BITARTRATE AND ACETAMINOPHEN 5; 325 MG/1; MG/1
1 TABLET ORAL EVERY 6 HOURS PRN
Status: DISCONTINUED | OUTPATIENT
Start: 2023-06-15 | End: 2023-06-16

## 2023-06-15 RX ORDER — HYDROXYZINE HYDROCHLORIDE 10 MG/1
10 TABLET, FILM COATED ORAL 3 TIMES DAILY PRN
Status: DISCONTINUED | OUTPATIENT
Start: 2023-06-15 | End: 2023-06-15 | Stop reason: SDUPTHER

## 2023-06-15 RX ORDER — LACTULOSE 10 G/15ML
20 SOLUTION ORAL 3 TIMES DAILY
Status: DISCONTINUED | OUTPATIENT
Start: 2023-06-15 | End: 2023-06-15 | Stop reason: SDUPTHER

## 2023-06-15 RX ORDER — ONDANSETRON 2 MG/ML
4 INJECTION INTRAMUSCULAR; INTRAVENOUS EVERY 6 HOURS PRN
Status: DISCONTINUED | OUTPATIENT
Start: 2023-06-15 | End: 2023-06-24 | Stop reason: SDUPTHER

## 2023-06-15 RX ORDER — CARBAMAZEPINE 100 MG/1
100 TABLET, CHEWABLE ORAL 3 TIMES DAILY
Status: DISCONTINUED | OUTPATIENT
Start: 2023-06-15 | End: 2023-06-15 | Stop reason: SDUPTHER

## 2023-06-15 RX ORDER — MORPHINE SULFATE 2 MG/ML
2 INJECTION, SOLUTION INTRAMUSCULAR; INTRAVENOUS
Status: DISCONTINUED | OUTPATIENT
Start: 2023-06-15 | End: 2023-06-29 | Stop reason: HOSPADM

## 2023-06-15 RX ORDER — FENTANYL CITRATE 50 UG/ML
INJECTION, SOLUTION INTRAMUSCULAR; INTRAVENOUS
Status: COMPLETED
Start: 2023-06-15 | End: 2023-06-15

## 2023-06-15 RX ORDER — MIDODRINE HYDROCHLORIDE 10 MG/1
10 TABLET ORAL
Status: DISCONTINUED | OUTPATIENT
Start: 2023-06-16 | End: 2023-06-15 | Stop reason: SDUPTHER

## 2023-06-15 RX ORDER — MORPHINE SULFATE 2 MG/ML
2 INJECTION, SOLUTION INTRAMUSCULAR; INTRAVENOUS
Status: DISCONTINUED | OUTPATIENT
Start: 2023-06-15 | End: 2023-06-15 | Stop reason: SDUPTHER

## 2023-06-15 RX ORDER — SODIUM CHLORIDE 0.9 % (FLUSH) 0.9 %
5-40 SYRINGE (ML) INJECTION PRN
Status: DISCONTINUED | OUTPATIENT
Start: 2023-06-15 | End: 2023-06-29 | Stop reason: HOSPADM

## 2023-06-15 RX ORDER — LEVOTHYROXINE SODIUM 0.05 MG/1
50 TABLET ORAL DAILY
Status: DISCONTINUED | OUTPATIENT
Start: 2023-06-16 | End: 2023-06-15 | Stop reason: SDUPTHER

## 2023-06-15 RX ORDER — BUSPIRONE HYDROCHLORIDE 10 MG/1
10 TABLET ORAL 2 TIMES DAILY
Status: DISCONTINUED | OUTPATIENT
Start: 2023-06-15 | End: 2023-06-15 | Stop reason: SDUPTHER

## 2023-06-15 RX ADMIN — MICONAZOLE NITRATE: 2 POWDER TOPICAL at 21:43

## 2023-06-15 RX ADMIN — LORAZEPAM 0.5 MG: 0.5 TABLET ORAL at 13:00

## 2023-06-15 RX ADMIN — HYDROCODONE BITARTRATE AND ACETAMINOPHEN 1 TABLET: 7.5; 325 TABLET ORAL at 13:00

## 2023-06-15 RX ADMIN — FENTANYL CITRATE 50 MCG: 0.05 INJECTION, SOLUTION INTRAMUSCULAR; INTRAVENOUS at 19:38

## 2023-06-15 RX ADMIN — LORAZEPAM 0.5 MG: 0.5 TABLET ORAL at 21:34

## 2023-06-15 RX ADMIN — BUSPIRONE HYDROCHLORIDE 10 MG: 10 TABLET ORAL at 21:34

## 2023-06-15 RX ADMIN — CARBAMAZEPINE 100 MG: 100 TABLET, CHEWABLE ORAL at 21:34

## 2023-06-15 RX ADMIN — FENTANYL CITRATE 50 MCG: 50 INJECTION, SOLUTION INTRAMUSCULAR; INTRAVENOUS at 19:38

## 2023-06-15 RX ADMIN — MORPHINE SULFATE 2 MG: 2 INJECTION, SOLUTION INTRAMUSCULAR; INTRAVENOUS at 21:35

## 2023-06-15 RX ADMIN — HYDROCODONE BITARTRATE AND ACETAMINOPHEN 1 TABLET: 7.5; 325 TABLET ORAL at 08:59

## 2023-06-15 RX ADMIN — SENNOSIDES 8.6 MG: 8.6 TABLET ORAL at 21:34

## 2023-06-15 RX ADMIN — METOPROLOL TARTRATE 25 MG: 25 TABLET, FILM COATED ORAL at 08:59

## 2023-06-15 RX ADMIN — MICONAZOLE NITRATE: 2 POWDER TOPICAL at 09:08

## 2023-06-15 RX ADMIN — POLYETHYLENE GLYCOL 3350 17 G: 17 POWDER, FOR SOLUTION ORAL at 21:35

## 2023-06-15 RX ADMIN — SODIUM CHLORIDE: 9 INJECTION, SOLUTION INTRAVENOUS at 10:23

## 2023-06-15 RX ADMIN — LACTULOSE 10 G: 20 SOLUTION ORAL at 21:35

## 2023-06-15 RX ADMIN — MIDODRINE HYDROCHLORIDE 10 MG: 10 TABLET ORAL at 08:39

## 2023-06-15 RX ADMIN — LORAZEPAM 0.5 MG: 0.5 TABLET ORAL at 08:59

## 2023-06-15 RX ADMIN — TROSPIUM CHLORIDE 20 MG: 20 TABLET, FILM COATED ORAL at 21:34

## 2023-06-15 RX ADMIN — HYDROCODONE BITARTRATE AND ACETAMINOPHEN 1 TABLET: 7.5; 325 TABLET ORAL at 03:13

## 2023-06-15 RX ADMIN — METOPROLOL TARTRATE 25 MG: 25 TABLET, FILM COATED ORAL at 21:34

## 2023-06-15 ASSESSMENT — PAIN DESCRIPTION - DESCRIPTORS
DESCRIPTORS: ACHING
DESCRIPTORS: ACHING;PRESSURE

## 2023-06-15 ASSESSMENT — PAIN DESCRIPTION - ONSET
ONSET: ON-GOING
ONSET: ON-GOING

## 2023-06-15 ASSESSMENT — PAIN SCALES - GENERAL
PAINLEVEL_OUTOF10: 5
PAINLEVEL_OUTOF10: 7
PAINLEVEL_OUTOF10: 10
PAINLEVEL_OUTOF10: 6
PAINLEVEL_OUTOF10: 8
PAINLEVEL_OUTOF10: 5
PAINLEVEL_OUTOF10: 10
PAINLEVEL_OUTOF10: 6

## 2023-06-15 ASSESSMENT — PAIN DESCRIPTION - ORIENTATION
ORIENTATION: UPPER
ORIENTATION: MID

## 2023-06-15 ASSESSMENT — PAIN DESCRIPTION - PAIN TYPE
TYPE: SURGICAL PAIN
TYPE: CHRONIC PAIN

## 2023-06-15 ASSESSMENT — PAIN DESCRIPTION - LOCATION
LOCATION: SACRUM
LOCATION: SACRUM

## 2023-06-15 ASSESSMENT — PAIN DESCRIPTION - FREQUENCY
FREQUENCY: CONTINUOUS
FREQUENCY: CONTINUOUS

## 2023-06-16 LAB
ANION GAP SERPL CALC-SCNC: 12 MEQ/L (ref 8–16)
BACTERIA: ABNORMAL
BASOPHILS ABSOLUTE: 0.1 THOU/MM3 (ref 0–0.1)
BASOPHILS NFR BLD AUTO: 1.1 %
BILIRUB UR QL STRIP: NEGATIVE
BUN SERPL-MCNC: 8 MG/DL (ref 7–22)
CALCIUM SERPL-MCNC: 7.9 MG/DL (ref 8.5–10.5)
CASTS #/AREA URNS LPF: ABNORMAL /LPF
CASTS #/AREA URNS LPF: ABNORMAL /LPF
CHARACTER UR: ABNORMAL
CHARCOAL URNS QL MICRO: ABNORMAL
CHLORIDE SERPL-SCNC: 109 MEQ/L (ref 98–111)
CO2 SERPL-SCNC: 19 MEQ/L (ref 23–33)
COLOR UR: YELLOW
CREAT SERPL-MCNC: 0.2 MG/DL (ref 0.4–1.2)
CRYSTALS URNS QL MICRO: ABNORMAL
DEPRECATED RDW RBC AUTO: 60.9 FL (ref 35–45)
EOSINOPHIL NFR BLD AUTO: 2.7 %
EOSINOPHILS ABSOLUTE: 0.2 THOU/MM3 (ref 0–0.4)
EPITHELIAL CELLS, UA: ABNORMAL /HPF
ERYTHROCYTE [DISTWIDTH] IN BLOOD BY AUTOMATED COUNT: 15.4 % (ref 11.5–14.5)
GFR SERPL CREATININE-BSD FRML MDRD: > 60 ML/MIN/1.73M2
GLUCOSE BLD STRIP.AUTO-MCNC: 72 MG/DL (ref 70–108)
GLUCOSE SERPL-MCNC: 55 MG/DL (ref 70–108)
GLUCOSE UR QL STRIP.AUTO: NEGATIVE MG/DL
HCT VFR BLD AUTO: 35.6 % (ref 37–47)
HGB BLD-MCNC: 10.9 GM/DL (ref 12–16)
HGB UR QL STRIP.AUTO: ABNORMAL
IMM GRANULOCYTES # BLD AUTO: 0.04 THOU/MM3 (ref 0–0.07)
IMM GRANULOCYTES NFR BLD AUTO: 0.5 %
KETONES UR QL STRIP.AUTO: 40
LEUKOCYTE ESTERASE UR QL STRIP.AUTO: ABNORMAL
LYMPHOCYTES ABSOLUTE: 1.8 THOU/MM3 (ref 1–4.8)
LYMPHOCYTES NFR BLD AUTO: 22.4 %
MCH RBC QN AUTO: 32.5 PG (ref 26–33)
MCHC RBC AUTO-ENTMCNC: 30.6 GM/DL (ref 32.2–35.5)
MCV RBC AUTO: 106.3 FL (ref 81–99)
MONOCYTES ABSOLUTE: 0.6 THOU/MM3 (ref 0.4–1.3)
MONOCYTES NFR BLD AUTO: 7.4 %
MUCOUS THREADS URNS QL MICRO: ABNORMAL
NEUTROPHILS NFR BLD AUTO: 65.9 %
NITRITE UR QL STRIP.AUTO: NEGATIVE
NRBC BLD AUTO-RTO: 0 /100 WBC
PH UR STRIP.AUTO: 5.5 [PH] (ref 5–9)
PLATELET # BLD AUTO: 237 THOU/MM3 (ref 130–400)
PMV BLD AUTO: 10.1 FL (ref 9.4–12.4)
POTASSIUM SERPL-SCNC: 4 MEQ/L (ref 3.5–5.2)
PROT UR STRIP.AUTO-MCNC: 30 MG/DL
RBC # BLD AUTO: 3.35 MILL/MM3 (ref 4.2–5.4)
RBC #/AREA URNS HPF: ABNORMAL /HPF
RENAL EPI CELLS #/AREA URNS HPF: ABNORMAL /[HPF]
SEGMENTED NEUTROPHILS ABSOLUTE COUNT: 5.2 THOU/MM3 (ref 1.8–7.7)
SODIUM SERPL-SCNC: 140 MEQ/L (ref 135–145)
SPECIFIC GRAVITY UA: 1.02 (ref 1–1.03)
UROBILINOGEN, URINE: 0.2 EU/DL (ref 0–1)
WBC # BLD AUTO: 7.9 THOU/MM3 (ref 4.8–10.8)
WBC #/AREA URNS HPF: > 200 /HPF
YEAST LIKE FUNGI URNS QL MICRO: ABNORMAL

## 2023-06-16 PROCEDURE — 99024 POSTOP FOLLOW-UP VISIT: CPT | Performed by: SURGERY

## 2023-06-16 PROCEDURE — 82948 REAGENT STRIP/BLOOD GLUCOSE: CPT

## 2023-06-16 PROCEDURE — 6360000002 HC RX W HCPCS: Performed by: SURGERY

## 2023-06-16 PROCEDURE — 81001 URINALYSIS AUTO W/SCOPE: CPT

## 2023-06-16 PROCEDURE — 87077 CULTURE AEROBIC IDENTIFY: CPT

## 2023-06-16 PROCEDURE — 87086 URINE CULTURE/COLONY COUNT: CPT

## 2023-06-16 PROCEDURE — 97605 NEG PRS WND THER DME<=50SQCM: CPT

## 2023-06-16 PROCEDURE — 96372 THER/PROPH/DIAG INJ SC/IM: CPT

## 2023-06-16 PROCEDURE — G0378 HOSPITAL OBSERVATION PER HR: HCPCS

## 2023-06-16 PROCEDURE — 85025 COMPLETE CBC W/AUTO DIFF WBC: CPT

## 2023-06-16 PROCEDURE — 6370000000 HC RX 637 (ALT 250 FOR IP): Performed by: SURGERY

## 2023-06-16 PROCEDURE — 36415 COLL VENOUS BLD VENIPUNCTURE: CPT

## 2023-06-16 PROCEDURE — 2580000003 HC RX 258: Performed by: SURGERY

## 2023-06-16 PROCEDURE — 99222 1ST HOSP IP/OBS MODERATE 55: CPT | Performed by: HOSPITALIST

## 2023-06-16 PROCEDURE — 87186 SC STD MICRODIL/AGAR DIL: CPT

## 2023-06-16 PROCEDURE — 80048 BASIC METABOLIC PNL TOTAL CA: CPT

## 2023-06-16 RX ORDER — SODIUM CHLORIDE 9 MG/ML
INJECTION, SOLUTION INTRAVENOUS CONTINUOUS
Status: DISCONTINUED | OUTPATIENT
Start: 2023-06-16 | End: 2023-06-29 | Stop reason: HOSPADM

## 2023-06-16 RX ORDER — DEXTROSE MONOHYDRATE 100 MG/ML
INJECTION, SOLUTION INTRAVENOUS CONTINUOUS PRN
Status: DISCONTINUED | OUTPATIENT
Start: 2023-06-16 | End: 2023-06-29 | Stop reason: HOSPADM

## 2023-06-16 RX ORDER — HYDROCODONE BITARTRATE AND ACETAMINOPHEN 5; 325 MG/1; MG/1
1 TABLET ORAL EVERY 4 HOURS PRN
Status: DISCONTINUED | OUTPATIENT
Start: 2023-06-16 | End: 2023-06-29 | Stop reason: HOSPADM

## 2023-06-16 RX ADMIN — CARBAMAZEPINE 100 MG: 100 TABLET, CHEWABLE ORAL at 07:43

## 2023-06-16 RX ADMIN — MICONAZOLE NITRATE: 2 POWDER TOPICAL at 20:11

## 2023-06-16 RX ADMIN — MIDODRINE HYDROCHLORIDE 10 MG: 10 TABLET ORAL at 07:43

## 2023-06-16 RX ADMIN — MIDODRINE HYDROCHLORIDE 10 MG: 10 TABLET ORAL at 16:59

## 2023-06-16 RX ADMIN — LORAZEPAM 0.5 MG: 0.5 TABLET ORAL at 13:42

## 2023-06-16 RX ADMIN — SODIUM CHLORIDE: 9 INJECTION, SOLUTION INTRAVENOUS at 20:04

## 2023-06-16 RX ADMIN — TROSPIUM CHLORIDE 20 MG: 20 TABLET, FILM COATED ORAL at 07:43

## 2023-06-16 RX ADMIN — LORAZEPAM 0.5 MG: 0.5 TABLET ORAL at 07:43

## 2023-06-16 RX ADMIN — SENNOSIDES 8.6 MG: 8.6 TABLET ORAL at 20:05

## 2023-06-16 RX ADMIN — HYDROXYZINE HYDROCHLORIDE 10 MG: 10 TABLET ORAL at 20:05

## 2023-06-16 RX ADMIN — DAKIN'S SOLUTION 0.125% (QUARTER STRENGTH): 0.12 SOLUTION at 07:44

## 2023-06-16 RX ADMIN — LORAZEPAM 0.5 MG: 0.5 TABLET ORAL at 20:05

## 2023-06-16 RX ADMIN — HYDROCODONE BITARTRATE AND ACETAMINOPHEN 1 TABLET: 5; 325 TABLET ORAL at 05:30

## 2023-06-16 RX ADMIN — MIDODRINE HYDROCHLORIDE 10 MG: 10 TABLET ORAL at 12:02

## 2023-06-16 RX ADMIN — TROSPIUM CHLORIDE 20 MG: 20 TABLET, FILM COATED ORAL at 20:05

## 2023-06-16 RX ADMIN — CARBAMAZEPINE 100 MG: 100 TABLET, CHEWABLE ORAL at 13:42

## 2023-06-16 RX ADMIN — Medication 1000 UNITS: at 07:43

## 2023-06-16 RX ADMIN — BUSPIRONE HYDROCHLORIDE 10 MG: 10 TABLET ORAL at 20:05

## 2023-06-16 RX ADMIN — POLYETHYLENE GLYCOL 3350 17 G: 17 POWDER, FOR SOLUTION ORAL at 07:44

## 2023-06-16 RX ADMIN — MORPHINE SULFATE 2 MG: 2 INJECTION, SOLUTION INTRAMUSCULAR; INTRAVENOUS at 20:11

## 2023-06-16 RX ADMIN — HYDROCODONE BITARTRATE AND ACETAMINOPHEN 1 TABLET: 5; 325 TABLET ORAL at 18:30

## 2023-06-16 RX ADMIN — MICONAZOLE NITRATE: 2 POWDER TOPICAL at 07:43

## 2023-06-16 RX ADMIN — ENOXAPARIN SODIUM 30 MG: 100 INJECTION SUBCUTANEOUS at 13:42

## 2023-06-16 RX ADMIN — HYDROCODONE BITARTRATE AND ACETAMINOPHEN 1 TABLET: 5; 325 TABLET ORAL at 14:30

## 2023-06-16 RX ADMIN — SODIUM CHLORIDE: 9 INJECTION, SOLUTION INTRAVENOUS at 08:07

## 2023-06-16 RX ADMIN — BUSPIRONE HYDROCHLORIDE 10 MG: 10 TABLET ORAL at 07:42

## 2023-06-16 RX ADMIN — HYDROCODONE BITARTRATE AND ACETAMINOPHEN 1 TABLET: 5; 325 TABLET ORAL at 10:29

## 2023-06-16 RX ADMIN — CARBAMAZEPINE 100 MG: 100 TABLET, CHEWABLE ORAL at 20:05

## 2023-06-16 RX ADMIN — LEVOTHYROXINE SODIUM 50 MCG: 0.05 TABLET ORAL at 05:25

## 2023-06-16 ASSESSMENT — PAIN SCALES - GENERAL
PAINLEVEL_OUTOF10: 10
PAINLEVEL_OUTOF10: 10
PAINLEVEL_OUTOF10: 5
PAINLEVEL_OUTOF10: 5
PAINLEVEL_OUTOF10: 8
PAINLEVEL_OUTOF10: 2
PAINLEVEL_OUTOF10: 5

## 2023-06-16 ASSESSMENT — PAIN DESCRIPTION - PAIN TYPE
TYPE: SURGICAL PAIN

## 2023-06-16 ASSESSMENT — PAIN DESCRIPTION - FREQUENCY
FREQUENCY: CONTINUOUS

## 2023-06-16 ASSESSMENT — PAIN - FUNCTIONAL ASSESSMENT
PAIN_FUNCTIONAL_ASSESSMENT: PREVENTS OR INTERFERES SOME ACTIVE ACTIVITIES AND ADLS
PAIN_FUNCTIONAL_ASSESSMENT: PREVENTS OR INTERFERES WITH MANY ACTIVE NOT PASSIVE ACTIVITIES
PAIN_FUNCTIONAL_ASSESSMENT: PREVENTS OR INTERFERES SOME ACTIVE ACTIVITIES AND ADLS

## 2023-06-16 ASSESSMENT — PAIN DESCRIPTION - ORIENTATION
ORIENTATION: RIGHT
ORIENTATION: MID
ORIENTATION: MID

## 2023-06-16 ASSESSMENT — PAIN DESCRIPTION - LOCATION
LOCATION: SACRUM

## 2023-06-16 ASSESSMENT — PAIN DESCRIPTION - DESCRIPTORS
DESCRIPTORS: ACHING

## 2023-06-16 ASSESSMENT — PAIN DESCRIPTION - ONSET
ONSET: ON-GOING

## 2023-06-17 LAB
ANION GAP SERPL CALC-SCNC: 7 MEQ/L (ref 8–16)
BUN SERPL-MCNC: 6 MG/DL (ref 7–22)
CALCIUM SERPL-MCNC: 8.1 MG/DL (ref 8.5–10.5)
CHLORIDE SERPL-SCNC: 107 MEQ/L (ref 98–111)
CO2 SERPL-SCNC: 23 MEQ/L (ref 23–33)
CREAT SERPL-MCNC: 0.3 MG/DL (ref 0.4–1.2)
GFR SERPL CREATININE-BSD FRML MDRD: > 60 ML/MIN/1.73M2
GLUCOSE SERPL-MCNC: 123 MG/DL (ref 70–108)
POTASSIUM SERPL-SCNC: 3.8 MEQ/L (ref 3.5–5.2)
SODIUM SERPL-SCNC: 137 MEQ/L (ref 135–145)

## 2023-06-17 PROCEDURE — 6360000002 HC RX W HCPCS: Performed by: SURGERY

## 2023-06-17 PROCEDURE — 80048 BASIC METABOLIC PNL TOTAL CA: CPT

## 2023-06-17 PROCEDURE — 36415 COLL VENOUS BLD VENIPUNCTURE: CPT

## 2023-06-17 PROCEDURE — 99233 SBSQ HOSP IP/OBS HIGH 50: CPT | Performed by: PHYSICIAN ASSISTANT

## 2023-06-17 PROCEDURE — 6370000000 HC RX 637 (ALT 250 FOR IP): Performed by: SURGERY

## 2023-06-17 PROCEDURE — 6370000000 HC RX 637 (ALT 250 FOR IP): Performed by: STUDENT IN AN ORGANIZED HEALTH CARE EDUCATION/TRAINING PROGRAM

## 2023-06-17 PROCEDURE — G0378 HOSPITAL OBSERVATION PER HR: HCPCS

## 2023-06-17 PROCEDURE — 6370000000 HC RX 637 (ALT 250 FOR IP): Performed by: PHYSICIAN ASSISTANT

## 2023-06-17 PROCEDURE — 2580000003 HC RX 258: Performed by: SURGERY

## 2023-06-17 PROCEDURE — 99024 POSTOP FOLLOW-UP VISIT: CPT | Performed by: SURGERY

## 2023-06-17 PROCEDURE — 96374 THER/PROPH/DIAG INJ IV PUSH: CPT

## 2023-06-17 PROCEDURE — 96372 THER/PROPH/DIAG INJ SC/IM: CPT

## 2023-06-17 RX ORDER — FLUCONAZOLE 200 MG/1
200 TABLET ORAL ONCE
Status: COMPLETED | OUTPATIENT
Start: 2023-06-17 | End: 2023-06-17

## 2023-06-17 RX ORDER — METOPROLOL SUCCINATE 25 MG/1
25 TABLET, EXTENDED RELEASE ORAL DAILY
Status: DISCONTINUED | OUTPATIENT
Start: 2023-06-17 | End: 2023-06-18

## 2023-06-17 RX ADMIN — CARBAMAZEPINE 100 MG: 100 TABLET, CHEWABLE ORAL at 20:08

## 2023-06-17 RX ADMIN — TROSPIUM CHLORIDE 20 MG: 20 TABLET, FILM COATED ORAL at 19:28

## 2023-06-17 RX ADMIN — MIDODRINE HYDROCHLORIDE 10 MG: 10 TABLET ORAL at 07:46

## 2023-06-17 RX ADMIN — CARBAMAZEPINE 100 MG: 100 TABLET, CHEWABLE ORAL at 14:11

## 2023-06-17 RX ADMIN — POLYETHYLENE GLYCOL 3350 17 G: 17 POWDER, FOR SOLUTION ORAL at 07:47

## 2023-06-17 RX ADMIN — MORPHINE SULFATE 2 MG: 2 INJECTION, SOLUTION INTRAMUSCULAR; INTRAVENOUS at 20:13

## 2023-06-17 RX ADMIN — BUSPIRONE HYDROCHLORIDE 10 MG: 10 TABLET ORAL at 07:46

## 2023-06-17 RX ADMIN — MIDODRINE HYDROCHLORIDE 10 MG: 10 TABLET ORAL at 16:39

## 2023-06-17 RX ADMIN — MIDODRINE HYDROCHLORIDE 10 MG: 10 TABLET ORAL at 14:11

## 2023-06-17 RX ADMIN — LACTULOSE 10 G: 20 SOLUTION ORAL at 07:46

## 2023-06-17 RX ADMIN — METOPROLOL TARTRATE 12.5 MG: 25 TABLET, FILM COATED ORAL at 07:46

## 2023-06-17 RX ADMIN — TROSPIUM CHLORIDE 20 MG: 20 TABLET, FILM COATED ORAL at 07:46

## 2023-06-17 RX ADMIN — CARBAMAZEPINE 100 MG: 100 TABLET, CHEWABLE ORAL at 07:46

## 2023-06-17 RX ADMIN — SODIUM CHLORIDE: 9 INJECTION, SOLUTION INTRAVENOUS at 22:51

## 2023-06-17 RX ADMIN — Medication 1000 UNITS: at 07:46

## 2023-06-17 RX ADMIN — SODIUM CHLORIDE: 9 INJECTION, SOLUTION INTRAVENOUS at 09:28

## 2023-06-17 RX ADMIN — SENNOSIDES 8.6 MG: 8.6 TABLET ORAL at 19:28

## 2023-06-17 RX ADMIN — MICONAZOLE NITRATE: 2 POWDER TOPICAL at 07:47

## 2023-06-17 RX ADMIN — HYDROCODONE BITARTRATE AND ACETAMINOPHEN 1 TABLET: 5; 325 TABLET ORAL at 14:51

## 2023-06-17 RX ADMIN — DOCUSATE SODIUM 100 MG: 100 CAPSULE, LIQUID FILLED ORAL at 07:48

## 2023-06-17 RX ADMIN — MICONAZOLE NITRATE: 20 CREAM TOPICAL at 19:29

## 2023-06-17 RX ADMIN — LORAZEPAM 0.5 MG: 0.5 TABLET ORAL at 12:40

## 2023-06-17 RX ADMIN — LORAZEPAM 0.5 MG: 0.5 TABLET ORAL at 07:48

## 2023-06-17 RX ADMIN — BUSPIRONE HYDROCHLORIDE 10 MG: 10 TABLET ORAL at 19:28

## 2023-06-17 RX ADMIN — FLUCONAZOLE 200 MG: 200 TABLET ORAL at 16:39

## 2023-06-17 RX ADMIN — HYDROCODONE BITARTRATE AND ACETAMINOPHEN 1 TABLET: 5; 325 TABLET ORAL at 07:49

## 2023-06-17 RX ADMIN — HYDROXYZINE HYDROCHLORIDE 10 MG: 10 TABLET ORAL at 19:28

## 2023-06-17 RX ADMIN — LACTULOSE 10 G: 20 SOLUTION ORAL at 19:28

## 2023-06-17 RX ADMIN — ENOXAPARIN SODIUM 30 MG: 100 INJECTION SUBCUTANEOUS at 14:11

## 2023-06-17 RX ADMIN — LORAZEPAM 0.5 MG: 0.5 TABLET ORAL at 19:28

## 2023-06-17 RX ADMIN — METOPROLOL SUCCINATE 25 MG: 25 TABLET, EXTENDED RELEASE ORAL at 19:28

## 2023-06-17 RX ADMIN — LEVOTHYROXINE SODIUM 50 MCG: 0.05 TABLET ORAL at 07:47

## 2023-06-17 RX ADMIN — LACTULOSE 10 G: 20 SOLUTION ORAL at 14:08

## 2023-06-17 ASSESSMENT — PAIN DESCRIPTION - ORIENTATION
ORIENTATION: RIGHT
ORIENTATION: RIGHT

## 2023-06-17 ASSESSMENT — PAIN DESCRIPTION - PAIN TYPE
TYPE: SURGICAL PAIN
TYPE: SURGICAL PAIN

## 2023-06-17 ASSESSMENT — PAIN DESCRIPTION - FREQUENCY
FREQUENCY: CONTINUOUS
FREQUENCY: CONTINUOUS

## 2023-06-17 ASSESSMENT — PAIN SCALES - GENERAL
PAINLEVEL_OUTOF10: 10
PAINLEVEL_OUTOF10: 4
PAINLEVEL_OUTOF10: 5
PAINLEVEL_OUTOF10: 10

## 2023-06-17 ASSESSMENT — PAIN DESCRIPTION - LOCATION
LOCATION: BUTTOCKS
LOCATION: BUTTOCKS

## 2023-06-17 ASSESSMENT — PAIN DESCRIPTION - DESCRIPTORS
DESCRIPTORS: ACHING;PRESSURE
DESCRIPTORS: ACHING

## 2023-06-17 ASSESSMENT — PAIN DESCRIPTION - ONSET
ONSET: ON-GOING
ONSET: ON-GOING

## 2023-06-17 ASSESSMENT — PAIN - FUNCTIONAL ASSESSMENT
PAIN_FUNCTIONAL_ASSESSMENT: PREVENTS OR INTERFERES SOME ACTIVE ACTIVITIES AND ADLS
PAIN_FUNCTIONAL_ASSESSMENT: PREVENTS OR INTERFERES SOME ACTIVE ACTIVITIES AND ADLS

## 2023-06-18 LAB
ANION GAP SERPL CALC-SCNC: 9 MEQ/L (ref 8–16)
BACTERIA UR CULT: ABNORMAL
BUN SERPL-MCNC: 5 MG/DL (ref 7–22)
CALCIUM SERPL-MCNC: 7.9 MG/DL (ref 8.5–10.5)
CHLORIDE SERPL-SCNC: 107 MEQ/L (ref 98–111)
CO2 SERPL-SCNC: 21 MEQ/L (ref 23–33)
CREAT SERPL-MCNC: 0.2 MG/DL (ref 0.4–1.2)
DEPRECATED RDW RBC AUTO: 58.8 FL (ref 35–45)
ERYTHROCYTE [DISTWIDTH] IN BLOOD BY AUTOMATED COUNT: 15.3 % (ref 11.5–14.5)
GFR SERPL CREATININE-BSD FRML MDRD: > 60 ML/MIN/1.73M2
GLUCOSE SERPL-MCNC: 89 MG/DL (ref 70–108)
HCT VFR BLD AUTO: 34.5 % (ref 37–47)
HGB BLD-MCNC: 10.8 GM/DL (ref 12–16)
MCH RBC QN AUTO: 32.5 PG (ref 26–33)
MCHC RBC AUTO-ENTMCNC: 31.3 GM/DL (ref 32.2–35.5)
MCV RBC AUTO: 103.9 FL (ref 81–99)
ORGANISM: ABNORMAL
PLATELET # BLD AUTO: 223 THOU/MM3 (ref 130–400)
PMV BLD AUTO: 10.3 FL (ref 9.4–12.4)
POTASSIUM SERPL-SCNC: 3.9 MEQ/L (ref 3.5–5.2)
RBC # BLD AUTO: 3.32 MILL/MM3 (ref 4.2–5.4)
SODIUM SERPL-SCNC: 137 MEQ/L (ref 135–145)
WBC # BLD AUTO: 8.3 THOU/MM3 (ref 4.8–10.8)

## 2023-06-18 PROCEDURE — 85027 COMPLETE CBC AUTOMATED: CPT

## 2023-06-18 PROCEDURE — 36415 COLL VENOUS BLD VENIPUNCTURE: CPT

## 2023-06-18 PROCEDURE — G0378 HOSPITAL OBSERVATION PER HR: HCPCS

## 2023-06-18 PROCEDURE — 6370000000 HC RX 637 (ALT 250 FOR IP): Performed by: SURGERY

## 2023-06-18 PROCEDURE — 96376 TX/PRO/DX INJ SAME DRUG ADON: CPT

## 2023-06-18 PROCEDURE — 6370000000 HC RX 637 (ALT 250 FOR IP): Performed by: PHYSICIAN ASSISTANT

## 2023-06-18 PROCEDURE — 99232 SBSQ HOSP IP/OBS MODERATE 35: CPT | Performed by: PHYSICIAN ASSISTANT

## 2023-06-18 PROCEDURE — 96372 THER/PROPH/DIAG INJ SC/IM: CPT

## 2023-06-18 PROCEDURE — 2580000003 HC RX 258: Performed by: SURGERY

## 2023-06-18 PROCEDURE — 80048 BASIC METABOLIC PNL TOTAL CA: CPT

## 2023-06-18 PROCEDURE — 6360000002 HC RX W HCPCS: Performed by: SURGERY

## 2023-06-18 RX ORDER — METOPROLOL SUCCINATE 50 MG/1
50 TABLET, EXTENDED RELEASE ORAL DAILY
Status: DISCONTINUED | OUTPATIENT
Start: 2023-06-18 | End: 2023-06-26

## 2023-06-18 RX ADMIN — BUSPIRONE HYDROCHLORIDE 10 MG: 10 TABLET ORAL at 20:29

## 2023-06-18 RX ADMIN — CARBAMAZEPINE 100 MG: 100 TABLET, CHEWABLE ORAL at 09:08

## 2023-06-18 RX ADMIN — Medication 1000 UNITS: at 09:08

## 2023-06-18 RX ADMIN — DOCUSATE SODIUM 100 MG: 100 CAPSULE, LIQUID FILLED ORAL at 09:08

## 2023-06-18 RX ADMIN — POLYETHYLENE GLYCOL 3350 17 G: 17 POWDER, FOR SOLUTION ORAL at 09:09

## 2023-06-18 RX ADMIN — HYDROCODONE BITARTRATE AND ACETAMINOPHEN 1 TABLET: 5; 325 TABLET ORAL at 09:08

## 2023-06-18 RX ADMIN — LEVOTHYROXINE SODIUM 50 MCG: 0.05 TABLET ORAL at 05:02

## 2023-06-18 RX ADMIN — MICONAZOLE NITRATE: 20 CREAM TOPICAL at 20:31

## 2023-06-18 RX ADMIN — SODIUM CHLORIDE: 9 INJECTION, SOLUTION INTRAVENOUS at 12:14

## 2023-06-18 RX ADMIN — TROSPIUM CHLORIDE 20 MG: 20 TABLET, FILM COATED ORAL at 09:08

## 2023-06-18 RX ADMIN — BUSPIRONE HYDROCHLORIDE 10 MG: 10 TABLET ORAL at 09:08

## 2023-06-18 RX ADMIN — LACTULOSE 10 G: 20 SOLUTION ORAL at 09:09

## 2023-06-18 RX ADMIN — LORAZEPAM 0.5 MG: 0.5 TABLET ORAL at 09:08

## 2023-06-18 RX ADMIN — MIDODRINE HYDROCHLORIDE 10 MG: 10 TABLET ORAL at 13:47

## 2023-06-18 RX ADMIN — ENOXAPARIN SODIUM 30 MG: 100 INJECTION SUBCUTANEOUS at 13:38

## 2023-06-18 RX ADMIN — LORAZEPAM 0.5 MG: 0.5 TABLET ORAL at 20:28

## 2023-06-18 RX ADMIN — MIDODRINE HYDROCHLORIDE 10 MG: 10 TABLET ORAL at 16:18

## 2023-06-18 RX ADMIN — CARBAMAZEPINE 100 MG: 100 TABLET, CHEWABLE ORAL at 13:39

## 2023-06-18 RX ADMIN — MORPHINE SULFATE 2 MG: 2 INJECTION, SOLUTION INTRAMUSCULAR; INTRAVENOUS at 21:29

## 2023-06-18 RX ADMIN — METOPROLOL SUCCINATE 50 MG: 50 TABLET, EXTENDED RELEASE ORAL at 18:17

## 2023-06-18 RX ADMIN — CARBAMAZEPINE 100 MG: 100 TABLET, CHEWABLE ORAL at 20:29

## 2023-06-18 RX ADMIN — LORAZEPAM 0.5 MG: 0.5 TABLET ORAL at 13:38

## 2023-06-18 RX ADMIN — MICONAZOLE NITRATE: 20 CREAM TOPICAL at 09:08

## 2023-06-18 RX ADMIN — TROSPIUM CHLORIDE 20 MG: 20 TABLET, FILM COATED ORAL at 20:30

## 2023-06-18 RX ADMIN — MIDODRINE HYDROCHLORIDE 10 MG: 10 TABLET ORAL at 09:08

## 2023-06-18 RX ADMIN — HYDROCODONE BITARTRATE AND ACETAMINOPHEN 1 TABLET: 5; 325 TABLET ORAL at 13:42

## 2023-06-18 ASSESSMENT — PAIN DESCRIPTION - DESCRIPTORS
DESCRIPTORS: ACHING
DESCRIPTORS: ACHING;CRAMPING;SORE

## 2023-06-18 ASSESSMENT — PAIN DESCRIPTION - ONSET: ONSET: ON-GOING

## 2023-06-18 ASSESSMENT — PAIN SCALES - GENERAL
PAINLEVEL_OUTOF10: 10
PAINLEVEL_OUTOF10: 0
PAINLEVEL_OUTOF10: 6
PAINLEVEL_OUTOF10: 10
PAINLEVEL_OUTOF10: 3
PAINLEVEL_OUTOF10: 4

## 2023-06-18 ASSESSMENT — PAIN DESCRIPTION - ORIENTATION
ORIENTATION: RIGHT;LEFT
ORIENTATION: RIGHT

## 2023-06-18 ASSESSMENT — PAIN DESCRIPTION - LOCATION
LOCATION: LEG;HIP
LOCATION: BUTTOCKS

## 2023-06-18 ASSESSMENT — PAIN - FUNCTIONAL ASSESSMENT: PAIN_FUNCTIONAL_ASSESSMENT: PREVENTS OR INTERFERES WITH MANY ACTIVE NOT PASSIVE ACTIVITIES

## 2023-06-18 ASSESSMENT — PAIN DESCRIPTION - FREQUENCY: FREQUENCY: CONTINUOUS

## 2023-06-18 ASSESSMENT — PAIN DESCRIPTION - PAIN TYPE: TYPE: SURGICAL PAIN

## 2023-06-19 LAB
ANION GAP SERPL CALC-SCNC: 8 MEQ/L (ref 8–16)
BUN SERPL-MCNC: 6 MG/DL (ref 7–22)
CALCIUM SERPL-MCNC: 7.9 MG/DL (ref 8.5–10.5)
CHLORIDE SERPL-SCNC: 107 MEQ/L (ref 98–111)
CO2 SERPL-SCNC: 23 MEQ/L (ref 23–33)
CREAT SERPL-MCNC: 0.2 MG/DL (ref 0.4–1.2)
DEPRECATED RDW RBC AUTO: 61.1 FL (ref 35–45)
ERYTHROCYTE [DISTWIDTH] IN BLOOD BY AUTOMATED COUNT: 15.5 % (ref 11.5–14.5)
GFR SERPL CREATININE-BSD FRML MDRD: > 60 ML/MIN/1.73M2
GLUCOSE SERPL-MCNC: 86 MG/DL (ref 70–108)
HCT VFR BLD AUTO: 36.6 % (ref 37–47)
HGB BLD-MCNC: 11.3 GM/DL (ref 12–16)
MCH RBC QN AUTO: 32.6 PG (ref 26–33)
MCHC RBC AUTO-ENTMCNC: 30.9 GM/DL (ref 32.2–35.5)
MCV RBC AUTO: 105.5 FL (ref 81–99)
PLATELET # BLD AUTO: 232 THOU/MM3 (ref 130–400)
PMV BLD AUTO: 10.4 FL (ref 9.4–12.4)
POTASSIUM SERPL-SCNC: 3.9 MEQ/L (ref 3.5–5.2)
RBC # BLD AUTO: 3.47 MILL/MM3 (ref 4.2–5.4)
SODIUM SERPL-SCNC: 138 MEQ/L (ref 135–145)
WBC # BLD AUTO: 8 THOU/MM3 (ref 4.8–10.8)

## 2023-06-19 PROCEDURE — 96372 THER/PROPH/DIAG INJ SC/IM: CPT

## 2023-06-19 PROCEDURE — 99232 SBSQ HOSP IP/OBS MODERATE 35: CPT | Performed by: PHYSICIAN ASSISTANT

## 2023-06-19 PROCEDURE — 96376 TX/PRO/DX INJ SAME DRUG ADON: CPT

## 2023-06-19 PROCEDURE — 80048 BASIC METABOLIC PNL TOTAL CA: CPT

## 2023-06-19 PROCEDURE — 85027 COMPLETE CBC AUTOMATED: CPT

## 2023-06-19 PROCEDURE — 36415 COLL VENOUS BLD VENIPUNCTURE: CPT

## 2023-06-19 PROCEDURE — 97605 NEG PRS WND THER DME<=50SQCM: CPT

## 2023-06-19 PROCEDURE — 99024 POSTOP FOLLOW-UP VISIT: CPT | Performed by: SURGERY

## 2023-06-19 PROCEDURE — 6370000000 HC RX 637 (ALT 250 FOR IP): Performed by: SURGERY

## 2023-06-19 PROCEDURE — 96375 TX/PRO/DX INJ NEW DRUG ADDON: CPT

## 2023-06-19 PROCEDURE — G0378 HOSPITAL OBSERVATION PER HR: HCPCS

## 2023-06-19 PROCEDURE — 2580000003 HC RX 258: Performed by: SURGERY

## 2023-06-19 PROCEDURE — 6360000002 HC RX W HCPCS: Performed by: SURGERY

## 2023-06-19 PROCEDURE — 6370000000 HC RX 637 (ALT 250 FOR IP): Performed by: PHYSICIAN ASSISTANT

## 2023-06-19 RX ADMIN — ENOXAPARIN SODIUM 30 MG: 100 INJECTION SUBCUTANEOUS at 12:12

## 2023-06-19 RX ADMIN — MORPHINE SULFATE 2 MG: 2 INJECTION, SOLUTION INTRAMUSCULAR; INTRAVENOUS at 12:12

## 2023-06-19 RX ADMIN — ACETAMINOPHEN 650 MG: 325 TABLET ORAL at 09:13

## 2023-06-19 RX ADMIN — MICONAZOLE NITRATE: 20 CREAM TOPICAL at 20:42

## 2023-06-19 RX ADMIN — ONDANSETRON 4 MG: 2 INJECTION INTRAMUSCULAR; INTRAVENOUS at 20:26

## 2023-06-19 RX ADMIN — SODIUM CHLORIDE, PRESERVATIVE FREE 10 ML: 5 INJECTION INTRAVENOUS at 20:33

## 2023-06-19 RX ADMIN — Medication 1000 UNITS: at 09:13

## 2023-06-19 RX ADMIN — TROSPIUM CHLORIDE 20 MG: 20 TABLET, FILM COATED ORAL at 20:33

## 2023-06-19 RX ADMIN — HYDROCODONE BITARTRATE AND ACETAMINOPHEN 1 TABLET: 5; 325 TABLET ORAL at 09:13

## 2023-06-19 RX ADMIN — DOCUSATE SODIUM 100 MG: 100 CAPSULE, LIQUID FILLED ORAL at 20:33

## 2023-06-19 RX ADMIN — DAKIN'S SOLUTION 0.125% (QUARTER STRENGTH): 0.12 SOLUTION at 09:19

## 2023-06-19 RX ADMIN — DOCUSATE SODIUM 100 MG: 100 CAPSULE, LIQUID FILLED ORAL at 09:13

## 2023-06-19 RX ADMIN — CARBAMAZEPINE 100 MG: 100 TABLET, CHEWABLE ORAL at 13:54

## 2023-06-19 RX ADMIN — MIDODRINE HYDROCHLORIDE 10 MG: 10 TABLET ORAL at 17:55

## 2023-06-19 RX ADMIN — HYDROCODONE BITARTRATE AND ACETAMINOPHEN 1 TABLET: 5; 325 TABLET ORAL at 17:56

## 2023-06-19 RX ADMIN — LEVOTHYROXINE SODIUM 50 MCG: 0.05 TABLET ORAL at 05:36

## 2023-06-19 RX ADMIN — BUSPIRONE HYDROCHLORIDE 10 MG: 10 TABLET ORAL at 20:33

## 2023-06-19 RX ADMIN — BUSPIRONE HYDROCHLORIDE 10 MG: 10 TABLET ORAL at 09:12

## 2023-06-19 RX ADMIN — MORPHINE SULFATE 4 MG: 4 INJECTION, SOLUTION INTRAMUSCULAR; INTRAVENOUS at 20:27

## 2023-06-19 RX ADMIN — LACTULOSE 10 G: 20 SOLUTION ORAL at 13:56

## 2023-06-19 RX ADMIN — LACTULOSE 10 G: 20 SOLUTION ORAL at 20:33

## 2023-06-19 RX ADMIN — TROSPIUM CHLORIDE 20 MG: 20 TABLET, FILM COATED ORAL at 09:14

## 2023-06-19 RX ADMIN — LORAZEPAM 0.5 MG: 0.5 TABLET ORAL at 09:13

## 2023-06-19 RX ADMIN — CARBAMAZEPINE 100 MG: 100 TABLET, CHEWABLE ORAL at 20:33

## 2023-06-19 RX ADMIN — LACTULOSE 10 G: 20 SOLUTION ORAL at 09:14

## 2023-06-19 RX ADMIN — METOPROLOL SUCCINATE 50 MG: 50 TABLET, EXTENDED RELEASE ORAL at 17:55

## 2023-06-19 RX ADMIN — CARBAMAZEPINE 100 MG: 100 TABLET, CHEWABLE ORAL at 09:14

## 2023-06-19 RX ADMIN — LORAZEPAM 0.5 MG: 0.5 TABLET ORAL at 13:54

## 2023-06-19 RX ADMIN — MICONAZOLE NITRATE: 20 CREAM TOPICAL at 09:19

## 2023-06-19 RX ADMIN — SODIUM CHLORIDE: 9 INJECTION, SOLUTION INTRAVENOUS at 01:13

## 2023-06-19 RX ADMIN — MIDODRINE HYDROCHLORIDE 10 MG: 10 TABLET ORAL at 12:12

## 2023-06-19 RX ADMIN — LORAZEPAM 0.5 MG: 0.5 TABLET ORAL at 20:33

## 2023-06-19 RX ADMIN — MIDODRINE HYDROCHLORIDE 10 MG: 10 TABLET ORAL at 09:12

## 2023-06-19 RX ADMIN — SENNOSIDES 8.6 MG: 8.6 TABLET ORAL at 20:33

## 2023-06-19 RX ADMIN — SODIUM CHLORIDE: 9 INJECTION, SOLUTION INTRAVENOUS at 13:56

## 2023-06-19 ASSESSMENT — PAIN SCALES - GENERAL
PAINLEVEL_OUTOF10: 10
PAINLEVEL_OUTOF10: 10
PAINLEVEL_OUTOF10: 7

## 2023-06-19 ASSESSMENT — PAIN - FUNCTIONAL ASSESSMENT: PAIN_FUNCTIONAL_ASSESSMENT: PREVENTS OR INTERFERES SOME ACTIVE ACTIVITIES AND ADLS

## 2023-06-19 ASSESSMENT — PAIN DESCRIPTION - ORIENTATION: ORIENTATION: MID

## 2023-06-19 ASSESSMENT — PAIN DESCRIPTION - LOCATION: LOCATION: BUTTOCKS

## 2023-06-19 ASSESSMENT — PAIN DESCRIPTION - DESCRIPTORS: DESCRIPTORS: SHARP

## 2023-06-20 LAB
DEPRECATED RDW RBC AUTO: 63.8 FL (ref 35–45)
ERYTHROCYTE [DISTWIDTH] IN BLOOD BY AUTOMATED COUNT: 15.6 % (ref 11.5–14.5)
HCT VFR BLD AUTO: 36.4 % (ref 37–47)
HGB BLD-MCNC: 11 GM/DL (ref 12–16)
MCH RBC QN AUTO: 33.1 PG (ref 26–33)
MCHC RBC AUTO-ENTMCNC: 30.2 GM/DL (ref 32.2–35.5)
MCV RBC AUTO: 109.6 FL (ref 81–99)
PLATELET # BLD AUTO: 273 THOU/MM3 (ref 130–400)
PMV BLD AUTO: 10.7 FL (ref 9.4–12.4)
RBC # BLD AUTO: 3.32 MILL/MM3 (ref 4.2–5.4)
WBC # BLD AUTO: 7.6 THOU/MM3 (ref 4.8–10.8)

## 2023-06-20 PROCEDURE — 6370000000 HC RX 637 (ALT 250 FOR IP): Performed by: PHYSICIAN ASSISTANT

## 2023-06-20 PROCEDURE — 85027 COMPLETE CBC AUTOMATED: CPT

## 2023-06-20 PROCEDURE — 1200000000 HC SEMI PRIVATE

## 2023-06-20 PROCEDURE — 99231 SBSQ HOSP IP/OBS SF/LOW 25: CPT | Performed by: SURGERY

## 2023-06-20 PROCEDURE — 6370000000 HC RX 637 (ALT 250 FOR IP): Performed by: SURGERY

## 2023-06-20 PROCEDURE — G0378 HOSPITAL OBSERVATION PER HR: HCPCS

## 2023-06-20 PROCEDURE — 36415 COLL VENOUS BLD VENIPUNCTURE: CPT

## 2023-06-20 PROCEDURE — 6360000002 HC RX W HCPCS: Performed by: SURGERY

## 2023-06-20 PROCEDURE — 2580000003 HC RX 258: Performed by: SURGERY

## 2023-06-20 RX ADMIN — CARBAMAZEPINE 100 MG: 100 TABLET, CHEWABLE ORAL at 13:07

## 2023-06-20 RX ADMIN — Medication 1000 UNITS: at 09:03

## 2023-06-20 RX ADMIN — MICONAZOLE NITRATE: 20 CREAM TOPICAL at 19:23

## 2023-06-20 RX ADMIN — BUSPIRONE HYDROCHLORIDE 10 MG: 10 TABLET ORAL at 19:23

## 2023-06-20 RX ADMIN — HYDROCODONE BITARTRATE AND ACETAMINOPHEN 1 TABLET: 5; 325 TABLET ORAL at 09:03

## 2023-06-20 RX ADMIN — HYDROCODONE BITARTRATE AND ACETAMINOPHEN 1 TABLET: 5; 325 TABLET ORAL at 15:09

## 2023-06-20 RX ADMIN — LEVOTHYROXINE SODIUM 50 MCG: 0.05 TABLET ORAL at 05:48

## 2023-06-20 RX ADMIN — LORAZEPAM 0.5 MG: 0.5 TABLET ORAL at 13:07

## 2023-06-20 RX ADMIN — LACTULOSE 10 G: 20 SOLUTION ORAL at 19:22

## 2023-06-20 RX ADMIN — HYDROCODONE BITARTRATE AND ACETAMINOPHEN 1 TABLET: 5; 325 TABLET ORAL at 20:27

## 2023-06-20 RX ADMIN — MIDODRINE HYDROCHLORIDE 10 MG: 10 TABLET ORAL at 17:07

## 2023-06-20 RX ADMIN — MIDODRINE HYDROCHLORIDE 10 MG: 10 TABLET ORAL at 13:07

## 2023-06-20 RX ADMIN — SENNOSIDES 8.6 MG: 8.6 TABLET ORAL at 19:22

## 2023-06-20 RX ADMIN — CARBAMAZEPINE 100 MG: 100 TABLET, CHEWABLE ORAL at 19:22

## 2023-06-20 RX ADMIN — MICONAZOLE NITRATE: 20 CREAM TOPICAL at 09:04

## 2023-06-20 RX ADMIN — MIDODRINE HYDROCHLORIDE 10 MG: 10 TABLET ORAL at 09:03

## 2023-06-20 RX ADMIN — LACTULOSE 10 G: 20 SOLUTION ORAL at 13:07

## 2023-06-20 RX ADMIN — METOPROLOL SUCCINATE 50 MG: 50 TABLET, EXTENDED RELEASE ORAL at 18:10

## 2023-06-20 RX ADMIN — LACTULOSE 10 G: 20 SOLUTION ORAL at 09:02

## 2023-06-20 RX ADMIN — POLYETHYLENE GLYCOL 3350 17 G: 17 POWDER, FOR SOLUTION ORAL at 09:05

## 2023-06-20 RX ADMIN — BUSPIRONE HYDROCHLORIDE 10 MG: 10 TABLET ORAL at 09:03

## 2023-06-20 RX ADMIN — LORAZEPAM 0.5 MG: 0.5 TABLET ORAL at 09:03

## 2023-06-20 RX ADMIN — TROSPIUM CHLORIDE 20 MG: 20 TABLET, FILM COATED ORAL at 09:03

## 2023-06-20 RX ADMIN — SODIUM CHLORIDE: 9 INJECTION, SOLUTION INTRAVENOUS at 15:11

## 2023-06-20 RX ADMIN — LORAZEPAM 0.5 MG: 0.5 TABLET ORAL at 19:22

## 2023-06-20 RX ADMIN — SODIUM CHLORIDE: 9 INJECTION, SOLUTION INTRAVENOUS at 03:06

## 2023-06-20 RX ADMIN — CARBAMAZEPINE 100 MG: 100 TABLET, CHEWABLE ORAL at 09:03

## 2023-06-20 RX ADMIN — TROSPIUM CHLORIDE 20 MG: 20 TABLET, FILM COATED ORAL at 19:22

## 2023-06-20 RX ADMIN — ENOXAPARIN SODIUM 30 MG: 100 INJECTION SUBCUTANEOUS at 13:07

## 2023-06-20 ASSESSMENT — PAIN SCALES - GENERAL
PAINLEVEL_OUTOF10: 4
PAINLEVEL_OUTOF10: 8
PAINLEVEL_OUTOF10: 10

## 2023-06-20 ASSESSMENT — PAIN DESCRIPTION - ONSET
ONSET: ON-GOING
ONSET: ON-GOING

## 2023-06-20 ASSESSMENT — PAIN DESCRIPTION - LOCATION
LOCATION: BUTTOCKS
LOCATION: BUTTOCKS

## 2023-06-20 ASSESSMENT — PAIN DESCRIPTION - FREQUENCY
FREQUENCY: INTERMITTENT
FREQUENCY: CONTINUOUS

## 2023-06-20 ASSESSMENT — PAIN DESCRIPTION - PAIN TYPE
TYPE: SURGICAL PAIN
TYPE: SURGICAL PAIN

## 2023-06-20 ASSESSMENT — PAIN DESCRIPTION - ORIENTATION
ORIENTATION: MID
ORIENTATION: MID

## 2023-06-20 ASSESSMENT — PAIN DESCRIPTION - DESCRIPTORS
DESCRIPTORS: ACHING
DESCRIPTORS: ACHING

## 2023-06-21 PROCEDURE — 6370000000 HC RX 637 (ALT 250 FOR IP): Performed by: SURGERY

## 2023-06-21 PROCEDURE — 6370000000 HC RX 637 (ALT 250 FOR IP): Performed by: PHYSICIAN ASSISTANT

## 2023-06-21 PROCEDURE — 2580000003 HC RX 258: Performed by: SURGERY

## 2023-06-21 PROCEDURE — 99024 POSTOP FOLLOW-UP VISIT: CPT | Performed by: SURGERY

## 2023-06-21 PROCEDURE — 1200000000 HC SEMI PRIVATE

## 2023-06-21 PROCEDURE — 6360000002 HC RX W HCPCS: Performed by: SURGERY

## 2023-06-21 PROCEDURE — 97605 NEG PRS WND THER DME<=50SQCM: CPT

## 2023-06-21 RX ADMIN — DOCUSATE SODIUM 100 MG: 100 CAPSULE, LIQUID FILLED ORAL at 09:20

## 2023-06-21 RX ADMIN — ENOXAPARIN SODIUM 30 MG: 100 INJECTION SUBCUTANEOUS at 12:58

## 2023-06-21 RX ADMIN — MORPHINE SULFATE 4 MG: 4 INJECTION, SOLUTION INTRAMUSCULAR; INTRAVENOUS at 20:23

## 2023-06-21 RX ADMIN — LEVOTHYROXINE SODIUM 50 MCG: 0.05 TABLET ORAL at 05:04

## 2023-06-21 RX ADMIN — Medication 1000 UNITS: at 09:19

## 2023-06-21 RX ADMIN — TROSPIUM CHLORIDE 20 MG: 20 TABLET, FILM COATED ORAL at 09:20

## 2023-06-21 RX ADMIN — MIDODRINE HYDROCHLORIDE 10 MG: 10 TABLET ORAL at 09:20

## 2023-06-21 RX ADMIN — MICONAZOLE NITRATE: 20 CREAM TOPICAL at 20:24

## 2023-06-21 RX ADMIN — BUSPIRONE HYDROCHLORIDE 10 MG: 10 TABLET ORAL at 20:24

## 2023-06-21 RX ADMIN — CARBAMAZEPINE 100 MG: 100 TABLET, CHEWABLE ORAL at 20:23

## 2023-06-21 RX ADMIN — LACTULOSE 10 G: 20 SOLUTION ORAL at 12:58

## 2023-06-21 RX ADMIN — POLYETHYLENE GLYCOL 3350 17 G: 17 POWDER, FOR SOLUTION ORAL at 09:21

## 2023-06-21 RX ADMIN — HYDROCODONE BITARTRATE AND ACETAMINOPHEN 1 TABLET: 5; 325 TABLET ORAL at 18:53

## 2023-06-21 RX ADMIN — LACTULOSE 10 G: 20 SOLUTION ORAL at 20:24

## 2023-06-21 RX ADMIN — MORPHINE SULFATE 2 MG: 2 INJECTION, SOLUTION INTRAMUSCULAR; INTRAVENOUS at 11:38

## 2023-06-21 RX ADMIN — HYDROCODONE BITARTRATE AND ACETAMINOPHEN 1 TABLET: 5; 325 TABLET ORAL at 09:46

## 2023-06-21 RX ADMIN — CARBAMAZEPINE 100 MG: 100 TABLET, CHEWABLE ORAL at 09:19

## 2023-06-21 RX ADMIN — TROSPIUM CHLORIDE 20 MG: 20 TABLET, FILM COATED ORAL at 20:25

## 2023-06-21 RX ADMIN — MICONAZOLE NITRATE: 20 CREAM TOPICAL at 09:22

## 2023-06-21 RX ADMIN — SODIUM CHLORIDE: 9 INJECTION, SOLUTION INTRAVENOUS at 03:14

## 2023-06-21 RX ADMIN — MIDODRINE HYDROCHLORIDE 10 MG: 10 TABLET ORAL at 17:17

## 2023-06-21 RX ADMIN — METOPROLOL SUCCINATE 50 MG: 50 TABLET, EXTENDED RELEASE ORAL at 17:17

## 2023-06-21 RX ADMIN — LORAZEPAM 0.5 MG: 0.5 TABLET ORAL at 12:57

## 2023-06-21 RX ADMIN — BUSPIRONE HYDROCHLORIDE 10 MG: 10 TABLET ORAL at 09:19

## 2023-06-21 RX ADMIN — HYDROCODONE BITARTRATE AND ACETAMINOPHEN 1 TABLET: 5; 325 TABLET ORAL at 03:15

## 2023-06-21 RX ADMIN — LORAZEPAM 0.5 MG: 0.5 TABLET ORAL at 20:24

## 2023-06-21 RX ADMIN — CARBAMAZEPINE 100 MG: 100 TABLET, CHEWABLE ORAL at 12:58

## 2023-06-21 RX ADMIN — MIDODRINE HYDROCHLORIDE 10 MG: 10 TABLET ORAL at 12:58

## 2023-06-21 RX ADMIN — LACTULOSE 10 G: 20 SOLUTION ORAL at 09:20

## 2023-06-21 RX ADMIN — DAKIN'S SOLUTION 0.125% (QUARTER STRENGTH): 0.12 SOLUTION at 09:20

## 2023-06-21 RX ADMIN — LORAZEPAM 0.5 MG: 0.5 TABLET ORAL at 09:20

## 2023-06-21 ASSESSMENT — PAIN DESCRIPTION - DESCRIPTORS
DESCRIPTORS: ACHING
DESCRIPTORS: SORE
DESCRIPTORS: ACHING;TENDER
DESCRIPTORS: ACHING;STABBING

## 2023-06-21 ASSESSMENT — PAIN - FUNCTIONAL ASSESSMENT: PAIN_FUNCTIONAL_ASSESSMENT: PREVENTS OR INTERFERES SOME ACTIVE ACTIVITIES AND ADLS

## 2023-06-21 ASSESSMENT — PAIN SCALES - GENERAL
PAINLEVEL_OUTOF10: 0
PAINLEVEL_OUTOF10: 8
PAINLEVEL_OUTOF10: 9
PAINLEVEL_OUTOF10: 7
PAINLEVEL_OUTOF10: 10
PAINLEVEL_OUTOF10: 3

## 2023-06-21 ASSESSMENT — PAIN DESCRIPTION - ONSET: ONSET: ON-GOING

## 2023-06-21 ASSESSMENT — PAIN DESCRIPTION - LOCATION
LOCATION: BACK;SACRUM
LOCATION: BUTTOCKS;BACK
LOCATION: BACK
LOCATION: LEG

## 2023-06-21 ASSESSMENT — PAIN DESCRIPTION - FREQUENCY: FREQUENCY: INTERMITTENT

## 2023-06-21 ASSESSMENT — PAIN DESCRIPTION - ORIENTATION
ORIENTATION: MID
ORIENTATION: RIGHT;LEFT
ORIENTATION: LOWER
ORIENTATION: MID

## 2023-06-21 ASSESSMENT — PAIN SCALES - WONG BAKER
WONGBAKER_NUMERICALRESPONSE: 2
WONGBAKER_NUMERICALRESPONSE: 2

## 2023-06-21 ASSESSMENT — PAIN DESCRIPTION - PAIN TYPE: TYPE: CHRONIC PAIN

## 2023-06-22 PROCEDURE — 99024 POSTOP FOLLOW-UP VISIT: CPT | Performed by: SURGERY

## 2023-06-22 PROCEDURE — 2580000003 HC RX 258: Performed by: SURGERY

## 2023-06-22 PROCEDURE — 1200000000 HC SEMI PRIVATE

## 2023-06-22 PROCEDURE — 6360000002 HC RX W HCPCS: Performed by: SURGERY

## 2023-06-22 PROCEDURE — 6370000000 HC RX 637 (ALT 250 FOR IP): Performed by: PHYSICIAN ASSISTANT

## 2023-06-22 PROCEDURE — 6370000000 HC RX 637 (ALT 250 FOR IP): Performed by: SURGERY

## 2023-06-22 RX ADMIN — ENOXAPARIN SODIUM 30 MG: 100 INJECTION SUBCUTANEOUS at 14:17

## 2023-06-22 RX ADMIN — TROSPIUM CHLORIDE 20 MG: 20 TABLET, FILM COATED ORAL at 08:39

## 2023-06-22 RX ADMIN — MORPHINE SULFATE 2 MG: 2 INJECTION, SOLUTION INTRAMUSCULAR; INTRAVENOUS at 21:53

## 2023-06-22 RX ADMIN — METOPROLOL SUCCINATE 50 MG: 50 TABLET, EXTENDED RELEASE ORAL at 18:23

## 2023-06-22 RX ADMIN — Medication 1000 UNITS: at 08:39

## 2023-06-22 RX ADMIN — LACTULOSE 10 G: 20 SOLUTION ORAL at 14:10

## 2023-06-22 RX ADMIN — TROSPIUM CHLORIDE 20 MG: 20 TABLET, FILM COATED ORAL at 20:35

## 2023-06-22 RX ADMIN — LEVOTHYROXINE SODIUM 50 MCG: 0.05 TABLET ORAL at 05:43

## 2023-06-22 RX ADMIN — LORAZEPAM 0.5 MG: 0.5 TABLET ORAL at 08:38

## 2023-06-22 RX ADMIN — HYDROCODONE BITARTRATE AND ACETAMINOPHEN 1 TABLET: 5; 325 TABLET ORAL at 18:23

## 2023-06-22 RX ADMIN — CARBAMAZEPINE 100 MG: 100 TABLET, CHEWABLE ORAL at 14:10

## 2023-06-22 RX ADMIN — MIDODRINE HYDROCHLORIDE 10 MG: 10 TABLET ORAL at 08:38

## 2023-06-22 RX ADMIN — HYDROCODONE BITARTRATE AND ACETAMINOPHEN 1 TABLET: 5; 325 TABLET ORAL at 14:10

## 2023-06-22 RX ADMIN — LACTULOSE 10 G: 20 SOLUTION ORAL at 08:40

## 2023-06-22 RX ADMIN — HYDROCODONE BITARTRATE AND ACETAMINOPHEN 1 TABLET: 5; 325 TABLET ORAL at 10:00

## 2023-06-22 RX ADMIN — MIDODRINE HYDROCHLORIDE 10 MG: 10 TABLET ORAL at 12:30

## 2023-06-22 RX ADMIN — HYDROCODONE BITARTRATE AND ACETAMINOPHEN 1 TABLET: 5; 325 TABLET ORAL at 05:43

## 2023-06-22 RX ADMIN — CARBAMAZEPINE 100 MG: 100 TABLET, CHEWABLE ORAL at 08:39

## 2023-06-22 RX ADMIN — ONDANSETRON 4 MG: 2 INJECTION INTRAMUSCULAR; INTRAVENOUS at 17:33

## 2023-06-22 RX ADMIN — LORAZEPAM 0.5 MG: 0.5 TABLET ORAL at 20:35

## 2023-06-22 RX ADMIN — MICONAZOLE NITRATE: 20 CREAM TOPICAL at 20:36

## 2023-06-22 RX ADMIN — BUSPIRONE HYDROCHLORIDE 10 MG: 10 TABLET ORAL at 20:35

## 2023-06-22 RX ADMIN — SODIUM CHLORIDE: 9 INJECTION, SOLUTION INTRAVENOUS at 17:34

## 2023-06-22 RX ADMIN — CARBAMAZEPINE 100 MG: 100 TABLET, CHEWABLE ORAL at 20:36

## 2023-06-22 RX ADMIN — MIDODRINE HYDROCHLORIDE 10 MG: 10 TABLET ORAL at 18:23

## 2023-06-22 RX ADMIN — MICONAZOLE NITRATE: 20 CREAM TOPICAL at 08:41

## 2023-06-22 RX ADMIN — BUSPIRONE HYDROCHLORIDE 10 MG: 10 TABLET ORAL at 08:39

## 2023-06-22 RX ADMIN — LORAZEPAM 0.5 MG: 0.5 TABLET ORAL at 14:17

## 2023-06-22 ASSESSMENT — PAIN SCALES - GENERAL
PAINLEVEL_OUTOF10: 10
PAINLEVEL_OUTOF10: 6
PAINLEVEL_OUTOF10: 1
PAINLEVEL_OUTOF10: 10
PAINLEVEL_OUTOF10: 10
PAINLEVEL_OUTOF10: 8
PAINLEVEL_OUTOF10: 0
PAINLEVEL_OUTOF10: 10
PAINLEVEL_OUTOF10: 0
PAINLEVEL_OUTOF10: 10

## 2023-06-22 ASSESSMENT — PAIN DESCRIPTION - DESCRIPTORS: DESCRIPTORS: ACHING;SORE

## 2023-06-22 ASSESSMENT — PAIN - FUNCTIONAL ASSESSMENT: PAIN_FUNCTIONAL_ASSESSMENT: ACTIVITIES ARE NOT PREVENTED

## 2023-06-22 ASSESSMENT — PAIN DESCRIPTION - LOCATION
LOCATION: LEG
LOCATION: BUTTOCKS
LOCATION: BUTTOCKS;LEG
LOCATION: BUTTOCKS

## 2023-06-22 ASSESSMENT — PAIN DESCRIPTION - ORIENTATION: ORIENTATION: RIGHT;MID

## 2023-06-23 ENCOUNTER — ANESTHESIA EVENT (OUTPATIENT)
Dept: OPERATING ROOM | Age: 73
End: 2023-06-23
Payer: MEDICARE

## 2023-06-23 PROCEDURE — 2580000003 HC RX 258: Performed by: SURGERY

## 2023-06-23 PROCEDURE — 6370000000 HC RX 637 (ALT 250 FOR IP): Performed by: PHYSICIAN ASSISTANT

## 2023-06-23 PROCEDURE — 6370000000 HC RX 637 (ALT 250 FOR IP): Performed by: SURGERY

## 2023-06-23 PROCEDURE — 99024 POSTOP FOLLOW-UP VISIT: CPT | Performed by: SURGERY

## 2023-06-23 PROCEDURE — 97605 NEG PRS WND THER DME<=50SQCM: CPT

## 2023-06-23 PROCEDURE — 1200000000 HC SEMI PRIVATE

## 2023-06-23 PROCEDURE — 6360000002 HC RX W HCPCS: Performed by: SURGERY

## 2023-06-23 RX ORDER — SODIUM CHLORIDE 0.9 % (FLUSH) 0.9 %
5-40 SYRINGE (ML) INJECTION EVERY 12 HOURS SCHEDULED
Status: DISCONTINUED | OUTPATIENT
Start: 2023-06-23 | End: 2023-06-24 | Stop reason: HOSPADM

## 2023-06-23 RX ORDER — SODIUM CHLORIDE 0.9 % (FLUSH) 0.9 %
5-40 SYRINGE (ML) INJECTION PRN
Status: DISCONTINUED | OUTPATIENT
Start: 2023-06-23 | End: 2023-06-24 | Stop reason: HOSPADM

## 2023-06-23 RX ORDER — SODIUM CHLORIDE 9 MG/ML
INJECTION, SOLUTION INTRAVENOUS PRN
Status: DISCONTINUED | OUTPATIENT
Start: 2023-06-23 | End: 2023-06-24 | Stop reason: HOSPADM

## 2023-06-23 RX ADMIN — MIDODRINE HYDROCHLORIDE 10 MG: 10 TABLET ORAL at 11:28

## 2023-06-23 RX ADMIN — BUSPIRONE HYDROCHLORIDE 10 MG: 10 TABLET ORAL at 08:34

## 2023-06-23 RX ADMIN — HYDROCODONE BITARTRATE AND ACETAMINOPHEN 1 TABLET: 5; 325 TABLET ORAL at 05:24

## 2023-06-23 RX ADMIN — MICONAZOLE NITRATE: 20 CREAM TOPICAL at 21:08

## 2023-06-23 RX ADMIN — LACTULOSE 10 G: 20 SOLUTION ORAL at 08:34

## 2023-06-23 RX ADMIN — LORAZEPAM 0.5 MG: 0.5 TABLET ORAL at 15:29

## 2023-06-23 RX ADMIN — MICONAZOLE NITRATE: 20 CREAM TOPICAL at 08:34

## 2023-06-23 RX ADMIN — CARBAMAZEPINE 100 MG: 100 TABLET, CHEWABLE ORAL at 13:45

## 2023-06-23 RX ADMIN — CARBAMAZEPINE 100 MG: 100 TABLET, CHEWABLE ORAL at 21:01

## 2023-06-23 RX ADMIN — MORPHINE SULFATE 2 MG: 2 INJECTION, SOLUTION INTRAMUSCULAR; INTRAVENOUS at 20:46

## 2023-06-23 RX ADMIN — TROSPIUM CHLORIDE 20 MG: 20 TABLET, FILM COATED ORAL at 08:34

## 2023-06-23 RX ADMIN — HYDROCODONE BITARTRATE AND ACETAMINOPHEN 1 TABLET: 5; 325 TABLET ORAL at 15:20

## 2023-06-23 RX ADMIN — TROSPIUM CHLORIDE 20 MG: 20 TABLET, FILM COATED ORAL at 21:04

## 2023-06-23 RX ADMIN — LORAZEPAM 0.5 MG: 0.5 TABLET ORAL at 21:05

## 2023-06-23 RX ADMIN — ENOXAPARIN SODIUM 30 MG: 100 INJECTION SUBCUTANEOUS at 13:46

## 2023-06-23 RX ADMIN — SODIUM CHLORIDE: 9 INJECTION, SOLUTION INTRAVENOUS at 06:26

## 2023-06-23 RX ADMIN — CARBAMAZEPINE 100 MG: 100 TABLET, CHEWABLE ORAL at 08:34

## 2023-06-23 RX ADMIN — BUSPIRONE HYDROCHLORIDE 10 MG: 10 TABLET ORAL at 20:58

## 2023-06-23 RX ADMIN — MIDODRINE HYDROCHLORIDE 10 MG: 10 TABLET ORAL at 15:20

## 2023-06-23 RX ADMIN — METOPROLOL SUCCINATE 50 MG: 50 TABLET, EXTENDED RELEASE ORAL at 21:02

## 2023-06-23 RX ADMIN — HYDROCODONE BITARTRATE AND ACETAMINOPHEN 1 TABLET: 5; 325 TABLET ORAL at 23:25

## 2023-06-23 RX ADMIN — SODIUM CHLORIDE, PRESERVATIVE FREE 10 ML: 5 INJECTION INTRAVENOUS at 21:06

## 2023-06-23 RX ADMIN — HYDROCODONE BITARTRATE AND ACETAMINOPHEN 1 TABLET: 5; 325 TABLET ORAL at 11:28

## 2023-06-23 RX ADMIN — LEVOTHYROXINE SODIUM 50 MCG: 0.05 TABLET ORAL at 05:24

## 2023-06-23 RX ADMIN — LORAZEPAM 0.5 MG: 0.5 TABLET ORAL at 11:21

## 2023-06-23 RX ADMIN — MIDODRINE HYDROCHLORIDE 10 MG: 10 TABLET ORAL at 08:34

## 2023-06-23 RX ADMIN — Medication 1000 UNITS: at 08:34

## 2023-06-23 ASSESSMENT — PAIN SCALES - GENERAL
PAINLEVEL_OUTOF10: 2
PAINLEVEL_OUTOF10: 6
PAINLEVEL_OUTOF10: 9
PAINLEVEL_OUTOF10: 6
PAINLEVEL_OUTOF10: 6
PAINLEVEL_OUTOF10: 5
PAINLEVEL_OUTOF10: 10
PAINLEVEL_OUTOF10: 6
PAINLEVEL_OUTOF10: 4
PAINLEVEL_OUTOF10: 0

## 2023-06-23 ASSESSMENT — PAIN DESCRIPTION - DESCRIPTORS
DESCRIPTORS: ACHING;SORE
DESCRIPTORS: SORE;DISCOMFORT;ACHING
DESCRIPTORS: ACHING
DESCRIPTORS: SORE

## 2023-06-23 ASSESSMENT — PAIN DESCRIPTION - LOCATION
LOCATION: BUTTOCKS;NECK
LOCATION: BUTTOCKS
LOCATION: BUTTOCKS;VAGINA
LOCATION: BUTTOCKS

## 2023-06-23 ASSESSMENT — PAIN DESCRIPTION - ORIENTATION: ORIENTATION: RIGHT

## 2023-06-23 ASSESSMENT — PAIN - FUNCTIONAL ASSESSMENT
PAIN_FUNCTIONAL_ASSESSMENT: ACTIVITIES ARE NOT PREVENTED
PAIN_FUNCTIONAL_ASSESSMENT: ACTIVITIES ARE NOT PREVENTED

## 2023-06-24 ENCOUNTER — APPOINTMENT (OUTPATIENT)
Dept: CT IMAGING | Age: 73
DRG: 579 | End: 2023-06-24
Attending: SURGERY
Payer: MEDICARE

## 2023-06-24 ENCOUNTER — ANESTHESIA (OUTPATIENT)
Dept: OPERATING ROOM | Age: 73
End: 2023-06-24
Payer: MEDICARE

## 2023-06-24 LAB
ANION GAP SERPL CALC-SCNC: 14 MEQ/L (ref 8–16)
BASOPHILS ABSOLUTE: 0 THOU/MM3 (ref 0–0.1)
BASOPHILS NFR BLD AUTO: 0.2 %
BUN SERPL-MCNC: 6 MG/DL (ref 7–22)
CALCIUM SERPL-MCNC: 7.9 MG/DL (ref 8.5–10.5)
CHLORIDE SERPL-SCNC: 104 MEQ/L (ref 98–111)
CO2 SERPL-SCNC: 19 MEQ/L (ref 23–33)
CREAT SERPL-MCNC: < 0.2 MG/DL (ref 0.4–1.2)
DEPRECATED RDW RBC AUTO: 60.5 FL (ref 35–45)
EKG ATRIAL RATE: 127 BPM
EKG P AXIS: 45 DEGREES
EKG P-R INTERVAL: 116 MS
EKG Q-T INTERVAL: 336 MS
EKG QRS DURATION: 100 MS
EKG QTC CALCULATION (BAZETT): 488 MS
EKG R AXIS: -40 DEGREES
EKG T AXIS: 110 DEGREES
EKG VENTRICULAR RATE: 127 BPM
EOSINOPHIL NFR BLD AUTO: 0.1 %
EOSINOPHILS ABSOLUTE: 0 THOU/MM3 (ref 0–0.4)
ERYTHROCYTE [DISTWIDTH] IN BLOOD BY AUTOMATED COUNT: 15.1 % (ref 11.5–14.5)
GFR SERPL CREATININE-BSD FRML MDRD: > 60 ML/MIN/1.73M2
GLUCOSE SERPL-MCNC: 107 MG/DL (ref 70–108)
HCT VFR BLD AUTO: 39.7 % (ref 37–47)
HGB BLD-MCNC: 12 GM/DL (ref 12–16)
IMM GRANULOCYTES # BLD AUTO: 0.11 THOU/MM3 (ref 0–0.07)
IMM GRANULOCYTES NFR BLD AUTO: 0.8 %
LYMPHOCYTES ABSOLUTE: 0.7 THOU/MM3 (ref 1–4.8)
LYMPHOCYTES NFR BLD AUTO: 5.3 %
MAGNESIUM SERPL-MCNC: 1.7 MG/DL (ref 1.6–2.4)
MCH RBC QN AUTO: 32.5 PG (ref 26–33)
MCHC RBC AUTO-ENTMCNC: 30.2 GM/DL (ref 32.2–35.5)
MCV RBC AUTO: 107.6 FL (ref 81–99)
MONOCYTES ABSOLUTE: 0.5 THOU/MM3 (ref 0.4–1.3)
MONOCYTES NFR BLD AUTO: 4 %
NEUTROPHILS NFR BLD AUTO: 89.6 %
NRBC BLD AUTO-RTO: 0 /100 WBC
PLATELET # BLD AUTO: 343 THOU/MM3 (ref 130–400)
PMV BLD AUTO: 9.7 FL (ref 9.4–12.4)
POTASSIUM SERPL-SCNC: 4.1 MEQ/L (ref 3.5–5.2)
RBC # BLD AUTO: 3.69 MILL/MM3 (ref 4.2–5.4)
SEGMENTED NEUTROPHILS ABSOLUTE COUNT: 11.9 THOU/MM3 (ref 1.8–7.7)
SODIUM SERPL-SCNC: 137 MEQ/L (ref 135–145)
TROPONIN T: < 0.01 NG/ML
TSH SERPL DL<=0.005 MIU/L-ACNC: 2.38 UIU/ML (ref 0.4–4.2)
WBC # BLD AUTO: 13.3 THOU/MM3 (ref 4.8–10.8)

## 2023-06-24 PROCEDURE — 83735 ASSAY OF MAGNESIUM: CPT

## 2023-06-24 PROCEDURE — 3700000000 HC ANESTHESIA ATTENDED CARE: Performed by: SURGERY

## 2023-06-24 PROCEDURE — 6360000002 HC RX W HCPCS: Performed by: SURGERY

## 2023-06-24 PROCEDURE — 2500000003 HC RX 250 WO HCPCS: Performed by: NURSE ANESTHETIST, CERTIFIED REGISTERED

## 2023-06-24 PROCEDURE — 3700000001 HC ADD 15 MINUTES (ANESTHESIA): Performed by: SURGERY

## 2023-06-24 PROCEDURE — 2709999900 HC NON-CHARGEABLE SUPPLY: Performed by: SURGERY

## 2023-06-24 PROCEDURE — 85025 COMPLETE CBC W/AUTO DIFF WBC: CPT

## 2023-06-24 PROCEDURE — 6360000002 HC RX W HCPCS: Performed by: PHYSICIAN ASSISTANT

## 2023-06-24 PROCEDURE — 6370000000 HC RX 637 (ALT 250 FOR IP): Performed by: SURGERY

## 2023-06-24 PROCEDURE — 3600000013 HC SURGERY LEVEL 3 ADDTL 15MIN: Performed by: SURGERY

## 2023-06-24 PROCEDURE — 99232 SBSQ HOSP IP/OBS MODERATE 35: CPT | Performed by: PHYSICIAN ASSISTANT

## 2023-06-24 PROCEDURE — 6360000002 HC RX W HCPCS: Performed by: NURSE ANESTHETIST, CERTIFIED REGISTERED

## 2023-06-24 PROCEDURE — 84443 ASSAY THYROID STIM HORMONE: CPT

## 2023-06-24 PROCEDURE — 80048 BASIC METABOLIC PNL TOTAL CA: CPT

## 2023-06-24 PROCEDURE — 3600000003 HC SURGERY LEVEL 3 BASE: Performed by: SURGERY

## 2023-06-24 PROCEDURE — 84484 ASSAY OF TROPONIN QUANT: CPT

## 2023-06-24 PROCEDURE — 6360000002 HC RX W HCPCS: Performed by: STUDENT IN AN ORGANIZED HEALTH CARE EDUCATION/TRAINING PROGRAM

## 2023-06-24 PROCEDURE — 2720000010 HC SURG SUPPLY STERILE: Performed by: SURGERY

## 2023-06-24 PROCEDURE — 7100000000 HC PACU RECOVERY - FIRST 15 MIN: Performed by: SURGERY

## 2023-06-24 PROCEDURE — 2500000003 HC RX 250 WO HCPCS: Performed by: SURGERY

## 2023-06-24 PROCEDURE — 7100000001 HC PACU RECOVERY - ADDTL 15 MIN: Performed by: SURGERY

## 2023-06-24 PROCEDURE — 1200000000 HC SEMI PRIVATE

## 2023-06-24 PROCEDURE — 93005 ELECTROCARDIOGRAM TRACING: CPT | Performed by: PHYSICIAN ASSISTANT

## 2023-06-24 PROCEDURE — 93010 ELECTROCARDIOGRAM REPORT: CPT | Performed by: INTERNAL MEDICINE

## 2023-06-24 PROCEDURE — 44206 LAP PART COLECTOMY W/STOMA: CPT | Performed by: SURGERY

## 2023-06-24 PROCEDURE — 2580000003 HC RX 258: Performed by: SURGERY

## 2023-06-24 PROCEDURE — 1200000003 HC TELEMETRY R&B

## 2023-06-24 PROCEDURE — 0D1N4Z4 BYPASS SIGMOID COLON TO CUTANEOUS, PERCUTANEOUS ENDOSCOPIC APPROACH: ICD-10-PCS | Performed by: SURGERY

## 2023-06-24 PROCEDURE — 36415 COLL VENOUS BLD VENIPUNCTURE: CPT

## 2023-06-24 PROCEDURE — 2500000003 HC RX 250 WO HCPCS: Performed by: STUDENT IN AN ORGANIZED HEALTH CARE EDUCATION/TRAINING PROGRAM

## 2023-06-24 RX ORDER — MAGNESIUM SULFATE IN WATER 40 MG/ML
2000 INJECTION, SOLUTION INTRAVENOUS ONCE
Status: COMPLETED | OUTPATIENT
Start: 2023-06-24 | End: 2023-06-24

## 2023-06-24 RX ORDER — SODIUM CHLORIDE 9 MG/ML
INJECTION, SOLUTION INTRAVENOUS PRN
Status: DISCONTINUED | OUTPATIENT
Start: 2023-06-24 | End: 2023-06-24

## 2023-06-24 RX ORDER — DEXAMETHASONE SODIUM PHOSPHATE 10 MG/ML
INJECTION, EMULSION INTRAMUSCULAR; INTRAVENOUS PRN
Status: DISCONTINUED | OUTPATIENT
Start: 2023-06-24 | End: 2023-06-24 | Stop reason: SDUPTHER

## 2023-06-24 RX ORDER — ONDANSETRON 2 MG/ML
4 INJECTION INTRAMUSCULAR; INTRAVENOUS EVERY 6 HOURS PRN
Status: DISCONTINUED | OUTPATIENT
Start: 2023-06-24 | End: 2023-06-29 | Stop reason: HOSPADM

## 2023-06-24 RX ORDER — SODIUM CHLORIDE 0.9 % (FLUSH) 0.9 %
5-40 SYRINGE (ML) INJECTION PRN
Status: DISCONTINUED | OUTPATIENT
Start: 2023-06-24 | End: 2023-06-24 | Stop reason: HOSPADM

## 2023-06-24 RX ORDER — LIDOCAINE HCL/PF 100 MG/5ML
SYRINGE (ML) INJECTION PRN
Status: DISCONTINUED | OUTPATIENT
Start: 2023-06-24 | End: 2023-06-24 | Stop reason: SDUPTHER

## 2023-06-24 RX ORDER — FENTANYL CITRATE 50 UG/ML
50 INJECTION, SOLUTION INTRAMUSCULAR; INTRAVENOUS EVERY 5 MIN PRN
Status: DISCONTINUED | OUTPATIENT
Start: 2023-06-24 | End: 2023-06-24 | Stop reason: HOSPADM

## 2023-06-24 RX ORDER — SODIUM CHLORIDE 0.9 % (FLUSH) 0.9 %
5-40 SYRINGE (ML) INJECTION PRN
Status: DISCONTINUED | OUTPATIENT
Start: 2023-06-24 | End: 2023-06-24

## 2023-06-24 RX ORDER — ROCURONIUM BROMIDE 10 MG/ML
INJECTION, SOLUTION INTRAVENOUS PRN
Status: DISCONTINUED | OUTPATIENT
Start: 2023-06-24 | End: 2023-06-24 | Stop reason: SDUPTHER

## 2023-06-24 RX ORDER — FENTANYL CITRATE 50 UG/ML
INJECTION, SOLUTION INTRAMUSCULAR; INTRAVENOUS PRN
Status: DISCONTINUED | OUTPATIENT
Start: 2023-06-24 | End: 2023-06-24 | Stop reason: SDUPTHER

## 2023-06-24 RX ORDER — ONDANSETRON 2 MG/ML
4 INJECTION INTRAMUSCULAR; INTRAVENOUS
Status: COMPLETED | OUTPATIENT
Start: 2023-06-24 | End: 2023-06-24

## 2023-06-24 RX ORDER — PROPOFOL 10 MG/ML
INJECTION, EMULSION INTRAVENOUS PRN
Status: DISCONTINUED | OUTPATIENT
Start: 2023-06-24 | End: 2023-06-24 | Stop reason: SDUPTHER

## 2023-06-24 RX ORDER — DIPHENHYDRAMINE HYDROCHLORIDE 50 MG/ML
12.5 INJECTION INTRAMUSCULAR; INTRAVENOUS
Status: DISCONTINUED | OUTPATIENT
Start: 2023-06-24 | End: 2023-06-24 | Stop reason: HOSPADM

## 2023-06-24 RX ORDER — CEFOXITIN 1 G/1
INJECTION, POWDER, FOR SOLUTION INTRAVENOUS PRN
Status: DISCONTINUED | OUTPATIENT
Start: 2023-06-24 | End: 2023-06-24 | Stop reason: SDUPTHER

## 2023-06-24 RX ORDER — SODIUM CHLORIDE 0.9 % (FLUSH) 0.9 %
5-40 SYRINGE (ML) INJECTION EVERY 12 HOURS SCHEDULED
Status: DISCONTINUED | OUTPATIENT
Start: 2023-06-24 | End: 2023-06-24

## 2023-06-24 RX ORDER — ONDANSETRON 2 MG/ML
INJECTION INTRAMUSCULAR; INTRAVENOUS PRN
Status: DISCONTINUED | OUTPATIENT
Start: 2023-06-24 | End: 2023-06-24 | Stop reason: SDUPTHER

## 2023-06-24 RX ORDER — SODIUM CHLORIDE 9 MG/ML
INJECTION, SOLUTION INTRAVENOUS PRN
Status: DISCONTINUED | OUTPATIENT
Start: 2023-06-24 | End: 2023-06-24 | Stop reason: HOSPADM

## 2023-06-24 RX ORDER — ONDANSETRON 4 MG/1
4 TABLET, ORALLY DISINTEGRATING ORAL EVERY 8 HOURS PRN
Status: DISCONTINUED | OUTPATIENT
Start: 2023-06-24 | End: 2023-06-29 | Stop reason: HOSPADM

## 2023-06-24 RX ORDER — VASOPRESSIN 20 U/ML
INJECTION PARENTERAL PRN
Status: DISCONTINUED | OUTPATIENT
Start: 2023-06-24 | End: 2023-06-24 | Stop reason: SDUPTHER

## 2023-06-24 RX ORDER — BUPIVACAINE HYDROCHLORIDE 5 MG/ML
INJECTION, SOLUTION PERINEURAL PRN
Status: DISCONTINUED | OUTPATIENT
Start: 2023-06-24 | End: 2023-06-24 | Stop reason: ALTCHOICE

## 2023-06-24 RX ORDER — SODIUM CHLORIDE 0.9 % (FLUSH) 0.9 %
5-40 SYRINGE (ML) INJECTION EVERY 12 HOURS SCHEDULED
Status: DISCONTINUED | OUTPATIENT
Start: 2023-06-24 | End: 2023-06-24 | Stop reason: HOSPADM

## 2023-06-24 RX ORDER — ENOXAPARIN SODIUM 100 MG/ML
40 INJECTION SUBCUTANEOUS DAILY
Status: DISCONTINUED | OUTPATIENT
Start: 2023-06-24 | End: 2023-06-24 | Stop reason: DRUGHIGH

## 2023-06-24 RX ADMIN — CEFOXITIN SODIUM 2000 MG: 2 POWDER, FOR SOLUTION INTRAVENOUS at 08:12

## 2023-06-24 RX ADMIN — VASOPRESSIN 2 UNITS: 20 INJECTION INTRAVENOUS at 09:23

## 2023-06-24 RX ADMIN — PHENYLEPHRINE HYDROCHLORIDE 100 MCG: 10 INJECTION INTRAVENOUS at 08:51

## 2023-06-24 RX ADMIN — MICONAZOLE NITRATE: 20 CREAM TOPICAL at 12:28

## 2023-06-24 RX ADMIN — METOPROLOL SUCCINATE 50 MG: 50 TABLET, EXTENDED RELEASE ORAL at 20:39

## 2023-06-24 RX ADMIN — VASOPRESSIN 2 UNITS: 20 INJECTION INTRAVENOUS at 10:22

## 2023-06-24 RX ADMIN — CARBAMAZEPINE 100 MG: 100 TABLET, CHEWABLE ORAL at 12:26

## 2023-06-24 RX ADMIN — LORAZEPAM 0.5 MG: 0.5 TABLET ORAL at 12:28

## 2023-06-24 RX ADMIN — VASOPRESSIN 2 UNITS: 20 INJECTION INTRAVENOUS at 10:44

## 2023-06-24 RX ADMIN — VASOPRESSIN 2 UNITS: 20 INJECTION INTRAVENOUS at 10:07

## 2023-06-24 RX ADMIN — PHENYLEPHRINE HYDROCHLORIDE 200 MCG: 10 INJECTION INTRAVENOUS at 08:07

## 2023-06-24 RX ADMIN — MIDODRINE HYDROCHLORIDE 10 MG: 10 TABLET ORAL at 12:26

## 2023-06-24 RX ADMIN — PHENYLEPHRINE HYDROCHLORIDE 100 MCG: 10 INJECTION INTRAVENOUS at 08:01

## 2023-06-24 RX ADMIN — PHENYLEPHRINE HYDROCHLORIDE 200 MCG: 10 INJECTION INTRAVENOUS at 10:36

## 2023-06-24 RX ADMIN — CEFOXITIN SODIUM 2000 MG: 2 POWDER, FOR SOLUTION INTRAVENOUS at 10:04

## 2023-06-24 RX ADMIN — MAGNESIUM SULFATE HEPTAHYDRATE 2000 MG: 40 INJECTION, SOLUTION INTRAVENOUS at 21:46

## 2023-06-24 RX ADMIN — MIDODRINE HYDROCHLORIDE 10 MG: 10 TABLET ORAL at 16:46

## 2023-06-24 RX ADMIN — LACTULOSE 10 G: 20 SOLUTION ORAL at 20:39

## 2023-06-24 RX ADMIN — FENTANYL CITRATE 50 MCG: 50 INJECTION, SOLUTION INTRAMUSCULAR; INTRAVENOUS at 11:05

## 2023-06-24 RX ADMIN — DEXAMETHASONE SODIUM PHOSPHATE 8 MG: 10 INJECTION, EMULSION INTRAMUSCULAR; INTRAVENOUS at 08:15

## 2023-06-24 RX ADMIN — VASOPRESSIN 2 UNITS: 20 INJECTION INTRAVENOUS at 10:15

## 2023-06-24 RX ADMIN — LORAZEPAM 0.5 MG: 0.5 TABLET ORAL at 20:39

## 2023-06-24 RX ADMIN — SENNOSIDES 8.6 MG: 8.6 TABLET ORAL at 20:39

## 2023-06-24 RX ADMIN — VASOPRESSIN 2 UNITS: 20 INJECTION INTRAVENOUS at 10:13

## 2023-06-24 RX ADMIN — HYDROXYZINE HYDROCHLORIDE 10 MG: 10 TABLET ORAL at 16:46

## 2023-06-24 RX ADMIN — ONDANSETRON 4 MG: 2 INJECTION INTRAMUSCULAR; INTRAVENOUS at 11:10

## 2023-06-24 RX ADMIN — LEVOTHYROXINE SODIUM 50 MCG: 0.05 TABLET ORAL at 12:27

## 2023-06-24 RX ADMIN — FENTANYL CITRATE 50 MCG: 50 INJECTION, SOLUTION INTRAMUSCULAR; INTRAVENOUS at 08:01

## 2023-06-24 RX ADMIN — HYDROCODONE BITARTRATE AND ACETAMINOPHEN 1 TABLET: 5; 325 TABLET ORAL at 16:45

## 2023-06-24 RX ADMIN — TROSPIUM CHLORIDE 20 MG: 20 TABLET, FILM COATED ORAL at 20:39

## 2023-06-24 RX ADMIN — BUSPIRONE HYDROCHLORIDE 10 MG: 10 TABLET ORAL at 12:25

## 2023-06-24 RX ADMIN — POLYETHYLENE GLYCOL 3350 17 G: 17 POWDER, FOR SOLUTION ORAL at 12:27

## 2023-06-24 RX ADMIN — SODIUM CHLORIDE: 9 INJECTION, SOLUTION INTRAVENOUS at 10:27

## 2023-06-24 RX ADMIN — MORPHINE SULFATE 4 MG: 4 INJECTION, SOLUTION INTRAMUSCULAR; INTRAVENOUS at 20:48

## 2023-06-24 RX ADMIN — VASOPRESSIN 1 UNITS: 20 INJECTION INTRAVENOUS at 09:11

## 2023-06-24 RX ADMIN — SODIUM CHLORIDE: 9 INJECTION, SOLUTION INTRAVENOUS at 04:18

## 2023-06-24 RX ADMIN — VASOPRESSIN 1 UNITS: 20 INJECTION INTRAVENOUS at 09:33

## 2023-06-24 RX ADMIN — SUGAMMADEX 200 MG: 100 INJECTION, SOLUTION INTRAVENOUS at 10:40

## 2023-06-24 RX ADMIN — ROCURONIUM BROMIDE 30 MG: 10 INJECTION INTRAVENOUS at 08:01

## 2023-06-24 RX ADMIN — ROCURONIUM BROMIDE 10 MG: 10 INJECTION INTRAVENOUS at 09:55

## 2023-06-24 RX ADMIN — Medication 100 MG: at 08:01

## 2023-06-24 RX ADMIN — VASOPRESSIN 1 UNITS: 20 INJECTION INTRAVENOUS at 08:58

## 2023-06-24 RX ADMIN — MICONAZOLE NITRATE: 20 CREAM TOPICAL at 20:55

## 2023-06-24 RX ADMIN — BUSPIRONE HYDROCHLORIDE 10 MG: 10 TABLET ORAL at 20:39

## 2023-06-24 RX ADMIN — VASOPRESSIN 2 UNITS: 20 INJECTION INTRAVENOUS at 09:48

## 2023-06-24 RX ADMIN — PROPOFOL 50 MG: 10 INJECTION, EMULSION INTRAVENOUS at 08:01

## 2023-06-24 RX ADMIN — PHENYLEPHRINE HYDROCHLORIDE 200 MCG: 10 INJECTION INTRAVENOUS at 10:24

## 2023-06-24 RX ADMIN — FENTANYL CITRATE 50 MCG: 50 INJECTION, SOLUTION INTRAMUSCULAR; INTRAVENOUS at 11:10

## 2023-06-24 RX ADMIN — ONDANSETRON 4 MG: 2 INJECTION INTRAMUSCULAR; INTRAVENOUS at 10:27

## 2023-06-24 RX ADMIN — CARBAMAZEPINE 100 MG: 100 TABLET, CHEWABLE ORAL at 21:34

## 2023-06-24 RX ADMIN — PHENYLEPHRINE HYDROCHLORIDE 200 MCG: 10 INJECTION INTRAVENOUS at 08:06

## 2023-06-24 RX ADMIN — SODIUM CHLORIDE: 9 INJECTION, SOLUTION INTRAVENOUS at 20:52

## 2023-06-24 ASSESSMENT — PAIN SCALES - GENERAL
PAINLEVEL_OUTOF10: 10
PAINLEVEL_OUTOF10: 7
PAINLEVEL_OUTOF10: 8
PAINLEVEL_OUTOF10: 3
PAINLEVEL_OUTOF10: 10
PAINLEVEL_OUTOF10: 7
PAINLEVEL_OUTOF10: 10
PAINLEVEL_OUTOF10: 5

## 2023-06-24 ASSESSMENT — PAIN DESCRIPTION - DESCRIPTORS
DESCRIPTORS: ACHING
DESCRIPTORS: CRAMPING

## 2023-06-24 ASSESSMENT — PAIN DESCRIPTION - PAIN TYPE: TYPE: SURGICAL PAIN

## 2023-06-24 ASSESSMENT — PAIN - FUNCTIONAL ASSESSMENT: PAIN_FUNCTIONAL_ASSESSMENT: PREVENTS OR INTERFERES WITH MANY ACTIVE NOT PASSIVE ACTIVITIES

## 2023-06-24 ASSESSMENT — PAIN DESCRIPTION - ORIENTATION
ORIENTATION: MID

## 2023-06-24 ASSESSMENT — PAIN DESCRIPTION - LOCATION
LOCATION: COCCYX
LOCATION: ABDOMEN
LOCATION: COCCYX
LOCATION: ABDOMEN
LOCATION: COCCYX

## 2023-06-24 NOTE — ANESTHESIA POSTPROCEDURE EVALUATION
Department of Anesthesiology  Postprocedure Note    Patient: Fabi Nielsen  MRN: [id number]  YOB: 1950  Date of evaluation: 6/24/2023      Procedure Summary     Date: 06/24/23 Room / Location: [facility] OR 01 / Diedra Boos    Anesthesia Start: Lillian Mckeon Anesthesia Stop: 3423    Procedure: Lap Diverting Colostomy (Abdomen) Diagnosis:       Pressure injury of skin of sacral region, unspecified injury stage      (Pressure injury of skin of sacral region, unspecified injury stage [L89.159])    Surgeons: Tvein Merrill DO Responsible Provider: Lon Lopez DO    Anesthesia Type: general ASA Status: 3          Anesthesia Type: No value filed.     Collin Phase I: Collin Score: 9    Collin Phase II:        Anesthesia Post Evaluation    Patient location during evaluation: PACU  Patient participation: complete - patient participated  Level of consciousness: awake and alert  Airway patency: patent  Nausea & Vomiting: no vomiting and no nausea  Complications: no  Cardiovascular status: hemodynamically stable  Respiratory status: acceptable and nasal cannula  Hydration status: stable

## 2023-06-25 LAB
ANION GAP SERPL CALC-SCNC: 14 MEQ/L (ref 8–16)
BASOPHILS ABSOLUTE: 0.1 THOU/MM3 (ref 0–0.1)
BASOPHILS NFR BLD AUTO: 0.6 %
BUN SERPL-MCNC: 9 MG/DL (ref 7–22)
CALCIUM SERPL-MCNC: 8 MG/DL (ref 8.5–10.5)
CHLORIDE SERPL-SCNC: 108 MEQ/L (ref 98–111)
CO2 SERPL-SCNC: 16 MEQ/L (ref 23–33)
CREAT SERPL-MCNC: 0.3 MG/DL (ref 0.4–1.2)
DEPRECATED RDW RBC AUTO: 54.4 FL (ref 35–45)
EOSINOPHIL NFR BLD AUTO: 0.1 %
EOSINOPHILS ABSOLUTE: 0 THOU/MM3 (ref 0–0.4)
ERYTHROCYTE [DISTWIDTH] IN BLOOD BY AUTOMATED COUNT: 15.1 % (ref 11.5–14.5)
GFR SERPL CREATININE-BSD FRML MDRD: > 60 ML/MIN/1.73M2
GLUCOSE SERPL-MCNC: 99 MG/DL (ref 70–108)
HCT VFR BLD AUTO: 37.5 % (ref 37–47)
HGB BLD-MCNC: 12.3 GM/DL (ref 12–16)
IMM GRANULOCYTES # BLD AUTO: 0.15 THOU/MM3 (ref 0–0.07)
IMM GRANULOCYTES NFR BLD AUTO: 1 %
LYMPHOCYTES ABSOLUTE: 2.1 THOU/MM3 (ref 1–4.8)
LYMPHOCYTES NFR BLD AUTO: 13.6 %
MCH RBC QN AUTO: 32.2 PG (ref 26–33)
MCHC RBC AUTO-ENTMCNC: 32.8 GM/DL (ref 32.2–35.5)
MCV RBC AUTO: 98.2 FL (ref 81–99)
MONOCYTES ABSOLUTE: 1.1 THOU/MM3 (ref 0.4–1.3)
MONOCYTES NFR BLD AUTO: 7.5 %
NEUTROPHILS NFR BLD AUTO: 77.2 %
NRBC BLD AUTO-RTO: 0 /100 WBC
PLATELET # BLD AUTO: 560 THOU/MM3 (ref 130–400)
PMV BLD AUTO: 9.8 FL (ref 9.4–12.4)
POTASSIUM SERPL-SCNC: 4.6 MEQ/L (ref 3.5–5.2)
RBC # BLD AUTO: 3.82 MILL/MM3 (ref 4.2–5.4)
SEGMENTED NEUTROPHILS ABSOLUTE COUNT: 11.7 THOU/MM3 (ref 1.8–7.7)
SODIUM SERPL-SCNC: 138 MEQ/L (ref 135–145)
WBC # BLD AUTO: 15.1 THOU/MM3 (ref 4.8–10.8)

## 2023-06-25 PROCEDURE — 1200000003 HC TELEMETRY R&B

## 2023-06-25 PROCEDURE — 6370000000 HC RX 637 (ALT 250 FOR IP): Performed by: SURGERY

## 2023-06-25 PROCEDURE — 6360000002 HC RX W HCPCS: Performed by: SURGERY

## 2023-06-25 PROCEDURE — 85025 COMPLETE CBC W/AUTO DIFF WBC: CPT

## 2023-06-25 PROCEDURE — 6370000000 HC RX 637 (ALT 250 FOR IP): Performed by: PHYSICIAN ASSISTANT

## 2023-06-25 PROCEDURE — 2580000003 HC RX 258: Performed by: PHYSICIAN ASSISTANT

## 2023-06-25 PROCEDURE — 80048 BASIC METABOLIC PNL TOTAL CA: CPT

## 2023-06-25 PROCEDURE — 36415 COLL VENOUS BLD VENIPUNCTURE: CPT

## 2023-06-25 PROCEDURE — 1200000000 HC SEMI PRIVATE

## 2023-06-25 PROCEDURE — 99024 POSTOP FOLLOW-UP VISIT: CPT | Performed by: SURGERY

## 2023-06-25 RX ORDER — SODIUM BICARBONATE 650 MG/1
650 TABLET ORAL 4 TIMES DAILY
Status: DISCONTINUED | OUTPATIENT
Start: 2023-06-25 | End: 2023-06-29 | Stop reason: HOSPADM

## 2023-06-25 RX ORDER — 0.9 % SODIUM CHLORIDE 0.9 %
500 INTRAVENOUS SOLUTION INTRAVENOUS ONCE
Status: COMPLETED | OUTPATIENT
Start: 2023-06-25 | End: 2023-06-25

## 2023-06-25 RX ADMIN — SODIUM BICARBONATE 650 MG: 650 TABLET ORAL at 16:57

## 2023-06-25 RX ADMIN — ENOXAPARIN SODIUM 30 MG: 100 INJECTION SUBCUTANEOUS at 17:03

## 2023-06-25 RX ADMIN — CARBAMAZEPINE 100 MG: 100 TABLET, CHEWABLE ORAL at 21:23

## 2023-06-25 RX ADMIN — MIDODRINE HYDROCHLORIDE 10 MG: 10 TABLET ORAL at 08:21

## 2023-06-25 RX ADMIN — MORPHINE SULFATE 4 MG: 4 INJECTION, SOLUTION INTRAMUSCULAR; INTRAVENOUS at 05:14

## 2023-06-25 RX ADMIN — POLYETHYLENE GLYCOL 3350 17 G: 17 POWDER, FOR SOLUTION ORAL at 10:43

## 2023-06-25 RX ADMIN — LORAZEPAM 0.5 MG: 0.5 TABLET ORAL at 08:21

## 2023-06-25 RX ADMIN — METOPROLOL SUCCINATE 50 MG: 50 TABLET, EXTENDED RELEASE ORAL at 21:23

## 2023-06-25 RX ADMIN — HYDROCODONE BITARTRATE AND ACETAMINOPHEN 1 TABLET: 5; 325 TABLET ORAL at 08:21

## 2023-06-25 RX ADMIN — LACTULOSE 10 G: 20 SOLUTION ORAL at 21:23

## 2023-06-25 RX ADMIN — MORPHINE SULFATE 4 MG: 4 INJECTION, SOLUTION INTRAMUSCULAR; INTRAVENOUS at 14:02

## 2023-06-25 RX ADMIN — DOCUSATE SODIUM 100 MG: 100 CAPSULE, LIQUID FILLED ORAL at 21:23

## 2023-06-25 RX ADMIN — LACTULOSE 10 G: 20 SOLUTION ORAL at 08:22

## 2023-06-25 RX ADMIN — HYDROCODONE BITARTRATE AND ACETAMINOPHEN 1 TABLET: 5; 325 TABLET ORAL at 03:54

## 2023-06-25 RX ADMIN — SODIUM CHLORIDE 500 ML: 9 INJECTION, SOLUTION INTRAVENOUS at 18:13

## 2023-06-25 RX ADMIN — SODIUM BICARBONATE 650 MG: 650 TABLET ORAL at 21:23

## 2023-06-25 RX ADMIN — HYDROCODONE BITARTRATE AND ACETAMINOPHEN 1 TABLET: 5; 325 TABLET ORAL at 12:33

## 2023-06-25 RX ADMIN — CARBAMAZEPINE 100 MG: 100 TABLET, CHEWABLE ORAL at 14:06

## 2023-06-25 RX ADMIN — LORAZEPAM 0.5 MG: 0.5 TABLET ORAL at 16:57

## 2023-06-25 RX ADMIN — BUSPIRONE HYDROCHLORIDE 10 MG: 10 TABLET ORAL at 08:21

## 2023-06-25 RX ADMIN — MIDODRINE HYDROCHLORIDE 10 MG: 10 TABLET ORAL at 12:33

## 2023-06-25 RX ADMIN — CARBAMAZEPINE 100 MG: 100 TABLET, CHEWABLE ORAL at 08:21

## 2023-06-25 RX ADMIN — LACTULOSE 10 G: 20 SOLUTION ORAL at 14:06

## 2023-06-25 RX ADMIN — MICONAZOLE NITRATE: 20 CREAM TOPICAL at 21:28

## 2023-06-25 RX ADMIN — TROSPIUM CHLORIDE 20 MG: 20 TABLET, FILM COATED ORAL at 21:23

## 2023-06-25 RX ADMIN — Medication 1000 UNITS: at 08:21

## 2023-06-25 RX ADMIN — LORAZEPAM 0.5 MG: 0.5 TABLET ORAL at 14:06

## 2023-06-25 RX ADMIN — SENNOSIDES 8.6 MG: 8.6 TABLET ORAL at 21:29

## 2023-06-25 RX ADMIN — ONDANSETRON 4 MG: 2 INJECTION INTRAMUSCULAR; INTRAVENOUS at 03:46

## 2023-06-25 RX ADMIN — ONDANSETRON 4 MG: 2 INJECTION INTRAMUSCULAR; INTRAVENOUS at 14:13

## 2023-06-25 RX ADMIN — MIDODRINE HYDROCHLORIDE 10 MG: 10 TABLET ORAL at 16:57

## 2023-06-25 RX ADMIN — TROSPIUM CHLORIDE 20 MG: 20 TABLET, FILM COATED ORAL at 08:21

## 2023-06-25 RX ADMIN — LORAZEPAM 0.5 MG: 0.5 TABLET ORAL at 21:34

## 2023-06-25 RX ADMIN — BUSPIRONE HYDROCHLORIDE 10 MG: 10 TABLET ORAL at 21:23

## 2023-06-25 RX ADMIN — LEVOTHYROXINE SODIUM 50 MCG: 0.05 TABLET ORAL at 08:21

## 2023-06-25 ASSESSMENT — PAIN DESCRIPTION - DESCRIPTORS
DESCRIPTORS: ACHING
DESCRIPTORS: ACHING;STABBING
DESCRIPTORS: ACHING
DESCRIPTORS: ACHING

## 2023-06-25 ASSESSMENT — PAIN - FUNCTIONAL ASSESSMENT
PAIN_FUNCTIONAL_ASSESSMENT: PREVENTS OR INTERFERES SOME ACTIVE ACTIVITIES AND ADLS
PAIN_FUNCTIONAL_ASSESSMENT: ACTIVITIES ARE NOT PREVENTED

## 2023-06-25 ASSESSMENT — PAIN SCALES - GENERAL
PAINLEVEL_OUTOF10: 10

## 2023-06-25 ASSESSMENT — PAIN DESCRIPTION - ORIENTATION
ORIENTATION: LEFT;UPPER
ORIENTATION: LEFT;UPPER
ORIENTATION: LEFT
ORIENTATION: LEFT;UPPER

## 2023-06-25 ASSESSMENT — PAIN DESCRIPTION - LOCATION
LOCATION: ABDOMEN

## 2023-06-25 ASSESSMENT — PAIN DESCRIPTION - FREQUENCY: FREQUENCY: CONTINUOUS

## 2023-06-25 ASSESSMENT — PAIN DESCRIPTION - PAIN TYPE: TYPE: SURGICAL PAIN

## 2023-06-25 ASSESSMENT — PAIN DESCRIPTION - ONSET: ONSET: ON-GOING

## 2023-06-26 ENCOUNTER — APPOINTMENT (OUTPATIENT)
Dept: CT IMAGING | Age: 73
DRG: 579 | End: 2023-06-26
Attending: SURGERY
Payer: MEDICARE

## 2023-06-26 LAB
ANION GAP SERPL CALC-SCNC: 10 MEQ/L (ref 8–16)
BUN SERPL-MCNC: 11 MG/DL (ref 7–22)
CALCIUM SERPL-MCNC: 8.2 MG/DL (ref 8.5–10.5)
CHLORIDE SERPL-SCNC: 111 MEQ/L (ref 98–111)
CO2 SERPL-SCNC: 19 MEQ/L (ref 23–33)
CREAT SERPL-MCNC: 0.3 MG/DL (ref 0.4–1.2)
DEPRECATED RDW RBC AUTO: 60.9 FL (ref 35–45)
ERYTHROCYTE [DISTWIDTH] IN BLOOD BY AUTOMATED COUNT: 15.4 % (ref 11.5–14.5)
GFR SERPL CREATININE-BSD FRML MDRD: > 60 ML/MIN/1.73M2
GLUCOSE SERPL-MCNC: 98 MG/DL (ref 70–108)
HCT VFR BLD AUTO: 40.1 % (ref 37–47)
HGB BLD-MCNC: 12.3 GM/DL (ref 12–16)
MAGNESIUM SERPL-MCNC: 2 MG/DL (ref 1.6–2.4)
MCH RBC QN AUTO: 32.6 PG (ref 26–33)
MCHC RBC AUTO-ENTMCNC: 30.7 GM/DL (ref 32.2–35.5)
MCV RBC AUTO: 106.4 FL (ref 81–99)
PLATELET # BLD AUTO: 537 THOU/MM3 (ref 130–400)
PMV BLD AUTO: 9 FL (ref 9.4–12.4)
POTASSIUM SERPL-SCNC: 4 MEQ/L (ref 3.5–5.2)
RBC # BLD AUTO: 3.77 MILL/MM3 (ref 4.2–5.4)
SODIUM SERPL-SCNC: 140 MEQ/L (ref 135–145)
WBC # BLD AUTO: 12.6 THOU/MM3 (ref 4.8–10.8)

## 2023-06-26 PROCEDURE — 1200000000 HC SEMI PRIVATE

## 2023-06-26 PROCEDURE — 6370000000 HC RX 637 (ALT 250 FOR IP)

## 2023-06-26 PROCEDURE — 6370000000 HC RX 637 (ALT 250 FOR IP): Performed by: SURGERY

## 2023-06-26 PROCEDURE — 85027 COMPLETE CBC AUTOMATED: CPT

## 2023-06-26 PROCEDURE — 83735 ASSAY OF MAGNESIUM: CPT

## 2023-06-26 PROCEDURE — 99024 POSTOP FOLLOW-UP VISIT: CPT | Performed by: SURGERY

## 2023-06-26 PROCEDURE — 6370000000 HC RX 637 (ALT 250 FOR IP): Performed by: PHYSICIAN ASSISTANT

## 2023-06-26 PROCEDURE — 6360000002 HC RX W HCPCS: Performed by: SURGERY

## 2023-06-26 PROCEDURE — 99223 1ST HOSP IP/OBS HIGH 75: CPT | Performed by: INTERNAL MEDICINE

## 2023-06-26 PROCEDURE — 80048 BASIC METABOLIC PNL TOTAL CA: CPT

## 2023-06-26 PROCEDURE — 6360000004 HC RX CONTRAST MEDICATION: Performed by: PHYSICIAN ASSISTANT

## 2023-06-26 PROCEDURE — 71275 CT ANGIOGRAPHY CHEST: CPT

## 2023-06-26 PROCEDURE — 97605 NEG PRS WND THER DME<=50SQCM: CPT

## 2023-06-26 PROCEDURE — 1200000003 HC TELEMETRY R&B

## 2023-06-26 PROCEDURE — 36415 COLL VENOUS BLD VENIPUNCTURE: CPT

## 2023-06-26 PROCEDURE — 99232 SBSQ HOSP IP/OBS MODERATE 35: CPT | Performed by: PHYSICIAN ASSISTANT

## 2023-06-26 RX ADMIN — LORAZEPAM 0.5 MG: 0.5 TABLET ORAL at 14:54

## 2023-06-26 RX ADMIN — SODIUM BICARBONATE 650 MG: 650 TABLET ORAL at 17:48

## 2023-06-26 RX ADMIN — LORAZEPAM 0.5 MG: 0.5 TABLET ORAL at 10:26

## 2023-06-26 RX ADMIN — MORPHINE SULFATE 4 MG: 4 INJECTION, SOLUTION INTRAMUSCULAR; INTRAVENOUS at 10:49

## 2023-06-26 RX ADMIN — ONDANSETRON 4 MG: 2 INJECTION INTRAMUSCULAR; INTRAVENOUS at 06:25

## 2023-06-26 RX ADMIN — CARBAMAZEPINE 100 MG: 100 TABLET, CHEWABLE ORAL at 10:25

## 2023-06-26 RX ADMIN — TROSPIUM CHLORIDE 20 MG: 20 TABLET, FILM COATED ORAL at 10:25

## 2023-06-26 RX ADMIN — SODIUM BICARBONATE 650 MG: 650 TABLET ORAL at 20:48

## 2023-06-26 RX ADMIN — LEVOTHYROXINE SODIUM 50 MCG: 0.05 TABLET ORAL at 06:18

## 2023-06-26 RX ADMIN — METOPROLOL TARTRATE 25 MG: 25 TABLET, FILM COATED ORAL at 20:46

## 2023-06-26 RX ADMIN — IOPAMIDOL 80 ML: 755 INJECTION, SOLUTION INTRAVENOUS at 13:22

## 2023-06-26 RX ADMIN — MORPHINE SULFATE 4 MG: 4 INJECTION, SOLUTION INTRAMUSCULAR; INTRAVENOUS at 17:48

## 2023-06-26 RX ADMIN — TROSPIUM CHLORIDE 20 MG: 20 TABLET, FILM COATED ORAL at 20:48

## 2023-06-26 RX ADMIN — MORPHINE SULFATE 4 MG: 4 INJECTION, SOLUTION INTRAMUSCULAR; INTRAVENOUS at 20:48

## 2023-06-26 RX ADMIN — METOPROLOL TARTRATE 25 MG: 25 TABLET, FILM COATED ORAL at 12:49

## 2023-06-26 RX ADMIN — ONDANSETRON 4 MG: 4 TABLET, ORALLY DISINTEGRATING ORAL at 12:53

## 2023-06-26 RX ADMIN — ONDANSETRON 4 MG: 2 INJECTION INTRAMUSCULAR; INTRAVENOUS at 23:03

## 2023-06-26 RX ADMIN — MIDODRINE HYDROCHLORIDE 10 MG: 10 TABLET ORAL at 12:49

## 2023-06-26 RX ADMIN — SODIUM BICARBONATE 650 MG: 650 TABLET ORAL at 12:49

## 2023-06-26 RX ADMIN — BUSPIRONE HYDROCHLORIDE 10 MG: 10 TABLET ORAL at 10:26

## 2023-06-26 RX ADMIN — LACTULOSE 10 G: 20 SOLUTION ORAL at 14:54

## 2023-06-26 RX ADMIN — BUSPIRONE HYDROCHLORIDE 10 MG: 10 TABLET ORAL at 20:44

## 2023-06-26 RX ADMIN — MIDODRINE HYDROCHLORIDE 10 MG: 10 TABLET ORAL at 17:48

## 2023-06-26 RX ADMIN — MICONAZOLE NITRATE: 20 CREAM TOPICAL at 20:47

## 2023-06-26 RX ADMIN — DOCUSATE SODIUM 100 MG: 100 CAPSULE, LIQUID FILLED ORAL at 10:25

## 2023-06-26 RX ADMIN — SENNOSIDES 8.6 MG: 8.6 TABLET ORAL at 20:45

## 2023-06-26 RX ADMIN — LACTULOSE 10 G: 20 SOLUTION ORAL at 10:26

## 2023-06-26 RX ADMIN — CARBAMAZEPINE 100 MG: 100 TABLET, CHEWABLE ORAL at 14:54

## 2023-06-26 RX ADMIN — MICONAZOLE NITRATE: 20 CREAM TOPICAL at 10:26

## 2023-06-26 RX ADMIN — POLYETHYLENE GLYCOL 3350 17 G: 17 POWDER, FOR SOLUTION ORAL at 10:26

## 2023-06-26 RX ADMIN — CARBAMAZEPINE 100 MG: 100 TABLET, CHEWABLE ORAL at 20:44

## 2023-06-26 RX ADMIN — MIDODRINE HYDROCHLORIDE 10 MG: 10 TABLET ORAL at 10:25

## 2023-06-26 RX ADMIN — Medication 1000 UNITS: at 10:26

## 2023-06-26 RX ADMIN — DOCUSATE SODIUM 100 MG: 100 CAPSULE, LIQUID FILLED ORAL at 20:45

## 2023-06-26 RX ADMIN — ENOXAPARIN SODIUM 30 MG: 100 INJECTION SUBCUTANEOUS at 17:48

## 2023-06-26 RX ADMIN — SODIUM BICARBONATE 650 MG: 650 TABLET ORAL at 10:25

## 2023-06-26 RX ADMIN — LACTULOSE 10 G: 20 SOLUTION ORAL at 20:46

## 2023-06-26 RX ADMIN — MORPHINE SULFATE 4 MG: 4 INJECTION, SOLUTION INTRAMUSCULAR; INTRAVENOUS at 14:54

## 2023-06-26 RX ADMIN — LORAZEPAM 0.5 MG: 0.5 TABLET ORAL at 20:46

## 2023-06-26 ASSESSMENT — PAIN DESCRIPTION - LOCATION
LOCATION: BUTTOCKS
LOCATION: BUTTOCKS;COCCYX
LOCATION: BACK;BUTTOCKS;COCCYX;LEG
LOCATION: ABDOMEN
LOCATION: COCCYX;BUTTOCKS

## 2023-06-26 ASSESSMENT — PAIN SCALES - GENERAL
PAINLEVEL_OUTOF10: 7
PAINLEVEL_OUTOF10: 7
PAINLEVEL_OUTOF10: 10
PAINLEVEL_OUTOF10: 8

## 2023-06-26 ASSESSMENT — PAIN - FUNCTIONAL ASSESSMENT: PAIN_FUNCTIONAL_ASSESSMENT: ACTIVITIES ARE NOT PREVENTED

## 2023-06-26 ASSESSMENT — PAIN DESCRIPTION - DESCRIPTORS
DESCRIPTORS: ACHING
DESCRIPTORS: ACHING
DESCRIPTORS: ACHING;SORE
DESCRIPTORS: ACHING;THROBBING
DESCRIPTORS: ACHING;PRESSURE

## 2023-06-26 ASSESSMENT — PAIN DESCRIPTION - ORIENTATION: ORIENTATION: UPPER

## 2023-06-26 ASSESSMENT — PAIN DESCRIPTION - PAIN TYPE: TYPE: SURGICAL PAIN

## 2023-06-26 ASSESSMENT — PAIN DESCRIPTION - ONSET: ONSET: ON-GOING

## 2023-06-26 ASSESSMENT — PAIN DESCRIPTION - FREQUENCY: FREQUENCY: CONTINUOUS

## 2023-06-27 PROCEDURE — 99024 POSTOP FOLLOW-UP VISIT: CPT | Performed by: SURGERY

## 2023-06-27 PROCEDURE — 1200000000 HC SEMI PRIVATE

## 2023-06-27 PROCEDURE — 6370000000 HC RX 637 (ALT 250 FOR IP): Performed by: SURGERY

## 2023-06-27 PROCEDURE — 6360000002 HC RX W HCPCS: Performed by: SURGERY

## 2023-06-27 PROCEDURE — 6370000000 HC RX 637 (ALT 250 FOR IP)

## 2023-06-27 PROCEDURE — 6370000000 HC RX 637 (ALT 250 FOR IP): Performed by: PHYSICIAN ASSISTANT

## 2023-06-27 RX ADMIN — SENNOSIDES 8.6 MG: 8.6 TABLET ORAL at 20:34

## 2023-06-27 RX ADMIN — LACTULOSE 10 G: 20 SOLUTION ORAL at 20:34

## 2023-06-27 RX ADMIN — Medication 1000 UNITS: at 08:06

## 2023-06-27 RX ADMIN — MIDODRINE HYDROCHLORIDE 10 MG: 10 TABLET ORAL at 08:08

## 2023-06-27 RX ADMIN — CARBAMAZEPINE 100 MG: 100 TABLET, CHEWABLE ORAL at 20:34

## 2023-06-27 RX ADMIN — MIDODRINE HYDROCHLORIDE 10 MG: 10 TABLET ORAL at 13:45

## 2023-06-27 RX ADMIN — MORPHINE SULFATE 4 MG: 4 INJECTION, SOLUTION INTRAMUSCULAR; INTRAVENOUS at 13:46

## 2023-06-27 RX ADMIN — ENOXAPARIN SODIUM 30 MG: 100 INJECTION SUBCUTANEOUS at 17:57

## 2023-06-27 RX ADMIN — BUSPIRONE HYDROCHLORIDE 10 MG: 10 TABLET ORAL at 20:36

## 2023-06-27 RX ADMIN — MORPHINE SULFATE 4 MG: 4 INJECTION, SOLUTION INTRAMUSCULAR; INTRAVENOUS at 07:55

## 2023-06-27 RX ADMIN — MICONAZOLE NITRATE: 20 CREAM TOPICAL at 20:36

## 2023-06-27 RX ADMIN — DOCUSATE SODIUM 100 MG: 100 CAPSULE, LIQUID FILLED ORAL at 08:06

## 2023-06-27 RX ADMIN — ONDANSETRON 4 MG: 2 INJECTION INTRAMUSCULAR; INTRAVENOUS at 08:24

## 2023-06-27 RX ADMIN — LACTULOSE 10 G: 20 SOLUTION ORAL at 08:06

## 2023-06-27 RX ADMIN — TROSPIUM CHLORIDE 20 MG: 20 TABLET, FILM COATED ORAL at 20:34

## 2023-06-27 RX ADMIN — LACTULOSE 10 G: 20 SOLUTION ORAL at 13:45

## 2023-06-27 RX ADMIN — MIDODRINE HYDROCHLORIDE 10 MG: 10 TABLET ORAL at 17:57

## 2023-06-27 RX ADMIN — CARBAMAZEPINE 100 MG: 100 TABLET, CHEWABLE ORAL at 13:45

## 2023-06-27 RX ADMIN — SODIUM BICARBONATE 650 MG: 650 TABLET ORAL at 20:36

## 2023-06-27 RX ADMIN — CARBAMAZEPINE 100 MG: 100 TABLET, CHEWABLE ORAL at 08:09

## 2023-06-27 RX ADMIN — TROSPIUM CHLORIDE 20 MG: 20 TABLET, FILM COATED ORAL at 08:07

## 2023-06-27 RX ADMIN — BUSPIRONE HYDROCHLORIDE 10 MG: 10 TABLET ORAL at 08:07

## 2023-06-27 RX ADMIN — LEVOTHYROXINE SODIUM 50 MCG: 0.05 TABLET ORAL at 05:57

## 2023-06-27 RX ADMIN — MICONAZOLE NITRATE: 20 CREAM TOPICAL at 08:09

## 2023-06-27 RX ADMIN — MORPHINE SULFATE 4 MG: 4 INJECTION, SOLUTION INTRAMUSCULAR; INTRAVENOUS at 22:50

## 2023-06-27 RX ADMIN — LORAZEPAM 0.5 MG: 0.5 TABLET ORAL at 20:34

## 2023-06-27 RX ADMIN — SODIUM BICARBONATE 650 MG: 650 TABLET ORAL at 08:06

## 2023-06-27 RX ADMIN — LORAZEPAM 0.5 MG: 0.5 TABLET ORAL at 08:06

## 2023-06-27 RX ADMIN — LORAZEPAM 0.5 MG: 0.5 TABLET ORAL at 13:45

## 2023-06-27 RX ADMIN — METOPROLOL TARTRATE 25 MG: 25 TABLET, FILM COATED ORAL at 08:07

## 2023-06-27 RX ADMIN — SODIUM BICARBONATE 650 MG: 650 TABLET ORAL at 13:45

## 2023-06-27 RX ADMIN — METOPROLOL TARTRATE 25 MG: 25 TABLET, FILM COATED ORAL at 20:36

## 2023-06-27 RX ADMIN — POLYETHYLENE GLYCOL 3350 17 G: 17 POWDER, FOR SOLUTION ORAL at 08:09

## 2023-06-27 RX ADMIN — SODIUM BICARBONATE 650 MG: 650 TABLET ORAL at 17:57

## 2023-06-27 ASSESSMENT — PAIN SCALES - GENERAL
PAINLEVEL_OUTOF10: 6
PAINLEVEL_OUTOF10: 8
PAINLEVEL_OUTOF10: 8
PAINLEVEL_OUTOF10: 7

## 2023-06-27 ASSESSMENT — PAIN DESCRIPTION - DESCRIPTORS
DESCRIPTORS: ACHING

## 2023-06-27 ASSESSMENT — PAIN DESCRIPTION - LOCATION
LOCATION: BUTTOCKS;BACK;LEG
LOCATION: BUTTOCKS;ABDOMEN
LOCATION: BACK;COCCYX;BUTTOCKS;LEG

## 2023-06-28 PROCEDURE — 6360000002 HC RX W HCPCS: Performed by: SURGERY

## 2023-06-28 PROCEDURE — 6370000000 HC RX 637 (ALT 250 FOR IP)

## 2023-06-28 PROCEDURE — 6370000000 HC RX 637 (ALT 250 FOR IP): Performed by: SURGERY

## 2023-06-28 PROCEDURE — 1200000000 HC SEMI PRIVATE

## 2023-06-28 PROCEDURE — 99024 POSTOP FOLLOW-UP VISIT: CPT | Performed by: SURGERY

## 2023-06-28 PROCEDURE — 6370000000 HC RX 637 (ALT 250 FOR IP): Performed by: PHYSICIAN ASSISTANT

## 2023-06-28 RX ADMIN — CARBAMAZEPINE 100 MG: 100 TABLET, CHEWABLE ORAL at 13:31

## 2023-06-28 RX ADMIN — LACTULOSE 10 G: 20 SOLUTION ORAL at 13:31

## 2023-06-28 RX ADMIN — CARBAMAZEPINE 100 MG: 100 TABLET, CHEWABLE ORAL at 08:31

## 2023-06-28 RX ADMIN — LORAZEPAM 0.5 MG: 0.5 TABLET ORAL at 13:31

## 2023-06-28 RX ADMIN — Medication 1000 UNITS: at 08:31

## 2023-06-28 RX ADMIN — LORAZEPAM 0.5 MG: 0.5 TABLET ORAL at 20:53

## 2023-06-28 RX ADMIN — SODIUM BICARBONATE 650 MG: 650 TABLET ORAL at 08:31

## 2023-06-28 RX ADMIN — METOPROLOL TARTRATE 25 MG: 25 TABLET, FILM COATED ORAL at 08:31

## 2023-06-28 RX ADMIN — ENOXAPARIN SODIUM 30 MG: 100 INJECTION SUBCUTANEOUS at 18:48

## 2023-06-28 RX ADMIN — LACTULOSE 10 G: 20 SOLUTION ORAL at 08:31

## 2023-06-28 RX ADMIN — LORAZEPAM 0.5 MG: 0.5 TABLET ORAL at 08:31

## 2023-06-28 RX ADMIN — BUSPIRONE HYDROCHLORIDE 10 MG: 10 TABLET ORAL at 08:30

## 2023-06-28 RX ADMIN — LEVOTHYROXINE SODIUM 50 MCG: 0.05 TABLET ORAL at 06:10

## 2023-06-28 RX ADMIN — MIDODRINE HYDROCHLORIDE 10 MG: 10 TABLET ORAL at 08:30

## 2023-06-28 RX ADMIN — MORPHINE SULFATE 2 MG: 2 INJECTION, SOLUTION INTRAMUSCULAR; INTRAVENOUS at 11:36

## 2023-06-28 RX ADMIN — MIDODRINE HYDROCHLORIDE 10 MG: 10 TABLET ORAL at 13:31

## 2023-06-28 RX ADMIN — TROSPIUM CHLORIDE 20 MG: 20 TABLET, FILM COATED ORAL at 08:31

## 2023-06-28 RX ADMIN — DOCUSATE SODIUM 100 MG: 100 CAPSULE, LIQUID FILLED ORAL at 08:31

## 2023-06-28 RX ADMIN — MICONAZOLE NITRATE: 20 CREAM TOPICAL at 08:31

## 2023-06-28 RX ADMIN — POLYETHYLENE GLYCOL 3350 17 G: 17 POWDER, FOR SOLUTION ORAL at 08:31

## 2023-06-28 RX ADMIN — MORPHINE SULFATE 2 MG: 2 INJECTION, SOLUTION INTRAMUSCULAR; INTRAVENOUS at 08:32

## 2023-06-28 RX ADMIN — SODIUM BICARBONATE 650 MG: 650 TABLET ORAL at 13:31

## 2023-06-28 ASSESSMENT — PAIN DESCRIPTION - DESCRIPTORS
DESCRIPTORS: ACHING

## 2023-06-28 ASSESSMENT — PAIN DESCRIPTION - LOCATION
LOCATION: BUTTOCKS
LOCATION: BUTTOCKS;LEG
LOCATION: BUTTOCKS

## 2023-06-28 ASSESSMENT — PAIN SCALES - GENERAL
PAINLEVEL_OUTOF10: 10
PAINLEVEL_OUTOF10: 10
PAINLEVEL_OUTOF10: 0
PAINLEVEL_OUTOF10: 10

## 2023-06-29 ENCOUNTER — APPOINTMENT (OUTPATIENT)
Dept: INTERVENTIONAL RADIOLOGY/VASCULAR | Age: 73
DRG: 579 | End: 2023-06-29
Attending: SURGERY
Payer: MEDICARE

## 2023-06-29 VITALS
RESPIRATION RATE: 20 BRPM | HEIGHT: 63 IN | OXYGEN SATURATION: 95 % | SYSTOLIC BLOOD PRESSURE: 115 MMHG | BODY MASS INDEX: 19.53 KG/M2 | HEART RATE: 105 BPM | WEIGHT: 110.23 LBS | TEMPERATURE: 98.5 F | DIASTOLIC BLOOD PRESSURE: 65 MMHG

## 2023-06-29 PROCEDURE — 6370000000 HC RX 637 (ALT 250 FOR IP): Performed by: SURGERY

## 2023-06-29 PROCEDURE — 6370000000 HC RX 637 (ALT 250 FOR IP): Performed by: PHYSICIAN ASSISTANT

## 2023-06-29 PROCEDURE — 93970 EXTREMITY STUDY: CPT

## 2023-06-29 PROCEDURE — 99238 HOSP IP/OBS DSCHRG MGMT 30/<: CPT | Performed by: SURGERY

## 2023-06-29 PROCEDURE — 99024 POSTOP FOLLOW-UP VISIT: CPT | Performed by: SURGERY

## 2023-06-29 PROCEDURE — 6370000000 HC RX 637 (ALT 250 FOR IP)

## 2023-06-29 RX ADMIN — MIDODRINE HYDROCHLORIDE 10 MG: 10 TABLET ORAL at 09:33

## 2023-06-29 RX ADMIN — SODIUM BICARBONATE 650 MG: 650 TABLET ORAL at 09:32

## 2023-06-29 RX ADMIN — MICONAZOLE NITRATE: 20 CREAM TOPICAL at 09:35

## 2023-06-29 RX ADMIN — DOCUSATE SODIUM 100 MG: 100 CAPSULE, LIQUID FILLED ORAL at 09:23

## 2023-06-29 RX ADMIN — LORAZEPAM 0.5 MG: 0.5 TABLET ORAL at 09:23

## 2023-06-29 RX ADMIN — CARBAMAZEPINE 100 MG: 100 TABLET, CHEWABLE ORAL at 09:23

## 2023-06-29 RX ADMIN — TROSPIUM CHLORIDE 20 MG: 20 TABLET, FILM COATED ORAL at 09:31

## 2023-06-29 RX ADMIN — METOPROLOL TARTRATE 25 MG: 25 TABLET, FILM COATED ORAL at 09:32

## 2023-06-29 RX ADMIN — Medication 1000 UNITS: at 09:31

## 2023-06-29 RX ADMIN — LEVOTHYROXINE SODIUM 50 MCG: 0.05 TABLET ORAL at 06:38

## 2023-06-29 RX ADMIN — DAKIN'S SOLUTION 0.125% (QUARTER STRENGTH): 0.12 SOLUTION at 09:24

## 2023-06-29 RX ADMIN — LACTULOSE 10 G: 20 SOLUTION ORAL at 09:32

## 2023-06-29 RX ADMIN — HYDROCODONE BITARTRATE AND ACETAMINOPHEN 1 TABLET: 5; 325 TABLET ORAL at 03:52

## 2023-06-29 RX ADMIN — HYDROXYZINE HYDROCHLORIDE 10 MG: 10 TABLET ORAL at 09:23

## 2023-06-29 RX ADMIN — BUSPIRONE HYDROCHLORIDE 10 MG: 10 TABLET ORAL at 09:32

## 2023-06-29 RX ADMIN — HYDROCODONE BITARTRATE AND ACETAMINOPHEN 1 TABLET: 5; 325 TABLET ORAL at 09:23

## 2023-06-29 ASSESSMENT — PAIN SCALES - GENERAL
PAINLEVEL_OUTOF10: 6
PAINLEVEL_OUTOF10: 10

## 2023-06-29 ASSESSMENT — PAIN DESCRIPTION - DESCRIPTORS: DESCRIPTORS: DISCOMFORT

## 2023-06-29 ASSESSMENT — PAIN DESCRIPTION - LOCATION: LOCATION: BUTTOCKS

## 2023-06-29 ASSESSMENT — PAIN DESCRIPTION - ORIENTATION: ORIENTATION: MID

## 2023-07-10 ENCOUNTER — TELEPHONE (OUTPATIENT)
Dept: SURGERY | Age: 73
End: 2023-07-10

## 2023-07-10 NOTE — TELEPHONE ENCOUNTER
[facility] faxed over form statin) Pt has elected for Hospice Services. 2) may wound vac be removed? 3) Any specific dssg to area? Note scanned into media.

## 2023-07-11 ENCOUNTER — TELEPHONE (OUTPATIENT)
Dept: SURGERY | Age: 73
End: 2023-07-11

## 2023-07-12 NOTE — TELEPHONE ENCOUNTER
Phone call received from [facility] requesting updated orders for hospice. Orders faxed by [facility]  already, completed.

## 2023-07-27 ENCOUNTER — TELEPHONE (OUTPATIENT)
Dept: WOUND CARE | Age: 73
End: 2023-07-27

## 2023-08-07 ENCOUNTER — TELEPHONE (OUTPATIENT)
Dept: WOUND CARE | Age: 73
End: 2023-08-07

## 2023-09-08 NOTE — PROGRESS NOTES
Marvin Loaiza, was evaluated through a synchronous (real-time) audio-video encounter. The patient (or guardian if applicable) is aware that this is a billable service, which includes applicable co-pays. This Virtual Visit was conducted with patient's (and/or legal guardian's) consent. The visit was conducted pursuant to the emergency declaration under the [address] [place] [address] and the [employer] and [employer] General Act. Patient identification was verified, and a caregiver was present when appropriate. The patient was located at Home: [address] [place] [address]. Provider was located at [facility] ([place] [address]): [address] MONICA BAINS II.[facility],  [address]. Total time spent for this encounter:  15 minutes    --Josseline Franco RN on 10/21/2022 at 1:28 PM    An electronic signature was used to authenticate this note. 0 (no pain/absence of nonverbal indicators of pain)

## 2023-09-15 NOTE — ED NOTES
Bed: 017A  Expected date: 8/29/19  Expected time:   Means of arrival: [facility]  Comments:      Juli Shrestha RN  08/29/19 3679
Speaking Coherently

## 2024-02-26 NOTE — PROGRESS NOTES
February 26, 2024      [facility]  [address] [facility] [place] [address]  Phone: [phone]  Fax: [phone]       Patient: Lisa Ortiz   YOB: 1969  Date of Visit: 02/26/2024    To Whom It May Concern:    Junito Ortiz  was at [facility] on 02/26/2024. The patient may return to work/school on 02/29/2024 with no restrictions. If you have any questions or concerns, or if I can be of further assistance, please do not hesitate to contact me.    Sincerely,    Naseem Srinivasan II, DO      Brett MANDEL has reviewed and agrees with Thee QUINTERO's shift  Assessment.     Brain Ramez, RN  9/23/2022

## 2024-08-11 NOTE — PROGRESS NOTES
Pt has met discharge criteria and states she is ready for discharge to home. IV removed, gauze and tape applied. Dressed in own clothes and personal belongings gathered. Discharge instructions (with opioid medication education information) given to pt and family; pt and family verbalized understanding of discharge instructions, prescriptions and follow up appointments. Pt transported to discharge lobby by [address] staff. [Imaging Present] : Present [de-identified] : XR bilateral knees on 7/16/2024 IMPRESSION: - Prominent enthesophyte formation is seen at the inferior pole of the patella as well as at the tibial tuberosity bilaterally. Small osseous excrescences are noted extending inferiorly from the right fibular head and posterior aspect of the right proximal tibia. These appear to represent small osteochondromas. - There is no fracture or dislocation. There is no tibial or fibular fracture. The knee and ankle joint spaces are preserved.  XR of the right knee from 5/5/2022 show that today's imaging is completely unchanged.

## 2025-07-17 NOTE — ANESTHESIA PRE PROCEDURE
Department of Anesthesiology  Preprocedure Note       Name:  Jayden Ortega   Age:  74 y.o.  :  1950                                          MRN:  [id number]         Date:  2023      Surgeon: Warden Encarnacion):  Marie Nicolas DO    Procedure: Procedure(s):  Lap Diverting Colostomy    Medications prior to admission:   Prior to Admission medications    Medication Sig Start Date End Date Taking? Authorizing Provider   polyethylene glycol (GLYCOLAX) 17 GM/SCOOP powder Take 17 g by mouth daily    Historical Provider, MD   senna (SENOKOT) 8.6 MG tablet Take 1 tablet by mouth at bedtime    Historical Provider, MD   zinc oxide 20 % ointment Apply topically 2 times daily Apply topically as needed. Historical Provider, MD   trospium (SANCTURA) 20 MG tablet take 1 tablet by mouth twice a day 23   Mickyoletayan Marks APRN - CNP   LORazepam (ATIVAN) 0.5 MG tablet Take 1 tablet by mouth in the morning and at bedtime. Historical Provider, MD   HYDROcodone-acetaminophen (NORCO) 5-325 MG per tablet Take 1 tablet by mouth every 6 hours as needed for Pain.     Historical Provider, MD   acetaminophen (TYLENOL) 325 MG tablet Take 2 tablets by mouth every 6 hours as needed for Pain    Historical Provider, MD   hydrOXYzine HCl (ATARAX) 10 MG tablet Take 1 tablet by mouth 3 times daily as needed for Itching    Historical Provider, MD   sodium hypochlorite (DAKINS) 0.125 % SOLN external solution Apply topically daily 23   Nato Alexandre APRN - CNP   midodrine (PROAMATINE) 10 MG tablet Take 1 tablet by mouth 3 times daily (with meals) 23   AL Crowder   busPIRone (BUSPAR) 10 MG tablet Take 1 tablet by mouth 2 times daily 23   AL Crowder   docusate sodium (COLACE) 150 MG/15ML liquid Take 10 mLs by mouth 2 times daily 23   AL Crowder   lactulose [facility]) 10 GM/15ML solution Take 30 mLs by mouth 3 times daily 23   AL Crowder   diclofenac sodium (VOLTAREN) 1 % GEL Apply 4 g
VTE Assessment already completed for this visit

## (undated) PROCEDURE — 0K9M0ZX DRAINAGE OF PERINEUM MUSCLE, OPEN APPROACH, DIAGNOSTIC: ICD-10-PCS

## (undated) DEVICE — SUTURE VCRL + SZ 3-0 L27IN ABSRB UD L26MM SH 1/2 CIR VCP416H

## (undated) DEVICE — TAPE,CLOTH/SILK,CURAD,3"X10YD,LF,40/CS: Brand: CURAD

## (undated) DEVICE — GENERAL LAPAROSCOPY-LF: Brand: MEDLINE INDUSTRIES, INC.

## (undated) DEVICE — PENCIL SMK EVAC ALL IN 1 DSGN ENH VISIBILITY IMPROVED AIR

## (undated) DEVICE — CHLORAPREP 26ML ORANGE

## (undated) DEVICE — SINGLE-USE DIGITAL FLEXIBLE URETEROSCOPE: Brand: LITHOVUE

## (undated) DEVICE — GLOVE ORANGE PI 7   MSG9070

## (undated) DEVICE — SOLUTION SCRB 4OZ 10% PVP I POVIDONE IOD TOP PAINT EXIDINE

## (undated) DEVICE — CATHETER,FOLEY,SILI-ELAST,LTX,18FR,10ML: Brand: MEDLINE

## (undated) DEVICE — SUTURE ETHLN SZ 2-0 L30IN NONABSORBABLE BLK L36MM FSLX 3/8 1674H

## (undated) DEVICE — YANKAUER,BULB TIP,W/O VENT,RIGID,STERILE: Brand: MEDLINE

## (undated) DEVICE — RELOAD STPL H4.1X2MM DIA60MM THCK TISS GRN 6 ROW PWR GST B

## (undated) DEVICE — GLOVE ORANGE PI 7 1/2   MSG9075

## (undated) DEVICE — GOWN,AURORA,NON-REINFORCED,2XL: Brand: MEDLINE

## (undated) DEVICE — YANKAUER,POOLE TIP,STERILE,50/CS: Brand: MEDLINE

## (undated) DEVICE — INTRODUCER CATH 16FR ORNG SUPRPUB PLAS SHRP TIP BVL TRCR

## (undated) DEVICE — DUAL LUMEN URETERAL CATHETER

## (undated) DEVICE — GLOVE ORANGE PI 8   MSG9080

## (undated) DEVICE — ELECTRODE PT RET AD L9FT HI MOIST COND ADH HYDRGEL CORDED

## (undated) DEVICE — Device: Brand: SENSURA MIO

## (undated) DEVICE — CYSTO: Brand: MEDLINE INDUSTRIES, INC.

## (undated) DEVICE — SOLUTION IV 1000ML 0.9% SOD CHL PH 5 INJ USP VIAFLX PLAS

## (undated) DEVICE — SUTURE PROL SZ 2-0 L18IN NONABSORBABLE BLU FS L26MM 3/8 CIR 8685H

## (undated) DEVICE — TUBING INSUFFLATION SMK EVAC HI FLO SET PNEUMOCLEAR

## (undated) DEVICE — SOLUTION SURG PREP POV IOD 7.5% 4 OZ

## (undated) DEVICE — BANDAGE,GAUZE,4.5"X4.1YD,STERILE,LF: Brand: MEDLINE

## (undated) DEVICE — 3M™ RANGER™ FLUID WARMING IRRIGATION SET, 24750, 10/CASE: Brand: 3M™ RANGER™

## (undated) DEVICE — SINGLE ACTION PUMPING SYSTEM

## (undated) DEVICE — NEEDLE SPNL L3.5IN PNK HUB S STL REG WALL FIT STYL W/ QNCKE

## (undated) DEVICE — SOLUTION ANTIFOG VIS SYS CLEARIFY LAPSCP

## (undated) DEVICE — SPONGE GZ W4XL4IN COT 12 PLY TYP VII WVN C FLD DSGN

## (undated) DEVICE — TTL1LYR 16FR10ML 100%SIL TMPST TR: Brand: MEDLINE

## (undated) DEVICE — STRIP SKIN CLSR W0.25XL4IN WHT SPUNBOUND FBR NYL HI ADH

## (undated) DEVICE — TRAY EPI 25GA L3.5IN 0.75% BIPIVCAIN 8.25% D CONTAIN BPA

## (undated) DEVICE — SPONGE DRN W4XL4IN RAYON/POLYESTER 6 PLY NONWOVEN PRECUT

## (undated) DEVICE — SOLUTION IRRIG 3000ML 0.9% SOD CHL USP UROMATIC PLAS CONT

## (undated) DEVICE — TUBING, SUCTION, 1/4" X 12', STRAIGHT: Brand: MEDLINE

## (undated) DEVICE — GLOVE SURG SZ 65 THK91MIL LTX FREE SYN POLYISOPRENE

## (undated) DEVICE — Device

## (undated) DEVICE — TROCAR: Brand: KII® SLEEVE

## (undated) DEVICE — HF-RESECTION ELECTRODE PLASMALOOP LOOP, MEDIUM, 24 FR., 12°-30°, ESG TURIS: Brand: OLYMPUS

## (undated) DEVICE — SUTURE MCRYL + SZ 4-0 L27IN ABSRB UD L19MM PS-2 3/8 CIR MCP426H

## (undated) DEVICE — SPONGE LAP W18XL18IN WHT COT 4 PLY FLD STRUNG RADPQ DISP ST 2 PER PACK

## (undated) DEVICE — BANDAGE COMPR E SGL LAYERED CLSR BGE W/ CLP W4INXL15FT

## (undated) DEVICE — GLOVE SURG SZ 7 L12IN FNGR THK94MIL TRNSLUC YEL LTX HYDRGEL

## (undated) DEVICE — SEALER LAP L37CM MARYLAND JAW OPN NANO COAT MULTIFUNCTIONAL

## (undated) DEVICE — GAUZE,SPONGE,8"X4",12PLY,XRAY,STRL,LF: Brand: MEDLINE

## (undated) DEVICE — CATHETER,FOLEY,SILI-ELAST,LTX,16FR,10ML: Brand: MEDLINE

## (undated) DEVICE — STAPLER 60MM POWERED ECHELON 3000  SHORT 340MM

## (undated) DEVICE — SOLUTION IRRIG 1000ML 0.9% SOD CHL USP POUR PLAS BTL

## (undated) DEVICE — TROCAR: Brand: KII FIOS FIRST ENTRY

## (undated) DEVICE — Z DISCONTINUED BY MEDLINE USE 2711682 TRAY SKIN PREP DRY W/ PREM GLV

## (undated) DEVICE — DRESSING ANITMICROBIAL FOAM OPTIFOAM POSTOP STRP 35 X 10 IN

## (undated) DEVICE — SOLUTION IRRIG 1000ML STRL H2O USP PLAS POUR BTL

## (undated) DEVICE — CYSTO PACK: Brand: MEDLINE INDUSTRIES, INC.

## (undated) DEVICE — PAD,ABDOMINAL,5"X9",ST,LF,25/BX: Brand: MEDLINE INDUSTRIES, INC.

## (undated) DEVICE — GOWN,SIRUS,NONRNF,SETINSLV,XL,20/CS: Brand: MEDLINE

## (undated) DEVICE — PACK,LAPAROSCOPY,PK II,SIRUS: Brand: MEDLINE

## (undated) DEVICE — BLANKET WRM W29.9XL79.1IN UP BODY FORC AIR MISTRAL-AIR

## (undated) DEVICE — DRAINBAG,ANTI-REFLUX TOWER,L/F,2000ML,LL: Brand: MEDLINE

## (undated) DEVICE — PUMP SUC IRR TBNG L10FT W/ HNDPC ASSEMB STRYKEFLOW 2

## (undated) DEVICE — SYRINGE CATH TIP 50ML

## (undated) DEVICE — DRESSING TRNSPAR W5XL4.5IN FLM SHT SEMIPERMEABLE WIND

## (undated) DEVICE — TUBING, SUCTION, 1/4" X 20', STRAIGHT: Brand: MEDLINE INDUSTRIES, INC.

## (undated) DEVICE — BREAST HERNIA: Brand: MEDLINE INDUSTRIES, INC.

## (undated) DEVICE — INSUFFLATION NEEDLE TO ESTABLISH PNEUMOPERITONEUM.: Brand: INSUFFLATION NEEDLE

## (undated) DEVICE — SUTURE VCRL + SZ 0 L27IN ABSRB VLT L26MM UR-6 5/8 CIR VCP603H

## (undated) DEVICE — FAN SPRAY KIT: Brand: PULSAVAC®

## (undated) DEVICE — GUIDEWIRE URO L150CM DIA0.035IN STIFF NIT HYDRPHLC STR TIP

## (undated) DEVICE — GLOVE SURG SZ 85 L12IN THK75MIL DK GRN LTX FREE

## (undated) DEVICE — INTENDED FOR TISSUE SEPARATION, AND OTHER PROCEDURES THAT REQUIRE A SHARP SURGICAL BLADE TO PUNCTURE OR CUT.: Brand: BARD-PARKER ® CARBON RIB-BACK BLADES